# Patient Record
Sex: MALE | Race: WHITE | NOT HISPANIC OR LATINO | URBAN - METROPOLITAN AREA
[De-identification: names, ages, dates, MRNs, and addresses within clinical notes are randomized per-mention and may not be internally consistent; named-entity substitution may affect disease eponyms.]

---

## 2023-08-08 ENCOUNTER — HOSPITAL ENCOUNTER (OUTPATIENT)
Dept: DATA CONVERSION | Facility: HOSPITAL | Age: 68
Discharge: HOME | End: 2023-08-08

## 2023-08-08 DIAGNOSIS — M25.561 PAIN IN RIGHT KNEE: ICD-10-CM

## 2023-08-24 PROBLEM — D36.9 TUBULOVILLOUS ADENOMA: Status: ACTIVE | Noted: 2023-08-24

## 2023-08-24 PROBLEM — D50.9 IRON DEFICIENCY ANEMIA: Status: ACTIVE | Noted: 2023-08-24

## 2023-08-24 PROBLEM — J34.3 HYPERTROPHY OF NASAL TURBINATES: Status: ACTIVE | Noted: 2023-08-24

## 2023-08-24 PROBLEM — J34.2 DEVIATED SEPTUM: Status: ACTIVE | Noted: 2023-08-24

## 2023-08-24 PROBLEM — R53.82 CHRONIC FATIGUE: Status: ACTIVE | Noted: 2023-08-24

## 2023-08-24 PROBLEM — E78.5 HYPERLIPIDEMIA: Status: ACTIVE | Noted: 2023-08-24

## 2023-08-24 PROBLEM — M10.9 GOUT: Status: ACTIVE | Noted: 2023-08-24

## 2023-08-24 PROBLEM — I10 BENIGN HYPERTENSION: Status: ACTIVE | Noted: 2023-08-24

## 2023-08-24 PROBLEM — J30.9 ALLERGIC RHINITIS: Status: ACTIVE | Noted: 2023-08-24

## 2023-08-24 PROBLEM — J31.0 CHRONIC RHINITIS: Status: ACTIVE | Noted: 2023-08-24

## 2023-08-24 PROBLEM — K57.90 DIVERTICULAR DISEASE: Status: ACTIVE | Noted: 2023-08-24

## 2023-08-24 PROBLEM — Z86.010 HX OF COLONIC POLYP: Status: ACTIVE | Noted: 2023-08-24

## 2023-08-24 PROBLEM — Z86.0100 HX OF COLONIC POLYP: Status: ACTIVE | Noted: 2023-08-24

## 2023-08-24 PROBLEM — J34.89 NASAL LESION: Status: ACTIVE | Noted: 2023-08-24

## 2023-08-24 PROBLEM — G47.10 HYPERSOMNOLENCE DISORDER: Status: ACTIVE | Noted: 2023-08-24

## 2023-08-24 PROBLEM — R73.9 HYPERGLYCEMIA: Status: ACTIVE | Noted: 2023-08-24

## 2023-08-24 PROBLEM — G47.33 OBSTRUCTIVE SLEEP APNEA OF ADULT: Status: ACTIVE | Noted: 2023-08-24

## 2023-08-24 PROBLEM — I48.0 PAROXYSMAL ATRIAL FIBRILLATION (MULTI): Status: ACTIVE | Noted: 2023-08-24

## 2023-08-24 RX ORDER — SIMVASTATIN 10 MG/1
10 TABLET, FILM COATED ORAL DAILY
COMMUNITY

## 2023-08-24 RX ORDER — FELODIPINE 10 MG/1
10 TABLET, EXTENDED RELEASE ORAL
COMMUNITY
End: 2024-04-10 | Stop reason: DRUGHIGH

## 2023-08-24 RX ORDER — TERAZOSIN 5 MG/1
10 CAPSULE ORAL DAILY
COMMUNITY

## 2023-08-24 RX ORDER — CLONIDINE HYDROCHLORIDE 0.1 MG/1
TABLET ORAL
COMMUNITY

## 2023-08-24 RX ORDER — BENAZEPRIL HYDROCHLORIDE AND HYDROCHLOROTHIAZIDE 20; 25 MG/1; MG/1
TABLET ORAL
COMMUNITY

## 2023-09-27 ENCOUNTER — HOSPITAL ENCOUNTER (OUTPATIENT)
Dept: DATA CONVERSION | Facility: HOSPITAL | Age: 68
Discharge: HOME | End: 2023-09-27
Payer: COMMERCIAL

## 2023-09-27 DIAGNOSIS — J34.2 DEVIATED NASAL SEPTUM: ICD-10-CM

## 2023-09-27 DIAGNOSIS — J34.3 HYPERTROPHY OF NASAL TURBINATES: ICD-10-CM

## 2023-10-03 ENCOUNTER — OFFICE VISIT (OUTPATIENT)
Dept: OTOLARYNGOLOGY | Facility: CLINIC | Age: 68
End: 2023-10-03
Payer: MEDICARE

## 2023-10-03 DIAGNOSIS — J34.3 HYPERTROPHY OF NASAL TURBINATES: ICD-10-CM

## 2023-10-03 DIAGNOSIS — G47.33 OBSTRUCTIVE SLEEP APNEA OF ADULT: ICD-10-CM

## 2023-10-03 DIAGNOSIS — J34.2 DEVIATED SEPTUM: Primary | ICD-10-CM

## 2023-10-03 DIAGNOSIS — J34.89 NASAL LESION: ICD-10-CM

## 2023-10-03 PROCEDURE — 1159F MED LIST DOCD IN RCRD: CPT | Performed by: OTOLARYNGOLOGY

## 2023-10-03 PROCEDURE — 99024 POSTOP FOLLOW-UP VISIT: CPT | Performed by: OTOLARYNGOLOGY

## 2023-10-03 NOTE — PROGRESS NOTES
HPI  David Chatman is a 68 y.o. male returns status post septoplasty and turbinate surgery September 27, 2023.  His postoperative course was uneventful    He returns in follow-up for nasal obstruction and obstructive sleep apnea. He is using CPAP every night all night with an improvement in his blood pressure, nocturnal awakening and how he feels. He remains bothered by difficulty breathing through the nose during the day. The CPAP overcomes the obstruction at night. The lesion in the right nasal vestibule has gotten larger.   His sleep study showed an apnea-hypopnea index of 77.   History: This is a 67-year-old white male who is here for evaluation of nasal obstruction and snoring and probable sleep apnea. He reports a sleep study 10 years ago that was normal. He has gained significant weight in the meantime. He has bothered by difficulty breathing through the nose both day and night. He uses Flonase on a regular basis. He has loud snoring and witnessed apnea. He does have some postnasal drip          No past medical history on file.         Medications:     Current Outpatient Medications:     benazepriL-hydrochlorothiazide (Lotensin HCT) 20-25 mg tablet, 1 tablet Orally Once a day in am for 90 days, Disp: , Rfl:     cetirizine (ZYRTEC) 10 mg capsule, Take 1 capsule (10 mg) by mouth if needed., Disp: , Rfl:     cloNIDine (Catapres) 0.1 mg tablet, TAKE 1 TABLET TWICE A DAY for 90, Disp: , Rfl:     felodipine ER (Plendil) 10 mg 24 hr tablet, Take 1 tablet (10 mg) by mouth. For 90 days, Disp: , Rfl:     simvastatin (Zocor) 10 mg tablet, Take 1 tablet (10 mg) by mouth once daily. For 90 days, Disp: , Rfl:     terazosin (Hytrin) 5 mg capsule, Take 2 capsules (10 mg) by mouth once daily. For 90 days, Disp: , Rfl:      Allergies:  No Known Allergies     Physical Exam:  Last Recorded Vitals  There were no vitals taken for this visit.  Septal splints were removed.  Turbinates debrided.  Septum straight    Assessment/Plan    Encounter Diagnoses   Name Primary?    Deviated septum Yes    Nasal lesion     Hypertrophy of nasal turbinates     Obstructive sleep apnea of adult          Continue saline nasal spray.  Recheck in 1 week.  Awaiting pathology report on nasal lesion    Bernardo Ramírez MD

## 2023-10-10 ENCOUNTER — OFFICE VISIT (OUTPATIENT)
Dept: OTOLARYNGOLOGY | Facility: CLINIC | Age: 68
End: 2023-10-10
Payer: MEDICARE

## 2023-10-10 VITALS — WEIGHT: 263 LBS | BODY MASS INDEX: 35.62 KG/M2 | HEIGHT: 72 IN | TEMPERATURE: 97.5 F

## 2023-10-10 DIAGNOSIS — G47.33 OBSTRUCTIVE SLEEP APNEA OF ADULT: ICD-10-CM

## 2023-10-10 DIAGNOSIS — J34.3 HYPERTROPHY OF NASAL TURBINATES: ICD-10-CM

## 2023-10-10 DIAGNOSIS — J34.89 NASAL LESION: ICD-10-CM

## 2023-10-10 DIAGNOSIS — J34.2 DEVIATED SEPTUM: Primary | ICD-10-CM

## 2023-10-10 PROCEDURE — 1159F MED LIST DOCD IN RCRD: CPT | Performed by: OTOLARYNGOLOGY

## 2023-10-10 PROCEDURE — 99024 POSTOP FOLLOW-UP VISIT: CPT | Performed by: OTOLARYNGOLOGY

## 2023-10-10 PROCEDURE — 1036F TOBACCO NON-USER: CPT | Performed by: OTOLARYNGOLOGY

## 2023-10-10 NOTE — PROGRESS NOTES
HPI  He returns in follow-up status post septoplasty and turbinate surgery and excision of right nasal vestibule lesion.  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    David Chatman is a 68 y.o. male returns status post septoplasty and turbinate surgery September 27, 2023.  His postoperative course was uneventful    He returns in follow-up for nasal obstruction and obstructive sleep apnea. He is using CPAP every night all night with an improvement in his blood pressure, nocturnal awakening and how he feels. He remains bothered by difficulty breathing through the nose during the day. The CPAP overcomes the obstruction at night. The lesion in the right nasal vestibule has gotten larger.   His sleep study showed an apnea-hypopnea index of 77.   History: This is a 67-year-old white male who is here for evaluation of nasal obstruction and snoring and probable sleep apnea. He reports a sleep study 10 years ago that was normal. He has gained significant weight in the meantime. He has bothered by difficulty breathing through the nose both day and night. He uses Flonase on a regular basis. He has loud snoring and witnessed apnea. He does have some postnasal drip          No past medical history on file.         Medications:     Current Outpatient Medications:     cloNIDine (Catapres) 0.1 mg tablet, TAKE 1 TABLET TWICE A DAY for 90, Disp: , Rfl:     felodipine ER (Plendil) 10 mg 24 hr tablet, Take 1 tablet (10 mg) by mouth. For 90 days, Disp: , Rfl:     simvastatin (Zocor) 10 mg tablet, Take 1 tablet (10 mg) by mouth once daily. For 90 days, Disp: , Rfl:     terazosin (Hytrin) 5 mg capsule, Take 2 capsules (10 mg) by mouth once daily. For 90 days, Disp: , Rfl:     benazepriL-hydrochlorothiazide (Lotensin HCT) 20-25 mg tablet, 1 tablet Orally Once a day in am for 90 days, Disp: , Rfl:     cetirizine (ZYRTEC) 10 mg capsule, Take 1 capsule (10 mg) by mouth if needed., Disp: , Rfl:       Allergies:  No Known Allergies     Physical Exam:  Last Recorded Vitals  Temperature 36.4 °C (97.5 °F), height 1.829 m (6'), weight 119 kg (263 lb).  Nose debrided.  Septum straight.  Turbinates healing well.  Right nasal vestibule healing well without lesion   Assessment/Plan   Encounter Diagnoses   Name Primary?    Deviated septum Yes    Nasal lesion     Hypertrophy of nasal turbinates     Obstructive sleep apnea of adult            Continue saline nasal spray.  Restart CPAP.  Recheck in 2 weeks.  Discussed nasal vestibule pathology.  Will observe    Bernardo Ramírez MD

## 2023-11-03 ENCOUNTER — APPOINTMENT (OUTPATIENT)
Dept: PRIMARY CARE | Facility: CLINIC | Age: 68
End: 2023-11-03
Payer: COMMERCIAL

## 2023-11-03 NOTE — PROGRESS NOTES
Outpatient Visit Note    No chief complaint on file.      With patient's permission, this is a Telemedicine visit with video and audio. The provider and patient participated in this telemedicine encounter.    HPI:  David Chatman is a 68 y.o. male who presents to the office via telemedicine encounter secondary to complaints of acute URI.  He was last seen in the office on 8/7/2023 as a new patient to establish with new primary care provider.           He had most recently been established with [facility] with last visit on 04/27/2023 for 6 month follow-up. Review of chart notes a past medical history significant for hypertension, hyperlipidemia, gout, chronic sinus issues followed by ENT, SHOAIB on CPAP, osteoarthritis /off good Mejias of left knee with orthopedic referral given and prediabetes.    He reports ____    Chronic sinus:  Has established relationship with Dr. Ramírez of ENT to which he follows per protocol. Has attempted to manage symptoms with Flonase nasal spray and Zyrtec. Was additionally found to have SHOAIB and started on CPAP to which he reports. Continued follow-up with ENT as he is actively adjusting to his equipment.                 Current Medications  Current Outpatient Medications   Medication Instructions    benazepriL-hydrochlorothiazide (Lotensin HCT) 20-25 mg tablet 1 tablet Orally Once a day in am for 90 days    cetirizine (ZYRTEC) 10 mg, oral, As needed    cloNIDine (Catapres) 0.1 mg tablet TAKE 1 TABLET TWICE A DAY for 90    felodipine ER (PLENDIL) 10 mg, oral, For 90 days    simvastatin (ZOCOR) 10 mg, oral, Daily, For 90 days    terazosin (HYTRIN) 10 mg, oral, Daily, For 90 days        Allergies  No Known Allergies     No past medical history on file.   No past surgical history on file.  No family history on file.  Social History     Tobacco Use    Smoking status: Never    Smokeless tobacco: Never     Tobacco Use: Low Risk  (10/10/2023)    Patient History      Smoking Tobacco Use: Never     Smokeless Tobacco Use: Never     Passive Exposure: Not on file        ROS  All pertinent positive symptoms are included in the history of present illness.  All other systems have been reviewed and are negative and noncontributory to this patient's current ailments.    VITAL SIGNS  There were no vitals filed for this visit.   There is no height or weight on file to calculate BMI.   Patient is unable to provide    PHYSICAL EXAM  GENERAL APPEARANCE:  Alert and oriented x 3, Pleasant and cooperative, No acute distress.   LUNGS:  No conversational dyspnea or cough during encounter.   PSYCH:  appropriate mood and affect, no difficulty with speech.   Telemedicine visit, no other exam component done.      Assessment/Plan   {Assess/PlanSmartLinks:07904}

## 2023-11-07 ENCOUNTER — APPOINTMENT (OUTPATIENT)
Dept: OTOLARYNGOLOGY | Facility: CLINIC | Age: 68
End: 2023-11-07
Payer: COMMERCIAL

## 2023-11-21 ENCOUNTER — OFFICE VISIT (OUTPATIENT)
Dept: OTOLARYNGOLOGY | Facility: CLINIC | Age: 68
End: 2023-11-21
Payer: MEDICARE

## 2023-11-21 VITALS — WEIGHT: 263 LBS | BODY MASS INDEX: 35.62 KG/M2 | TEMPERATURE: 97.5 F | HEIGHT: 72 IN

## 2023-11-21 DIAGNOSIS — J34.2 DEVIATED SEPTUM: Primary | ICD-10-CM

## 2023-11-21 DIAGNOSIS — J34.89 NASAL LESION: ICD-10-CM

## 2023-11-21 DIAGNOSIS — H93.13 TINNITUS OF BOTH EARS: ICD-10-CM

## 2023-11-21 DIAGNOSIS — H90.3 SENSORINEURAL HEARING LOSS (SNHL) OF BOTH EARS: ICD-10-CM

## 2023-11-21 DIAGNOSIS — G47.33 OBSTRUCTIVE SLEEP APNEA OF ADULT: ICD-10-CM

## 2023-11-21 PROCEDURE — 99024 POSTOP FOLLOW-UP VISIT: CPT | Performed by: OTOLARYNGOLOGY

## 2023-11-21 PROCEDURE — 1159F MED LIST DOCD IN RCRD: CPT | Performed by: OTOLARYNGOLOGY

## 2023-11-21 PROCEDURE — 1036F TOBACCO NON-USER: CPT | Performed by: OTOLARYNGOLOGY

## 2023-11-21 NOTE — PROGRESS NOTES
HPI  He returns in follow-up status post septoplasty and turbinate surgery and excision of right nasal vestibule lesion.  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  He is breathing well and happy with his nasal airway.  He has additional questions regarding tinnitus and  related noise exposure with hearing loss.  He is looking for me to fill out some disability information regarding the hearing loss.  [facility]  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 male returns status post septoplasty and turbinate surgery September 27, 2023.  His postoperative course was uneventful    He returns in follow-up for nasal obstruction and obstructive sleep apnea. He is using CPAP every night all night with an improvement in his blood pressure, nocturnal awakening and how he feels. He remains bothered by difficulty breathing through the nose during the day. The CPAP overcomes the obstruction at night. The lesion in the right nasal vestibule has gotten larger.   His sleep study showed an apnea-hypopnea index of 77.   History: This is a 67-year-old white male who is here for evaluation of nasal obstruction and snoring and probable sleep apnea. He reports a sleep study 10 years ago that was normal. He has gained significant weight in the meantime. He has bothered by difficulty breathing through the nose both day and night. He uses Flonase on a regular basis. He has loud snoring and witnessed apnea. He does have some postnasal drip          No past medical history on file.         Medications:     Current Outpatient Medications:     benazepriL-hydrochlorothiazide (Lotensin HCT) 20-25 mg tablet, 1 tablet Orally Once a day in am for 90 days, Disp: , Rfl:     cetirizine (ZYRTEC) 10 mg capsule, Take 1 capsule (10 mg) by mouth if needed., Disp: , Rfl:     cloNIDine (Catapres) 0.1 mg tablet, TAKE 1 TABLET  TWICE A DAY for 90, Disp: , Rfl:     felodipine ER (Plendil) 10 mg 24 hr tablet, Take 1 tablet (10 mg) by mouth. For 90 days, Disp: , Rfl:     simvastatin (Zocor) 10 mg tablet, Take 1 tablet (10 mg) by mouth once daily. For 90 days, Disp: , Rfl:     terazosin (Hytrin) 5 mg capsule, Take 2 capsules (10 mg) by mouth once daily. For 90 days, Disp: , Rfl:      Allergies:  No Known Allergies     Physical Exam:  Last Recorded Vitals  Temperature 36.4 °C (97.5 °F), height 1.829 m (6'), weight 119 kg (263 lb).  Septum straight.  Turbinates healed.  Right nasal vestibule healed without lesion.  Ear exam normal  Assessment/Plan   Encounter Diagnoses   Name Primary?    Deviated septum Yes    Nasal lesion     Obstructive sleep apnea of adult     Sensorineural hearing loss (SNHL) of both ears     Tinnitus of both ears        He is doing well postoperatively status post septoplasty and turbinate surgery.  No recurrence of nasal vestibule lesion on the right.  He is tolerating CPAP and using it every night.  I have asked him to [facility] and schedule a audiogram and follow-up here for evaluation of his tinnitus and hearing loss.     Bernardo Ramírez MD

## 2023-12-05 ENCOUNTER — APPOINTMENT (OUTPATIENT)
Dept: OTOLARYNGOLOGY | Facility: CLINIC | Age: 68
End: 2023-12-05
Payer: COMMERCIAL

## 2023-12-12 ENCOUNTER — OFFICE VISIT (OUTPATIENT)
Dept: OTOLARYNGOLOGY | Facility: CLINIC | Age: 68
End: 2023-12-12
Payer: MEDICARE

## 2023-12-12 ENCOUNTER — CLINICAL SUPPORT (OUTPATIENT)
Dept: AUDIOLOGY | Facility: CLINIC | Age: 68
End: 2023-12-12
Payer: MEDICARE

## 2023-12-12 VITALS — WEIGHT: 263 LBS | TEMPERATURE: 97.5 F | BODY MASS INDEX: 35.62 KG/M2 | HEIGHT: 72 IN

## 2023-12-12 DIAGNOSIS — J30.9 ALLERGIC RHINITIS, UNSPECIFIED SEASONALITY, UNSPECIFIED TRIGGER: Primary | ICD-10-CM

## 2023-12-12 DIAGNOSIS — H93.13 TINNITUS OF BOTH EARS: ICD-10-CM

## 2023-12-12 DIAGNOSIS — G47.33 OBSTRUCTIVE SLEEP APNEA OF ADULT: ICD-10-CM

## 2023-12-12 DIAGNOSIS — H90.3 SENSORINEURAL HEARING LOSS (SNHL) OF BOTH EARS: ICD-10-CM

## 2023-12-12 DIAGNOSIS — J34.89 NASAL LESION: ICD-10-CM

## 2023-12-12 DIAGNOSIS — H93.13 TINNITUS OF BOTH EARS: Primary | ICD-10-CM

## 2023-12-12 PROCEDURE — 92557 COMPREHENSIVE HEARING TEST: CPT | Performed by: AUDIOLOGIST

## 2023-12-12 PROCEDURE — 92550 TYMPANOMETRY & REFLEX THRESH: CPT | Performed by: AUDIOLOGIST

## 2023-12-12 PROCEDURE — 1159F MED LIST DOCD IN RCRD: CPT | Performed by: OTOLARYNGOLOGY

## 2023-12-12 PROCEDURE — 1036F TOBACCO NON-USER: CPT | Performed by: OTOLARYNGOLOGY

## 2023-12-12 NOTE — PROGRESS NOTES
AUDIOLOGY ADULT AUDIOMETRIC EVALUATION    Name:  David Chatman  :  1955  Age:  68 y.o.  Date of Evaluation:  2023    Reason for visit: Patient is seen in the clinic today at the request of Bernardo Ramírez MD in otolaryngology for an audiologic evaluation.     HISTORY  The patient has a history of bilateral sensorineural hearing loss.  He reported that he has been experiencing constant, bilateral tinnitus for at least twenty years.  Otalgia, aural pressure and dizziness were denied.  He was exposed to excessive noise while serving in the .    EVALUATION  See scanned audiogram: “Media” > “Audiology Report”.    RESULTS  Otoscopic Evaluation:  Right Ear: clear ear canal  Left Ear: clear ear canal    Immittance Measures:  Tympanometry:  Right Ear: Type A, normal tympanic membrane mobility with normal middle ear pressure   Left Ear: Type A, normal tympanic membrane mobility with normal middle ear pressure     Acoustic Reflexes:  Ipsilateral Right Ear: Present at normal levels at 500-2000 Hz, absent at 4000 Hz   Ipsilateral Left Ear: Present at normal levels at 500-2000 Hz, absent at 4000 Hz   Contralateral Right Ear: did not evaluate  Contralateral Left Ear: did not evaluate    Distortion Product Otoacoustic Emissions (DPOAEs):  Right Ear: passed 7807-0085 Hz, absent 8596-7283 Hz  Left Ear: passed 1209-9612 Hz, absent 9282-6426 Hz    Audiometry:  Test Technique and Reliability:   Standard audiometry via supra-aural headphones. Reliability is good.    Pure tone air and bone conduction audiometry:  Right Ear: mild sloping to moderately-severe sensorineural hearing loss at 6674-8069 Hz  Left Ear: mild to moderately-severe sensorineural hearing loss at 8728-4138 Hz    Speech Audiometry (Word Recognition Scores):   Right Ear: Excellent, 100%   Left Ear: Excellent, 100%     IMPRESSIONS  Results of today's audiometric evaluation revealed a bilateral sensorineural hearing loss.    The presence of  acoustic reflexes within normal intensity limits is consistent with normal middle ear and brainstem function, and suggests that auditory sensitivity is not significantly impaired. An elevated or absent acoustic reflex threshold is consistent with a middle ear disorder, hearing loss in the stimulated ear, and/or interruption of neural innervation of the stapedius muscle. Present DPOAEs suggest normal/near normal cochlear outer hair cell function and are consistent with no greater than a mild hearing loss at those frequencies. Absent DPOAEs are consistent with abnormal cochlear outer hair cell function and some degree of hearing loss at those frequencies.    RECOMMENDATIONS  - Follow up with otolaryngology today as scheduled.  - Annual audiologic evaluation, sooner if an acute change is noted.  - Hearing aid evaluation.  - Wear hearing protection when exposed to excessive noise.    PATIENT EDUCATION  Discussed results, impressions and recommendations with the patient. Questions were addressed and the patient was encouraged to contact our office should concerns arise.    Time for this encounter: 10:15-10:45

## 2023-12-12 NOTE — PROGRESS NOTES
HPI  He returns in follow-up status post septoplasty and turbinate surgery and excision of right nasal vestibule lesion.  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  He is breathing well and happy with his nasal airway.    He has additional questions regarding tinnitus and  related noise exposure with hearing loss. 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  An audiogram today shows bilateral mid to high-frequency moderate to severe sensorineural hearing loss with excellent word recognition and normal tympanometry.  He is very bothered by tinnitus.    David Chatman is a 68 y.o. male returns status post septoplasty and turbinate surgery September 27, 2023.  His postoperative course was uneventful    He returns in follow-up for nasal obstruction and obstructive sleep apnea. He is using CPAP every night all night with an improvement in his blood pressure, nocturnal awakening and how he feels. He remains bothered by difficulty breathing through the nose during the day. The CPAP overcomes the obstruction at night. The lesion in the right nasal vestibule has gotten larger.   His sleep study showed an apnea-hypopnea index of 77.   History: This is a 67-year-old white male who is here for evaluation of nasal obstruction and snoring and probable sleep apnea. He reports a sleep study 10 years ago that was normal. He has gained significant weight in the meantime. He has bothered by difficulty breathing through the nose both day and night. He uses Flonase on a regular basis. He has loud snoring and witnessed apnea. He does have some postnasal drip          No past medical history on file.         Medications:     Current Outpatient Medications:     benazepriL-hydrochlorothiazide (Lotensin HCT) 20-25 mg tablet, 1 tablet Orally Once a day in am for 90 days, Disp: , Rfl:     cetirizine (ZYRTEC) 10 mg capsule, Take 1 capsule (10 mg) by mouth if needed., Disp: , Rfl:      cloNIDine (Catapres) 0.1 mg tablet, TAKE 1 TABLET TWICE A DAY for 90, Disp: , Rfl:     felodipine ER (Plendil) 10 mg 24 hr tablet, Take 1 tablet (10 mg) by mouth. For 90 days, Disp: , Rfl:     simvastatin (Zocor) 10 mg tablet, Take 1 tablet (10 mg) by mouth once daily. For 90 days, Disp: , Rfl:     terazosin (Hytrin) 5 mg capsule, Take 2 capsules (10 mg) by mouth once daily. For 90 days, Disp: , Rfl:      Allergies:  No Known Allergies     Physical Exam:  Last Recorded Vitals  Temperature 36.4 °C (97.5 °F), height 1.829 m (6'), weight 119 kg (263 lb).  []General appearance: Well-developed, well-nourished in no acute distress, conversant with normal voice quality    Head/face: No erythema or edema or facial tenderness, and normal facial nerve function bilaterally    External ear: Clear external auditory canals with normal pinnae  Tube status: N/A  Middle ear: Tympanic membranes intact and mobile, middle ears normal.  Tympanic membrane perforation: N/A  Mastoid bowl: N/A  Hearing: Normal conversational awareness at normal speech thresholds    Nose visualized using: Anterior rhinoscopy  Nasal dorsum: Nontraumatic midline appearance  Septum: Midline, nonobstructing  Inferior turbinates: Normal, pink  Secretions: Dry    Oral cavity and oropharynx: Normal  Teeth: Good condition  Floor of mouth: without lesions  Palate: Normal hard palate, soft palate and uvula  Oropharynx: Clear, no lesions present  Buccal mucosa: Normal without masses or lesions  Lips: Normal    Nasopharynx: Inadequate mirror exam secondary to gag/anatomy    Neck:  Salivary glands: Normal bilateral parotid and submandibular glands by inspection and palpation.  Non-thyroid masses: No palpable masses or significant lymphadenopathy  Trachea: Midline  Thyroid: No thyromegaly or palpable nodules  Temporomandibular joint: Nontender  Cervical range of motion: Normal    Neurologic exam: Alert and oriented x3, appropriate affect.  Cranial nerves II-XII normal  bilaterally  Extraocular movement: Extraocular movement intact, normal gaze alignment  Assessment/Plan   No diagnosis found.      He is doing well postoperatively status post septoplasty and turbinate surgery.  No recurrence of nasal vestibule lesion on the right.  He is tolerating CPAP and using it every night.  Follow-up with me in 3 months  I have reviewed his audiogram from the [facility] in 2019 and our audiogram today.  He has classic noise-induced sensorineural hearing loss.  His history of noise exposure in the  is the likely cause of this hearing loss.  It is more likely than not the proximate cause of his hearing loss which is then causing his tinnitus.  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  He will work through the [facility] for this.   Bernardo Ramírez MD

## 2024-02-07 ENCOUNTER — TELEPHONE (OUTPATIENT)
Dept: PRIMARY CARE | Facility: CLINIC | Age: 69
End: 2024-02-07

## 2024-02-07 ENCOUNTER — OFFICE VISIT (OUTPATIENT)
Dept: PRIMARY CARE | Facility: CLINIC | Age: 69
End: 2024-02-07
Payer: MEDICARE

## 2024-02-07 VITALS
WEIGHT: 269 LBS | HEART RATE: 78 BPM | OXYGEN SATURATION: 94 % | TEMPERATURE: 97.5 F | DIASTOLIC BLOOD PRESSURE: 66 MMHG | HEIGHT: 72 IN | SYSTOLIC BLOOD PRESSURE: 146 MMHG | BODY MASS INDEX: 36.44 KG/M2

## 2024-02-07 DIAGNOSIS — N50.811 RIGHT TESTICULAR PAIN: Primary | ICD-10-CM

## 2024-02-07 PROCEDURE — 3077F SYST BP >= 140 MM HG: CPT | Performed by: FAMILY MEDICINE

## 2024-02-07 PROCEDURE — 1036F TOBACCO NON-USER: CPT | Performed by: FAMILY MEDICINE

## 2024-02-07 PROCEDURE — 1159F MED LIST DOCD IN RCRD: CPT | Performed by: FAMILY MEDICINE

## 2024-02-07 PROCEDURE — 99214 OFFICE O/P EST MOD 30 MIN: CPT | Performed by: FAMILY MEDICINE

## 2024-02-07 PROCEDURE — 1160F RVW MEDS BY RX/DR IN RCRD: CPT | Performed by: FAMILY MEDICINE

## 2024-02-07 PROCEDURE — 3078F DIAST BP <80 MM HG: CPT | Performed by: FAMILY MEDICINE

## 2024-02-07 PROCEDURE — 1124F ACP DISCUSS-NO DSCNMKR DOCD: CPT | Performed by: FAMILY MEDICINE

## 2024-02-07 PROCEDURE — 1125F AMNT PAIN NOTED PAIN PRSNT: CPT | Performed by: FAMILY MEDICINE

## 2024-02-07 PROCEDURE — 3008F BODY MASS INDEX DOCD: CPT | Performed by: FAMILY MEDICINE

## 2024-02-07 RX ORDER — SEMAGLUTIDE 0.25 MG/.5ML
0.25 INJECTION, SOLUTION SUBCUTANEOUS
Qty: 2 ML | Refills: 0 | Status: SHIPPED | OUTPATIENT
Start: 2024-02-07 | End: 2024-02-29

## 2024-02-07 RX ORDER — DOXYCYCLINE 100 MG/1
100 CAPSULE ORAL 2 TIMES DAILY
Qty: 20 CAPSULE | Refills: 0 | Status: SHIPPED | OUTPATIENT
Start: 2024-02-07 | End: 2024-02-17

## 2024-02-07 RX ORDER — METHYLPREDNISOLONE 4 MG/1
TABLET ORAL
Qty: 21 TABLET | Refills: 0 | Status: SHIPPED | OUTPATIENT
Start: 2024-02-07 | End: 2024-02-14

## 2024-02-07 ASSESSMENT — PATIENT HEALTH QUESTIONNAIRE - PHQ9
1. LITTLE INTEREST OR PLEASURE IN DOING THINGS: NOT AT ALL
SUM OF ALL RESPONSES TO PHQ9 QUESTIONS 1 AND 2: 0
2. FEELING DOWN, DEPRESSED OR HOPELESS: NOT AT ALL

## 2024-02-07 ASSESSMENT — PAIN SCALES - GENERAL: PAINLEVEL: 8

## 2024-02-07 NOTE — ASSESSMENT & PLAN NOTE
- Unclear the exact cause of your symptoms though we will cover with an anti-inflammatory along with antibiotic  -Ultrasound ordered along with urology referral given plans for consultation if symptoms persist

## 2024-02-07 NOTE — ASSESSMENT & PLAN NOTE
- With ongoing struggles with weight despite lifestyle modification will attempt to initiate medical weight loss medication in the form of Wegovy  -If we are able to get this approved by insurance would plan for follow-up in 3 to 4 weeks  -If this is denied, would recommend talking with insurance/pharmacy to see if there is an approved alternative

## 2024-02-07 NOTE — TELEPHONE ENCOUNTER
The pt has question about [facility] and [facility].  He was supposed to give something for weight loss and he did not get that.  Asking if he can take these two prescribed medications together ?

## 2024-02-07 NOTE — PROGRESS NOTES
Outpatient Visit Note    Chief Complaint   Patient presents with    Groin Swelling     Right testicle swelling and soreness in groin on right side x2 days. No known injury       HPI:  David Chatman is a 68 y.o. male a past medical history significant for hypertension, hyperlipidemia, gout, chronic sinus issues followed by ENT, SHOAIB on CPAP, osteoarthritis /off good Mejias of left knee with orthopedic referral given and prediabetes who presents to the office secondary to testicular complaint.  He was last seen in the office on 8/7/2023 as a new patient to establish with new primary care provider.           He was previously established with [facility] with last visit on 04/27/2023 for 6 month follow-up. Review of chart notes.    He reports right-sided testicular pain and swelling for approximately 2 days ago.  States that he was in the garage working in his car when he started develop soreness/aching discomfort and sensation of swelling in his right testicle.  Denies any injury.  States that pain has occasionally radiated up into his right groin.  Urinary discomfort, discharge, hematuria or other systemic complaints such as fever or chills.  Denies ever having any similar symptomatic complaints in the past states to have taken 1 dose of ibuprofen with no significant symptom improvement.  No reported left-sided testicular complaints.    Separately, patient does express concerns regarding going persistent/progressive weight gain.  Has attempted to modify diet by reducing portions his physical activity as tolerated with no success in losing weight.  With this, he does express interest in potential medical weight loss options.    Current Medications  Current Outpatient Medications   Medication Instructions    benazepriL-hydrochlorothiazide (Lotensin HCT) 20-25 mg tablet 1 tablet Orally Once a day in am for 90 days    cloNIDine (Catapres) 0.1 mg tablet TAKE 1 TABLET TWICE A DAY for 90     doxycycline (VIBRAMYCIN) 100 mg, oral, 2 times daily, Take with at least 8 ounces (large glass) of water, do not lie down for 30 minutes after    felodipine ER (PLENDIL) 10 mg, oral, For 90 days    methylPREDNISolone (Medrol Dospak) 4 mg tablets Take as directed on package.    simvastatin (ZOCOR) 10 mg, oral, Daily, For 90 days    terazosin (HYTRIN) 10 mg, oral, Daily, For 90 days    Wegovy 0.25 mg, subcutaneous, Weekly        Allergies  No Known Allergies     Past Medical History:   Diagnosis Date    Gout     Hyperlipidemia     Hypertension       Past Surgical History:   Procedure Laterality Date    SINUS SURGERY       No family history on file.  Social History     Tobacco Use    Smoking status: Never    Smokeless tobacco: Never   Vaping Use    Vaping Use: Never used   Substance Use Topics    Alcohol use: Yes     Alcohol/week: 2.0 standard drinks of alcohol     Types: 2 Standard drinks or equivalent per week    Drug use: Never       ROS  All pertinent positive symptoms are included in the history of present illness.  All other systems have been reviewed and are negative and noncontributory to this patient's current ailments.    PHQ9/GAD7:        VITAL SIGNS  Vitals:    02/07/24 1259   BP: 146/66   Pulse: 78   Temp: 36.4 °C (97.5 °F)   SpO2: 94%       PHYSICAL EXAM  GENERAL APPEARANCE: alert and oriented, Pleasant and cooperative, No Acute Distress  HEENT: EOMI, PERRLA, MMM  HEART: RRR, normal S1S2, no murmurs, click or rubs  LUNGS: clear to auscultation bilaterally, no wheezes/rhonchi/rales  EXTREMITIES: no edema, normal ROM  : Right testicular enlargement with scrotal erythema and prominent TTP, left testicle grossly unremarkable, no appreciated penile lesions/discharge  SKIN: normal, no rash, unremarkable  NEUROLOGIC EXAM: non-focal exam  MUSCULOSKELETAL: no gross abnormalities  PSYCH: affect is normal, eye contact is good      Assessment/Plan   Problem List Items Addressed This Visit             ICD-10-CM     Right testicular pain - Primary N50.811     - Unclear the exact cause of your symptoms though we will cover with an anti-inflammatory along with antibiotic  -Ultrasound ordered along with urology referral given plans for consultation if symptoms persist         Relevant Medications    doxycycline (Vibramycin) 100 mg capsule    methylPREDNISolone (Medrol Dospak) 4 mg tablets    Other Relevant Orders    US scrotum    Referral to Urology    BMI 36.0-36.9,adult Z68.36     - With ongoing struggles with weight despite lifestyle modification will attempt to initiate medical weight loss medication in the form of Wegovy  -If we are able to get this approved by insurance would plan for follow-up in 3 to 4 weeks  -If this is denied, would recommend talking with insurance/pharmacy to see if there is an approved alternative         Relevant Medications    semaglutide, weight loss, (Wegovy) 0.25 mg/0.5 mL pen injector       Counseling:       Medication education:         Education:  The patient is counseled regarding potential side-effects of all new medications        Understanding:  Patient expressed understanding        Adherence:  No barriers to adherence identified

## 2024-02-07 NOTE — PATIENT INSTRUCTIONS
Problem List Items Addressed This Visit             ICD-10-CM    Right testicular pain - Primary N50.811     - Unclear the exact cause of your symptoms though we will cover with an anti-inflammatory along with antibiotic  -Ultrasound ordered along with urology referral given plans for consultation if symptoms persist         Relevant Medications    doxycycline (Vibramycin) 100 mg capsule    methylPREDNISolone (Medrol Dospak) 4 mg tablets    Other Relevant Orders    US scrotum    Referral to Urology    BMI 36.0-36.9,adult Z68.36     - With ongoing struggles with weight despite lifestyle modification will attempt to initiate medical weight loss medication in the form of Wegovy  -If we are able to get this approved by insurance would plan for follow-up in 3 to 4 weeks  -If this is denied, would recommend talking with insurance/pharmacy to see if there is an approved alternative         Relevant Medications    semaglutide, weight loss, (Wegovy) 0.25 mg/0.5 mL pen injector       Counseling:       Medication education:         Education:  The patient is counseled regarding potential side-effects of all new medications        Understanding:  Patient expressed understanding        Adherence:  No barriers to adherence identified

## 2024-02-08 NOTE — TELEPHONE ENCOUNTER
It is intended for patient to take both Medrol pack and doxycycline at same time together.  Both should be taken as directed with food.  As for the weight loss medication, I did send prescription of Wegovy to [facility] pharmacy.  If they did not fill it it may be related to his insurance not covering the medication or a prior authorization needing to be completed though I have not received any notification of prior authorization.  Would need to reach out to pharmacy to confirm status of prescription

## 2024-03-05 ENCOUNTER — TELEPHONE (OUTPATIENT)
Dept: PRIMARY CARE | Facility: CLINIC | Age: 69
End: 2024-03-05

## 2024-03-12 ENCOUNTER — OFFICE VISIT (OUTPATIENT)
Dept: OTOLARYNGOLOGY | Facility: CLINIC | Age: 69
End: 2024-03-12
Payer: MEDICARE

## 2024-03-12 VITALS — BODY MASS INDEX: 36.44 KG/M2 | WEIGHT: 269 LBS | HEIGHT: 72 IN

## 2024-03-12 DIAGNOSIS — J34.89 NASAL LESION: ICD-10-CM

## 2024-03-12 DIAGNOSIS — H90.3 SENSORINEURAL HEARING LOSS (SNHL) OF BOTH EARS: ICD-10-CM

## 2024-03-12 DIAGNOSIS — H60.543 ECZEMA OF BOTH EXTERNAL EARS: Primary | ICD-10-CM

## 2024-03-12 DIAGNOSIS — G47.33 OBSTRUCTIVE SLEEP APNEA OF ADULT: ICD-10-CM

## 2024-03-12 DIAGNOSIS — J30.9 ALLERGIC RHINITIS, UNSPECIFIED SEASONALITY, UNSPECIFIED TRIGGER: ICD-10-CM

## 2024-03-12 PROCEDURE — 1036F TOBACCO NON-USER: CPT | Performed by: OTOLARYNGOLOGY

## 2024-03-12 PROCEDURE — 1160F RVW MEDS BY RX/DR IN RCRD: CPT | Performed by: OTOLARYNGOLOGY

## 2024-03-12 PROCEDURE — 1159F MED LIST DOCD IN RCRD: CPT | Performed by: OTOLARYNGOLOGY

## 2024-03-12 PROCEDURE — 99214 OFFICE O/P EST MOD 30 MIN: CPT | Performed by: OTOLARYNGOLOGY

## 2024-03-12 PROCEDURE — 3008F BODY MASS INDEX DOCD: CPT | Performed by: OTOLARYNGOLOGY

## 2024-03-12 PROCEDURE — 1125F AMNT PAIN NOTED PAIN PRSNT: CPT | Performed by: OTOLARYNGOLOGY

## 2024-03-12 RX ORDER — FLUOCINOLONE ACETONIDE 0.1 MG/G
CREAM TOPICAL
Qty: 1 G | Refills: 3 | Status: SHIPPED | OUTPATIENT
Start: 2024-03-12

## 2024-03-12 NOTE — PROGRESS NOTES
HPI  He returns in follow-up status post septoplasty and turbinate surgery and excision of right nasal vestibule lesion.  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  He is breathing well and happy with his nasal airway.  In the morning when he wakes up he has some congestion.  He complains of dry itchy ears  He has additional questions regarding tinnitus and  related noise exposure with hearing loss. 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  An audiogram today shows bilateral mid to high-frequency moderate to severe sensorineural hearing loss with excellent word recognition and normal tympanometry.  He is very bothered by tinnitus.    David Chatman is a 68 y.o. male returns status post septoplasty and turbinate surgery September 27, 2023.  His postoperative course was uneventful    He returns in follow-up for nasal obstruction and obstructive sleep apnea. He is using CPAP every night all night with an improvement in his blood pressure, nocturnal awakening and how he feels. He remains bothered by difficulty breathing through the nose during the day. The CPAP overcomes the obstruction at night. The lesion in the right nasal vestibule has gotten larger.   His sleep study showed an apnea-hypopnea index of 77.   History: This is a 67-year-old white male who is here for evaluation of nasal obstruction and snoring and probable sleep apnea. He reports a sleep study 10 years ago that was normal. He has gained significant weight in the meantime. He has bothered by difficulty breathing through the nose both day and night. He uses Flonase on a regular basis. He has loud snoring and witnessed apnea. He does have some postnasal drip          Past Medical History:   Diagnosis Date    Gout     Hyperlipidemia     Hypertension             Medications:     Current Outpatient Medications:     benazepriL-hydrochlorothiazide (Lotensin HCT) 20-25 mg tablet, 1 tablet Orally  Once a day in am for 90 days, Disp: , Rfl:     cloNIDine (Catapres) 0.1 mg tablet, TAKE 1 TABLET TWICE A DAY for 90, Disp: , Rfl:     felodipine ER (Plendil) 10 mg 24 hr tablet, Take 1 tablet (10 mg) by mouth. For 90 days, Disp: , Rfl:     simvastatin (Zocor) 10 mg tablet, Take 1 tablet (10 mg) by mouth once daily. For 90 days, Disp: , Rfl:     terazosin (Hytrin) 5 mg capsule, Take 2 capsules (10 mg) by mouth once daily. For 90 days, Disp: , Rfl:     fluocinolone 0.01 % cream, Applied to external ear canal once daily as needed for itching, Disp: 1 g, Rfl: 3    semaglutide, weight loss, (Wegovy) 0.25 mg/0.5 mL pen injector, Inject 0.25 mg under the skin 1 (one) time per week for 4 doses., Disp: 2 mL, Rfl: 0     Allergies:  No Known Allergies     Physical Exam:  Last Recorded Vitals  Height 1.829 m (6'), weight 122 kg (269 lb).  []General appearance: Well-developed, well-nourished in no acute distress, conversant with normal voice quality    Head/face: No erythema or edema or facial tenderness, and normal facial nerve function bilaterally    External ear: Clear external auditory canals with normal pinnae bilateral eczema  Tube status: N/A  Middle ear: Tympanic membranes intact and mobile, middle ears normal.  Tympanic membrane perforation: N/A  Mastoid bowl: N/A  Hearing: Normal conversational awareness at normal speech thresholds    Nose visualized using: Anterior rhinoscopy  Nasal dorsum: Nontraumatic midline appearance  Septum: Midline, nonobstructing  Inferior turbinates: Normal, pink  Secretions: Dry    Oral cavity and oropharynx: Normal  Teeth: Good condition  Floor of mouth: without lesions  Palate: Normal hard palate, soft palate and uvula  Oropharynx: Clear, no lesions present  Buccal mucosa: Normal without masses or lesions  Lips: Normal    Nasopharynx: Inadequate mirror exam secondary to gag/anatomy    Neck:  Salivary glands: Normal bilateral parotid and submandibular glands by inspection and  palpation.  Non-thyroid masses: No palpable masses or significant lymphadenopathy  Trachea: Midline  Thyroid: No thyromegaly or palpable nodules  Temporomandibular joint: Nontender  Cervical range of motion: Normal    Neurologic exam: Alert and oriented x3, appropriate affect.  Cranial nerves II-XII normal bilaterally  Extraocular movement: Extraocular movement intact, normal gaze alignment  Assessment/Plan   Encounter Diagnoses   Name Primary?    Eczema of both external ears Yes    Allergic rhinitis, unspecified seasonality, unspecified trigger     Obstructive sleep apnea of adult     Nasal lesion     Sensorineural hearing loss (SNHL) of both ears          He is doing well postoperatively status post septoplasty and turbinate surgery.  No recurrence of nasal vestibule lesion on the right.  He is tolerating CPAP and using it every night.  Follow-up with me in 3 months  Fluocinolone cream for the chronic itching in the ears.  Flonase nightly and humidification.   Bernardo Ramírez MD

## 2024-04-10 ENCOUNTER — OFFICE VISIT (OUTPATIENT)
Dept: PRIMARY CARE | Facility: CLINIC | Age: 69
End: 2024-04-10
Payer: MEDICARE

## 2024-04-10 ENCOUNTER — LAB (OUTPATIENT)
Dept: LAB | Facility: LAB | Age: 69
End: 2024-04-10
Payer: MEDICARE

## 2024-04-10 VITALS
WEIGHT: 256.8 LBS | TEMPERATURE: 97.3 F | HEIGHT: 72 IN | OXYGEN SATURATION: 93 % | DIASTOLIC BLOOD PRESSURE: 66 MMHG | HEART RATE: 71 BPM | SYSTOLIC BLOOD PRESSURE: 132 MMHG | BODY MASS INDEX: 34.78 KG/M2

## 2024-04-10 DIAGNOSIS — Z11.59 NEED FOR HEPATITIS C SCREENING TEST: ICD-10-CM

## 2024-04-10 DIAGNOSIS — E78.5 HYPERLIPIDEMIA, UNSPECIFIED HYPERLIPIDEMIA TYPE: ICD-10-CM

## 2024-04-10 DIAGNOSIS — Z00.00 ROUTINE ADULT HEALTH MAINTENANCE: ICD-10-CM

## 2024-04-10 DIAGNOSIS — Z12.5 PROSTATE CANCER SCREENING: ICD-10-CM

## 2024-04-10 DIAGNOSIS — D50.9 IRON DEFICIENCY ANEMIA, UNSPECIFIED IRON DEFICIENCY ANEMIA TYPE: ICD-10-CM

## 2024-04-10 DIAGNOSIS — Z00.00 ROUTINE ADULT HEALTH MAINTENANCE: Primary | ICD-10-CM

## 2024-04-10 DIAGNOSIS — I10 BENIGN HYPERTENSION: ICD-10-CM

## 2024-04-10 DIAGNOSIS — I48.0 PAROXYSMAL ATRIAL FIBRILLATION (MULTI): ICD-10-CM

## 2024-04-10 DIAGNOSIS — M1A.9XX0 CHRONIC GOUT WITHOUT TOPHUS, UNSPECIFIED CAUSE, UNSPECIFIED SITE: ICD-10-CM

## 2024-04-10 DIAGNOSIS — R73.03 PREDIABETES: ICD-10-CM

## 2024-04-10 DIAGNOSIS — R22.9 SUBCUTANEOUS NODULE: ICD-10-CM

## 2024-04-10 DIAGNOSIS — M17.0 PRIMARY OSTEOARTHRITIS OF BOTH KNEES: ICD-10-CM

## 2024-04-10 DIAGNOSIS — Z23 ENCOUNTER FOR IMMUNIZATION: ICD-10-CM

## 2024-04-10 PROBLEM — M17.9 OSTEOARTHRITIS OF KNEE: Status: ACTIVE | Noted: 2024-04-10

## 2024-04-10 PROBLEM — H93.13 TINNITUS OF BOTH EARS: Status: ACTIVE | Noted: 2024-04-10

## 2024-04-10 PROBLEM — H90.3 SENSORINEURAL HEARING LOSS (SNHL) OF BOTH EARS: Status: ACTIVE | Noted: 2024-04-10

## 2024-04-10 LAB
ALBUMIN SERPL-MCNC: 4.5 G/DL (ref 3.5–5)
ALP BLD-CCNC: 88 U/L (ref 35–125)
ALT SERPL-CCNC: 25 U/L (ref 5–40)
ANION GAP SERPL CALC-SCNC: 14 MMOL/L
AST SERPL-CCNC: 23 U/L (ref 5–40)
BILIRUB SERPL-MCNC: 0.6 MG/DL (ref 0.1–1.2)
BUN SERPL-MCNC: 17 MG/DL (ref 8–25)
CALCIUM SERPL-MCNC: 9 MG/DL (ref 8.5–10.4)
CHLORIDE SERPL-SCNC: 104 MMOL/L (ref 97–107)
CHOLEST SERPL-MCNC: 157 MG/DL (ref 133–200)
CHOLEST/HDLC SERPL: 3 {RATIO}
CO2 SERPL-SCNC: 23 MMOL/L (ref 24–31)
CREAT SERPL-MCNC: 1 MG/DL (ref 0.4–1.6)
EGFRCR SERPLBLD CKD-EPI 2021: 83 ML/MIN/1.73M*2
ERYTHROCYTE [DISTWIDTH] IN BLOOD BY AUTOMATED COUNT: 14 % (ref 11.5–14.5)
EST. AVERAGE GLUCOSE BLD GHB EST-MCNC: 114 MG/DL
FERRITIN SERPL-MCNC: 389 NG/ML (ref 30–400)
GLUCOSE SERPL-MCNC: 101 MG/DL (ref 65–99)
HBA1C MFR BLD: 5.6 %
HCT VFR BLD AUTO: 39.7 % (ref 41–52)
HCV AB SER QL: NONREACTIVE
HDLC SERPL-MCNC: 53 MG/DL
HGB BLD-MCNC: 13.4 G/DL (ref 13.5–17.5)
IRON SERPL-MCNC: 71 UG/DL (ref 45–160)
LDLC SERPL CALC-MCNC: 93 MG/DL (ref 65–130)
MCH RBC QN AUTO: 28.8 PG (ref 26–34)
MCHC RBC AUTO-ENTMCNC: 33.8 G/DL (ref 32–36)
MCV RBC AUTO: 85 FL (ref 80–100)
NRBC BLD-RTO: 0 /100 WBCS (ref 0–0)
PLATELET # BLD AUTO: 178 X10*3/UL (ref 150–450)
POTASSIUM SERPL-SCNC: 4 MMOL/L (ref 3.4–5.1)
PROT SERPL-MCNC: 6.7 G/DL (ref 5.9–7.9)
PSA SERPL-MCNC: 1 NG/ML
RBC # BLD AUTO: 4.65 X10*6/UL (ref 4.5–5.9)
SODIUM SERPL-SCNC: 141 MMOL/L (ref 133–145)
TRIGL SERPL-MCNC: 55 MG/DL (ref 40–150)
TSH SERPL DL<=0.05 MIU/L-ACNC: 1.11 MIU/L (ref 0.27–4.2)
URATE SERPL-MCNC: 8.5 MG/DL (ref 3.6–7.7)
WBC # BLD AUTO: 6.1 X10*3/UL (ref 4.4–11.3)

## 2024-04-10 PROCEDURE — 84550 ASSAY OF BLOOD/URIC ACID: CPT

## 2024-04-10 PROCEDURE — 85027 COMPLETE CBC AUTOMATED: CPT

## 2024-04-10 PROCEDURE — 1160F RVW MEDS BY RX/DR IN RCRD: CPT | Performed by: FAMILY MEDICINE

## 2024-04-10 PROCEDURE — 84443 ASSAY THYROID STIM HORMONE: CPT

## 2024-04-10 PROCEDURE — G0103 PSA SCREENING: HCPCS

## 2024-04-10 PROCEDURE — 3078F DIAST BP <80 MM HG: CPT | Performed by: FAMILY MEDICINE

## 2024-04-10 PROCEDURE — 1125F AMNT PAIN NOTED PAIN PRSNT: CPT | Performed by: FAMILY MEDICINE

## 2024-04-10 PROCEDURE — 1159F MED LIST DOCD IN RCRD: CPT | Performed by: FAMILY MEDICINE

## 2024-04-10 PROCEDURE — 83036 HEMOGLOBIN GLYCOSYLATED A1C: CPT

## 2024-04-10 PROCEDURE — 36415 COLL VENOUS BLD VENIPUNCTURE: CPT

## 2024-04-10 PROCEDURE — 80061 LIPID PANEL: CPT

## 2024-04-10 PROCEDURE — G0439 PPPS, SUBSEQ VISIT: HCPCS | Performed by: FAMILY MEDICINE

## 2024-04-10 PROCEDURE — 90677 PCV20 VACCINE IM: CPT | Performed by: FAMILY MEDICINE

## 2024-04-10 PROCEDURE — 86803 HEPATITIS C AB TEST: CPT

## 2024-04-10 PROCEDURE — 3075F SYST BP GE 130 - 139MM HG: CPT | Performed by: FAMILY MEDICINE

## 2024-04-10 PROCEDURE — 1036F TOBACCO NON-USER: CPT | Performed by: FAMILY MEDICINE

## 2024-04-10 PROCEDURE — 83540 ASSAY OF IRON: CPT

## 2024-04-10 PROCEDURE — 99215 OFFICE O/P EST HI 40 MIN: CPT | Performed by: FAMILY MEDICINE

## 2024-04-10 PROCEDURE — 80053 COMPREHEN METABOLIC PANEL: CPT

## 2024-04-10 PROCEDURE — 3008F BODY MASS INDEX DOCD: CPT | Performed by: FAMILY MEDICINE

## 2024-04-10 PROCEDURE — 82728 ASSAY OF FERRITIN: CPT

## 2024-04-10 RX ORDER — FELODIPINE 5 MG/1
5 TABLET, EXTENDED RELEASE ORAL DAILY
Qty: 30 TABLET | Refills: 11 | Status: SHIPPED | OUTPATIENT
Start: 2024-04-10 | End: 2025-04-10

## 2024-04-10 RX ORDER — BISMUTH SUBSALICYLATE 262 MG
1 TABLET,CHEWABLE ORAL DAILY
COMMUNITY

## 2024-04-10 ASSESSMENT — PATIENT HEALTH QUESTIONNAIRE - PHQ9
SUM OF ALL RESPONSES TO PHQ9 QUESTIONS 1 AND 2: 0
1. LITTLE INTEREST OR PLEASURE IN DOING THINGS: NOT AT ALL
2. FEELING DOWN, DEPRESSED OR HOPELESS: NOT AT ALL

## 2024-04-10 ASSESSMENT — PAIN SCALES - GENERAL: PAINLEVEL: 4

## 2024-04-10 NOTE — PATIENT INSTRUCTIONS
Problem List Items Addressed This Visit             ICD-10-CM    Benign hypertension I10     - Blood pressure stable on current regimen  -With recent weight loss and dedicated lifestyle modification efforts, will plan to trial reduced dose of felodipine, reducing from 10 mg to 5 mg  -Please continue to watch blood pressures at home and let us know if pressures increase above 140/90 on routine checks  -Will continue with current medications along with healthy, balanced diet including moderation of salt/caffeine/alcohol         Relevant Medications    felodipine ER (Plendil) 5 mg 24 hr tablet    Other Relevant Orders    TSH with reflex to Free T4 if abnormal    Lipid Panel    Comprehensive Metabolic Panel    CBC    Gout M10.9     - Will assess uric acid levels with blood work ordered today  -Continue to moderate trigger foods including red meat, seafood and alcohol         Relevant Orders    Uric acid    Hyperlipidemia E78.5     - Will check cholesterol levels with blood work ordered  -Continue with current statin regimen along with healthy, low-fat diet         Relevant Orders    TSH with reflex to Free T4 if abnormal    Lipid Panel    Comprehensive Metabolic Panel    Iron deficiency anemia D50.9     - Will monitor blood counts with blood work ordered today         Relevant Orders    CBC    Iron level    Ferritin    Paroxysmal atrial fibrillation (CMS/HCC) I48.0     - Stable         Relevant Medications    felodipine ER (Plendil) 5 mg 24 hr tablet    Osteoarthritis of knee M17.9     - Continue with supportive care efforts         Prediabetes R73.03     - Will monitor A1c sugar average with blood work ordered today  -Continue to focus on healthy, low-fat diet with moderation of carbohydrates and regular physical activity as tolerated         Relevant Orders    Hemoglobin A1c    Prostate Spec.Ag,Screen    Subcutaneous nodule R22.9     - Nodule near left shoulder most likely subcutaneous cyst which no acute intervention  is needed at this time  -Will continue to monitor for any changes or concerns          Other Visit Diagnoses         Codes    Routine adult health maintenance    -  Primary Z00.00    Relevant Orders    TSH with reflex to Free T4 if abnormal    Lipid Panel    Comprehensive Metabolic Panel    CBC    Pneumococcal conjugate vaccine, 20-valent (PREVNAR 20)    Prostate cancer screening     Z12.5    Relevant Orders    Prostate Spec.Ag,Screen    Need for hepatitis C screening test     Z11.59    Relevant Orders    Hepatitis C antibody    Encounter for immunization     Z23    Relevant Orders    Pneumococcal conjugate vaccine, 20-valent (PREVNAR 20)            Additional Visit Plans:  Notes:  PREVENTATIVE HEALTH SCREENINGS INCLUDED:  - Blood pressure screen.  - Blood work may include a cholesterol and diabetes screen if risk factors exist (overweight, high blood pressure etc); screening for sexually transmitted infections; a one time HIV screen for all individuals, and a one time Hepatitis C Virus screen for those born between 8524-6039.  - I encourage you to eat a low-fat, moderate-carbohydrate, low-calorie diet to maintain a normal BMI (under 25) to reduce heart disease, and risk for diabetes  - Moderate intensity exercise for 30 minutes 5 days per week is recommended  - Along with recommendations for nutrition and exercise discussed today helpful resources recommended by the American Academy of Family Practice can be found at www.familydoctor.org or www.choosemyplate.gov    - Colon cancer screening for all ages 45-75 or 85 years old periodically depending on results.  - Cervical cancer screening (pap test) in women between 21-65 years old, periodically depending on results.  - Mammogram screening for breast cancer in women starting at 40-50 years and every 1-2 years.  - For men and women who have a 30 pack year smoking history and currently smoke or have quit in the past 15 years, screening for lung cancer with a yearly low  dose CT scan is recommended starting at age 55 until age 80 years  - For men only who have smoked 100+ cigarettes anytime in their lifetime, a one time ultrasound screening for for abdominal aortic aneurysms starting at age 65 until 75 years old  - Bone density screening (DEXA) for osteoporosis in women aged 65 years and older, once every two years if needed.    IMMUNIZATIONS:  - Flu shot annually.  - Tetanus booster every 10 years.  - Two pneumococcal vaccinations after 65 years old.  - Shingles vaccine for those 50 years or older.     This was a shared decision making visit.    Next Wellness Exam Due  In 1 year from today    Counseling:       Medication education:         Education:  The patient is counseled regarding potential side-effects of all new medications        Understanding:  Patient expressed understanding        Adherence:  No barriers to adherence identified  04/10/24   10:27 AM

## 2024-04-10 NOTE — ASSESSMENT & PLAN NOTE
- Nodule near left shoulder most likely subcutaneous cyst which no acute intervention is needed at this time  -Will continue to monitor for any changes or concerns

## 2024-04-10 NOTE — ASSESSMENT & PLAN NOTE
- Blood pressure stable on current regimen  -With recent weight loss and dedicated lifestyle modification efforts, will plan to trial reduced dose of felodipine, reducing from 10 mg to 5 mg  -Please continue to watch blood pressures at home and let us know if pressures increase above 140/90 on routine checks  -Will continue with current medications along with healthy, balanced diet including moderation of salt/caffeine/alcohol

## 2024-04-10 NOTE — ASSESSMENT & PLAN NOTE
- Will check cholesterol levels with blood work ordered  -Continue with current statin regimen along with healthy, low-fat diet

## 2024-04-10 NOTE — ASSESSMENT & PLAN NOTE
- Will monitor A1c sugar average with blood work ordered today  -Continue to focus on healthy, low-fat diet with moderation of carbohydrates and regular physical activity as tolerated

## 2024-04-10 NOTE — ASSESSMENT & PLAN NOTE
- Will assess uric acid levels with blood work ordered today  -Continue to moderate trigger foods including red meat, seafood and alcohol

## 2024-04-10 NOTE — PROGRESS NOTES
Outpatient Visit Note    Chief Complaint   Patient presents with    Annual Exam       HPI:  David Chatman is a 68 y.o. male a past medical history significant for hypertension, hyperlipidemia, gout, chronic sinus issues followed by ENT, SHOAIB on CPAP, osteoarthritis /Osgood Mejias of left knee with orthopedic referral given and prediabetes who presents the office for an annual Medicare Wellness Visit.  He was last seen in the office on 2/7/2024 secondary to complaints of testicular pain.  Prior to this he had been seen in the office on 8/7/2023 as a new patient to establish with new primary care provider.           He was previously established with [facility] with last visit on 04/27/2023 for 6 month follow-up. Review of chart notes.    At last encounter he reported right-sided testicular pain and swelling for approximately 2 days ago.  Stated that he was in the garage working in his car when he started develop soreness/aching discomfort and sensation of swelling in his right testicle.  Denies any injury.  States that pain has occasionally radiated up into his right groin.  Urinary discomfort, discharge, hematuria or other systemic complaints such as fever or chills.  Denies ever having any similar symptomatic complaints in the past states to have taken 1 dose of ibuprofen with no significant symptom improvement.  No reported left-sided testicular complaints.    Separately, patient does express concerns regarding going persistent/progressive weight gain.  Has attempted to modify diet by reducing portions his physical activity as tolerated with no success in losing weight.  With this, he does express interest in potential medical weight loss options.  Ultimately prescription for Wegovy was given.    Last panel blood work completed in October 2022 including CBC, CMP, ferritin, A1c, iron panel, lipid panel, PSA and uric acid level which was remarkable for hyperglycemia with prediabetic A1c  of 5.9% and elevated uric acid levels.    Well Exam:  Overall, they describes their health as good with no reports of recent illness or hospitalization. They state that their diet is stable. In regards to physical activity, he does stay active in his day-to-day. They deny any significant sleep complaints. No reported issues of chest pain, shortness of breath, headaches, vision/hearing changes, abdominal pain, vomiting, diarrhea, melena, hematochezia, constipation or urinary symptoms.    Preventative Health Maintenance:  In regards to preventative health maintenance, last Tdap received in 2020. Flu shot received for past season. Pneumonia vaccination series previously initiated with Pneumovax received in 2020. In regards to CRC screening, colonoscopy successfully completed in 2022. Shingles vaccination initiated with patient currently needing second and final Shingrix vaccine. COVID-19 vaccination series completed with patient currently up-to-date with recommended boosters.           Hypertension:  Patient was noted to have elevated blood pressure at last encounter with former PCP at which time he was switched to benazepril -hydrochlorothiazide 20-25 mg and clonidine 0.1 mg twice daily. He denies any adverse side effects from medication, admitting to compliance. No reported chest pain, shortness a breath, lightheadedness or dizziness.    Hyperlipidemia:  Has been compliant with simvastatin 10 mg daily with last lipid panel October 2022 showing good control. Denies any myalgias           Prediabetes:  Denies any polyuria, polydipsia, polyphagia or acute vision / sensation changes.           Gout:  Denies any recent attacks with hyperuricemia identified on blood work in October 2022. Diligent lifestyle modification was advocated.           Chronic sinus:  Has established relationship with Dr. Ramírez of ENT to which he follows per protocol. Has attempted to manage symptoms with Flonase nasal spray and Zyrtec. Was  additionally found to have SHOAIB and started on CPAP to which he reports. Continued follow-up with ENT as he is actively adjusting to his equipment.           OA/Osgood-Schlatter:  History of left knee pain with prior x-ray identifying osteoarthritis and tenderness thickening consistent with probable Osgood-Schlatter's. Orthopedic referral previously given though he denies coordinating consultation notes that left knee pain restricts activities. Does have some right-sided knee pain as well though left has historically been more painful in prominent does swell compared to right side.  Physical therapy previously encouraged.    Advanced directives: None on file    Hearing screen: reports no difficulty with hearing and passes finger rub test bilaterally    Does the patient use opioid medications: No  Name of medication: N/A  If yes, do they take this medicine appropriately: N/A    How does the patient rate their health status today: good    Cognitive Screen:  AAAx3  to person, place and time: Yes  3 word recall: Apple, Car, Shoe - Immediate recall: Yes         - 5 minutes recall: Yes   Impression: No cognitive deficiency observed during screening or encounter today      Reviewed:   Past Medical History/Allergies:  Yes  Family History:  Yes  Social History:  Yes  Current Medications:  Yes  Vital Signs:  Yes  Advanced Directives:  discussed  Immunizations:  reviewed today  Home Safety:                    Up & Go test > 30 seconds?  No                   Home have rugs; lack grab bars in bathroom; lack handrail on stairs; have poor lighting?  No                   Hearing difficulties?  No  Geriatric Assessment                   ADL areas requiring assistance:  Does not need help with Dressing, Eating, Ambulating, Toileting, Grooming, Hygiene.                    IADL areas requiring assistance:  Does not need help with Shopping, Housework, Accounting, Transportation, Driving.   Medications: reviewed  Current supplements:   Reviewed and recorded.   Other providers: Reviewed and recorded - Current providers and suppliers: Dr. Vaca, Dr. [facility]      PHQ9/GAD7:         Past Medical History:   Diagnosis Date    Gout     Hyperlipidemia     Hypertension         Current Medications  Current Outpatient Medications   Medication Instructions    benazepriL-hydrochlorothiazide (Lotensin HCT) 20-25 mg tablet 1 tablet Orally Once a day in am for 90 days    cloNIDine (Catapres) 0.1 mg tablet TAKE 1 TABLET TWICE A DAY for 90    felodipine ER (PLENDIL) 5 mg, oral, Daily, Do not crush, chew, or split.    fluocinolone 0.01 % cream Applied to external ear canal once daily as needed for itching    multivitamin tablet 1 tablet, oral, Daily    simvastatin (ZOCOR) 10 mg, oral, Daily, For 90 days    terazosin (HYTRIN) 10 mg, oral, Daily, For 90 days    Wegovy 0.25 mg, subcutaneous, Once Weekly        Allergies  No Known Allergies     Immunizations  Immunization History   Administered Date(s) Administered    Flu vaccine (IIV4), preservative free *Check age/dose* 09/15/2020, 10/12/2021, 10/27/2022    Flu vaccine, quadrivalent, high-dose, preservative free, age 65y+ (FLUZONE) 12/16/2023    Hepatitis B vaccine, adult (RECOMBIVAX, ENGERIX) 09/10/2008, 10/14/2008    Hepatitis B vaccine, pediatric/adolescent (RECOMBIVAX, ENGERIX) 11/30/2006    Influenza, injectable, MDCK, quadrivalent 10/17/2018, 12/01/2019    Influenza, injectable, quadrivalent 10/22/2017    Influenza, seasonal, injectable 09/30/2009, 11/13/2014, 11/10/2015    Novel influenza-H1N1-09, preservative-free 11/20/2009    Pfizer COVID-19 vaccine, Fall 2023, 12 years and older, (30mcg/0.3mL) 12/16/2023    Pfizer COVID-19 vaccine, bivalent, age 12 years and older (30 mcg/0.3 mL) 10/28/2022    Pfizer Purple Cap SARS-CoV-2 02/14/2021, 03/07/2021, 10/23/2021    Pneumococcal conjugate vaccine, 13-valent (PREVNAR 13) 11/24/2018    Pneumococcal polysaccharide vaccine, 23-valent, age 2 years and older  (PNEUMOVAX 23) 09/15/2020    RESPIRATORY SYNCYTIAL VIRUS (RSV), ELIGIBLE PREGNANT PTS, 0.5 ML (ABRYSVO) 01/18/2024    Tdap vaccine, age 7 year and older (BOOSTRIX, ADACEL) 11/30/2006, 09/23/2016, 06/12/2020    Zoster vaccine, recombinant, adult (SHINGRIX) 06/03/2020        Past Surgical History:   Procedure Laterality Date    SINUS SURGERY       No family history on file.  Social History     Tobacco Use    Smoking status: Never    Smokeless tobacco: Never   Vaping Use    Vaping status: Never Used   Substance Use Topics    Alcohol use: Not Currently     Alcohol/week: 2.0 standard drinks of alcohol     Types: 2 Cans of beer per week     Comment: once a week    Drug use: Never     Tobacco Use: Low Risk  (4/10/2024)    Patient History     Smoking Tobacco Use: Never     Smokeless Tobacco Use: Never     Passive Exposure: Not on file        ROS  All pertinent positive symptoms are included in the history of present illness.  All other systems have been reviewed and are negative and noncontributory to this patient's current ailments.    VITAL SIGNS  Vitals:    04/10/24 1000   BP: 132/66   Pulse: 71   Temp: 36.3 °C (97.3 °F)   SpO2: 93%     Vitals:    04/10/24 1000   Weight: 116 kg (256 lb 12.8 oz)      Body mass index is 34.83 kg/m².     PHYSICAL EXAM  GENERAL APPEARANCE: well nourished, well developed, looks like stated age, in no acute distress, not ill or tired appearing, conversing well.   HEENT: no trauma, normocephalic. PERRLA and EOMI with normal external exam. TM's intact with no injection or effusion, no signs of infection. Nares patent, turbinates pink without discharge. Pharynx pink with no exudates or lesions, no enlarged tonsils.   NECK: no nodes, supple without rigidity, no neck mass was observed, no thyromegaly or thyroid nodules.   HEART: regular rate and rhythm, S1 and S2 heard with no murmurs or skipped beats  LUNGS: clear to auscultation bilaterally with no wheezes, crackles or rales.   ABDOMEN: no  organomegaly, soft, nontender, nondistended, no guarding/rebound/rigidity.   EXTREMITIES: moving all extremities equally with no edema or deformities.   SKIN: normal skin color and pigmentation, normal skin turgor without rash, lesions, or nodules visualized.   NEUROLOGIC EXAM: CN II-XII grossly intact, normal gait, normal balance, 5/5 muscle strength, sensation grossly intact.   PSYCH: mood and affect appropriate; alert and oriented to time, place, person; no difficulty with speech or language.     Preventative Services reviewed with patient and copy printed for patient.    Assessment/Plan   Problem List Items Addressed This Visit             ICD-10-CM    Benign hypertension I10     - Blood pressure stable on current regimen  -With recent weight loss and dedicated lifestyle modification efforts, will plan to trial reduced dose of felodipine, reducing from 10 mg to 5 mg  -Please continue to watch blood pressures at home and let us know if pressures increase above 140/90 on routine checks  -Will continue with current medications along with healthy, balanced diet including moderation of salt/caffeine/alcohol         Relevant Medications    felodipine ER (Plendil) 5 mg 24 hr tablet    Other Relevant Orders    TSH with reflex to Free T4 if abnormal    Lipid Panel    Comprehensive Metabolic Panel    CBC    Gout M10.9     - Will assess uric acid levels with blood work ordered today  -Continue to moderate trigger foods including red meat, seafood and alcohol         Relevant Orders    Uric acid    Hyperlipidemia E78.5     - Will check cholesterol levels with blood work ordered  -Continue with current statin regimen along with healthy, low-fat diet         Relevant Orders    TSH with reflex to Free T4 if abnormal    Lipid Panel    Comprehensive Metabolic Panel    Iron deficiency anemia D50.9     - Will monitor blood counts with blood work ordered today         Relevant Orders    CBC    Iron level    Ferritin    Paroxysmal  atrial fibrillation (CMS/HCC) I48.0     - Stable         Relevant Medications    felodipine ER (Plendil) 5 mg 24 hr tablet    Osteoarthritis of knee M17.9     - Continue with supportive care efforts         Prediabetes R73.03     - Will monitor A1c sugar average with blood work ordered today  -Continue to focus on healthy, low-fat diet with moderation of carbohydrates and regular physical activity as tolerated         Relevant Orders    Hemoglobin A1c    Prostate Spec.Ag,Screen    Subcutaneous nodule R22.9     - Nodule near left shoulder most likely subcutaneous cyst which no acute intervention is needed at this time  -Will continue to monitor for any changes or concerns          Other Visit Diagnoses         Codes    Routine adult health maintenance    -  Primary Z00.00    Relevant Orders    TSH with reflex to Free T4 if abnormal    Lipid Panel    Comprehensive Metabolic Panel    CBC    Pneumococcal conjugate vaccine, 20-valent (PREVNAR 20)    Prostate cancer screening     Z12.5    Relevant Orders    Prostate Spec.Ag,Screen    Need for hepatitis C screening test     Z11.59    Relevant Orders    Hepatitis C antibody    Encounter for immunization     Z23    Relevant Orders    Pneumococcal conjugate vaccine, 20-valent (PREVNAR 20)            Additional Visit Plans:  Notes:  PREVENTATIVE HEALTH SCREENINGS INCLUDED:  - Blood pressure screen.  - Blood work may include a cholesterol and diabetes screen if risk factors exist (overweight, high blood pressure etc); screening for sexually transmitted infections; a one time HIV screen for all individuals, and a one time Hepatitis C Virus screen for those born between 3734-0551.  - I encourage you to eat a low-fat, moderate-carbohydrate, low-calorie diet to maintain a normal BMI (under 25) to reduce heart disease, and risk for diabetes  - Moderate intensity exercise for 30 minutes 5 days per week is recommended  - Along with recommendations for nutrition and exercise discussed  today helpful resources recommended by the American Academy of Family Practice can be found at www.familydoctor.org or www.choosemyplate.gov    - Colon cancer screening for all ages 45-75 or 85 years old periodically depending on results.  - Cervical cancer screening (pap test) in women between 21-65 years old, periodically depending on results.  - Mammogram screening for breast cancer in women starting at 40-50 years and every 1-2 years.  - For men and women who have a 30 pack year smoking history and currently smoke or have quit in the past 15 years, screening for lung cancer with a yearly low dose CT scan is recommended starting at age 55 until age 80 years  - For men only who have smoked 100+ cigarettes anytime in their lifetime, a one time ultrasound screening for for abdominal aortic aneurysms starting at age 65 until 75 years old  - Bone density screening (DEXA) for osteoporosis in women aged 65 years and older, once every two years if needed.    IMMUNIZATIONS:  - Flu shot annually.  - Tetanus booster every 10 years.  - Two pneumococcal vaccinations after 65 years old.  - Shingles vaccine for those 50 years or older.     This was a shared decision making visit.    Next Wellness Exam Due  In 1 year from today    Counseling:       Medication education:         Education:  The patient is counseled regarding potential side-effects of all new medications        Understanding:  Patient expressed understanding        Adherence:  No barriers to adherence identified  04/10/24   10:27 AM

## 2024-07-26 DIAGNOSIS — I10 ESSENTIAL (PRIMARY) HYPERTENSION: ICD-10-CM

## 2024-07-29 RX ORDER — CLONIDINE HYDROCHLORIDE 0.1 MG/1
0.1 TABLET ORAL EVERY 12 HOURS
Qty: 180 TABLET | Refills: 1 | Status: SHIPPED | OUTPATIENT
Start: 2024-07-29

## 2024-07-29 RX ORDER — BENAZEPRIL HYDROCHLORIDE AND HYDROCHLOROTHIAZIDE 20; 25 MG/1; MG/1
1 TABLET ORAL DAILY
Qty: 90 TABLET | Refills: 1 | Status: SHIPPED | OUTPATIENT
Start: 2024-07-29 | End: 2025-01-25

## 2024-07-30 ENCOUNTER — APPOINTMENT (OUTPATIENT)
Dept: CARDIOLOGY | Facility: HOSPITAL | Age: 69
End: 2024-07-30
Payer: MEDICARE

## 2024-07-30 ENCOUNTER — HOSPITAL ENCOUNTER (EMERGENCY)
Facility: HOSPITAL | Age: 69
Discharge: HOME | End: 2024-07-30
Attending: STUDENT IN AN ORGANIZED HEALTH CARE EDUCATION/TRAINING PROGRAM
Payer: MEDICARE

## 2024-07-30 ENCOUNTER — APPOINTMENT (OUTPATIENT)
Dept: OTOLARYNGOLOGY | Facility: CLINIC | Age: 69
End: 2024-07-30
Payer: COMMERCIAL

## 2024-07-30 ENCOUNTER — APPOINTMENT (OUTPATIENT)
Dept: RADIOLOGY | Facility: HOSPITAL | Age: 69
End: 2024-07-30
Payer: MEDICARE

## 2024-07-30 VITALS
HEART RATE: 69 BPM | TEMPERATURE: 97.7 F | RESPIRATION RATE: 16 BRPM | OXYGEN SATURATION: 94 % | BODY MASS INDEX: 33.51 KG/M2 | SYSTOLIC BLOOD PRESSURE: 133 MMHG | WEIGHT: 252.87 LBS | HEIGHT: 73 IN | DIASTOLIC BLOOD PRESSURE: 60 MMHG

## 2024-07-30 DIAGNOSIS — I50.9 ACUTE HEART FAILURE, UNSPECIFIED HEART FAILURE TYPE (MULTI): ICD-10-CM

## 2024-07-30 DIAGNOSIS — R06.02 SHORTNESS OF BREATH: Primary | ICD-10-CM

## 2024-07-30 LAB
ALBUMIN SERPL-MCNC: 4 G/DL (ref 3.5–5)
ALP BLD-CCNC: 113 U/L (ref 35–125)
ALT SERPL-CCNC: 40 U/L (ref 5–40)
ANION GAP SERPL CALC-SCNC: 12 MMOL/L
AST SERPL-CCNC: 30 U/L (ref 5–40)
BASOPHILS # BLD AUTO: 0.03 X10*3/UL (ref 0–0.1)
BASOPHILS NFR BLD AUTO: 0.6 %
BILIRUB SERPL-MCNC: 1 MG/DL (ref 0.1–1.2)
BUN SERPL-MCNC: 12 MG/DL (ref 8–25)
CALCIUM SERPL-MCNC: 8.8 MG/DL (ref 8.5–10.4)
CHLORIDE SERPL-SCNC: 108 MMOL/L (ref 97–107)
CO2 SERPL-SCNC: 22 MMOL/L (ref 24–31)
CREAT SERPL-MCNC: 0.9 MG/DL (ref 0.4–1.6)
EGFRCR SERPLBLD CKD-EPI 2021: >90 ML/MIN/1.73M*2
EOSINOPHIL # BLD AUTO: 0.06 X10*3/UL (ref 0–0.7)
EOSINOPHIL NFR BLD AUTO: 1.1 %
ERYTHROCYTE [DISTWIDTH] IN BLOOD BY AUTOMATED COUNT: 13.9 % (ref 11.5–14.5)
FLUAV RNA RESP QL NAA+PROBE: NOT DETECTED
FLUBV RNA RESP QL NAA+PROBE: NOT DETECTED
GLUCOSE SERPL-MCNC: 102 MG/DL (ref 65–99)
HCT VFR BLD AUTO: 39.1 % (ref 41–52)
HGB BLD-MCNC: 12.9 G/DL (ref 13.5–17.5)
IMM GRANULOCYTES # BLD AUTO: 0.01 X10*3/UL (ref 0–0.7)
IMM GRANULOCYTES NFR BLD AUTO: 0.2 % (ref 0–0.9)
LYMPHOCYTES # BLD AUTO: 1.46 X10*3/UL (ref 1.2–4.8)
LYMPHOCYTES NFR BLD AUTO: 26.9 %
MCH RBC QN AUTO: 28.1 PG (ref 26–34)
MCHC RBC AUTO-ENTMCNC: 33 G/DL (ref 32–36)
MCV RBC AUTO: 85 FL (ref 80–100)
MONOCYTES # BLD AUTO: 0.51 X10*3/UL (ref 0.1–1)
MONOCYTES NFR BLD AUTO: 9.4 %
NEUTROPHILS # BLD AUTO: 3.36 X10*3/UL (ref 1.2–7.7)
NEUTROPHILS NFR BLD AUTO: 61.8 %
NRBC BLD-RTO: 0 /100 WBCS (ref 0–0)
NT-PROBNP SERPL-MCNC: 939 PG/ML (ref 0–229)
PLATELET # BLD AUTO: 151 X10*3/UL (ref 150–450)
POTASSIUM SERPL-SCNC: 3.7 MMOL/L (ref 3.4–5.1)
PROT SERPL-MCNC: 6.7 G/DL (ref 5.9–7.9)
RBC # BLD AUTO: 4.59 X10*6/UL (ref 4.5–5.9)
SARS-COV-2 RNA RESP QL NAA+PROBE: NOT DETECTED
SODIUM SERPL-SCNC: 142 MMOL/L (ref 133–145)
TROPONIN T SERPL-MCNC: 19 NG/L
TROPONIN T SERPL-MCNC: 20 NG/L
WBC # BLD AUTO: 5.4 X10*3/UL (ref 4.4–11.3)

## 2024-07-30 PROCEDURE — 83880 ASSAY OF NATRIURETIC PEPTIDE: CPT | Performed by: STUDENT IN AN ORGANIZED HEALTH CARE EDUCATION/TRAINING PROGRAM

## 2024-07-30 PROCEDURE — 87636 SARSCOV2 & INF A&B AMP PRB: CPT | Performed by: STUDENT IN AN ORGANIZED HEALTH CARE EDUCATION/TRAINING PROGRAM

## 2024-07-30 PROCEDURE — 84075 ASSAY ALKALINE PHOSPHATASE: CPT | Performed by: STUDENT IN AN ORGANIZED HEALTH CARE EDUCATION/TRAINING PROGRAM

## 2024-07-30 PROCEDURE — 71045 X-RAY EXAM CHEST 1 VIEW: CPT | Performed by: RADIOLOGY

## 2024-07-30 PROCEDURE — 36415 COLL VENOUS BLD VENIPUNCTURE: CPT | Performed by: STUDENT IN AN ORGANIZED HEALTH CARE EDUCATION/TRAINING PROGRAM

## 2024-07-30 PROCEDURE — 84484 ASSAY OF TROPONIN QUANT: CPT | Mod: 91 | Performed by: STUDENT IN AN ORGANIZED HEALTH CARE EDUCATION/TRAINING PROGRAM

## 2024-07-30 PROCEDURE — 93005 ELECTROCARDIOGRAM TRACING: CPT

## 2024-07-30 PROCEDURE — 93005 ELECTROCARDIOGRAM TRACING: CPT | Mod: 59

## 2024-07-30 PROCEDURE — 71045 X-RAY EXAM CHEST 1 VIEW: CPT

## 2024-07-30 PROCEDURE — 84484 ASSAY OF TROPONIN QUANT: CPT | Performed by: STUDENT IN AN ORGANIZED HEALTH CARE EDUCATION/TRAINING PROGRAM

## 2024-07-30 PROCEDURE — 2500000004 HC RX 250 GENERAL PHARMACY W/ HCPCS (ALT 636 FOR OP/ED): Performed by: STUDENT IN AN ORGANIZED HEALTH CARE EDUCATION/TRAINING PROGRAM

## 2024-07-30 PROCEDURE — 99284 EMERGENCY DEPT VISIT MOD MDM: CPT | Mod: 25

## 2024-07-30 PROCEDURE — 96374 THER/PROPH/DIAG INJ IV PUSH: CPT

## 2024-07-30 PROCEDURE — 85025 COMPLETE CBC W/AUTO DIFF WBC: CPT | Performed by: STUDENT IN AN ORGANIZED HEALTH CARE EDUCATION/TRAINING PROGRAM

## 2024-07-30 RX ORDER — FUROSEMIDE 10 MG/ML
20 INJECTION INTRAMUSCULAR; INTRAVENOUS ONCE
Status: COMPLETED | OUTPATIENT
Start: 2024-07-30 | End: 2024-07-30

## 2024-07-30 RX ORDER — FUROSEMIDE 20 MG/1
20 TABLET ORAL DAILY
Qty: 5 TABLET | Refills: 0 | Status: SHIPPED | OUTPATIENT
Start: 2024-07-30 | End: 2024-08-04

## 2024-07-30 ASSESSMENT — COLUMBIA-SUICIDE SEVERITY RATING SCALE - C-SSRS
1. IN THE PAST MONTH, HAVE YOU WISHED YOU WERE DEAD OR WISHED YOU COULD GO TO SLEEP AND NOT WAKE UP?: NO
6. HAVE YOU EVER DONE ANYTHING, STARTED TO DO ANYTHING, OR PREPARED TO DO ANYTHING TO END YOUR LIFE?: NO
2. HAVE YOU ACTUALLY HAD ANY THOUGHTS OF KILLING YOURSELF?: NO

## 2024-07-30 ASSESSMENT — HEART SCORE
TROPONIN: LESS THAN OR EQUAL TO NORMAL LIMIT
HEART SCORE: 3
AGE: 65+
ECG: NORMAL
HISTORY: SLIGHTLY SUSPICIOUS
RISK FACTORS: 1-2 RISK FACTORS

## 2024-07-30 ASSESSMENT — PAIN - FUNCTIONAL ASSESSMENT: PAIN_FUNCTIONAL_ASSESSMENT: 0-10

## 2024-07-30 ASSESSMENT — PAIN SCALES - GENERAL
PAINLEVEL_OUTOF10: 0 - NO PAIN
PAINLEVEL_OUTOF10: 0 - NO PAIN

## 2024-07-30 NOTE — DISCHARGE INSTRUCTIONS
Your chest x-ray and laboratory studies revealed some extra fluid around the lungs.  You have been given a prescription for medication to help with this.  You should follow-up with both your primary care physician as well as cardiology.  Return to the emergency department with any worsening symptoms.    It is important to remember that your care does not end here and you must continue to monitor your condition closely. Please return to the emergency department for any worsening or concerning signs or symptoms as directed by our conversations and the discharge instructions. If you do not have a doctor please contact the referral number on your discharge instructions. Please contact any physician specialists provided in your discharge notes as it is very important to follow up with them regarding your condition. If you are unable to reach the physicians provided, please come back to the Emergency Department at any time.    Return to emergency room without delay for ANY new or worsening pains or for any other symptoms or concerns.  Return with worsening pains, nausea, vomiting, trouble breathing, palpitations, shortness of breath, inability to pass stool or urine, loss of control of stool or urine, any numbness or tingling (that is not normal for you), uncontrolled fevers, the passing of blood or other material in stool or urine, rashes, pains or for any other symptoms or concerns you may have.  You are always welcome to return to the ER at any time for any reason or for any other concerns you may have.

## 2024-07-30 NOTE — ED PROVIDER NOTES
HPI   Chief Complaint   Patient presents with    Shortness of Breath    Flu Symptoms     X 3 weeks of cough, congestion, shortness of breath, no vomit or diarrhea, hx of pneumonia and bronchitis , no CP,        Patient presents with shortness of breath.  He reports that his symptoms have been ongoing for the last 1 month.  He reports that he has episodes where he becomes very short of breath.  He reports that twice over the last month, he says shortness of breath at night which has limited his ability to sleep.  He reports that he has been more short of breath when he is up and walking around.  He states that he has tried to lose weight to help with symptoms.  He feels a tightness in the chest when he is short of breath.              Patient History   Past Medical History:   Diagnosis Date    Gout     Hyperlipidemia     Hypertension      Past Surgical History:   Procedure Laterality Date    SINUS SURGERY       No family history on file.  Social History     Tobacco Use    Smoking status: Never    Smokeless tobacco: Never   Vaping Use    Vaping status: Never Used   Substance Use Topics    Alcohol use: Not Currently     Alcohol/week: 2.0 standard drinks of alcohol     Types: 2 Cans of beer per week     Comment: once a week    Drug use: Never       Physical Exam   ED Triage Vitals [07/30/24 0856]   Temperature Heart Rate Respirations BP   36.5 °C (97.7 °F) 75 18 127/71      Pulse Ox Temp Source Heart Rate Source Patient Position   95 % Oral Monitor Sitting      BP Location FiO2 (%)     Right arm --       Physical Exam  HENT:      Head: Normocephalic.   Cardiovascular:      Rate and Rhythm: Normal rate.      Comments: JVD noted  Pulmonary:      Effort: Pulmonary effort is normal.      Breath sounds: Normal breath sounds.   Musculoskeletal:      Comments: Trace pretibial edema bilateral lower extremities   Neurological:      General: No focal deficit present.      Mental Status: He is alert.           ED Course & MDM   ED  Course as of 07/30/24 1426 Tue Jul 30, 2024   0922 EKG interpreted by me: Normal sinus rhythm, rate 72.  Normal axis.  Left bundle branch block.  No previous EKG available for comparison.  Sgarbossa criteria not met. [ML]      ED Course User Index  [ML] Phillip Saenz MD         Diagnoses as of 07/30/24 1426   Shortness of breath   Acute heart failure, unspecified heart failure type (Multi)                       Penn Laird Coma Scale Score: 15   HEART Score: 3                      Medical Decision Making  Laboratory results reveal stable but indeterminate troponin.  BNP significantly elevated.  Chest x-ray reveals pulmonary vascular congestion as well as small effusion.  I feel that heart failure is likely etiology of symptoms.  Patient was given Lasix and it was recommended that he be admitted.  Patient declines admission at this time, preferring to follow-up with cardiology as outpatient.  Given the mild nature of his symptoms, I feel that this is reasonable.  My suspicion for acute coronary syndrome or PE as etiology of symptoms is very low.  Patient given prescription for Lasix and was advised to follow-up with primary care physician and given referral for cardiology.  Return precautions given for any worsening symptoms.  Parts of this chart were completed with dictation software, please excuse any errors in transcription.        Procedure  Procedures     Phillip Saenz MD  07/30/24 1427

## 2024-08-02 LAB
ATRIAL RATE: 72 BPM
ATRIAL RATE: 72 BPM
P AXIS: -10 DEGREES
P AXIS: -8 DEGREES
P OFFSET: 149 MS
P OFFSET: 169 MS
P ONSET: 111 MS
P ONSET: 92 MS
PR INTERVAL: 182 MS
PR INTERVAL: 190 MS
Q ONSET: 183 MS
Q ONSET: 206 MS
QRS COUNT: 12 BEATS
QRS COUNT: 12 BEATS
QRS DURATION: 158 MS
QRS DURATION: 158 MS
QT INTERVAL: 440 MS
QT INTERVAL: 454 MS
QTC CALCULATION(BAZETT): 481 MS
QTC CALCULATION(BAZETT): 497 MS
QTC FREDERICIA: 467 MS
QTC FREDERICIA: 482 MS
R AXIS: -34 DEGREES
R AXIS: -35 DEGREES
T AXIS: 122 DEGREES
T AXIS: 127 DEGREES
T OFFSET: 403 MS
T OFFSET: 433 MS
VENTRICULAR RATE: 72 BPM
VENTRICULAR RATE: 72 BPM

## 2024-08-08 RX ORDER — CETIRIZINE HYDROCHLORIDE 10 MG/1
TABLET ORAL
COMMUNITY

## 2024-08-12 ENCOUNTER — APPOINTMENT (OUTPATIENT)
Dept: CARDIOLOGY | Facility: CLINIC | Age: 69
End: 2024-08-12
Payer: MEDICARE

## 2024-08-12 VITALS
HEART RATE: 68 BPM | TEMPERATURE: 98.6 F | RESPIRATION RATE: 18 BRPM | OXYGEN SATURATION: 93 % | WEIGHT: 250 LBS | DIASTOLIC BLOOD PRESSURE: 72 MMHG | HEIGHT: 73 IN | SYSTOLIC BLOOD PRESSURE: 125 MMHG | BODY MASS INDEX: 33.13 KG/M2

## 2024-08-12 DIAGNOSIS — I10 BENIGN HYPERTENSION: Primary | ICD-10-CM

## 2024-08-12 DIAGNOSIS — R53.82 CHRONIC FATIGUE: ICD-10-CM

## 2024-08-12 DIAGNOSIS — G47.33 OBSTRUCTIVE SLEEP APNEA OF ADULT: ICD-10-CM

## 2024-08-12 DIAGNOSIS — R06.02 SHORTNESS OF BREATH: ICD-10-CM

## 2024-08-12 DIAGNOSIS — E78.2 MIXED HYPERLIPIDEMIA: ICD-10-CM

## 2024-08-12 PROCEDURE — 1160F RVW MEDS BY RX/DR IN RCRD: CPT | Performed by: INTERNAL MEDICINE

## 2024-08-12 PROCEDURE — 3078F DIAST BP <80 MM HG: CPT | Performed by: INTERNAL MEDICINE

## 2024-08-12 PROCEDURE — 1036F TOBACCO NON-USER: CPT | Performed by: INTERNAL MEDICINE

## 2024-08-12 PROCEDURE — 1159F MED LIST DOCD IN RCRD: CPT | Performed by: INTERNAL MEDICINE

## 2024-08-12 PROCEDURE — 3008F BODY MASS INDEX DOCD: CPT | Performed by: INTERNAL MEDICINE

## 2024-08-12 PROCEDURE — 3074F SYST BP LT 130 MM HG: CPT | Performed by: INTERNAL MEDICINE

## 2024-08-12 PROCEDURE — 99204 OFFICE O/P NEW MOD 45 MIN: CPT | Performed by: INTERNAL MEDICINE

## 2024-08-12 PROCEDURE — 1126F AMNT PAIN NOTED NONE PRSNT: CPT | Performed by: INTERNAL MEDICINE

## 2024-08-12 ASSESSMENT — LIFESTYLE VARIABLES
HOW OFTEN DO YOU HAVE A DRINK CONTAINING ALCOHOL: 2-4 TIMES A MONTH
AUDIT TOTAL SCORE: 2
HOW MANY STANDARD DRINKS CONTAINING ALCOHOL DO YOU HAVE ON A TYPICAL DAY: 1 OR 2
HOW OFTEN DURING THE LAST YEAR HAVE YOU FOUND THAT YOU WERE NOT ABLE TO STOP DRINKING ONCE YOU HAD STARTED: NEVER
HOW OFTEN DURING THE LAST YEAR HAVE YOU HAD A FEELING OF GUILT OR REMORSE AFTER DRINKING: NEVER
HOW OFTEN DURING THE LAST YEAR HAVE YOU BEEN UNABLE TO REMEMBER WHAT HAPPENED THE NIGHT BEFORE BECAUSE YOU HAD BEEN DRINKING: NEVER
SKIP TO QUESTIONS 9-10: 1
HOW OFTEN DO YOU HAVE SIX OR MORE DRINKS ON ONE OCCASION: NEVER
HAS A RELATIVE, FRIEND, DOCTOR, OR ANOTHER HEALTH PROFESSIONAL EXPRESSED CONCERN ABOUT YOUR DRINKING OR SUGGESTED YOU CUT DOWN: NO
HOW OFTEN DURING THE LAST YEAR HAVE YOU FAILED TO DO WHAT WAS NORMALLY EXPECTED FROM YOU BECAUSE OF DRINKING: NEVER
HAVE YOU OR SOMEONE ELSE BEEN INJURED AS A RESULT OF YOUR DRINKING: NO
HOW OFTEN DURING THE LAST YEAR HAVE YOU NEEDED AN ALCOHOLIC DRINK FIRST THING IN THE MORNING TO GET YOURSELF GOING AFTER A NIGHT OF HEAVY DRINKING: NEVER
AUDIT-C TOTAL SCORE: 2

## 2024-08-12 ASSESSMENT — COLUMBIA-SUICIDE SEVERITY RATING SCALE - C-SSRS
1. IN THE PAST MONTH, HAVE YOU WISHED YOU WERE DEAD OR WISHED YOU COULD GO TO SLEEP AND NOT WAKE UP?: NO
2. HAVE YOU ACTUALLY HAD ANY THOUGHTS OF KILLING YOURSELF?: NO
6. HAVE YOU EVER DONE ANYTHING, STARTED TO DO ANYTHING, OR PREPARED TO DO ANYTHING TO END YOUR LIFE?: NO

## 2024-08-12 ASSESSMENT — PATIENT HEALTH QUESTIONNAIRE - PHQ9
1. LITTLE INTEREST OR PLEASURE IN DOING THINGS: NOT AT ALL
2. FEELING DOWN, DEPRESSED OR HOPELESS: NOT AT ALL
SUM OF ALL RESPONSES TO PHQ9 QUESTIONS 1 AND 2: 0

## 2024-08-12 ASSESSMENT — ENCOUNTER SYMPTOMS
LOSS OF SENSATION IN FEET: 0
OCCASIONAL FEELINGS OF UNSTEADINESS: 0
DEPRESSION: 0

## 2024-08-12 ASSESSMENT — PAIN SCALES - GENERAL: PAINLEVEL: 0-NO PAIN

## 2024-08-12 NOTE — PROGRESS NOTES
Subjective   Chief Complaint   Patient presents with    ER Follow-up     David Chatman is a/an new patient who presents to the office for an ER follow up for SOB. Patient states the SOB has improved, but he is still unable to take a deep breath. He was advised he needs an echocardiogram but I did not see an order.       68-year-old patient with a history of sinus surgery past currently and hypertension multiple medication that including Lotensin, Catapres, felt to be A channel blocker.  Went to [facility] emergency room with shortness of breath symptoms patient had a almost 3 weeks of flu symptoms cough congestion history of pneumonia and bronchitis in the past.  Worsening symptoms finally patient was Seen emergency room also complaining of tightness in the chest that time.  The history is negative for gout.  EKG in emergency room 530 sinus rhythm 72 with a left bundle right EKG with a nonspecific ST patient seen.  Chest x-ray history of pulmonary vascular congestion.  With a small effusion.  Workup in emergency room patient Found to have a proBNP 939, troponins were low normal 20s.  Comprehensive metabolic panel was done 2 weeks ago essentially was unremarkable.  CBC was done also 2 weeks ago sore anemia hemoglobin 12.9 hematocrit 39.1.  Plate count 151.  PSA checked 4 months ago with a negative or unremarkable hemoglobin A1c 4 months ago was 5.6%.  Lipid profile was done 4 months ago showed total cholesterol 157, triglyceride was 55, LDL is 93.  TSH is also unremarkable.  Patient does complain of worsening Sharber this may exertion for last month.  Also found to having anemia.  Hemoglobin 12.9 recently.  Past Medical History:   Diagnosis Date    Gout     Hyperlipidemia     Hypertension     Paroxysmal atrial fibrillation (Multi) 08/24/2023     Past Surgical History:   Procedure Laterality Date    SINUS SURGERY       No relevant family history has been documented for this patient.  Current Outpatient  "Medications   Medication Sig Dispense Refill    benazepriL-hydrochlorothiazide (Lotensin HCT) 20-25 mg tablet Take 1 tablet by mouth once daily. 90 tablet 1    cloNIDine (Catapres) 0.1 mg tablet TAKE 1 TABLET BY MOUTH EVERY 12 HOURS 180 tablet 1    felodipine ER (Plendil) 5 mg 24 hr tablet Take 1 tablet (5 mg) by mouth once daily. Do not crush, chew, or split. 30 tablet 11    fluocinolone 0.01 % cream Applied to external ear canal once daily as needed for itching 1 g 3    multivitamin tablet Take 1 tablet by mouth once daily.      simvastatin (Zocor) 10 mg tablet Take 1 tablet (10 mg) by mouth once daily. For 90 days      terazosin (Hytrin) 5 mg capsule Take 2 capsules (10 mg) by mouth once daily. For 90 days       No current facility-administered medications for this visit.      reports that he has never smoked. He has never used smokeless tobacco. He reports that he does not currently use alcohol after a past usage of about 2.0 standard drinks of alcohol per week. He reports that he does not use drugs.  Patient has no known allergies.  Shortness of breath    Vitals:    08/12/24 0946   BP: 125/72   Pulse: 68   Resp: 18   Temp: 37 °C (98.6 °F)   SpO2: 93%   Weight: 113 kg (250 lb)   Height: 1.854 m (6' 1\")   PainSc: 0-No pain      BMI:Body mass index is 32.98 kg/m².   General Cardiology:  General Appearance: Alert, oriented and in no acute distress.  HEENT: extra ocular movements intact (EOMI), pupils equal,  round, reactive to light and accommodation (PERRLA).  Carotid Upstroke: no bruit, normal.  Jugular Venous Distention (JVD): flat.  Chest: normal.  Lungs: Clear to auscultation,   Heart Sounds: no S3 or S4, normal S1, S2, regular rate.  Murmur, Click, Gallop: soft mid diastolic murmur.  Abdomen: no hepatomegaly, no masses felt, soft.  Extremities: no leg edema.  Peripheral pulses: 2 plus bilateral.  NEUROLOGY Cranial nerves II-XII grossly intact.     Patient Active Problem List   Diagnosis    Allergic rhinitis "    Chronic rhinitis    Benign hypertension    Chronic fatigue    Diverticular disease    Gout    Hx of colonic polyp    Hyperglycemia    Hyperlipidemia    Hypersomnolence disorder    Iron deficiency anemia    Paroxysmal atrial fibrillation (Multi)    Tubulovillous adenoma    Deviated septum    Hypertrophy of nasal turbinates    Nasal lesion    Obstructive sleep apnea of adult    Right testicular pain    BMI 36.0-36.9,adult    Osteoarthritis of knee    Sensorineural hearing loss (SNHL) of both ears    Tinnitus of both ears    Prediabetes    Subcutaneous nodule    Shortness of breath       Problem List Items Addressed This Visit       Benign hypertension - Primary    Relevant Orders    Transthoracic Echo Complete    Nuclear Stress Test    Chronic fatigue    Relevant Orders    Transthoracic Echo Complete    Nuclear Stress Test    Hyperlipidemia    Relevant Orders    Transthoracic Echo Complete    Nuclear Stress Test    Obstructive sleep apnea of adult    Relevant Orders    Transthoracic Echo Complete    Nuclear Stress Test    Shortness of breath    Relevant Orders    Transthoracic Echo Complete    Nuclear Stress Test      68-year patient with no history of shortness of breath with exertion weight loss almost 20 pounds in last month.  Shortness of breath fatigue at the moment.  1.  Hypertension: Patient currently on multiple medication.  EKG found to having underlying sinus rhythm with a left bundle EKG with a diffuse ST-T changes seen.  Continue current Lotensin, clonidine, shoulder pain.  Blood pressure normal range.  Advised patient check blood pressure at home.  2.  Worsening shortness of breath: Patient angina equivalent symptoms.  Will schedule patient for pharmacological stress test due to EKG showed underlying sinus rhythm with a left bundle by EKG with a nonspecific ST patient seen.  3.  Hyperlipidemia: Currently on simvastatin 10 mg tablet once a day.  4.  CAD: Will schedule patient for pharmacological stress  test as well as echocardiogram.  For workup for underlying CAD and shortness of breath with the valvular dysfunction.  5.  Anemia: Patient with a low H&H.  For anemia workup for GI perspective.  As well as hematological perspective.    Advised patient to avoid lunch meats, canned soups, pizzas, bread rolls, and sandwiches. Advised patient to limit salt intake 1,500 mg daily. Advised patient to exercise 30 mins/3 times a week including treadmill or aerobic type, Goal to achieve 65% target HR.    Diet and exercise reviewed with patient..advice to walk about 10,000 steps or about 2 hours during day time. Cut back on salt, sugar and flour.    Pt. care time is spent includes review of diagnostic tests, labs, radiographs, EKGs, old echoes, cardiac work-up and coordination of care. Assessment, impression and plans are reflected in the note above as well as the orders.    Jonathon Carter MD

## 2024-08-20 ENCOUNTER — APPOINTMENT (OUTPATIENT)
Dept: OTOLARYNGOLOGY | Facility: CLINIC | Age: 69
End: 2024-08-20
Payer: COMMERCIAL

## 2024-08-20 VITALS — HEIGHT: 73 IN | TEMPERATURE: 96.9 F | WEIGHT: 258 LBS | BODY MASS INDEX: 34.19 KG/M2

## 2024-08-20 DIAGNOSIS — K21.9 GASTROESOPHAGEAL REFLUX DISEASE WITHOUT ESOPHAGITIS: ICD-10-CM

## 2024-08-20 DIAGNOSIS — G47.33 OBSTRUCTIVE SLEEP APNEA OF ADULT: ICD-10-CM

## 2024-08-20 DIAGNOSIS — H90.3 SENSORINEURAL HEARING LOSS (SNHL) OF BOTH EARS: ICD-10-CM

## 2024-08-20 DIAGNOSIS — J30.9 ALLERGIC RHINITIS, UNSPECIFIED SEASONALITY, UNSPECIFIED TRIGGER: Primary | ICD-10-CM

## 2024-08-20 DIAGNOSIS — J34.89 NASAL LESION: ICD-10-CM

## 2024-08-20 PROCEDURE — 1036F TOBACCO NON-USER: CPT | Performed by: OTOLARYNGOLOGY

## 2024-08-20 PROCEDURE — 3008F BODY MASS INDEX DOCD: CPT | Performed by: OTOLARYNGOLOGY

## 2024-08-20 PROCEDURE — 1159F MED LIST DOCD IN RCRD: CPT | Performed by: OTOLARYNGOLOGY

## 2024-08-20 PROCEDURE — 99213 OFFICE O/P EST LOW 20 MIN: CPT | Performed by: OTOLARYNGOLOGY

## 2024-08-20 RX ORDER — OMEPRAZOLE 40 MG/1
CAPSULE, DELAYED RELEASE ORAL
Qty: 30 CAPSULE | Refills: 11 | Status: SHIPPED | OUTPATIENT
Start: 2024-08-20

## 2024-08-20 NOTE — PROGRESS NOTES
HPI  [facility].  He is complaining of postnasal drip and globus sensation.  He had a recent problem with shortness of breath and is scheduled for cardiac workup.  His nose feels good.  He is on Flonase.  He is using CPAP every night    He returns in follow-up status post septoplasty and turbinate surgery and excision of right nasal vestibule lesion.  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  He is breathing well and happy with his nasal airway.  In the morning when he wakes up he has some congestion.  He complains of dry itchy ears  He has additional questions regarding tinnitus and  related noise exposure with hearing loss. 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  An audiogram today shows bilateral mid to high-frequency moderate to severe sensorineural hearing loss with excellent word recognition and normal tympanometry.  He is very bothered by tinnitus.    David Chatman is a 68 y.o. male returns status post septoplasty and turbinate surgery September 27, 2023.  His postoperative course was uneventful    He returns in follow-up for nasal obstruction and obstructive sleep apnea. He is using CPAP every night all night with an improvement in his blood pressure, nocturnal awakening and how he feels. He remains bothered by difficulty breathing through the nose during the day. The CPAP overcomes the obstruction at night. The lesion in the right nasal vestibule has gotten larger.   His sleep study showed an apnea-hypopnea index of 77.   History: This is a 67-year-old white male who is here for evaluation of nasal obstruction and snoring and probable sleep apnea. He reports a sleep study 10 years ago that was normal. He has gained significant weight in the meantime. He has bothered by difficulty breathing through the nose both day and night. He uses Flonase on a regular basis. He has loud snoring and witnessed apnea. He does  "have some postnasal drip          Past Medical History:   Diagnosis Date    Gout     Hyperlipidemia     Hypertension     Paroxysmal atrial fibrillation (Multi) 08/24/2023            Medications:     Current Outpatient Medications:     benazepriL-hydrochlorothiazide (Lotensin HCT) 20-25 mg tablet, Take 1 tablet by mouth once daily., Disp: 90 tablet, Rfl: 1    cloNIDine (Catapres) 0.1 mg tablet, TAKE 1 TABLET BY MOUTH EVERY 12 HOURS, Disp: 180 tablet, Rfl: 1    felodipine ER (Plendil) 5 mg 24 hr tablet, Take 1 tablet (5 mg) by mouth once daily. Do not crush, chew, or split., Disp: 30 tablet, Rfl: 11    fluocinolone 0.01 % cream, Applied to external ear canal once daily as needed for itching, Disp: 1 g, Rfl: 3    multivitamin tablet, Take 1 tablet by mouth once daily., Disp: , Rfl:     simvastatin (Zocor) 10 mg tablet, Take 1 tablet (10 mg) by mouth once daily. For 90 days, Disp: , Rfl:     terazosin (Hytrin) 5 mg capsule, Take 2 capsules (10 mg) by mouth once daily. For 90 days, Disp: , Rfl:     omeprazole (PriLOSEC) 40 mg DR capsule, Take 1 capsule once daily 30-45 minutes before a meal., Disp: 30 capsule, Rfl: 11     Allergies:  No Known Allergies     Physical Exam:  Last Recorded Vitals  Temperature 36.1 °C (96.9 °F), height 1.854 m (6' 1\"), weight 117 kg (258 lb).  []General appearance: Well-developed, well-nourished in no acute distress, conversant with normal voice quality    Head/face: No erythema or edema or facial tenderness, and normal facial nerve function bilaterally    External ear: Clear external auditory canals with normal pinnae bilateral eczema  Tube status: N/A  Middle ear: Tympanic membranes intact and mobile, middle ears normal.  Tympanic membrane perforation: N/A  Mastoid bowl: N/A  Hearing: Normal conversational awareness at normal speech thresholds    Nose visualized using: Anterior rhinoscopy  Nasal dorsum: Nontraumatic midline appearance  Septum: Midline, nonobstructing  Inferior turbinates: " Normal, pink  Secretions: Dry    Oral cavity and oropharynx: Normal  Teeth: Good condition  Floor of mouth: without lesions  Palate: Normal hard palate, soft palate and uvula  Oropharynx: Clear, no lesions present  Buccal mucosa: Normal without masses or lesions  Lips: Normal    Nasopharynx: Inadequate mirror exam secondary to gag/anatomy  Larynx and hypopharynx: mirror examination.   The patient had a normal epiglottis, AE folds, arytenoids, false vocal cords, true vocal cords, and normal vocal cord mobility bilaterally.  No significant mucosal masses or lesions noted  Neck:  Salivary glands: Normal bilateral parotid and submandibular glands by inspection and palpation.  Non-thyroid masses: No palpable masses or significant lymphadenopathy  Trachea: Midline  Thyroid: No thyromegaly or palpable nodules  Temporomandibular joint: Nontender  Cervical range of motion: Normal    Neurologic exam: Alert and oriented x3, appropriate affect.  Cranial nerves II-XII normal bilaterally  Extraocular movement: Extraocular movement intact, normal gaze alignment  Assessment/Plan   Encounter Diagnoses   Name Primary?    Allergic rhinitis, unspecified seasonality, unspecified trigger Yes    Obstructive sleep apnea of adult     Nasal lesion     Sensorineural hearing loss (SNHL) of both ears     GERD without esophagitis     Gastroesophageal reflux disease without esophagitis            He is doing well postoperatively status post septoplasty and turbinate surgery.  No recurrence of nasal vestibule lesion on the right.  He is tolerating CPAP and using it every night. Fluocinolone cream for the chronic itching in the ears.  Flonase nightly and humidification.  Omeprazole for probable reflux laryngitis.  Recheck in 4 months  Bernardo Ramírez MD

## 2024-09-10 ENCOUNTER — HOSPITAL ENCOUNTER (OUTPATIENT)
Dept: RADIOLOGY | Facility: HOSPITAL | Age: 69
Discharge: HOME | End: 2024-09-10
Payer: MEDICARE

## 2024-09-10 ENCOUNTER — APPOINTMENT (OUTPATIENT)
Dept: CARDIOLOGY | Facility: HOSPITAL | Age: 69
DRG: 286 | End: 2024-09-10
Payer: MEDICARE

## 2024-09-10 ENCOUNTER — HOSPITAL ENCOUNTER (INPATIENT)
Facility: HOSPITAL | Age: 69
LOS: 1 days | Discharge: ANOTHER HOSPITAL | DRG: 286 | End: 2024-09-11
Attending: EMERGENCY MEDICINE | Admitting: INTERNAL MEDICINE
Payer: MEDICARE

## 2024-09-10 ENCOUNTER — HOSPITAL ENCOUNTER (OUTPATIENT)
Dept: CARDIOLOGY | Facility: HOSPITAL | Age: 69
Discharge: HOME | End: 2024-09-10
Payer: MEDICARE

## 2024-09-10 ENCOUNTER — APPOINTMENT (OUTPATIENT)
Dept: RADIOLOGY | Facility: HOSPITAL | Age: 69
DRG: 286 | End: 2024-09-10
Payer: MEDICARE

## 2024-09-10 DIAGNOSIS — E78.2 MIXED HYPERLIPIDEMIA: ICD-10-CM

## 2024-09-10 DIAGNOSIS — I10 BENIGN HYPERTENSION: ICD-10-CM

## 2024-09-10 DIAGNOSIS — I44.7 LBBB (LEFT BUNDLE BRANCH BLOCK): ICD-10-CM

## 2024-09-10 DIAGNOSIS — R07.9 CHEST PAIN, UNSPECIFIED TYPE: ICD-10-CM

## 2024-09-10 DIAGNOSIS — I10 BENIGN HYPERTENSION: Primary | ICD-10-CM

## 2024-09-10 DIAGNOSIS — R53.83 OTHER FATIGUE: ICD-10-CM

## 2024-09-10 DIAGNOSIS — R06.02 SHORTNESS OF BREATH: ICD-10-CM

## 2024-09-10 DIAGNOSIS — E78.5 HYPERLIPIDEMIA, UNSPECIFIED: ICD-10-CM

## 2024-09-10 DIAGNOSIS — R53.82 CHRONIC FATIGUE: ICD-10-CM

## 2024-09-10 DIAGNOSIS — G47.33 OBSTRUCTIVE SLEEP APNEA OF ADULT: ICD-10-CM

## 2024-09-10 DIAGNOSIS — I77.810 THORACIC AORTIC ECTASIA (CMS-HCC): ICD-10-CM

## 2024-09-10 DIAGNOSIS — I42.0 DILATED CARDIOMYOPATHY (MULTI): ICD-10-CM

## 2024-09-10 DIAGNOSIS — I50.20 HFREF (HEART FAILURE WITH REDUCED EJECTION FRACTION): Primary | ICD-10-CM

## 2024-09-10 DIAGNOSIS — R06.02 SOB (SHORTNESS OF BREATH) ON EXERTION: ICD-10-CM

## 2024-09-10 DIAGNOSIS — I71.21 ANEURYSM OF ASCENDING AORTA WITHOUT RUPTURE (CMS-HCC): ICD-10-CM

## 2024-09-10 PROBLEM — I50.21 ACUTE SYSTOLIC HEART FAILURE (MULTI): Status: ACTIVE | Noted: 2024-09-10

## 2024-09-10 PROBLEM — I42.9 CARDIOMYOPATHY: Status: ACTIVE | Noted: 2024-09-10

## 2024-09-10 LAB
ALBUMIN SERPL-MCNC: 4.2 G/DL (ref 3.5–5)
ALP BLD-CCNC: 140 U/L (ref 35–125)
ALT SERPL-CCNC: 27 U/L (ref 5–40)
ANION GAP SERPL CALC-SCNC: 14 MMOL/L
AORTIC VALVE MEAN GRADIENT: 8 MMHG
AORTIC VALVE PEAK VELOCITY: 2.03 M/S
AST SERPL-CCNC: 19 U/L (ref 5–40)
AV PEAK GRADIENT: 16.5 MMHG
AVA (PEAK VEL): 4.07 CM2
AVA (VTI): 4.27 CM2
BASOPHILS # BLD AUTO: 0.03 X10*3/UL (ref 0–0.1)
BASOPHILS NFR BLD AUTO: 0.5 %
BILIRUB SERPL-MCNC: 0.6 MG/DL (ref 0.1–1.2)
BUN SERPL-MCNC: 27 MG/DL (ref 8–25)
CALCIUM SERPL-MCNC: 9.4 MG/DL (ref 8.5–10.4)
CHLORIDE SERPL-SCNC: 106 MMOL/L (ref 97–107)
CO2 SERPL-SCNC: 24 MMOL/L (ref 24–31)
CREAT SERPL-MCNC: 1.2 MG/DL (ref 0.4–1.6)
EGFRCR SERPLBLD CKD-EPI 2021: 67 ML/MIN/1.73M*2
EJECTION FRACTION APICAL 4 CHAMBER: 43.1
EJECTION FRACTION: 38 %
EOSINOPHIL # BLD AUTO: 0.11 X10*3/UL (ref 0–0.7)
EOSINOPHIL NFR BLD AUTO: 1.9 %
ERYTHROCYTE [DISTWIDTH] IN BLOOD BY AUTOMATED COUNT: 14 % (ref 11.5–14.5)
GLOBAL LONGITUDINAL STRAIN: -10.5 %
GLUCOSE SERPL-MCNC: 125 MG/DL (ref 65–99)
HCT VFR BLD AUTO: 37.6 % (ref 41–52)
HGB BLD-MCNC: 12.4 G/DL (ref 13.5–17.5)
IMM GRANULOCYTES # BLD AUTO: 0.01 X10*3/UL (ref 0–0.7)
IMM GRANULOCYTES NFR BLD AUTO: 0.2 % (ref 0–0.9)
LEFT ATRIUM VOLUME AREA LENGTH INDEX BSA: 68.3 ML/M2
LEFT VENTRICLE INTERNAL DIMENSION DIASTOLE: 7.16 CM (ref 3.5–6)
LEFT VENTRICULAR OUTFLOW TRACT DIAMETER: 2.3 CM
LIPASE SERPL-CCNC: 25 U/L (ref 16–63)
LV EJECTION FRACTION BIPLANE: 41 %
LYMPHOCYTES # BLD AUTO: 1.4 X10*3/UL (ref 1.2–4.8)
LYMPHOCYTES NFR BLD AUTO: 23.9 %
MCH RBC QN AUTO: 28.3 PG (ref 26–34)
MCHC RBC AUTO-ENTMCNC: 33 G/DL (ref 32–36)
MCV RBC AUTO: 86 FL (ref 80–100)
MITRAL VALVE E/A RATIO: 2.27
MONOCYTES # BLD AUTO: 0.55 X10*3/UL (ref 0.1–1)
MONOCYTES NFR BLD AUTO: 9.4 %
NEUTROPHILS # BLD AUTO: 3.76 X10*3/UL (ref 1.2–7.7)
NEUTROPHILS NFR BLD AUTO: 64.1 %
NRBC BLD-RTO: 0 /100 WBCS (ref 0–0)
NT-PROBNP SERPL-MCNC: 1015 PG/ML (ref 0–229)
PLATELET # BLD AUTO: 147 X10*3/UL (ref 150–450)
POTASSIUM SERPL-SCNC: 3.4 MMOL/L (ref 3.4–5.1)
PROT SERPL-MCNC: 6.8 G/DL (ref 5.9–7.9)
RBC # BLD AUTO: 4.38 X10*6/UL (ref 4.5–5.9)
RIGHT VENTRICLE FREE WALL PEAK S': 9.57 CM/S
SARS-COV-2 RNA RESP QL NAA+PROBE: NOT DETECTED
SODIUM SERPL-SCNC: 144 MMOL/L (ref 133–145)
TRICUSPID ANNULAR PLANE SYSTOLIC EXCURSION: 2.1 CM
TROPONIN T SERPL-MCNC: 16 NG/L
TROPONIN T SERPL-MCNC: 18 NG/L
WBC # BLD AUTO: 5.9 X10*3/UL (ref 4.4–11.3)

## 2024-09-10 PROCEDURE — 2500000004 HC RX 250 GENERAL PHARMACY W/ HCPCS (ALT 636 FOR OP/ED): Performed by: INTERNAL MEDICINE

## 2024-09-10 PROCEDURE — 78452 HT MUSCLE IMAGE SPECT MULT: CPT

## 2024-09-10 PROCEDURE — 99285 EMERGENCY DEPT VISIT HI MDM: CPT | Mod: 25

## 2024-09-10 PROCEDURE — A9502 TC99M TETROFOSMIN: HCPCS | Performed by: INTERNAL MEDICINE

## 2024-09-10 PROCEDURE — 93306 TTE W/DOPPLER COMPLETE: CPT

## 2024-09-10 PROCEDURE — 83880 ASSAY OF NATRIURETIC PEPTIDE: CPT | Performed by: EMERGENCY MEDICINE

## 2024-09-10 PROCEDURE — 80053 COMPREHEN METABOLIC PANEL: CPT | Performed by: EMERGENCY MEDICINE

## 2024-09-10 PROCEDURE — 93005 ELECTROCARDIOGRAM TRACING: CPT

## 2024-09-10 PROCEDURE — 87635 SARS-COV-2 COVID-19 AMP PRB: CPT | Performed by: EMERGENCY MEDICINE

## 2024-09-10 PROCEDURE — 2550000001 HC RX 255 CONTRASTS: Performed by: EMERGENCY MEDICINE

## 2024-09-10 PROCEDURE — 3430000001 HC RX 343 DIAGNOSTIC RADIOPHARMACEUTICALS: Performed by: INTERNAL MEDICINE

## 2024-09-10 PROCEDURE — 85025 COMPLETE CBC W/AUTO DIFF WBC: CPT | Performed by: EMERGENCY MEDICINE

## 2024-09-10 PROCEDURE — 36415 COLL VENOUS BLD VENIPUNCTURE: CPT | Performed by: EMERGENCY MEDICINE

## 2024-09-10 PROCEDURE — 74174 CTA ABD&PLVS W/CONTRAST: CPT | Performed by: RADIOLOGY

## 2024-09-10 PROCEDURE — 93306 TTE W/DOPPLER COMPLETE: CPT | Performed by: INTERNAL MEDICINE

## 2024-09-10 PROCEDURE — 2060000001 HC INTERMEDIATE ICU ROOM DAILY

## 2024-09-10 PROCEDURE — 78452 HT MUSCLE IMAGE SPECT MULT: CPT | Performed by: STUDENT IN AN ORGANIZED HEALTH CARE EDUCATION/TRAINING PROGRAM

## 2024-09-10 PROCEDURE — 93017 CV STRESS TEST TRACING ONLY: CPT

## 2024-09-10 PROCEDURE — 71275 CT ANGIOGRAPHY CHEST: CPT | Performed by: RADIOLOGY

## 2024-09-10 PROCEDURE — 83690 ASSAY OF LIPASE: CPT | Performed by: EMERGENCY MEDICINE

## 2024-09-10 PROCEDURE — 84484 ASSAY OF TROPONIN QUANT: CPT | Performed by: EMERGENCY MEDICINE

## 2024-09-10 PROCEDURE — 93356 MYOCRD STRAIN IMG SPCKL TRCK: CPT | Performed by: INTERNAL MEDICINE

## 2024-09-10 PROCEDURE — 76376 3D RENDER W/INTRP POSTPROCES: CPT | Performed by: INTERNAL MEDICINE

## 2024-09-10 PROCEDURE — 99222 1ST HOSP IP/OBS MODERATE 55: CPT | Performed by: INTERNAL MEDICINE

## 2024-09-10 PROCEDURE — 71275 CT ANGIOGRAPHY CHEST: CPT

## 2024-09-10 RX ORDER — REGADENOSON 0.08 MG/ML
0.4 INJECTION, SOLUTION INTRAVENOUS ONCE
Status: COMPLETED | OUTPATIENT
Start: 2024-09-10 | End: 2024-09-10

## 2024-09-10 RX ORDER — FUROSEMIDE 10 MG/ML
20 INJECTION INTRAMUSCULAR; INTRAVENOUS ONCE
Status: COMPLETED | OUTPATIENT
Start: 2024-09-10 | End: 2024-09-11

## 2024-09-10 RX ADMIN — IOHEXOL 75 ML: 350 INJECTION, SOLUTION INTRAVENOUS at 21:50

## 2024-09-10 ASSESSMENT — PAIN - FUNCTIONAL ASSESSMENT: PAIN_FUNCTIONAL_ASSESSMENT: 0-10

## 2024-09-10 ASSESSMENT — LIFESTYLE VARIABLES
HAVE YOU EVER FELT YOU SHOULD CUT DOWN ON YOUR DRINKING: NO
HAVE PEOPLE ANNOYED YOU BY CRITICIZING YOUR DRINKING: NO
TOTAL SCORE: 0
EVER FELT BAD OR GUILTY ABOUT YOUR DRINKING: NO
EVER HAD A DRINK FIRST THING IN THE MORNING TO STEADY YOUR NERVES TO GET RID OF A HANGOVER: NO

## 2024-09-10 ASSESSMENT — PAIN DESCRIPTION - LOCATION: LOCATION: CHEST

## 2024-09-10 ASSESSMENT — PAIN DESCRIPTION - PAIN TYPE: TYPE: ACUTE PAIN

## 2024-09-10 ASSESSMENT — PAIN SCALES - GENERAL: PAINLEVEL_OUTOF10: 7

## 2024-09-10 NOTE — PROGRESS NOTES
Patient with underlying history of hypertension hyperlipidemia per patient had a echocardiogram report today found to having ascending aortic aneurysm 6.2 cm.   1. The left ventricular systolic function is moderately decreased, with a visually estimated ejection fraction of 35-40%.   2. There is global hypokinesis of the left ventricle with minor regional variations.   3. Left ventricular cavity size is severely dilated.   4. There is normal right ventricular global systolic function.   5. The left atrium is moderately dilated.   6. The right atrium is moderately dilated.   7. Moderate mitral valve regurgitation.   8. Trace tricuspid regurgitation is visualized.   9. Aortic valve stenosis is not present.  10. Likely tricuspid aortic valve but images slightly suboptimal.  11. Moderate aortic valve regurgitation.  12. Aneurysm of the ascending aorta.    Patient had pharmacological stress test found to having ischemia with dilated cardiomyopathy ejection action by 33%.  Impression   1. No evidence of inducible myocardial ischemia. Decreased perfusion  in the inferior wall seen on both stress and rest imaging with  preserved wall motion and thickness, without CT correction, either  artifacts or prior infarction, attention on follow-up study.  2. Severe dilatation of left ventricle  3. Global hypokinesis with a markedly decreased post-stress LV EF  estimated at 33%.         Schedule patient for outpatient CT chest angiography to evaluate ascending arctic aneurysm as well as CT surgery consult.  Will have patient to see Dr.Baeza VALDIVIA

## 2024-09-11 ENCOUNTER — TELEPHONE (OUTPATIENT)
Age: 69
End: 2024-09-11

## 2024-09-11 ENCOUNTER — HOSPITAL ENCOUNTER (INPATIENT)
Facility: HOSPITAL | Age: 69
DRG: 219 | End: 2024-09-11
Attending: THORACIC SURGERY (CARDIOTHORACIC VASCULAR SURGERY) | Admitting: PHYSICIAN ASSISTANT
Payer: MEDICARE

## 2024-09-11 ENCOUNTER — APPOINTMENT (OUTPATIENT)
Dept: CARDIOLOGY | Facility: HOSPITAL | Age: 69
DRG: 219 | End: 2024-09-11
Payer: MEDICARE

## 2024-09-11 VITALS
HEIGHT: 73 IN | BODY MASS INDEX: 32.47 KG/M2 | WEIGHT: 245 LBS | HEART RATE: 78 BPM | OXYGEN SATURATION: 97 % | SYSTOLIC BLOOD PRESSURE: 135 MMHG | RESPIRATION RATE: 24 BRPM | TEMPERATURE: 98 F | DIASTOLIC BLOOD PRESSURE: 69 MMHG

## 2024-09-11 DIAGNOSIS — I35.1 NONRHEUMATIC AORTIC (VALVE) INSUFFICIENCY: ICD-10-CM

## 2024-09-11 DIAGNOSIS — I44.7 LBBB (LEFT BUNDLE BRANCH BLOCK): ICD-10-CM

## 2024-09-11 DIAGNOSIS — I71.21 ANEURYSM OF ASCENDING AORTA WITHOUT RUPTURE (CMS-HCC): ICD-10-CM

## 2024-09-11 DIAGNOSIS — Z95.828 S/P ASCENDING AORTIC REPLACEMENT: ICD-10-CM

## 2024-09-11 DIAGNOSIS — I50.22 CHRONIC SYSTOLIC CONGESTIVE HEART FAILURE: ICD-10-CM

## 2024-09-11 DIAGNOSIS — I71.21 ASCENDING AORTIC ANEURYSM, UNSPECIFIED WHETHER RUPTURED (CMS-HCC): ICD-10-CM

## 2024-09-11 DIAGNOSIS — R09.89 CARDIAC COMPLAINT: ICD-10-CM

## 2024-09-11 DIAGNOSIS — Z95.3 S/P AORTIC VALVE REPLACEMENT WITH PORCINE VALVE: ICD-10-CM

## 2024-09-11 DIAGNOSIS — I79.0 ANEURYSM OF AORTA IN DISEASES CLASSIFIED ELSEWHERE: Primary | ICD-10-CM

## 2024-09-11 DIAGNOSIS — Z95.2 S/P AORTIC VALVE REPLACEMENT AND AORTOPLASTY: ICD-10-CM

## 2024-09-11 DIAGNOSIS — I50.21 ACUTE SYSTOLIC HEART FAILURE: ICD-10-CM

## 2024-09-11 DIAGNOSIS — I42.2 HYPERTROPHIC NONOBSTRUCTIVE CARDIOMYOPATHY (MULTI): ICD-10-CM

## 2024-09-11 PROBLEM — E78.5 HYPERLIPIDEMIA: Status: RESOLVED | Noted: 2023-08-24 | Resolved: 2024-09-11

## 2024-09-11 PROBLEM — D50.9 IRON DEFICIENCY ANEMIA: Status: RESOLVED | Noted: 2023-08-24 | Resolved: 2024-09-11

## 2024-09-11 PROBLEM — J81.1 PULMONARY EDEMA: Status: ACTIVE | Noted: 2024-09-11

## 2024-09-11 PROBLEM — K57.90 DIVERTICULAR DISEASE: Status: RESOLVED | Noted: 2023-08-24 | Resolved: 2024-09-11

## 2024-09-11 PROBLEM — J81.1 PULMONARY EDEMA: Status: RESOLVED | Noted: 2024-09-11 | Resolved: 2024-09-11

## 2024-09-11 PROBLEM — M10.9 GOUT: Status: RESOLVED | Noted: 2023-08-24 | Resolved: 2024-09-11

## 2024-09-11 PROBLEM — R73.9 HYPERGLYCEMIA: Status: RESOLVED | Noted: 2023-08-24 | Resolved: 2024-09-11

## 2024-09-11 PROBLEM — I50.20 HFREF (HEART FAILURE WITH REDUCED EJECTION FRACTION): Status: RESOLVED | Noted: 2024-09-10 | Resolved: 2024-09-11

## 2024-09-11 LAB
ALBUMIN SERPL-MCNC: 4 G/DL (ref 3.5–5)
ALP BLD-CCNC: 126 U/L (ref 35–125)
ALT SERPL-CCNC: 25 U/L (ref 5–40)
ANION GAP SERPL CALC-SCNC: 10 MMOL/L
AST SERPL-CCNC: 18 U/L (ref 5–40)
BASOPHILS # BLD AUTO: 0.04 X10*3/UL (ref 0–0.1)
BASOPHILS NFR BLD AUTO: 0.6 %
BILIRUB SERPL-MCNC: 0.8 MG/DL (ref 0.1–1.2)
BUN SERPL-MCNC: 24 MG/DL (ref 8–25)
CALCIUM SERPL-MCNC: 8.8 MG/DL (ref 8.5–10.4)
CHLORIDE SERPL-SCNC: 107 MMOL/L (ref 97–107)
CO2 SERPL-SCNC: 25 MMOL/L (ref 24–31)
CREAT SERPL-MCNC: 1 MG/DL (ref 0.4–1.6)
EGFRCR SERPLBLD CKD-EPI 2021: 83 ML/MIN/1.73M*2
EOSINOPHIL # BLD AUTO: 0.11 X10*3/UL (ref 0–0.7)
EOSINOPHIL NFR BLD AUTO: 1.5 %
ERYTHROCYTE [DISTWIDTH] IN BLOOD BY AUTOMATED COUNT: 14.1 % (ref 11.5–14.5)
GLUCOSE SERPL-MCNC: 102 MG/DL (ref 65–99)
HCT VFR BLD AUTO: 37.6 % (ref 41–52)
HGB BLD-MCNC: 12.3 G/DL (ref 13.5–17.5)
IMM GRANULOCYTES # BLD AUTO: 0.03 X10*3/UL (ref 0–0.7)
IMM GRANULOCYTES NFR BLD AUTO: 0.4 % (ref 0–0.9)
LYMPHOCYTES # BLD AUTO: 1.9 X10*3/UL (ref 1.2–4.8)
LYMPHOCYTES NFR BLD AUTO: 26.7 %
MCH RBC QN AUTO: 27.9 PG (ref 26–34)
MCHC RBC AUTO-ENTMCNC: 32.7 G/DL (ref 32–36)
MCV RBC AUTO: 85 FL (ref 80–100)
MONOCYTES # BLD AUTO: 0.7 X10*3/UL (ref 0.1–1)
MONOCYTES NFR BLD AUTO: 9.8 %
NEUTROPHILS # BLD AUTO: 4.33 X10*3/UL (ref 1.2–7.7)
NEUTROPHILS NFR BLD AUTO: 61 %
NRBC BLD-RTO: 0 /100 WBCS (ref 0–0)
PLATELET # BLD AUTO: 145 X10*3/UL (ref 150–450)
POTASSIUM SERPL-SCNC: 3.8 MMOL/L (ref 3.4–5.1)
PROT SERPL-MCNC: 6.4 G/DL (ref 5.9–7.9)
RBC # BLD AUTO: 4.41 X10*6/UL (ref 4.5–5.9)
SODIUM SERPL-SCNC: 142 MMOL/L (ref 133–145)
TROPONIN T SERPL-MCNC: 21 NG/L
WBC # BLD AUTO: 7.1 X10*3/UL (ref 4.4–11.3)

## 2024-09-11 PROCEDURE — 4A023N7 MEASUREMENT OF CARDIAC SAMPLING AND PRESSURE, LEFT HEART, PERCUTANEOUS APPROACH: ICD-10-PCS | Performed by: INTERNAL MEDICINE

## 2024-09-11 PROCEDURE — G0269 OCCLUSIVE DEVICE IN VEIN ART: HCPCS | Performed by: INTERNAL MEDICINE

## 2024-09-11 PROCEDURE — 93458 L HRT ARTERY/VENTRICLE ANGIO: CPT | Performed by: INTERNAL MEDICINE

## 2024-09-11 PROCEDURE — 2500000004 HC RX 250 GENERAL PHARMACY W/ HCPCS (ALT 636 FOR OP/ED): Performed by: NURSE PRACTITIONER

## 2024-09-11 PROCEDURE — 93005 ELECTROCARDIOGRAM TRACING: CPT

## 2024-09-11 PROCEDURE — 93454 CORONARY ARTERY ANGIO S&I: CPT | Performed by: INTERNAL MEDICINE

## 2024-09-11 PROCEDURE — 2500000004 HC RX 250 GENERAL PHARMACY W/ HCPCS (ALT 636 FOR OP/ED): Performed by: PHYSICIAN ASSISTANT

## 2024-09-11 PROCEDURE — 99221 1ST HOSP IP/OBS SF/LOW 40: CPT | Performed by: PHYSICIAN ASSISTANT

## 2024-09-11 PROCEDURE — C1760 CLOSURE DEV, VASC: HCPCS | Performed by: INTERNAL MEDICINE

## 2024-09-11 PROCEDURE — 99152 MOD SED SAME PHYS/QHP 5/>YRS: CPT | Performed by: INTERNAL MEDICINE

## 2024-09-11 PROCEDURE — 2500000002 HC RX 250 W HCPCS SELF ADMINISTERED DRUGS (ALT 637 FOR MEDICARE OP, ALT 636 FOR OP/ED): Performed by: INTERNAL MEDICINE

## 2024-09-11 PROCEDURE — 87081 CULTURE SCREEN ONLY: CPT | Performed by: PHYSICIAN ASSISTANT

## 2024-09-11 PROCEDURE — 84484 ASSAY OF TROPONIN QUANT: CPT | Performed by: EMERGENCY MEDICINE

## 2024-09-11 PROCEDURE — 99153 MOD SED SAME PHYS/QHP EA: CPT | Performed by: INTERNAL MEDICINE

## 2024-09-11 PROCEDURE — 2500000001 HC RX 250 WO HCPCS SELF ADMINISTERED DRUGS (ALT 637 FOR MEDICARE OP): Performed by: INTERNAL MEDICINE

## 2024-09-11 PROCEDURE — 2550000001 HC RX 255 CONTRASTS: Performed by: INTERNAL MEDICINE

## 2024-09-11 PROCEDURE — 2780000003 HC OR 278 NO HCPCS: Performed by: INTERNAL MEDICINE

## 2024-09-11 PROCEDURE — 2500000001 HC RX 250 WO HCPCS SELF ADMINISTERED DRUGS (ALT 637 FOR MEDICARE OP): Performed by: PHYSICIAN ASSISTANT

## 2024-09-11 PROCEDURE — 36415 COLL VENOUS BLD VENIPUNCTURE: CPT | Performed by: INTERNAL MEDICINE

## 2024-09-11 PROCEDURE — 2500000005 HC RX 250 GENERAL PHARMACY W/O HCPCS: Performed by: INTERNAL MEDICINE

## 2024-09-11 PROCEDURE — 99223 1ST HOSP IP/OBS HIGH 75: CPT | Performed by: INTERNAL MEDICINE

## 2024-09-11 PROCEDURE — 2500000004 HC RX 250 GENERAL PHARMACY W/ HCPCS (ALT 636 FOR OP/ED): Performed by: INTERNAL MEDICINE

## 2024-09-11 PROCEDURE — 85025 COMPLETE CBC W/AUTO DIFF WBC: CPT | Performed by: INTERNAL MEDICINE

## 2024-09-11 PROCEDURE — 1200000002 HC GENERAL ROOM WITH TELEMETRY DAILY

## 2024-09-11 PROCEDURE — 2720000007 HC OR 272 NO HCPCS: Performed by: INTERNAL MEDICINE

## 2024-09-11 PROCEDURE — 99221 1ST HOSP IP/OBS SF/LOW 40: CPT | Performed by: NURSE PRACTITIONER

## 2024-09-11 PROCEDURE — 2500000005 HC RX 250 GENERAL PHARMACY W/O HCPCS: Performed by: NURSE PRACTITIONER

## 2024-09-11 PROCEDURE — 2500000002 HC RX 250 W HCPCS SELF ADMINISTERED DRUGS (ALT 637 FOR MEDICARE OP, ALT 636 FOR OP/ED): Performed by: PHYSICIAN ASSISTANT

## 2024-09-11 PROCEDURE — 99232 SBSQ HOSP IP/OBS MODERATE 35: CPT | Performed by: INTERNAL MEDICINE

## 2024-09-11 PROCEDURE — B2111ZZ FLUOROSCOPY OF MULTIPLE CORONARY ARTERIES USING LOW OSMOLAR CONTRAST: ICD-10-PCS | Performed by: INTERNAL MEDICINE

## 2024-09-11 PROCEDURE — 84450 TRANSFERASE (AST) (SGOT): CPT | Performed by: INTERNAL MEDICINE

## 2024-09-11 PROCEDURE — C1894 INTRO/SHEATH, NON-LASER: HCPCS | Performed by: INTERNAL MEDICINE

## 2024-09-11 RX ORDER — ACETAMINOPHEN 160 MG/5ML
650 SOLUTION ORAL EVERY 4 HOURS PRN
Status: DISCONTINUED | OUTPATIENT
Start: 2024-09-11 | End: 2024-09-11 | Stop reason: HOSPADM

## 2024-09-11 RX ORDER — PANTOPRAZOLE SODIUM 20 MG/1
20 TABLET, DELAYED RELEASE ORAL
Status: ON HOLD
Start: 2024-09-12

## 2024-09-11 RX ORDER — ONDANSETRON 4 MG/1
4 TABLET, FILM COATED ORAL EVERY 8 HOURS PRN
Status: DISCONTINUED | OUTPATIENT
Start: 2024-09-11 | End: 2024-09-11 | Stop reason: HOSPADM

## 2024-09-11 RX ORDER — CLONIDINE HYDROCHLORIDE 0.1 MG/1
0.1 TABLET ORAL EVERY 8 HOURS SCHEDULED
Status: DISCONTINUED | OUTPATIENT
Start: 2024-09-11 | End: 2024-09-20

## 2024-09-11 RX ORDER — PANTOPRAZOLE SODIUM 20 MG/1
20 TABLET, DELAYED RELEASE ORAL
Status: DISCONTINUED | OUTPATIENT
Start: 2024-09-11 | End: 2024-09-11 | Stop reason: HOSPADM

## 2024-09-11 RX ORDER — GUAIFENESIN/DEXTROMETHORPHAN 100-10MG/5
5 SYRUP ORAL EVERY 4 HOURS PRN
Status: DISCONTINUED | OUTPATIENT
Start: 2024-09-11 | End: 2024-09-11 | Stop reason: HOSPADM

## 2024-09-11 RX ORDER — TERAZOSIN 5 MG/1
10 CAPSULE ORAL DAILY
Status: DISCONTINUED | OUTPATIENT
Start: 2024-09-11 | End: 2024-09-11 | Stop reason: HOSPADM

## 2024-09-11 RX ORDER — ONDANSETRON HYDROCHLORIDE 2 MG/ML
4 INJECTION, SOLUTION INTRAVENOUS EVERY 8 HOURS PRN
Status: DISCONTINUED | OUTPATIENT
Start: 2024-09-11 | End: 2024-09-11 | Stop reason: HOSPADM

## 2024-09-11 RX ORDER — MUPIROCIN 20 MG/G
0.5 OINTMENT TOPICAL 2 TIMES DAILY
Status: COMPLETED | OUTPATIENT
Start: 2024-09-11 | End: 2024-09-16

## 2024-09-11 RX ORDER — POLYETHYLENE GLYCOL 3350 17 G/17G
17 POWDER, FOR SOLUTION ORAL DAILY PRN
Status: DISCONTINUED | OUTPATIENT
Start: 2024-09-11 | End: 2024-09-11 | Stop reason: HOSPADM

## 2024-09-11 RX ORDER — ACETAMINOPHEN 650 MG/1
650 SUPPOSITORY RECTAL EVERY 4 HOURS PRN
Status: DISCONTINUED | OUTPATIENT
Start: 2024-09-11 | End: 2024-09-11 | Stop reason: HOSPADM

## 2024-09-11 RX ORDER — METOPROLOL SUCCINATE 25 MG/1
25 TABLET, EXTENDED RELEASE ORAL DAILY
Status: DISCONTINUED | OUTPATIENT
Start: 2024-09-11 | End: 2024-09-11 | Stop reason: HOSPADM

## 2024-09-11 RX ORDER — FLUTICASONE PROPIONATE 50 MCG
2 SPRAY, SUSPENSION (ML) NASAL DAILY
Status: DISCONTINUED | OUTPATIENT
Start: 2024-09-11 | End: 2024-09-11 | Stop reason: HOSPADM

## 2024-09-11 RX ORDER — HEPARIN SODIUM 5000 [USP'U]/ML
5000 INJECTION, SOLUTION INTRAVENOUS; SUBCUTANEOUS EVERY 8 HOURS
Status: DISCONTINUED | OUTPATIENT
Start: 2024-09-11 | End: 2024-09-11 | Stop reason: HOSPADM

## 2024-09-11 RX ORDER — METOPROLOL SUCCINATE 25 MG/1
25 TABLET, EXTENDED RELEASE ORAL DAILY
Status: DISCONTINUED | OUTPATIENT
Start: 2024-09-11 | End: 2024-09-11

## 2024-09-11 RX ORDER — SIMVASTATIN 10 MG/1
10 TABLET, FILM COATED ORAL NIGHTLY
Status: DISCONTINUED | OUTPATIENT
Start: 2024-09-11 | End: 2024-09-11 | Stop reason: HOSPADM

## 2024-09-11 RX ORDER — FENTANYL CITRATE 50 UG/ML
INJECTION, SOLUTION INTRAMUSCULAR; INTRAVENOUS AS NEEDED
Status: DISCONTINUED | OUTPATIENT
Start: 2024-09-11 | End: 2024-09-11 | Stop reason: HOSPADM

## 2024-09-11 RX ORDER — AMLODIPINE BESYLATE 5 MG/1
5 TABLET ORAL DAILY
Status: DISCONTINUED | OUTPATIENT
Start: 2024-09-11 | End: 2024-09-11

## 2024-09-11 RX ORDER — FLUTICASONE PROPIONATE 50 MCG
2 SPRAY, SUSPENSION (ML) NASAL DAILY
Qty: 16 G | Refills: 12 | Status: ON HOLD | OUTPATIENT
Start: 2024-09-12

## 2024-09-11 RX ORDER — MIDAZOLAM HYDROCHLORIDE 1 MG/ML
INJECTION, SOLUTION INTRAMUSCULAR; INTRAVENOUS AS NEEDED
Status: DISCONTINUED | OUTPATIENT
Start: 2024-09-11 | End: 2024-09-11 | Stop reason: HOSPADM

## 2024-09-11 RX ORDER — GUAIFENESIN 600 MG/1
600 TABLET, EXTENDED RELEASE ORAL EVERY 12 HOURS PRN
Status: DISCONTINUED | OUTPATIENT
Start: 2024-09-11 | End: 2024-09-11 | Stop reason: HOSPADM

## 2024-09-11 RX ORDER — CLONIDINE HYDROCHLORIDE 0.1 MG/1
0.1 TABLET ORAL 2 TIMES DAILY
Status: DISCONTINUED | OUTPATIENT
Start: 2024-09-11 | End: 2024-09-11

## 2024-09-11 RX ORDER — SIMVASTATIN 10 MG/1
10 TABLET, FILM COATED ORAL NIGHTLY
Status: ON HOLD
Start: 2024-09-11 | End: 2024-09-13

## 2024-09-11 RX ORDER — CLONIDINE HYDROCHLORIDE 0.1 MG/1
0.1 TABLET ORAL EVERY 12 HOURS
Status: ON HOLD
Start: 2024-09-11

## 2024-09-11 RX ORDER — BENAZEPRIL HYDROCHLORIDE AND HYDROCHLOROTHIAZIDE 20; 25 MG/1; MG/1
1 TABLET ORAL DAILY
Status: DISCONTINUED | OUTPATIENT
Start: 2024-09-11 | End: 2024-09-11

## 2024-09-11 RX ORDER — SIMVASTATIN 20 MG/1
10 TABLET, FILM COATED ORAL NIGHTLY
Status: DISCONTINUED | OUTPATIENT
Start: 2024-09-11 | End: 2024-09-23

## 2024-09-11 RX ORDER — SODIUM CHLORIDE 9 MG/ML
75 INJECTION, SOLUTION INTRAVENOUS CONTINUOUS
Status: ACTIVE | OUTPATIENT
Start: 2024-09-11 | End: 2024-09-11

## 2024-09-11 RX ORDER — LOSARTAN POTASSIUM 100 MG/1
100 TABLET ORAL DAILY
Status: ON HOLD
Start: 2024-09-12

## 2024-09-11 RX ORDER — TERAZOSIN 10 MG/1
10 CAPSULE ORAL DAILY
Status: ON HOLD
Start: 2024-09-12

## 2024-09-11 RX ORDER — HEPARIN SODIUM 5000 [USP'U]/ML
5000 INJECTION, SOLUTION INTRAVENOUS; SUBCUTANEOUS EVERY 8 HOURS
Status: ON HOLD
Start: 2024-09-11

## 2024-09-11 RX ORDER — LOSARTAN POTASSIUM 100 MG/1
100 TABLET ORAL DAILY
Status: DISCONTINUED | OUTPATIENT
Start: 2024-09-11 | End: 2024-09-11 | Stop reason: HOSPADM

## 2024-09-11 RX ORDER — CLONIDINE HYDROCHLORIDE 0.1 MG/1
0.1 TABLET ORAL EVERY 12 HOURS
Status: DISCONTINUED | OUTPATIENT
Start: 2024-09-11 | End: 2024-09-11 | Stop reason: HOSPADM

## 2024-09-11 RX ORDER — ACETAMINOPHEN 325 MG/1
650 TABLET ORAL EVERY 4 HOURS PRN
Status: DISCONTINUED | OUTPATIENT
Start: 2024-09-11 | End: 2024-09-11 | Stop reason: HOSPADM

## 2024-09-11 RX ORDER — PANTOPRAZOLE SODIUM 40 MG/1
40 TABLET, DELAYED RELEASE ORAL
Status: DISCONTINUED | OUTPATIENT
Start: 2024-09-12 | End: 2024-09-23

## 2024-09-11 RX ORDER — LIDOCAINE HYDROCHLORIDE 10 MG/ML
INJECTION, SOLUTION EPIDURAL; INFILTRATION; INTRACAUDAL; PERINEURAL AS NEEDED
Status: DISCONTINUED | OUTPATIENT
Start: 2024-09-11 | End: 2024-09-11 | Stop reason: HOSPADM

## 2024-09-11 RX ORDER — IODIXANOL 320 MG/ML
INJECTION, SOLUTION INTRAVASCULAR AS NEEDED
Status: DISCONTINUED | OUTPATIENT
Start: 2024-09-11 | End: 2024-09-11 | Stop reason: HOSPADM

## 2024-09-11 RX ORDER — TERAZOSIN 5 MG/1
10 CAPSULE ORAL NIGHTLY
Status: DISCONTINUED | OUTPATIENT
Start: 2024-09-11 | End: 2024-09-23

## 2024-09-11 RX ORDER — HYDRALAZINE HYDROCHLORIDE 20 MG/ML
10 INJECTION INTRAMUSCULAR; INTRAVENOUS EVERY 4 HOURS PRN
Status: DISCONTINUED | OUTPATIENT
Start: 2024-09-11 | End: 2024-09-13

## 2024-09-11 RX ORDER — METOPROLOL TARTRATE 25 MG/1
25 TABLET, FILM COATED ORAL 2 TIMES DAILY
Status: DISCONTINUED | OUTPATIENT
Start: 2024-09-11 | End: 2024-09-17

## 2024-09-11 RX ORDER — METOPROLOL SUCCINATE 25 MG/1
25 TABLET, EXTENDED RELEASE ORAL DAILY
Status: ON HOLD
Start: 2024-09-12

## 2024-09-11 RX ADMIN — MUPIROCIN 0.5 APPLICATION: 20 OINTMENT TOPICAL at 20:22

## 2024-09-11 RX ADMIN — SIMVASTATIN 10 MG: 10 TABLET, FILM COATED ORAL at 21:51

## 2024-09-11 RX ADMIN — TERAZOSIN HYDROCHLORIDE 10 MG: 5 CAPSULE ORAL at 20:22

## 2024-09-11 RX ADMIN — FUROSEMIDE 20 MG: 10 INJECTION, SOLUTION INTRAMUSCULAR; INTRAVENOUS at 06:27

## 2024-09-11 RX ADMIN — Medication 2 L/MIN: at 14:51

## 2024-09-11 RX ADMIN — METOPROLOL SUCCINATE 25 MG: 25 TABLET, EXTENDED RELEASE ORAL at 08:18

## 2024-09-11 RX ADMIN — METOPROLOL TARTRATE 25 MG: 25 TABLET, FILM COATED ORAL at 20:22

## 2024-09-11 RX ADMIN — HYDRALAZINE HYDROCHLORIDE 10 MG: 20 INJECTION INTRAMUSCULAR; INTRAVENOUS at 21:51

## 2024-09-11 RX ADMIN — CLONIDINE HYDROCHLORIDE 0.1 MG: 0.1 TABLET ORAL at 08:18

## 2024-09-11 RX ADMIN — FLUTICASONE PROPIONATE 2 SPRAY: 50 SPRAY, METERED NASAL at 08:19

## 2024-09-11 RX ADMIN — TERAZOSIN 10 MG: 5 CAPSULE ORAL at 08:19

## 2024-09-11 RX ADMIN — HEPARIN SODIUM 5000 UNITS: 5000 INJECTION, SOLUTION INTRAVENOUS; SUBCUTANEOUS at 06:27

## 2024-09-11 RX ADMIN — HEPARIN SODIUM 5000 UNITS: 5000 INJECTION, SOLUTION INTRAVENOUS; SUBCUTANEOUS at 08:18

## 2024-09-11 RX ADMIN — SODIUM CHLORIDE 75 ML/HR: 900 INJECTION, SOLUTION INTRAVENOUS at 08:22

## 2024-09-11 RX ADMIN — PANTOPRAZOLE SODIUM 20 MG: 20 TABLET, DELAYED RELEASE ORAL at 06:27

## 2024-09-11 RX ADMIN — CLONIDINE HYDROCHLORIDE 0.1 MG: 0.1 TABLET ORAL at 21:51

## 2024-09-11 ASSESSMENT — PAIN - FUNCTIONAL ASSESSMENT
PAIN_FUNCTIONAL_ASSESSMENT: 0-10
PAIN_FUNCTIONAL_ASSESSMENT: 0-10

## 2024-09-11 ASSESSMENT — ENCOUNTER SYMPTOMS
PALPITATIONS: 0
NEUROLOGICAL NEGATIVE: 1
EYES NEGATIVE: 1
CHEST TIGHTNESS: 1
CONSTITUTIONAL NEGATIVE: 1
SHORTNESS OF BREATH: 0
MUSCULOSKELETAL NEGATIVE: 1

## 2024-09-11 ASSESSMENT — PAIN SCALES - GENERAL
PAINLEVEL_OUTOF10: 0 - NO PAIN

## 2024-09-11 NOTE — TELEPHONE ENCOUNTER
David Chatman   was called  for his\her results from echo and was given the result of abnormal       David Chatman was told to come as scheduled\ they need to be seen once they were done with our procedures

## 2024-09-11 NOTE — CONSULTS
Reason For Consult  Ascending aortic aneurysm    History Of Present Illness  David Chatman is a 68 y.o. male presenting with shortness of breath. He presented to his cardiologist as an outpt for these complaints. Echo showed an EF of 35-40% and ascending aneurysm 6.2. He was directed to the ED. BNP 1015, troponins, 21,16,15. CT chest revealed large ascending aneurysm up to 6.2 cm. No evidence of acute aortic  dissection. He was given IV lasix with relief of symptoms and admitted for further evaluation. He is currently planned for cardiac catheterization. Asymptomatic. The pt reports his blood pressures are under control currently. CT surgery consulted for aneurysm evaluation.   Outpt antihypertensives include Lotensin Catapres Plendil.     Past Medical History  Gout  Hyperlipidemia  Hypertension  Paroxysmal atrial fibrillation (Multi) (2023)  SHOAIB  Chronic fatigue    Surgical History  Sinus surgery    Rt upper chest and face injury from a saw accident.      Social History  He reports that he has never smoked. He has never used smokeless tobacco. He reports that he does not currently use alcohol after a past usage of about 2.0 standard drinks of alcohol per week. He reports that he does not use drugs.      Family History  Mother  from lung cancer  Grandfather sudden death, cause unknown.      Allergies  NDKA    Physical Exam  Constitutional:       Normal appearance, well-developed, well-nourished.   HENT:      Head: Normocephalic and atraumatic.   Cardiovascular:      Rate and Rhythm: RRR, no murmur, nl S1/S2.      Pulses: Normal, <3 sec cap refill.   Pulmonary:      Clear bilateral breath sounds.   Abdominal:      Bowel sounds present, soft.   Musculoskeletal:         No gross abnormalities.  Skin:     Warm and dry, no rashes.   Neurological:      Alert, no focal deficits.     Last Recorded Vitals  Blood pressure 137/74, pulse 72, temperature 36.7 °C (98 °F), temperature source Oral, resp. rate  "16, height 1.854 m (6' 1\"), weight 111 kg (245 lb), SpO2 97%.    Relevant Results  9/11/24 CT angio chest  IMPRESSION:  Large ascending aneurysm up to 6.2 cm. No evidence of acute aortic  dissection.  Cardiac enlargement. Features of mild pulmonary edema.  Hepatic heterogeneity.  Diverticulosis. No diverticulitis.. No free air. No free fluid.     Assessment/Plan   Will transfer to [facility] for evaluation of ascending aneurysm. Cath currently underway. Spoke with accepting surgeon.     Dudley, 1:36 pm: Noted unable to perform cardiac cath due to technical difficulty.   Spoke with pt and wife. Both agree for transfer to [facility] for evaluation. Transfer order placed earlier today and followed up with phone call to Center.     I spent 30 minutes in the professional and overall care of this patient.    Niurka Pastor, APRN-CNP    "

## 2024-09-11 NOTE — NURSING NOTE
@ [address] [facility] packaged up pt. . Report given to Staff nurse @ [phone].

## 2024-09-11 NOTE — POST-PROCEDURE NOTE
Physician Transition of Care Summary  Invasive Cardiovascular Lab    Procedure Date: 9/11/2024  Attending:    * Zacarias Jarquin - Primary  Resident/Fellow/Other Assistant: Surgeons and Role:  * No surgeons found with a matching role *    Indications:   Pre-op Diagnosis      * Chest pain, unspecified type [R07.9]     * Aneurysm of ascending aorta without rupture (CMS-HCC) [I71.21]    Post-procedure diagnosis:   Post-op Diagnosis     * Chest pain, unspecified type [R07.9]     * Aneurysm of ascending aorta without rupture (CMS-HCC) [I71.21]    Procedure(s):   Left Heart Cath  60756 - TN CATH PLMT L HRT & ARTS W/NJX & ANGIO IMG S&I        Procedure Findings:   In the setting of large 6.2 cm ascending aorta dilation his coronary arteries were difficult to cannulate and image.  For LAD imaging a JL 4.5, JL 5, JL 6, catheters were unsuccessful due to catheter prolapse.  A 6 Danish AL 3 catheter provided adequate nonselective imaging of the left coronary system.  The right coronary artery originated just above the right coronary cusp and anterior orientation based on the prior CT scan of the chest.  I was unsuccessful at selectively imaging the right coronary artery and had only 1 brief fluoroscopic image of flow in the right coronary artery using an AL 3 catheter.  AL 2 and Aaron right 3 catheters were also unsuccessful at engaging the right coronary ostium.    1.  The left main is a large vessel that bifurcates into the LAD and circumflex and had no obstructive disease.    2.  The LAD was a large vessel that extended to the apex and wraps around the apex to supply the distal inferoapical wall.  The LAD had no obstructive disease.    3.  The circumflex was a large codominant vessel with no obstructive disease in the circumflex or its marginal branches.    4.  The right coronary artery was unsuccessfully imaged nonselective imaging briefly showed evidence of vessel patency.      Impression:     #1 large 6.2 cm ascending  aorta dilation.    2.  Inability to selectively or nonselectively engage the right coronary artery, and patient would benefit from CCTA imaging to confirm coronary anatomy.    3.  No obstructive disease in the left main, LAD, or circumflex vessels.    Description of the Procedure:   After informed consent was obtained for the cardiac catheterization procedure the patient was brought to the cardiac catheterization lab and prepped and draped in a sterile fashion with the remainder procedure performed in sterile technique.  10 cc of 1% lidocaine use of the right groin for local anesthesia and 3 mg of Versed and 75 mcg of fentanyl were used for sedation during the procedure.        We used a micropuncture technique to access his right common femoral artery and a 6 Gabonese sheath was placed without difficulty.  To help with catheter support and cardiac catheterization imaging I exchanged this sheath for a long 27 mm [place] sheath.  At the end the procedure the catheters and sheath were removed and good hemostasis was achieved with an Angio-Seal closure device and he was returned to the heart and vascular Center in stable condition.    Complications:   None    Stents/Implants:   Implants       No implant documentation for this case.            Anticoagulation/Antiplatelet Plan:   None    Estimated Blood Loss:   10 mL    Anesthesia: Moderate Sedation Anesthesia Staff: No anesthesia staff entered.    Any Specimen(s) Removed:   No specimens collected during this procedure.    Disposition:   Patient has been referred to the cardiac surgery service to arrange urgent resection of his ascending aorta dilation.  Preoperative CCTA would be appropriate to confirm anatomy and patency of the right coronary artery and additional anatomical assessment of his left coronary system is described above.      Electronically signed by: Zacarias Jarquin DO, 9/11/2024 12:49 PM

## 2024-09-11 NOTE — TELEPHONE ENCOUNTER
----- Message from Jonathon Carter sent at 9/10/2024  5:48 PM EDT -----  Please call the patient regarding his abnormal result.

## 2024-09-11 NOTE — PROGRESS NOTES
David Chatman is a 68 y.o. male on day 1 of admission presenting with Acute systolic heart failure (Multi).      Subjective   History of hypertension.  Has had vague chest discomfort and shortness of breath for months or longer.  Was getting outpatient cardiac evaluation which revealed a EF of 35% and a significant thoracic aortic aneurysm.  Was sent to the ER by his cardiologist.  He does not really have any acute symptoms.       Objective alert oriented undistressed  Heart regular rate and rhythm without murmurs rubs or gallops  Lungs bibasilar crackles without wheezing or rhonchi  Abdomen soft nontender nondistended  Extremities 2+ pitting bilateral leg edema    Last Recorded Vitals  /70 (BP Location: Left arm, Patient Position: Lying)   Pulse 82   Temp 36.5 °C (97.7 °F) (Oral)   Resp 16   Wt 111 kg (245 lb)   SpO2 94%   Intake/Output last 3 Shifts:  No intake or output data in the 24 hours ending 09/11/24 0828    Admission Weight  Weight: 111 kg (245 lb) (09/10/24 2054)    Daily Weight  09/10/24 : 111 kg (245 lb)    Image Results  ECG 12 lead  Normal sinus rhythm  Left axis deviation  Left bundle branch block  Abnormal ECG  When compared with ECG of 30-JUL-2024 09:04,  No significant change was found      Physical Exam      Assessment/Plan                  Assessment & Plan  Acute systolic heart failure (Multi)  EF 35%.  He has some symptoms.  He needs to be diuresed but he is getting a heart cath today so we will hold off on aggressive diuretics for now.  Need to be careful with his kidneys because of the forthcoming dye load  Benign hypertension  Continue home medications  Diverticular disease    Gout    Hyperglycemia    Hyperlipidemia    Iron deficiency anemia    Obstructive sleep apnea of adult  He has his own CPAP with him  Thoracic ascending aortic aneurysm (CMS-HCC)  Needs to be evaluated by thoracic surgery  Chest pain  Getting cardiac cath today  Cardiomyopathy (Multi)    HFrEF (heart  failure with reduced ejection fraction) (Multi)    LBBB (left bundle branch block)    Pulmonary edema (HHS-HCC)                  Farhat Valladares MD

## 2024-09-11 NOTE — CONSULTS
Inpatient consult to Cardiology  Consult performed by: Flaco Neff, SERGIO-CNP  Consult ordered by: Bryce Carter MD  Reason for consult: Ascending aortic aneurysm        History Of Present Illness:    David Chatman is a 68 y.o. male presenting with exertional dyspnea and chest pain.  Current with Dr. MATT Carter.  Past medical history of paroxysmal atrial fibrillation, hyperlipidemia, hypertensive disorder, and recently diagnosed systolic heart failure as well as thoracic ascending aortic aneurysm.  Patient presented to the hospital at the advice of his cardiologist after finding of significant reduced ejection fraction.  CT in emergency department reveals a ascending aortic aneurysm at 6.2 cm.  I sensitive troponin values of 18, 1621.  proBNP 1015.  Creatinine 1.0.  Hemoglobin 12.3 with a blood pressure of 146/78 on room air pulse ox 95%.  Patient denies significant symptoms although he reports continual exertional shortness of breath and occasional chest discomfort.  Patient was admitted on telemetry received 1 dose of IV furosemide and was admitted on telemetry for further testing and treatment.      Last Recorded Vitals:  Vitals:    09/11/24 0345 09/11/24 0625 09/11/24 0630 09/11/24 0738   BP:  146/78 146/78 147/70   BP Location:  Left arm  Left arm   Patient Position:  Lying  Lying   Pulse: 75 83 78 82   Resp: 17 17 16 16   Temp:    36.5 °C (97.7 °F)   TempSrc:    Oral   SpO2: (!) 93% 96% 95% 94%   Weight:       Height:           Last Labs:  CBC - 9/11/2024:  4:03 AM  7.1 12.3 145    37.6      CMP - 9/11/2024:  4:03 AM  8.8 6.4 18 --- 0.8   _ 4.0 25 126      PTT - No results in last year.  _   _ _     Hemoglobin A1C   Date/Time Value Ref Range Status   04/10/2024 10:44 AM 5.6 See below % Final   10/20/2022 07:48 AM 5.9 4.0 - 6.0 % Final     Comment:     Hemoglobin A1C levels are related to mean blood glucose during the   preceding 2-3 months. The relationship table below may be used as a   general  guide. Each 1% increase in HGB A1C is a reflection of an   increase in mean glucose of approximately 30 mg/dl.   Reference: Diabetes Care, volume 29, supplement 1 Jan. 2006                        HGB A1C ................. Approx. Mean Glucose   _______________________________________________   6%   ...............................  120 mg/dl   7%   ...............................  150 mg/dl   8%   ...............................  180 mg/dl   9%   ...............................  210 mg/dl   10%  ...............................  240 mg/dl  Performed at [address] [address]     09/07/2021 01:21 PM 5.8 4.0 - 6.0 % Final     Comment:     Hemoglobin A1C levels are related to mean blood glucose during the   preceding 2-3 months. The relationship table below may be used as a   general guide. Each 1% increase in HGB A1C is a reflection of an   increase in mean glucose of approximately 30 mg/dl.   Reference: Diabetes Care, volume 29, supplement 1 Jan. 2006                        HGB A1C ................. Approx. Mean Glucose   _______________________________________________   6%   ...............................  120 mg/dl   7%   ...............................  150 mg/dl   8%   ...............................  180 mg/dl   9%   ...............................  210 mg/dl   10%  ...............................  240 mg/dl  Performed at [address] [address]       LDL Calculated   Date/Time Value Ref Range Status   04/10/2024 10:44 AM 93 65 - 130 mg/dL Final   10/20/2022 07:48 AM 99 65 - 130 MG/DL Final   09/07/2021 01:21  65 - 130 MG/DL Final   12/05/2019 07:47 AM 90 65 - 130 MG/DL Final      Results for orders placed or performed during the hospital encounter of 09/10/24 (from the past 24 hour(s))   CBC and Auto Differential   Result Value Ref Range    WBC 5.9 4.4 - 11.3 x10*3/uL    nRBC 0.0 0.0 - 0.0 /100 WBCs    RBC 4.38 (L) 4.50 - 5.90 x10*6/uL    Hemoglobin 12.4 (L)  13.5 - 17.5 g/dL    Hematocrit 37.6 (L) 41.0 - 52.0 %    MCV 86 80 - 100 fL    MCH 28.3 26.0 - 34.0 pg    MCHC 33.0 32.0 - 36.0 g/dL    RDW 14.0 11.5 - 14.5 %    Platelets 147 (L) 150 - 450 x10*3/uL    Neutrophils % 64.1 40.0 - 80.0 %    Immature Granulocytes %, Automated 0.2 0.0 - 0.9 %    Lymphocytes % 23.9 13.0 - 44.0 %    Monocytes % 9.4 2.0 - 10.0 %    Eosinophils % 1.9 0.0 - 6.0 %    Basophils % 0.5 0.0 - 2.0 %    Neutrophils Absolute 3.76 1.20 - 7.70 x10*3/uL    Immature Granulocytes Absolute, Automated 0.01 0.00 - 0.70 x10*3/uL    Lymphocytes Absolute 1.40 1.20 - 4.80 x10*3/uL    Monocytes Absolute 0.55 0.10 - 1.00 x10*3/uL    Eosinophils Absolute 0.11 0.00 - 0.70 x10*3/uL    Basophils Absolute 0.03 0.00 - 0.10 x10*3/uL   Comprehensive metabolic panel   Result Value Ref Range    Glucose 125 (H) 65 - 99 mg/dL    Sodium 144 133 - 145 mmol/L    Potassium 3.4 3.4 - 5.1 mmol/L    Chloride 106 97 - 107 mmol/L    Bicarbonate 24 24 - 31 mmol/L    Urea Nitrogen 27 (H) 8 - 25 mg/dL    Creatinine 1.20 0.40 - 1.60 mg/dL    eGFR 67 >60 mL/min/1.73m*2    Calcium 9.4 8.5 - 10.4 mg/dL    Albumin 4.2 3.5 - 5.0 g/dL    Alkaline Phosphatase 140 (H) 35 - 125 U/L    Total Protein 6.8 5.9 - 7.9 g/dL    AST 19 5 - 40 U/L    Bilirubin, Total 0.6 0.1 - 1.2 mg/dL    ALT 27 5 - 40 U/L    Anion Gap 14 <=19 mmol/L   Lipase   Result Value Ref Range    Lipase 25 16 - 63 U/L   NT Pro-BNP   Result Value Ref Range    PROBNP 1,015 (H) 0 - 229 pg/mL   Serial Troponin, Initial ([facility])   Result Value Ref Range    Troponin T, High Sensitivity 18 () <=14 ng/L   Sars-CoV-2 PCR   Result Value Ref Range    Coronavirus 2019, PCR Not Detected Not Detected   Serial Troponin, 2 Hour ([facility])   Result Value Ref Range    Troponin T, High Sensitivity 16 () <=14 ng/L   Serial Troponin, 6 Hour ([facility])   Result Value Ref Range    Troponin T, High Sensitivity 21 () <=14 ng/L   CBC and Auto Differential   Result Value Ref Range    WBC 7.1 4.4 - 11.3 x10*3/uL     nRBC 0.0 0.0 - 0.0 /100 WBCs    RBC 4.41 (L) 4.50 - 5.90 x10*6/uL    Hemoglobin 12.3 (L) 13.5 - 17.5 g/dL    Hematocrit 37.6 (L) 41.0 - 52.0 %    MCV 85 80 - 100 fL    MCH 27.9 26.0 - 34.0 pg    MCHC 32.7 32.0 - 36.0 g/dL    RDW 14.1 11.5 - 14.5 %    Platelets 145 (L) 150 - 450 x10*3/uL    Neutrophils % 61.0 40.0 - 80.0 %    Immature Granulocytes %, Automated 0.4 0.0 - 0.9 %    Lymphocytes % 26.7 13.0 - 44.0 %    Monocytes % 9.8 2.0 - 10.0 %    Eosinophils % 1.5 0.0 - 6.0 %    Basophils % 0.6 0.0 - 2.0 %    Neutrophils Absolute 4.33 1.20 - 7.70 x10*3/uL    Immature Granulocytes Absolute, Automated 0.03 0.00 - 0.70 x10*3/uL    Lymphocytes Absolute 1.90 1.20 - 4.80 x10*3/uL    Monocytes Absolute 0.70 0.10 - 1.00 x10*3/uL    Eosinophils Absolute 0.11 0.00 - 0.70 x10*3/uL    Basophils Absolute 0.04 0.00 - 0.10 x10*3/uL   Comprehensive metabolic panel   Result Value Ref Range    Glucose 102 (H) 65 - 99 mg/dL    Sodium 142 133 - 145 mmol/L    Potassium 3.8 3.4 - 5.1 mmol/L    Chloride 107 97 - 107 mmol/L    Bicarbonate 25 24 - 31 mmol/L    Urea Nitrogen 24 8 - 25 mg/dL    Creatinine 1.00 0.40 - 1.60 mg/dL    eGFR 83 >60 mL/min/1.73m*2    Calcium 8.8 8.5 - 10.4 mg/dL    Albumin 4.0 3.5 - 5.0 g/dL    Alkaline Phosphatase 126 (H) 35 - 125 U/L    Total Protein 6.4 5.9 - 7.9 g/dL    AST 18 5 - 40 U/L    Bilirubin, Total 0.8 0.1 - 1.2 mg/dL    ALT 25 5 - 40 U/L    Anion Gap 10 <=19 mmol/L       Last I/O:  No intake/output data recorded.    Past Cardiology Tests (Last 3 Years):    EKG: Sinus rhythm with left bundle branch block    Echo:  Transthoracic Echo Complete 09/10/2024  Transthoracic Echo Complete    Result Date: 9/10/2024            [facility] [address] , [place] [address]             Phone [phone] TRANSTHORACIC ECHOCARDIOGRAM REPORT Patient Name:      NAN MCBRIDE      Reading Physician:    [id number] Jackeline Davies MD Study  Date:        9/10/2024            Ordering Provider:    [id number] AAMIR STANLEY MRN/PID:           [id number]             Fellow: Accession#:        [id number]         Nurse: Date of Birth/Age: 1955 / 68 years Sonographer:          Ai PELLETIER Gender:            M                    Additional Staff: Height:            185.42 cm            Admit Date: Weight:            117.03 kg            Admission Status:     Outpatient BSA / BMI:         2.40 m2 / 34.04      Department Location:                    kg/m2 Blood Pressure: 125 /72 mmHg Study Type:    TRANSTHORACIC ECHO (TTE) COMPLETE Diagnosis/ICD: Shortness of breath-R06.02; Essential (primary) hypertension-I10 Indication:    Rule out CAD HLD HTN Shortness of Breath CPT Codes:     Echo Complete w Full Doppler-[id number] Patient History: Pertinent History: HTN HLD PAF. Study Detail: The following Echo studies were performed: 2D, M-Mode, Doppler,               color flow, Strain and 3D.  PHYSICIAN INTERPRETATION: Left Ventricle: The left ventricular systolic function is moderately decreased, with a visually estimated ejection fraction of 35-40%. There is global hypokinesis of the left ventricle with minor regional variations. The left ventricular cavity size is severely dilated. Left Ventricular Global Longitudinal Strain - -10.5 %. Spectral Doppler shows a normal pattern of left ventricular diastolic filling. Left Atrium: The left atrium is moderately dilated. Right Ventricle: The right ventricle is normal in size. There is normal right ventricular global systolic function. Right Atrium: The right atrium is moderately dilated. Aortic Valve: The aortic valve was not well visualized. There is no aortic valve thickening. There is no evidence of aortic valve stenosis. The aortic valve dimensionless index is 1.03. There is moderate aortic valve  regurgitation. The peak instantaneous gradient of the aortic valve is 16.5 mmHg. The mean gradient of the aortic valve is 8.0 mmHg. Likely tricuspid aortic valve but images slightly suboptimal. Mitral Valve: The mitral valve is abnormal. There is moderate mitral valve regurgitation which is centrally directed. Tricuspid Valve: The tricuspid valve is structurally normal. There is trace tricuspid regurgitation. The right ventricular systolic pressure is unable to be estimated. Pulmonic Valve: The pulmonic valve is structurally normal. There is physiologic pulmonic valve regurgitation. Pericardium: There is a trivial pericardial effusion. Aorta: The aortic root is normal. There is an aneurysm involving the ascending aorta. Severe ascending aortic aneurysm of 6.2 cm at the largest diameter. Systemic Veins: The inferior vena cava appears to be of normal size, IVC inspiratory collapse greater than 50%.  CONCLUSIONS:  1. The left ventricular systolic function is moderately decreased, with a visually estimated ejection fraction of 35-40%.  2. There is global hypokinesis of the left ventricle with minor regional variations.  3. Left ventricular cavity size is severely dilated.  4. There is normal right ventricular global systolic function.  5. The left atrium is moderately dilated.  6. The right atrium is moderately dilated.  7. Moderate mitral valve regurgitation.  8. Trace tricuspid regurgitation is visualized.  9. Aortic valve stenosis is not present. 10. Likely tricuspid aortic valve but images slightly suboptimal. 11. Moderate aortic valve regurgitation. 12. Aneurysm of the ascending aorta. QUANTITATIVE DATA SUMMARY:  2D MEASUREMENTS:            Normal Ranges: LAs:             5.00 cm    (2.7-4.0cm) IVSd:            1.03 cm    (0.6-1.1cm) LVPWd:           1.19 cm    (0.6-1.1cm) LVIDd:           7.16 cm    (3.9-5.9cm) LVIDs:           6.41 cm LV Mass Index:   159.3 g/m2 LV % FS          10.5 %  LA VOLUME:                     Normal Ranges: LA Vol A4C:        164.4 ml   (22+/-6mL/m2) LA Vol A2C:        159.8 ml LA Vol BP:         163.9 ml LA Vol Index A4C:  68.5ml/m2 LA Vol Index A2C:  66.7 ml/m2 LA Vol Index BP:   68.3 ml/m2 LA Area A4C:       36.5 cm2 LA Area A2C:       35.6 cm2 LA Major Axis A4C: 6.9 cm LA Major Axis A2C: 6.7 cm LA Volume Index:   64.6 ml/m2 LA Vol A4C:        151.0 ml LA Vol A2C:        156.0 ml LA Vol Index BSA:  64.0 ml/m2  RA VOLUME BY A/L METHOD:            Normal Ranges: RA Vol A4C:              86.5 ml    (8.3-19.5ml) RA Vol Index A4C:        36.1 ml/m2 RA Area A4C:             25.3 cm2 RA Major Axis A4C:       6.3 cm  M-MODE MEASUREMENTS:         Normal Ranges: Ao Root:             4.80 cm (2.0-3.7cm) AoV Exc:             1.50 cm (1.5-2.5cm) LAs:                 4.50 cm (2.7-4.0cm)  AORTA MEASUREMENTS:         Normal Ranges: AoV Exc:            1.50 cm (1.5-2.5cm) Asc Ao, d:          6.20 cm (2.1-3.4cm)  LV SYSTOLIC FUNCTION BY 2D PLANIMETRY (MOD):                                         Normal Ranges: EF-A4C View:                      43 %  (>=55%) EF-A2C View:                      42 % EF-Biplane:                       41 % EF-Visual:                        38 % EF-3DQ:                           43 % LV EF Reported:                   38 % Global Longitudinal Strain (GLS): -10 %  LV DIASTOLIC FUNCTION:            Normal Ranges: MV Peak E:             0.99 m/s   (0.7-1.2 m/s) MV Peak A:             0.44 m/s   (0.42-0.7 m/s) E/A Ratio:             2.27       (1.0-2.2) MV e'                  0.057 m/s  (>8.0) MV lateral e'          0.07 m/s MV medial e'           0.04 m/s E/e' Ratio:            17.32      (<8.0) PulmV Sys Alfredito:         69.60 cm/s PulmV Gray Alfredito:        56.00 cm/s PulmV S/D Alfredito:         1.20  MITRAL VALVE:           Normal Ranges: MV Vmax:      1.72 m/s  (<=1.3m/s) MV peak P.8 mmHg (<5mmHg) MV mean P.0 mmHg  (<48mmHg) MV DT:        145 msec  (150-240msec) MV Annulus:   0.80 cm    (1.8-3.1cm)  MITRAL INSUFFICIENCY:             Normal Ranges: PISA Radius:          1.1 cm MR VTI:               170.00 cm MR Vmax:              487.50 cm/s  AORTIC VALVE:                      Normal Ranges: AoV Vmax:                2.03 m/s  (<=1.7m/s) AoV Peak P.5 mmHg (<20mmHg) AoV Mean P.0 mmHg  (1.7-11.5mmHg) LVOT Max Alfredito:            1.99 m/s  (<=1.1m/s) AoV VTI:                 39.20 cm  (18-25cm) LVOT VTI:                40.30 cm LVOT Diameter:           2.30 cm   (1.8-2.4cm) AoV Area, VTI:           4.27 cm2  (2.5-5.5cm2) AoV Area,Vmax:           4.07 cm2  (2.5-4.5cm2) AoV Dimensionless Index: 1.03  AORTIC INSUFFICIENCY: AI Vmax:       4.54 m/s AI Half-time:  269 msec AI Decel Rate: 500.50 cm/s2  RIGHT VENTRICLE: RV Basal 3.48 cm RV Mid   2.62 cm RV Major 6.8 cm TAPSE:   20.9 mm RV s'    0.10 m/s  TRICUSPID VALVE/RVSP:         Normal Ranges: IVC Diam:             2.17 cm  PULMONIC VALVE:          Normal Ranges: PV Accel Time:  87 msec  (>120ms) PV Max Alfredito:     0.6 m/s  (0.6-0.9m/s) PV Max P.4 mmHg UT Vmax:        1.75 m/s  Pulmonary Veins: PulmV Gray Alfredito: 56.00 cm/s PulmV S/D Alfredito:  1.20 PulmV Sys Alfredito:  69.60 cm/s  [id number] Jackeline Davies MD Electronically signed on 9/10/2024 at 3:42:25 PM  ** Final **        Ejection Fractions:  EF   Date/Time Value Ref Range Status   09/10/2024 01:30 PM 38 %        Stress Test:  Nuclear Stress Test 09/10/2024        Past Medical History:  He has a past medical history of Gout, Hyperlipidemia, Hypertension, and Paroxysmal atrial fibrillation (Multi) (2023).    Past Surgical History:  He has a past surgical history that includes Sinus surgery.      Social History:  He reports that he has never smoked. He has never used smokeless tobacco. He reports that he does not currently use alcohol after a past usage of about 2.0 standard drinks of alcohol per week. He reports that he does not use drugs.    Family History:  No family history on  file.     Allergies:  Patient has no known allergies.    Inpatient Medications:  Scheduled medications   Medication Dose Route Frequency    cloNIDine  0.1 mg oral q12h    fluticasone  2 spray Each Nostril Daily    heparin (porcine)  5,000 Units subcutaneous q8h    losartan  100 mg oral Daily    metoprolol succinate XL  25 mg oral Daily    pantoprazole  20 mg oral Daily before breakfast    simvastatin  10 mg oral Nightly    terazosin  10 mg oral Daily     PRN medications   Medication    acetaminophen    Or    acetaminophen    Or    acetaminophen    benzocaine-menthol    dextromethorphan-guaifenesin    guaiFENesin    ondansetron    Or    ondansetron    polyethylene glycol     Continuous Medications   Medication Dose Last Rate     Outpatient Medications:  Current Outpatient Medications   Medication Instructions    benazepriL-hydrochlorothiazide (Lotensin HCT) 20-25 mg tablet 1 tablet, oral, Daily    cloNIDine (CATAPRES) 0.1 mg, oral, Every 12 hours    felodipine ER (PLENDIL) 5 mg, oral, Daily, Do not crush, chew, or split.    fluocinolone 0.01 % cream Applied to external ear canal once daily as needed for itching    multivitamin tablet 1 tablet, oral, Daily    omeprazole (PriLOSEC) 40 mg DR capsule Take 1 capsule once daily 30-45 minutes before a meal.    simvastatin (ZOCOR) 10 mg, oral, Daily, For 90 days    terazosin (HYTRIN) 10 mg, oral, Daily, For 90 days       Physical Exam:  General: alert, oriented x 3, no distress.  HEENT: normal cephalic, atraumatic, no scleral icterus  Neck: No JVD, bruit or thrill, masses or tenderness   Heart: S1/S2, Rate 70, Rhythm regular, no s3 or s4, no murmur, thrill, or heaves at PMI.   Lungs: Clear, diminished in bases, equal expansion and excursion, no wheezes, crackles, rhales or rhonci.  Room air.   Abdomen: Overweight, bowel sounds x 4, soft, non-tender   Genitourinary: deferred   Extremities: No significant upper or lower extremity edema appreciated.       Assessment/Plan      Ascending thoracic aortic aneurysm  Chest pain  Acute on chronic systolic heart failure  Exertional shortness of  Paroxysmal atrial fibrillation  Hypertensive disorder  Hyperlipidemia  Obesity  Cardiomyopathy      Overall impression:      9/11: As above, presents at the advice of his cardiologist Dr. Carter.  Recently found to have reduced ejection fraction of approximately 35 to 40%.  Sent to the hospital for dyspnea and chest pain.  CT reveals a ascending thoracic aortic aneurysm of 6.2 cm.  Surgical team has been consulted.  Patient will go for cardiac catheterization this morning for assessment of his coronary arteries.  NPO.  Currently chest pain-free.  Breathing comfortably on room air.  Received 1 dose of IV furosemide for a mild fluid volume overload.  Likely a component of acute on chronic systolic heart failure.  Consult cardiac rehab.  Blood pressure is stable at 146/78.   room air pulse ox 95%.  Further recommendations post cardiac catheterization.  Will follow with you.      Code Status:  Full Code    I spent 60 minutes in the professional and overall care of this patient.        Flaco Neff, APRN-CNP

## 2024-09-11 NOTE — TELEPHONE ENCOUNTER
David Chatman   was called  for his\her results from stress and was given the result abnormal       David Chatman  was told to come as scheduled\ they need to be seen once done with procedures

## 2024-09-11 NOTE — H&P
History Of Present Illness      David Chatman is a 68 y.o. male presenting with dyspnea on exertion.      Patient presented to the emergency room upon request by Dr. NOEMY Carter. The patient's cardiologist, Dr. Carter, did call ahead of the patient's arrival to report that the patient has been having ongoing shortness of breath and dyspnea on exertion that prompted an outpatient workup. He was subsequently found to have a 6 cm aortic aneurysm. Dr. Carter has consulted with vascular surgery. He would like to have the patient admitted to the hospitalist and cardiology and vascular surgery will consult on the patient while he is in the hospital. He is requesting that we obtain a CT angio chest abdomen pelvis for further evaluation of the aortic aneurysm and to rule out dissection prior to admission. The patient denies any headache or visual changes. He does report that he has had increasing shortness of breath and dyspnea on exertion for a while. He states it has been gradually worsening over the past several weeks and more acutely intense over the past several weeks.       Upon arrival to the emergency room patient's vital signs noted for Tmax 36.9, pulse rate 81, respiratory rate 18, /63.  Patient saturate 95% on room air.    The patient's ED diagnostic workup was noted for a CBC with differential essentially unremarkable except side for a mild anemia of 12.4/37.6.  Patient's platelet count was low normal at 147.  The blood chemistry was noted for glucose 125.  The BUN was slightly elevated 27.  Alkaline phosphatase elevated to 140.  Patient had no electrolyte aberrancies.  A rapid coronavirus test was negative.    A proBNP was elevated 1015.  Patient's first set troponin level was 18 followed by 16, respectively.  Patient had a nuclear stress test earlier today.    It was noted for the following:      IMPRESSION:  1. No evidence of inducible myocardial ischemia. Decreased perfusion  in the inferior wall seen on  both stress and rest imaging with  preserved wall motion and thickness, without CT correction, either  artifacts or prior infarction, attention on follow-up study.  2. Severe dilatation of left ventricle  3. Global hypokinesis with a markedly decreased post-stress LV EF  estimated at 33%.    Patient also underwent a 2D echocardiogram earlier today which was noted for the following:      CONCLUSIONS:   1. The left ventricular systolic function is moderately decreased, with a visually estimated ejection fraction of 35-40%.   2. There is global hypokinesis of the left ventricle with minor regional variations.   3. Left ventricular cavity size is severely dilated.   4. There is normal right ventricular global systolic function.   5. The left atrium is moderately dilated.   6. The right atrium is moderately dilated.   7. Moderate mitral valve regurgitation.   8. Trace tricuspid regurgitation is visualized.   9. Aortic valve stenosis is not present.  10. Likely tricuspid aortic valve but images slightly suboptimal.  11. Moderate aortic valve regurgitation.  12. Aneurysm of the ascending aorta.    Patient underwent a CT angio chest abdomen and pelvis today in the ED.  The following was noted:      IMPRESSION:  Large ascending aneurysm up to 6.2 cm. No evidence of acute aortic  dissection.      Cardiac enlargement. Features of mild pulmonary edema.      Hepatic heterogeneity.      Diverticulosis. No diverticulitis.. No free air. No free fluid.        EKG today noted for normal sinus rhythm at 75 bpm  Left axis deviation  Left bundle branch block  QTc 471 ms         Past Medical History      Past Medical History:   Diagnosis Date    Gout     Hyperlipidemia     Hypertension     Paroxysmal atrial fibrillation (Multi) 08/24/2023         Surgical History        Past Surgical History:   Procedure Laterality Date    SINUS SURGERY            Social History    He reports that he has never smoked. He has never used smokeless tobacco.  He reports that he does not currently use alcohol after a past usage of about 2.0 standard drinks of alcohol per week. He reports that he does not use drugs.      Family History      No family history on file.       Allergies      Patient has no known allergies.          Review of Systems    14-point ROS otherwise negative, as per HPI/Interval History.    General: No change in weight. No weakenss. No Fevers/Chills/Night Sweats   Skin: No skin/hair/nail changes. No rashes or sores.  Head:  No trauma. No Headache/nasuea/vomitting.   Eyes: No visual changes. No tearing. No itching.   Ears: No hearing loss. No tinnitus. No vertigo. No discharge.  Nose, Sinuses: No rhinorrhea, No nasal congestion. No epistaxis.  Mouth, Throat, Neck: No bleeding gums, hoarseness, sore throat or swollen neck  Cardiac: No palpitations. Yes RIOJAS. No PND. No Orthopnea.   Respiratory: Yes Shortness of Breath. No wheezing. No cough. No hemoptysis.   GI: No nausea/vomiting. No indigestion. No diarrhea. No constipation.   Extremities: No numbness or tingling. No paresthesias.   Urinary: No change in urinary frequency. No change in hesitancy. No hematuria. No incontinence.         Physical Exam        Constitutional:  Pleasant  Eyes: PERRL, EOMI,   ENMT: mucous membranes moist  Head/Neck: Neck supple, No JVD,   Respiratory/Thorax: Patent airways, CTAB, Fine Crackles Bilaterally   Cardiovascular: Regular, rate and rhythm, no murmurs  Gastrointestinal: Soft, non-distended, +BS. Umbilical hernia protuberance noted  Musculoskeletal: ROM intact, no joint swelling, normal strength  Extremities: peripheral pulses intact; no edema  Neurological: Alert and Oriented x 3; no focal deficits; gross motor and sensation intact; CN II-XII intact. No asterixis.  Psychological: Appropriate mood and behavior  Skin: No lesions, No rashes.         Last Recorded Vitals  Blood pressure 146/69, pulse 77, temperature 36.9 °C (98.4 °F), temperature source Oral, resp. rate  "19, height 1.854 m (6' 1\"), weight 111 kg (245 lb), SpO2 (!) 93%.    Relevant Results    Lab Results   Component Value Date    WBC 5.9 09/10/2024    HGB 12.4 (L) 09/10/2024    HCT 37.6 (L) 09/10/2024    MCV 86 09/10/2024     (L) 09/10/2024       Lab Results   Component Value Date    GLUCOSE 125 (H) 09/10/2024    CALCIUM 9.4 09/10/2024     09/10/2024    K 3.4 09/10/2024    CO2 24 09/10/2024     09/10/2024    BUN 27 (H) 09/10/2024    CREATININE 1.20 09/10/2024       Lab Results   Component Value Date    HGBA1C 5.6 04/10/2024         No CT head results found for the past 12 months      Scheduled medications  amLODIPine, 5 mg, oral, Daily  benazepriL-hydrochlorothiazide, 1 tablet, oral, Daily  cloNIDine, 0.1 mg, oral, q12h  fluticasone, 2 spray, Each Nostril, Daily  furosemide, 20 mg, intravenous, Once  heparin (porcine), 5,000 Units, subcutaneous, q8h  pantoprazole, 20 mg, oral, Daily before breakfast  simvastatin, 10 mg, oral, Nightly  terazosin, 10 mg, oral, Daily      Continuous medications     PRN medications  PRN medications: acetaminophen **OR** acetaminophen **OR** acetaminophen, benzocaine-menthol, dextromethorphan-guaifenesin, guaiFENesin, ondansetron **OR** ondansetron, polyethylene glycol        Assessment/Plan   Principal Problem:    Acute systolic heart failure (Multi)  Active Problems:    Benign hypertension    Diverticular disease    Gout    Hyperglycemia    Hyperlipidemia    Iron deficiency anemia    Obstructive sleep apnea of adult    Thoracic ascending aortic aneurysm (CMS-HCC)    Chest pain    Cardiomyopathy (Multi)    HFrEF (heart failure with reduced ejection fraction) (Multi)    LBBB (left bundle branch block)    Pulmonary edema (HHS-HCC)        David Chatman is a 68 y.o. Male presenting with dyspnea on exertion.  Patient admitted for further evaluation and management.        Dyspnea on exertion secondary to HFrEF    Admit patient to Telemetry Services.   Will administer " diuretics Lasix 20 mg IVP x 1  Recent TTE to evaluate for most recent LVEF and to asses for any valvular deformities, or regional wall motion defects reviewed   Low Sodium Diet  Fluid Restriction < 1.5 Liter/day  Daily Weights  Strict I's + O's  NPO for now      Mild Pulmonary Edema    Noted on CT imaging  Management as above      Cardiomyopathy    NST earlier today negative for inducible ischemia  Possible planned LHC by Cardiology  Cardiology to medically optimize the patient      Thoracic Ascending Aortic Aneurysm    Incidental finding on 2D echocardiogram  Confirmed on CT angiogram  CTS contacted by Dr. NOEMY Carter  Continue to monitor blood pressure      Uncontrolled Hypertension    Continue to monitor BP and adjust adherence of medications accordingly  Will continue patient's home regimen  Appears felodipine is nonformulary so will be substituted with Norvasc      Obstructive Sleep Apnea    Patient has his home CPAP device with him      Dyslipidemia    Home statin therapy      Seasonal allergies    Continue Flonase      GI + DVT prophylaxis            This Dictation was Transcribed using a Nuance Dragon Voice Recognition System Device (with Compatible Computer + Software) and as such may contain Grammatical Errors and Unintentional Typing Misprints.      I spent 38 minutes in the professional and overall care of this patient.      Bryce Carter MD

## 2024-09-11 NOTE — H&P
CARDIAC SURGERY H&P    David Chatman is a 68 y.o. male presenting with shortness of breath. He presented to his cardiologist as an outpt for these complaints. Echo showed an EF of 35-40% and ascending aneurysm 6.2. He was directed to the ED. BNP 1015, troponins, 21,16,15. CT chest revealed large ascending aneurysm up to 6.2 cm. No evidence of acute aortic  dissection. He was given IV lasix with relief of symptoms and admitted for further evaluation. He is currently planned for cardiac catheterization. Asymptomatic. The pt reports his blood pressures are under control currently. CT surgery consulted for aneurysm evaluation at OSH and he was transferred to [facility] for further care.         Subjective   Past Medical History:   Diagnosis Date    Gout     Hyperlipidemia     Hypertension     Paroxysmal atrial fibrillation (Multi) 08/24/2023     Past Surgical History:   Procedure Laterality Date    SINUS SURGERY       Social History     Tobacco Use    Smoking status: Never    Smokeless tobacco: Never   Vaping Use    Vaping status: Never Used   Substance Use Topics    Alcohol use: Not Currently     Alcohol/week: 2.0 standard drinks of alcohol     Types: 2 Cans of beer per week     Comment: once a week    Drug use: Never     No family history on file.    Allergies:  Patient has no known allergies.    Prior to Admission medications    Medication Sig Start Date End Date Taking? Authorizing Provider   cloNIDine (Catapres) 0.1 mg tablet Take 1 tablet (0.1 mg) by mouth every 12 hours. 9/11/24   Farhat Valladares MD   fluticasone (Flonase) 50 mcg/actuation nasal spray Administer 2 sprays into each nostril once daily. Shake gently. Before first use, prime pump. After use, clean tip and replace cap. 9/12/24   Farhat Valladares MD   heparin sodium,porcine (heparin, porcine,) 5,000 unit/mL injection Inject 1 mL (5,000 Units) under the skin every 8 hours. 9/11/24   Farhat Valladares MD   losartan (Cozaar) 100 mg tablet Take 1 tablet (100 mg) by  mouth once daily. 9/12/24   Farhat Valladares MD   metoprolol succinate XL (Toprol-XL) 25 mg 24 hr tablet Take 1 tablet (25 mg) by mouth once daily. Do not crush or chew. 9/12/24   Farhat Valladares MD   pantoprazole (ProtoNix) 20 mg EC tablet Take 1 tablet (20 mg) by mouth once daily in the morning. Take before meals. Do not crush, chew, or split. 9/12/24   Farhat Valladares MD   simvastatin (Zocor) 10 mg tablet Take 1 tablet (10 mg) by mouth once daily at bedtime. 9/11/24   Farhat Valladares MD   terazosin (Hytrin) 10 mg capsule Take 1 capsule (10 mg) by mouth once daily. 9/12/24   Farhat Valladares MD   benazepriL-hydrochlorothiazide (Lotensin HCT) 20-25 mg tablet Take 1 tablet by mouth once daily. 7/29/24 9/11/24  Amandeep Vaca DO   cloNIDine (Catapres) 0.1 mg tablet TAKE 1 TABLET BY MOUTH EVERY 12 HOURS 7/29/24 9/11/24  Amandeep Vaca DO   felodipine ER (Plendil) 5 mg 24 hr tablet Take 1 tablet (5 mg) by mouth once daily. Do not crush, chew, or split. 4/10/24 9/11/24  Amandeep Vaca DO   fluocinolone 0.01 % cream Applied to external ear canal once daily as needed for itching 3/12/24 9/11/24  Bernardo Ramírez MD   multivitamin tablet Take 1 tablet by mouth once daily.  9/11/24  Historical Provider, MD   omeprazole (PriLOSEC) 40 mg DR capsule Take 1 capsule once daily 30-45 minutes before a meal. 8/20/24 9/11/24  Bernardo Ramírez MD   simvastatin (Zocor) 10 mg tablet Take 1 tablet (10 mg) by mouth once daily. For 90 days  9/11/24  Historical Provider, MD   terazosin (Hytrin) 5 mg capsule Take 2 capsules (10 mg) by mouth once daily. For 90 days  9/11/24  Historical Provider, MD       Review of Systems  Review of Systems   Constitutional: Negative.    HENT: Negative.     Eyes: Negative.    Respiratory:  Positive for chest tightness (very brief and self limiting). Negative for shortness of breath.    Cardiovascular:  Negative for chest pain and palpitations.   Musculoskeletal: Negative.    Neurological: Negative.              Objective   /71 (BP Location: Left arm, Patient Position: Lying)   Pulse 76   Temp 37.1 °C (98.8 °F) (Temporal)   Resp 21   SpO2 92%   0-10 (Numeric) Pain Score: 0 - No pain   There were no vitals filed for this visit.     No intake or output data in the 24 hours ending 09/11/24 4911    Physical Exam  Physical Exam  Constitutional:       General: He is not in acute distress.     Appearance: He is not toxic-appearing.   Eyes:      Pupils: Pupils are equal, round, and reactive to light.   Cardiovascular:      Rate and Rhythm: Normal rate and regular rhythm.      Pulses: Normal pulses.   Pulmonary:      Effort: Pulmonary effort is normal.   Abdominal:      General: Abdomen is flat. There is no distension.      Palpations: Abdomen is soft.      Tenderness: There is no abdominal tenderness.   Skin:     General: Skin is warm and dry.   Neurological:      General: No focal deficit present.      Mental Status: He is alert and oriented to person, place, and time.   Psychiatric:         Mood and Affect: Mood normal.         Behavior: Behavior normal.      Comments: anxious         Medications  Scheduled medications  cloNIDine, 0.1 mg, oral, BID  metoprolol succinate XL, 25 mg, oral, Daily  mupirocin, 0.5 Application, Topical, BID  [START ON 9/12/2024] pantoprazole, 40 mg, oral, Daily before breakfast  simvastatin, 10 mg, oral, Nightly  terazosin, 10 mg, oral, Nightly    Continuous medications   PRN medications  PRN medications: hydrALAZINE    Labs  Results for orders placed or performed during the hospital encounter of 09/10/24 (from the past 24 hour(s))   CBC and Auto Differential   Result Value Ref Range    WBC 5.9 4.4 - 11.3 x10*3/uL    nRBC 0.0 0.0 - 0.0 /100 WBCs    RBC 4.38 (L) 4.50 - 5.90 x10*6/uL    Hemoglobin 12.4 (L) 13.5 - 17.5 g/dL    Hematocrit 37.6 (L) 41.0 - 52.0 %    MCV 86 80 - 100 fL    MCH 28.3 26.0 - 34.0 pg    MCHC 33.0 32.0 - 36.0 g/dL    RDW 14.0 11.5 - 14.5 %    Platelets 147 (L) 150 -  450 x10*3/uL    Neutrophils % 64.1 40.0 - 80.0 %    Immature Granulocytes %, Automated 0.2 0.0 - 0.9 %    Lymphocytes % 23.9 13.0 - 44.0 %    Monocytes % 9.4 2.0 - 10.0 %    Eosinophils % 1.9 0.0 - 6.0 %    Basophils % 0.5 0.0 - 2.0 %    Neutrophils Absolute 3.76 1.20 - 7.70 x10*3/uL    Immature Granulocytes Absolute, Automated 0.01 0.00 - 0.70 x10*3/uL    Lymphocytes Absolute 1.40 1.20 - 4.80 x10*3/uL    Monocytes Absolute 0.55 0.10 - 1.00 x10*3/uL    Eosinophils Absolute 0.11 0.00 - 0.70 x10*3/uL    Basophils Absolute 0.03 0.00 - 0.10 x10*3/uL   Comprehensive metabolic panel   Result Value Ref Range    Glucose 125 (H) 65 - 99 mg/dL    Sodium 144 133 - 145 mmol/L    Potassium 3.4 3.4 - 5.1 mmol/L    Chloride 106 97 - 107 mmol/L    Bicarbonate 24 24 - 31 mmol/L    Urea Nitrogen 27 (H) 8 - 25 mg/dL    Creatinine 1.20 0.40 - 1.60 mg/dL    eGFR 67 >60 mL/min/1.73m*2    Calcium 9.4 8.5 - 10.4 mg/dL    Albumin 4.2 3.5 - 5.0 g/dL    Alkaline Phosphatase 140 (H) 35 - 125 U/L    Total Protein 6.8 5.9 - 7.9 g/dL    AST 19 5 - 40 U/L    Bilirubin, Total 0.6 0.1 - 1.2 mg/dL    ALT 27 5 - 40 U/L    Anion Gap 14 <=19 mmol/L   Lipase   Result Value Ref Range    Lipase 25 16 - 63 U/L   NT Pro-BNP   Result Value Ref Range    PROBNP 1,015 (H) 0 - 229 pg/mL   Serial Troponin, Initial ([facility])   Result Value Ref Range    Troponin T, High Sensitivity 18 (HH) <=14 ng/L   ECG 12 lead   Result Value Ref Range    Ventricular Rate 75 BPM    Atrial Rate 75 BPM    NV Interval 204 ms    QRS Duration 166 ms    QT Interval 422 ms    QTC Calculation(Bazett) 471 ms    P Axis -1 degrees    R Axis -47 degrees    T Axis 119 degrees    QRS Count 13 beats    Q Onset 208 ms    P Onset 106 ms    P Offset 169 ms    T Offset 419 ms    QTC Fredericia 454 ms   Sars-CoV-2 PCR   Result Value Ref Range    Coronavirus 2019, PCR Not Detected Not Detected   Serial Troponin, 2 Hour ([facility])   Result Value Ref Range    Troponin T, High Sensitivity 16 (HH) <=14 ng/L    Serial Troponin, 6 Hour ([facility])   Result Value Ref Range    Troponin T, High Sensitivity 21 (HH) <=14 ng/L   CBC and Auto Differential   Result Value Ref Range    WBC 7.1 4.4 - 11.3 x10*3/uL    nRBC 0.0 0.0 - 0.0 /100 WBCs    RBC 4.41 (L) 4.50 - 5.90 x10*6/uL    Hemoglobin 12.3 (L) 13.5 - 17.5 g/dL    Hematocrit 37.6 (L) 41.0 - 52.0 %    MCV 85 80 - 100 fL    MCH 27.9 26.0 - 34.0 pg    MCHC 32.7 32.0 - 36.0 g/dL    RDW 14.1 11.5 - 14.5 %    Platelets 145 (L) 150 - 450 x10*3/uL    Neutrophils % 61.0 40.0 - 80.0 %    Immature Granulocytes %, Automated 0.4 0.0 - 0.9 %    Lymphocytes % 26.7 13.0 - 44.0 %    Monocytes % 9.8 2.0 - 10.0 %    Eosinophils % 1.5 0.0 - 6.0 %    Basophils % 0.6 0.0 - 2.0 %    Neutrophils Absolute 4.33 1.20 - 7.70 x10*3/uL    Immature Granulocytes Absolute, Automated 0.03 0.00 - 0.70 x10*3/uL    Lymphocytes Absolute 1.90 1.20 - 4.80 x10*3/uL    Monocytes Absolute 0.70 0.10 - 1.00 x10*3/uL    Eosinophils Absolute 0.11 0.00 - 0.70 x10*3/uL    Basophils Absolute 0.04 0.00 - 0.10 x10*3/uL   Comprehensive metabolic panel   Result Value Ref Range    Glucose 102 (H) 65 - 99 mg/dL    Sodium 142 133 - 145 mmol/L    Potassium 3.8 3.4 - 5.1 mmol/L    Chloride 107 97 - 107 mmol/L    Bicarbonate 25 24 - 31 mmol/L    Urea Nitrogen 24 8 - 25 mg/dL    Creatinine 1.00 0.40 - 1.60 mg/dL    eGFR 83 >60 mL/min/1.73m*2    Calcium 8.8 8.5 - 10.4 mg/dL    Albumin 4.0 3.5 - 5.0 g/dL    Alkaline Phosphatase 126 (H) 35 - 125 U/L    Total Protein 6.4 5.9 - 7.9 g/dL    AST 18 5 - 40 U/L    Bilirubin, Total 0.8 0.1 - 1.2 mg/dL    ALT 25 5 - 40 U/L    Anion Gap 10 <=19 mmol/L       Imaging/Cardiac Testing  Cardiac cath perfroemd 9/11 at OSH  1.  The left main is a large vessel that bifurcates into the LAD and circumflex and had no obstructive disease.     2.  The LAD was a large vessel that extended to the apex and wraps around the apex to supply the distal inferoapical wall.  The LAD had no obstructive disease.     3.  The  circumflex was a large codominant vessel with no obstructive disease in the circumflex or its marginal branches.     4.  The right coronary artery was unsuccessfully imaged nonselective imaging briefly showed evidence of vessel patency.     Impression:      #1 large 6.2 cm ascending aorta dilation.     2.  Inability to selectively or nonselectively engage the right coronary artery, and patient would benefit from CCTA imaging to confirm coronary anatomy.     3.  No obstructive disease in the left main, LAD, or circumflex vessels.       ECHO 8/12   1. The left ventricular systolic function is moderately decreased, with a visually estimated ejection fraction of 35-40%.   2. There is global hypokinesis of the left ventricle with minor regional variations.   3. Left ventricular cavity size is severely dilated.   4. There is normal right ventricular global systolic function.   5. The left atrium is moderately dilated.   6. The right atrium is moderately dilated.   7. Moderate mitral valve regurgitation.   8. Trace tricuspid regurgitation is visualized.   9. Aortic valve stenosis is not present.  10. Likely tricuspid aortic valve but images slightly suboptimal.  11. Moderate aortic valve regurgitation.  12. Aneurysm of the ascending aorta.    ASSESSMENT:  David Chatman is a 68 y.o. male presenting with shortness of breath. He presented to his cardiologist as an outpt for these complaints. Echo showed an EF of 35-40% and ascending aneurysm 6.2. He was directed to the ED. BNP 1015, troponins, 21,16,15. CT chest revealed large ascending aneurysm up to 6.2 cm. No evidence of acute aortic  dissection. He was given IV lasix with relief of symptoms and admitted for further evaluation. He is currently planned for cardiac catheterization. Asymptomatic. The pt reports his blood pressures are under control currently. CT surgery consulted for aneurysm evaluation.     - Continue home statin  - ECHO and LHC completed at OSH- only  thing may consider is if needs coronary CTA to eval RCA given inability to engage  - Carotid US ordered   - Change to metoprolol succinate to tartrate 25mg BID  - Goal for BP control with home oral agent as well PRN hydralazine goal SBP <140, will add oral if needed to help with BP control  - Continue Hytrin and clonidine--> will increase cloniding to  0.1mg TID instead of BID    - Will need to discuss further with Dr Perdomo timing of surgery. At this time unclear surgical date    Prophylaxis  VTE Prophylaxis: SCDs/TEDs, ambulation    Code Status: Full Code      Alexsander Iqbal PA-C  Cardiac Surgery [facility]  [facility]  Team Pager [phone]     9/11/2024  6:33 PM

## 2024-09-11 NOTE — NURSING NOTE
@ 1300 Pt presented to room SDU 4 via cart from cath lab. Placed on are telemetry. Report received at bedside  from  cath lab staff. Pt laying flat s/p procedure. Femoral and pedal pulses  on both ext's strong and palpable. No active bleeding noticed. Wife at the Call light within reach. Will cont to monitor. Vials stable.

## 2024-09-11 NOTE — ASSESSMENT & PLAN NOTE
EF 35%.  He has some symptoms.  He needs to be diuresed but he is getting a heart cath today so we will hold off on aggressive diuretics for now.  Need to be careful with his kidneys because of the forthcoming dye load

## 2024-09-11 NOTE — PROGRESS NOTES
Attestation/Supervisory note for ENOCH Burden      The patient is a 68-year-old male presenting to the emergency department for evaluation of shortness of breath and dyspnea exertion.  The patient's cardiologist, Dr. Carter, did call ahead of the patient's arrival to report that the patient has been having ongoing shortness of breath and dyspnea on exertion that prompted an outpatient workup.  He was subsequently found to have a 6 cm aortic aneurysm.  Dr. Carter has consulted with vascular surgery.  He would like to have the patient admitted to the hospitalist and cardiology and vascular surgery will consult on the patient while he is in the hospital.  He is requesting that we obtain a CT angio chest abdomen pelvis for further evaluation of the aortic aneurysm and to rule out dissection prior to admission.  The patient denies any headache or visual changes.  He does report that he has had increasing shortness of breath and dyspnea on exertion for a while.  He states it has been gradually worsening over the past several weeks and more acutely intense over the past several weeks.  He denies having any chest pain.  No palpitations.  No diaphoresis.  No fever or chills.  No cough or congestion.  No abdominal pain.  No nausea vomiting.  No diarrhea or constipation.  No urinary complaints.  He does not use any blood thinners.  He does have a history of hypertension and hyperlipidemia but no diabetes.  No history of CAD or ACS.  No history of PE or DVT.  He does not smoke.  No recent travel or immobility.  No recent surgery.  All pertinent positives and negatives are recorded above.  All other systems reviewed and otherwise negative.  Vital signs within normal limits.  Physical exam with a well-nourished well-developed male in no acute distress.  HEENT exam within normal limits.  He has no evidence of airway compromise or respiratory distress.  Abdominal exam is benign.  He has no gross motor, neurologic or vascular deficits on  exam.  Pulses are equal bilaterally.      EKG with normal sinus rhythm at 75 bpm, leftward axis, left bundle branch block pattern, and normal T waves.      Diagnostic labs with elevated BNP, borderline troponin T, borderline anemia and mild thrombocytopenia but otherwise unremarkable.      UA pending at the time of admission      Initial troponin T 18.  Repeat troponin T pending at the time of my departure      CT angio chest abdomen pelvis    (Results Pending)        The patient does not have any evidence of STEMI on EKG.  No events on telemetry.  He does not have any evidence of airway compromise or respiratory distress on exam.  Results of the CT angio chest abdomen pelvis and repeat opponent T were pending at the time of my departure.      ENOCH Burden will continue manage the patient primarily.  Anticipate disposition based on the results of the repeat troponin T and CT angio chest abdomen pelvis.  If the CT chest abdomen pelvis does not show any evidence of dissection and/or other reason to prompt a transfer to higher level care, anticipate that the patient will be admitted to this facility by the hospitalist at the request of the his cardiologist and for further evaluation by cardiology and vascular surgery tomorrow.      Impression/diagnosis:  Dyspnea, dyspnea on exertion  Aortic aneurysm      Critical care time billing is not warranted at this time      I personally saw the patient and made/approve the management plan and take responsibility for the patient management.      I independently interpreted the following study (S) EKG and diagnostic labs      I personally discussed the patient's management with the patient, his wife and the referring cardiologist      I reviewed the results of the diagnostic labs and diagnostic imaging.  Formal radiology read was completed by the radiologist.      Stella Douglas MD

## 2024-09-11 NOTE — ED PROVIDER NOTES
HPI   Chief Complaint   Patient presents with    Chest Pain     Patient had an appointment today and testing discovered he had a thoracic aneurysm- patient sent in to be admitted and see Dr. Davies in the morning.        68-year-old male into the emergency department at the recommendation of his cardiologist after echocardiogram today revealed a 6.2 cm aortic aneurysm.  Patient has been having chest discomfort.  Had a stress test.  His cardiologist Dr. Carter contacted cardiothoracic surgeon and recommended that he come to the hospital for a CT angio to ensure no dissection and then for cardiac catheterization and CT surgery evaluation.  Patient is well-appearing nontoxic afebrile.  Denies lightheadedness dizziness numbness weakness.  No other complaint.              Patient History   Past Medical History:   Diagnosis Date    Gout     Hyperlipidemia     Hypertension     Paroxysmal atrial fibrillation (Multi) 08/24/2023     Past Surgical History:   Procedure Laterality Date    SINUS SURGERY       No family history on file.  Social History     Tobacco Use    Smoking status: Never    Smokeless tobacco: Never   Vaping Use    Vaping status: Never Used   Substance Use Topics    Alcohol use: Not Currently     Alcohol/week: 2.0 standard drinks of alcohol     Types: 2 Cans of beer per week     Comment: once a week    Drug use: Never       Physical Exam   ED Triage Vitals [09/10/24 2054]   Temperature Heart Rate Respirations BP   36.9 °C (98.4 °F) 81 18 141/63      Pulse Ox Temp Source Heart Rate Source Patient Position   95 % Oral Monitor Sitting      BP Location FiO2 (%)     Right arm --       Physical Exam  Vitals and nursing note reviewed.   Constitutional:       Appearance: He is well-developed.   HENT:      Head: Normocephalic.   Cardiovascular:      Rate and Rhythm: Normal rate and regular rhythm.      Heart sounds: Normal heart sounds.   Pulmonary:      Effort: Pulmonary effort is normal.      Breath sounds: Normal  breath sounds.   Abdominal:      Palpations: Abdomen is soft.   Musculoskeletal:         General: Normal range of motion.      Cervical back: Normal range of motion.   Skin:     General: Skin is dry.   Neurological:      General: No focal deficit present.      Mental Status: He is alert and oriented to person, place, and time.   Psychiatric:         Mood and Affect: Mood normal.           ED Course & MDM   Diagnoses as of 09/10/24 2355   Chest pain, unspecified type   Aneurysm of ascending aorta without rupture (CMS-HCC)                 No data recorded                                 Medical Decision Making  I have seen and evaluated this patient.  The attending physician has also seen and evaluated this patient.  Vital signs, laboratory testing and diagnostic images if applicable have been reviewed.  All laboratory and imaging is interpreted by myself unless otherwise stated.  Radiology studies are also formally interpreted by radiologist.    CBC without significant leukocytosis or anemia, metabolic panel without significant renal impairment or electrolyte abnormality.  Troponin within an appropriate range without significant delta change, chest x-ray without actionable cardiopulmonary process, EKG without evidence of acute ischemia.  CT angiogram without evidence of aortic dissection does demonstrate aortic aneurysm.  Patient will be admitted for cardiac surgery evaluation and cardiac catheterization.    I have seen and evaluated this patient and independently provided 31 minutes of nonconcurrent critical care time. This does not include separately billable procedures. Patient with high potential for deterioration, required frequent monitoring and assessment.    Labs Reviewed  CBC WITH AUTO DIFFERENTIAL - Abnormal     WBC                           5.9                    nRBC                          0.0                    RBC                           4.38 (*)               Hemoglobin                    12.4 (*)                Hematocrit                    37.6 (*)               MCV                           86                     MCH                           28.3                   MCHC                          33.0                   RDW                           14.0                   Platelets                     147 (*)                Neutrophils %                 64.1                   Immature Granulocytes %, Automated   0.2                    Lymphocytes %                 23.9                   Monocytes %                   9.4                    Eosinophils %                 1.9                    Basophils %                   0.5                    Neutrophils Absolute          3.76                   Immature Granulocytes Absolute, Au*   0.01                   Lymphocytes Absolute          1.40                   Monocytes Absolute            0.55                   Eosinophils Absolute          0.11                   Basophils Absolute            0.03                COMPREHENSIVE METABOLIC PANEL - Abnormal     Glucose                       125 (*)                Sodium                        144                    Potassium                     3.4                    Chloride                      106                    Bicarbonate                   24                     Urea Nitrogen                 27 (*)                 Creatinine                    1.20                   eGFR                          67                     Calcium                       9.4                    Albumin                       4.2                    Alkaline Phosphatase          140 (*)                Total Protein                 6.8                    AST                           19                     Bilirubin, Total              0.6                    ALT                           27                     Anion Gap                     14                  N-TERMINAL PROBNP - Abnormal     PROBNP                        1,015 (*)                  Narrative: Reference ranges are based on clinical submission data. These ranges represent the 95th percentile of normal cut-off points. As NT Pro- BNP values approach 1000 pg/ml, clinical symptoms are more likely associated with CHF.  SERIAL TROPONIN, INITIAL ([facility]) - Abnormal     Troponin T, High Sensitivity   18 (*)              SERIAL TROPONIN,  2 HOUR ([facility]) - Abnormal     Troponin T, High Sensitivity   16 (*)              LIPASE - Normal     Lipase                        25                  SARS-COV-2 PCR - Normal     Coronavirus 2019, PCR                                  Narrative: This assay has received FDA Emergency Use Authorization (EUA) and is only authorized for the duration of time that circumstances exist to justify the authorization of the emergency use of in vitro diagnostic tests for the detection of SARS-CoV-2 virus and/or diagnosis of COVID-19 infection under section 564(b)(1) of the Act, 21 U.S.C. 360bbb-3(b)(1). This assay is an in vitro diagnostic nucleic acid amplification test for the qualitative detection of SARS-CoV-2 from nasopharyngeal specimens and has been validated for use at [facility]. Negative results do not preclude COVID-19 infections and should not be used as the sole basis for diagnosis, treatment, or other management decisions.                  URINALYSIS WITH REFLEX CULTURE AND MICROSCOPIC       Narrative: The following orders were created for panel order Urinalysis with Reflex Culture and Microscopic.                Procedure                               Abnormality         Status                                   ---------                               -----------         ------                                   Urinalysis with Reflex C...[[id number]]                                                               Extra Urine Gray Tube[[id number]]                                                                                     Please view  results for these tests on the individual orders.  TROPONIN T SERIES, HIGH SENSITIVITY (0, 2 HR, 6 HR)       Narrative: The following orders were created for panel order Troponin T Series, High Sensitivity (0, 2HR, 6HR).                Procedure                               Abnormality         Status                                   ---------                               -----------         ------                                   Serial Troponin, Initial...[[id number]]  Abnormal            Final result                             Serial Troponin, 2 Hour ...[[id number]]  Abnormal            Final result                             Serial Troponin, 6 Hour ...[[id number]]                                                                               Please view results for these tests on the individual orders.  URINALYSIS WITH REFLEX CULTURE AND MICROSCOPIC  EXTRA URINE GRAY TUBE  SERIAL TROPONIN, 6 HOUR ([facility])  CBC WITH AUTO DIFFERENTIAL  COMPREHENSIVE METABOLIC PANEL  CT angio chest abdomen pelvis   Final Result    Large ascending aneurysm up to 6.2 cm. No evidence of acute aortic    dissection.          Cardiac enlargement. Features of mild pulmonary edema.          Hepatic heterogeneity.          Diverticulosis. No diverticulitis.. No free air. No free fluid.          Signed by: Geo Carbajal 9/10/2024 10:59 PM    Dictation workstation:   [id number]     Medications  furosemide (Lasix) injection 20 mg (has no administration in time range)  benazepriL-hydrochlorothiazide (Lotensin HCT) 20-25 mg per tablet 1 tablet (has no administration in time range)  cloNIDine (Catapres) tablet 0.1 mg (has no administration in time range)  amLODIPine (Norvasc) tablet 5 mg (has no administration in time range)  pantoprazole (ProtoNix) EC tablet 20 mg (has no administration in time range)  simvastatin (Zocor) tablet 10 mg (has no administration in time range)  terazosin (Hytrin) capsule 10 mg (has no administration in time  range)  heparin (porcine) injection 5,000 Units (has no administration in time range)  acetaminophen (Tylenol) tablet 650 mg (has no administration in time range)    Or  acetaminophen (Tylenol) oral liquid 650 mg (has no administration in time range)    Or  acetaminophen (Tylenol) suppository 650 mg (has no administration in time range)  ondansetron (Zofran) tablet 4 mg (has no administration in time range)    Or  ondansetron (Zofran) injection 4 mg (has no administration in time range)  polyethylene glycol (Glycolax, Miralax) packet 17 g (has no administration in time range)  benzocaine-menthol (Cepastat Sore Throat) lozenge 1 lozenge (has no administration in time range)  dextromethorphan-guaifenesin (Robitussin DM)  mg/5 mL oral liquid 5 mL (has no administration in time range)  guaiFENesin (Mucinex) 12 hr tablet 600 mg (has no administration in time range)  iohexol (OMNIPaque) 350 mg iodine/mL solution 75 mL (75 mL intravenous Given 9/10/24 0063)  New Prescriptions  No medications on file            Procedure  Procedures     Cabrera Burden PA-C  09/11/24 0018

## 2024-09-12 ENCOUNTER — APPOINTMENT (OUTPATIENT)
Dept: RADIOLOGY | Facility: HOSPITAL | Age: 69
DRG: 219 | End: 2024-09-12
Payer: MEDICARE

## 2024-09-12 ENCOUNTER — APPOINTMENT (OUTPATIENT)
Dept: VASCULAR MEDICINE | Facility: HOSPITAL | Age: 69
DRG: 219 | End: 2024-09-12
Payer: MEDICARE

## 2024-09-12 DIAGNOSIS — I44.7 LBBB (LEFT BUNDLE BRANCH BLOCK): ICD-10-CM

## 2024-09-12 DIAGNOSIS — I10 ESSENTIAL (PRIMARY) HYPERTENSION: ICD-10-CM

## 2024-09-12 PROCEDURE — 75561 CARDIAC MRI FOR MORPH W/DYE: CPT | Performed by: STUDENT IN AN ORGANIZED HEALTH CARE EDUCATION/TRAINING PROGRAM

## 2024-09-12 PROCEDURE — 2550000001 HC RX 255 CONTRASTS

## 2024-09-12 PROCEDURE — 93880 EXTRACRANIAL BILAT STUDY: CPT | Performed by: INTERNAL MEDICINE

## 2024-09-12 PROCEDURE — A9575 INJ GADOTERATE MEGLUMI 0.1ML: HCPCS

## 2024-09-12 PROCEDURE — 2500000004 HC RX 250 GENERAL PHARMACY W/ HCPCS (ALT 636 FOR OP/ED)

## 2024-09-12 PROCEDURE — 2500000001 HC RX 250 WO HCPCS SELF ADMINISTERED DRUGS (ALT 637 FOR MEDICARE OP): Performed by: PHYSICIAN ASSISTANT

## 2024-09-12 PROCEDURE — 93880 EXTRACRANIAL BILAT STUDY: CPT

## 2024-09-12 PROCEDURE — 1200000002 HC GENERAL ROOM WITH TELEMETRY DAILY

## 2024-09-12 PROCEDURE — 75561 CARDIAC MRI FOR MORPH W/DYE: CPT

## 2024-09-12 PROCEDURE — 93016 CV STRESS TEST SUPVJ ONLY: CPT | Performed by: INTERNAL MEDICINE

## 2024-09-12 PROCEDURE — 2500000004 HC RX 250 GENERAL PHARMACY W/ HCPCS (ALT 636 FOR OP/ED): Performed by: PHYSICIAN ASSISTANT

## 2024-09-12 PROCEDURE — 99232 SBSQ HOSP IP/OBS MODERATE 35: CPT

## 2024-09-12 PROCEDURE — 93018 CV STRESS TEST I&R ONLY: CPT | Performed by: INTERNAL MEDICINE

## 2024-09-12 PROCEDURE — 2500000002 HC RX 250 W HCPCS SELF ADMINISTERED DRUGS (ALT 637 FOR MEDICARE OP, ALT 636 FOR OP/ED): Performed by: PHYSICIAN ASSISTANT

## 2024-09-12 RX ORDER — HEPARIN SODIUM 5000 [USP'U]/ML
5000 INJECTION, SOLUTION INTRAVENOUS; SUBCUTANEOUS EVERY 8 HOURS
Status: DISCONTINUED | OUTPATIENT
Start: 2024-09-12 | End: 2024-09-23

## 2024-09-12 RX ORDER — GADOTERATE MEGLUMINE 376.9 MG/ML
40 INJECTION INTRAVENOUS
Status: COMPLETED | OUTPATIENT
Start: 2024-09-12 | End: 2024-09-12

## 2024-09-12 RX ADMIN — METOPROLOL TARTRATE 25 MG: 25 TABLET, FILM COATED ORAL at 08:29

## 2024-09-12 RX ADMIN — SIMVASTATIN 10 MG: 10 TABLET, FILM COATED ORAL at 21:15

## 2024-09-12 RX ADMIN — CLONIDINE HYDROCHLORIDE 0.1 MG: 0.1 TABLET ORAL at 05:38

## 2024-09-12 RX ADMIN — MUPIROCIN 0.5 APPLICATION: 20 OINTMENT TOPICAL at 08:29

## 2024-09-12 RX ADMIN — HEPARIN SODIUM 5000 UNITS: 5000 INJECTION, SOLUTION INTRAVENOUS; SUBCUTANEOUS at 15:39

## 2024-09-12 RX ADMIN — CLONIDINE HYDROCHLORIDE 0.1 MG: 0.1 TABLET ORAL at 21:14

## 2024-09-12 RX ADMIN — HYDRALAZINE HYDROCHLORIDE 10 MG: 20 INJECTION INTRAMUSCULAR; INTRAVENOUS at 18:01

## 2024-09-12 RX ADMIN — PANTOPRAZOLE SODIUM 40 MG: 40 TABLET, DELAYED RELEASE ORAL at 05:37

## 2024-09-12 RX ADMIN — TERAZOSIN HYDROCHLORIDE 10 MG: 5 CAPSULE ORAL at 21:15

## 2024-09-12 RX ADMIN — GADOTERATE MEGLUMINE 40 ML: 376.9 INJECTION INTRAVENOUS at 10:58

## 2024-09-12 RX ADMIN — METOPROLOL TARTRATE 25 MG: 25 TABLET, FILM COATED ORAL at 21:15

## 2024-09-12 RX ADMIN — CLONIDINE HYDROCHLORIDE 0.1 MG: 0.1 TABLET ORAL at 15:39

## 2024-09-12 RX ADMIN — MUPIROCIN 0.5 APPLICATION: 20 OINTMENT TOPICAL at 21:15

## 2024-09-12 ASSESSMENT — PAIN SCALES - GENERAL
PAINLEVEL_OUTOF10: 2
PAINLEVEL_OUTOF10: 0 - NO PAIN

## 2024-09-12 ASSESSMENT — PAIN - FUNCTIONAL ASSESSMENT
PAIN_FUNCTIONAL_ASSESSMENT: 0-10
PAIN_FUNCTIONAL_ASSESSMENT: 0-10

## 2024-09-12 NOTE — PROGRESS NOTES
David Chatman is a 68 y.o. male presenting with shortness of breath. He presented to his cardiologist as an outpt for these complaints. Echo showed an EF of 35-40% and ascending aneurysm 6.2. He was directed to the ED. BNP 1015, troponins, 21,16,15. CT chest revealed large ascending aneurysm up to 6.2 cm. No evidence of acute aortic dissection. He was given IV lasix with relief of symptoms and admitted for further evaluation. He is currently planned for cardiac catheterization. Asymptomatic. The pt reports his blood pressures are under control currently. CT surgery consulted for aneurysm evaluation at OSH and [place] [facility] [place]              Subjective  Medical History        Past Medical History:   Diagnosis Date    Gout      Hyperlipidemia      Hypertension      Paroxysmal atrial fibrillation (Multi) 08/24/2023         Surgical History         Past Surgical History:   Procedure Laterality Date    SINUS SURGERY             Social History   Social History            Tobacco Use    Smoking status: Never    Smokeless tobacco: Never   Vaping Use    Vaping status: Never Used   Substance Use Topics    Alcohol use: Not Currently       Alcohol/week: 2.0 standard drinks of alcohol       Types: 2 Cans of beer per week       Comment: once a week    Drug use: Never         Family History   No family history on file.        Allergies:  Patient has no known allergies.             Prior to Admission medications    Medication Sig Start Date End Date Taking? Authorizing Provider   cloNIDine (Catapres) 0.1 mg tablet Take 1 tablet (0.1 mg) by mouth every 12 hours. 9/11/24     Farhat Valladares MD   fluticasone (Flonase) 50 mcg/actuation nasal spray Administer 2 sprays into each nostril once daily. Shake gently. Before first use, prime pump. After use, clean tip and replace cap. 9/12/24     Farhat Valladares MD   heparin sodium,porcine (heparin, porcine,) 5,000 unit/mL injection Inject 1 mL (5,000 Units) under the  skin every 8 hours. 9/11/24     Farhat Valladares MD   losartan (Cozaar) 100 mg tablet Take 1 tablet (100 mg) by mouth once daily. 9/12/24     Farhat Valladares MD   metoprolol succinate XL (Toprol-XL) 25 mg 24 hr tablet Take 1 tablet (25 mg) by mouth once daily. Do not crush or chew. 9/12/24     Farhat Valladares MD   pantoprazole (ProtoNix) 20 mg EC tablet Take 1 tablet (20 mg) by mouth once daily in the morning. Take before meals. Do not crush, chew, or split. 9/12/24     Farhat Valladares MD   simvastatin (Zocor) 10 mg tablet Take 1 tablet (10 mg) by mouth once daily at bedtime. 9/11/24     Farhat Valladares MD   terazosin (Hytrin) 10 mg capsule Take 1 capsule (10 mg) by mouth once daily. 9/12/24     Farhat Valladares MD   benazepriL-hydrochlorothiazide (Lotensin HCT) 20-25 mg tablet Take 1 tablet by mouth once daily. 7/29/24 9/11/24   Amandeep Vaca DO   cloNIDine (Catapres) 0.1 mg tablet TAKE 1 TABLET BY MOUTH EVERY 12 HOURS 7/29/24 9/11/24   Amandeep Vaca DO   felodipine ER (Plendil) 5 mg 24 hr tablet Take 1 tablet (5 mg) by mouth once daily. Do not crush, chew, or split. 4/10/24 9/11/24   Amandeep Vaca DO   fluocinolone 0.01 % cream Applied to external ear canal once daily as needed for itching 3/12/24 9/11/24   Bernardo Ramírez MD   multivitamin tablet Take 1 tablet by mouth once daily.   9/11/24   Historical Provider, MD   omeprazole (PriLOSEC) 40 mg DR capsule Take 1 capsule once daily 30-45 minutes before a meal. 8/20/24 9/11/24   Bernardo Ramírez MD   simvastatin (Zocor) 10 mg tablet Take 1 tablet (10 mg) by mouth once daily. For 90 days   9/11/24   Historical Provider, MD   terazosin (Hytrin) 5 mg capsule Take 2 capsules (10 mg) by mouth once daily. For 90 days   9/11/24   Historical Provider, MD         Review of Systems  Review of Systems   Constitutional: Negative.    HENT: Negative.     Eyes: Negative.    Respiratory:  negative for chest tightness. Negative for shortness of breath.    Cardiovascular:   Negative for chest pain and palpitations.   Musculoskeletal: Negative.    Neurological: Negative.                   Objective  /71 (BP Location: Left arm, Patient Position: Lying)   Pulse 76   Temp 37.1 °C (98.8 °F) (Temporal)   Resp 21   SpO2 92%   0-10 (Numeric) Pain Score: 0 - No pain   Vitals   There were no vitals filed for this visit.         No intake or output data in the 24 hours ending 09/11/24 1833     Physical Exam  Physical Exam  Constitutional:       General: He is not in acute distress.     Appearance: He is not toxic-appearing.   Eyes:      Pupils: Pupils are equal, round, and reactive to light.   Cardiovascular:      Rate and Rhythm: Normal rate and regular rhythm.      Pulses: Normal pulses.   Pulmonary:      Effort: Pulmonary effort is normal.   Abdominal:      General: Abdomen is flat. There is no distension.      Palpations: Abdomen is soft.      Tenderness: There is no abdominal tenderness.   Skin:     General: Skin is warm and dry.   Neurological:      General: No focal deficit present.      Mental Status: He is alert and oriented to person, place, and time.   Psychiatric:         Mood and Affect: Mood normal.         Behavior: Behavior normal.      Comments: anxious            Medications  Scheduled medications    Scheduled Medications   cloNIDine, 0.1 mg, oral, BID  metoprolol succinate XL, 25 mg, oral, Daily  mupirocin, 0.5 Application, Topical, BID  [START ON 9/12/2024] pantoprazole, 40 mg, oral, Daily before breakfast  simvastatin, 10 mg, oral, Nightly  terazosin, 10 mg, oral, Nightly      Continuous medications  Continuous Medications       PRN medications  PRN Medications   PRN medications: hydrALAZINE        Labs        Results for orders placed or performed during the hospital encounter of 09/10/24 (from the past 24 hour(s))   CBC and Auto Differential   Result Value Ref Range     WBC 5.9 4.4 - 11.3 x10*3/uL     nRBC 0.0 0.0 - 0.0 /100 WBCs     RBC 4.38 (L) 4.50 - 5.90  x10*6/uL     Hemoglobin 12.4 (L) 13.5 - 17.5 g/dL     Hematocrit 37.6 (L) 41.0 - 52.0 %     MCV 86 80 - 100 fL     MCH 28.3 26.0 - 34.0 pg     MCHC 33.0 32.0 - 36.0 g/dL     RDW 14.0 11.5 - 14.5 %     Platelets 147 (L) 150 - 450 x10*3/uL     Neutrophils % 64.1 40.0 - 80.0 %     Immature Granulocytes %, Automated 0.2 0.0 - 0.9 %     Lymphocytes % 23.9 13.0 - 44.0 %     Monocytes % 9.4 2.0 - 10.0 %     Eosinophils % 1.9 0.0 - 6.0 %     Basophils % 0.5 0.0 - 2.0 %     Neutrophils Absolute 3.76 1.20 - 7.70 x10*3/uL     Immature Granulocytes Absolute, Automated 0.01 0.00 - 0.70 x10*3/uL     Lymphocytes Absolute 1.40 1.20 - 4.80 x10*3/uL     Monocytes Absolute 0.55 0.10 - 1.00 x10*3/uL     Eosinophils Absolute 0.11 0.00 - 0.70 x10*3/uL     Basophils Absolute 0.03 0.00 - 0.10 x10*3/uL   Comprehensive metabolic panel   Result Value Ref Range     Glucose 125 (H) 65 - 99 mg/dL     Sodium 144 133 - 145 mmol/L     Potassium 3.4 3.4 - 5.1 mmol/L     Chloride 106 97 - 107 mmol/L     Bicarbonate 24 24 - 31 mmol/L     Urea Nitrogen 27 (H) 8 - 25 mg/dL     Creatinine 1.20 0.40 - 1.60 mg/dL     eGFR 67 >60 mL/min/1.73m*2     Calcium 9.4 8.5 - 10.4 mg/dL     Albumin 4.2 3.5 - 5.0 g/dL     Alkaline Phosphatase 140 (H) 35 - 125 U/L     Total Protein 6.8 5.9 - 7.9 g/dL     AST 19 5 - 40 U/L     Bilirubin, Total 0.6 0.1 - 1.2 mg/dL     ALT 27 5 - 40 U/L     Anion Gap 14 <=19 mmol/L   Lipase   Result Value Ref Range     Lipase 25 16 - 63 U/L   NT Pro-BNP   Result Value Ref Range     PROBNP 1,015 (H) 0 - 229 pg/mL   Serial Troponin, Initial ([facility])   Result Value Ref Range     Troponin T, High Sensitivity 18 (HH) <=14 ng/L   ECG 12 lead   Result Value Ref Range     Ventricular Rate 75 BPM     Atrial Rate 75 BPM     NH Interval 204 ms     QRS Duration 166 ms     QT Interval 422 ms     QTC Calculation(Bazett) 471 ms     P Axis -1 degrees     R Axis -47 degrees     T Axis 119 degrees     QRS Count 13 beats     Q Onset 208 ms     P Onset 106 ms      P Offset 169 ms     T Offset 419 ms     QTC Fredericia 454 ms   Sars-CoV-2 PCR   Result Value Ref Range     Coronavirus 2019, PCR Not Detected Not Detected   Serial Troponin, 2 Hour ([facility])   Result Value Ref Range     Troponin T, High Sensitivity 16 (HH) <=14 ng/L   Serial Troponin, 6 Hour ([facility])   Result Value Ref Range     Troponin T, High Sensitivity 21 (HH) <=14 ng/L   CBC and Auto Differential   Result Value Ref Range     WBC 7.1 4.4 - 11.3 x10*3/uL     nRBC 0.0 0.0 - 0.0 /100 WBCs     RBC 4.41 (L) 4.50 - 5.90 x10*6/uL     Hemoglobin 12.3 (L) 13.5 - 17.5 g/dL     Hematocrit 37.6 (L) 41.0 - 52.0 %     MCV 85 80 - 100 fL     MCH 27.9 26.0 - 34.0 pg     MCHC 32.7 32.0 - 36.0 g/dL     RDW 14.1 11.5 - 14.5 %     Platelets 145 (L) 150 - 450 x10*3/uL     Neutrophils % 61.0 40.0 - 80.0 %     Immature Granulocytes %, Automated 0.4 0.0 - 0.9 %     Lymphocytes % 26.7 13.0 - 44.0 %     Monocytes % 9.8 2.0 - 10.0 %     Eosinophils % 1.5 0.0 - 6.0 %     Basophils % 0.6 0.0 - 2.0 %     Neutrophils Absolute 4.33 1.20 - 7.70 x10*3/uL     Immature Granulocytes Absolute, Automated 0.03 0.00 - 0.70 x10*3/uL     Lymphocytes Absolute 1.90 1.20 - 4.80 x10*3/uL     Monocytes Absolute 0.70 0.10 - 1.00 x10*3/uL     Eosinophils Absolute 0.11 0.00 - 0.70 x10*3/uL     Basophils Absolute 0.04 0.00 - 0.10 x10*3/uL   Comprehensive metabolic panel   Result Value Ref Range     Glucose 102 (H) 65 - 99 mg/dL     Sodium 142 133 - 145 mmol/L     Potassium 3.8 3.4 - 5.1 mmol/L     Chloride 107 97 - 107 mmol/L     Bicarbonate 25 24 - 31 mmol/L     Urea Nitrogen 24 8 - 25 mg/dL     Creatinine 1.00 0.40 - 1.60 mg/dL     eGFR 83 >60 mL/min/1.73m*2     Calcium 8.8 8.5 - 10.4 mg/dL     Albumin 4.0 3.5 - 5.0 g/dL     Alkaline Phosphatase 126 (H) 35 - 125 U/L     Total Protein 6.4 5.9 - 7.9 g/dL     AST 18 5 - 40 U/L     Bilirubin, Total 0.8 0.1 - 1.2 mg/dL     ALT 25 5 - 40 U/L     Anion Gap 10 <=19 mmol/L         Imaging/Cardiac Testing  Cardiac cath  perfroemd 9/11 at OSH  1.  The left main is a large vessel that bifurcates into the LAD and circumflex and had no obstructive disease.     2.  The LAD was a large vessel that extended to the apex and wraps around the apex to supply the distal inferoapical wall.  The LAD had no obstructive disease.     3.  The circumflex was a large codominant vessel with no obstructive disease in the circumflex or its marginal branches.     4.  The right coronary artery was unsuccessfully imaged nonselective imaging briefly showed evidence of vessel patency.     Impression:      #1 large 6.2 cm ascending aorta dilation.     2.  Inability to selectively or nonselectively engage the right coronary artery, and patient would benefit from CCTA imaging to confirm coronary anatomy.     3.  No obstructive disease in the left main, LAD, or circumflex vessels.        ECHO 8/12   1. The left ventricular systolic function is moderately decreased, with a visually estimated ejection fraction of 35-40%.   2. There is global hypokinesis of the left ventricle with minor regional variations.   3. Left ventricular cavity size is severely dilated.   4. There is normal right ventricular global systolic function.   5. The left atrium is moderately dilated.   6. The right atrium is moderately dilated.   7. Moderate mitral valve regurgitation.   8. Trace tricuspid regurgitation is visualized.   9. Aortic valve stenosis is not present.  10. Likely tricuspid aortic valve but images slightly suboptimal.  11. Moderate aortic valve regurgitation.  12. Aneurysm of the ascending aorta.     ASSESSMENT:  David Chatman is a 68 y.o. male presenting with shortness of breath. He presented to his cardiologist as an outpt for these complaints. Echo showed an EF of 35-40% and ascending aneurysm 6.2. He was directed to the ED. BNP 1015, troponins, 21,16,15. CT chest revealed large ascending aneurysm up to 6.2 cm. No evidence of acute aortic  dissection. He was given IV  lasix with relief of symptoms and admitted for further evaluation. He is currently planned for cardiac catheterization. Asymptomatic. The pt reports his blood pressures are under control currently. CT surgery consulted for aneurysm evaluation.      - Continue home statin  - ECHO and LHC completed at OSH- only thing may consider is if needs coronary CTA to eval RCA given inability to engage  -Cardiac MRI ordered today  - Consult Heart failure today  - Carotid US ordered   - Changed to metoprolol succinate to tartrate 25mg BID 9/11/24  - Goal for BP control with home oral agent as well PRN hydralazine goal SBP <140, will add oral if needed to help with BP control  - Continue Hytrin and clonidine--> will increase cloniding to  0.1mg TID instead of BID     - Will need to discuss further with Dr Perdomo timing of surgery. At this time unclear surgical date, likely mid-week next week     Prophylaxis  VTE Prophylaxis: SCDs/TEDs, ambulation     Code Status: Full Code

## 2024-09-12 NOTE — PROGRESS NOTES
09/12/24 0935   Discharge Planning   Living Arrangements Spouse/significant other   Support Systems Spouse/significant other   Type of Residence Private residence   Number of Stairs to Enter Residence 3   Number of Stairs Within Residence 12   Do you have animals or pets at home? Yes   Type of Animals or Pets 1 Cat   Home or Post Acute Services In home services   Type of Home Care Services Home nursing visits   Expected Discharge Disposition  Services  ([facility])   Does the patient need discharge transport arranged? No     Met with patient to introduce myself, role and discuss discharge planning.  Patient lives with his spouse.  Patient is independent in all adl's.  Requires no assist devices for mobility.  Patient denies active home care , but is agreeable to home care post discharge.  Patient denies any recent falls.  Patient feels safe at home and denies any issues with making follow up appointments or getting his medications.  Patient expressed no questions and no social work concerns at this time.    PCP:  Amandeep Vaca  Pharmacy:  [facility]  Home Care:  N/A  DME:  Orders from [employer]

## 2024-09-12 NOTE — CONSULTS
Inpatient consult to Heart Failure  Consult performed by: Daisy Han MD  Consult ordered by: Lisa Marte, APRN-CNP  Reason for consult: HFrEF        Advanced Heart Failure Initial Consultation Note   Consulting Team: CT surgery  Primary Cardiologist: Dr. Carter  Reason for Consult: HFrEF    Subjective   David Chatman is a 68 y.o. male with a PMH of HLD,HTN presented to the hospital at the advice of his cardiologist.    He presented to his cardiologist with complaints of shortness of breath on exertion for the past several weeks. He was seen in the clinic on 08/12 initially with c/o dyspnea.    TTE showed an LVEF of 35-40%, moderate AR, moderate MR, normal RV systolic function and aneurysm of ascending aorta. Nuclear perfusion scan negative for inducible ischemia.Decreased perfusion in the inferior wall seen on both stress and rest imaging with preserved wall motion and thickness, without CT correction, either  artifacts or prior infarction, attention on follow-up study.    Given the finding of aneurysm on TTE, (cardiology) consulted with vascular surgery with the decision to admit for further evaluation.  Admitted to the [facility] service initially.   Upon arrival to the emergency room patient's vital signs : Tmax 36.9, pulse rate 81, respiratory rate 18, /63. 95% on room air.   CT chest showed ascending aneurysm of 6.2 cm with no evidence of acute aortic dissection.  Noted to have pro BNP 1015. Mild pulm edema on CT chest and small L pleural effusion.  Patient received IV lasix with relief of symptoms.   S/p LHC at OSH. Inability to engage RCA per Dr. Davies's note 09/11.  CT surgery was consulted for aneurysm evaluation at OSH and he was transferred to [facility] for further evaluation/management. Timing of surgery TBD. Pending possible coronary CTA to rule out RCA disease.   Patient received IV lasix 20 mg yesterday. I/O?.    Cardiac visits with Dr. Carter:  Noted that even before  this presentation to the clinic on 08/12, patient had 3 weeks of flu like sx for which he went to [facility] with CXR showing pulm congestion, EKG showing LBBB with non specific ST changes.Workup in emergency room patient Found to have a proBNP 939, troponins were low normal 20s. Patient declined admission and opted to follow up with cardiology as outpatient. Was sent home on lasix prescription.        The following portions of the chart were reviewed this encounter and updated as appropriate:         Review of Systems  Otherwise, limited cardiovascular review of systems is negative.    Past Medical History:   Diagnosis Date    Gout     Hyperlipidemia     Hypertension     Paroxysmal atrial fibrillation (Multi) 08/24/2023     Past Surgical History:   Procedure Laterality Date    CARDIAC CATHETERIZATION N/A 9/11/2024    Procedure: Left Heart Cath;  Surgeon: Zacarias Jarquin DO;  Location: [facility] Cardiac Cath Lab;  Service: Cardiovascular;  Laterality: N/A;    SINUS SURGERY       Social History     Socioeconomic History    Marital status:      Spouse name: Not on file    Number of children: Not on file    Years of education: Not on file    Highest education level: Not on file   Occupational History    Not on file   Tobacco Use    Smoking status: Never    Smokeless tobacco: Never   Vaping Use    Vaping status: Never Used   Substance and Sexual Activity    Alcohol use: Not Currently     Alcohol/week: 2.0 standard drinks of alcohol     Types: 2 Cans of beer per week     Comment: once a week    Drug use: Never    Sexual activity: Defer   Other Topics Concern    Not on file   Social History Narrative    Not on file     Social Determinants of Health     Financial Resource Strain: Not on file   Food Insecurity: Not on file   Transportation Needs: Not on file   Physical Activity: Not on file   Stress: Not on file   Social Connections: Not on file   Intimate Partner Violence: Not on file   Housing Stability: Not  on file     No family history on file.    Home meds:  Benazepril-hydrochlorothiazide 20-25 mg, oral, Daily   Clonidine 0.1 mg, oral, Q12H [frequency increased to Q8H]  Felodipine ER 5 mg, oral, Daily  Omeprazole 20 mg DR, oral, Daily  Simvastatin 10 mg, oral, Daily   Terazosin 5 mg, oral, Daily [dose increased to 10 mg]      Objective   Physical Exam  Vitals:    09/12/24 0540   BP: 143/71   Pulse: 76   Resp: 18   Temp: 36.7 °C (98.1 °F)   SpO2: 94%       GEN:  NAD.  CV: RRR, no m/r/g.   LUNGS: crackles at bases  ABD: Soft, NT/ND, NBS  SKIN: Warm, well perfused. No skin rashes or abnormal lesions.   EXT: No clubbing, cyanosis, or edema.  NEURO: AAOX3, not following commands    I have personally reviewed the following images and laboratory findings:    ECG:  sinus rhythm, LAD, LBBB    Results for orders placed during the hospital encounter of 09/10/24    Transthoracic Echo Complete    Narrative  [address], [place] [address]  Phone [phone]    TRANSTHORACIC ECHOCARDIOGRAM REPORT    Patient Name:      NAN Norman Physician:    [id number] Jackeline Davies MD  Study Date:        9/10/2024            Ordering Provider:    [id number] AAMIR STANLEY  MRN/PID:           [id number]             Fellow:  Accession#:        [id number]         Nurse:  Date of Birth/Age: 1955 / 68 years Sonographer:          Ai PELLETIER  Gender:            M                    Additional Staff:  Height:            185.42 cm            Admit Date:  Weight:            117.03 kg            Admission Status:     Outpatient  BSA / BMI:         2.40 m2 / 34.04      Department Location:  kg/m2  Blood Pressure: 125 /72 mmHg    Study Type:    TRANSTHORACIC ECHO (TTE) COMPLETE  Diagnosis/ICD: Shortness of breath-R06.02; Essential (primary) hypertension-I10  Indication:    Rule out CAD HLD HTN Shortness of Breath  CPT Codes:     Echo Complete w Full Doppler-[id number]    Patient History:  Pertinent  History: HTN HLD PAF.    Study Detail: The following Echo studies were performed: 2D, M-Mode, Doppler,  color flow, Strain and 3D.      PHYSICIAN INTERPRETATION:  Left Ventricle: The left ventricular systolic function is moderately decreased, with a visually estimated ejection fraction of 35-40%. There is global hypokinesis of the left ventricle with minor regional variations. The left ventricular cavity size is severely dilated. Left Ventricular Global Longitudinal Strain - -10.5 %. Spectral Doppler shows a normal pattern of left ventricular diastolic filling.  Left Atrium: The left atrium is moderately dilated.  Right Ventricle: The right ventricle is normal in size. There is normal right ventricular global systolic function.  Right Atrium: The right atrium is moderately dilated.  Aortic Valve: The aortic valve was not well visualized. There is no aortic valve thickening. There is no evidence of aortic valve stenosis.  The aortic valve dimensionless index is 1.03. There is moderate aortic valve regurgitation. The peak instantaneous gradient of the aortic valve is 16.5 mmHg. The mean gradient of the aortic valve is 8.0 mmHg. Likely tricuspid aortic valve but images slightly suboptimal.  Mitral Valve: The mitral valve is abnormal. There is moderate mitral valve regurgitation which is centrally directed.  Tricuspid Valve: The tricuspid valve is structurally normal. There is trace tricuspid regurgitation. The right ventricular systolic pressure is unable to be estimated.  Pulmonic Valve: The pulmonic valve is structurally normal. There is physiologic pulmonic valve regurgitation.  Pericardium: There is a trivial pericardial effusion.  Aorta: The aortic root is normal. There is an aneurysm involving the ascending aorta. Severe ascending aortic aneurysm of 6.2 cm at the largest diameter.  Systemic Veins: The inferior vena cava appears to be of normal size, IVC inspiratory collapse greater than 50%.      CONCLUSIONS:  1.  The left ventricular systolic function is moderately decreased, with a visually estimated ejection fraction of 35-40%.  2. There is global hypokinesis of the left ventricle with minor regional variations.  3. Left ventricular cavity size is severely dilated.  4. There is normal right ventricular global systolic function.  5. The left atrium is moderately dilated.  6. The right atrium is moderately dilated.  7. Moderate mitral valve regurgitation.  8. Trace tricuspid regurgitation is visualized.  9. Aortic valve stenosis is not present.  10. Likely tricuspid aortic valve but images slightly suboptimal.  11. Moderate aortic valve regurgitation.  12. Aneurysm of the ascending aorta.    QUANTITATIVE DATA SUMMARY:    2D MEASUREMENTS:            Normal Ranges:  LAs:             5.00 cm    (2.7-4.0cm)  IVSd:            1.03 cm    (0.6-1.1cm)  LVPWd:           1.19 cm    (0.6-1.1cm)  LVIDd:           7.16 cm    (3.9-5.9cm)  LVIDs:           6.41 cm  LV Mass Index:   159.3 g/m2  LV % FS          10.5 %      LA VOLUME:                    Normal Ranges:  LA Vol A4C:        164.4 ml   (22+/-6mL/m2)  LA Vol A2C:        159.8 ml  LA Vol BP:         163.9 ml  LA Vol Index A4C:  68.5ml/m2  LA Vol Index A2C:  66.7 ml/m2  LA Vol Index BP:   68.3 ml/m2  LA Area A4C:       36.5 cm2  LA Area A2C:       35.6 cm2  LA Major Axis A4C: 6.9 cm  LA Major Axis A2C: 6.7 cm  LA Volume Index:   64.6 ml/m2  LA Vol A4C:        151.0 ml  LA Vol A2C:        156.0 ml  LA Vol Index BSA:  64.0 ml/m2      RA VOLUME BY A/L METHOD:            Normal Ranges:  RA Vol A4C:              86.5 ml    (8.3-19.5ml)  RA Vol Index A4C:        36.1 ml/m2  RA Area A4C:             25.3 cm2  RA Major Axis A4C:       6.3 cm      M-MODE MEASUREMENTS:         Normal Ranges:  Ao Root:             4.80 cm (2.0-3.7cm)  AoV Exc:             1.50 cm (1.5-2.5cm)  LAs:                 4.50 cm (2.7-4.0cm)      AORTA MEASUREMENTS:         Normal Ranges:  AoV Exc:            1.50 cm  (1.5-2.5cm)  Asc Ao, d:          6.20 cm (2.1-3.4cm)      LV SYSTOLIC FUNCTION BY 2D PLANIMETRY (MOD):  Normal Ranges:  EF-A4C View:                      43 %  (>=55%)  EF-A2C View:                      42 %  EF-Biplane:                       41 %  EF-Visual:                        38 %  EF-3DQ:                           43 %  LV EF Reported:                   38 %  Global Longitudinal Strain (GLS): -10 %      LV DIASTOLIC FUNCTION:            Normal Ranges:  MV Peak E:             0.99 m/s   (0.7-1.2 m/s)  MV Peak A:             0.44 m/s   (0.42-0.7 m/s)  E/A Ratio:             2.27       (1.0-2.2)  MV e'                  0.057 m/s  (>8.0)  MV lateral e'          0.07 m/s  MV medial e'           0.04 m/s  E/e' Ratio:            17.32      (<8.0)  PulmV Sys Alfredito:         69.60 cm/s  PulmV Gray Alfredito:        56.00 cm/s  PulmV S/D Alfredito:         1.20      MITRAL VALVE:           Normal Ranges:  MV Vmax:      1.72 m/s  (<=1.3m/s)  MV peak P.8 mmHg (<5mmHg)  MV mean P.0 mmHg  (<48mmHg)  MV DT:        145 msec  (150-240msec)  MV Annulus:   0.80 cm   (1.8-3.1cm)      MITRAL INSUFFICIENCY:             Normal Ranges:  PISA Radius:          1.1 cm  MR VTI:               170.00 cm  MR Vmax:              487.50 cm/s      AORTIC VALVE:                      Normal Ranges:  AoV Vmax:                2.03 m/s  (<=1.7m/s)  AoV Peak P.5 mmHg (<20mmHg)  AoV Mean P.0 mmHg  (1.7-11.5mmHg)  LVOT Max Alfredito:            1.99 m/s  (<=1.1m/s)  AoV VTI:                 39.20 cm  (18-25cm)  LVOT VTI:                40.30 cm  LVOT Diameter:           2.30 cm   (1.8-2.4cm)  AoV Area, VTI:           4.27 cm2  (2.5-5.5cm2)  AoV Area,Vmax:           4.07 cm2  (2.5-4.5cm2)  AoV Dimensionless Index: 1.03      AORTIC INSUFFICIENCY:  AI Vmax:       4.54 m/s  AI Half-time:  269 msec  AI Decel Rate: 500.50 cm/s2      RIGHT VENTRICLE:  RV Basal 3.48 cm  RV Mid   2.62 cm  RV Major 6.8 cm  TAPSE:   20.9 mm  RV s'     "0.10 m/s      TRICUSPID VALVE/RVSP:         Normal Ranges:  IVC Diam:             2.17 cm      PULMONIC VALVE:          Normal Ranges:  PV Accel Time:  87 msec  (>120ms)  PV Max Alfredito:     0.6 m/s  (0.6-0.9m/s)  PV Max P.4 mmHg  IA Vmax:        1.75 m/s      Pulmonary Veins:  PulmV Gray Alfredito: 56.00 cm/s  PulmV S/D Alfredito:  1.20  PulmV Sys Alfredito:  69.60 cm/s      [id number] Jackeline Davies MD  Electronically signed on 9/10/2024 at 3:42:25 PM        ** Final **       Imaging  CT angio chest/abd pelvis: IMPRESSION:  Large ascending aneurysm up to 6.2 cm. No evidence of acute aortic  dissection.      Cardiac enlargement. Features of mild pulmonary edema.      Hepatic heterogeneity.    Lab Review   No results found for: \"CKTOTAL\", \"CKMB\", \"CKMBINDEX\", \"TROPONINI\"  No results found for: \"WBC\", \"HGB\", \"HCT\", \"MCV\", \"PLT\"  No results found for: \"CHOL\", \"TRIG\", \"HDL\", \"LDLDIRECT\"    Triglycerides   Date/Time Value Ref Range Status   04/10/2024 10:44 AM 55 40 - 150 mg/dL Final   10/20/2022 07:48 AM 63 40 - 150 MG/DL Final   2021 01:21 PM 82 40 - 150 MG/DL Final        Assessment and Plan   David Chatman is a 68 y.o. male with a PMH of HLD,HTN presented to the hospital at the advice of his cardiologist. Found to have an LVEF of 35-40% with moderate AR with CT angio chest showing aneursym of thoracic aorta 6.2cm. Transferred from [facility] to [facility] service L3 for further evaluation. Planned for surgical intervention(date/time TBD).  HF consulted to assist in the management of HFrEF.    #New onset HFrEF(NYHA Class II-III/AHA Stage C). Cardiomyopathy likely related to moderate AR vs uncontrolled long standing HTN(patient on 5 different anti hypertensive agents at home) vs electrical dyssynchrony EKG noted with LBBB  Admit proBNP 1015, trop 18-->16-->21, EKG LBBB.  TTE 09/10 showed an LVEF of 35-40%, moderate AR, moderate MR, normal RV systolic function and aneurysm of ascending aorta. Nuclear perfusion scan negative for inducible " ischemia.  [facility] 09/11 at OSH with no significant CAD, although the RCA difficult to engage. MRI official read pending, but based on reviewing with Dr. Lynch, no evidence of ischemia.  #Thoracic aorta aneurysm 6.2 cm. No dissection on CT angio chest. Moderate AR noted in the setting of Thoracic aortic aneurysm    - Diuresis: s/p IV lasix 20mg with appropriate response.   - GDMT; patient on home ACE for HTN control which has been held in anticipation of surgery. Unclear regarding the surgical intervention timings. Likely early next week per primary team. In light of this, would hold off on initiating GDMT(BB,ACE/ARB/ARNI, MRA, SGLT2i). Of note, epic reports metoprolol succinate but patient not on home beta blocker(med rec done at bedside).  -goal SBP<130.  -currently on clonidine 0.1mg q8h, lopressor 25mg BID, hydralazine 10mg q4h PRN  - Inotropes: not indicated  - Mechanical support:  N/A  - AICD/CRT-D/Lifevest: none currently. Potentially a candidate in the future for CRT given HFrEF EF<35%, sinus rhythm, QRS>150 and LBBB.  - Strict I/Os, Daily Na < 2g daily, daily weights    For any questions or concerns, feel free to reach out via [employer] chat or page at [phone].         SIGNATURE: Daisy Han MD PATIENT NAME: David Chatman   DATE/TIME: September 12, 2024 9:08 AM MRN: [id number]     This plan was discussed with Dr. Lynch, HF attending.        Daisy Han MD  HF fellow  PGY-4

## 2024-09-12 NOTE — CARE PLAN
The patient's goals for the shift include      The clinical goals for the shift include Patient will remain safe throughout the shift.    Patient will be hemodynamically stable

## 2024-09-12 NOTE — PROGRESS NOTES
"Pharmacy Medication History Review    David Chatman is a 68 y.o. male admitted for Aneurysm of aorta in diseases classified elsewhere (CMS-HCC). Pharmacy reviewed the patient's [id number] medications and allergies for accuracy.    Medications ADDED:  None   Medications CHANGED:  None   Medications REMOVED:   None      See medication list in additional comments          The list below reflects the updated PTA list.        The list below reflects the updated allergy list. Please review each documented allergy for additional clarification and justification.  Allergies  Reviewed by Bere Arthur RN on 9/11/2024   No Known Allergies         Patient accepts M2B at discharge.   Local pharmacy: [facility]    Sources:   Pt interview - moderate historian, cannot recall medications from memory. Prompted with drug name and pt can confirm if he is taking or not.   Dispense hx  OARRS     Additional Comments:  Prior to admission medication list - the PTA list was updated prior to transfer from [facility] [place], operated by [facility]. Below are the medications taken prior to admission at [facility] [place] operated by [facility]. Pt will require new prescriptions at discharge for all medications he will continue. [Comment in brackets indicate changes made while admitted].   Benazepril-hydrochlorothiazide 20-25 mg, oral, Daily   Clonidine 0.1 mg, oral, Q12H [frequency increased to Q8H]  Felodipine ER 5 mg, oral, Daily  Omeprazole 20 mg DR, oral, Daily  Simvastatin 10 mg, oral, Daily   Terazosin 5 mg, oral, Daily [dose increased to 10 mg]       Marco Sky, PharmD  Transitions of Care Pharmacist  09/12/24     Secure Chat preferred   If no response call [phone] or [employer] \"Med Rec\"   "

## 2024-09-12 NOTE — PROGRESS NOTES
Pharmacy Admission Order Reconciliation Review    David Chatman is a 68 y.o. male admitted for Aneurysm of aorta in diseases classified elsewhere (CMS-HCC). Pharmacy reviewed the patient's unreconciled admission medications.    Prior to admission medications that were reviewed and acted on by the pharmacist include:  Clonidine  Flonase  Protonix  Simvastatin  Terazosin  Toprol XL  Losartan  These medications have been reconciled.     Any other unreconcilied medications have been addressed and will be ordered or held by the patient's medical team. Medications addressed by the pharmacist may be added or changed by the patient's medical team at any time.    Ana Rosa Muhammad, PharmD  Transitions of Care Pharmacist  [facility]  Please reach out via Secure Chat for questions

## 2024-09-13 PROBLEM — I35.1 NONRHEUMATIC AORTIC VALVE INSUFFICIENCY: Status: ACTIVE | Noted: 2024-09-13

## 2024-09-13 LAB
ALBUMIN SERPL BCP-MCNC: 4.2 G/DL (ref 3.4–5)
ANION GAP SERPL CALC-SCNC: 15 MMOL/L (ref 10–20)
BUN SERPL-MCNC: 20 MG/DL (ref 6–23)
CALCIUM SERPL-MCNC: 9.4 MG/DL (ref 8.6–10.6)
CHLORIDE SERPL-SCNC: 109 MMOL/L (ref 98–107)
CO2 SERPL-SCNC: 23 MMOL/L (ref 21–32)
CREAT SERPL-MCNC: 0.98 MG/DL (ref 0.5–1.3)
EGFRCR SERPLBLD CKD-EPI 2021: 85 ML/MIN/1.73M*2
ERYTHROCYTE [DISTWIDTH] IN BLOOD BY AUTOMATED COUNT: 13.8 % (ref 11.5–14.5)
GLUCOSE SERPL-MCNC: 86 MG/DL (ref 74–99)
HCT VFR BLD AUTO: 38 % (ref 41–52)
HGB BLD-MCNC: 12.6 G/DL (ref 13.5–17.5)
MAGNESIUM SERPL-MCNC: 2.35 MG/DL (ref 1.6–2.4)
MCH RBC QN AUTO: 28.3 PG (ref 26–34)
MCHC RBC AUTO-ENTMCNC: 33.2 G/DL (ref 32–36)
MCV RBC AUTO: 85 FL (ref 80–100)
NRBC BLD-RTO: 0 /100 WBCS (ref 0–0)
PHOSPHATE SERPL-MCNC: 4 MG/DL (ref 2.5–4.9)
PLATELET # BLD AUTO: 146 X10*3/UL (ref 150–450)
POTASSIUM SERPL-SCNC: 3.9 MMOL/L (ref 3.5–5.3)
RBC # BLD AUTO: 4.45 X10*6/UL (ref 4.5–5.9)
SODIUM SERPL-SCNC: 143 MMOL/L (ref 136–145)
STAPHYLOCOCCUS SPEC CULT: NORMAL
WBC # BLD AUTO: 5.4 X10*3/UL (ref 4.4–11.3)

## 2024-09-13 PROCEDURE — 2500000002 HC RX 250 W HCPCS SELF ADMINISTERED DRUGS (ALT 637 FOR MEDICARE OP, ALT 636 FOR OP/ED): Performed by: PHYSICIAN ASSISTANT

## 2024-09-13 PROCEDURE — 99232 SBSQ HOSP IP/OBS MODERATE 35: CPT | Performed by: NURSE PRACTITIONER

## 2024-09-13 PROCEDURE — 2500000001 HC RX 250 WO HCPCS SELF ADMINISTERED DRUGS (ALT 637 FOR MEDICARE OP): Performed by: PHYSICIAN ASSISTANT

## 2024-09-13 PROCEDURE — 2500000004 HC RX 250 GENERAL PHARMACY W/ HCPCS (ALT 636 FOR OP/ED)

## 2024-09-13 PROCEDURE — 99231 SBSQ HOSP IP/OBS SF/LOW 25: CPT | Performed by: INTERNAL MEDICINE

## 2024-09-13 PROCEDURE — 83735 ASSAY OF MAGNESIUM: CPT | Performed by: NURSE PRACTITIONER

## 2024-09-13 PROCEDURE — 36415 COLL VENOUS BLD VENIPUNCTURE: CPT | Performed by: NURSE PRACTITIONER

## 2024-09-13 PROCEDURE — 80069 RENAL FUNCTION PANEL: CPT | Performed by: NURSE PRACTITIONER

## 2024-09-13 PROCEDURE — 2500000004 HC RX 250 GENERAL PHARMACY W/ HCPCS (ALT 636 FOR OP/ED): Performed by: NURSE PRACTITIONER

## 2024-09-13 PROCEDURE — 85027 COMPLETE CBC AUTOMATED: CPT | Performed by: NURSE PRACTITIONER

## 2024-09-13 PROCEDURE — 1200000002 HC GENERAL ROOM WITH TELEMETRY DAILY

## 2024-09-13 RX ORDER — HYDRALAZINE HYDROCHLORIDE 20 MG/ML
10 INJECTION INTRAMUSCULAR; INTRAVENOUS EVERY 4 HOURS PRN
Status: DISCONTINUED | OUTPATIENT
Start: 2024-09-13 | End: 2024-09-19

## 2024-09-13 RX ORDER — SIMVASTATIN 10 MG/1
10 TABLET, FILM COATED ORAL DAILY
Qty: 90 TABLET | Refills: 1 | Status: SHIPPED | OUTPATIENT
Start: 2024-09-13

## 2024-09-13 RX ORDER — TERAZOSIN 5 MG/1
5 CAPSULE ORAL DAILY
Qty: 90 CAPSULE | Refills: 1 | Status: SHIPPED | OUTPATIENT
Start: 2024-09-13 | End: 2025-03-12

## 2024-09-13 RX ADMIN — MUPIROCIN 0.5 APPLICATION: 20 OINTMENT TOPICAL at 23:08

## 2024-09-13 RX ADMIN — MUPIROCIN 0.5 APPLICATION: 20 OINTMENT TOPICAL at 08:05

## 2024-09-13 RX ADMIN — PANTOPRAZOLE SODIUM 40 MG: 40 TABLET, DELAYED RELEASE ORAL at 05:35

## 2024-09-13 RX ADMIN — HEPARIN SODIUM 5000 UNITS: 5000 INJECTION, SOLUTION INTRAVENOUS; SUBCUTANEOUS at 01:11

## 2024-09-13 RX ADMIN — TERAZOSIN HYDROCHLORIDE 10 MG: 5 CAPSULE ORAL at 21:13

## 2024-09-13 RX ADMIN — CLONIDINE HYDROCHLORIDE 0.1 MG: 0.1 TABLET ORAL at 13:45

## 2024-09-13 RX ADMIN — METOPROLOL TARTRATE 25 MG: 25 TABLET, FILM COATED ORAL at 21:13

## 2024-09-13 RX ADMIN — HYDRALAZINE HYDROCHLORIDE 10 MG: 20 INJECTION INTRAMUSCULAR; INTRAVENOUS at 23:07

## 2024-09-13 RX ADMIN — METOPROLOL TARTRATE 25 MG: 25 TABLET, FILM COATED ORAL at 08:05

## 2024-09-13 RX ADMIN — HEPARIN SODIUM 5000 UNITS: 5000 INJECTION, SOLUTION INTRAVENOUS; SUBCUTANEOUS at 17:25

## 2024-09-13 RX ADMIN — CLONIDINE HYDROCHLORIDE 0.1 MG: 0.1 TABLET ORAL at 21:13

## 2024-09-13 RX ADMIN — CLONIDINE HYDROCHLORIDE 0.1 MG: 0.1 TABLET ORAL at 05:35

## 2024-09-13 RX ADMIN — SIMVASTATIN 10 MG: 10 TABLET, FILM COATED ORAL at 21:13

## 2024-09-13 RX ADMIN — HEPARIN SODIUM 5000 UNITS: 5000 INJECTION, SOLUTION INTRAVENOUS; SUBCUTANEOUS at 08:05

## 2024-09-13 ASSESSMENT — COGNITIVE AND FUNCTIONAL STATUS - GENERAL
DAILY ACTIVITIY SCORE: 24
MOBILITY SCORE: 24

## 2024-09-13 ASSESSMENT — PAIN SCALES - GENERAL
PAINLEVEL_OUTOF10: 0 - NO PAIN
PAINLEVEL_OUTOF10: 0 - NO PAIN

## 2024-09-13 ASSESSMENT — PAIN - FUNCTIONAL ASSESSMENT
PAIN_FUNCTIONAL_ASSESSMENT: 0-10
PAIN_FUNCTIONAL_ASSESSMENT: 0-10

## 2024-09-13 NOTE — HOSPITAL COURSE
David Chatman is a 68 y.o. male presenting with shortness of breath. He presented to his cardiologist as an outpt for these complaints. Echo showed an EF of 35-40% and ascending aneurysm 6.2. He was directed to the ED. BNP 1015, troponins, 21,16,15. CT chest revealed large ascending aneurysm up to 6.2 cm. No evidence of acute aortic dissection. He was given IV lasix with relief of symptoms and admitted for further evaluation. He is currently planned for cardiac catheterization. Asymptomatic. The pt reports his blood pressures are under control currently. CT surgery consulted for aneurysm evaluation at OSH and he was transferred to [facility] for further care.    9/23/2024 Operation Procedures: by Trino Perdomo  #1 standard median sternotomy  #2 innominate trunk arterial cannulation  #3 ascending aortic replacement and hemiarch replacement with a 32 Gelweave graft  #4 aortic root replacement and aortic valve replacement: Bentall procedure with a 29 mm connect valve conduit  #5 left atrial appendage closure with a 35 mm AtriCure clip  #6 left femoral arterial line placement    CTICU course: weaned off drips; afib RVR req amio    Transferred to T3 SDU on 9/26  ========================  Floor Course:  - Preop weight: Weight: 111 kg (245 lb 1.6 oz)kg, discharge wt:   Vitals:    10/01/24 0551   Weight: 114 kg (251 lb 4.8 oz)   Kg  - HF consult followed; unable to start Entresto d/t renal function; no SGLT2i >$500 so not started; will f/up with HF outpt  - day of discharge Cr 1.74 (baseline 1.08-1.2)  - On ASA, statin, long acting BB, hydralazine, eplerenone, torsemide by discharge  - DC'd terazosin given contraindication in heart failure  - LifeVest obtained and delivered to patient before discharge  - Epicardial wires CUT on 10/3  - telemetry at discharge : SR 1st degree LBBB  - had intermittent afib, rate controlled; on BB and amio; no AC at this time per Dr Perdomo; pt to f/up with cardiology and Dr Perdomo  - continued home  CPAP  - 2v CXR done 9/29  - Postop echo done 9/25 - EF 22%, reduced RV function, no AI  - Postop CTA done 9/30 - reviewed by Dr Perdomo  - Cardiac rehab referral was placed  - PT recs home and low intensity therapy  - Anticipate discharge to home with homecare    On day of discharge, vital signs were stable and no acute distress was noted. All questions were answered. After VS and labs were reviewed it was determined the patient was stable for discharge.   Discharged with home care RN and PT on Thursday, October 3, 2024; POD#10    =====================================================  Hospital day of discharge management- spent >30 minutes coordinating the discharge and counseling/educating patient and family regarding discharge instructions.  ====================================================  HISTORY:  Past Medical History:  Diagnosis Date  · Gout    · Hyperlipidemia    · Hypertension    · Paroxysmal atrial fibrillation (Multi) 08/24/2023     Surgical History  Past Surgical History:  Procedure Laterality Date  · SINUS SURGERY         Social History  Tobacco Use  · Smoking status: Never  · Smokeless tobacco: Never  Vaping Use  · Vaping status: Never Used  Substance Use Topics  · Alcohol use: Not Currently      Alcohol/week: 2.0 standard drinks of alcohol      Types: 2 Cans of beer per week      Comment: once a week  · Drug use: Never     Allergies:  Patient has no known allergies.     Prior to Admission medications   Below are the medications taken prior to admission at [facility]. Pt will require new prescriptions at discharge for all medications he will continue. [Comment in brackets indicate changes made while admitted].   Benazepril-hydrochlorothiazide 20-25 mg, oral, Daily   Clonidine 0.1 mg, oral, Q12H [frequency increased to Q8H]  Felodipine ER 5 mg, oral, Daily  Omeprazole 20 mg DR, oral, Daily  Simvastatin 10 mg, oral, Daily   Terazosin 5 mg, oral, Daily [dose increased to 10 mg]    ======================================================================

## 2024-09-13 NOTE — PROGRESS NOTES
CARDIAC SURGERY DAILY PROGRESS NOTE    David Chatman is a 68 y.o. male presenting with shortness of breath. He presented to his cardiologist as an outpt for these complaints. Echo showed an EF of 35-40% and ascending aneurysm 6.2. He was directed to the ED. BNP 1015, troponins, 21,16,15. CT chest revealed large ascending aneurysm up to 6.2 cm. No evidence of acute aortic dissection. He was given IV lasix with relief of symptoms and admitted for further evaluation. He is currently planned for cardiac catheterization. Asymptomatic. The pt reports his blood pressures are under control currently. CT surgery consulted for aneurysm evaluation at OSH and he was transferred to [facility] [place] for further care.        Interval History:   Overnight uneventful    SUBJECTIVE:  Denies complaints      Objective   /64 (BP Location: Left arm, Patient Position: Lying)   Pulse 71   Temp 36.5 °C (97.7 °F) (Temporal)   Resp 18   SpO2 95%   0-10 (Numeric) Pain Score: 0 - No pain   3 Day Weight Change: Unable to Calculate    Intake and Output    Intake/Output Summary (Last 24 hours) at 9/13/2024 1132  Last data filed at 9/13/2024 0537  Gross per 24 hour   Intake 480 ml   Output 500 ml   Net -20 ml       Physical Exam  Physical Exam  Vitals and nursing note reviewed.   HENT:      Head: Normocephalic.      Mouth/Throat:      Mouth: Mucous membranes are moist.   Eyes:      Conjunctiva/sclera: Conjunctivae normal.   Cardiovascular:      Rate and Rhythm: Normal rate and regular rhythm.      Pulses: Normal pulses.      Heart sounds: Normal heart sounds.   Pulmonary:      Effort: Pulmonary effort is normal.      Breath sounds: Normal breath sounds.   Abdominal:      Palpations: Abdomen is soft.   Musculoskeletal:      Cervical back: Neck supple.      Right lower leg: No edema.      Left lower leg: No edema.   Skin:     General: Skin is warm and dry.   Neurological:      General: No focal deficit present.      Mental Status: He is alert and  oriented to person, place, and time.   Psychiatric:         Mood and Affect: Mood normal.         Behavior: Behavior normal.           Medications  Scheduled medications  cloNIDine, 0.1 mg, oral, q8h DESHAWN  heparin (porcine), 5,000 Units, subcutaneous, q8h  metoprolol tartrate, 25 mg, oral, BID  mupirocin, 0.5 Application, Topical, BID  pantoprazole, 40 mg, oral, Daily before breakfast  simvastatin, 10 mg, oral, Nightly  terazosin, 10 mg, oral, Nightly    Continuous medications   PRN medications  PRN medications: hydrALAZINE    Labs  Results for orders placed or performed during the hospital encounter of 09/11/24 (from the past 24 hour(s))   CBC   Result Value Ref Range    WBC 5.4 4.4 - 11.3 x10*3/uL    nRBC 0.0 0.0 - 0.0 /100 WBCs    RBC 4.45 (L) 4.50 - 5.90 x10*6/uL    Hemoglobin 12.6 (L) 13.5 - 17.5 g/dL    Hematocrit 38.0 (L) 41.0 - 52.0 %    MCV 85 80 - 100 fL    MCH 28.3 26.0 - 34.0 pg    MCHC 33.2 32.0 - 36.0 g/dL    RDW 13.8 11.5 - 14.5 %    Platelets 146 (L) 150 - 450 x10*3/uL   Magnesium   Result Value Ref Range    Magnesium 2.35 1.60 - 2.40 mg/dL   Renal Function Panel   Result Value Ref Range    Glucose 86 74 - 99 mg/dL    Sodium 143 136 - 145 mmol/L    Potassium 3.9 3.5 - 5.3 mmol/L    Chloride 109 (H) 98 - 107 mmol/L    Bicarbonate 23 21 - 32 mmol/L    Anion Gap 15 10 - 20 mmol/L    Urea Nitrogen 20 6 - 23 mg/dL    Creatinine 0.98 0.50 - 1.30 mg/dL    eGFR 85 >60 mL/min/1.73m*2    Calcium 9.4 8.6 - 10.6 mg/dL    Phosphorus 4.0 2.5 - 4.9 mg/dL    Albumin 4.2 3.4 - 5.0 g/dL               IMAGING/ DIAGNOSTIC TESTING:  I have personally reviewed the following test result(s):      Transthoracic Echo (TTE) Complete With 3D And Strain 09/10/2024  CONCLUSIONS:  1. The left ventricular systolic function is moderately decreased, with a visually estimated ejection fraction of 35-40%.  2. There is global hypokinesis of the left ventricle with minor regional variations.  3. Left ventricular cavity size is severely  dilated.  4. There is normal right ventricular global systolic function.  5. The left atrium is moderately dilated.  6. The right atrium is moderately dilated.  7. Moderate mitral valve regurgitation.  8. Trace tricuspid regurgitation is visualized.  9. Aortic valve stenosis is not present.  10. Likely tricuspid aortic valve but images slightly suboptimal.  11. Moderate aortic valve regurgitation.  12. Aneurysm of the ascending aorta.    Cardiac MRI 9/12/24  IMPRESSION:  1. Dilated LV (EDVi-182 ml/m2) with reduced systolic function.  Quantitative LVEF 34 %.  2. Normal RV size (EDVi-88 ml/m2)and systolic function. Quantitative  RVEF49%.  3. Aneurysmal dilatation of the ascending aorta measuring up 6.4 cm.  Dilated aortic root measuring 4.5 x 1.5 x 4.2 cm.  4. Moderate aortic regurgitation. Aortic regurgitant fraction is 36%.  5. Small focal transmural (%) LGE/scar involving the apical  lateral wall. Finding may represent prior embolic event versus prior  myocarditis.  6. Nonspecific replacement fibrosis noted at the level of the basal  mid septum.  7. Small bilateral pleural effusion.  8. Small circumferential pericardial effusion.    CT angio chest abdomen pelvis 9/10/24  IMPRESSION:  Large ascending aneurysm up to 6.2 cm. No evidence of acute aortic  dissection.  Cardiac enlargement. Features of mild pulmonary edema.  Hepatic heterogeneity.  Diverticulosis. No diverticulitis.. No free air. No free fluid.          IMPRESSION & PLAN:  Ascending aneurysm up to 6.2 cm. No evidence of acute aortic dissection  - Continue home statin  - ECHO and LHC completed at OSH- only thing may consider is if needs coronary CTA to eval RCA given inability to engage  - Cardiac MRI 9/12  - appreciate consult by heart failure  - Carotid US done 9/12   - Changed to metoprolol succinate to tartrate 25mg BID 9/11/24  - Goal for BP control with home oral agent as well PRN hydralazine goal SBP <140, will add oral if needed to help with  BP control  - Continue Hytrin and clonidine--> will increase cloniding to  0.1mg TID instead of BID  - Will need to discuss further with Dr Perdomo timing of surgery. At this time unclear surgical date, likely mid-week next week    Rhythm: hx paroxysmal afib  - Tele: SR  - Continue BB  - Adjust medications as tolerated    Chronic systolic heart failure with EF 35-40 % on echo done 9/10/24   - appreciate HF recs  Recommendations:  Proceed with cardiac surgery for aortic aneurysm and aortic valve regurgitation  After cardiac surgery will need to optimize GDMT for HFrEF  Depending on what happens with his LV function with time he may need to be considered for CRT, but would not assess this until several weeks/months following cardiac surgery and introduction of GDMT    Hypertension: home meds: Benazepril-hydrochlorothiazide 20-25 mg, oral, Daily, Clonidine 0.1 mg, oral, Q12H, Felodipine ER 5 mg, oral, Daily, Terazosin 5 mg, oral, Daily   Systolic (24hrs), Av , Min:121 , Max:149   - continue BB  - additional antihypertensives as needed     Hyperlipidemia: home Simvastatin 10 mg, oral, Daily   Lab Results   Component Value Date    CHOL 157 04/10/2024    LDLCALC 93 04/10/2024    HDL 53.0 04/10/2024    TRIG 55 04/10/2024   - continue statin  - follow up lipid panel with PCP/ cardiologist for ongoing lipid management    VTE Prophylaxis: SCDs/TEDs, ambulation, SQ heparin  Code Status: Full Code    Dispo  - PT/OT recs to be determined after surgery  - will go to CTICU postop  - Will continue to assess discharge needs postoperatively         SERGIO Avalos-CNP  Cardiac Surgery [facility]  [facility]  Team Phone [phone]    2024  11:32 AM

## 2024-09-13 NOTE — PROGRESS NOTES
Advanced Heart Failure Progress Note   Consulting Team: CT surgery  Primary Cardiologist: Dr. Carter  Reason for Consult: HFrEF       Subjective   No acute complaints  Denies shortness of breath  Objective   Physical Exam  Vitals:    09/13/24 1340   BP: 130/74   Pulse: 70   Resp: 18   Temp: 36.6 °C (97.9 °F)   SpO2: 94%       GEN:  NAD.  CV: RRR, no m/r/g.   LUNGS: CTA B/L  ABD: Soft, NT/ND, NBS  SKIN: Warm, well perfused. No skin rashes or abnormal lesions.   EXT: No clubbing, cyanosis, or edema.  NEURO: AAOX3, not following commands       I have personally reviewed the following images and laboratory findings:  ECG:  sinus rhythm, LAD, LBBB     Results for orders placed during the hospital encounter of 09/10/24    Transthoracic Echo Complete    Narrative  [facility]  [address] [place] [address], [place] [id number]  Phone [phone]    TRANSTHORACIC ECHOCARDIOGRAM REPORT    Patient Name:      NAN Norman Physician:    [id number] Jackeline Davies MD  Study Date:        9/10/2024            Ordering Provider:    [id number] AAMIR CARTER  MRN/PID:           [id number]             Fellow:  Accession#:        [id number]         Nurse:  Date of Birth/Age: 1955 / 68 years Sonographer:          Ai PELLETIER  Gender:            M                    Additional Staff:  Height:            185.42 cm            Admit Date:  Weight:            117.03 kg            Admission Status:     Outpatient  BSA / BMI:         2.40 m2 / 34.04      Department Location:  kg/m2  Blood Pressure: 125 /72 mmHg    Study Type:    TRANSTHORACIC ECHO (TTE) COMPLETE  Diagnosis/ICD: Shortness of breath-R06.02; Essential (primary) hypertension-I10  Indication:    Rule out CAD HLD HTN Shortness of Breath  CPT Codes:     Echo Complete w Full [id number]    Patient History:  Pertinent History: HTN HLD PAF.    Study Detail: The following Echo studies were performed: 2D, M-Mode, Doppler,  color flow, Strain and  3D.      PHYSICIAN INTERPRETATION:  Left Ventricle: The left ventricular systolic function is moderately decreased, with a visually estimated ejection fraction of 35-40%. There is global hypokinesis of the left ventricle with minor regional variations. The left ventricular cavity size is severely dilated. Left Ventricular Global Longitudinal Strain - -10.5 %. Spectral Doppler shows a normal pattern of left ventricular diastolic filling.  Left Atrium: The left atrium is moderately dilated.  Right Ventricle: The right ventricle is normal in size. There is normal right ventricular global systolic function.  Right Atrium: The right atrium is moderately dilated.  Aortic Valve: The aortic valve was not well visualized. There is no aortic valve thickening. There is no evidence of aortic valve stenosis.  The aortic valve dimensionless index is 1.03. There is moderate aortic valve regurgitation. The peak instantaneous gradient of the aortic valve is 16.5 mmHg. The mean gradient of the aortic valve is 8.0 mmHg. Likely tricuspid aortic valve but images slightly suboptimal.  Mitral Valve: The mitral valve is abnormal. There is moderate mitral valve regurgitation which is centrally directed.  Tricuspid Valve: The tricuspid valve is structurally normal. There is trace tricuspid regurgitation. The right ventricular systolic pressure is unable to be estimated.  Pulmonic Valve: The pulmonic valve is structurally normal. There is physiologic pulmonic valve regurgitation.  Pericardium: There is a trivial pericardial effusion.  Aorta: The aortic root is normal. There is an aneurysm involving the ascending aorta. Severe ascending aortic aneurysm of 6.2 cm at the largest diameter.  Systemic Veins: The inferior vena cava appears to be of normal size, IVC inspiratory collapse greater than 50%.      CONCLUSIONS:  1. The left ventricular systolic function is moderately decreased, with a visually estimated ejection fraction of 35-40%.  2.  There is global hypokinesis of the left ventricle with minor regional variations.  3. Left ventricular cavity size is severely dilated.  4. There is normal right ventricular global systolic function.  5. The left atrium is moderately dilated.  6. The right atrium is moderately dilated.  7. Moderate mitral valve regurgitation.  8. Trace tricuspid regurgitation is visualized.  9. Aortic valve stenosis is not present.  10. Likely tricuspid aortic valve but images slightly suboptimal.  11. Moderate aortic valve regurgitation.  12. Aneurysm of the ascending aorta.    QUANTITATIVE DATA SUMMARY:    2D MEASUREMENTS:            Normal Ranges:  LAs:             5.00 cm    (2.7-4.0cm)  IVSd:            1.03 cm    (0.6-1.1cm)  LVPWd:           1.19 cm    (0.6-1.1cm)  LVIDd:           7.16 cm    (3.9-5.9cm)  LVIDs:           6.41 cm  LV Mass Index:   159.3 g/m2  LV % FS          10.5 %      LA VOLUME:                    Normal Ranges:  LA Vol A4C:        164.4 ml   (22+/-6mL/m2)  LA Vol A2C:        159.8 ml  LA Vol BP:         163.9 ml  LA Vol Index A4C:  68.5ml/m2  LA Vol Index A2C:  66.7 ml/m2  LA Vol Index BP:   68.3 ml/m2  LA Area A4C:       36.5 cm2  LA Area A2C:       35.6 cm2  LA Major Axis A4C: 6.9 cm  LA Major Axis A2C: 6.7 cm  LA Volume Index:   64.6 ml/m2  LA Vol A4C:        151.0 ml  LA Vol A2C:        156.0 ml  LA Vol Index BSA:  64.0 ml/m2      RA VOLUME BY A/L METHOD:            Normal Ranges:  RA Vol A4C:              86.5 ml    (8.3-19.5ml)  RA Vol Index A4C:        36.1 ml/m2  RA Area A4C:             25.3 cm2  RA Major Axis A4C:       6.3 cm      M-MODE MEASUREMENTS:         Normal Ranges:  Ao Root:             4.80 cm (2.0-3.7cm)  AoV Exc:             1.50 cm (1.5-2.5cm)  LAs:                 4.50 cm (2.7-4.0cm)      AORTA MEASUREMENTS:         Normal Ranges:  AoV Exc:            1.50 cm (1.5-2.5cm)  Asc Ao, d:          6.20 cm (2.1-3.4cm)      LV SYSTOLIC FUNCTION BY 2D PLANIMETRY (MOD):  Normal  Ranges:  EF-A4C View:                      43 %  (>=55%)  EF-A2C View:                      42 %  EF-Biplane:                       41 %  EF-Visual:                        38 %  EF-3DQ:                           43 %  LV EF Reported:                   38 %  Global Longitudinal Strain (GLS): -10 %      LV DIASTOLIC FUNCTION:            Normal Ranges:  MV Peak E:             0.99 m/s   (0.7-1.2 m/s)  MV Peak A:             0.44 m/s   (0.42-0.7 m/s)  E/A Ratio:             2.27       (1.0-2.2)  MV e'                  0.057 m/s  (>8.0)  MV lateral e'          0.07 m/s  MV medial e'           0.04 m/s  E/e' Ratio:            17.32      (<8.0)  PulmV Sys Alfredito:         69.60 cm/s  PulmV Gray Alfredito:        56.00 cm/s  PulmV S/D Alfredito:         1.20      MITRAL VALVE:           Normal Ranges:  MV Vmax:      1.72 m/s  (<=1.3m/s)  MV peak P.8 mmHg (<5mmHg)  MV mean P.0 mmHg  (<48mmHg)  MV DT:        145 msec  (150-240msec)  MV Annulus:   0.80 cm   (1.8-3.1cm)      MITRAL INSUFFICIENCY:             Normal Ranges:  PISA Radius:          1.1 cm  MR VTI:               170.00 cm  MR Vmax:              487.50 cm/s      AORTIC VALVE:                      Normal Ranges:  AoV Vmax:                2.03 m/s  (<=1.7m/s)  AoV Peak P.5 mmHg (<20mmHg)  AoV Mean P.0 mmHg  (1.7-11.5mmHg)  LVOT Max Alfredito:            1.99 m/s  (<=1.1m/s)  AoV VTI:                 39.20 cm  (18-25cm)  LVOT VTI:                40.30 cm  LVOT Diameter:           2.30 cm   (1.8-2.4cm)  AoV Area, VTI:           4.27 cm2  (2.5-5.5cm2)  AoV Area,Vmax:           4.07 cm2  (2.5-4.5cm2)  AoV Dimensionless Index: 1.03      AORTIC INSUFFICIENCY:  AI Vmax:       4.54 m/s  AI Half-time:  269 msec  AI Decel Rate: 500.50 cm/s2      RIGHT VENTRICLE:  RV Basal 3.48 cm  RV Mid   2.62 cm  RV Major 6.8 cm  TAPSE:   20.9 mm  RV s'    0.10 m/s      TRICUSPID VALVE/RVSP:         Normal Ranges:  IVC Diam:             2.17 cm      PULMONIC VALVE:      "     Normal Ranges:  PV Accel Time:  87 msec  (>120ms)  PV Max Alfredito:     0.6 m/s  (0.6-0.9m/s)  PV Max P.4 mmHg  MI Vmax:        1.75 m/s      Pulmonary Veins:  PulmV Gray Alfredito: 56.00 cm/s  PulmV S/D Alfredito:  1.20  PulmV Sys Alfredito:  69.60 cm/s      [id number] Jackeline Davies MD  Electronically signed on 9/10/2024 at 3:42:25 PM        ** Final **       Imaging  Chest x-ray: None today    Lab Review   No results found for: \"CKTOTAL\", \"CKMB\", \"CKMBINDEX\", \"TROPONINI\"  Lab Results   Component Value Date    WBC 5.4 2024    HGB 12.6 (L) 2024    HCT 38.0 (L) 2024    MCV 85 2024     (L) 2024     No results found for: \"CHOL\", \"TRIG\", \"HDL\", \"LDLDIRECT\"    Triglycerides   Date/Time Value Ref Range Status   04/10/2024 10:44 AM 55 40 - 150 mg/dL Final   10/20/2022 07:48 AM 63 40 - 150 MG/DL Final   2021 01:21 PM 82 40 - 150 MG/DL Final        Assessment and Plan   David Chatman is a 68 y.o. male with a PMH of HLD,HTN presented to the hospital at the advice of his cardiologist. Found to have an LVEF of 35-40% with moderate AR with CT angio chest showing aneursym of thoracic aorta 6.2cm. Transferred from [facility] to CTS service L3 for further evaluation. Planned for surgical intervention(date/time TBD).  HF consulted to assist in the management of HFrEF.     #New onset HFrEF(NYHA Class II-III/AHA Stage C). Cardiomyopathy likely related to moderate AR vs uncontrolled long standing HTN(patient on 5 different anti hypertensive agents at home) vs electrical dyssynchrony EKG noted with LBBB vs prior MI due to coronary embolism per findings on cMRI of \"small focal transmural scar in the apical lateral wall.\"   Admit proBNP 1015, trop 18-->16-->21, EKG LBBB.  TTE 09/10 showed an LVEF of 35-40%, moderate AR, moderate MR, normal RV systolic function and aneurysm of ascending aorta. Nuclear perfusion scan negative for inducible ischemia.  [facility]  at OSH with no significant CAD, although the RCA " difficult to engage.   ysm 6.2 cm. No dissection on CT angio chest. Moderate AR noted in the setting of Thoracic aortic aneurysm  - Diuresis:euvolemic on assessment.  - GDMT; patient on home ACE for HTN control which has been held in anticipation of surgery. Unclear regarding the surgical intervention timings. Likely early next week per primary team. In light of this, would hold off on initiating GDMT(BB,ACE/ARB/ARNI, MRA, SGLT2i). Of note, epic reports metoprolol succinate but patient not on home beta blocker(med rec done at bedside).  -goal SBP<130.  -currently on clonidine 0.1mg q8h, lopressor 25mg BID, hydralazine 10mg q4h PRN  - Inotropes: not indicated  - Mechanical support:  N/A  - AICD/CRT-D/Lifevest: none currently. Potentially a candidate in the future for CRT given HFrEF EF<35%, sinus rhythm, QRS>150 and LBBB.  - Strict I/Os, Daily Na < 2g daily, daily weights    HF will sign off. Please reconsult us after the surgery to assist with GDMT optimization.    Thank you for the opportunity to involve us in the care of this fine individual. This plan was discussed with Dr. Lynch, HF attending. For any questions or concerns, feel free to reach out via [employer] chat or page at [phone].    Daisy Han MD   Heart Failure Fellow  PGY-4      Daisy Han MD

## 2024-09-14 ENCOUNTER — APPOINTMENT (OUTPATIENT)
Dept: CARDIOLOGY | Facility: HOSPITAL | Age: 69
DRG: 219 | End: 2024-09-14
Payer: MEDICARE

## 2024-09-14 LAB
ALBUMIN SERPL BCP-MCNC: 3.8 G/DL (ref 3.4–5)
ANION GAP SERPL CALC-SCNC: 11 MMOL/L (ref 10–20)
BUN SERPL-MCNC: 23 MG/DL (ref 6–23)
CALCIUM SERPL-MCNC: 8.6 MG/DL (ref 8.6–10.6)
CHLORIDE SERPL-SCNC: 111 MMOL/L (ref 98–107)
CO2 SERPL-SCNC: 24 MMOL/L (ref 21–32)
CREAT SERPL-MCNC: 0.99 MG/DL (ref 0.5–1.3)
EGFRCR SERPLBLD CKD-EPI 2021: 84 ML/MIN/1.73M*2
ERYTHROCYTE [DISTWIDTH] IN BLOOD BY AUTOMATED COUNT: 14.1 % (ref 11.5–14.5)
GLUCOSE SERPL-MCNC: 97 MG/DL (ref 74–99)
HCT VFR BLD AUTO: 35.1 % (ref 41–52)
HGB BLD-MCNC: 11.4 G/DL (ref 13.5–17.5)
MAGNESIUM SERPL-MCNC: 2.23 MG/DL (ref 1.6–2.4)
MCH RBC QN AUTO: 28.1 PG (ref 26–34)
MCHC RBC AUTO-ENTMCNC: 32.5 G/DL (ref 32–36)
MCV RBC AUTO: 87 FL (ref 80–100)
NRBC BLD-RTO: 0 /100 WBCS (ref 0–0)
PHOSPHATE SERPL-MCNC: 4.1 MG/DL (ref 2.5–4.9)
PLATELET # BLD AUTO: 116 X10*3/UL (ref 150–450)
POTASSIUM SERPL-SCNC: 3.7 MMOL/L (ref 3.5–5.3)
RBC # BLD AUTO: 4.06 X10*6/UL (ref 4.5–5.9)
SODIUM SERPL-SCNC: 142 MMOL/L (ref 136–145)
WBC # BLD AUTO: 4.1 X10*3/UL (ref 4.4–11.3)

## 2024-09-14 PROCEDURE — 99232 SBSQ HOSP IP/OBS MODERATE 35: CPT

## 2024-09-14 PROCEDURE — 93010 ELECTROCARDIOGRAM REPORT: CPT | Performed by: INTERNAL MEDICINE

## 2024-09-14 PROCEDURE — 2500000001 HC RX 250 WO HCPCS SELF ADMINISTERED DRUGS (ALT 637 FOR MEDICARE OP): Performed by: PHYSICIAN ASSISTANT

## 2024-09-14 PROCEDURE — 1200000002 HC GENERAL ROOM WITH TELEMETRY DAILY

## 2024-09-14 PROCEDURE — 93005 ELECTROCARDIOGRAM TRACING: CPT

## 2024-09-14 PROCEDURE — 36415 COLL VENOUS BLD VENIPUNCTURE: CPT | Performed by: NURSE PRACTITIONER

## 2024-09-14 PROCEDURE — 85027 COMPLETE CBC AUTOMATED: CPT | Performed by: NURSE PRACTITIONER

## 2024-09-14 PROCEDURE — 2500000002 HC RX 250 W HCPCS SELF ADMINISTERED DRUGS (ALT 637 FOR MEDICARE OP, ALT 636 FOR OP/ED): Performed by: PHYSICIAN ASSISTANT

## 2024-09-14 PROCEDURE — 80069 RENAL FUNCTION PANEL: CPT | Performed by: NURSE PRACTITIONER

## 2024-09-14 PROCEDURE — 2500000005 HC RX 250 GENERAL PHARMACY W/O HCPCS

## 2024-09-14 PROCEDURE — 83735 ASSAY OF MAGNESIUM: CPT | Performed by: NURSE PRACTITIONER

## 2024-09-14 PROCEDURE — 2500000004 HC RX 250 GENERAL PHARMACY W/ HCPCS (ALT 636 FOR OP/ED)

## 2024-09-14 PROCEDURE — 2500000004 HC RX 250 GENERAL PHARMACY W/ HCPCS (ALT 636 FOR OP/ED): Performed by: NURSE PRACTITIONER

## 2024-09-14 RX ORDER — METOPROLOL TARTRATE 1 MG/ML
5 INJECTION, SOLUTION INTRAVENOUS ONCE
Status: COMPLETED | OUTPATIENT
Start: 2024-09-14 | End: 2024-09-14

## 2024-09-14 RX ADMIN — METOROPROLOL TARTRATE 5 MG: 5 INJECTION, SOLUTION INTRAVENOUS at 00:58

## 2024-09-14 RX ADMIN — HYDRALAZINE HYDROCHLORIDE 10 MG: 20 INJECTION INTRAMUSCULAR; INTRAVENOUS at 03:45

## 2024-09-14 RX ADMIN — CLONIDINE HYDROCHLORIDE 0.1 MG: 0.1 TABLET ORAL at 06:16

## 2024-09-14 RX ADMIN — HEPARIN SODIUM 5000 UNITS: 5000 INJECTION, SOLUTION INTRAVENOUS; SUBCUTANEOUS at 08:45

## 2024-09-14 RX ADMIN — CLONIDINE HYDROCHLORIDE 0.1 MG: 0.1 TABLET ORAL at 14:17

## 2024-09-14 RX ADMIN — METOPROLOL TARTRATE 25 MG: 25 TABLET, FILM COATED ORAL at 20:14

## 2024-09-14 RX ADMIN — HEPARIN SODIUM 5000 UNITS: 5000 INJECTION, SOLUTION INTRAVENOUS; SUBCUTANEOUS at 15:52

## 2024-09-14 RX ADMIN — CLONIDINE HYDROCHLORIDE 0.1 MG: 0.1 TABLET ORAL at 20:15

## 2024-09-14 RX ADMIN — MUPIROCIN 0.5 APPLICATION: 20 OINTMENT TOPICAL at 08:45

## 2024-09-14 RX ADMIN — HEPARIN SODIUM 5000 UNITS: 5000 INJECTION, SOLUTION INTRAVENOUS; SUBCUTANEOUS at 00:32

## 2024-09-14 RX ADMIN — METOPROLOL TARTRATE 25 MG: 25 TABLET, FILM COATED ORAL at 08:45

## 2024-09-14 RX ADMIN — TERAZOSIN HYDROCHLORIDE 10 MG: 5 CAPSULE ORAL at 20:14

## 2024-09-14 RX ADMIN — HYDRALAZINE HYDROCHLORIDE 10 MG: 20 INJECTION INTRAMUSCULAR; INTRAVENOUS at 14:17

## 2024-09-14 RX ADMIN — MUPIROCIN 0.5 APPLICATION: 20 OINTMENT TOPICAL at 20:15

## 2024-09-14 RX ADMIN — SIMVASTATIN 10 MG: 10 TABLET, FILM COATED ORAL at 20:14

## 2024-09-14 RX ADMIN — PANTOPRAZOLE SODIUM 40 MG: 40 TABLET, DELAYED RELEASE ORAL at 06:16

## 2024-09-14 ASSESSMENT — COGNITIVE AND FUNCTIONAL STATUS - GENERAL
MOBILITY SCORE: 24
DAILY ACTIVITIY SCORE: 24

## 2024-09-14 ASSESSMENT — PAIN SCALES - GENERAL
PAINLEVEL_OUTOF10: 0 - NO PAIN
PAINLEVEL_OUTOF10: 0 - NO PAIN

## 2024-09-14 ASSESSMENT — PAIN - FUNCTIONAL ASSESSMENT
PAIN_FUNCTIONAL_ASSESSMENT: 0-10
PAIN_FUNCTIONAL_ASSESSMENT: 0-10

## 2024-09-14 NOTE — CARE PLAN
Problem: Pain - Adult  Goal: Verbalizes/displays adequate comfort level or baseline comfort level  Outcome: Progressing     Problem: Safety - Adult  Goal: Free from fall injury  Outcome: Progressing     Problem: Discharge Planning  Goal: Discharge to home or other facility with appropriate resources  Outcome: Progressing     Problem: Chronic Conditions and Co-morbidities  Goal: Patient's chronic conditions and co-morbidity symptoms are monitored and maintained or improved  Outcome: Progressing     Problem: Fall/Injury  Goal: Not fall by end of shift  Outcome: Progressing  Goal: Be free from injury by end of the shift  Outcome: Progressing  Goal: Verbalize understanding of personal risk factors for fall in the hospital  Outcome: Progressing  Goal: Verbalize understanding of risk factor reduction measures to prevent injury from fall in the home  Outcome: Progressing  Goal: Use assistive devices by end of the shift  Outcome: Progressing  Goal: Pace activities to prevent fatigue by end of the shift  Outcome: Progressing

## 2024-09-14 NOTE — PROGRESS NOTES
CARDIAC SURGERY DAILY PROGRESS NOTE    David Chatman is a 68 y.o. male presenting with shortness of breath. He presented to his cardiologist as an outpt for these complaints. Echo showed an EF of 35-40% and ascending aneurysm 6.2. He was directed to the ED. BNP 1015, troponins, 21,16,15. CT chest revealed large ascending aneurysm up to 6.2 cm. No evidence of acute aortic dissection. He was given IV lasix with relief of symptoms and admitted for further evaluation. He is currently planned for cardiac catheterization. Asymptomatic. The pt reports his blood pressures are under control currently. CT surgery consulted for aneurysm evaluation at OSH and he was transferred to [facility] for further care.        Interval History:   Overnight uneventful    SUBJECTIVE:  Denies complaints. Intermittent mild SOB; stable from yesterday. Denies CP, palpitations, n/v. Last BM yesterday.      Objective   /70   Pulse 64   Temp 36.2 °C (97.2 °F)   Resp 17   SpO2 95%   0-10 (Numeric) Pain Score: 0 - No pain   3 Day Weight Change: Unable to Calculate    Intake and Output    Intake/Output Summary (Last 24 hours) at 9/14/2024 1058  Last data filed at 9/14/2024 1020  Gross per 24 hour   Intake 360 ml   Output 925 ml   Net -565 ml       Physical Exam  Physical Exam  Vitals and nursing note reviewed.   HENT:      Head: Normocephalic.      Mouth/Throat:      Mouth: Mucous membranes are moist.   Eyes:      Conjunctiva/sclera: Conjunctivae normal.   Cardiovascular:      Rate and Rhythm: Normal rate and regular rhythm.      Pulses: Normal pulses.      Heart sounds: Normal heart sounds.      Comments: SR with wide QRS, LBBB  Normotensive (goal SBP < 130 pre-op)  Pulmonary:      Effort: Pulmonary effort is normal.      Breath sounds: Normal breath sounds.   Abdominal:      General: Abdomen is flat. There is distension.      Palpations: Abdomen is soft.   Musculoskeletal:      Cervical back: Neck supple.      Right lower leg: No edema.       Left lower leg: No edema.   Skin:     General: Skin is warm and dry.   Neurological:      General: No focal deficit present.      Mental Status: He is alert and oriented to person, place, and time. Mental status is at baseline.   Psychiatric:         Mood and Affect: Mood normal.         Behavior: Behavior normal.         Thought Content: Thought content normal.         Judgment: Judgment normal.           Medications  Scheduled medications  cloNIDine, 0.1 mg, oral, q8h DESHAWN  heparin (porcine), 5,000 Units, subcutaneous, q8h  metoprolol tartrate, 25 mg, oral, BID  mupirocin, 0.5 Application, Topical, BID  pantoprazole, 40 mg, oral, Daily before breakfast  simvastatin, 10 mg, oral, Nightly  terazosin, 10 mg, oral, Nightly    Continuous medications   PRN medications  PRN medications: hydrALAZINE    Labs  Results for orders placed or performed during the hospital encounter of 09/11/24 (from the past 24 hour(s))   Renal Function Panel   Result Value Ref Range    Glucose 97 74 - 99 mg/dL    Sodium 142 136 - 145 mmol/L    Potassium 3.7 3.5 - 5.3 mmol/L    Chloride 111 (H) 98 - 107 mmol/L    Bicarbonate 24 21 - 32 mmol/L    Anion Gap 11 10 - 20 mmol/L    Urea Nitrogen 23 6 - 23 mg/dL    Creatinine 0.99 0.50 - 1.30 mg/dL    eGFR 84 >60 mL/min/1.73m*2    Calcium 8.6 8.6 - 10.6 mg/dL    Phosphorus 4.1 2.5 - 4.9 mg/dL    Albumin 3.8 3.4 - 5.0 g/dL   CBC   Result Value Ref Range    WBC 4.1 (L) 4.4 - 11.3 x10*3/uL    nRBC 0.0 0.0 - 0.0 /100 WBCs    RBC 4.06 (L) 4.50 - 5.90 x10*6/uL    Hemoglobin 11.4 (L) 13.5 - 17.5 g/dL    Hematocrit 35.1 (L) 41.0 - 52.0 %    MCV 87 80 - 100 fL    MCH 28.1 26.0 - 34.0 pg    MCHC 32.5 32.0 - 36.0 g/dL    RDW 14.1 11.5 - 14.5 %    Platelets 116 (L) 150 - 450 x10*3/uL   Magnesium   Result Value Ref Range    Magnesium 2.23 1.60 - 2.40 mg/dL               IMAGING/ DIAGNOSTIC TESTING:  I have personally reviewed the following test result(s):      Transthoracic Echo (TTE) Complete With 3D And Strain  09/10/2024  CONCLUSIONS:  1. The left ventricular systolic function is moderately decreased, with a visually estimated ejection fraction of 35-40%.  2. There is global hypokinesis of the left ventricle with minor regional variations.  3. Left ventricular cavity size is severely dilated.  4. There is normal right ventricular global systolic function.  5. The left atrium is moderately dilated.  6. The right atrium is moderately dilated.  7. Moderate mitral valve regurgitation.  8. Trace tricuspid regurgitation is visualized.  9. Aortic valve stenosis is not present.  10. Likely tricuspid aortic valve but images slightly suboptimal.  11. Moderate aortic valve regurgitation.  12. Aneurysm of the ascending aorta.    Cardiac MRI 9/12/24  IMPRESSION:  1. Dilated LV (EDVi-182 ml/m2) with reduced systolic function.  Quantitative LVEF 34 %.  2. Normal RV size (EDVi-88 ml/m2)and systolic function. Quantitative  RVEF49%.  3. Aneurysmal dilatation of the ascending aorta measuring up 6.4 cm.  Dilated aortic root measuring 4.5 x 1.5 x 4.2 cm.  4. Moderate aortic regurgitation. Aortic regurgitant fraction is 36%.  5. Small focal transmural (%) LGE/scar involving the apical  lateral wall. Finding may represent prior embolic event versus prior  myocarditis.  6. Nonspecific replacement fibrosis noted at the level of the basal  mid septum.  7. Small bilateral pleural effusion.  8. Small circumferential pericardial effusion.    CT angio chest abdomen pelvis 9/10/24  IMPRESSION:  Large ascending aneurysm up to 6.2 cm. No evidence of acute aortic  dissection.  Cardiac enlargement. Features of mild pulmonary edema.  Hepatic heterogeneity.  Diverticulosis. No diverticulitis.. No free air. No free fluid.          IMPRESSION & PLAN:  Ascending aneurysm up to 6.2 cm. No evidence of acute aortic dissection  - Continue home statin  - ECHO and LHC completed at OSH- only thing may consider is if needs coronary CTA to eval RCA given inability  to engage  - Cardiac MRI   - appreciate consult by heart failure  - Carotid US done    - Changed to metoprolol succinate to tartrate 25mg BID 24  - Goal for BP control with home oral agent as well PRN hydralazine goal SBP <140, will add oral if needed to help with BP control  - Continue Hytrin and clonidine--> will increase cloniding to  0.1mg TID instead of BID  - Will need to discuss further with Dr Perdomo timing of surgery. At this time unclear surgical date, likely mid-week next week    Rhythm: hx paroxysmal afib  - Tele: SR  - Continue BB  - Adjust medications as tolerated    Chronic systolic heart failure with EF 35-40 % on echo done 9/10/24   - appreciate HF recs  Recommendations:  Proceed with cardiac surgery for aortic aneurysm and aortic valve regurgitation  After cardiac surgery will need to optimize GDMT for HFrEF  Depending on what happens with his LV function with time he may need to be considered for CRT, but would not assess this until several weeks/months following cardiac surgery and introduction of GDMT    Hypertension: home meds: Benazepril-hydrochlorothiazide 20-25 mg, oral, Daily, Clonidine 0.1 mg, oral, Q12H, Felodipine ER 5 mg, oral, Daily, Terazosin 5 mg, oral, Daily   Systolic (24hrs), Av , Min:124 , Max:138   - continue BB  - additional antihypertensives as needed     Hyperlipidemia: home Simvastatin 10 mg, oral, Daily   Lab Results   Component Value Date    CHOL 157 04/10/2024    LDLCALC 93 04/10/2024    HDL 53.0 04/10/2024    TRIG 55 04/10/2024   - continue statin  - follow up lipid panel with PCP/ cardiologist for ongoing lipid management    VTE Prophylaxis: SCDs/TEDs, ambulation, SQ heparin  Code Status: Full Code    Dispo  - PT/OT recs to be determined after surgery  - will go to CTICU postop  - Will continue to assess discharge needs postoperatively         SERGIO James-CNP  Cardiac Surgery [facility]  [facility]  Team Phone  [phone]    9/14/2024  10:58 AM

## 2024-09-14 NOTE — CARE PLAN
The patient's goals for the shift include    Pt will remain HDS during shift and BP will be controlled below 140 systolic with medications

## 2024-09-15 VITALS
SYSTOLIC BLOOD PRESSURE: 132 MMHG | DIASTOLIC BLOOD PRESSURE: 60 MMHG | HEART RATE: 76 BPM | OXYGEN SATURATION: 94 % | RESPIRATION RATE: 18 BRPM | TEMPERATURE: 98.1 F

## 2024-09-15 LAB
ALBUMIN SERPL BCP-MCNC: 3.9 G/DL (ref 3.4–5)
ANION GAP SERPL CALC-SCNC: 11 MMOL/L (ref 10–20)
ATRIAL RATE: 75 BPM
BUN SERPL-MCNC: 22 MG/DL (ref 6–23)
CALCIUM SERPL-MCNC: 8.8 MG/DL (ref 8.6–10.6)
CHLORIDE SERPL-SCNC: 112 MMOL/L (ref 98–107)
CO2 SERPL-SCNC: 24 MMOL/L (ref 21–32)
CREAT SERPL-MCNC: 1.01 MG/DL (ref 0.5–1.3)
EGFRCR SERPLBLD CKD-EPI 2021: 82 ML/MIN/1.73M*2
ERYTHROCYTE [DISTWIDTH] IN BLOOD BY AUTOMATED COUNT: 14.1 % (ref 11.5–14.5)
GLUCOSE SERPL-MCNC: 85 MG/DL (ref 74–99)
HCT VFR BLD AUTO: 36.2 % (ref 41–52)
HGB BLD-MCNC: 11.6 G/DL (ref 13.5–17.5)
MAGNESIUM SERPL-MCNC: 2.3 MG/DL (ref 1.6–2.4)
MCH RBC QN AUTO: 27.7 PG (ref 26–34)
MCHC RBC AUTO-ENTMCNC: 32 G/DL (ref 32–36)
MCV RBC AUTO: 86 FL (ref 80–100)
NRBC BLD-RTO: 0 /100 WBCS (ref 0–0)
P AXIS: -1 DEGREES
P OFFSET: 169 MS
P ONSET: 106 MS
PHOSPHATE SERPL-MCNC: 4.2 MG/DL (ref 2.5–4.9)
PLATELET # BLD AUTO: 122 X10*3/UL (ref 150–450)
POTASSIUM SERPL-SCNC: 4 MMOL/L (ref 3.5–5.3)
PR INTERVAL: 204 MS
Q ONSET: 208 MS
QRS COUNT: 13 BEATS
QRS DURATION: 166 MS
QT INTERVAL: 422 MS
QTC CALCULATION(BAZETT): 471 MS
QTC FREDERICIA: 454 MS
R AXIS: -47 DEGREES
RBC # BLD AUTO: 4.19 X10*6/UL (ref 4.5–5.9)
SODIUM SERPL-SCNC: 143 MMOL/L (ref 136–145)
T AXIS: 119 DEGREES
T OFFSET: 419 MS
VENTRICULAR RATE: 75 BPM
WBC # BLD AUTO: 4.7 X10*3/UL (ref 4.4–11.3)

## 2024-09-15 PROCEDURE — 80069 RENAL FUNCTION PANEL: CPT | Performed by: NURSE PRACTITIONER

## 2024-09-15 PROCEDURE — 85027 COMPLETE CBC AUTOMATED: CPT | Performed by: NURSE PRACTITIONER

## 2024-09-15 PROCEDURE — 2500000001 HC RX 250 WO HCPCS SELF ADMINISTERED DRUGS (ALT 637 FOR MEDICARE OP): Performed by: PHYSICIAN ASSISTANT

## 2024-09-15 PROCEDURE — 36415 COLL VENOUS BLD VENIPUNCTURE: CPT | Performed by: NURSE PRACTITIONER

## 2024-09-15 PROCEDURE — 2500000004 HC RX 250 GENERAL PHARMACY W/ HCPCS (ALT 636 FOR OP/ED)

## 2024-09-15 PROCEDURE — 2500000004 HC RX 250 GENERAL PHARMACY W/ HCPCS (ALT 636 FOR OP/ED): Performed by: NURSE PRACTITIONER

## 2024-09-15 PROCEDURE — 1200000002 HC GENERAL ROOM WITH TELEMETRY DAILY

## 2024-09-15 PROCEDURE — 2500000002 HC RX 250 W HCPCS SELF ADMINISTERED DRUGS (ALT 637 FOR MEDICARE OP, ALT 636 FOR OP/ED): Performed by: PHYSICIAN ASSISTANT

## 2024-09-15 PROCEDURE — 83735 ASSAY OF MAGNESIUM: CPT | Performed by: NURSE PRACTITIONER

## 2024-09-15 RX ADMIN — METOPROLOL TARTRATE 25 MG: 25 TABLET, FILM COATED ORAL at 20:35

## 2024-09-15 RX ADMIN — HYDRALAZINE HYDROCHLORIDE 10 MG: 20 INJECTION INTRAMUSCULAR; INTRAVENOUS at 18:02

## 2024-09-15 RX ADMIN — HEPARIN SODIUM 5000 UNITS: 5000 INJECTION, SOLUTION INTRAVENOUS; SUBCUTANEOUS at 16:22

## 2024-09-15 RX ADMIN — CLONIDINE HYDROCHLORIDE 0.1 MG: 0.1 TABLET ORAL at 21:55

## 2024-09-15 RX ADMIN — MUPIROCIN 0.5 APPLICATION: 20 OINTMENT TOPICAL at 20:35

## 2024-09-15 RX ADMIN — PANTOPRAZOLE SODIUM 40 MG: 40 TABLET, DELAYED RELEASE ORAL at 08:17

## 2024-09-15 RX ADMIN — METOPROLOL TARTRATE 25 MG: 25 TABLET, FILM COATED ORAL at 08:17

## 2024-09-15 RX ADMIN — HEPARIN SODIUM 5000 UNITS: 5000 INJECTION, SOLUTION INTRAVENOUS; SUBCUTANEOUS at 00:58

## 2024-09-15 RX ADMIN — CLONIDINE HYDROCHLORIDE 0.1 MG: 0.1 TABLET ORAL at 05:50

## 2024-09-15 RX ADMIN — HYDRALAZINE HYDROCHLORIDE 10 MG: 20 INJECTION INTRAMUSCULAR; INTRAVENOUS at 00:59

## 2024-09-15 RX ADMIN — SIMVASTATIN 10 MG: 10 TABLET, FILM COATED ORAL at 20:35

## 2024-09-15 RX ADMIN — HYDRALAZINE HYDROCHLORIDE 10 MG: 20 INJECTION INTRAMUSCULAR; INTRAVENOUS at 13:55

## 2024-09-15 RX ADMIN — MUPIROCIN 0.5 APPLICATION: 20 OINTMENT TOPICAL at 08:17

## 2024-09-15 RX ADMIN — HEPARIN SODIUM 5000 UNITS: 5000 INJECTION, SOLUTION INTRAVENOUS; SUBCUTANEOUS at 08:17

## 2024-09-15 RX ADMIN — TERAZOSIN HYDROCHLORIDE 10 MG: 5 CAPSULE ORAL at 20:35

## 2024-09-15 RX ADMIN — CLONIDINE HYDROCHLORIDE 0.1 MG: 0.1 TABLET ORAL at 13:55

## 2024-09-15 ASSESSMENT — COGNITIVE AND FUNCTIONAL STATUS - GENERAL
DAILY ACTIVITIY SCORE: 24
MOBILITY SCORE: 24
DAILY ACTIVITIY SCORE: 24
MOBILITY SCORE: 24

## 2024-09-15 ASSESSMENT — PAIN - FUNCTIONAL ASSESSMENT
PAIN_FUNCTIONAL_ASSESSMENT: 0-10
PAIN_FUNCTIONAL_ASSESSMENT: 0-10

## 2024-09-15 ASSESSMENT — PAIN SCALES - GENERAL
PAINLEVEL_OUTOF10: 1
PAINLEVEL_OUTOF10: 0 - NO PAIN

## 2024-09-15 NOTE — PROGRESS NOTES
CARDIAC SURGERY DAILY PROGRESS NOTE    David Chatman is a 68 y.o. male presenting with shortness of breath. He presented to his cardiologist as an outpt for these complaints. Echo showed an EF of 35-40% and ascending aneurysm 6.2. He was directed to the ED. BNP 1015, troponins, 21,16,15. CT chest revealed large ascending aneurysm up to 6.2 cm. No evidence of acute aortic dissection. He was given IV lasix with relief of symptoms and admitted for further evaluation. He is currently planned for cardiac catheterization. Asymptomatic. The pt reports his blood pressures are under control currently. CT surgery consulted for aneurysm evaluation at OSH and [place] was [place] [facility] [place].        Interval History:   Overnight uneventful    SUBJECTIVE:  Denies complaints. Intermittent mild SOB; stable from yesterday. Denies CP, palpitations, n/v. Last BM this morning.       Objective   /69   Pulse 70   Temp 36.1 °C (97 °F)   Resp 17   SpO2 95%   0-10 (Numeric) Pain Score: 0 - No pain   3 Day Weight Change: Unable to Calculate    Intake and Output    Intake/Output Summary (Last 24 hours) at 9/15/2024 1203  Last data filed at 9/15/2024 0954  Gross per 24 hour   Intake 720 ml   Output 750 ml   Net -30 ml       Physical Exam  Physical Exam  Vitals and nursing note reviewed.   HENT:      Head: Normocephalic.      Mouth/Throat:      Mouth: Mucous membranes are moist.   Eyes:      Conjunctiva/sclera: Conjunctivae normal.   Cardiovascular:      Rate and Rhythm: Normal rate and regular rhythm.      Pulses: Normal pulses.      Heart sounds: Normal heart sounds.      Comments: SR with wide QRS, LBBB  Normotensive (goal SBP < 130 pre-op)  Pulmonary:      Effort: Pulmonary effort is normal.      Breath sounds: Normal breath sounds.   Abdominal:      General: Abdomen is flat. There is distension.      Palpations: Abdomen is soft.   Musculoskeletal:      Cervical back: Neck supple.      Right lower leg: Edema present.       Left lower leg: Edema present.      Comments: Trace to +1 edema lower extremities   Skin:     General: Skin is warm and dry.   Neurological:      General: No focal deficit present.      Mental Status: He is alert and oriented to person, place, and time. Mental status is at baseline.   Psychiatric:         Mood and Affect: Mood normal.         Behavior: Behavior normal.           Medications  Scheduled medications  cloNIDine, 0.1 mg, oral, q8h DESHAWN  heparin (porcine), 5,000 Units, subcutaneous, q8h  metoprolol tartrate, 25 mg, oral, BID  mupirocin, 0.5 Application, Topical, BID  pantoprazole, 40 mg, oral, Daily before breakfast  simvastatin, 10 mg, oral, Nightly  terazosin, 10 mg, oral, Nightly    Continuous medications   PRN medications  PRN medications: hydrALAZINE    Labs  Results for orders placed or performed during the hospital encounter of 09/11/24 (from the past 24 hour(s))   Renal Function Panel   Result Value Ref Range    Glucose 85 74 - 99 mg/dL    Sodium 143 136 - 145 mmol/L    Potassium 4.0 3.5 - 5.3 mmol/L    Chloride 112 (H) 98 - 107 mmol/L    Bicarbonate 24 21 - 32 mmol/L    Anion Gap 11 10 - 20 mmol/L    Urea Nitrogen 22 6 - 23 mg/dL    Creatinine 1.01 0.50 - 1.30 mg/dL    eGFR 82 >60 mL/min/1.73m*2    Calcium 8.8 8.6 - 10.6 mg/dL    Phosphorus 4.2 2.5 - 4.9 mg/dL    Albumin 3.9 3.4 - 5.0 g/dL   CBC   Result Value Ref Range    WBC 4.7 4.4 - 11.3 x10*3/uL    nRBC 0.0 0.0 - 0.0 /100 WBCs    RBC 4.19 (L) 4.50 - 5.90 x10*6/uL    Hemoglobin 11.6 (L) 13.5 - 17.5 g/dL    Hematocrit 36.2 (L) 41.0 - 52.0 %    MCV 86 80 - 100 fL    MCH 27.7 26.0 - 34.0 pg    MCHC 32.0 32.0 - 36.0 g/dL    RDW 14.1 11.5 - 14.5 %    Platelets 122 (L) 150 - 450 x10*3/uL   Magnesium   Result Value Ref Range    Magnesium 2.30 1.60 - 2.40 mg/dL               IMAGING/ DIAGNOSTIC TESTING:  I have personally reviewed the following test result(s):      Transthoracic Echo (TTE) Complete With 3D And Strain 09/10/2024  CONCLUSIONS:  1.  The left ventricular systolic function is moderately decreased, with a visually estimated ejection fraction of 35-40%.  2. There is global hypokinesis of the left ventricle with minor regional variations.  3. Left ventricular cavity size is severely dilated.  4. There is normal right ventricular global systolic function.  5. The left atrium is moderately dilated.  6. The right atrium is moderately dilated.  7. Moderate mitral valve regurgitation.  8. Trace tricuspid regurgitation is visualized.  9. Aortic valve stenosis is not present.  10. Likely tricuspid aortic valve but images slightly suboptimal.  11. Moderate aortic valve regurgitation.  12. Aneurysm of the ascending aorta.    Cardiac MRI 9/12/24  IMPRESSION:  1. Dilated LV (EDVi-182 ml/m2) with reduced systolic function.  Quantitative LVEF 34 %.  2. Normal RV size (EDVi-88 ml/m2)and systolic function. Quantitative  RVEF49%.  3. Aneurysmal dilatation of the ascending aorta measuring up 6.4 cm.  Dilated aortic root measuring 4.5 x 1.5 x 4.2 cm.  4. Moderate aortic regurgitation. Aortic regurgitant fraction is 36%.  5. Small focal transmural (%) LGE/scar involving the apical  lateral wall. Finding may represent prior embolic event versus prior  myocarditis.  6. Nonspecific replacement fibrosis noted at the level of the basal  mid septum.  7. Small bilateral pleural effusion.  8. Small circumferential pericardial effusion.    CT angio chest abdomen pelvis 9/10/24  IMPRESSION:  Large ascending aneurysm up to 6.2 cm. No evidence of acute aortic  dissection.  Cardiac enlargement. Features of mild pulmonary edema.  Hepatic heterogeneity.  Diverticulosis. No diverticulitis.. No free air. No free fluid.          IMPRESSION & PLAN:  Ascending aneurysm up to 6.2 cm. No evidence of acute aortic dissection  - Continue home statin  - ECHO and LHC completed at OSH- only thing may consider is if needs coronary CTA to eval RCA given inability to engage  - Cardiac MRI    - appreciate consult by heart failure  - Carotid US done    - Changed to metoprolol succinate to tartrate 25mg BID 24  - Goal for BP control with home oral agent as well PRN hydralazine goal SBP <140, will add oral if needed to help with BP control  - Continue Hytrin and clonidine--> will increase cloniding to  0.1mg TID instead of BID  - Will need to discuss further with Dr Perdomo timing of surgery. At this time unclear surgical date, likely mid-week next week    Rhythm: hx paroxysmal afib  - Tele: SR  - Continue BB  - Adjust medications as tolerated    Chronic systolic heart failure with EF 35-40 % on echo done 9/10/24   - appreciate HF recs  Recommendations:  Proceed with cardiac surgery for aortic aneurysm and aortic valve regurgitation  After cardiac surgery will need to optimize GDMT for HFrEF  Depending on what happens with his LV function with time he may need to be considered for CRT, but would not assess this until several weeks/months following cardiac surgery and introduction of GDMT    Hypertension: home meds: Benazepril-hydrochlorothiazide 20-25 mg, oral, Daily, Clonidine 0.1 mg, oral, Q12H, Felodipine ER 5 mg, oral, Daily, Terazosin 5 mg, oral, Daily   Systolic (24hrs), Av , Min:122 , Max:137   - continue BB  - additional antihypertensives as needed     Hyperlipidemia: home Simvastatin 10 mg, oral, Daily   Lab Results   Component Value Date    CHOL 157 04/10/2024    LDLCALC 93 04/10/2024    HDL 53.0 04/10/2024    TRIG 55 04/10/2024   - continue statin  - follow up lipid panel with PCP/ cardiologist for ongoing lipid management    VTE Prophylaxis: SCDs/TEDs, ambulation, SQ heparin  Code Status: Full Code    Dispo  - PT/OT recs to be determined after surgery  - will go to CTICU postop  - Will continue to assess discharge needs postoperatively         SERGIO Fisher-CNP  Cardiac Surgery [facility]  [facility]  Team Phone [phone]    9/15/2024  12:03 PM

## 2024-09-16 LAB
ALBUMIN SERPL BCP-MCNC: 3.7 G/DL (ref 3.4–5)
ANION GAP SERPL CALC-SCNC: 13 MMOL/L (ref 10–20)
BUN SERPL-MCNC: 21 MG/DL (ref 6–23)
CALCIUM SERPL-MCNC: 8.5 MG/DL (ref 8.6–10.6)
CHLORIDE SERPL-SCNC: 112 MMOL/L (ref 98–107)
CO2 SERPL-SCNC: 23 MMOL/L (ref 21–32)
CREAT SERPL-MCNC: 1.01 MG/DL (ref 0.5–1.3)
EGFRCR SERPLBLD CKD-EPI 2021: 82 ML/MIN/1.73M*2
ERYTHROCYTE [DISTWIDTH] IN BLOOD BY AUTOMATED COUNT: 14.2 % (ref 11.5–14.5)
GLUCOSE SERPL-MCNC: 87 MG/DL (ref 74–99)
HCT VFR BLD AUTO: 33.5 % (ref 41–52)
HGB BLD-MCNC: 10.9 G/DL (ref 13.5–17.5)
MAGNESIUM SERPL-MCNC: 2.23 MG/DL (ref 1.6–2.4)
MCH RBC QN AUTO: 27.8 PG (ref 26–34)
MCHC RBC AUTO-ENTMCNC: 32.5 G/DL (ref 32–36)
MCV RBC AUTO: 86 FL (ref 80–100)
NRBC BLD-RTO: 0 /100 WBCS (ref 0–0)
PHOSPHATE SERPL-MCNC: 4.1 MG/DL (ref 2.5–4.9)
PLATELET # BLD AUTO: 112 X10*3/UL (ref 150–450)
POTASSIUM SERPL-SCNC: 3.9 MMOL/L (ref 3.5–5.3)
RBC # BLD AUTO: 3.92 X10*6/UL (ref 4.5–5.9)
SODIUM SERPL-SCNC: 144 MMOL/L (ref 136–145)
WBC # BLD AUTO: 4.7 X10*3/UL (ref 4.4–11.3)

## 2024-09-16 PROCEDURE — 80069 RENAL FUNCTION PANEL: CPT | Performed by: NURSE PRACTITIONER

## 2024-09-16 PROCEDURE — 2500000002 HC RX 250 W HCPCS SELF ADMINISTERED DRUGS (ALT 637 FOR MEDICARE OP, ALT 636 FOR OP/ED): Performed by: PHYSICIAN ASSISTANT

## 2024-09-16 PROCEDURE — 2500000001 HC RX 250 WO HCPCS SELF ADMINISTERED DRUGS (ALT 637 FOR MEDICARE OP): Performed by: PHYSICIAN ASSISTANT

## 2024-09-16 PROCEDURE — 99232 SBSQ HOSP IP/OBS MODERATE 35: CPT | Performed by: NURSE PRACTITIONER

## 2024-09-16 PROCEDURE — 86022 PLATELET ANTIBODIES: CPT | Performed by: THORACIC SURGERY (CARDIOTHORACIC VASCULAR SURGERY)

## 2024-09-16 PROCEDURE — 36415 COLL VENOUS BLD VENIPUNCTURE: CPT | Performed by: NURSE PRACTITIONER

## 2024-09-16 PROCEDURE — 2500000004 HC RX 250 GENERAL PHARMACY W/ HCPCS (ALT 636 FOR OP/ED)

## 2024-09-16 PROCEDURE — 1200000002 HC GENERAL ROOM WITH TELEMETRY DAILY

## 2024-09-16 PROCEDURE — 83735 ASSAY OF MAGNESIUM: CPT | Performed by: NURSE PRACTITIONER

## 2024-09-16 PROCEDURE — 85027 COMPLETE CBC AUTOMATED: CPT | Performed by: NURSE PRACTITIONER

## 2024-09-16 PROCEDURE — 2500000004 HC RX 250 GENERAL PHARMACY W/ HCPCS (ALT 636 FOR OP/ED): Performed by: NURSE PRACTITIONER

## 2024-09-16 RX ADMIN — PANTOPRAZOLE SODIUM 40 MG: 40 TABLET, DELAYED RELEASE ORAL at 05:34

## 2024-09-16 RX ADMIN — CLONIDINE HYDROCHLORIDE 0.1 MG: 0.1 TABLET ORAL at 22:09

## 2024-09-16 RX ADMIN — CLONIDINE HYDROCHLORIDE 0.1 MG: 0.1 TABLET ORAL at 05:35

## 2024-09-16 RX ADMIN — TERAZOSIN HYDROCHLORIDE 10 MG: 5 CAPSULE ORAL at 20:13

## 2024-09-16 RX ADMIN — HYDRALAZINE HYDROCHLORIDE 10 MG: 20 INJECTION INTRAMUSCULAR; INTRAVENOUS at 18:37

## 2024-09-16 RX ADMIN — METOPROLOL TARTRATE 25 MG: 25 TABLET, FILM COATED ORAL at 09:25

## 2024-09-16 RX ADMIN — HYDRALAZINE HYDROCHLORIDE 10 MG: 20 INJECTION INTRAMUSCULAR; INTRAVENOUS at 22:09

## 2024-09-16 RX ADMIN — CLONIDINE HYDROCHLORIDE 0.1 MG: 0.1 TABLET ORAL at 14:14

## 2024-09-16 RX ADMIN — SIMVASTATIN 10 MG: 10 TABLET, FILM COATED ORAL at 20:13

## 2024-09-16 RX ADMIN — MUPIROCIN 0.5 APPLICATION: 20 OINTMENT TOPICAL at 09:25

## 2024-09-16 RX ADMIN — METOPROLOL TARTRATE 25 MG: 25 TABLET, FILM COATED ORAL at 20:13

## 2024-09-16 RX ADMIN — HEPARIN SODIUM 5000 UNITS: 5000 INJECTION, SOLUTION INTRAVENOUS; SUBCUTANEOUS at 01:07

## 2024-09-16 ASSESSMENT — PAIN - FUNCTIONAL ASSESSMENT
PAIN_FUNCTIONAL_ASSESSMENT: 0-10
PAIN_FUNCTIONAL_ASSESSMENT: 0-10

## 2024-09-16 ASSESSMENT — PAIN SCALES - GENERAL
PAINLEVEL_OUTOF10: 0 - NO PAIN
PAINLEVEL_OUTOF10: 0 - NO PAIN

## 2024-09-16 NOTE — PROGRESS NOTES
CARDIAC SURGERY DAILY PROGRESS NOTE    David Chatman is a 68 y.o. male presenting with shortness of breath. He presented to his cardiologist as an outpt for these complaints. Echo showed an EF of 35-40% and ascending aneurysm 6.2. He was directed to the ED. BNP 1015, troponins, 21,16,15. CT chest revealed large ascending aneurysm up to 6.2 cm. No evidence of acute aortic dissection. He was given IV lasix with relief of symptoms and admitted for further evaluation. He is currently planned for cardiac catheterization. Asymptomatic. The pt reports his blood pressures are under control currently. CT surgery consulted for aneurysm evaluation at OSH and he was transferred to [facility] [place] for further care.        Interval History:   Overnight uneventful    SUBJECTIVE:  Denies complaints.   Would like to go outside today if possible        Objective   /64 (BP Location: Left arm, Patient Position: Sitting)   Pulse 68   Temp 36.3 °C (97.3 °F) (Temporal)   Resp 17   Wt 111 kg (245 lb 1.6 oz)   SpO2 94%   BMI 32.34 kg/m²   0-10 (Numeric) Pain Score: 1   3 Day Weight Change: Unable to Calculate    Intake and Output    Intake/Output Summary (Last 24 hours) at 9/16/2024 0955  Last data filed at 9/16/2024 0910  Gross per 24 hour   Intake 760 ml   Output 750 ml   Net 10 ml       Physical Exam  Physical Exam  Vitals and nursing note reviewed.   HENT:      Head: Normocephalic.      Mouth/Throat:      Mouth: Mucous membranes are moist.   Eyes:      Conjunctiva/sclera: Conjunctivae normal.   Cardiovascular:      Rate and Rhythm: Normal rate and regular rhythm.      Pulses: Normal pulses.      Heart sounds: Normal heart sounds.      Comments: SR 60s-80s  Pulmonary:      Effort: Pulmonary effort is normal.      Breath sounds: Normal breath sounds.   Abdominal:      General: Abdomen is flat.      Palpations: Abdomen is soft.   Musculoskeletal:      Cervical back: Neck supple.      Right lower leg: No edema.      Left lower leg: No  edema.   Skin:     General: Skin is warm and dry.   Neurological:      General: No focal deficit present.      Mental Status: He is alert and oriented to person, place, and time. Mental status is at baseline.   Psychiatric:         Mood and Affect: Mood normal.         Behavior: Behavior normal.           Medications  Scheduled medications  cloNIDine, 0.1 mg, oral, q8h DESHAWN  [Held by provider] heparin (porcine), 5,000 Units, subcutaneous, q8h  metoprolol tartrate, 25 mg, oral, BID  pantoprazole, 40 mg, oral, Daily before breakfast  simvastatin, 10 mg, oral, Nightly  terazosin, 10 mg, oral, Nightly    Continuous medications   PRN medications  PRN medications: hydrALAZINE    Labs  Results for orders placed or performed during the hospital encounter of 09/11/24 (from the past 24 hour(s))   Renal Function Panel   Result Value Ref Range    Glucose 87 74 - 99 mg/dL    Sodium 144 136 - 145 mmol/L    Potassium 3.9 3.5 - 5.3 mmol/L    Chloride 112 (H) 98 - 107 mmol/L    Bicarbonate 23 21 - 32 mmol/L    Anion Gap 13 10 - 20 mmol/L    Urea Nitrogen 21 6 - 23 mg/dL    Creatinine 1.01 0.50 - 1.30 mg/dL    eGFR 82 >60 mL/min/1.73m*2    Calcium 8.5 (L) 8.6 - 10.6 mg/dL    Phosphorus 4.1 2.5 - 4.9 mg/dL    Albumin 3.7 3.4 - 5.0 g/dL   CBC   Result Value Ref Range    WBC 4.7 4.4 - 11.3 x10*3/uL    nRBC 0.0 0.0 - 0.0 /100 WBCs    RBC 3.92 (L) 4.50 - 5.90 x10*6/uL    Hemoglobin 10.9 (L) 13.5 - 17.5 g/dL    Hematocrit 33.5 (L) 41.0 - 52.0 %    MCV 86 80 - 100 fL    MCH 27.8 26.0 - 34.0 pg    MCHC 32.5 32.0 - 36.0 g/dL    RDW 14.2 11.5 - 14.5 %    Platelets 112 (L) 150 - 450 x10*3/uL   Magnesium   Result Value Ref Range    Magnesium 2.23 1.60 - 2.40 mg/dL               IMAGING/ DIAGNOSTIC TESTING:  I have personally reviewed the following test result(s):      Transthoracic Echo (TTE) Complete With 3D And Strain 09/10/2024  CONCLUSIONS:  1. The left ventricular systolic function is moderately decreased, with a visually estimated ejection  fraction of 35-40%.  2. There is global hypokinesis of the left ventricle with minor regional variations.  3. Left ventricular cavity size is severely dilated.  4. There is normal right ventricular global systolic function.  5. The left atrium is moderately dilated.  6. The right atrium is moderately dilated.  7. Moderate mitral valve regurgitation.  8. Trace tricuspid regurgitation is visualized.  9. Aortic valve stenosis is not present.  10. Likely tricuspid aortic valve but images slightly suboptimal.  11. Moderate aortic valve regurgitation.  12. Aneurysm of the ascending aorta.    Cardiac MRI 9/12/24  IMPRESSION:  1. Dilated LV (EDVi-182 ml/m2) with reduced systolic function.  Quantitative LVEF 34 %.  2. Normal RV size (EDVi-88 ml/m2)and systolic function. Quantitative  RVEF49%.  3. Aneurysmal dilatation of the ascending aorta measuring up 6.4 cm.  Dilated aortic root measuring 4.5 x 1.5 x 4.2 cm.  4. Moderate aortic regurgitation. Aortic regurgitant fraction is 36%.  5. Small focal transmural (%) LGE/scar involving the apical  lateral wall. Finding may represent prior embolic event versus prior  myocarditis.  6. Nonspecific replacement fibrosis noted at the level of the basal  mid septum.  7. Small bilateral pleural effusion.  8. Small circumferential pericardial effusion.    CT angio chest abdomen pelvis 9/10/24  IMPRESSION:  Large ascending aneurysm up to 6.2 cm. No evidence of acute aortic  dissection.  Cardiac enlargement. Features of mild pulmonary edema.  Hepatic heterogeneity.  Diverticulosis. No diverticulitis.. No free air. No free fluid.          IMPRESSION & PLAN:  Ascending aneurysm up to 6.2 cm. No evidence of acute aortic dissection  - Continue home statin  - ECHO and LHC completed at OSH- only thing may consider is if needs coronary CTA to eval RCA given inability to engage  - Cardiac MRI 9/12  - appreciate consult by heart failure  - Carotid US done 9/12   - Changed to metoprolol  succinate to tartrate 25mg BID 24  - Goal for BP control with home oral agent as well PRN hydralazine goal SBP <140, will add oral if needed to help with BP control  - Continue Hytrin and clonidine--> will increase cloniding to  0.1mg TID instead of BID  - Will need to discuss further with Dr Perdomo timing of surgery. At this time unclear surgical date, likely mid-week next week    Rhythm: hx paroxysmal afib  - Tele: SR  - Continue BB  - Adjust medications as tolerated    Chronic systolic heart failure with EF 35-40 % on echo done 9/10/24   - appreciate HF recs  Recommendations:  Proceed with cardiac surgery for aortic aneurysm and aortic valve regurgitation  After cardiac surgery will need to optimize GDMT for HFrEF  Depending on what happens with his LV function with time he may need to be considered for CRT, but would not assess this until several weeks/months following cardiac surgery and introduction of GDMT    Thrombocytopenia  Platelets   Date Value Ref Range Status   2024 112 (L) 150 - 450 x10*3/uL Final   09/15/2024 122 (L) 150 - 450 x10*3/uL Final   2024 116 (L) 150 - 450 x10*3/uL Final   - baseline platelets at admit 147; has been on subQ heparin DVT prophylaxis  -  plts 112; check PF4 as day 5 of heparin with ~20% drop in platelets with 4T score = 4 (intermediate risk)  - Continue to trend with daily CBCs    Hypertension: home meds: Benazepril-hydrochlorothiazide 20-25 mg, oral, Daily, Clonidine 0.1 mg, oral, Q12H, Felodipine ER 5 mg, oral, Daily, Terazosin 5 mg, oral, Daily   Systolic (24hrs), Av , Min:124 , Max:141   - continue BB  - additional antihypertensives as needed   - IV hydralazine PRN for SBP > 130    Hyperlipidemia: home Simvastatin 10 mg, oral, Daily   Lab Results   Component Value Date    CHOL 157 04/10/2024    LDLCALC 93 04/10/2024    HDL 53.0 04/10/2024    TRIG 55 04/10/2024   - continue statin  - follow up lipid panel with PCP/ cardiologist for ongoing lipid  management    VTE Prophylaxis: SCDs/TEDs, ambulation, SQ heparin  Code Status: Full Code    Dispo  - PT/OT recs to be determined after surgery  - will go to CTICU postop  - Will continue to assess discharge needs postoperatively         SERGIO Avalos-CNP  Cardiac Surgery [facility]  [facility]  Team Phone [phone]    9/16/2024  9:55 AM

## 2024-09-16 NOTE — CARE PLAN
The patient's goals for the shift include      The clinical goals for the shift include Patient will remain safe and free from injury/falls throughout this shift.    Patient will remain safe throughout the shift.

## 2024-09-17 ENCOUNTER — APPOINTMENT (OUTPATIENT)
Dept: CARDIOLOGY | Facility: HOSPITAL | Age: 69
DRG: 219 | End: 2024-09-17
Payer: MEDICARE

## 2024-09-17 ENCOUNTER — APPOINTMENT (OUTPATIENT)
Dept: RADIOLOGY | Facility: HOSPITAL | Age: 69
DRG: 219 | End: 2024-09-17
Payer: MEDICARE

## 2024-09-17 LAB
ALBUMIN SERPL BCP-MCNC: 3.7 G/DL (ref 3.4–5)
ANION GAP SERPL CALC-SCNC: 14 MMOL/L (ref 10–20)
ATRIAL RATE: 68 BPM
ATRIAL RATE: 70 BPM
BUN SERPL-MCNC: 20 MG/DL (ref 6–23)
CALCIUM SERPL-MCNC: 8.7 MG/DL (ref 8.6–10.6)
CHLORIDE SERPL-SCNC: 111 MMOL/L (ref 98–107)
CO2 SERPL-SCNC: 22 MMOL/L (ref 21–32)
CREAT SERPL-MCNC: 1 MG/DL (ref 0.5–1.3)
EGFRCR SERPLBLD CKD-EPI 2021: 83 ML/MIN/1.73M*2
ERYTHROCYTE [DISTWIDTH] IN BLOOD BY AUTOMATED COUNT: 14.1 % (ref 11.5–14.5)
GLUCOSE SERPL-MCNC: 87 MG/DL (ref 74–99)
HCT VFR BLD AUTO: 34.4 % (ref 41–52)
HGB BLD-MCNC: 11.1 G/DL (ref 13.5–17.5)
MAGNESIUM SERPL-MCNC: 2.17 MG/DL (ref 1.6–2.4)
MCH RBC QN AUTO: 28 PG (ref 26–34)
MCHC RBC AUTO-ENTMCNC: 32.3 G/DL (ref 32–36)
MCV RBC AUTO: 87 FL (ref 80–100)
NRBC BLD-RTO: 0 /100 WBCS (ref 0–0)
P AXIS: -11 DEGREES
P AXIS: -13 DEGREES
P OFFSET: 167 MS
P OFFSET: 172 MS
P ONSET: 104 MS
P ONSET: 118 MS
PHOSPHATE SERPL-MCNC: 3.8 MG/DL (ref 2.5–4.9)
PLATELET # BLD AUTO: 119 X10*3/UL (ref 150–450)
POTASSIUM SERPL-SCNC: 3.8 MMOL/L (ref 3.5–5.3)
PR INTERVAL: 182 MS
PR INTERVAL: 212 MS
Q ONSET: 209 MS
Q ONSET: 210 MS
QRS COUNT: 11 BEATS
QRS COUNT: 12 BEATS
QRS DURATION: 154 MS
QRS DURATION: 154 MS
QT INTERVAL: 456 MS
QT INTERVAL: 456 MS
QTC CALCULATION(BAZETT): 484 MS
QTC CALCULATION(BAZETT): 492 MS
QTC FREDERICIA: 475 MS
QTC FREDERICIA: 480 MS
R AXIS: -35 DEGREES
R AXIS: -44 DEGREES
RBC # BLD AUTO: 3.97 X10*6/UL (ref 4.5–5.9)
SCAN RESULT: NORMAL
SODIUM SERPL-SCNC: 143 MMOL/L (ref 136–145)
T AXIS: 140 DEGREES
T AXIS: 143 DEGREES
T OFFSET: 437 MS
T OFFSET: 438 MS
VENTRICULAR RATE: 68 BPM
VENTRICULAR RATE: 70 BPM
WBC # BLD AUTO: 4.6 X10*3/UL (ref 4.4–11.3)

## 2024-09-17 PROCEDURE — 2500000001 HC RX 250 WO HCPCS SELF ADMINISTERED DRUGS (ALT 637 FOR MEDICARE OP)

## 2024-09-17 PROCEDURE — 71275 CT ANGIOGRAPHY CHEST: CPT | Performed by: STUDENT IN AN ORGANIZED HEALTH CARE EDUCATION/TRAINING PROGRAM

## 2024-09-17 PROCEDURE — 93010 ELECTROCARDIOGRAM REPORT: CPT | Performed by: INTERNAL MEDICINE

## 2024-09-17 PROCEDURE — 99232 SBSQ HOSP IP/OBS MODERATE 35: CPT

## 2024-09-17 PROCEDURE — 2500000004 HC RX 250 GENERAL PHARMACY W/ HCPCS (ALT 636 FOR OP/ED): Performed by: NURSE PRACTITIONER

## 2024-09-17 PROCEDURE — 85027 COMPLETE CBC AUTOMATED: CPT | Performed by: NURSE PRACTITIONER

## 2024-09-17 PROCEDURE — 2500000001 HC RX 250 WO HCPCS SELF ADMINISTERED DRUGS (ALT 637 FOR MEDICARE OP): Performed by: PHYSICIAN ASSISTANT

## 2024-09-17 PROCEDURE — 2500000004 HC RX 250 GENERAL PHARMACY W/ HCPCS (ALT 636 FOR OP/ED)

## 2024-09-17 PROCEDURE — 93005 ELECTROCARDIOGRAM TRACING: CPT

## 2024-09-17 PROCEDURE — 1200000002 HC GENERAL ROOM WITH TELEMETRY DAILY

## 2024-09-17 PROCEDURE — 36415 COLL VENOUS BLD VENIPUNCTURE: CPT | Performed by: NURSE PRACTITIONER

## 2024-09-17 PROCEDURE — 83735 ASSAY OF MAGNESIUM: CPT | Performed by: NURSE PRACTITIONER

## 2024-09-17 PROCEDURE — 2500000002 HC RX 250 W HCPCS SELF ADMINISTERED DRUGS (ALT 637 FOR MEDICARE OP, ALT 636 FOR OP/ED): Performed by: PHYSICIAN ASSISTANT

## 2024-09-17 PROCEDURE — 80069 RENAL FUNCTION PANEL: CPT | Performed by: NURSE PRACTITIONER

## 2024-09-17 PROCEDURE — 2550000001 HC RX 255 CONTRASTS: Performed by: THORACIC SURGERY (CARDIOTHORACIC VASCULAR SURGERY)

## 2024-09-17 PROCEDURE — 71275 CT ANGIOGRAPHY CHEST: CPT

## 2024-09-17 RX ORDER — HYDRALAZINE HYDROCHLORIDE 10 MG/1
10 TABLET, FILM COATED ORAL 3 TIMES DAILY
Status: DISCONTINUED | OUTPATIENT
Start: 2024-09-17 | End: 2024-09-20

## 2024-09-17 RX ORDER — ACETAMINOPHEN 325 MG/1
650 TABLET ORAL EVERY 4 HOURS PRN
Status: DISCONTINUED | OUTPATIENT
Start: 2024-09-17 | End: 2024-09-23

## 2024-09-17 RX ORDER — METOPROLOL TARTRATE 50 MG/1
50 TABLET ORAL 2 TIMES DAILY
Status: DISCONTINUED | OUTPATIENT
Start: 2024-09-17 | End: 2024-09-17

## 2024-09-17 RX ORDER — HYDRALAZINE HYDROCHLORIDE 20 MG/ML
10 INJECTION INTRAMUSCULAR; INTRAVENOUS ONCE
Status: COMPLETED | OUTPATIENT
Start: 2024-09-17 | End: 2024-09-17

## 2024-09-17 RX ORDER — METOPROLOL TARTRATE 25 MG/1
25 TABLET, FILM COATED ORAL 2 TIMES DAILY
Status: DISCONTINUED | OUTPATIENT
Start: 2024-09-17 | End: 2024-09-22

## 2024-09-17 RX ORDER — ACETAMINOPHEN 160 MG/5ML
650 SOLUTION ORAL EVERY 4 HOURS PRN
Status: DISCONTINUED | OUTPATIENT
Start: 2024-09-17 | End: 2024-09-20

## 2024-09-17 RX ORDER — ACETAMINOPHEN 650 MG/1
650 SUPPOSITORY RECTAL EVERY 4 HOURS PRN
Status: DISCONTINUED | OUTPATIENT
Start: 2024-09-17 | End: 2024-09-20

## 2024-09-17 RX ADMIN — HYDRALAZINE HYDROCHLORIDE 10 MG: 10 TABLET, FILM COATED ORAL at 20:34

## 2024-09-17 RX ADMIN — HYDRALAZINE HYDROCHLORIDE 10 MG: 20 INJECTION INTRAMUSCULAR; INTRAVENOUS at 23:13

## 2024-09-17 RX ADMIN — PANTOPRAZOLE SODIUM 40 MG: 40 TABLET, DELAYED RELEASE ORAL at 05:38

## 2024-09-17 RX ADMIN — IOHEXOL 90 ML: 350 INJECTION, SOLUTION INTRAVENOUS at 16:42

## 2024-09-17 RX ADMIN — HYDRALAZINE HYDROCHLORIDE 10 MG: 20 INJECTION INTRAMUSCULAR; INTRAVENOUS at 22:13

## 2024-09-17 RX ADMIN — HYDRALAZINE HYDROCHLORIDE 10 MG: 20 INJECTION INTRAMUSCULAR; INTRAVENOUS at 05:38

## 2024-09-17 RX ADMIN — HEPARIN SODIUM 5000 UNITS: 5000 INJECTION, SOLUTION INTRAVENOUS; SUBCUTANEOUS at 22:13

## 2024-09-17 RX ADMIN — HYDRALAZINE HYDROCHLORIDE 10 MG: 20 INJECTION INTRAMUSCULAR; INTRAVENOUS at 02:04

## 2024-09-17 RX ADMIN — HYDRALAZINE HYDROCHLORIDE 10 MG: 10 TABLET, FILM COATED ORAL at 18:04

## 2024-09-17 RX ADMIN — CLONIDINE HYDROCHLORIDE 0.1 MG: 0.1 TABLET ORAL at 05:38

## 2024-09-17 RX ADMIN — SIMVASTATIN 10 MG: 10 TABLET, FILM COATED ORAL at 20:32

## 2024-09-17 RX ADMIN — METOPROLOL TARTRATE 50 MG: 50 TABLET, FILM COATED ORAL at 08:12

## 2024-09-17 RX ADMIN — ACETAMINOPHEN 650 MG: 325 TABLET ORAL at 08:12

## 2024-09-17 RX ADMIN — CLONIDINE HYDROCHLORIDE 0.1 MG: 0.1 TABLET ORAL at 22:13

## 2024-09-17 RX ADMIN — TERAZOSIN HYDROCHLORIDE 10 MG: 5 CAPSULE ORAL at 20:34

## 2024-09-17 RX ADMIN — METOPROLOL TARTRATE 25 MG: 25 TABLET, FILM COATED ORAL at 20:32

## 2024-09-17 RX ADMIN — CLONIDINE HYDROCHLORIDE 0.1 MG: 0.1 TABLET ORAL at 15:49

## 2024-09-17 ASSESSMENT — COGNITIVE AND FUNCTIONAL STATUS - GENERAL
DAILY ACTIVITIY SCORE: 24
MOBILITY SCORE: 24

## 2024-09-17 ASSESSMENT — PAIN - FUNCTIONAL ASSESSMENT
PAIN_FUNCTIONAL_ASSESSMENT: 0-10

## 2024-09-17 ASSESSMENT — PAIN SCALES - GENERAL
PAINLEVEL_OUTOF10: 0 - NO PAIN
PAINLEVEL_OUTOF10: 0 - NO PAIN
PAINLEVEL_OUTOF10: 3
PAINLEVEL_OUTOF10: 0 - NO PAIN

## 2024-09-17 NOTE — CARE PLAN
Problem: Pain - Adult  Goal: Verbalizes/displays adequate comfort level or baseline comfort level  Outcome: Progressing     Problem: Safety - Adult  Goal: Free from fall injury  Outcome: Progressing     Problem: Chronic Conditions and Co-morbidities  Goal: Patient's chronic conditions and co-morbidity symptoms are monitored and maintained or improved  Outcome: Progressing     Problem: Fall/Injury  Goal: Not fall by end of shift  Outcome: Progressing  Goal: Be free from injury by end of the shift  Outcome: Progressing  Goal: Verbalize understanding of personal risk factors for fall in the hospital  Outcome: Progressing  Goal: Verbalize understanding of risk factor reduction measures to prevent injury from fall in the home  Outcome: Progressing  Goal: Use assistive devices by end of the shift  Outcome: Progressing  Goal: Pace activities to prevent fatigue by end of the shift  Outcome: Progressing     The clinical goals for the shift include Pt will remain safe, free from falls, and HDS throughout remiander of shift

## 2024-09-17 NOTE — PROGRESS NOTES
CARDIAC SURGERY DAILY PROGRESS NOTE    David Chatman is a 68 y.o. male presenting with shortness of breath. He presented to his cardiologist as an outpt for these complaints. Echo showed an EF of 35-40% and ascending aneurysm 6.2. He was directed to the ED. BNP 1015, troponins, 21,16,15. CT chest revealed large ascending aneurysm up to 6.2 cm. No evidence of acute aortic dissection. He was given IV lasix with relief of symptoms and admitted for further evaluation. He is currently planned for cardiac catheterization. Asymptomatic. The pt reports his blood pressures are under control currently. CT surgery consulted for aneurysm evaluation at OSH and he was transferred to [facility] for further care.    Interval History:   Overnight uneventful    SUBJECTIVE:  Denies complaints. Dizzy and bradycardic with increase metoprolol. Requesting less frequent lab draws. Notified of plan for OR Monday.         Objective   /68 (BP Location: Right arm, Patient Position: Lying)   Pulse 73   Temp 36.3 °C (97.3 °F) (Temporal)   Resp 18   Wt 112 kg (246 lb 11.2 oz)   SpO2 95%   BMI 32.55 kg/m²   0-10 (Numeric) Pain Score: 0 - No pain   3 Day Weight Change: Unable to Calculate    Intake and Output    Intake/Output Summary (Last 24 hours) at 9/17/2024 0712  Last data filed at 9/17/2024 0532  Gross per 24 hour   Intake 720 ml   Output 825 ml   Net -105 ml       Physical Exam  Physical Exam  Vitals and nursing note reviewed.   HENT:      Head: Normocephalic.      Mouth/Throat:      Mouth: Mucous membranes are moist.   Eyes:      Conjunctiva/sclera: Conjunctivae normal.   Cardiovascular:      Rate and Rhythm: Normal rate and regular rhythm.      Pulses: Normal pulses.      Heart sounds: Normal heart sounds.      Comments: SR 50-60s  Pulmonary:      Effort: Pulmonary effort is normal.      Breath sounds: Normal breath sounds.   Abdominal:      General: Abdomen is flat.      Palpations: Abdomen is soft.   Musculoskeletal:      Cervical  back: Neck supple.      Right lower leg: No edema.      Left lower leg: No edema.   Skin:     General: Skin is warm and dry.   Neurological:      General: No focal deficit present.      Mental Status: He is alert and oriented to person, place, and time. Mental status is at baseline.   Psychiatric:         Mood and Affect: Mood normal.         Behavior: Behavior normal.           Medications  Scheduled medications  cloNIDine, 0.1 mg, oral, q8h DESHAWN  [Held by provider] heparin (porcine), 5,000 Units, subcutaneous, q8h  metoprolol tartrate, 25 mg, oral, BID  pantoprazole, 40 mg, oral, Daily before breakfast  simvastatin, 10 mg, oral, Nightly  terazosin, 10 mg, oral, Nightly    Continuous medications   PRN medications  PRN medications: hydrALAZINE    Labs  No results found for this or any previous visit (from the past 24 hour(s)).              IMAGING/ DIAGNOSTIC TESTING:  I have personally reviewed the following test result(s):      Transthoracic Echo (TTE) Complete With 3D And Strain 09/10/2024  CONCLUSIONS:  1. The left ventricular systolic function is moderately decreased, with a visually estimated ejection fraction of 35-40%.  2. There is global hypokinesis of the left ventricle with minor regional variations.  3. Left ventricular cavity size is severely dilated.  4. There is normal right ventricular global systolic function.  5. The left atrium is moderately dilated.  6. The right atrium is moderately dilated.  7. Moderate mitral valve regurgitation.  8. Trace tricuspid regurgitation is visualized.  9. Aortic valve stenosis is not present.  10. Likely tricuspid aortic valve but images slightly suboptimal.  11. Moderate aortic valve regurgitation.  12. Aneurysm of the ascending aorta.    Cardiac MRI 9/12/24  IMPRESSION:  1. Dilated LV (EDVi-182 ml/m2) with reduced systolic function.  Quantitative LVEF 34 %.  2. Normal RV size (EDVi-88 ml/m2)and systolic function. Quantitative  RVEF49%.  3. Aneurysmal dilatation of  the ascending aorta measuring up 6.4 cm.  Dilated aortic root measuring 4.5 x 1.5 x 4.2 cm.  4. Moderate aortic regurgitation. Aortic regurgitant fraction is 36%.  5. Small focal transmural (%) LGE/scar involving the apical  lateral wall. Finding may represent prior embolic event versus prior  myocarditis.  6. Nonspecific replacement fibrosis noted at the level of the basal  mid septum.  7. Small bilateral pleural effusion.  8. Small circumferential pericardial effusion.    CT angio chest abdomen pelvis 9/10/24  IMPRESSION:  Large ascending aneurysm up to 6.2 cm. No evidence of acute aortic  dissection.  Cardiac enlargement. Features of mild pulmonary edema.  Hepatic heterogeneity.  Diverticulosis. No diverticulitis.. No free air. No free fluid.          IMPRESSION & PLAN:  Ascending aneurysm up to 6.2 cm. No evidence of acute aortic dissection  - Continue home statin  - ECHO and LHC completed at OSH- only thing may consider is if needs coronary CTA to eval RCA given inability to engage  - Cardiac MRI 9/12  - appreciate consult by heart failure  - Carotid US done 9/12   - Changed to metoprolol succinate to tartrate 25mg BID 9/11/24  - Goal for BP control with home oral agent as well PRN hydralazine goal SBP <140, will add oral if needed to help with BP control  - Continue Hytrin and clonidine--> will increase cloniding to  0.1mg TID instead of BID  - Will need to discuss further with Dr Perdomo timing of surgery. At this time unclear surgical date, likely mid-week next week    Rhythm: hx paroxysmal afib  - Tele: SR  - Continue metop 25 mg BID, did not tolerate increase to 50 mg BID  - Adjust medications as tolerated    Chronic systolic heart failure with EF 35-40 % on echo done 9/10/24   - appreciate HF recs  Recommendations:  Proceed with cardiac surgery for aortic aneurysm and aortic valve regurgitation  After cardiac surgery will need to optimize GDMT for HFrEF  Depending on what happens with his LV function  with time he may need to be considered for CRT, but would not assess this until several weeks/months following cardiac surgery and introduction of GDMT    Thrombocytopenia  Platelets   Date Value Ref Range Status   2024 112 (L) 150 - 450 x10*3/uL Final   09/15/2024 122 (L) 150 - 450 x10*3/uL Final   - baseline platelets at admit 147; has been on subQ heparin DVT prophylaxis  -  plts 112; check PF4 as day 5 of heparin with ~20% drop in platelets with 4T score = 4 (intermediate risk)  - Continue to trend with daily CBCs    Hypertension: home meds: Benazepril-hydrochlorothiazide 20-25 mg, oral, Daily, Clonidine 0.1 mg, oral, Q12H, Felodipine ER 5 mg, oral, Daily, Terazosin 5 mg, oral, Daily   Systolic (24hrs), Av , Min:123 , Max:149   - continue BB  - add PO hydralazine 10 mg BID  - additional antihypertensives as needed   - IV hydralazine PRN for SBP > 130    Hyperlipidemia: home Simvastatin 10 mg, oral, Daily   Lab Results   Component Value Date    CHOL 157 04/10/2024    LDLCALC 93 04/10/2024    HDL 53.0 04/10/2024    TRIG 55 04/10/2024   - continue statin  - follow up lipid panel with PCP/ cardiologist for ongoing lipid management    VTE Prophylaxis: SCDs/TEDs, ambulation, SQ heparin  Code Status: Full Code    Dispo  - PT/OT recs to be determined after surgery  - Surgery scheduled for  second round  - Will go to CTICU postop  - Will continue to assess discharge needs postoperatively       SERGIO Garza-CNP  Cardiac Surgery [facility]  [facility]  Team Phone [phone]    2024  7:12 AM

## 2024-09-17 NOTE — PROGRESS NOTES
09/17/24 0950   Discharge Planning   Living Arrangements Spouse/significant other   Support Systems Spouse/significant other   Type of Residence Private residence   Who is requesting discharge planning? Provider   Does the patient need discharge transport arranged? No     Patient pre- op cardiac surgery being medically managed.  Surgery date unclear at this time.

## 2024-09-18 LAB
APPEARANCE UR: CLEAR
BILIRUB UR STRIP.AUTO-MCNC: NEGATIVE MG/DL
COLOR UR: YELLOW
GLUCOSE UR STRIP.AUTO-MCNC: NORMAL MG/DL
HOLD SPECIMEN: NORMAL
KETONES UR STRIP.AUTO-MCNC: NEGATIVE MG/DL
LEUKOCYTE ESTERASE UR QL STRIP.AUTO: NEGATIVE
MUCOUS THREADS #/AREA URNS AUTO: NORMAL /LPF
NITRITE UR QL STRIP.AUTO: NEGATIVE
PH UR STRIP.AUTO: 5.5 [PH]
PROT UR STRIP.AUTO-MCNC: ABNORMAL MG/DL
RBC # UR STRIP.AUTO: NEGATIVE /UL
RBC #/AREA URNS AUTO: NORMAL /HPF
SP GR UR STRIP.AUTO: 1.04
UROBILINOGEN UR STRIP.AUTO-MCNC: ABNORMAL MG/DL
WBC #/AREA URNS AUTO: NORMAL /HPF

## 2024-09-18 PROCEDURE — 2500000004 HC RX 250 GENERAL PHARMACY W/ HCPCS (ALT 636 FOR OP/ED): Performed by: NURSE PRACTITIONER

## 2024-09-18 PROCEDURE — 1200000002 HC GENERAL ROOM WITH TELEMETRY DAILY

## 2024-09-18 PROCEDURE — 99232 SBSQ HOSP IP/OBS MODERATE 35: CPT | Performed by: NURSE PRACTITIONER

## 2024-09-18 PROCEDURE — 2500000004 HC RX 250 GENERAL PHARMACY W/ HCPCS (ALT 636 FOR OP/ED)

## 2024-09-18 PROCEDURE — 2500000002 HC RX 250 W HCPCS SELF ADMINISTERED DRUGS (ALT 637 FOR MEDICARE OP, ALT 636 FOR OP/ED): Performed by: PHYSICIAN ASSISTANT

## 2024-09-18 PROCEDURE — 81003 URINALYSIS AUTO W/O SCOPE: CPT | Performed by: NURSE PRACTITIONER

## 2024-09-18 PROCEDURE — 2500000001 HC RX 250 WO HCPCS SELF ADMINISTERED DRUGS (ALT 637 FOR MEDICARE OP)

## 2024-09-18 PROCEDURE — 2500000001 HC RX 250 WO HCPCS SELF ADMINISTERED DRUGS (ALT 637 FOR MEDICARE OP): Performed by: PHYSICIAN ASSISTANT

## 2024-09-18 PROCEDURE — 81001 URINALYSIS AUTO W/SCOPE: CPT | Performed by: NURSE PRACTITIONER

## 2024-09-18 RX ORDER — CHLORHEXIDINE GLUCONATE ORAL RINSE 1.2 MG/ML
15 SOLUTION DENTAL 2 TIMES DAILY
Status: DISCONTINUED | OUTPATIENT
Start: 2024-09-22 | End: 2024-09-23

## 2024-09-18 RX ADMIN — HEPARIN SODIUM 5000 UNITS: 5000 INJECTION, SOLUTION INTRAVENOUS; SUBCUTANEOUS at 15:38

## 2024-09-18 RX ADMIN — HYDRALAZINE HYDROCHLORIDE 10 MG: 10 TABLET, FILM COATED ORAL at 14:03

## 2024-09-18 RX ADMIN — HYDRALAZINE HYDROCHLORIDE 10 MG: 10 TABLET, FILM COATED ORAL at 20:39

## 2024-09-18 RX ADMIN — CLONIDINE HYDROCHLORIDE 0.1 MG: 0.1 TABLET ORAL at 14:03

## 2024-09-18 RX ADMIN — CLONIDINE HYDROCHLORIDE 0.1 MG: 0.1 TABLET ORAL at 21:26

## 2024-09-18 RX ADMIN — HEPARIN SODIUM 5000 UNITS: 5000 INJECTION, SOLUTION INTRAVENOUS; SUBCUTANEOUS at 23:54

## 2024-09-18 RX ADMIN — HEPARIN SODIUM 5000 UNITS: 5000 INJECTION, SOLUTION INTRAVENOUS; SUBCUTANEOUS at 09:38

## 2024-09-18 RX ADMIN — METOPROLOL TARTRATE 25 MG: 25 TABLET, FILM COATED ORAL at 09:38

## 2024-09-18 RX ADMIN — HYDRALAZINE HYDROCHLORIDE 10 MG: 20 INJECTION INTRAMUSCULAR; INTRAVENOUS at 10:17

## 2024-09-18 RX ADMIN — SIMVASTATIN 10 MG: 10 TABLET, FILM COATED ORAL at 20:39

## 2024-09-18 RX ADMIN — PANTOPRAZOLE SODIUM 40 MG: 40 TABLET, DELAYED RELEASE ORAL at 06:06

## 2024-09-18 RX ADMIN — HYDRALAZINE HYDROCHLORIDE 10 MG: 10 TABLET, FILM COATED ORAL at 09:38

## 2024-09-18 RX ADMIN — CLONIDINE HYDROCHLORIDE 0.1 MG: 0.1 TABLET ORAL at 06:06

## 2024-09-18 RX ADMIN — TERAZOSIN HYDROCHLORIDE 10 MG: 5 CAPSULE ORAL at 20:39

## 2024-09-18 RX ADMIN — METOPROLOL TARTRATE 25 MG: 25 TABLET, FILM COATED ORAL at 20:39

## 2024-09-18 RX ADMIN — HYDRALAZINE HYDROCHLORIDE 10 MG: 20 INJECTION INTRAMUSCULAR; INTRAVENOUS at 17:38

## 2024-09-18 ASSESSMENT — COGNITIVE AND FUNCTIONAL STATUS - GENERAL
MOBILITY SCORE: 24
MOBILITY SCORE: 24
DAILY ACTIVITIY SCORE: 24
DAILY ACTIVITIY SCORE: 24

## 2024-09-18 ASSESSMENT — PAIN - FUNCTIONAL ASSESSMENT
PAIN_FUNCTIONAL_ASSESSMENT: 0-10
PAIN_FUNCTIONAL_ASSESSMENT: 0-10

## 2024-09-18 ASSESSMENT — PAIN SCALES - GENERAL
PAINLEVEL_OUTOF10: 0 - NO PAIN
PAINLEVEL_OUTOF10: 0 - NO PAIN

## 2024-09-18 NOTE — PROGRESS NOTES
CARDIAC SURGERY DAILY PROGRESS NOTE    David Chatman is a 68 y.o. male presenting with shortness of breath. He presented to his cardiologist as an outpt for these complaints. Echo showed an EF of 35-40% and ascending aneurysm 6.2. He was directed to the ED. BNP 1015, troponins, 21,16,15. CT chest revealed large ascending aneurysm up to 6.2 cm. No evidence of acute aortic dissection. He was given IV lasix with relief of symptoms and admitted for further evaluation. He is currently planned for cardiac catheterization. Asymptomatic. The pt reports his blood pressures are under control currently. CT surgery consulted for aneurysm evaluation at OSH and he was transferred to [facility] [place] for further care.    Interval History:   Overnight uneventful  [birth year] birthday.      SUBJECTIVE:  Denies complaints.       Objective   /68 (BP Location: Right arm, Patient Position: Lying)   Pulse 62   Temp 36.9 °C (98.4 °F) (Temporal)   Resp 17   Wt 111 kg (245 lb 6 oz)   SpO2 95%   BMI 32.37 kg/m²   0-10 (Numeric) Pain Score: 0 - No pain   3 Day Weight Change: Unable to Calculate    Intake and Output    Intake/Output Summary (Last 24 hours) at 9/18/2024 1502  Last data filed at 9/18/2024 1347  Gross per 24 hour   Intake 800 ml   Output 1100 ml   Net -300 ml       Physical Exam  Physical Exam  Vitals and nursing note reviewed.   HENT:      Head: Normocephalic.      Mouth/Throat:      Mouth: Mucous membranes are moist.   Eyes:      Conjunctiva/sclera: Conjunctivae normal.   Cardiovascular:      Rate and Rhythm: Normal rate and regular rhythm.      Pulses: Normal pulses.      Heart sounds: Normal heart sounds.      Comments: SR 60s-70s  Pulmonary:      Effort: Pulmonary effort is normal.      Breath sounds: Normal breath sounds.   Abdominal:      General: Abdomen is flat.      Palpations: Abdomen is soft.   Musculoskeletal:      Cervical back: Neck supple.      Right lower leg: No edema.      Left lower leg: No edema.   Skin:      General: Skin is warm and dry.   Neurological:      General: No focal deficit present.      Mental Status: He is alert and oriented to person, place, and time. Mental status is at baseline.   Psychiatric:         Mood and Affect: Mood normal.         Behavior: Behavior normal.           Medications  Scheduled medications  [START ON 9/22/2024] chlorhexidine, 15 mL, Mouth/Throat, BID  cloNIDine, 0.1 mg, oral, q8h DESHAWN  heparin (porcine), 5,000 Units, subcutaneous, q8h  hydrALAZINE, 10 mg, oral, TID  metoprolol tartrate, 25 mg, oral, BID  pantoprazole, 40 mg, oral, Daily before breakfast  simvastatin, 10 mg, oral, Nightly  terazosin, 10 mg, oral, Nightly    Continuous medications   PRN medications  PRN medications: acetaminophen **OR** acetaminophen **OR** acetaminophen, hydrALAZINE    Labs  Results for orders placed or performed during the hospital encounter of 09/11/24 (from the past 24 hour(s))   Urinalysis with Reflex Culture and Microscopic   Result Value Ref Range    Color, Urine Yellow Light-Yellow, Yellow, Dark-Yellow    Appearance, Urine Clear Clear    Specific Gravity, Urine 1.044 (N) 1.005 - 1.035    pH, Urine 5.5 5.0, 5.5, 6.0, 6.5, 7.0, 7.5, 8.0    Protein, Urine 30 (1+) (A) NEGATIVE, 10 (TRACE), 20 (TRACE) mg/dL    Glucose, Urine Normal Normal mg/dL    Blood, Urine NEGATIVE NEGATIVE    Ketones, Urine NEGATIVE NEGATIVE mg/dL    Bilirubin, Urine NEGATIVE NEGATIVE    Urobilinogen, Urine 2 (1+) (A) Normal mg/dL    Nitrite, Urine NEGATIVE NEGATIVE    Leukocyte Esterase, Urine NEGATIVE NEGATIVE   Urinalysis Microscopic   Result Value Ref Range    WBC, Urine 1-5 1-5, NONE /HPF    RBC, Urine 3-5 NONE, 1-2, 3-5 /HPF    Mucus, Urine 1+ Reference range not established. /LPF                 IMAGING/ DIAGNOSTIC TESTING:  I have personally reviewed the following test result(s):      Transthoracic Echo (TTE) Complete With 3D And Strain 09/10/2024  CONCLUSIONS:  1. The left ventricular systolic function is moderately  decreased, with a visually estimated ejection fraction of 35-40%.  2. There is global hypokinesis of the left ventricle with minor regional variations.  3. Left ventricular cavity size is severely dilated.  4. There is normal right ventricular global systolic function.  5. The left atrium is moderately dilated.  6. The right atrium is moderately dilated.  7. Moderate mitral valve regurgitation.  8. Trace tricuspid regurgitation is visualized.  9. Aortic valve stenosis is not present.  10. Likely tricuspid aortic valve but images slightly suboptimal.  11. Moderate aortic valve regurgitation.  12. Aneurysm of the ascending aorta.    Cardiac MRI 9/12/24  IMPRESSION:  1. Dilated LV (EDVi-182 ml/m2) with reduced systolic function.  Quantitative LVEF 34 %.  2. Normal RV size (EDVi-88 ml/m2)and systolic function. Quantitative  RVEF49%.  3. Aneurysmal dilatation of the ascending aorta measuring up 6.4 cm.  Dilated aortic root measuring 4.5 x 1.5 x 4.2 cm.  4. Moderate aortic regurgitation. Aortic regurgitant fraction is 36%.  5. Small focal transmural (%) LGE/scar involving the apical  lateral wall. Finding may represent prior embolic event versus prior  myocarditis.  6. Nonspecific replacement fibrosis noted at the level of the basal  mid septum.  7. Small bilateral pleural effusion.  8. Small circumferential pericardial effusion.    CT angio chest abdomen pelvis 9/10/24  IMPRESSION:  Large ascending aneurysm up to 6.2 cm. No evidence of acute aortic  dissection.  Cardiac enlargement. Features of mild pulmonary edema.  Hepatic heterogeneity.  Diverticulosis. No diverticulitis.. No free air. No free fluid.          IMPRESSION & PLAN:  Ascending aneurysm up to 6.2 cm. No evidence of acute aortic dissection  - Continue home statin  - ECHO and LHC completed at OSH- only thing may consider is if needs coronary CTA to eval RCA given inability to engage  - Cardiac MRI 9/12  - appreciate consult by heart failure  - Carotid  US done    - Changed to metoprolol succinate to tartrate 25mg BID 24  - Goal for BP control with home oral agent as well PRN hydralazine goal SBP <140, will add oral if needed to help with BP control  - Continue Hytrin and clonidine--> will increase cloniding to  0.1mg TID instead of BID  - Surgery date has been set for  - 1st case, discussed with patient    Rhythm: hx paroxysmal afib  - Tele: SR 60s-70s  - Continue metop 25 mg BID, did not tolerate increase to 50 mg BID  - Adjust medications as tolerated    Chronic systolic heart failure with EF 35-40 % on echo done 9/10/24   - appreciate HF recs  Recommendations:  Proceed with cardiac surgery for aortic aneurysm and aortic valve regurgitation  After cardiac surgery will need to optimize GDMT for HFrEF  Depending on what happens with his LV function with time he may need to be considered for CRT, but would not assess this until several weeks/months following cardiac surgery and introduction of GDMT    Thrombocytopenia  Platelets   Date Value Ref Range Status   2024 119 (L) 150 - 450 x10*3/uL Final   2024 112 (L) 150 - 450 x10*3/uL Final   - baseline platelets at admit 147; has been on subQ heparin DVT prophylaxis  -  plts 112; check PF4 as day 5 of heparin with ~20% drop in platelets with 4T score = 4 (intermediate risk)  - Continue to trend with daily CBCs    Hypertension: home meds: Benazepril-hydrochlorothiazide 20-25 mg, oral, Daily, Clonidine 0.1 mg, oral, Q12H, Felodipine ER 5 mg, oral, Daily, Terazosin 5 mg, oral, Daily   Systolic (24hrs), Av , Min:119 , Max:142   - continue BB  - added PO hydralazine 10 mg BID  - additional antihypertensives as needed   - IV hydralazine PRN for SBP > 130    Hyperlipidemia: home Simvastatin 10 mg, oral, Daily   Lab Results   Component Value Date    CHOL 157 04/10/2024    LDLCALC 93 04/10/2024    HDL 53.0 04/10/2024    TRIG 55 04/10/2024   - continue statin  - follow up lipid panel  with PCP/ cardiologist for ongoing lipid management    VTE Prophylaxis: SCDs/TEDs, ambulation, SQ heparin  Code Status: Full Code    Dispo  - PT/OT recs to be determined after surgery  - Surgery scheduled for Monday 9/23 first round; patient aware of new time of 1st case  - Will go to CTICU postop  - Will continue to assess discharge needs postoperatively       SERGIO Avalos-CNP  Cardiac Surgery [facility]  [facility]  Team Phone [phone]    9/18/2024  3:02 PM

## 2024-09-18 NOTE — PROGRESS NOTES
09/18/24 0920   Discharge Planning   Living Arrangements Spouse/significant other   Support Systems Spouse/significant other   Type of Residence Private residence   Does the patient need discharge transport arranged? No     Patient pre op cardiac surgery.  Tentative surgery date is 9/23/2024.

## 2024-09-18 NOTE — DISCHARGE SUMMARY
Discharge Diagnosis  Acute systolic heart failure (Multi)    Issues Requiring Follow-Up  Thoracic aortic aneurysm, CHF    Discharge Meds     Medication List      START taking these medications     fluticasone 50 mcg/actuation nasal spray; Commonly known as: Flonase;   Administer 2 sprays into each nostril once daily. Shake gently. Before   first use, prime pump. After use, clean tip and replace cap.   heparin (porcine) 5,000 unit/mL injection; Inject 1 mL (5,000 Units)   under the skin every 8 hours.   losartan 100 mg tablet; Commonly known as: Cozaar; Take 1 tablet (100   mg) by mouth once daily.   metoprolol succinate XL 25 mg 24 hr tablet; Commonly known as:   Toprol-XL; Take 1 tablet (25 mg) by mouth once daily. Do not crush or   chew.   pantoprazole 20 mg EC tablet; Commonly known as: ProtoNix; Take 1 tablet   (20 mg) by mouth once daily in the morning. Take before meals. Do not   crush, chew, or split.; Replaces: omeprazole 40 mg DR capsule     CHANGE how you take these medications     terazosin 10 mg capsule; Commonly known as: Hytrin; Take 1 capsule (10   mg) by mouth once daily.; What changed: medication strength, when to take   this, additional instructions     CONTINUE taking these medications     cloNIDine 0.1 mg tablet; Commonly known as: Catapres; Take 1 tablet (0.1   mg) by mouth every 12 hours.     STOP taking these medications     benazepriL-hydrochlorothiazide 20-25 mg tablet; Commonly known as:   Lotensin HCT   felodipine ER 5 mg 24 hr tablet; Commonly known as: Plendil   fluocinolone 0.01 % cream   multivitamin tablet   omeprazole 40 mg DR capsule; Commonly known as: PriLOSEC; Replaced by:   pantoprazole 20 mg EC tablet   Zocor 10 mg tablet; Generic drug: simvastatin       Test Results Pending At Discharge  Pending Labs       No current pending labs.            Hospital Course   Admitted with CHF.  Imaging showed an acute decline in his EF as well as a large thoracic aortic aneurysm.  He had a  cardiac catheterization that did not show any significant coronary lesions but CT surgery evaluated him and felt that the thoracic aortic aneurysm was significant and required urgent surgery.  He was transferred to [facility] ([facility] for surgical treatment of his thoracic aortic aneurysm.    Pertinent Physical Exam At Time of Discharge  Physical Exam    Outpatient Follow-Up  Future Appointments   Date Time Provider Department Center   2/21/2025  9:45 AM Bernardo Ramírez MD [id number] / Jason Valladares MD   pt feeling slightly better, does not want anything further for pain.  troponin negative (pain ongoing throughout the day, doubt acute acs), ddimer negative, doubt PE. recommend PMD f/u  I have discussed the discharge plan with the patient. The patient agrees with the plan, as discussed.  The patient understands Emergency Department diagnosis is a preliminary diagnosis often based on limited information and that the patient must adhere to the follow-up plan as discussed.  The patient understands that if the symptoms worsen the patient may return to the Emergency Department at any time for further evaluation and treatment.

## 2024-09-19 LAB
ABO GROUP (TYPE) IN BLOOD: NORMAL
ANTIBODY SCREEN: NORMAL
RH FACTOR (ANTIGEN D): NORMAL

## 2024-09-19 PROCEDURE — 86900 BLOOD TYPING SEROLOGIC ABO: CPT | Performed by: NURSE PRACTITIONER

## 2024-09-19 PROCEDURE — 86901 BLOOD TYPING SEROLOGIC RH(D): CPT | Performed by: NURSE PRACTITIONER

## 2024-09-19 PROCEDURE — 2500000001 HC RX 250 WO HCPCS SELF ADMINISTERED DRUGS (ALT 637 FOR MEDICARE OP): Performed by: PHYSICIAN ASSISTANT

## 2024-09-19 PROCEDURE — 2500000004 HC RX 250 GENERAL PHARMACY W/ HCPCS (ALT 636 FOR OP/ED): Performed by: NURSE PRACTITIONER

## 2024-09-19 PROCEDURE — 2500000004 HC RX 250 GENERAL PHARMACY W/ HCPCS (ALT 636 FOR OP/ED)

## 2024-09-19 PROCEDURE — 86923 COMPATIBILITY TEST ELECTRIC: CPT

## 2024-09-19 PROCEDURE — 99232 SBSQ HOSP IP/OBS MODERATE 35: CPT

## 2024-09-19 PROCEDURE — 36415 COLL VENOUS BLD VENIPUNCTURE: CPT | Performed by: NURSE PRACTITIONER

## 2024-09-19 PROCEDURE — 1200000002 HC GENERAL ROOM WITH TELEMETRY DAILY

## 2024-09-19 PROCEDURE — 2500000001 HC RX 250 WO HCPCS SELF ADMINISTERED DRUGS (ALT 637 FOR MEDICARE OP)

## 2024-09-19 PROCEDURE — 2500000002 HC RX 250 W HCPCS SELF ADMINISTERED DRUGS (ALT 637 FOR MEDICARE OP, ALT 636 FOR OP/ED): Performed by: PHYSICIAN ASSISTANT

## 2024-09-19 RX ORDER — HYDRALAZINE HYDROCHLORIDE 20 MG/ML
15 INJECTION INTRAMUSCULAR; INTRAVENOUS
Status: DISCONTINUED | OUTPATIENT
Start: 2024-09-19 | End: 2024-09-20

## 2024-09-19 RX ORDER — HYDRALAZINE HYDROCHLORIDE 20 MG/ML
10 INJECTION INTRAMUSCULAR; INTRAVENOUS ONCE
Status: DISCONTINUED | OUTPATIENT
Start: 2024-09-19 | End: 2024-09-19

## 2024-09-19 RX ORDER — HYDRALAZINE HYDROCHLORIDE 20 MG/ML
10 INJECTION INTRAMUSCULAR; INTRAVENOUS ONCE AS NEEDED
Status: DISCONTINUED | OUTPATIENT
Start: 2024-09-19 | End: 2024-09-19

## 2024-09-19 RX ORDER — HYDRALAZINE HYDROCHLORIDE 20 MG/ML
15 INJECTION INTRAMUSCULAR; INTRAVENOUS EVERY 4 HOURS PRN
Status: DISCONTINUED | OUTPATIENT
Start: 2024-09-19 | End: 2024-09-19

## 2024-09-19 RX ADMIN — CLONIDINE HYDROCHLORIDE 0.1 MG: 0.1 TABLET ORAL at 16:03

## 2024-09-19 RX ADMIN — METOPROLOL TARTRATE 25 MG: 25 TABLET, FILM COATED ORAL at 10:18

## 2024-09-19 RX ADMIN — SIMVASTATIN 10 MG: 10 TABLET, FILM COATED ORAL at 20:07

## 2024-09-19 RX ADMIN — HEPARIN SODIUM 5000 UNITS: 5000 INJECTION, SOLUTION INTRAVENOUS; SUBCUTANEOUS at 18:44

## 2024-09-19 RX ADMIN — HYDRALAZINE HYDROCHLORIDE 10 MG: 20 INJECTION INTRAMUSCULAR; INTRAVENOUS at 22:19

## 2024-09-19 RX ADMIN — HYDRALAZINE HYDROCHLORIDE 10 MG: 10 TABLET, FILM COATED ORAL at 20:07

## 2024-09-19 RX ADMIN — METOPROLOL TARTRATE 25 MG: 25 TABLET, FILM COATED ORAL at 20:06

## 2024-09-19 RX ADMIN — HEPARIN SODIUM 5000 UNITS: 5000 INJECTION, SOLUTION INTRAVENOUS; SUBCUTANEOUS at 10:19

## 2024-09-19 RX ADMIN — HYDRALAZINE HYDROCHLORIDE 10 MG: 20 INJECTION INTRAMUSCULAR; INTRAVENOUS at 05:28

## 2024-09-19 RX ADMIN — CLONIDINE HYDROCHLORIDE 0.1 MG: 0.1 TABLET ORAL at 23:59

## 2024-09-19 RX ADMIN — TERAZOSIN HYDROCHLORIDE 10 MG: 5 CAPSULE ORAL at 20:06

## 2024-09-19 RX ADMIN — HYDRALAZINE HYDROCHLORIDE 10 MG: 10 TABLET, FILM COATED ORAL at 15:59

## 2024-09-19 RX ADMIN — PANTOPRAZOLE SODIUM 40 MG: 40 TABLET, DELAYED RELEASE ORAL at 10:19

## 2024-09-19 RX ADMIN — HYDRALAZINE HYDROCHLORIDE 10 MG: 10 TABLET, FILM COATED ORAL at 10:18

## 2024-09-19 RX ADMIN — CLONIDINE HYDROCHLORIDE 0.1 MG: 0.1 TABLET ORAL at 05:28

## 2024-09-19 ASSESSMENT — COGNITIVE AND FUNCTIONAL STATUS - GENERAL
MOBILITY SCORE: 24
DAILY ACTIVITIY SCORE: 24

## 2024-09-19 ASSESSMENT — PAIN - FUNCTIONAL ASSESSMENT
PAIN_FUNCTIONAL_ASSESSMENT: 0-10
PAIN_FUNCTIONAL_ASSESSMENT: 0-10

## 2024-09-19 ASSESSMENT — PAIN SCALES - GENERAL
PAINLEVEL_OUTOF10: 0 - NO PAIN
PAINLEVEL_OUTOF10: 0 - NO PAIN

## 2024-09-19 NOTE — PROGRESS NOTES
CARDIAC SURGERY DAILY PROGRESS NOTE    David Chatman is a 68 y.o. male presenting with shortness of breath. He presented to his cardiologist as an outpt for these complaints. Echo showed an EF of 35-40% and ascending aneurysm 6.2. He was directed to the ED. BNP 1015, troponins, 21,16,15. CT chest revealed large ascending aneurysm up to 6.2 cm. No evidence of acute aortic dissection. He was given IV lasix with relief of symptoms and admitted for further evaluation. He is currently planned for cardiac catheterization. Asymptomatic. The pt reports his blood pressures are under control currently. CT surgery consulted for aneurysm evaluation at OSH and [place] [facility] [place]    Interval History:   Overnight uneventful    SUBJECTIVE:  Denies complaints.       Objective   /61   Pulse 67   Temp 36.5 °C (97.7 °F) (Temporal)   Resp 20   Wt 112 kg (246 lb 8 oz)   SpO2 94%   BMI 32.52 kg/m²   0-10 (Numeric) Pain Score: 0 - No pain   3 Day Weight Change: 0.212 kg (7.5 oz) per day    Intake and Output    Intake/Output Summary (Last 24 hours) at 9/19/2024 0934  Last data filed at 9/18/2024 2040  Gross per 24 hour   Intake 360 ml   Output 200 ml   Net 160 ml       Physical Exam  Physical Exam  Vitals and nursing note reviewed.   HENT:      Head: Normocephalic.      Mouth/Throat:      Mouth: Mucous membranes are moist.   Eyes:      Conjunctiva/sclera: Conjunctivae normal.   Cardiovascular:      Rate and Rhythm: Normal rate and regular rhythm.      Pulses: Normal pulses.      Heart sounds: Normal heart sounds.      Comments: SR 60s-70s  Pulmonary:      Effort: Pulmonary effort is normal.      Breath sounds: Normal breath sounds.   Abdominal:      General: Abdomen is flat.      Palpations: Abdomen is soft.   Musculoskeletal:      Cervical back: Neck supple.      Right lower leg: No edema.      Left lower leg: No edema.   Skin:     General: Skin is warm and dry.   Neurological:      General: No focal  Caller: Unspecified (2 days ago,  1:33 PM)  This approval authorizes your coverage from 01/01/2024 - 12/31/2024   deficit present.      Mental Status: He is alert and oriented to person, place, and time. Mental status is at baseline.   Psychiatric:         Mood and Affect: Mood normal.         Behavior: Behavior normal.           Medications  Scheduled medications  [START ON 9/22/2024] chlorhexidine, 15 mL, Mouth/Throat, BID  cloNIDine, 0.1 mg, oral, q8h DESHAWN  heparin (porcine), 5,000 Units, subcutaneous, q8h  hydrALAZINE, 10 mg, oral, TID  metoprolol tartrate, 25 mg, oral, BID  pantoprazole, 40 mg, oral, Daily before breakfast  simvastatin, 10 mg, oral, Nightly  terazosin, 10 mg, oral, Nightly    Continuous medications   PRN medications  PRN medications: acetaminophen **OR** acetaminophen **OR** acetaminophen, hydrALAZINE    Labs  Results for orders placed or performed during the hospital encounter of 09/11/24 (from the past 24 hour(s))   Urinalysis with Reflex Culture and Microscopic   Result Value Ref Range    Color, Urine Yellow Light-Yellow, Yellow, Dark-Yellow    Appearance, Urine Clear Clear    Specific Gravity, Urine 1.044 (N) 1.005 - 1.035    pH, Urine 5.5 5.0, 5.5, 6.0, 6.5, 7.0, 7.5, 8.0    Protein, Urine 30 (1+) (A) NEGATIVE, 10 (TRACE), 20 (TRACE) mg/dL    Glucose, Urine Normal Normal mg/dL    Blood, Urine NEGATIVE NEGATIVE    Ketones, Urine NEGATIVE NEGATIVE mg/dL    Bilirubin, Urine NEGATIVE NEGATIVE    Urobilinogen, Urine 2 (1+) (A) Normal mg/dL    Nitrite, Urine NEGATIVE NEGATIVE    Leukocyte Esterase, Urine NEGATIVE NEGATIVE   Extra Urine Gray Tube   Result Value Ref Range    Extra Tube Hold for add-ons.    Urinalysis Microscopic   Result Value Ref Range    WBC, Urine 1-5 1-5, NONE /HPF    RBC, Urine 3-5 NONE, 1-2, 3-5 /HPF    Mucus, Urine 1+ Reference range not established. /LPF           IMAGING/ DIAGNOSTIC TESTING:  I have personally reviewed the following test result(s):      Transthoracic Echo (TTE) Complete With 3D And Strain 09/10/2024  CONCLUSIONS:  1. The left ventricular systolic function is  moderately decreased, with a visually estimated ejection fraction of 35-40%.  2. There is global hypokinesis of the left ventricle with minor regional variations.  3. Left ventricular cavity size is severely dilated.  4. There is normal right ventricular global systolic function.  5. The left atrium is moderately dilated.  6. The right atrium is moderately dilated.  7. Moderate mitral valve regurgitation.  8. Trace tricuspid regurgitation is visualized.  9. Aortic valve stenosis is not present.  10. Likely tricuspid aortic valve but images slightly suboptimal.  11. Moderate aortic valve regurgitation.  12. Aneurysm of the ascending aorta.    Cardiac MRI 9/12/24  IMPRESSION:  1. Dilated LV (EDVi-182 ml/m2) with reduced systolic function.  Quantitative LVEF 34 %.  2. Normal RV size (EDVi-88 ml/m2)and systolic function. Quantitative  RVEF49%.  3. Aneurysmal dilatation of the ascending aorta measuring up 6.4 cm.  Dilated aortic root measuring 4.5 x 1.5 x 4.2 cm.  4. Moderate aortic regurgitation. Aortic regurgitant fraction is 36%.  5. Small focal transmural (%) LGE/scar involving the apical  lateral wall. Finding may represent prior embolic event versus prior  myocarditis.  6. Nonspecific replacement fibrosis noted at the level of the basal  mid septum.  7. Small bilateral pleural effusion.  8. Small circumferential pericardial effusion.    CT angio chest abdomen pelvis 9/10/24  IMPRESSION:  Large ascending aneurysm up to 6.2 cm. No evidence of acute aortic  dissection.  Cardiac enlargement. Features of mild pulmonary edema.  Hepatic heterogeneity.  Diverticulosis. No diverticulitis.. No free air. No free fluid.    CT angio chest w and wo contrast 9/17/24  IMPRESSION:  1. No significant change in appearance of aneurysmal dilatation of  aortic root (4.6 cm) and ascending thoracic aorta (measuring up to  6.4 cm) with effacement of sino-tubular junction. No evidence of  acute aortic pathology.  2. Suggestion of  minimal interstitial edema with slightly worsened  small bilateral pleural effusions.  3. Similar cardiomegaly with asymmetric left heart enlargement.  4. Redemonstrated moderate coronary artery calcifications. Please  note that, the present study is not tailored for evaluation of  coronary arteries.  5. Few linear calcifications of aortic valve again seen and correlate  with recent echocardiogram.    IMPRESSION & PLAN:  Ascending aneurysm up to 6.2 cm. No evidence of acute aortic dissection  - Continue home statin  - ECHO and LHC completed at OSH -> reached out to Dr. Perdomo 9/19 to make sure he saw the results of recent CTA on 9/17. He does not need any more imaging prior to surgery.    - Cardiac MRI 9/12  - Heart failure consulted and will f/up after surgery   - Carotid US done 9/12   - Changed to metoprolol succinate to tartrate 25mg BID 9/11/24  - Goal for BP control with home oral agent as well PRN hydralazine goal SBP <140, will add oral if needed to help with BP control  - Continue Hytrin and clonidine--> will increase cloniding to  0.1mg TID instead of BID  - Surgery date has been set for Monday 9/23 - 1st case, discussed with patient    Rhythm: hx paroxysmal afib  - Tele: SR 50s-70s  - Continue metop 25 mg BID  - Adjust medications as tolerated    Chronic systolic heart failure with EF 35-40 % on echo done 9/10/24   - appreciate HF recs  Recommendations:  Proceed with cardiac surgery for aortic aneurysm and aortic valve regurgitation  After cardiac surgery will need to optimize GDMT for HFrEF  Depending on what happens with his LV function with time he may need to be considered for CRT, but would not assess this until several weeks/months following cardiac surgery and introduction of GDMT    Thrombocytopenia  Platelets   Date Value Ref Range Status   09/17/2024 119 (L) 150 - 450 x10*3/uL Final   - baseline platelets at admit 147; has been on subQ heparin DVT prophylaxis  - 9/16 plts 112; check PF4 as day 5  of heparin with ~20% drop in platelets with 4T score = 4 (intermediate risk)  - Next CBC check is tomorrow     Hypertension: home meds: Benazepril-hydrochlorothiazide 20-25 mg, oral, Daily, Clonidine 0.1 mg, oral, Q12H, Felodipine ER 5 mg, oral, Daily, Terazosin 5 mg, oral, Daily   Systolic (24hrs), Av , Min:115 , Max:143   - continue BB 25 mg BID  - PO hydralazine 10 mg TID (BID)  - clonidine 0.1 mg q8hr  - additional antihypertensives as needed   - IV hydralazine PRN for SBP > 130    Hyperlipidemia: home Simvastatin 10 mg, oral, Daily   Lab Results   Component Value Date    CHOL 157 04/10/2024    LDLCALC 93 04/10/2024    HDL 53.0 04/10/2024    TRIG 55 04/10/2024   - continue statin  - follow up lipid panel with PCP/ cardiologist for ongoing lipid management    VTE Prophylaxis: SCDs/TEDs, ambulation, SQ heparin  Code Status: Full Code    Dispo  - PT/OT recs to be determined after surgery  - Surgery scheduled for  first round; patient aware of new time of 1st case  - Will go to CTICU postop  - Will continue to assess discharge needs postoperatively       Davina Stratton PA-C  Cardiac Surgery [facility]  [facility]  Team Phone [phone]    2024  9:34 AM

## 2024-09-20 ENCOUNTER — ANESTHESIA EVENT (OUTPATIENT)
Dept: OPERATING ROOM | Facility: HOSPITAL | Age: 69
End: 2024-09-20
Payer: MEDICARE

## 2024-09-20 PROCEDURE — 2500000002 HC RX 250 W HCPCS SELF ADMINISTERED DRUGS (ALT 637 FOR MEDICARE OP, ALT 636 FOR OP/ED): Performed by: PHYSICIAN ASSISTANT

## 2024-09-20 PROCEDURE — 2500000004 HC RX 250 GENERAL PHARMACY W/ HCPCS (ALT 636 FOR OP/ED)

## 2024-09-20 PROCEDURE — 2500000004 HC RX 250 GENERAL PHARMACY W/ HCPCS (ALT 636 FOR OP/ED): Performed by: STUDENT IN AN ORGANIZED HEALTH CARE EDUCATION/TRAINING PROGRAM

## 2024-09-20 PROCEDURE — 2500000001 HC RX 250 WO HCPCS SELF ADMINISTERED DRUGS (ALT 637 FOR MEDICARE OP)

## 2024-09-20 PROCEDURE — 2500000001 HC RX 250 WO HCPCS SELF ADMINISTERED DRUGS (ALT 637 FOR MEDICARE OP): Performed by: PHYSICIAN ASSISTANT

## 2024-09-20 PROCEDURE — 2500000001 HC RX 250 WO HCPCS SELF ADMINISTERED DRUGS (ALT 637 FOR MEDICARE OP): Performed by: NURSE PRACTITIONER

## 2024-09-20 PROCEDURE — 1200000002 HC GENERAL ROOM WITH TELEMETRY DAILY

## 2024-09-20 PROCEDURE — 99232 SBSQ HOSP IP/OBS MODERATE 35: CPT | Performed by: NURSE PRACTITIONER

## 2024-09-20 PROCEDURE — 2500000004 HC RX 250 GENERAL PHARMACY W/ HCPCS (ALT 636 FOR OP/ED): Performed by: NURSE PRACTITIONER

## 2024-09-20 RX ORDER — CLONIDINE HYDROCHLORIDE 0.1 MG/1
0.2 TABLET ORAL EVERY 8 HOURS SCHEDULED
Status: DISCONTINUED | OUTPATIENT
Start: 2024-09-20 | End: 2024-09-22

## 2024-09-20 RX ORDER — HYDRALAZINE HYDROCHLORIDE 100 MG/1
100 TABLET, FILM COATED ORAL 3 TIMES DAILY
Status: DISCONTINUED | OUTPATIENT
Start: 2024-09-20 | End: 2024-09-23

## 2024-09-20 RX ORDER — CLONIDINE HYDROCHLORIDE 0.1 MG/1
0.1 TABLET ORAL ONCE
Status: COMPLETED | OUTPATIENT
Start: 2024-09-20 | End: 2024-09-20

## 2024-09-20 RX ORDER — HYDRALAZINE HYDROCHLORIDE 20 MG/ML
10 INJECTION INTRAMUSCULAR; INTRAVENOUS
Status: DISCONTINUED | OUTPATIENT
Start: 2024-09-20 | End: 2024-09-23

## 2024-09-20 RX ORDER — HYDRALAZINE HYDROCHLORIDE 10 MG/1
50 TABLET, FILM COATED ORAL 3 TIMES DAILY
Status: DISCONTINUED | OUTPATIENT
Start: 2024-09-20 | End: 2024-09-20

## 2024-09-20 RX ADMIN — HEPARIN SODIUM 5000 UNITS: 5000 INJECTION, SOLUTION INTRAVENOUS; SUBCUTANEOUS at 20:07

## 2024-09-20 RX ADMIN — CLONIDINE HYDROCHLORIDE 0.1 MG: 0.1 TABLET ORAL at 08:34

## 2024-09-20 RX ADMIN — HYDRALAZINE HYDROCHLORIDE 100 MG: 10 TABLET ORAL at 20:06

## 2024-09-20 RX ADMIN — TERAZOSIN HYDROCHLORIDE 10 MG: 5 CAPSULE ORAL at 20:07

## 2024-09-20 RX ADMIN — HYDRALAZINE HYDROCHLORIDE 50 MG: 10 TABLET ORAL at 08:34

## 2024-09-20 RX ADMIN — PANTOPRAZOLE SODIUM 40 MG: 40 TABLET, DELAYED RELEASE ORAL at 08:34

## 2024-09-20 RX ADMIN — CLONIDINE HYDROCHLORIDE 0.1 MG: 0.1 TABLET ORAL at 21:08

## 2024-09-20 RX ADMIN — HYDRALAZINE HYDROCHLORIDE 15 MG: 20 INJECTION INTRAMUSCULAR; INTRAVENOUS at 00:00

## 2024-09-20 RX ADMIN — ACETAMINOPHEN 650 MG: 325 TABLET ORAL at 04:49

## 2024-09-20 RX ADMIN — METOPROLOL TARTRATE 25 MG: 25 TABLET, FILM COATED ORAL at 08:34

## 2024-09-20 RX ADMIN — HEPARIN SODIUM 5000 UNITS: 5000 INJECTION, SOLUTION INTRAVENOUS; SUBCUTANEOUS at 12:18

## 2024-09-20 RX ADMIN — HYDRALAZINE HYDROCHLORIDE 10 MG: 20 INJECTION INTRAMUSCULAR; INTRAVENOUS at 22:26

## 2024-09-20 RX ADMIN — ACETAMINOPHEN 650 MG: 325 TABLET ORAL at 23:53

## 2024-09-20 RX ADMIN — CLONIDINE HYDROCHLORIDE 0.1 MG: 0.1 TABLET ORAL at 14:21

## 2024-09-20 RX ADMIN — CLONIDINE HYDROCHLORIDE 0.1 MG: 0.1 TABLET ORAL at 23:19

## 2024-09-20 RX ADMIN — METOPROLOL TARTRATE 25 MG: 25 TABLET, FILM COATED ORAL at 20:06

## 2024-09-20 RX ADMIN — HYDRALAZINE HYDROCHLORIDE 50 MG: 10 TABLET ORAL at 15:19

## 2024-09-20 RX ADMIN — HEPARIN SODIUM 5000 UNITS: 5000 INJECTION, SOLUTION INTRAVENOUS; SUBCUTANEOUS at 03:05

## 2024-09-20 RX ADMIN — HYDRALAZINE HYDROCHLORIDE 15 MG: 20 INJECTION INTRAMUSCULAR; INTRAVENOUS at 05:37

## 2024-09-20 RX ADMIN — SIMVASTATIN 10 MG: 10 TABLET, FILM COATED ORAL at 20:07

## 2024-09-20 ASSESSMENT — COGNITIVE AND FUNCTIONAL STATUS - GENERAL
EATING MEALS: A LITTLE
DAILY ACTIVITIY SCORE: 24
MOBILITY SCORE: 24
MOBILITY SCORE: 24
DAILY ACTIVITIY SCORE: 23

## 2024-09-20 ASSESSMENT — PAIN SCALES - GENERAL
PAINLEVEL_OUTOF10: 0 - NO PAIN
PAINLEVEL_OUTOF10: 2
PAINLEVEL_OUTOF10: 0 - NO PAIN
PAINLEVEL_OUTOF10: 2

## 2024-09-20 ASSESSMENT — PAIN - FUNCTIONAL ASSESSMENT
PAIN_FUNCTIONAL_ASSESSMENT: 0-10
PAIN_FUNCTIONAL_ASSESSMENT: 0-10

## 2024-09-20 ASSESSMENT — PAIN DESCRIPTION - ORIENTATION: ORIENTATION: MID

## 2024-09-20 ASSESSMENT — PAIN DESCRIPTION - LOCATION
LOCATION: NECK
LOCATION: NECK

## 2024-09-20 NOTE — CARE PLAN
Problem: Pain - Adult  Goal: Verbalizes/displays adequate comfort level or baseline comfort level  Outcome: Progressing     Problem: Safety - Adult  Goal: Free from fall injury  Outcome: Progressing     Problem: Fall/Injury  Goal: Not fall by end of shift  Outcome: Progressing  Goal: Be free from injury by end of the shift  Outcome: Progressing  Goal: Verbalize understanding of personal risk factors for fall in the hospital  Outcome: Progressing  Goal: Verbalize understanding of risk factor reduction measures to prevent injury from fall in the home  Outcome: Progressing  Goal: Use assistive devices by end of the shift  Outcome: Progressing  Goal: Pace activities to prevent fatigue by end of the shift  Outcome: Progressing       The clinical goals for the shift include Patient will remain hemodynamically stable throughout the shift

## 2024-09-20 NOTE — PROGRESS NOTES
CARDIAC SURGERY DAILY PROGRESS NOTE    David Chatman is a 68 y.o. male presenting with shortness of breath. He presented to his cardiologist as an outpt for these complaints. Echo showed an EF of 35-40% and ascending aneurysm 6.2. He was directed to the ED. BNP 1015, troponins, 21,16,15. CT chest revealed large ascending aneurysm up to 6.2 cm. No evidence of acute aortic dissection. He was given IV lasix with relief of symptoms and admitted for further evaluation. He is currently planned for cardiac catheterization. Asymptomatic. The pt reports his blood pressures are under control currently. CT surgery consulted for aneurysm evaluation at OSH and he was transferred to [facility] for further care.    Interval History:   Overnight uneventful   Extensive education about surgical procedure and expectations      SUBJECTIVE:  Anxious about surgery     Objective   /67 (BP Location: Left arm, Patient Position: Lying)   Pulse 63   Temp 36.6 °C (97.9 °F) (Temporal)   Resp 17   Wt 112 kg (246 lb 8 oz)   SpO2 93%   BMI 32.52 kg/m²   0-10 (Numeric) Pain Score: 2   3 Day Weight Change: Unable to Calculate    Intake and Output    Intake/Output Summary (Last 24 hours) at 9/20/2024 1044  Last data filed at 9/19/2024 1920  Gross per 24 hour   Intake 240 ml   Output 750 ml   Net -510 ml       Physical Exam  Physical Exam  Vitals and nursing note reviewed.   HENT:      Head: Normocephalic.      Mouth/Throat:      Mouth: Mucous membranes are moist.   Eyes:      Conjunctiva/sclera: Conjunctivae normal.   Cardiovascular:      Rate and Rhythm: Normal rate and regular rhythm.      Pulses: Normal pulses.      Heart sounds: Normal heart sounds.      Comments: SR 60s-70s  Pulmonary:      Effort: Pulmonary effort is normal.      Breath sounds: Normal breath sounds.   Abdominal:      General: Abdomen is flat.      Palpations: Abdomen is soft.   Musculoskeletal:      Cervical back: Neck supple.      Right lower leg: No edema.      Left  lower leg: No edema.   Skin:     General: Skin is warm and dry.   Neurological:      General: No focal deficit present.      Mental Status: He is alert and oriented to person, place, and time. Mental status is at baseline.   Psychiatric:         Mood and Affect: Mood normal.         Behavior: Behavior normal.       Medications  Scheduled medications  [START ON 9/22/2024] chlorhexidine, 15 mL, Mouth/Throat, BID  cloNIDine, 0.1 mg, oral, q8h DESHAWN  heparin (porcine), 5,000 Units, subcutaneous, q8h  hydrALAZINE, 50 mg, oral, TID  metoprolol tartrate, 25 mg, oral, BID  pantoprazole, 40 mg, oral, Daily before breakfast  simvastatin, 10 mg, oral, Nightly  terazosin, 10 mg, oral, Nightly    Continuous medications   PRN medications  PRN medications: acetaminophen **OR** [DISCONTINUED] acetaminophen **OR** [DISCONTINUED] acetaminophen, hydrALAZINE    Labs  No results found for this or any previous visit (from the past 24 hour(s)).          IMAGING/ DIAGNOSTIC TESTING:  I have personally reviewed the following test result(s):      Transthoracic Echo (TTE) Complete With 3D And Strain 09/10/2024  CONCLUSIONS:  1. The left ventricular systolic function is moderately decreased, with a visually estimated ejection fraction of 35-40%.  2. There is global hypokinesis of the left ventricle with minor regional variations.  3. Left ventricular cavity size is severely dilated.  4. There is normal right ventricular global systolic function.  5. The left atrium is moderately dilated.  6. The right atrium is moderately dilated.  7. Moderate mitral valve regurgitation.  8. Trace tricuspid regurgitation is visualized.  9. Aortic valve stenosis is not present.  10. Likely tricuspid aortic valve but images slightly suboptimal.  11. Moderate aortic valve regurgitation.  12. Aneurysm of the ascending aorta.    Cardiac MRI 9/12/24  IMPRESSION:  1. Dilated LV (EDVi-182 ml/m2) with reduced systolic function.  Quantitative LVEF 34 %.  2. Normal RV size  (EDVi-88 ml/m2)and systolic function. Quantitative  RVEF49%.  3. Aneurysmal dilatation of the ascending aorta measuring up 6.4 cm.  Dilated aortic root measuring 4.5 x 1.5 x 4.2 cm.  4. Moderate aortic regurgitation. Aortic regurgitant fraction is 36%.  5. Small focal transmural (%) LGE/scar involving the apical  lateral wall. Finding may represent prior embolic event versus prior  myocarditis.  6. Nonspecific replacement fibrosis noted at the level of the basal  mid septum.  7. Small bilateral pleural effusion.  8. Small circumferential pericardial effusion.    CT angio chest abdomen pelvis 9/10/24  IMPRESSION:  Large ascending aneurysm up to 6.2 cm. No evidence of acute aortic  dissection.  Cardiac enlargement. Features of mild pulmonary edema.  Hepatic heterogeneity.  Diverticulosis. No diverticulitis.. No free air. No free fluid.    CT angio chest w and wo contrast 9/17/24  IMPRESSION:  1. No significant change in appearance of aneurysmal dilatation of  aortic root (4.6 cm) and ascending thoracic aorta (measuring up to  6.4 cm) with effacement of sino-tubular junction. No evidence of  acute aortic pathology.  2. Suggestion of minimal interstitial edema with slightly worsened  small bilateral pleural effusions.  3. Similar cardiomegaly with asymmetric left heart enlargement.  4. Redemonstrated moderate coronary artery calcifications. Please  note that, the present study is not tailored for evaluation of  coronary arteries.  5. Few linear calcifications of aortic valve again seen and correlate  with recent echocardiogram.    IMPRESSION & PLAN:   Preop   Ascending aneurysm up to 6.2 cm. No evidence of acute aortic dissection  - Continue home statin  - ECHO and LHC completed at OSH -> reached out to Dr. Perdomo 9/19 to make sure he saw the results of recent CTA on 9/17. He does not need any more imaging prior to surgery.    - Cardiac MRI 9/12  - Heart failure consulted and will f/up after surgery   - Carotid US  "done    - Changed to metoprolol succinate to tartrate 25mg BID 24  - Goal for BP control with home oral agent as well PRN hydralazine goal SBP <140, will add oral if needed to help with BP control  - Continue Hytrin and clonidine--> will increase cloniding to  0.1mg TID instead of BID  - Surgery date has been set for  - 1st case, discussed with patient    Rhythm: hx paroxysmal afib  - Tele: SR 50s-70s  - Continue metop 25 mg BID  - Adjust medications as tolerated    Chronic systolic heart failure with EF 35-40 % on echo done 9/10/24   - appreciate HF recs  Recommendations:  Proceed with cardiac surgery for aortic aneurysm and aortic valve regurgitation  After cardiac surgery will need to optimize GDMT for HFrEF  Depending on what happens with his LV function with time he may need to be considered for CRT, but would not assess this until several weeks/months following cardiac surgery and introduction of GDMT    Thrombocytopenia  No results found for: \"PLT\"  - baseline platelets at admit 147; has been on subQ heparin DVT prophylaxis  -  plts 112; check PF4 as day 5 of heparin with ~20% drop in platelets with 4T score = 4 (intermediate risk)  - Next CBC check is tomorrow     Hypertension: home meds: Benazepril-hydrochlorothiazide 20-25 mg, oral, Daily, Clonidine 0.1 mg, oral, Q12H, Felodipine ER 5 mg, oral, Daily, Terazosin 5 mg, oral, Daily   Systolic (24hrs), Av , Min:128 , Max:152   - continue BB 25 mg BID  - PO hydralazine 10 mg TID (BID)  - clonidine 0.1 mg q8hr  - additional antihypertensives as needed   - IV hydralazine PRN for SBP > 130    Hyperlipidemia: home Simvastatin 10 mg, oral, Daily   Lab Results   Component Value Date    CHOL 157 04/10/2024    LDLCALC 93 04/10/2024    HDL 53.0 04/10/2024    TRIG 55 04/10/2024   - continue statin  - follow up lipid panel with PCP/ cardiologist for ongoing lipid management    VTE Prophylaxis: SCDs/TEDs, ambulation, SQ heparin  Code " Status: Full Code    Dispo  - PT/OT recs to be determined after surgery  - Surgery scheduled for Monday 9/23 first round; patient aware of new time of 1st case  - Will go to CTICU postop  - Will continue to assess discharge needs postoperatively       SERGIO Mendoza-CNP  Cardiac Surgery [facility]  [facility]  Team Phone [phone]    9/20/2024  10:44 AM

## 2024-09-21 PROBLEM — R09.89 SINUS SYMPTOM: Status: ACTIVE | Noted: 2024-09-21

## 2024-09-21 PROBLEM — R93.1 DECREASED CARDIAC EJECTION FRACTION: Status: ACTIVE | Noted: 2024-09-21

## 2024-09-21 PROBLEM — M17.11 PRIMARY OSTEOARTHRITIS OF RIGHT KNEE: Status: ACTIVE | Noted: 2024-09-21

## 2024-09-21 PROBLEM — R94.31 ABNORMAL EKG: Status: ACTIVE | Noted: 2024-09-21

## 2024-09-21 PROBLEM — J30.2 SEASONAL ALLERGIES: Status: ACTIVE | Noted: 2024-09-21

## 2024-09-21 PROBLEM — N18.9 CHRONIC RENAL INSUFFICIENCY: Status: ACTIVE | Noted: 2024-09-21

## 2024-09-21 PROBLEM — R06.09 DYSPNEA ON EXERTION: Status: ACTIVE | Noted: 2024-08-12

## 2024-09-21 PROBLEM — E78.5 HYPERLIPIDEMIA: Status: ACTIVE | Noted: 2024-09-21

## 2024-09-21 PROBLEM — E66.9 CLASS 1 OBESITY WITH BODY MASS INDEX (BMI) OF 32.0 TO 32.9 IN ADULT: Status: ACTIVE | Noted: 2024-09-21

## 2024-09-21 PROBLEM — D69.6 THROMBOCYTOPENIA (CMS-HCC): Status: ACTIVE | Noted: 2024-09-21

## 2024-09-21 PROBLEM — E66.811 CLASS 1 OBESITY WITH BODY MASS INDEX (BMI) OF 32.0 TO 32.9 IN ADULT: Status: ACTIVE | Noted: 2024-09-21

## 2024-09-21 PROBLEM — I50.22 CHRONIC SYSTOLIC CONGESTIVE HEART FAILURE: Status: ACTIVE | Noted: 2024-09-21

## 2024-09-21 PROBLEM — D64.9 ANEMIA: Status: ACTIVE | Noted: 2024-09-21

## 2024-09-21 LAB
ABO GROUP (TYPE) IN BLOOD: NORMAL
ALBUMIN SERPL BCP-MCNC: 3.9 G/DL (ref 3.4–5)
ANION GAP SERPL CALC-SCNC: 13 MMOL/L (ref 10–20)
ANTIBODY SCREEN: NORMAL
BLOOD EXPIRATION DATE: NORMAL
BUN SERPL-MCNC: 22 MG/DL (ref 6–23)
CALCIUM SERPL-MCNC: 8.7 MG/DL (ref 8.6–10.6)
CHLORIDE SERPL-SCNC: 111 MMOL/L (ref 98–107)
CO2 SERPL-SCNC: 21 MMOL/L (ref 21–32)
CREAT SERPL-MCNC: 1.08 MG/DL (ref 0.5–1.3)
DISPENSE STATUS: NORMAL
EGFRCR SERPLBLD CKD-EPI 2021: 75 ML/MIN/1.73M*2
ERYTHROCYTE [DISTWIDTH] IN BLOOD BY AUTOMATED COUNT: 14.6 % (ref 11.5–14.5)
GLUCOSE SERPL-MCNC: 96 MG/DL (ref 74–99)
HCT VFR BLD AUTO: 35.7 % (ref 41–52)
HGB BLD-MCNC: 11.5 G/DL (ref 13.5–17.5)
MAGNESIUM SERPL-MCNC: 2.19 MG/DL (ref 1.6–2.4)
MCH RBC QN AUTO: 27.7 PG (ref 26–34)
MCHC RBC AUTO-ENTMCNC: 32.2 G/DL (ref 32–36)
MCV RBC AUTO: 86 FL (ref 80–100)
NRBC BLD-RTO: 0 /100 WBCS (ref 0–0)
PHOSPHATE SERPL-MCNC: 4.1 MG/DL (ref 2.5–4.9)
PLATELET # BLD AUTO: 124 X10*3/UL (ref 150–450)
POTASSIUM SERPL-SCNC: 4 MMOL/L (ref 3.5–5.3)
PRODUCT BLOOD TYPE: 5100
PRODUCT CODE: NORMAL
RBC # BLD AUTO: 4.15 X10*6/UL (ref 4.5–5.9)
RH FACTOR (ANTIGEN D): NORMAL
SODIUM SERPL-SCNC: 141 MMOL/L (ref 136–145)
UNIT ABO: NORMAL
UNIT NUMBER: NORMAL
UNIT RH: NORMAL
UNIT VOLUME: 281
UNIT VOLUME: 350
WBC # BLD AUTO: 4.5 X10*3/UL (ref 4.4–11.3)
XM INTEP: NORMAL

## 2024-09-21 PROCEDURE — 80069 RENAL FUNCTION PANEL: CPT | Performed by: NURSE PRACTITIONER

## 2024-09-21 PROCEDURE — 85027 COMPLETE CBC AUTOMATED: CPT | Performed by: NURSE PRACTITIONER

## 2024-09-21 PROCEDURE — 36415 COLL VENOUS BLD VENIPUNCTURE: CPT | Performed by: NURSE PRACTITIONER

## 2024-09-21 PROCEDURE — 2500000002 HC RX 250 W HCPCS SELF ADMINISTERED DRUGS (ALT 637 FOR MEDICARE OP, ALT 636 FOR OP/ED): Performed by: PHYSICIAN ASSISTANT

## 2024-09-21 PROCEDURE — 2500000001 HC RX 250 WO HCPCS SELF ADMINISTERED DRUGS (ALT 637 FOR MEDICARE OP): Performed by: NURSE PRACTITIONER

## 2024-09-21 PROCEDURE — 99232 SBSQ HOSP IP/OBS MODERATE 35: CPT | Performed by: NURSE PRACTITIONER

## 2024-09-21 PROCEDURE — 2500000004 HC RX 250 GENERAL PHARMACY W/ HCPCS (ALT 636 FOR OP/ED)

## 2024-09-21 PROCEDURE — 86923 COMPATIBILITY TEST ELECTRIC: CPT

## 2024-09-21 PROCEDURE — 2500000001 HC RX 250 WO HCPCS SELF ADMINISTERED DRUGS (ALT 637 FOR MEDICARE OP)

## 2024-09-21 PROCEDURE — 86901 BLOOD TYPING SEROLOGIC RH(D): CPT | Performed by: NURSE PRACTITIONER

## 2024-09-21 PROCEDURE — 2500000001 HC RX 250 WO HCPCS SELF ADMINISTERED DRUGS (ALT 637 FOR MEDICARE OP): Performed by: PHYSICIAN ASSISTANT

## 2024-09-21 PROCEDURE — 83735 ASSAY OF MAGNESIUM: CPT | Performed by: NURSE PRACTITIONER

## 2024-09-21 PROCEDURE — 1200000002 HC GENERAL ROOM WITH TELEMETRY DAILY

## 2024-09-21 RX ORDER — AMLODIPINE BESYLATE 5 MG/1
5 TABLET ORAL DAILY
Status: DISCONTINUED | OUTPATIENT
Start: 2024-09-21 | End: 2024-09-23

## 2024-09-21 RX ADMIN — CLONIDINE HYDROCHLORIDE 0.2 MG: 0.1 TABLET ORAL at 06:08

## 2024-09-21 RX ADMIN — SIMVASTATIN 10 MG: 10 TABLET, FILM COATED ORAL at 20:41

## 2024-09-21 RX ADMIN — CLONIDINE HYDROCHLORIDE 0.2 MG: 0.1 TABLET ORAL at 15:01

## 2024-09-21 RX ADMIN — ACETAMINOPHEN 650 MG: 325 TABLET ORAL at 22:10

## 2024-09-21 RX ADMIN — TERAZOSIN HYDROCHLORIDE 10 MG: 5 CAPSULE ORAL at 20:41

## 2024-09-21 RX ADMIN — AMLODIPINE BESYLATE 5 MG: 5 TABLET ORAL at 08:28

## 2024-09-21 RX ADMIN — HEPARIN SODIUM 5000 UNITS: 5000 INJECTION, SOLUTION INTRAVENOUS; SUBCUTANEOUS at 06:08

## 2024-09-21 RX ADMIN — CLONIDINE HYDROCHLORIDE 0.2 MG: 0.1 TABLET ORAL at 22:14

## 2024-09-21 RX ADMIN — HYDRALAZINE HYDROCHLORIDE 100 MG: 10 TABLET ORAL at 15:01

## 2024-09-21 RX ADMIN — HYDRALAZINE HYDROCHLORIDE 100 MG: 10 TABLET ORAL at 20:41

## 2024-09-21 RX ADMIN — HEPARIN SODIUM 5000 UNITS: 5000 INJECTION, SOLUTION INTRAVENOUS; SUBCUTANEOUS at 15:01

## 2024-09-21 RX ADMIN — HYDRALAZINE HYDROCHLORIDE 100 MG: 10 TABLET ORAL at 08:27

## 2024-09-21 RX ADMIN — PANTOPRAZOLE SODIUM 40 MG: 40 TABLET, DELAYED RELEASE ORAL at 06:08

## 2024-09-21 RX ADMIN — METOPROLOL TARTRATE 25 MG: 25 TABLET, FILM COATED ORAL at 20:41

## 2024-09-21 ASSESSMENT — COGNITIVE AND FUNCTIONAL STATUS - GENERAL
MOBILITY SCORE: 24
DAILY ACTIVITIY SCORE: 24
MOBILITY SCORE: 24
DAILY ACTIVITIY SCORE: 24

## 2024-09-21 ASSESSMENT — PAIN SCALES - GENERAL
PAINLEVEL_OUTOF10: 0 - NO PAIN
PAINLEVEL_OUTOF10: 0 - NO PAIN

## 2024-09-21 ASSESSMENT — PAIN - FUNCTIONAL ASSESSMENT
PAIN_FUNCTIONAL_ASSESSMENT: 0-10
PAIN_FUNCTIONAL_ASSESSMENT: 0-10

## 2024-09-21 NOTE — PROGRESS NOTES
CARDIAC SURGERY DAILY PROGRESS NOTE    David Chatman is a 68 y.o. male presenting with shortness of breath. He presented to his cardiologist as an outpt for these complaints. Echo showed an EF of 35-40% and ascending aneurysm 6.2. He was directed to the ED. BNP 1015, troponins, 21,16,15. CT chest revealed large ascending aneurysm up to 6.2 cm. No evidence of acute aortic dissection. He was given IV lasix with relief of symptoms and admitted for further evaluation. He is currently planned for cardiac catheterization. Asymptomatic. The pt reports his blood pressures are under control currently. CT surgery consulted for aneurysm evaluation at OSH and he was transferred to [facility] for further care.    Interval History:   Overnight uneventful   Extensive education about surgical procedure and expectations      SUBJECTIVE:  Anxious about surgery     Objective   /57 (BP Location: Left arm, Patient Position: Lying)   Pulse 61   Temp 36.3 °C (97.3 °F) (Temporal)   Resp 17   Wt 112 kg (246 lb 8 oz)   SpO2 94%   BMI 32.52 kg/m²   0-10 (Numeric) Pain Score: 2   3 Day Weight Change: Unable to Calculate    Intake and Output    Intake/Output Summary (Last 24 hours) at 9/21/2024 0746  Last data filed at 9/20/2024 1954  Gross per 24 hour   Intake 240 ml   Output --   Net 240 ml       Physical Exam  Physical Exam  Vitals and nursing note reviewed.   HENT:      Head: Normocephalic.      Mouth/Throat:      Mouth: Mucous membranes are moist.   Eyes:      Conjunctiva/sclera: Conjunctivae normal.   Cardiovascular:      Rate and Rhythm: Normal rate and regular rhythm.      Pulses: Normal pulses.      Heart sounds: Normal heart sounds.      Comments: SR 60s-70s  Pulmonary:      Effort: Pulmonary effort is normal.      Breath sounds: Normal breath sounds.   Abdominal:      General: Abdomen is flat.      Palpations: Abdomen is soft.   Musculoskeletal:      Cervical back: Neck supple.      Right lower leg: No edema.      Left lower  leg: No edema.   Skin:     General: Skin is warm and dry.   Neurological:      General: No focal deficit present.      Mental Status: He is alert and oriented to person, place, and time. Mental status is at baseline.   Psychiatric:         Mood and Affect: Mood normal.         Behavior: Behavior normal.      Comments: Anxious about the surgery       Medications  Scheduled medications  amLODIPine, 5 mg, oral, Daily  [START ON 9/22/2024] chlorhexidine, 15 mL, Mouth/Throat, BID  cloNIDine, 0.2 mg, oral, q8h DESHAWN  heparin (porcine), 5,000 Units, subcutaneous, q8h  hydrALAZINE, 100 mg, oral, TID  metoprolol tartrate, 25 mg, oral, BID  pantoprazole, 40 mg, oral, Daily before breakfast  simvastatin, 10 mg, oral, Nightly  terazosin, 10 mg, oral, Nightly    Continuous medications   PRN medications  PRN medications: acetaminophen **OR** [DISCONTINUED] acetaminophen **OR** [DISCONTINUED] acetaminophen, hydrALAZINE    Labs  No results found for this or any previous visit (from the past 24 hour(s)).          IMAGING/ DIAGNOSTIC TESTING:  I have personally reviewed the following test result(s):      Transthoracic Echo (TTE) Complete With 3D And Strain 09/10/2024  CONCLUSIONS:  1. The left ventricular systolic function is moderately decreased, with a visually estimated ejection fraction of 35-40%.  2. There is global hypokinesis of the left ventricle with minor regional variations.  3. Left ventricular cavity size is severely dilated.  4. There is normal right ventricular global systolic function.  5. The left atrium is moderately dilated.  6. The right atrium is moderately dilated.  7. Moderate mitral valve regurgitation.  8. Trace tricuspid regurgitation is visualized.  9. Aortic valve stenosis is not present.  10. Likely tricuspid aortic valve but images slightly suboptimal.  11. Moderate aortic valve regurgitation.  12. Aneurysm of the ascending aorta.    Cardiac MRI 9/12/24  IMPRESSION:  1. Dilated LV (EDVi-182 ml/m2) with  reduced systolic function.  Quantitative LVEF 34 %.  2. Normal RV size (EDVi-88 ml/m2)and systolic function. Quantitative  RVEF49%.  3. Aneurysmal dilatation of the ascending aorta measuring up 6.4 cm.  Dilated aortic root measuring 4.5 x 1.5 x 4.2 cm.  4. Moderate aortic regurgitation. Aortic regurgitant fraction is 36%.  5. Small focal transmural (%) LGE/scar involving the apical  lateral wall. Finding may represent prior embolic event versus prior  myocarditis.  6. Nonspecific replacement fibrosis noted at the level of the basal  mid septum.  7. Small bilateral pleural effusion.  8. Small circumferential pericardial effusion.    CT angio chest abdomen pelvis 9/10/24  IMPRESSION:  Large ascending aneurysm up to 6.2 cm. No evidence of acute aortic  dissection.  Cardiac enlargement. Features of mild pulmonary edema.  Hepatic heterogeneity.  Diverticulosis. No diverticulitis.. No free air. No free fluid.    CT angio chest w and wo contrast 9/17/24  IMPRESSION:  1. No significant change in appearance of aneurysmal dilatation of  aortic root (4.6 cm) and ascending thoracic aorta (measuring up to  6.4 cm) with effacement of sino-tubular junction. No evidence of  acute aortic pathology.  2. Suggestion of minimal interstitial edema with slightly worsened  small bilateral pleural effusions.  3. Similar cardiomegaly with asymmetric left heart enlargement.  4. Redemonstrated moderate coronary artery calcifications. Please  note that, the present study is not tailored for evaluation of  coronary arteries.  5. Few linear calcifications of aortic valve again seen and correlate  with recent echocardiogram.    IMPRESSION & PLAN:   Preop   Ascending aneurysm up to 6.2 cm. No evidence of acute aortic dissection  - Continue home statin  - ECHO and LHC completed at OSH -> reached out to Dr. Perdomo 9/19 to make sure he saw the results of recent CTA on 9/17. He does not need any more imaging prior to surgery.    - Cardiac MRI  "  - Heart failure consulted and will f/up after surgery   - Carotid US done    - Changed to metoprolol succinate to tartrate 25mg BID 24  - Goal for BP control with home oral agent as well PRN hydralazine goal SBP <140, will add oral if needed to help with BP control  - Continue Hytrin and clonidine--> will increase cloniding to  0.1mg TID instead of BID  - Surgery date has been set for  - 1st case, discussed with patient    Rhythm: hx paroxysmal afib  - Tele: SR 50s-70s  - Continue metop 25 mg BID  - Adjust medications as tolerated    Chronic systolic heart failure with EF 35-40 % on echo done 9/10/24   - appreciate HF recs  Recommendations:  Proceed with cardiac surgery for aortic aneurysm and aortic valve regurgitation  After cardiac surgery will need to optimize GDMT for HFrEF  Depending on what happens with his LV function with time he may need to be considered for CRT, but would not assess this until several weeks/months following cardiac surgery and introduction of GDMT    Thrombocytopenia  No results found for: \"PLT\"  - baseline platelets at admit 147; has been on subQ heparin DVT prophylaxis  -  plts 112; check PF4 as day 5 of heparin with ~20% drop in platelets with 4T score = 4 (intermediate risk)  - CBC     Hypertension: home meds: Benazepril-hydrochlorothiazide 20-25 mg, oral, Daily, Clonidine 0.1 mg, oral, Q12H, Felodipine ER 5 mg, oral, Daily, Terazosin 5 mg, oral, Daily   Systolic (24hrs), Av , Min:116 , Max:138   - continue BB 25 mg BID  - PO hydralazine 100 mg TID (BID)  - clonidine 0.2 mg q8hr  - Amlodipine 5 mg daily   - additional antihypertensives as needed   - IV hydralazine PRN for SBP > 130    Hyperlipidemia: home Simvastatin 10 mg, oral, Daily   Lab Results   Component Value Date    CHOL 157 04/10/2024    LDLCALC 93 04/10/2024    HDL 53.0 04/10/2024    TRIG 55 04/10/2024   - continue statin  - follow up lipid panel with PCP/ cardiologist for ongoing " lipid management    VTE Prophylaxis: SCDs/TEDs, ambulation, SQ heparin  Code Status: Full Code    Dispo  - PT/OT recs to be determined after surgery  - Surgery scheduled for Monday 9/23 first round; patient aware of new time of 1st case  - Will go to CTICU postop    SERGIO Mendoza-CNP  Cardiac Surgery [facility]  [facility]  Team Phone [phone]    9/21/2024  7:46 AM

## 2024-09-21 NOTE — ANESTHESIA PREPROCEDURE EVALUATION
Patient: David Chatman    Procedure Information       Date/Time: 09/23/24 0645    Procedure: Aortic root and ascending aorta replacement - first case    Location: [facility] OR 19 / [facility] OR    Surgeons: Trino Perdomo MD            Relevant Problems   Anesthesia (within normal limits)      Cardiac   (+) Abnormal EKG (Hx LBBB)   (+) Aneurysm of aorta in diseases classified elsewhere   (+) Benign hypertension   (+) Chest pain   (+) Chronic systolic congestive heart failure   (+) Decreased cardiac ejection fraction (Hx of cardiomyopathy)   (+) Hyperlipidemia   (+) LBBB (left bundle branch block)   (+) Nonrheumatic aortic valve insufficiency   (+) Paroxysmal atrial fibrillation (Multi)   (+) Thoracic ascending aortic aneurysm (CMS-HCC)      Pulmonary   (+) Dyspnea on exertion   (+) Obstructive sleep apnea of adult (Wears CPAP at home every night)      Neuro  Hypersomnolence disorder      GI (within normal limits)      /Renal  Hx R-sided testicular pain   (+) Chronic renal insufficiency (Hx gout  )      Liver (within normal limits)      Endocrine   (+) Class 1 obesity with body mass index (BMI) of 32.0 to 32.9 in adult   (-) Diabetes mellitus, type 2 (Multi) (Hx pre-diabetes)      Hematology   (+) Anemia (Chronic mild anemia)   (+) Thrombocytopenia (CMS-HCC) (Chronic mild thrombocytopenia)      Musculoskeletal  Chronic fatigue syndrome   (+) Primary osteoarthritis of right knee      HEENT   (+) Seasonal allergies   (+) Sensorineural hearing loss (SNHL) of both ears (Bilateral tinnitus)   (+) Sinus symptom (Chronic sinusitis/rhinitis, nasal turbinate hypertrophy, deviated nasal septum)      ID (within normal limits)      Skin (within normal limits)       Clinical information reviewed:    Allergies  Meds               NPO Detail:  No data recorded     Physical Exam    Airway  Mallampati: III  TM distance: >3 FB  Neck ROM: limited  Comments: [place]   Cardiovascular   Rhythm: regular  Rate: normal  (+)  peripheral edema (2+ pitting edema of BLE)     Dental   (+) upper dentures       Pulmonary - normal exam  Breath sounds clear to auscultation     Abdominal - normal exam  (+) obese  Abdomen: soft  Bowel sounds: normal     Other findings: Pt with full upper dentures with metal posts in place, solid lower dentition/none loose per patient          Anesthesia Plan    History of general anesthesia?: yes  History of complications of general anesthesia?: no    ASA 4     general and regional   (Plan GETA/A-line/PIVx2/CVP/JUMANA/post-op vent, possible post-op nerve blocks)  The patient is not a current smoker.  Patient was previously instructed to abstain from smoking on day of procedure.  Patient did not smoke on day of procedure.  Education provided regarding risk of obstructive sleep apnea.  intravenous induction   Postoperative administration of opioids is intended.  Anesthetic plan and risks discussed with patient and spouse.  Use of blood products discussed with patient and spouse who consented to blood products.    Plan discussed with resident and attending.

## 2024-09-22 VITALS
TEMPERATURE: 99 F | BODY MASS INDEX: 33.26 KG/M2 | SYSTOLIC BLOOD PRESSURE: 126 MMHG | DIASTOLIC BLOOD PRESSURE: 62 MMHG | HEART RATE: 69 BPM | OXYGEN SATURATION: 93 % | WEIGHT: 252.1 LBS | RESPIRATION RATE: 18 BRPM

## 2024-09-22 PROCEDURE — 2500000001 HC RX 250 WO HCPCS SELF ADMINISTERED DRUGS (ALT 637 FOR MEDICARE OP): Performed by: NURSE PRACTITIONER

## 2024-09-22 PROCEDURE — 2500000004 HC RX 250 GENERAL PHARMACY W/ HCPCS (ALT 636 FOR OP/ED)

## 2024-09-22 PROCEDURE — 2500000002 HC RX 250 W HCPCS SELF ADMINISTERED DRUGS (ALT 637 FOR MEDICARE OP, ALT 636 FOR OP/ED): Performed by: PHYSICIAN ASSISTANT

## 2024-09-22 PROCEDURE — 99232 SBSQ HOSP IP/OBS MODERATE 35: CPT | Performed by: NURSE PRACTITIONER

## 2024-09-22 PROCEDURE — 2500000001 HC RX 250 WO HCPCS SELF ADMINISTERED DRUGS (ALT 637 FOR MEDICARE OP): Performed by: PHYSICIAN ASSISTANT

## 2024-09-22 PROCEDURE — 1200000002 HC GENERAL ROOM WITH TELEMETRY DAILY

## 2024-09-22 RX ORDER — CLONIDINE HYDROCHLORIDE 0.1 MG/1
0.1 TABLET ORAL EVERY 12 HOURS SCHEDULED
Status: DISCONTINUED | OUTPATIENT
Start: 2024-09-22 | End: 2024-09-23

## 2024-09-22 RX ORDER — METOPROLOL TARTRATE 25 MG/1
12.5 TABLET, FILM COATED ORAL 2 TIMES DAILY
Status: DISCONTINUED | OUTPATIENT
Start: 2024-09-22 | End: 2024-09-23

## 2024-09-22 RX ADMIN — HYDRALAZINE HYDROCHLORIDE 100 MG: 10 TABLET ORAL at 16:03

## 2024-09-22 RX ADMIN — HYDRALAZINE HYDROCHLORIDE 100 MG: 10 TABLET ORAL at 09:19

## 2024-09-22 RX ADMIN — AMLODIPINE BESYLATE 5 MG: 5 TABLET ORAL at 09:19

## 2024-09-22 RX ADMIN — CHLORHEXIDINE GLUCONATE 0.12% ORAL RINSE 15 ML: 1.2 LIQUID ORAL at 21:44

## 2024-09-22 RX ADMIN — PANTOPRAZOLE SODIUM 40 MG: 40 TABLET, DELAYED RELEASE ORAL at 06:29

## 2024-09-22 RX ADMIN — METOPROLOL TARTRATE 12.5 MG: 25 TABLET, FILM COATED ORAL at 21:43

## 2024-09-22 RX ADMIN — CLONIDINE HYDROCHLORIDE 0.2 MG: 0.1 TABLET ORAL at 06:29

## 2024-09-22 RX ADMIN — HEPARIN SODIUM 5000 UNITS: 5000 INJECTION, SOLUTION INTRAVENOUS; SUBCUTANEOUS at 16:03

## 2024-09-22 RX ADMIN — HEPARIN SODIUM 5000 UNITS: 5000 INJECTION, SOLUTION INTRAVENOUS; SUBCUTANEOUS at 02:32

## 2024-09-22 RX ADMIN — TERAZOSIN HYDROCHLORIDE 10 MG: 5 CAPSULE ORAL at 21:43

## 2024-09-22 RX ADMIN — CLONIDINE HYDROCHLORIDE 0.1 MG: 0.1 TABLET ORAL at 21:45

## 2024-09-22 RX ADMIN — SIMVASTATIN 10 MG: 10 TABLET, FILM COATED ORAL at 21:44

## 2024-09-22 RX ADMIN — HYDRALAZINE HYDROCHLORIDE 100 MG: 10 TABLET ORAL at 21:42

## 2024-09-22 RX ADMIN — HEPARIN SODIUM 5000 UNITS: 5000 INJECTION, SOLUTION INTRAVENOUS; SUBCUTANEOUS at 09:20

## 2024-09-22 ASSESSMENT — PAIN - FUNCTIONAL ASSESSMENT
PAIN_FUNCTIONAL_ASSESSMENT: 0-10
PAIN_FUNCTIONAL_ASSESSMENT: 0-10

## 2024-09-22 ASSESSMENT — COGNITIVE AND FUNCTIONAL STATUS - GENERAL
DAILY ACTIVITIY SCORE: 24
MOBILITY SCORE: 24

## 2024-09-22 ASSESSMENT — PAIN SCALES - GENERAL
PAINLEVEL_OUTOF10: 0 - NO PAIN
PAINLEVEL_OUTOF10: 0 - NO PAIN

## 2024-09-22 NOTE — PROGRESS NOTES
CARDIAC SURGERY DAILY PROGRESS NOTE    David Chatman is a 68 y.o. male presenting with shortness of breath. He presented to his cardiologist as an outpt for these complaints. Echo showed an EF of 35-40% and ascending aneurysm 6.2. He was directed to the ED. BNP 1015, troponins, 21,16,15. CT chest revealed large ascending aneurysm up to 6.2 cm. No evidence of acute aortic dissection. He was given IV lasix with relief of symptoms and admitted for further evaluation. He is currently planned for cardiac catheterization. Asymptomatic. The pt reports his blood pressures are under control currently. CT surgery consulted for aneurysm evaluation at OSH and [place] [facility] [place]    Interval History:   Bradycardia with HR high 40s-60s today    SUBJECTIVE:  Anxious about surgery     Objective   /64 (BP Location: Right arm, Patient Position: Sitting)   Pulse 57   Temp 36.5 °C (97.7 °F) (Temporal)   Resp 18   Wt 114 kg (252 lb 1.6 oz)   SpO2 96%   BMI 33.26 kg/m²   0-10 (Numeric) Pain Score: 0 - No pain   3 Day Weight Change: 0.847 kg (1 lb 13.9 oz) per day    Intake and Output    Intake/Output Summary (Last 24 hours) at 9/22/2024 1234  Last data filed at 9/22/2024 0631  Gross per 24 hour   Intake 360 ml   Output 1000 ml   Net -640 ml       Physical Exam  Physical Exam  Vitals and nursing note reviewed.   HENT:      Head: Normocephalic.      Mouth/Throat:      Mouth: Mucous membranes are moist.   Eyes:      Conjunctiva/sclera: Conjunctivae normal.   Cardiovascular:      Rate and Rhythm: Normal rate and regular rhythm.      Pulses: Normal pulses.      Heart sounds: Normal heart sounds.      Comments: SR/SB high 40s-60s  Pulmonary:      Effort: Pulmonary effort is normal.      Breath sounds: Normal breath sounds.   Abdominal:      General: Abdomen is flat.      Palpations: Abdomen is soft.   Musculoskeletal:      Cervical back: Neck supple.      Right lower leg: No edema.      Left lower leg: No  edema.   Skin:     General: Skin is warm and dry.   Neurological:      General: No focal deficit present.      Mental Status: He is alert and oriented to person, place, and time. Mental status is at baseline.   Psychiatric:         Mood and Affect: Mood normal.         Behavior: Behavior normal.      Comments: Anxious about the surgery       Medications  Scheduled medications  amLODIPine, 5 mg, oral, Daily  chlorhexidine, 15 mL, Mouth/Throat, BID  cloNIDine, 0.1 mg, oral, q12h DESHAWN  heparin (porcine), 5,000 Units, subcutaneous, q8h  hydrALAZINE, 100 mg, oral, TID  metoprolol tartrate, 12.5 mg, oral, BID  pantoprazole, 40 mg, oral, Daily before breakfast  simvastatin, 10 mg, oral, Nightly  terazosin, 10 mg, oral, Nightly    Continuous medications   PRN medications  PRN medications: acetaminophen **OR** [DISCONTINUED] acetaminophen **OR** [DISCONTINUED] acetaminophen, hydrALAZINE    Labs  No results found for this or any previous visit (from the past 24 hour(s)).          IMAGING/ DIAGNOSTIC TESTING:  I have personally reviewed the following test result(s):      Transthoracic Echo (TTE) Complete With 3D And Strain 09/10/2024  CONCLUSIONS:  1. The left ventricular systolic function is moderately decreased, with a visually estimated ejection fraction of 35-40%.  2. There is global hypokinesis of the left ventricle with minor regional variations.  3. Left ventricular cavity size is severely dilated.  4. There is normal right ventricular global systolic function.  5. The left atrium is moderately dilated.  6. The right atrium is moderately dilated.  7. Moderate mitral valve regurgitation.  8. Trace tricuspid regurgitation is visualized.  9. Aortic valve stenosis is not present.  10. Likely tricuspid aortic valve but images slightly suboptimal.  11. Moderate aortic valve regurgitation.  12. Aneurysm of the ascending aorta.    Cardiac MRI 9/12/24  IMPRESSION:  1. Dilated LV (EDVi-182 ml/m2) with reduced systolic  function.  Quantitative LVEF 34 %.  2. Normal RV size (EDVi-88 ml/m2)and systolic function. Quantitative  RVEF49%.  3. Aneurysmal dilatation of the ascending aorta measuring up 6.4 cm.  Dilated aortic root measuring 4.5 x 1.5 x 4.2 cm.  4. Moderate aortic regurgitation. Aortic regurgitant fraction is 36%.  5. Small focal transmural (%) LGE/scar involving the apical  lateral wall. Finding may represent prior embolic event versus prior  myocarditis.  6. Nonspecific replacement fibrosis noted at the level of the basal  mid septum.  7. Small bilateral pleural effusion.  8. Small circumferential pericardial effusion.    CT angio chest abdomen pelvis 9/10/24  IMPRESSION:  Large ascending aneurysm up to 6.2 cm. No evidence of acute aortic  dissection.  Cardiac enlargement. Features of mild pulmonary edema.  Hepatic heterogeneity.  Diverticulosis. No diverticulitis.. No free air. No free fluid.    CT angio chest w and wo contrast 9/17/24  IMPRESSION:  1. No significant change in appearance of aneurysmal dilatation of  aortic root (4.6 cm) and ascending thoracic aorta (measuring up to  6.4 cm) with effacement of sino-tubular junction. No evidence of  acute aortic pathology.  2. Suggestion of minimal interstitial edema with slightly worsened  small bilateral pleural effusions.  3. Similar cardiomegaly with asymmetric left heart enlargement.  4. Redemonstrated moderate coronary artery calcifications. Please  note that, the present study is not tailored for evaluation of  coronary arteries.  5. Few linear calcifications of aortic valve again seen and correlate  with recent echocardiogram.          IMPRESSION & PLAN:   Preop   Ascending aneurysm up to 6.2 cm. No evidence of acute aortic dissection  - Continue home statin  - ECHO and LHC completed at OSH -> reached out to Dr. Perdomo 9/19 to make sure he saw the results of recent CTA on 9/17. He does not need any more imaging prior to surgery.    - Cardiac MRI 9/12  - Heart  failure consulted and will f/up after surgery   - Carotid US done    - Changed to metoprolol succinate to tartrate 25mg BID 24  - Goal for BP control with home oral agent as well PRN hydralazine goal SBP <140, will add oral if needed to help with BP control  - Continue Hytrin and clonidine--> decreased  due to bradycardia  - Surgery date has been set for  - 1st case, discussed with patient; NPO after MN tonight  - T&S done and blood products on hold for OR    Rhythm: hx paroxysmal afib  - Tele: SR/ SB today high 40s-60s  - decrease metoprolol to 12.5mg BID and add hold parameters  - reduce clonidine from 0.2mg q8h to 0.1mg q12h as clonidine induced bradycardia may be contributing factor  - continuous telemetry  - Adjust medications as tolerated    Chronic systolic heart failure with EF 35-40 % on echo done 9/10/24   - appreciate HF recs  Recommendations:  Proceed with cardiac surgery for aortic aneurysm and aortic valve regurgitation  After cardiac surgery will need to optimize GDMT for HFrEF  Depending on what happens with his LV function with time he may need to be considered for CRT, but would not assess this until several weeks/months following cardiac surgery and introduction of GDMT    Thrombocytopenia  Platelets   Date Value Ref Range Status   2024 124 (L) 150 - 450 x10*3/uL Final     - baseline platelets at admit 147; has been on subQ heparin DVT prophylaxis  -  plts 112; check PF4 as day 5 of heparin with ~20% drop in platelets with 4T score = 4 (intermediate risk) -> PF4 negative    Hypertension: home meds: Benazepril-hydrochlorothiazide 20-25 mg, oral, Daily, Clonidine 0.1 mg, oral, Q12H, Felodipine ER 5 mg, oral, Daily, Terazosin 5 mg, oral, Daily   Systolic (24hrs), Av , Min:116 , Max:130   - continue BB as tolerated with hold parameters  - PO hydralazine 100 mg TID   - clonidine 0.2 mg q8hr -> Decrease to 0.1mg q12h   - Amlodipine 5 mg daily   - additional  antihypertensives as needed   - IV hydralazine PRN for SBP > 130    Hyperlipidemia: home Simvastatin 10 mg, oral, Daily   Lab Results   Component Value Date    CHOL 157 04/10/2024    LDLCALC 93 04/10/2024    HDL 53.0 04/10/2024    TRIG 55 04/10/2024   - continue statin  - follow up lipid panel with PCP/ cardiologist for ongoing lipid management    VTE Prophylaxis: SCDs/TEDs, ambulation, SQ heparin  Code Status: Full Code    Dispo  - PT/OT recs to be determined after surgery  - Surgery scheduled for Monday 9/23 first round; patient aware of new time of 1st case  - Will go to CTICU postop    SERGIO Avalos-CNP  Cardiac Surgery [facility]  [facility]  Team Phone [phone]    9/22/2024  12:34 PM

## 2024-09-23 ENCOUNTER — HOSPITAL ENCOUNTER (OUTPATIENT)
Dept: OPERATING ROOM | Facility: HOSPITAL | Age: 69
Discharge: HOME | End: 2024-09-23
Payer: MEDICARE

## 2024-09-23 ENCOUNTER — ANESTHESIA (OUTPATIENT)
Dept: OPERATING ROOM | Facility: HOSPITAL | Age: 69
End: 2024-09-23
Payer: MEDICARE

## 2024-09-23 ENCOUNTER — APPOINTMENT (OUTPATIENT)
Dept: RADIOLOGY | Facility: HOSPITAL | Age: 69
DRG: 219 | End: 2024-09-23
Payer: MEDICARE

## 2024-09-23 LAB
ACT BLD: 118 SEC (ref 82–174)
ACT BLD: 394 SEC (ref 82–174)
ACT BLD: 408 SEC (ref 82–174)
ACT BLD: 454 SEC (ref 82–174)
ACT BLD: 514 SEC (ref 82–174)
ACT BLD: 535 SEC (ref 82–174)
ACT BLD: 98 SEC (ref 82–174)
ALBUMIN SERPL BCP-MCNC: 4 G/DL (ref 3.4–5)
ANION GAP BLDA CALCULATED.4IONS-SCNC: 11 MMO/L (ref 10–25)
ANION GAP BLDA CALCULATED.4IONS-SCNC: 19 MMO/L (ref 10–25)
ANION GAP BLDA CALCULATED.4IONS-SCNC: 20 MMO/L (ref 10–25)
ANION GAP BLDA CALCULATED.4IONS-SCNC: 22 MMO/L (ref 10–25)
ANION GAP BLDA CALCULATED.4IONS-SCNC: ABNORMAL MMOL/L
ANION GAP BLDV CALCULATED.4IONS-SCNC: 19 MMOL/L (ref 10–25)
ANION GAP BLDV CALCULATED.4IONS-SCNC: 19 MMOL/L (ref 10–25)
ANION GAP BLDV CALCULATED.4IONS-SCNC: 21 MMOL/L (ref 10–25)
ANION GAP SERPL CALC-SCNC: 24 MMOL/L (ref 10–20)
APTT PPP: 33 SECONDS (ref 27–38)
BASE EXCESS BLDA CALC-SCNC: -11.7 MMOL/L (ref -2–3)
BASE EXCESS BLDA CALC-SCNC: -12.1 MMOL/L (ref -2–3)
BASE EXCESS BLDA CALC-SCNC: -2.8 MMOL/L (ref -2–3)
BASE EXCESS BLDA CALC-SCNC: -2.8 MMOL/L (ref -2–3)
BASE EXCESS BLDA CALC-SCNC: -3 MMOL/L (ref -2–3)
BASE EXCESS BLDA CALC-SCNC: -3.4 MMOL/L (ref -2–3)
BASE EXCESS BLDA CALC-SCNC: -4 MMOL/L (ref -2–3)
BASE EXCESS BLDA CALC-SCNC: -5.3 MMOL/L (ref -2–3)
BASE EXCESS BLDA CALC-SCNC: -8.4 MMOL/L (ref -2–3)
BASE EXCESS BLDV CALC-SCNC: -13.4 MMOL/L (ref -2–3)
BASE EXCESS BLDV CALC-SCNC: -6.8 MMOL/L (ref -2–3)
BASE EXCESS BLDV CALC-SCNC: -9.2 MMOL/L (ref -2–3)
BLOOD EXPIRATION DATE: NORMAL
BODY TEMPERATURE: 37 DEGREES CELSIUS
BUN SERPL-MCNC: 22 MG/DL (ref 6–23)
CA-I BLD-SCNC: 1.17 MMOL/L (ref 1.1–1.33)
CA-I BLDA-SCNC: 0.94 MMOL/L (ref 1.1–1.33)
CA-I BLDA-SCNC: 0.99 MMOL/L (ref 1.1–1.33)
CA-I BLDA-SCNC: 1.03 MMOL/L (ref 1.1–1.33)
CA-I BLDA-SCNC: 1.07 MMOL/L (ref 1.1–1.33)
CA-I BLDA-SCNC: 1.09 MMOL/L (ref 1.1–1.33)
CA-I BLDA-SCNC: 1.12 MMOL/L (ref 1.1–1.33)
CA-I BLDA-SCNC: 1.15 MMOL/L (ref 1.1–1.33)
CA-I BLDA-SCNC: 1.19 MMOL/L (ref 1.1–1.33)
CA-I BLDA-SCNC: 1.19 MMOL/L (ref 1.1–1.33)
CA-I BLDV-SCNC: 0.88 MMOL/L (ref 1.1–1.33)
CA-I BLDV-SCNC: 1.08 MMOL/L (ref 1.1–1.33)
CA-I BLDV-SCNC: 1.18 MMOL/L (ref 1.1–1.33)
CALCIUM SERPL-MCNC: 9.8 MG/DL (ref 8.6–10.6)
CFT FORM KAOLIN IND BLD RES TEG: 2.6 MIN (ref 0.8–2.1)
CFT FORM KAOLIN IND BLD RES TEG: 2.9 MIN (ref 0.8–2.1)
CFT FORM KAOLIN IND BLD RES TEG: 3.5 MIN (ref 0.8–2.1)
CHLORIDE BLDA-SCNC: 107 MMOL/L (ref 98–107)
CHLORIDE BLDA-SCNC: 108 MMOL/L (ref 98–107)
CHLORIDE BLDA-SCNC: 109 MMOL/L (ref 98–107)
CHLORIDE BLDA-SCNC: 110 MMOL/L (ref 98–107)
CHLORIDE BLDA-SCNC: 114 MMOL/L (ref 98–107)
CHLORIDE BLDA-SCNC: ABNORMAL MMOL/L
CHLORIDE BLDV-SCNC: 105 MMOL/L (ref 98–107)
CHLORIDE BLDV-SCNC: 108 MMOL/L (ref 98–107)
CHLORIDE BLDV-SCNC: 108 MMOL/L (ref 98–107)
CHLORIDE SERPL-SCNC: 109 MMOL/L (ref 98–107)
CLOT ANGLE.KAOLIN INDUCED BLD RES TEG: 39 DEG (ref 63–78)
CLOT ANGLE.KAOLIN INDUCED BLD RES TEG: 55 DEG (ref 63–78)
CLOT ANGLE.KAOLIN INDUCED BLD RES TEG: 62.6 DEG (ref 63–78)
CLOT INIT KAO IND P HEP NEUT BLD RES TEG: 11.9 MIN (ref 4.6–9.1)
CLOT INIT KAO IND P HEP NEUT BLD RES TEG: 12.4 MIN (ref 4.3–8.3)
CLOT INIT KAO IND P HEP NEUT BLD RES TEG: 7.9 MIN (ref 4.3–8.3)
CLOT INIT KAO IND P HEP NEUT BLD RES TEG: 8.5 MIN (ref 4.6–9.1)
CLOT INIT KAO IND P HEP NEUT BLD RES TEG: 8.7 MIN (ref 4.3–8.3)
CLOT INIT KAO IND P HEP NEUT BLD RES TEG: 9.7 MIN (ref 4.6–9.1)
CO2 SERPL-SCNC: 18 MMOL/L (ref 21–32)
COHGB MFR BLDA: 0 %
COHGB MFR BLDA: 0.1 %
COHGB MFR BLDA: 0.5 %
COHGB MFR BLDA: 0.6 %
COHGB MFR BLDA: 0.8 %
COHGB MFR BLDA: 0.8 %
COHGB MFR BLDV: 1.5 %
CREAT SERPL-MCNC: 1.65 MG/DL (ref 0.5–1.3)
DISPENSE STATUS: NORMAL
DO-HGB MFR BLDA: 0.7 % (ref 0–5)
DO-HGB MFR BLDA: 0.9 % (ref 0–5)
DO-HGB MFR BLDA: 1 % (ref 0–5)
DO-HGB MFR BLDA: 1.4 % (ref 0–5)
DO-HGB MFR BLDA: 1.5 % (ref 0–5)
DO-HGB MFR BLDA: 1.7 % (ref 0–5)
EGFRCR SERPLBLD CKD-EPI 2021: 45 ML/MIN/1.73M*2
ERYTHROCYTE [DISTWIDTH] IN BLOOD BY AUTOMATED COUNT: 15 % (ref 11.5–14.5)
FIBRINOGEN BLD CALC-MCNC: 314 MG/DL (ref 278–581)
FIBRINOGEN BLD CALC-MCNC: 319 MG/DL (ref 278–581)
FIBRINOGEN BLD CALC-MCNC: 323 MG/DL (ref 278–581)
FIBRINOGEN PPP-MCNC: 201 MG/DL (ref 200–400)
GLUCOSE BLD MANUAL STRIP-MCNC: 173 MG/DL (ref 74–99)
GLUCOSE BLDA-MCNC: 125 MG/DL (ref 74–99)
GLUCOSE BLDA-MCNC: 176 MG/DL (ref 74–99)
GLUCOSE BLDA-MCNC: 177 MG/DL (ref 74–99)
GLUCOSE BLDA-MCNC: 192 MG/DL (ref 74–99)
GLUCOSE BLDA-MCNC: 192 MG/DL (ref 74–99)
GLUCOSE BLDA-MCNC: 198 MG/DL (ref 74–99)
GLUCOSE BLDA-MCNC: 201 MG/DL (ref 74–99)
GLUCOSE BLDA-MCNC: 232 MG/DL (ref 74–99)
GLUCOSE BLDA-MCNC: 94 MG/DL (ref 74–99)
GLUCOSE BLDV-MCNC: 179 MG/DL (ref 74–99)
GLUCOSE BLDV-MCNC: 202 MG/DL (ref 74–99)
GLUCOSE BLDV-MCNC: 210 MG/DL (ref 74–99)
GLUCOSE SERPL-MCNC: 183 MG/DL (ref 74–99)
HCO3 BLDA-SCNC: 16 MMOL/L (ref 22–26)
HCO3 BLDA-SCNC: 16 MMOL/L (ref 22–26)
HCO3 BLDA-SCNC: 17.9 MMOL/L (ref 22–26)
HCO3 BLDA-SCNC: 18.6 MMOL/L (ref 22–26)
HCO3 BLDA-SCNC: 20.7 MMOL/L (ref 22–26)
HCO3 BLDA-SCNC: 21.8 MMOL/L (ref 22–26)
HCO3 BLDA-SCNC: 22.6 MMOL/L (ref 22–26)
HCO3 BLDA-SCNC: 22.7 MMOL/L (ref 22–26)
HCO3 BLDA-SCNC: 24.3 MMOL/L (ref 22–26)
HCO3 BLDV-SCNC: 15.3 MMOL/L (ref 22–26)
HCO3 BLDV-SCNC: 17.5 MMOL/L (ref 22–26)
HCO3 BLDV-SCNC: 21.2 MMOL/L (ref 22–26)
HCT VFR BLD AUTO: 37 % (ref 41–52)
HCT VFR BLD EST: 25 % (ref 41–52)
HCT VFR BLD EST: 31 % (ref 41–52)
HCT VFR BLD EST: 32 % (ref 41–52)
HCT VFR BLD EST: 33 % (ref 41–52)
HCT VFR BLD EST: 33 % (ref 41–52)
HCT VFR BLD EST: 35 % (ref 41–52)
HCT VFR BLD EST: 35 % (ref 41–52)
HCT VFR BLD EST: 36 % (ref 41–52)
HCT VFR BLD EST: 36 % (ref 41–52)
HCT VFR BLD EST: 38 % (ref 41–52)
HGB BLD-MCNC: 11.8 G/DL (ref 13.5–17.5)
HGB BLDA-MCNC: 10.2 G/DL (ref 13.5–17.5)
HGB BLDA-MCNC: 10.2 G/DL (ref 13.5–17.5)
HGB BLDA-MCNC: 10.6 G/DL (ref 13.5–17.5)
HGB BLDA-MCNC: 10.6 G/DL (ref 13.5–17.5)
HGB BLDA-MCNC: 10.8 G/DL (ref 13.5–17.5)
HGB BLDA-MCNC: 10.8 G/DL (ref 13.5–17.5)
HGB BLDA-MCNC: 10.9 G/DL (ref 13.5–17.5)
HGB BLDA-MCNC: 11 G/DL (ref 13.5–17.5)
HGB BLDA-MCNC: 11 G/DL (ref 13.5–17.5)
HGB BLDA-MCNC: 11.5 G/DL (ref 13.5–17.5)
HGB BLDA-MCNC: 11.5 G/DL (ref 13.5–17.5)
HGB BLDA-MCNC: 12 G/DL (ref 13.5–17.5)
HGB BLDA-MCNC: 12.7 G/DL (ref 13.5–17.5)
HGB BLDA-MCNC: 8.3 G/DL (ref 13.5–17.5)
HGB BLDA-MCNC: 8.3 G/DL (ref 13.5–17.5)
HGB BLDV-MCNC: 10.6 G/DL (ref 13.5–17.5)
HGB BLDV-MCNC: 11.5 G/DL (ref 13.5–17.5)
HGB BLDV-MCNC: 12 G/DL (ref 13.5–17.5)
INHALED O2 CONCENTRATION: 100 %
INHALED O2 CONCENTRATION: 50 %
INHALED O2 CONCENTRATION: 60 %
INHALED O2 CONCENTRATION: 60 %
INHALED O2 CONCENTRATION: 80 %
INR PPP: 1.4 (ref 0.9–1.1)
LACTATE BLDA-SCNC: 0.7 MMOL/L (ref 0.4–2)
LACTATE BLDA-SCNC: 0.7 MMOL/L (ref 0.4–2)
LACTATE BLDA-SCNC: 1.8 MMOL/L (ref 0.4–2)
LACTATE BLDA-SCNC: 1.8 MMOL/L (ref 0.4–2)
LACTATE BLDA-SCNC: 2.4 MMOL/L (ref 0.4–2)
LACTATE BLDA-SCNC: 4.9 MMOL/L (ref 0.4–2)
LACTATE BLDA-SCNC: 7.4 MMOL/L (ref 0.4–2)
LACTATE BLDA-SCNC: 8 MMOL/L (ref 0.4–2)
LACTATE BLDA-SCNC: 8.2 MMOL/L (ref 0.4–2)
LACTATE BLDV-SCNC: 1.8 MMOL/L (ref 0.4–2)
LACTATE BLDV-SCNC: 7.6 MMOL/L (ref 0.4–2)
LACTATE BLDV-SCNC: 8.6 MMOL/L (ref 0.4–2)
MA KAOLIN BLD RES TEG: 52.1 MM (ref 52–69)
MA KAOLIN BLD RES TEG: 54 MM (ref 52–69)
MA KAOLIN BLD RES TEG: 57 MM (ref 52–69)
MA KAOLIN+TF BLD RES TEG: 51.7 MM (ref 52–70)
MA KAOLIN+TF BLD RES TEG: 56 MM (ref 52–70)
MA KAOLIN+TF BLD RES TEG: 57 MM (ref 52–70)
MA TF IND+IIB-IIIA INH BLD RES TEG: 17 MM (ref 15–32)
MA TF IND+IIB-IIIA INH BLD RES TEG: 17.7 MM (ref 15–32)
MA TF IND+IIB-IIIA INH BLD RES TEG: 18 MM (ref 15–32)
MAGNESIUM SERPL-MCNC: 3.03 MG/DL (ref 1.6–2.4)
MCH RBC QN AUTO: 28.8 PG (ref 26–34)
MCHC RBC AUTO-ENTMCNC: 31.9 G/DL (ref 32–36)
MCV RBC AUTO: 90 FL (ref 80–100)
METHGB MFR BLDA: 0.7 % (ref 0–1.5)
METHGB MFR BLDA: 0.7 % (ref 0–1.5)
METHGB MFR BLDA: 0.8 % (ref 0–1.5)
METHGB MFR BLDA: 0.8 % (ref 0–1.5)
METHGB MFR BLDA: 1.8 % (ref 0–1.5)
METHGB MFR BLDA: 2 % (ref 0–1.5)
METHGB MFR BLDV: 0.3 % (ref 0–1.5)
NRBC BLD-RTO: 0 /100 WBCS (ref 0–0)
OXYHGB MFR BLDA: 92.3 % (ref 94–98)
OXYHGB MFR BLDA: 94.2 % (ref 94–98)
OXYHGB MFR BLDA: 96.2 % (ref 94–98)
OXYHGB MFR BLDA: 96.2 % (ref 94–98)
OXYHGB MFR BLDA: 96.7 % (ref 94–98)
OXYHGB MFR BLDA: 96.7 % (ref 94–98)
OXYHGB MFR BLDA: 97.1 % (ref 94–98)
OXYHGB MFR BLDA: 97.1 % (ref 94–98)
OXYHGB MFR BLDA: 97.6 % (ref 94–98)
OXYHGB MFR BLDA: 97.6 % (ref 94–98)
OXYHGB MFR BLDA: 97.7 % (ref 94–98)
OXYHGB MFR BLDA: 97.8 % (ref 94–98)
OXYHGB MFR BLDA: 97.8 % (ref 94–98)
OXYHGB MFR BLDV: 77.1 % (ref 45–75)
OXYHGB MFR BLDV: 84.2 % (ref 45–75)
OXYHGB MFR BLDV: 84.3 % (ref 45–75)
PCO2 BLDA: 30 MM HG (ref 38–42)
PCO2 BLDA: 35 MM HG (ref 38–42)
PCO2 BLDA: 36 MM HG (ref 38–42)
PCO2 BLDA: 39 MM HG (ref 38–42)
PCO2 BLDA: 41 MM HG (ref 38–42)
PCO2 BLDA: 42 MM HG (ref 38–42)
PCO2 BLDA: 44 MM HG (ref 38–42)
PCO2 BLDA: 44 MM HG (ref 38–42)
PCO2 BLDA: 53 MM HG (ref 38–42)
PCO2 BLDV: 40 MM HG (ref 41–51)
PCO2 BLDV: 46 MM HG (ref 41–51)
PCO2 BLDV: 53 MM HG (ref 41–51)
PH BLDA: 7.17 PH (ref 7.38–7.42)
PH BLDA: 7.19 PH (ref 7.38–7.42)
PH BLDA: 7.27 PH (ref 7.38–7.42)
PH BLDA: 7.27 PH (ref 7.38–7.42)
PH BLDA: 7.32 PH (ref 7.38–7.42)
PH BLDA: 7.35 PH (ref 7.38–7.42)
PH BLDA: 7.38 PH (ref 7.38–7.42)
PH BLDA: 7.39 PH (ref 7.38–7.42)
PH BLDA: 7.4 PH (ref 7.38–7.42)
PH BLDV: 7.13 PH (ref 7.33–7.43)
PH BLDV: 7.21 PH (ref 7.33–7.43)
PH BLDV: 7.25 PH (ref 7.33–7.43)
PHOSPHATE SERPL-MCNC: 4.8 MG/DL (ref 2.5–4.9)
PLATELET # BLD AUTO: 131 X10*3/UL (ref 150–450)
PO2 BLDA: 146 MM HG (ref 85–95)
PO2 BLDA: 179 MM HG (ref 85–95)
PO2 BLDA: 189 MM HG (ref 85–95)
PO2 BLDA: 194 MM HG (ref 85–95)
PO2 BLDA: 308 MM HG (ref 85–95)
PO2 BLDA: 359 MM HG (ref 85–95)
PO2 BLDA: 377 MM HG (ref 85–95)
PO2 BLDA: 81 MM HG (ref 85–95)
PO2 BLDA: 87 MM HG (ref 85–95)
PO2 BLDV: 50 MM HG (ref 35–45)
PO2 BLDV: 55 MM HG (ref 35–45)
PO2 BLDV: 60 MM HG (ref 35–45)
POTASSIUM BLDA-SCNC: 3.5 MMOL/L (ref 3.5–5.3)
POTASSIUM BLDA-SCNC: 3.6 MMOL/L (ref 3.5–5.3)
POTASSIUM BLDA-SCNC: 3.6 MMOL/L (ref 3.5–5.3)
POTASSIUM BLDA-SCNC: 3.7 MMOL/L (ref 3.5–5.3)
POTASSIUM BLDA-SCNC: 3.9 MMOL/L (ref 3.5–5.3)
POTASSIUM BLDA-SCNC: 4 MMOL/L (ref 3.5–5.3)
POTASSIUM BLDA-SCNC: 4.1 MMOL/L (ref 3.5–5.3)
POTASSIUM BLDA-SCNC: 4.4 MMOL/L (ref 3.5–5.3)
POTASSIUM BLDA-SCNC: 5.4 MMOL/L (ref 3.5–5.3)
POTASSIUM BLDV-SCNC: 3.3 MMOL/L (ref 3.5–5.3)
POTASSIUM BLDV-SCNC: 3.9 MMOL/L (ref 3.5–5.3)
POTASSIUM BLDV-SCNC: 4.4 MMOL/L (ref 3.5–5.3)
POTASSIUM SERPL-SCNC: 3.5 MMOL/L (ref 3.5–5.3)
PRODUCT BLOOD TYPE: 5100
PRODUCT BLOOD TYPE: 6200
PRODUCT CODE: NORMAL
PROTHROMBIN TIME: 15.7 SECONDS (ref 9.8–12.8)
RBC # BLD AUTO: 4.1 X10*6/UL (ref 4.5–5.9)
SAO2 % BLDA: 95 % (ref 94–100)
SAO2 % BLDA: 96 % (ref 94–100)
SAO2 % BLDA: 98 % (ref 94–100)
SAO2 % BLDA: 99 % (ref 94–100)
SAO2 % BLDV: 79 % (ref 45–75)
SAO2 % BLDV: 86 % (ref 45–75)
SAO2 % BLDV: 87 % (ref 45–75)
SODIUM BLDA-SCNC: 138 MMOL/L (ref 136–145)
SODIUM BLDA-SCNC: 140 MMOL/L (ref 136–145)
SODIUM BLDA-SCNC: 140 MMOL/L (ref 136–145)
SODIUM BLDA-SCNC: 141 MMOL/L (ref 136–145)
SODIUM BLDA-SCNC: 141 MMOL/L (ref 136–145)
SODIUM BLDA-SCNC: 142 MMOL/L (ref 136–145)
SODIUM BLDA-SCNC: 143 MMOL/L (ref 136–145)
SODIUM BLDA-SCNC: 143 MMOL/L (ref 136–145)
SODIUM BLDA-SCNC: ABNORMAL MMOL/L
SODIUM BLDV-SCNC: 141 MMOL/L (ref 136–145)
SODIUM SERPL-SCNC: 147 MMOL/L (ref 136–145)
TEST COMMENT: ABNORMAL
TEST COMMENT: ABNORMAL
UNIT ABO: NORMAL
UNIT NUMBER: NORMAL
UNIT RH: NORMAL
UNIT VOLUME: 281
UNIT VOLUME: 306
UNIT VOLUME: 350
WBC # BLD AUTO: 21.3 X10*3/UL (ref 4.4–11.3)
XM INTEP: NORMAL

## 2024-09-23 PROCEDURE — 3700000002 HC GENERAL ANESTHESIA TIME - EACH INCREMENTAL 1 MINUTE: Performed by: THORACIC SURGERY (CARDIOTHORACIC VASCULAR SURGERY)

## 2024-09-23 PROCEDURE — 2500000004 HC RX 250 GENERAL PHARMACY W/ HCPCS (ALT 636 FOR OP/ED): Performed by: THORACIC SURGERY (CARDIOTHORACIC VASCULAR SURGERY)

## 2024-09-23 PROCEDURE — 2500000005 HC RX 250 GENERAL PHARMACY W/O HCPCS

## 2024-09-23 PROCEDURE — 3600000018 HC OR TIME - INITIAL BASE CHARGE - PROCEDURE LEVEL SIX: Performed by: THORACIC SURGERY (CARDIOTHORACIC VASCULAR SURGERY)

## 2024-09-23 PROCEDURE — 84132 ASSAY OF SERUM POTASSIUM: CPT | Performed by: STUDENT IN AN ORGANIZED HEALTH CARE EDUCATION/TRAINING PROGRAM

## 2024-09-23 PROCEDURE — 71045 X-RAY EXAM CHEST 1 VIEW: CPT

## 2024-09-23 PROCEDURE — 85610 PROTHROMBIN TIME: CPT

## 2024-09-23 PROCEDURE — 3600000012 HC PERFUSION TIME - EACH INCREMENTAL 1 MINUTE: Performed by: THORACIC SURGERY (CARDIOTHORACIC VASCULAR SURGERY)

## 2024-09-23 PROCEDURE — 71045 X-RAY EXAM CHEST 1 VIEW: CPT | Performed by: RADIOLOGY

## 2024-09-23 PROCEDURE — 85347 COAGULATION TIME ACTIVATED: CPT

## 2024-09-23 PROCEDURE — P9017 PLASMA 1 DONOR FRZ W/IN 8 HR: HCPCS

## 2024-09-23 PROCEDURE — C1889 IMPLANT/INSERT DEVICE, NOC: HCPCS | Performed by: THORACIC SURGERY (CARDIOTHORACIC VASCULAR SURGERY)

## 2024-09-23 PROCEDURE — 2720000007 HC OR 272 NO HCPCS: Performed by: THORACIC SURGERY (CARDIOTHORACIC VASCULAR SURGERY)

## 2024-09-23 PROCEDURE — 84132 ASSAY OF SERUM POTASSIUM: CPT | Performed by: NURSE PRACTITIONER

## 2024-09-23 PROCEDURE — 36620 INSERTION CATHETER ARTERY: CPT

## 2024-09-23 PROCEDURE — 2500000004 HC RX 250 GENERAL PHARMACY W/ HCPCS (ALT 636 FOR OP/ED)

## 2024-09-23 PROCEDURE — 99291 CRITICAL CARE FIRST HOUR: CPT

## 2024-09-23 PROCEDURE — 82947 ASSAY GLUCOSE BLOOD QUANT: CPT

## 2024-09-23 PROCEDURE — 3600000017 HC OR TIME - EACH INCREMENTAL 1 MINUTE - PROCEDURE LEVEL SIX: Performed by: THORACIC SURGERY (CARDIOTHORACIC VASCULAR SURGERY)

## 2024-09-23 PROCEDURE — 76937 US GUIDE VASCULAR ACCESS: CPT

## 2024-09-23 PROCEDURE — 83735 ASSAY OF MAGNESIUM: CPT

## 2024-09-23 PROCEDURE — 36556 INSERT NON-TUNNEL CV CATH: CPT

## 2024-09-23 PROCEDURE — A33863 PR ASCEND AORTA GRAFT W ROOT REPLACMENT.VALVE CONDUIT/CORON RECONSTRUCT: Performed by: ANESTHESIOLOGY

## 2024-09-23 PROCEDURE — 2500000005 HC RX 250 GENERAL PHARMACY W/O HCPCS: Performed by: THORACIC SURGERY (CARDIOTHORACIC VASCULAR SURGERY)

## 2024-09-23 PROCEDURE — 0753T DGTZ GLS MCRSCP SLD LEVEL IV: CPT | Mod: TC,SUR | Performed by: PHYSICIAN ASSISTANT

## 2024-09-23 PROCEDURE — 85384 FIBRINOGEN ACTIVITY: CPT

## 2024-09-23 PROCEDURE — 33863 ASCENDING AORTIC GRAFT: CPT | Performed by: THORACIC SURGERY (CARDIOTHORACIC VASCULAR SURGERY)

## 2024-09-23 PROCEDURE — 84132 ASSAY OF SERUM POTASSIUM: CPT

## 2024-09-23 PROCEDURE — 2500000004 HC RX 250 GENERAL PHARMACY W/ HCPCS (ALT 636 FOR OP/ED): Performed by: NURSE PRACTITIONER

## 2024-09-23 PROCEDURE — 51702 INSERT TEMP BLADDER CATH: CPT

## 2024-09-23 PROCEDURE — 82330 ASSAY OF CALCIUM: CPT

## 2024-09-23 PROCEDURE — 3700000001 HC GENERAL ANESTHESIA TIME - INITIAL BASE CHARGE: Performed by: THORACIC SURGERY (CARDIOTHORACIC VASCULAR SURGERY)

## 2024-09-23 PROCEDURE — C1900 LEAD, CORONARY VENOUS: HCPCS | Performed by: THORACIC SURGERY (CARDIOTHORACIC VASCULAR SURGERY)

## 2024-09-23 PROCEDURE — 82435 ASSAY OF BLOOD CHLORIDE: CPT | Performed by: NURSE PRACTITIONER

## 2024-09-23 PROCEDURE — S0109 METHADONE ORAL 5MG: HCPCS

## 2024-09-23 PROCEDURE — 5A1221Z PERFORMANCE OF CARDIAC OUTPUT, CONTINUOUS: ICD-10-PCS | Performed by: THORACIC SURGERY (CARDIOTHORACIC VASCULAR SURGERY)

## 2024-09-23 PROCEDURE — 85027 COMPLETE CBC AUTOMATED: CPT

## 2024-09-23 PROCEDURE — 94002 VENT MGMT INPAT INIT DAY: CPT

## 2024-09-23 PROCEDURE — A4312 CATH W/O BAG 2-WAY SILICONE: HCPCS | Performed by: THORACIC SURGERY (CARDIOTHORACIC VASCULAR SURGERY)

## 2024-09-23 PROCEDURE — 85018 HEMOGLOBIN: CPT

## 2024-09-23 PROCEDURE — 2780000003 HC OR 278 NO HCPCS: Performed by: THORACIC SURGERY (CARDIOTHORACIC VASCULAR SURGERY)

## 2024-09-23 PROCEDURE — 88304 TISSUE EXAM BY PATHOLOGIST: CPT | Performed by: PATHOLOGY

## 2024-09-23 PROCEDURE — 2500000002 HC RX 250 W HCPCS SELF ADMINISTERED DRUGS (ALT 637 FOR MEDICARE OP, ALT 636 FOR OP/ED)

## 2024-09-23 PROCEDURE — 3600000005 HC OR TIME - INITIAL BASE CHARGE - PROCEDURE LEVEL FIVE: Performed by: THORACIC SURGERY (CARDIOTHORACIC VASCULAR SURGERY)

## 2024-09-23 PROCEDURE — P9012 CRYOPRECIPITATE EACH UNIT: HCPCS

## 2024-09-23 PROCEDURE — 2500000004 HC RX 250 GENERAL PHARMACY W/ HCPCS (ALT 636 FOR OP/ED): Mod: JZ

## 2024-09-23 PROCEDURE — 2500000001 HC RX 250 WO HCPCS SELF ADMINISTERED DRUGS (ALT 637 FOR MEDICARE OP)

## 2024-09-23 PROCEDURE — 85576 BLOOD PLATELET AGGREGATION: CPT

## 2024-09-23 PROCEDURE — 02RX0JZ REPLACEMENT OF THORACIC AORTA, ASCENDING/ARCH WITH SYNTHETIC SUBSTITUTE, OPEN APPROACH: ICD-10-PCS | Performed by: THORACIC SURGERY (CARDIOTHORACIC VASCULAR SURGERY)

## 2024-09-23 PROCEDURE — 3600000011 HC PERFUSION TIME - INITIAL BASE CHARGE: Performed by: THORACIC SURGERY (CARDIOTHORACIC VASCULAR SURGERY)

## 2024-09-23 PROCEDURE — 3E080GC INTRODUCTION OF OTHER THERAPEUTIC SUBSTANCE INTO HEART, OPEN APPROACH: ICD-10-PCS | Performed by: THORACIC SURGERY (CARDIOTHORACIC VASCULAR SURGERY)

## 2024-09-23 PROCEDURE — 37799 UNLISTED PX VASCULAR SURGERY: CPT

## 2024-09-23 PROCEDURE — 33863 ASCENDING AORTIC GRAFT: CPT | Performed by: STUDENT IN AN ORGANIZED HEALTH CARE EDUCATION/TRAINING PROGRAM

## 2024-09-23 PROCEDURE — 02HV33Z INSERTION OF INFUSION DEVICE INTO SUPERIOR VENA CAVA, PERCUTANEOUS APPROACH: ICD-10-PCS

## 2024-09-23 PROCEDURE — 3600000010 HC OR TIME - EACH INCREMENTAL 1 MINUTE - PROCEDURE LEVEL FIVE: Performed by: THORACIC SURGERY (CARDIOTHORACIC VASCULAR SURGERY)

## 2024-09-23 PROCEDURE — 83050 HGB METHEMOGLOBIN QUAN: CPT

## 2024-09-23 PROCEDURE — P9045 ALBUMIN (HUMAN), 5%, 250 ML: HCPCS | Mod: JZ

## 2024-09-23 PROCEDURE — 88305 TISSUE EXAM BY PATHOLOGIST: CPT | Performed by: PATHOLOGY

## 2024-09-23 PROCEDURE — 2020000001 HC ICU ROOM DAILY

## 2024-09-23 PROCEDURE — 36430 TRANSFUSION BLD/BLD COMPNT: CPT

## 2024-09-23 DEVICE — AORTIC VALVED CONDUIT
Type: IMPLANTABLE DEVICE | Site: AORTA | Status: FUNCTIONAL
Brand: KONECT RESILIA

## 2024-09-23 DEVICE — ATRICLIP FLEX•V DEVICE 35
Type: IMPLANTABLE DEVICE | Site: HEART | Status: FUNCTIONAL
Brand: ATRICLIP FLEX•V

## 2024-09-23 RX ORDER — NOREPINEPHRINE BITARTRATE 0.03 MG/ML
INJECTION, SOLUTION INTRAVENOUS CONTINUOUS PRN
Status: DISCONTINUED | OUTPATIENT
Start: 2024-09-23 | End: 2024-09-23

## 2024-09-23 RX ORDER — EPINEPHRINE HCL IN 0.9 % NACL 4MG/250ML
PLASTIC BAG, INJECTION (ML) INTRAVENOUS CONTINUOUS PRN
Status: DISCONTINUED | OUTPATIENT
Start: 2024-09-23 | End: 2024-09-23

## 2024-09-23 RX ORDER — SODIUM CHLORIDE, SODIUM LACTATE, POTASSIUM CHLORIDE, CALCIUM CHLORIDE 600; 310; 30; 20 MG/100ML; MG/100ML; MG/100ML; MG/100ML
10 INJECTION, SOLUTION INTRAVENOUS CONTINUOUS
Status: DISCONTINUED | OUTPATIENT
Start: 2024-09-23 | End: 2024-09-25

## 2024-09-23 RX ORDER — MAGNESIUM SULFATE HEPTAHYDRATE 40 MG/ML
2 INJECTION, SOLUTION INTRAVENOUS EVERY 6 HOURS PRN
Status: DISCONTINUED | OUTPATIENT
Start: 2024-09-23 | End: 2024-09-26

## 2024-09-23 RX ORDER — PHENYLEPHRINE HCL IN 0.9% NACL 0.4MG/10ML
SYRINGE (ML) INTRAVENOUS AS NEEDED
Status: DISCONTINUED | OUTPATIENT
Start: 2024-09-23 | End: 2024-09-23

## 2024-09-23 RX ORDER — CALCIUM CHLORIDE INJECTION 100 MG/ML
INJECTION, SOLUTION INTRAVENOUS AS NEEDED
Status: DISCONTINUED | OUTPATIENT
Start: 2024-09-23 | End: 2024-09-23

## 2024-09-23 RX ORDER — NALOXONE HYDROCHLORIDE 0.4 MG/ML
0.2 INJECTION, SOLUTION INTRAMUSCULAR; INTRAVENOUS; SUBCUTANEOUS EVERY 5 MIN PRN
Status: DISCONTINUED | OUTPATIENT
Start: 2024-09-23 | End: 2024-10-03 | Stop reason: HOSPADM

## 2024-09-23 RX ORDER — CALCIUM GLUCONATE 20 MG/ML
2 INJECTION, SOLUTION INTRAVENOUS EVERY 6 HOURS PRN
Status: DISCONTINUED | OUTPATIENT
Start: 2024-09-23 | End: 2024-09-26

## 2024-09-23 RX ORDER — VANCOMYCIN HYDROCHLORIDE 1 G/20ML
INJECTION, POWDER, LYOPHILIZED, FOR SOLUTION INTRAVENOUS AS NEEDED
Status: DISCONTINUED | OUTPATIENT
Start: 2024-09-23 | End: 2024-09-23

## 2024-09-23 RX ORDER — POTASSIUM CHLORIDE 29.8 MG/ML
40 INJECTION INTRAVENOUS EVERY 6 HOURS PRN
Status: DISCONTINUED | OUTPATIENT
Start: 2024-09-23 | End: 2024-09-26

## 2024-09-23 RX ORDER — PROPOFOL 10 MG/ML
0-50 INJECTION, EMULSION INTRAVENOUS CONTINUOUS
Status: DISCONTINUED | OUTPATIENT
Start: 2024-09-23 | End: 2024-09-24

## 2024-09-23 RX ORDER — METHYLENE BLUE 5 MG/ML
INJECTION INTRAVENOUS AS NEEDED
Status: DISCONTINUED | OUTPATIENT
Start: 2024-09-23 | End: 2024-09-23

## 2024-09-23 RX ORDER — ALBUMIN HUMAN 50 G/1000ML
SOLUTION INTRAVENOUS
Status: COMPLETED
Start: 2024-09-23 | End: 2024-09-23

## 2024-09-23 RX ORDER — PANTOPRAZOLE SODIUM 40 MG/1
40 TABLET, DELAYED RELEASE ORAL
Status: DISCONTINUED | OUTPATIENT
Start: 2024-09-24 | End: 2024-09-24

## 2024-09-23 RX ORDER — CEFAZOLIN 1 G/1
INJECTION, POWDER, FOR SOLUTION INTRAVENOUS AS NEEDED
Status: DISCONTINUED | OUTPATIENT
Start: 2024-09-23 | End: 2024-09-23

## 2024-09-23 RX ORDER — ROCURONIUM BROMIDE 10 MG/ML
INJECTION, SOLUTION INTRAVENOUS AS NEEDED
Status: DISCONTINUED | OUTPATIENT
Start: 2024-09-23 | End: 2024-09-23

## 2024-09-23 RX ORDER — EPINEPHRINE HCL IN DEXTROSE 5% 4MG/250ML
0-1 PLASTIC BAG, INJECTION (ML) INTRAVENOUS CONTINUOUS
Status: DISCONTINUED | OUTPATIENT
Start: 2024-09-23 | End: 2024-09-24

## 2024-09-23 RX ORDER — ALBUMIN HUMAN 50 G/1000ML
25 SOLUTION INTRAVENOUS ONCE
Status: COMPLETED | OUTPATIENT
Start: 2024-09-23 | End: 2024-09-23

## 2024-09-23 RX ORDER — CEFAZOLIN SODIUM 2 G/100ML
2 INJECTION, SOLUTION INTRAVENOUS EVERY 8 HOURS
Status: COMPLETED | OUTPATIENT
Start: 2024-09-23 | End: 2024-09-25

## 2024-09-23 RX ORDER — POTASSIUM CHLORIDE 29.8 MG/ML
INJECTION INTRAVENOUS AS NEEDED
Status: DISCONTINUED | OUTPATIENT
Start: 2024-09-23 | End: 2024-09-23

## 2024-09-23 RX ORDER — SODIUM CHLORIDE, SODIUM LACTATE, POTASSIUM CHLORIDE, CALCIUM CHLORIDE 600; 310; 30; 20 MG/100ML; MG/100ML; MG/100ML; MG/100ML
5 INJECTION, SOLUTION INTRAVENOUS CONTINUOUS
Status: DISCONTINUED | OUTPATIENT
Start: 2024-09-23 | End: 2024-09-26

## 2024-09-23 RX ORDER — PHENYLEPHRINE HYDROCHLORIDE 10 MG/ML
INJECTION INTRAVENOUS AS NEEDED
Status: DISCONTINUED | OUTPATIENT
Start: 2024-09-23 | End: 2024-09-23

## 2024-09-23 RX ORDER — LIDOCAINE HYDROCHLORIDE 20 MG/ML
INJECTION, SOLUTION INFILTRATION; PERINEURAL AS NEEDED
Status: DISCONTINUED | OUTPATIENT
Start: 2024-09-23 | End: 2024-09-23

## 2024-09-23 RX ORDER — MIDAZOLAM HYDROCHLORIDE 1 MG/ML
INJECTION INTRAMUSCULAR; INTRAVENOUS AS NEEDED
Status: DISCONTINUED | OUTPATIENT
Start: 2024-09-23 | End: 2024-09-23

## 2024-09-23 RX ORDER — INSULIN LISPRO 100 [IU]/ML
0-15 INJECTION, SOLUTION INTRAVENOUS; SUBCUTANEOUS EVERY 4 HOURS
Status: DISCONTINUED | OUTPATIENT
Start: 2024-09-23 | End: 2024-09-24

## 2024-09-23 RX ORDER — MAGNESIUM SULFATE HEPTAHYDRATE 500 MG/ML
INJECTION, SOLUTION INTRAMUSCULAR; INTRAVENOUS AS NEEDED
Status: DISCONTINUED | OUTPATIENT
Start: 2024-09-23 | End: 2024-09-23

## 2024-09-23 RX ORDER — PANTOPRAZOLE SODIUM 40 MG/10ML
40 INJECTION, POWDER, LYOPHILIZED, FOR SOLUTION INTRAVENOUS
Status: DISCONTINUED | OUTPATIENT
Start: 2024-09-24 | End: 2024-09-24

## 2024-09-23 RX ORDER — HYDROMORPHONE HYDROCHLORIDE 1 MG/ML
0.2 INJECTION, SOLUTION INTRAMUSCULAR; INTRAVENOUS; SUBCUTANEOUS
Status: DISCONTINUED | OUTPATIENT
Start: 2024-09-23 | End: 2024-09-24

## 2024-09-23 RX ORDER — FENTANYL CITRATE 50 UG/ML
INJECTION, SOLUTION INTRAMUSCULAR; INTRAVENOUS AS NEEDED
Status: DISCONTINUED | OUTPATIENT
Start: 2024-09-23 | End: 2024-09-23

## 2024-09-23 RX ORDER — PROTAMINE SULFATE 10 MG/ML
INJECTION, SOLUTION INTRAVENOUS AS NEEDED
Status: DISCONTINUED | OUTPATIENT
Start: 2024-09-23 | End: 2024-09-23

## 2024-09-23 RX ORDER — AMOXICILLIN 250 MG
2 CAPSULE ORAL 2 TIMES DAILY
Status: DISCONTINUED | OUTPATIENT
Start: 2024-09-23 | End: 2024-10-02

## 2024-09-23 RX ORDER — INDOMETHACIN 25 MG/1
CAPSULE ORAL AS NEEDED
Status: DISCONTINUED | OUTPATIENT
Start: 2024-09-23 | End: 2024-09-23

## 2024-09-23 RX ORDER — NITROGLYCERIN 40 MG/100ML
INJECTION INTRAVENOUS AS NEEDED
Status: DISCONTINUED | OUTPATIENT
Start: 2024-09-23 | End: 2024-09-23

## 2024-09-23 RX ORDER — PHENYLEPHRINE 10 MG/250 ML(40 MCG/ML)IN 0.9 % SOD.CHLORIDE INTRAVENOUS
CONTINUOUS PRN
Status: DISCONTINUED | OUTPATIENT
Start: 2024-09-23 | End: 2024-09-23

## 2024-09-23 RX ORDER — POTASSIUM CHLORIDE 14.9 MG/ML
20 INJECTION INTRAVENOUS EVERY 6 HOURS PRN
Status: DISCONTINUED | OUTPATIENT
Start: 2024-09-23 | End: 2024-09-26

## 2024-09-23 RX ORDER — MAGNESIUM SULFATE HEPTAHYDRATE 40 MG/ML
4 INJECTION, SOLUTION INTRAVENOUS EVERY 6 HOURS PRN
Status: DISCONTINUED | OUTPATIENT
Start: 2024-09-23 | End: 2024-09-26

## 2024-09-23 RX ORDER — NOREPINEPHRINE BITARTRATE/D5W 8 MG/250ML
0-1 PLASTIC BAG, INJECTION (ML) INTRAVENOUS CONTINUOUS
Status: DISCONTINUED | OUTPATIENT
Start: 2024-09-23 | End: 2024-09-24

## 2024-09-23 RX ORDER — ACETAMINOPHEN 325 MG/1
650 TABLET ORAL EVERY 6 HOURS
Status: DISCONTINUED | OUTPATIENT
Start: 2024-09-23 | End: 2024-09-26

## 2024-09-23 RX ORDER — OXYCODONE HYDROCHLORIDE 5 MG/1
5 TABLET ORAL EVERY 4 HOURS PRN
Status: DISCONTINUED | OUTPATIENT
Start: 2024-09-23 | End: 2024-10-02

## 2024-09-23 RX ORDER — ESMOLOL HYDROCHLORIDE 10 MG/ML
INJECTION INTRAVENOUS AS NEEDED
Status: DISCONTINUED | OUTPATIENT
Start: 2024-09-23 | End: 2024-09-23

## 2024-09-23 RX ORDER — OXYCODONE HYDROCHLORIDE 5 MG/1
10 TABLET ORAL EVERY 4 HOURS PRN
Status: DISCONTINUED | OUTPATIENT
Start: 2024-09-23 | End: 2024-09-26

## 2024-09-23 RX ORDER — NAPROXEN SODIUM 220 MG/1
81 TABLET, FILM COATED ORAL DAILY
Status: DISCONTINUED | OUTPATIENT
Start: 2024-09-23 | End: 2024-09-26

## 2024-09-23 RX ORDER — CALCIUM GLUCONATE 20 MG/ML
1 INJECTION, SOLUTION INTRAVENOUS EVERY 6 HOURS PRN
Status: DISCONTINUED | OUTPATIENT
Start: 2024-09-23 | End: 2024-09-26

## 2024-09-23 RX ORDER — EPINEPHRINE HCL IN DEXTROSE 5% 4MG/250ML
0.05 PLASTIC BAG, INJECTION (ML) INTRAVENOUS CONTINUOUS
Status: DISCONTINUED | OUTPATIENT
Start: 2024-09-23 | End: 2024-09-23

## 2024-09-23 RX ORDER — AMIODARONE HYDROCHLORIDE 150 MG/3ML
INJECTION, SOLUTION INTRAVENOUS AS NEEDED
Status: DISCONTINUED | OUTPATIENT
Start: 2024-09-23 | End: 2024-09-23

## 2024-09-23 RX ORDER — METHADONE HYDROCHLORIDE 10 MG/1
TABLET ORAL AS NEEDED
Status: DISCONTINUED | OUTPATIENT
Start: 2024-09-23 | End: 2024-09-23

## 2024-09-23 RX ORDER — PROPOFOL 10 MG/ML
INJECTION, EMULSION INTRAVENOUS AS NEEDED
Status: DISCONTINUED | OUTPATIENT
Start: 2024-09-23 | End: 2024-09-23

## 2024-09-23 RX ORDER — CALCIUM CHLORIDE INJECTION 100 MG/ML
INJECTION, SOLUTION INTRAVENOUS
Status: COMPLETED
Start: 2024-09-23 | End: 2024-09-23

## 2024-09-23 RX ORDER — SODIUM CHLORIDE, SODIUM GLUCONATE, SODIUM ACETATE, POTASSIUM CHLORIDE AND MAGNESIUM CHLORIDE 30; 37; 368; 526; 502 MG/100ML; MG/100ML; MG/100ML; MG/100ML; MG/100ML
INJECTION, SOLUTION INTRAVENOUS CONTINUOUS PRN
Status: DISCONTINUED | OUTPATIENT
Start: 2024-09-23 | End: 2024-09-23

## 2024-09-23 RX ADMIN — SENNOSIDES AND DOCUSATE SODIUM 2 TABLET: 8.6; 5 TABLET ORAL at 20:49

## 2024-09-23 RX ADMIN — ALBUMIN HUMAN 12.5 G: 0.05 INJECTION, SOLUTION INTRAVENOUS at 14:47

## 2024-09-23 RX ADMIN — ALBUMIN HUMAN 25 G: 0.05 INJECTION, SOLUTION INTRAVENOUS at 20:49

## 2024-09-23 RX ADMIN — PROPOFOL 40 MCG/KG/MIN: 10 INJECTION, EMULSION INTRAVENOUS at 20:52

## 2024-09-23 RX ADMIN — HEPARIN SODIUM 5000 UNITS: 5000 INJECTION, SOLUTION INTRAVENOUS; SUBCUTANEOUS at 02:16

## 2024-09-23 RX ADMIN — PROPOFOL 40 MCG/KG/MIN: 10 INJECTION, EMULSION INTRAVENOUS at 23:34

## 2024-09-23 RX ADMIN — VASOPRESSIN 0.03 UNITS: 0.2 INJECTION INTRAVENOUS at 15:04

## 2024-09-23 RX ADMIN — ALBUMIN HUMAN 12.5 G: 50 SOLUTION INTRAVENOUS at 14:47

## 2024-09-23 RX ADMIN — ASPIRIN 81 MG 81 MG: 81 TABLET ORAL at 20:50

## 2024-09-23 RX ADMIN — Medication 50 PERCENT: at 14:15

## 2024-09-23 RX ADMIN — ACETAMINOPHEN 650 MG: 325 TABLET ORAL at 20:50

## 2024-09-23 RX ADMIN — CEFAZOLIN SODIUM 2 G: 2 INJECTION, SOLUTION INTRAVENOUS at 22:31

## 2024-09-23 RX ADMIN — PROPOFOL 40 MCG/KG/MIN: 10 INJECTION, EMULSION INTRAVENOUS at 16:38

## 2024-09-23 RX ADMIN — HYDRALAZINE HYDROCHLORIDE 10 MG: 20 INJECTION INTRAMUSCULAR; INTRAVENOUS at 05:04

## 2024-09-23 RX ADMIN — INSULIN LISPRO 15 UNITS: 100 INJECTION, SOLUTION INTRAVENOUS; SUBCUTANEOUS at 22:37

## 2024-09-23 RX ADMIN — CALCIUM CHLORIDE 1 G: 100 INJECTION INTRAVENOUS; INTRAVENTRICULAR at 14:48

## 2024-09-23 RX ADMIN — Medication 50 PERCENT: at 20:00

## 2024-09-23 SDOH — HEALTH STABILITY: MENTAL HEALTH: CURRENT SMOKER: 0

## 2024-09-23 ASSESSMENT — PAIN SCALES - GENERAL
PAINLEVEL_OUTOF10: 0 - NO PAIN
PAIN_LEVEL: 0

## 2024-09-23 ASSESSMENT — PAIN - FUNCTIONAL ASSESSMENT: PAIN_FUNCTIONAL_ASSESSMENT: 0-10

## 2024-09-23 NOTE — BRIEF OP NOTE
Date: 2024 - 2024  OR Location: [facility] OR    Name: David Chatman, : 1955, Age: 69 y.o., MRN: [id number], Sex: male    Diagnosis  Pre-op Diagnosis      * Aneurysm of ascending aorta without rupture (CMS-HCC) [I71.21] Post-op Diagnosis     * Aneurysm of ascending aorta without rupture (CMS-HCC) [I71.21]     Procedures  Aortic root and ascending aorta replacement w/ 29 mm Konect; LAAL  10548 - TN AS-AORT GRF W/CARD BYP & AORTIC ROOT RPLCMT    Median Sternotomy  Standard Central Cannulation  Evacuation of Pleural Effusions   Evacuation of 200mL Pleural Effusion  Bio Bental w/ 29 Konnect  STEVEN W 35mm Atriclip  Closure w/10 Wires    Chest Tubes/Drains:      Mediastinal X2 L Pleural & R Pleural  Temporary Pacing Wires:     -Settings:   -Underlying Rhythm: V & A Wires    Permanent pacer/ICD: No   -Preoperative settings:    -Intra-op/ Postoperative settings:    Sternotomy performed by: Conor    Conduit Harvested by: n/a    Sternal Wires placed by: Eapon  Chest-Gladikiy/ Eapon/    Arm/Leg/Groin Closure/Cutdown performed by: n/a    Cardio Pulmonary Bypass Time: 119  Cross-clamp Time: 75  Circulatory Arrest: No Time:     Is patient candidate for Emergency Re-sternotomy? Yes   -If yes, POD #10 is -     Surgeons      * Trino Perdomo - Primary    Resident/Fellow/Other Assistant:  Surgeons and Role:  * No surgeons found with a matching role *    Procedure Summary  Anesthesia: Regional, General  ASA: IV  Anesthesia Staff: Anesthesiologist: Daljit West MD  Anesthesia Resident: Carlitos Mccoy MD  Perfusionist: Cabrera Ramey  Estimated Blood Loss: 250mL  Intra-op Medications:   Administrations occurring from 0645 to 1330 on 24:   Medication Name Total Dose   amLODIPine (Norvasc) tablet 5 mg Cannot be calculated   angiotensin II (Giapreza) 2.5 mg in sodium chloride 0.9% 250 mL (0.01 mg/mL) infusion 353,400 ng   chlorhexidine (Peridex) 0.12 % solution 15 mL Cannot be calculated   cloNIDine  (Catapres) tablet 0.1 mg Cannot be calculated   heparin (porcine) injection 5,000 Units 40,000 Units   hydrALAZINE (Apresoline) tablet 100 mg Cannot be calculated   metoprolol tartrate (Lopressor) tablet 12.5 mg Cannot be calculated   pantoprazole (ProtoNix) EC tablet 40 mg Cannot be calculated              Anesthesia Record               Intraprocedure I/O Totals          Intake    Norepinephrine Drip 0.00 mL    The total shown is the total volume documented since Anesthesia Start was filed.    Tranexamic Acid 0.00 mL    The total shown is the total volume documented since Anesthesia Start was filed.    Angiotensin II 0.00 mL    The total shown is the total volume documented since Anesthesia Start was filed.    Insulin Drip 0.00 mL    The total shown is the total volume documented since Anesthesia Start was filed.    Epinephrine Drip 0.00 mL    The total shown is the total volume documented since Anesthesia Start was filed.    Phenylephrine Drip 0.00 mL    The total shown is the total volume documented since Anesthesia Start was filed.    Vasopressin Drip 0.00 mL    The total shown is the total volume documented since Anesthesia Start was filed.    Total Intake 0 mL          Specimen:   ID Type Source Tests Collected by Time   1 : AORTA Tissue AORTA SURGICAL PATHOLOGY EXAM Trino Perdomo MD 9/23/2024 1150   2 : AORTIC VALVE LEAFLET Tissue HEART VALVE-AORTIC SURGICAL PATHOLOGY EXAM Trino Perdomo MD 9/23/2024 6649        Staff:   Circulator: Kenia  Scrub Person: Melissa Contrerasub Person: Candy Ramirez Circulator: Nisha          Findings:Ascending Aneurysm     Complications:  None; patient tolerated the procedure well.     Disposition: ICU - intubated and hemodynamically stable.  Condition: stable  Specimens Collected:   ID Type Source Tests Collected by Time   1 : AORTA Tissue AORTA SURGICAL PATHOLOGY EXAM Trino Perdomo MD 9/23/2024 1155   2 : AORTIC VALVE LEAFLET Tissue HEART VALVE-AORTIC SURGICAL  PATHOLOGY EXAM Trino Perdomo MD 9/23/2024 9825     Attending Attestation:       Trino Perdomo  Phone Number: [phone]

## 2024-09-23 NOTE — ANESTHESIA PROCEDURE NOTES
Central Venous Line:    Date/Time: 9/23/2024 8:15 AM    A central venous line was placed in the OR for the following indication(s): central venous access and CVP monitoring.  Staffing  Performed: resident   Authorized by: Daljit West MD    Performed by: Carlitos Mccoy MD    Sterility preparation included the following: provider hand hygiene performed prior to central venous catheter insertion, all 5 sterile barriers used (gloves, gown, cap, mask, large sterile drape) during central venous catheter insertion, antiseptic used during central venous catheter insertion and skin prep agent completely dried prior to procedure.  Medical reason for not performing maximal sterile barrier technique: no  The patient was placed in Trendelenburg position.    Right internal jugular vein was prepped.    The site was prepped with Chlorhexidine.  Size: 9 Fr (8 Fr)   Length: 11.5  Catheter type: introducer   Number of Lumens: double lumen    This catheter was not an oximetric catheter.    During the procedure, the following specific steps were taken: target vein identified, needle advanced into vein and blood aspirated and guidewire advanced into vein.  Seldinger technique used.  Procedure performed using ultrasound guidance.  Sterile gel and probe cover used in ultrasound-guided central venous catheter insertion.    Intravenous verification was obtained by ultrasound, venous blood return and manometry.      Post insertion care included: all ports aspirated, all ports flushed easily, guidewire removed intact, Biopatch applied, line sutured in place and dressing applied.    During the procedure the patient experienced: patient tolerated procedure well with no complications.           images stored in chart    Additional notes:  Central line Procedure summary     The patient was placed in trendelenburg position.   The patient's Left neck region was prepped and draped in sterile fashion using chlorhexidine scrub. The patient  prior to central line placement was on mechanical ventilation. The left Internal Jugular vein was accessed with ultrasound guidance using a finder needle.  The guidewire was advanced into the vein and the needle was withdrawn. A small incision was made with a 10 blade scalpel and the vessel was dilated over the guidewire until appropriate dilation was obtained. The dilator was removed and an 9  Romanian central venous MAC double lumen catheter was advanced over the guidewire and secured into place with sutures at 11.5 cm. Post procedure chest x-ray pending. The patient tolerated the procedure well without any complications.

## 2024-09-23 NOTE — ANESTHESIA PROCEDURE NOTES
Airway  Date/Time: 9/23/2024 8:00 AM  Urgency: elective    Airway not difficult    Staffing  Performed: resident   Authorized by: Daljit West MD    Performed by: Carlitos Mccoy MD  Patient location during procedure: OR    Indications and Patient Condition  Indications for airway management: anesthesia and airway protection  Spontaneous Ventilation: absent  Sedation level: deep  Preoxygenated: yes  Patient position: sniffing  MILS maintained throughout  Mask difficulty assessment: 3 - difficult mask (inadequate, unstable or two providers) +/- NMBA  No planned trial extubation    Final Airway Details  Final airway type: endotracheal airway      Successful airway: ETT  Cuffed: yes   Successful intubation technique: direct laryngoscopy  Facilitating devices/methods: intubating stylet  Endotracheal tube insertion site: oral  Blade: Janessa  Blade size: #4  ETT size (mm): 8.0  Cormack-Lehane Classification: grade IIa - partial view of glottis  Placement verified by: chest auscultation and capnometry   Measured from: teeth  ETT to teeth (cm): 24  Number of attempts at approach: 1  Ventilation between attempts: none  Number of other approaches attempted: 0

## 2024-09-23 NOTE — ANESTHESIA PROCEDURE NOTES
Peripheral IV  Date/Time: 9/23/2024 7:45 AM      Placement  Needle size: 16 G  Laterality: left  Location: hand  Local anesthetic: none  Site prep: alcohol  Technique: anatomical landmarks  Attempts: 1

## 2024-09-23 NOTE — H&P
HPI: 69 y M with PMH s/f LBBB, T-AAA, HTN, ACS, HFrEF (35-40%), HLD, Aortic valve insufficiency, pAF, RIOJAS, SHOAIB (compliant with CPAP), Hypersomnolence disorder, Gout, Renal insufficiency, R sided testicular pain, Class 1 Obesity, T2DM, Anemia, Thrombocytopenia, OA of R knee, SNHL (bilateral), and sinusitis who presents to the CTICU s/p ascending aorta repair with Dr Perdomo on 9/23. He initially presented with shortness of breath. He presented to his cardiologist as an outpt for SOB. Echo at the time showed an EF of 35-40% and ascending aneurysm with max dilatation at 6.2 cm. He was thence directed to the ED. BNP 1015, troponins, 21,16,15. CT chest revealed large ascending aneurysm up to 6.2 cm. No evidence of acute aortic dissection. He was given IV lasix with relief of symptoms and admitted for further evaluation. He then underwent cardiac catheterization. Asymptomatic at that time. CT surgery consulted for aneurysm evaluation at OSH and [place] [facility] [place] Now POD 0 from ascending aorta replacement.    Cardiac Testing:     TTE: 9/10/24   1. The left ventricular systolic function is moderately decreased, with a visually estimated ejection fraction of 35-40%.   2. There is global hypokinesis of the left ventricle with minor regional variations.   3. Left ventricular cavity size is severely dilated.   4. There is normal right ventricular global systolic function.   5. The left atrium is moderately dilated.   6. The right atrium is moderately dilated.   7. Moderate mitral valve regurgitation.   8. Trace tricuspid regurgitation is visualized.   9. Aortic valve stenosis is not present.  10. Likely tricuspid aortic valve but images slightly suboptimal.  11. Moderate aortic valve regurgitation.  12. Aneurysm of the ascending aorta.    CT angio chest abdomen pelvis 9/10/24  IMPRESSION:  Large ascending aneurysm up to 6.2 cm. No evidence of acute aortic  dissection.  Cardiac enlargement. Features of  mild pulmonary edema.  Hepatic heterogeneity.  Diverticulosis. No diverticulitis.. No free air. No free fluid.    [facility]: 9/11/24  1. large 6.2 cm ascending aorta dilation.  2. Inability to selectively or nonselectively engage the right coronary artery, and patient would benefit from CCTA imaging to confirm coronary anatomy.   3. No obstructive disease in the left main, LAD, or circumflex vessels.    Cardiac MRI 9/12/24  IMPRESSION:  1. Dilated LV (EDVi-182 ml/m2) with reduced systolic function.  Quantitative LVEF 34 %.  2. Normal RV size (EDVi-88 ml/m2)and systolic function. Quantitative  RVEF49%.  3. Aneurysmal dilatation of the ascending aorta measuring up 6.4 cm.  Dilated aortic root measuring 4.5 x 1.5 x 4.2 cm.  4. Moderate aortic regurgitation. Aortic regurgitant fraction is 36%.  5. Small focal transmural (%) LGE/scar involving the apical  lateral wall. Finding may represent prior embolic event versus prior  myocarditis.  6. Nonspecific replacement fibrosis noted at the level of the basal  mid septum.  7. Small bilateral pleural effusion.  8. Small circumferential pericardial effusion.    PMH: as above    PSH: Sinus surgery    Social history: Per chart review as of 9/10/24; reports that he has never smoked. He has never used smokeless tobacco. He reports that he does not currently use alcohol after a past usage of about 2.0 standard drinks of alcohol per week. He reports that he does not use drugs.     Family history: none on file    Allergies: NKDA     Home meds: Simvastatin 10mg daily, Terazosin 10mg daily, Pantoprazole 20mg daily, Fluticasone 50mcg (2puffs) daily, Losartan 100mg daily, Metoprolol succinate 25mg daily, Clonidine 0.1mg BID    Intra-Op:  Procedure/Surgeon: Ascending aortic repair/ Dr Perdomo  Frontliner/Anesthesia: Joseph Mccoy/ Brett  Out of OR Time (document on ventilator card): 3535     OR Course/Issues: Became profoundly vasoplegic as CPB was initiated and throughout the entirety of  being on pump. Received methylene blue, cyanokit, started on epinephrine gtt, levophed gtt, vasopressin gtt, angiotensin II gtt     CPB time: 119 mins  Cross clamp time: 75 mins  Circ arrest time: None  Echo Pre/Post: Normal RV, LVEF 30% w septal dyskinesis  Chest Tubes/Drains: 2 mediastinal chest tubes, 2 pleural chest tubes   Temporary wires location/setting: A and V wires      Fluids  Crystalloid: 3.5L  Colloid: none  Cellsaver: 1170 cc  Products: none  EBL: 250 cc  UOP: 260 cc     Anesthesia  Airway: 2 hand mask, DL, 8.0 ETT, 1 attempt, grade 2a view  Intravenous Access: RIJ MAC w mini MAC 16g LH, 20g LFA   Regional anesthesia: none  Benzodiazepine dose/last administration: 4 mg midazolam total  Opioid dose/last administration: 100 mcg fentanyl total, 20mg Methadone  NMB dose/last administration: 150 mg rocuronium   TOF/ reversal given: 2 twitches present at end of case/ not reversed  Antibiotics: 1g Vancomycin and 4g of Ancef  Temperature on admission to ICU: 36.4    Review of Systems:  ROS unable to be completed as patient currently intubated and sedated    Scheduled Medications:   [Transfer Hold] amLODIPine, 5 mg, oral, Daily  [Transfer Hold] chlorhexidine, 15 mL, Mouth/Throat, BID  [Transfer Hold] cloNIDine, 0.1 mg, oral, q12h DESHAWN  [Transfer Hold] heparin (porcine), 5,000 Units, subcutaneous, q8h  [Transfer Hold] hydrALAZINE, 100 mg, oral, TID  [Transfer Hold] metoprolol tartrate, 12.5 mg, oral, BID  [Transfer Hold] pantoprazole, 40 mg, oral, Daily before breakfast  [Transfer Hold] simvastatin, 10 mg, oral, Nightly  [Transfer Hold] terazosin, 10 mg, oral, Nightly         Continuous Medications:   angiotensin II (Giapreza) 2.5 mg in sodium chloride 0.9% 250 mL (0.01 mg/mL) infusion, 1.25-80 ng/kg/min  angiotensin II (Giapreza) 2.5 mg in sodium chloride 0.9% 250 mL (0.01 mg/mL) infusion, 1.25-40 ng/kg/min, Last Rate: 20 ng/kg/min (09/23/24 1148)         PRN Medications:   PRN medications: [Transfer Hold]  acetaminophen **OR** [DISCONTINUED] acetaminophen **OR** [DISCONTINUED] acetaminophen, [Transfer Hold] hydrALAZINE    Vitals:  Most Recent:  Vitals:    09/23/24 0537   BP: 168/73   Pulse: 74   Resp: 16   Temp: 36.6 °C (97.9 °F)   SpO2: 95%       I/O:  I/O last 2 completed shifts:  In: 600 (5.3 mL/kg) [P.O.:600]  Out: 550 (4.8 mL/kg) [Urine:550 (0.2 mL/kg/hr)]  Weight: 114 kg     Physical Exam:   Constitutional: Intubated and sedated on mechanical ventilation in NAD   Neuro: Neuro intact without obvious focal deficits, on sedation.  PERRL  CV: RRR. S1S2. SR on monitor.  AV wires with back up rate set at VVI 50  Pulm: CTAB on mechanical ventilation.  CT's with appropriate serosang output and no airleak.  : clear dusky urine via todd  GI: S/ND/NT. Hypoactive BS. OGT in place with bilious output.  Extremities: Vasc exam intact x4.  Skin: WDI.  Postop dressings intact.  Chest tube dressings intact.    Psych: CIRO, sedated    Lab/Radiology/Diagnostic Review:  All other pertinent labs/imaging/diagnostics reviewed.     Assessment:  69 y M with PMH s/f LBBB, T-AAA, HTN, ACS, HFrEF (35-40%), HLD, Aortic valve insufficiency, pAF, RIOJAS, SHOAIB (compliant with CPAP), Hypersomnolence disorder, Gout, Renal insufficiency, R sided testicular pain, Class 1 Obesity, T2DM, Anemia, Thrombocytopenia, OA of R knee, SNHL (bilateral), and sinusitis who presents to the CTICU s/p ascending aorta repair with Dr Perdomo on 9/23    Plan:  NEURO:  Hx of hypersomnolence disorder. Acute post op pain.  Patient is currently intubated and sedated on propofol infusion. No obvious focal deficits.  -->   - Serial neuro and pain assessments   - Continue propofol until NMB reversal, then daily sedation vacation at minimum   - NMB reversal when normothermic and hemodynamically stable.    - Scheduled Tylenol   - PRN oxycodone and Dilaudid for pain   - Lidoderm patches x3 days  - PT Consult, OOB to chair as tolerated, chair position if not tolerated   - CAM ICU  score qshift  - Sleep/wake cycle hygiene   - REST protocol    CV:  Patient has a history of HFrEF (35-40%), TAA (6.2cm), LBBB, HTN, HLD, pAF.  Now status post ascending aorta repair. Pre & post cardiac function: 30%. AV wires set to VVI @ 50.  SR on monitor.  Arrived to ICU on Epi 0.05, Levo 0.05, and Angiotensin II 20 ng/kg/min. --> severely vasoplegic on pump intra-op requiring pressors and cyanokit and methylene blue. Upon arrival to unit MAPs drifting down to high 40s. Transfused 1 unit FFP, administered 500cc of 5% Albumin, and 500cc of crystalloid with titration of pressors upto Vaso 0.06, Ang II 40, levo 0.07, and epi 0.05. ABGs significant for lactic acidosis with mild hypercarbia. Was able to wean off pressors once patient was volume loaded. Currently on 0.03 of levo and epi and 5 ng/kg/min of angiotensin 2. Awaiting 2 units FFPs and 2u Cryoprecipitate from Blood bank to correct TEG values from earlier today. Will obtain repeat labs and TEG after completion of product transfusion.  - Continuous EKG and ABP monitoring  - Titrate vasopressors to maintain goal MAP 65-90, -120 per surgical team  - Wean intropes to goal CI >2.2 and/or SVO2 > 60  - Maintain AV pacer wires set at backup rate VVI 50   - Start ASA 81 mg today. No indication for Plavix at this time per surgical team  - Start statin tomorrow  Hold home meds: Simvastatin 10mg daily, Terazosin 10mg daily, Losartan 100mg daily, Metoprolol succinate 25mg daily, Clonidine 0.1mg BID    PULM:  Hx of RIOJAS, SHOAIB (compliant with home CPAP at night) Currently intubated on ventilator with appropriate oxygenation and ventilation.  4 Chest tubes with no airleak and appropriate serosang output, to suction.  -->  - f/u post op CXR and daily CXR while in ICU  - Once NMB reversed begin CPAP trials and extubate when criteria met.    - Wean FiO2 maintaining SpO2 >92%.   - ABGs as needed  - Aggressive BPH, IS q1h and OOB to chair when extubated  - Maintain chest  tubes to wall suction, notify team if output > 100 cc in one hour  Hold Home meds: Fluticasone 50mcg (2puffs) daily    GI: NPO with OGT in place, minimal bilious output.. -->  - Continue PPI until extubated, then will resume home dose  - NPO, swallow eval 4 hours post extubation.    - Colace and miralax BID, PRN dulc suppository for bowel regimen.   Home meds: Pantoprazole 20mg daily    :  Chronic renal insufficiency, R sided testicular pain. Baseline serum creatinine 1.08. Todd in place with appropriate UOP draining dusky urine iso cyanokit administration-->  - Continue todd catheter for strict I/Os.    - Goal UOP 0.5ml/kg/hr  - RFP as clinically indicated.  Replete electrolytes per CTICU protocol.    ENDO:  Hx of T2DM.  A1c 5.6 (4/24) .  Not on home meds. Arrived to the unit on insulin gtt. Postoperative hyperglycemia well controlled with insulin gtt at 1 U/hr . --> weaned off insulin gtt, started on cardiac SSI  - Maintain BG <180  - cardiac SSI per CTICU protocol    HEME:  Acute blood loss anemia. Thrombocytopenia.   -->    - Monitor drain output volume and characteristics  - CBC, coags, and fibrinogen post op and as clinically indicated  - SCDs for DVT prophylaxis.  Hold SQH POD0 d/t increased risk of bleed.   - last type and screen: 9/21/24    ID:  MRSA negative. COVID negative. Afebrile, no current indications of infection --> Received Vancomycin and Ancef intra-op.   - Trend temp q4h  - Periop Ancef x48hrs    Skin:    - arrived to ICU from OR with preventative Mepilex dressings in place on sacrum and heels  - change preventative Mepilex weekly or more frequently as indicated (when moist/soiled)   - every shift skin assessment per nursing and weekly ICU skin rounds  - moisture barrier to be applied with ruddy care  - active skin problems addressed with nursing on daily rounds.     Proph:  SCDs  PPI    G:  Line  Right IJ MAC w mini MAC placed 9/23  Right brachial phil placed 9/23  Left femoral phil placed  9/23  Coy placed 9/23  16g, 20g PIVs    F: Family: will update family at bedside as able    A,B,C,D,E,F,G: reviewed    Restraints: Bilateral soft wrist restraints ordered    Dispo: CTICU    Seen and discussed with ICU attending Dr Walden

## 2024-09-23 NOTE — ANESTHESIA POSTPROCEDURE EVALUATION
Patient: David Chatman    Procedure Summary       Date: 09/23/24 Room / Location: [facility] OR 19 / [facility] OR    Anesthesia Start: 0724 Anesthesia Stop: 1433    Procedure: Aortic root and ascending aorta replacement w/ 29 mm Konect; 35 LAAL Diagnosis:       Aneurysm of ascending aorta without rupture (CMS-HCC)      (Aneurysm of ascending aorta without rupture (CMS-HCC) [I71.21])    Surgeons: Trino Perdomo MD Responsible Provider: Daljit West MD    Anesthesia Type: general, regional ASA Status: 4            Anesthesia Type: general, regional    Vitals Value Taken Time   /47 09/23/24 1415   Temp 36 °C (96.8 °F) 09/23/24 1415   Pulse 96 09/23/24 1432   Resp 13 09/23/24 1432   SpO2 95 % 09/23/24 1432   Vitals shown include unfiled device data.    Anesthesia Post Evaluation    Patient location during evaluation: ICU  Patient participation: complete - patient cannot participate  Level of consciousness: sedated  Pain score: 0  Pain management: adequate  Multimodal analgesia pain management approach  Airway patency: patent  Two or more strategies used to mitigate risk of obstructive sleep apnea  Cardiovascular status: acceptable and blood pressure returned to baseline  Respiratory status: acceptable, ETT and intubated  Hydration status: acceptable  Postoperative Nausea and Vomiting: none        No notable events documented.

## 2024-09-23 NOTE — ANESTHESIA PROCEDURE NOTES
Arterial Line:    Date/Time: 9/23/2024 7:48 AM    Staffing  Performed: resident   Authorized by: Daljit West MD    Performed by: Carlitos Mccoy MD    An arterial line was placed. Procedure performed using ultrasound guidance.in the OR for the following indication(s): continuous blood pressure monitoring and blood sampling needed.    A 20 gauge (size), 10 cm (length), Arrow (type) catheter was placed into the Right brachial artery, secured by Tegaderm,   Seldinger technique used.  Events:  patient tolerated procedure well with no complications.      Additional notes:  Arterial line insertion performed easily/atraumatically, no complications noted.

## 2024-09-24 ENCOUNTER — APPOINTMENT (OUTPATIENT)
Dept: RADIOLOGY | Facility: HOSPITAL | Age: 69
DRG: 219 | End: 2024-09-24
Payer: MEDICARE

## 2024-09-24 ENCOUNTER — APPOINTMENT (OUTPATIENT)
Dept: CARDIOLOGY | Facility: HOSPITAL | Age: 69
DRG: 219 | End: 2024-09-24
Payer: MEDICARE

## 2024-09-24 LAB
ALBUMIN SERPL BCP-MCNC: 4 G/DL (ref 3.4–5)
ANION GAP BLDA CALCULATED.4IONS-SCNC: 11 MMO/L (ref 10–25)
ANION GAP BLDA CALCULATED.4IONS-SCNC: 14 MMO/L (ref 10–25)
ANION GAP BLDV CALCULATED.4IONS-SCNC: 14 MMOL/L (ref 10–25)
ANION GAP SERPL CALC-SCNC: 15 MMOL/L (ref 10–20)
BASE EXCESS BLDA CALC-SCNC: -0.8 MMOL/L (ref -2–3)
BASE EXCESS BLDA CALC-SCNC: -3.8 MMOL/L (ref -2–3)
BASE EXCESS BLDV CALC-SCNC: -5.7 MMOL/L (ref -2–3)
BLOOD EXPIRATION DATE: NORMAL
BODY TEMPERATURE: 37 DEGREES CELSIUS
BUN SERPL-MCNC: 26 MG/DL (ref 6–23)
CA-I BLD-SCNC: 1.13 MMOL/L (ref 1.1–1.33)
CA-I BLDA-SCNC: 1.18 MMOL/L (ref 1.1–1.33)
CA-I BLDA-SCNC: 1.19 MMOL/L (ref 1.1–1.33)
CA-I BLDV-SCNC: 1.13 MMOL/L (ref 1.1–1.33)
CALCIUM SERPL-MCNC: 8.7 MG/DL (ref 8.6–10.6)
CHLORIDE BLDA-SCNC: 108 MMOL/L (ref 98–107)
CHLORIDE BLDA-SCNC: 108 MMOL/L (ref 98–107)
CHLORIDE BLDV-SCNC: 109 MMOL/L (ref 98–107)
CHLORIDE SERPL-SCNC: 107 MMOL/L (ref 98–107)
CO2 SERPL-SCNC: 23 MMOL/L (ref 21–32)
CREAT SERPL-MCNC: 1.42 MG/DL (ref 0.5–1.3)
DISPENSE STATUS: NORMAL
EGFRCR SERPLBLD CKD-EPI 2021: 54 ML/MIN/1.73M*2
ERYTHROCYTE [DISTWIDTH] IN BLOOD BY AUTOMATED COUNT: 15.4 % (ref 11.5–14.5)
GLUCOSE BLD MANUAL STRIP-MCNC: 125 MG/DL (ref 74–99)
GLUCOSE BLD MANUAL STRIP-MCNC: 131 MG/DL (ref 74–99)
GLUCOSE BLDA-MCNC: 163 MG/DL (ref 74–99)
GLUCOSE BLDA-MCNC: 198 MG/DL (ref 74–99)
GLUCOSE BLDV-MCNC: 247 MG/DL (ref 74–99)
GLUCOSE SERPL-MCNC: 153 MG/DL (ref 74–99)
HCO3 BLDA-SCNC: 20.6 MMOL/L (ref 22–26)
HCO3 BLDA-SCNC: 23.2 MMOL/L (ref 22–26)
HCO3 BLDV-SCNC: 19.3 MMOL/L (ref 22–26)
HCT VFR BLD AUTO: 31.5 % (ref 41–52)
HCT VFR BLD EST: 31 % (ref 41–52)
HCT VFR BLD EST: 32 % (ref 41–52)
HCT VFR BLD EST: 32 % (ref 41–52)
HGB BLD-MCNC: 10.1 G/DL (ref 13.5–17.5)
HGB BLDA-MCNC: 10.7 G/DL (ref 13.5–17.5)
HGB BLDA-MCNC: 10.8 G/DL (ref 13.5–17.5)
HGB BLDV-MCNC: 10.3 G/DL (ref 13.5–17.5)
INHALED O2 CONCENTRATION: 25 %
INHALED O2 CONCENTRATION: 40 %
INHALED O2 CONCENTRATION: 40 %
LACTATE BLDA-SCNC: 1.8 MMOL/L (ref 0.4–2)
LACTATE BLDA-SCNC: 4.8 MMOL/L (ref 0.4–2)
LACTATE BLDV-SCNC: 5.3 MMOL/L (ref 0.4–2)
MAGNESIUM SERPL-MCNC: 2.7 MG/DL (ref 1.6–2.4)
MCH RBC QN AUTO: 28.3 PG (ref 26–34)
MCHC RBC AUTO-ENTMCNC: 32.1 G/DL (ref 32–36)
MCV RBC AUTO: 88 FL (ref 80–100)
NRBC BLD-RTO: 0 /100 WBCS (ref 0–0)
OXYHGB MFR BLDA: 92.1 % (ref 94–98)
OXYHGB MFR BLDA: 96.5 % (ref 94–98)
OXYHGB MFR BLDV: 83.5 % (ref 45–75)
PCO2 BLDA: 34 MM HG (ref 38–42)
PCO2 BLDA: 35 MM HG (ref 38–42)
PCO2 BLDV: 35 MM HG (ref 41–51)
PH BLDA: 7.39 PH (ref 7.38–7.42)
PH BLDA: 7.43 PH (ref 7.38–7.42)
PH BLDV: 7.35 PH (ref 7.33–7.43)
PHOSPHATE SERPL-MCNC: 4.3 MG/DL (ref 2.5–4.9)
PLATELET # BLD AUTO: 88 X10*3/UL (ref 150–450)
PO2 BLDA: 103 MM HG (ref 85–95)
PO2 BLDA: 64 MM HG (ref 85–95)
PO2 BLDV: 52 MM HG (ref 35–45)
POTASSIUM BLDA-SCNC: 3.8 MMOL/L (ref 3.5–5.3)
POTASSIUM BLDA-SCNC: 4.1 MMOL/L (ref 3.5–5.3)
POTASSIUM BLDV-SCNC: 3.8 MMOL/L (ref 3.5–5.3)
POTASSIUM SERPL-SCNC: 3.8 MMOL/L (ref 3.5–5.3)
PRODUCT BLOOD TYPE: 5100
PRODUCT CODE: NORMAL
RBC # BLD AUTO: 3.57 X10*6/UL (ref 4.5–5.9)
SAO2 % BLDA: 94 % (ref 94–100)
SAO2 % BLDA: 99 % (ref 94–100)
SAO2 % BLDV: 86 % (ref 45–75)
SODIUM BLDA-SCNC: 138 MMOL/L (ref 136–145)
SODIUM BLDA-SCNC: 139 MMOL/L (ref 136–145)
SODIUM BLDV-SCNC: 138 MMOL/L (ref 136–145)
SODIUM SERPL-SCNC: 141 MMOL/L (ref 136–145)
UNIT ABO: NORMAL
UNIT NUMBER: NORMAL
UNIT RH: NORMAL
UNIT VOLUME: 294
UNIT VOLUME: 324
UNIT VOLUME: 361
UNIT VOLUME: 75
UNIT VOLUME: 81
WBC # BLD AUTO: 13.9 X10*3/UL (ref 4.4–11.3)

## 2024-09-24 PROCEDURE — 2500000001 HC RX 250 WO HCPCS SELF ADMINISTERED DRUGS (ALT 637 FOR MEDICARE OP)

## 2024-09-24 PROCEDURE — 2500000004 HC RX 250 GENERAL PHARMACY W/ HCPCS (ALT 636 FOR OP/ED)

## 2024-09-24 PROCEDURE — 37799 UNLISTED PX VASCULAR SURGERY: CPT

## 2024-09-24 PROCEDURE — 99291 CRITICAL CARE FIRST HOUR: CPT

## 2024-09-24 PROCEDURE — 83735 ASSAY OF MAGNESIUM: CPT

## 2024-09-24 PROCEDURE — 82330 ASSAY OF CALCIUM: CPT

## 2024-09-24 PROCEDURE — 82947 ASSAY GLUCOSE BLOOD QUANT: CPT

## 2024-09-24 PROCEDURE — 97161 PT EVAL LOW COMPLEX 20 MIN: CPT | Mod: GP

## 2024-09-24 PROCEDURE — 84132 ASSAY OF SERUM POTASSIUM: CPT | Performed by: STUDENT IN AN ORGANIZED HEALTH CARE EDUCATION/TRAINING PROGRAM

## 2024-09-24 PROCEDURE — 97530 THERAPEUTIC ACTIVITIES: CPT | Mod: GP

## 2024-09-24 PROCEDURE — 84520 ASSAY OF UREA NITROGEN: CPT

## 2024-09-24 PROCEDURE — 97166 OT EVAL MOD COMPLEX 45 MIN: CPT | Mod: GO

## 2024-09-24 PROCEDURE — 2500000004 HC RX 250 GENERAL PHARMACY W/ HCPCS (ALT 636 FOR OP/ED): Mod: JZ

## 2024-09-24 PROCEDURE — 94660 CPAP INITIATION&MGMT: CPT

## 2024-09-24 PROCEDURE — 84132 ASSAY OF SERUM POTASSIUM: CPT

## 2024-09-24 PROCEDURE — 2500000002 HC RX 250 W HCPCS SELF ADMINISTERED DRUGS (ALT 637 FOR MEDICARE OP, ALT 636 FOR OP/ED)

## 2024-09-24 PROCEDURE — 93005 ELECTROCARDIOGRAM TRACING: CPT

## 2024-09-24 PROCEDURE — 94003 VENT MGMT INPAT SUBQ DAY: CPT

## 2024-09-24 PROCEDURE — 2020000001 HC ICU ROOM DAILY

## 2024-09-24 PROCEDURE — 71045 X-RAY EXAM CHEST 1 VIEW: CPT | Performed by: RADIOLOGY

## 2024-09-24 PROCEDURE — 93010 ELECTROCARDIOGRAM REPORT: CPT | Performed by: INTERNAL MEDICINE

## 2024-09-24 PROCEDURE — 85027 COMPLETE CBC AUTOMATED: CPT

## 2024-09-24 PROCEDURE — 71045 X-RAY EXAM CHEST 1 VIEW: CPT

## 2024-09-24 PROCEDURE — 2500000005 HC RX 250 GENERAL PHARMACY W/O HCPCS

## 2024-09-24 RX ORDER — ATORVASTATIN CALCIUM 20 MG/1
20 TABLET, FILM COATED ORAL NIGHTLY
Status: DISCONTINUED | OUTPATIENT
Start: 2024-09-24 | End: 2024-10-03 | Stop reason: HOSPADM

## 2024-09-24 RX ORDER — HYDROMORPHONE HYDROCHLORIDE 1 MG/ML
0.2 INJECTION, SOLUTION INTRAMUSCULAR; INTRAVENOUS; SUBCUTANEOUS EVERY 2 HOUR PRN
Status: DISCONTINUED | OUTPATIENT
Start: 2024-09-24 | End: 2024-09-26

## 2024-09-24 RX ORDER — DEXTROSE 50 % IN WATER (D50W) INTRAVENOUS SYRINGE
25
Status: DISCONTINUED | OUTPATIENT
Start: 2024-09-24 | End: 2024-09-26

## 2024-09-24 RX ORDER — METHOCARBAMOL 100 MG/ML
1000 INJECTION, SOLUTION INTRAMUSCULAR; INTRAVENOUS ONCE
Status: COMPLETED | OUTPATIENT
Start: 2024-09-24 | End: 2024-09-24

## 2024-09-24 RX ORDER — HYDRALAZINE HYDROCHLORIDE 20 MG/ML
10 INJECTION INTRAMUSCULAR; INTRAVENOUS ONCE
Status: COMPLETED | OUTPATIENT
Start: 2024-09-24 | End: 2024-09-24

## 2024-09-24 RX ORDER — NEOSTIGMINE METHYLSULFATE 1 MG/ML
INJECTION INTRAVENOUS
Status: COMPLETED
Start: 2024-09-24 | End: 2024-09-24

## 2024-09-24 RX ORDER — HEPARIN SODIUM 5000 [USP'U]/ML
5000 INJECTION, SOLUTION INTRAVENOUS; SUBCUTANEOUS EVERY 8 HOURS
Status: DISCONTINUED | OUTPATIENT
Start: 2024-09-24 | End: 2024-10-03 | Stop reason: HOSPADM

## 2024-09-24 RX ORDER — ATORVASTATIN CALCIUM 40 MG/1
40 TABLET, FILM COATED ORAL NIGHTLY
Status: DISCONTINUED | OUTPATIENT
Start: 2024-09-24 | End: 2024-09-24

## 2024-09-24 RX ORDER — POTASSIUM CHLORIDE 20 MEQ/1
20 TABLET, EXTENDED RELEASE ORAL ONCE
Status: COMPLETED | OUTPATIENT
Start: 2024-09-24 | End: 2024-09-24

## 2024-09-24 RX ORDER — NITROGLYCERIN 20 MG/100ML
0-200 INJECTION INTRAVENOUS CONTINUOUS
Status: DISCONTINUED | OUTPATIENT
Start: 2024-09-24 | End: 2024-09-24

## 2024-09-24 RX ORDER — HYDROMORPHONE HYDROCHLORIDE 1 MG/ML
0.2 INJECTION, SOLUTION INTRAMUSCULAR; INTRAVENOUS; SUBCUTANEOUS EVERY 2 HOUR PRN
Status: DISCONTINUED | OUTPATIENT
Start: 2024-09-24 | End: 2024-09-24

## 2024-09-24 RX ORDER — NEOSTIGMINE METHYLSULFATE 1 MG/ML
5 INJECTION INTRAVENOUS ONCE
Status: COMPLETED | OUTPATIENT
Start: 2024-09-24 | End: 2024-09-24

## 2024-09-24 RX ORDER — HYDRALAZINE HYDROCHLORIDE 20 MG/ML
5 INJECTION INTRAMUSCULAR; INTRAVENOUS EVERY 4 HOURS PRN
Status: DISCONTINUED | OUTPATIENT
Start: 2024-09-24 | End: 2024-09-28

## 2024-09-24 RX ORDER — ACETAMINOPHEN 500 MG
5 TABLET ORAL DAILY
Status: DISCONTINUED | OUTPATIENT
Start: 2024-09-25 | End: 2024-09-25

## 2024-09-24 RX ORDER — NITROGLYCERIN 20 MG/100ML
INJECTION INTRAVENOUS
Status: COMPLETED
Start: 2024-09-24 | End: 2024-09-24

## 2024-09-24 RX ORDER — GLYCOPYRROLATE 0.2 MG/ML
INJECTION INTRAMUSCULAR; INTRAVENOUS
Status: COMPLETED
Start: 2024-09-24 | End: 2024-09-24

## 2024-09-24 RX ORDER — DEXTROSE 50 % IN WATER (D50W) INTRAVENOUS SYRINGE
12.5
Status: DISCONTINUED | OUTPATIENT
Start: 2024-09-24 | End: 2024-09-26

## 2024-09-24 RX ORDER — EPINEPHRINE HCL IN DEXTROSE 5% 4MG/250ML
0.01 PLASTIC BAG, INJECTION (ML) INTRAVENOUS CONTINUOUS
Status: DISCONTINUED | OUTPATIENT
Start: 2024-09-24 | End: 2024-09-25

## 2024-09-24 RX ORDER — GLYCOPYRROLATE 0.2 MG/ML
1 INJECTION INTRAMUSCULAR; INTRAVENOUS ONCE
Status: COMPLETED | OUTPATIENT
Start: 2024-09-24 | End: 2024-09-24

## 2024-09-24 RX ORDER — CLONIDINE HYDROCHLORIDE 0.1 MG/1
0.1 TABLET ORAL EVERY 8 HOURS
Status: DISCONTINUED | OUTPATIENT
Start: 2024-09-24 | End: 2024-09-26

## 2024-09-24 RX ORDER — TERAZOSIN 5 MG/1
5 CAPSULE ORAL NIGHTLY
Status: DISCONTINUED | OUTPATIENT
Start: 2024-09-24 | End: 2024-09-30

## 2024-09-24 RX ORDER — INSULIN LISPRO 100 [IU]/ML
0-5 INJECTION, SOLUTION INTRAVENOUS; SUBCUTANEOUS
Status: DISCONTINUED | OUTPATIENT
Start: 2024-09-24 | End: 2024-09-28

## 2024-09-24 RX ORDER — POLYETHYLENE GLYCOL 3350 17 G/17G
17 POWDER, FOR SOLUTION ORAL DAILY
Status: DISCONTINUED | OUTPATIENT
Start: 2024-09-24 | End: 2024-10-03 | Stop reason: HOSPADM

## 2024-09-24 RX ORDER — PANTOPRAZOLE SODIUM 20 MG/1
20 TABLET, DELAYED RELEASE ORAL
Status: DISCONTINUED | OUTPATIENT
Start: 2024-09-24 | End: 2024-09-29

## 2024-09-24 RX ORDER — FUROSEMIDE 10 MG/ML
20 INJECTION INTRAMUSCULAR; INTRAVENOUS ONCE
Status: COMPLETED | OUTPATIENT
Start: 2024-09-24 | End: 2024-09-24

## 2024-09-24 RX ADMIN — INSULIN LISPRO 5 UNITS: 100 INJECTION, SOLUTION INTRAVENOUS; SUBCUTANEOUS at 05:48

## 2024-09-24 RX ADMIN — OXYCODONE HYDROCHLORIDE 10 MG: 5 TABLET ORAL at 08:18

## 2024-09-24 RX ADMIN — CLONIDINE HYDROCHLORIDE 0.1 MG: 0.1 TABLET ORAL at 20:13

## 2024-09-24 RX ADMIN — CLONIDINE HYDROCHLORIDE 0.1 MG: 0.1 TABLET ORAL at 10:54

## 2024-09-24 RX ADMIN — HYDRALAZINE HYDROCHLORIDE 10 MG: 20 INJECTION INTRAMUSCULAR; INTRAVENOUS at 05:19

## 2024-09-24 RX ADMIN — FUROSEMIDE 20 MG: 10 INJECTION, SOLUTION INTRAVENOUS at 16:39

## 2024-09-24 RX ADMIN — ASPIRIN 81 MG 81 MG: 81 TABLET ORAL at 08:18

## 2024-09-24 RX ADMIN — ACETAMINOPHEN 650 MG: 325 TABLET ORAL at 14:05

## 2024-09-24 RX ADMIN — GLYCOPYRROLATE 1 MG: 0.2 INJECTION, SOLUTION INTRAMUSCULAR; INTRAVENOUS at 00:15

## 2024-09-24 RX ADMIN — TERAZOSIN HYDROCHLORIDE 5 MG: 5 CAPSULE ORAL at 20:13

## 2024-09-24 RX ADMIN — Medication 4 L/MIN: at 01:33

## 2024-09-24 RX ADMIN — METHOCARBAMOL 1000 MG: 100 INJECTION INTRAMUSCULAR; INTRAVENOUS at 23:50

## 2024-09-24 RX ADMIN — OXYCODONE HYDROCHLORIDE 10 MG: 5 TABLET ORAL at 20:13

## 2024-09-24 RX ADMIN — SENNOSIDES AND DOCUSATE SODIUM 2 TABLET: 8.6; 5 TABLET ORAL at 20:13

## 2024-09-24 RX ADMIN — NEOSTIGMINE METHYLSULFATE 5 MG: 1 INJECTION, SOLUTION INTRAVENOUS at 00:14

## 2024-09-24 RX ADMIN — PANTOPRAZOLE SODIUM 20 MG: 20 TABLET, DELAYED RELEASE ORAL at 08:19

## 2024-09-24 RX ADMIN — ACETAMINOPHEN 650 MG: 325 TABLET ORAL at 20:13

## 2024-09-24 RX ADMIN — POTASSIUM CHLORIDE 20 MEQ: 1500 TABLET, EXTENDED RELEASE ORAL at 14:05

## 2024-09-24 RX ADMIN — Medication 4 L/MIN: at 21:26

## 2024-09-24 RX ADMIN — CEFAZOLIN SODIUM 2 G: 2 INJECTION, SOLUTION INTRAVENOUS at 05:19

## 2024-09-24 RX ADMIN — Medication 40 PERCENT: at 00:33

## 2024-09-24 RX ADMIN — HYDRALAZINE HYDROCHLORIDE 5 MG: 20 INJECTION INTRAMUSCULAR; INTRAVENOUS at 14:14

## 2024-09-24 RX ADMIN — ATORVASTATIN CALCIUM 20 MG: 20 TABLET, FILM COATED ORAL at 20:13

## 2024-09-24 RX ADMIN — NITROGLYCERIN 50 MCG/MIN: 20 INJECTION INTRAVENOUS at 00:15

## 2024-09-24 RX ADMIN — GLYCOPYRROLATE 1 MG: 0.2 INJECTION INTRAMUSCULAR; INTRAVENOUS at 00:15

## 2024-09-24 RX ADMIN — NEOSTIGMINE METHYLSULFATE 5 MG: 1 INJECTION INTRAVENOUS at 00:14

## 2024-09-24 RX ADMIN — HEPARIN SODIUM 5000 UNITS: 5000 INJECTION, SOLUTION INTRAVENOUS; SUBCUTANEOUS at 10:54

## 2024-09-24 RX ADMIN — CEFAZOLIN SODIUM 2 G: 2 INJECTION, SOLUTION INTRAVENOUS at 13:04

## 2024-09-24 RX ADMIN — Medication 4 L/MIN: at 02:05

## 2024-09-24 RX ADMIN — HEPARIN SODIUM 5000 UNITS: 5000 INJECTION, SOLUTION INTRAVENOUS; SUBCUTANEOUS at 20:14

## 2024-09-24 RX ADMIN — Medication 4 L/MIN: at 08:19

## 2024-09-24 RX ADMIN — NITROGLYCERIN 75 MCG/MIN: 20 INJECTION INTRAVENOUS at 09:06

## 2024-09-24 RX ADMIN — CEFAZOLIN SODIUM 2 G: 2 INJECTION, SOLUTION INTRAVENOUS at 20:13

## 2024-09-24 RX ADMIN — ACETAMINOPHEN 650 MG: 325 TABLET ORAL at 08:18

## 2024-09-24 SDOH — SOCIAL STABILITY: SOCIAL NETWORK: HOW OFTEN DO YOU GET TOGETHER WITH FRIENDS OR RELATIVES?: MORE THAN THREE TIMES A WEEK

## 2024-09-24 SDOH — ECONOMIC STABILITY: HOUSING INSECURITY: AT ANY TIME IN THE PAST 12 MONTHS, WERE YOU HOMELESS OR LIVING IN A SHELTER (INCLUDING NOW)?: NO

## 2024-09-24 SDOH — HEALTH STABILITY: MENTAL HEALTH
STRESS IS WHEN SOMEONE FEELS TENSE, NERVOUS, ANXIOUS, OR CAN'T SLEEP AT NIGHT BECAUSE THEIR MIND IS TROUBLED. HOW STRESSED ARE YOU?: ONLY A LITTLE

## 2024-09-24 SDOH — SOCIAL STABILITY: SOCIAL INSECURITY
WITHIN THE LAST YEAR, HAVE YOU BEEN KICKED, HIT, SLAPPED, OR OTHERWISE PHYSICALLY HURT BY YOUR PARTNER OR EX-PARTNER?: NO

## 2024-09-24 SDOH — ECONOMIC STABILITY: TRANSPORTATION INSECURITY
IN THE PAST 12 MONTHS, HAS THE LACK OF TRANSPORTATION KEPT YOU FROM MEDICAL APPOINTMENTS OR FROM GETTING MEDICATIONS?: NO

## 2024-09-24 SDOH — ECONOMIC STABILITY: INCOME INSECURITY: IN THE LAST 12 MONTHS, WAS THERE A TIME WHEN YOU WERE NOT ABLE TO PAY THE MORTGAGE OR RENT ON TIME?: NO

## 2024-09-24 SDOH — HEALTH STABILITY: PHYSICAL HEALTH: ON AVERAGE, HOW MANY DAYS PER WEEK DO YOU ENGAGE IN MODERATE TO STRENUOUS EXERCISE (LIKE A BRISK WALK)?: 3 DAYS

## 2024-09-24 SDOH — HEALTH STABILITY: MENTAL HEALTH: HOW OFTEN DO YOU HAVE A DRINK CONTAINING ALCOHOL?: 2-4 TIMES A MONTH

## 2024-09-24 SDOH — SOCIAL STABILITY: SOCIAL INSECURITY: WITHIN THE LAST YEAR, HAVE YOU BEEN HUMILIATED OR EMOTIONALLY ABUSED IN OTHER WAYS BY YOUR PARTNER OR EX-PARTNER?: NO

## 2024-09-24 SDOH — HEALTH STABILITY: MENTAL HEALTH: HOW MANY STANDARD DRINKS CONTAINING ALCOHOL DO YOU HAVE ON A TYPICAL DAY?: 1 OR 2

## 2024-09-24 SDOH — ECONOMIC STABILITY: INCOME INSECURITY: IN THE PAST 12 MONTHS, HAS THE ELECTRIC, GAS, OIL, OR WATER COMPANY THREATENED TO SHUT OFF SERVICE IN YOUR HOME?: NO

## 2024-09-24 SDOH — SOCIAL STABILITY: SOCIAL NETWORK: HOW OFTEN DO YOU ATTEND CHURCH OR RELIGIOUS SERVICES?: NEVER

## 2024-09-24 SDOH — ECONOMIC STABILITY: FOOD INSECURITY: WITHIN THE PAST 12 MONTHS, THE FOOD YOU BOUGHT JUST DIDN'T LAST AND YOU DIDN'T HAVE MONEY TO GET MORE.: NEVER TRUE

## 2024-09-24 SDOH — SOCIAL STABILITY: SOCIAL INSECURITY: WITHIN THE LAST YEAR, HAVE YOU BEEN AFRAID OF YOUR PARTNER OR EX-PARTNER?: NO

## 2024-09-24 SDOH — SOCIAL STABILITY: SOCIAL NETWORK
DO YOU BELONG TO ANY CLUBS OR ORGANIZATIONS SUCH AS CHURCH GROUPS UNIONS, FRATERNAL OR ATHLETIC GROUPS, OR SCHOOL GROUPS?: NO

## 2024-09-24 SDOH — SOCIAL STABILITY: SOCIAL NETWORK: ARE YOU MARRIED, WIDOWED, DIVORCED, SEPARATED, NEVER MARRIED, OR LIVING WITH A PARTNER?: MARRIED

## 2024-09-24 SDOH — ECONOMIC STABILITY: HOUSING INSECURITY: IN THE PAST 12 MONTHS, HOW MANY TIMES HAVE YOU MOVED WHERE YOU WERE LIVING?: 0

## 2024-09-24 SDOH — HEALTH STABILITY: MENTAL HEALTH
HOW OFTEN DO YOU NEED TO HAVE SOMEONE HELP YOU WHEN YOU READ INSTRUCTIONS, PAMPHLETS, OR OTHER WRITTEN MATERIAL FROM YOUR DOCTOR OR PHARMACY?: NEVER

## 2024-09-24 SDOH — HEALTH STABILITY: MENTAL HEALTH: HOW OFTEN DO YOU HAVE 6 OR MORE DRINKS ON ONE OCCASION?: NEVER

## 2024-09-24 SDOH — HEALTH STABILITY: PHYSICAL HEALTH: ON AVERAGE, HOW MANY MINUTES DO YOU ENGAGE IN EXERCISE AT THIS LEVEL?: 20 MIN

## 2024-09-24 SDOH — SOCIAL STABILITY: SOCIAL NETWORK
IN A TYPICAL WEEK, HOW MANY TIMES DO YOU TALK ON THE PHONE WITH FAMILY, FRIENDS, OR NEIGHBORS?: MORE THAN THREE TIMES A WEEK

## 2024-09-24 SDOH — ECONOMIC STABILITY: FOOD INSECURITY: WITHIN THE PAST 12 MONTHS, YOU WORRIED THAT YOUR FOOD WOULD RUN OUT BEFORE YOU GOT MONEY TO BUY MORE.: NEVER TRUE

## 2024-09-24 SDOH — SOCIAL STABILITY: SOCIAL INSECURITY
WITHIN THE LAST YEAR, HAVE TO BEEN RAPED OR FORCED TO HAVE ANY KIND OF SEXUAL ACTIVITY BY YOUR PARTNER OR EX-PARTNER?: NO

## 2024-09-24 SDOH — SOCIAL STABILITY: SOCIAL NETWORK: HOW OFTEN DO YOU ATTENT MEETINGS OF THE CLUB OR ORGANIZATION YOU BELONG TO?: NEVER

## 2024-09-24 SDOH — ECONOMIC STABILITY: TRANSPORTATION INSECURITY
IN THE PAST 12 MONTHS, HAS LACK OF TRANSPORTATION KEPT YOU FROM MEETINGS, WORK, OR FROM GETTING THINGS NEEDED FOR DAILY LIVING?: NO

## 2024-09-24 SDOH — ECONOMIC STABILITY: INCOME INSECURITY: HOW HARD IS IT FOR YOU TO PAY FOR THE VERY BASICS LIKE FOOD, HOUSING, MEDICAL CARE, AND HEATING?: NOT HARD AT ALL

## 2024-09-24 ASSESSMENT — LIFESTYLE VARIABLES
AUDIT-C TOTAL SCORE: 2
SKIP TO QUESTIONS 9-10: 1

## 2024-09-24 ASSESSMENT — COGNITIVE AND FUNCTIONAL STATUS - GENERAL
PERSONAL GROOMING: A LITTLE
DRESSING REGULAR LOWER BODY CLOTHING: A LOT
MOVING TO AND FROM BED TO CHAIR: A LITTLE
STANDING UP FROM CHAIR USING ARMS: A LITTLE
TOILETING: A LOT
HELP NEEDED FOR BATHING: A LOT
MOVING FROM LYING ON BACK TO SITTING ON SIDE OF FLAT BED WITH BEDRAILS: A LITTLE
WALKING IN HOSPITAL ROOM: A LITTLE
TURNING FROM BACK TO SIDE WHILE IN FLAT BAD: A LITTLE
MOBILITY SCORE: 18
DAILY ACTIVITIY SCORE: 16
CLIMB 3 TO 5 STEPS WITH RAILING: A LITTLE
DRESSING REGULAR UPPER BODY CLOTHING: A LITTLE

## 2024-09-24 ASSESSMENT — PAIN SCALES - GENERAL
PAINLEVEL_OUTOF10: 0 - NO PAIN
PAINLEVEL_OUTOF10: 7
PAINLEVEL_OUTOF10: 4
PAINLEVEL_OUTOF10: 0 - NO PAIN
PAINLEVEL_OUTOF10: 0 - NO PAIN
PAINLEVEL_OUTOF10: 8
PAINLEVEL_OUTOF10: 0 - NO PAIN
PAINLEVEL_OUTOF10: 0 - NO PAIN
PAINLEVEL_OUTOF10: 5 - MODERATE PAIN
PAINLEVEL_OUTOF10: 0 - NO PAIN
PAINLEVEL_OUTOF10: 5 - MODERATE PAIN

## 2024-09-24 ASSESSMENT — PAIN - FUNCTIONAL ASSESSMENT
PAIN_FUNCTIONAL_ASSESSMENT: 0-10

## 2024-09-24 ASSESSMENT — ACTIVITIES OF DAILY LIVING (ADL)
ADL_ASSISTANCE: INDEPENDENT
ADL_ASSISTANCE: INDEPENDENT
ADLS_ADDRESSED: NO
BATHING_ASSISTANCE: MODERATE

## 2024-09-24 NOTE — PROGRESS NOTES
Subjective   Overnight events: Lactate normalized. NTG started for afterload reduction. Extubated to BiPAP ON, on 4L NC satting> 92%.      Scheduled Medications:   acetaminophen, 650 mg, oral, q6h  aspirin, 81 mg, oral, Daily  ceFAZolin, 2 g, intravenous, q8h  insulin lispro, 0-15 Units, subcutaneous, q4h  oxygen, , inhalation, Continuous - Inhalation  pantoprazole, 20 mg, oral, Daily before breakfast  sennosides-docusate sodium, 2 tablet, oral, BID         Continuous Medications:   angiotensin II (Giapreza) 2.5 mg in sodium chloride 0.9% 250 mL (0.01 mg/mL) infusion, 1.25-40 ng/kg/min, Last Rate: Stopped (09/23/24 1428)  EPINEPHrine, 0-1 mcg/kg/min, Last Rate: 0.02 mcg/kg/min (09/24/24 0600)  lactated Ringer's, 30 mL/hr, Last Rate: 30 mL/hr (09/24/24 0700)  lactated Ringer's, 5 mL/hr, Last Rate: 5 mL/hr (09/24/24 0700)  nitroglycerin, 0-200 mcg/min, Last Rate: 75 mcg/min (09/24/24 0700)  norepinephrine, 0-1 mcg/kg/min, Last Rate: Stopped (09/23/24 2200)  propofol, 0-50 mcg/kg/min, Last Rate: Stopped (09/24/24 0102)         PRN Medications:   PRN medications: alteplase, calcium gluconate, calcium gluconate, HYDROmorphone, magnesium sulfate, magnesium sulfate, naloxone, oxyCODONE, oxyCODONE, potassium chloride, potassium chloride    Objective   Vitals:  Most Recent:  Vitals:    09/24/24 0745   BP:    Pulse: 82   Resp: 21   Temp:    SpO2: 94%       24hr Min/Max:  Temp  Min: 36 °C (96.8 °F)  Max: 37 °C (98.6 °F)  Pulse  Min: 77  Max: 98  BP  Min: 108/47  Max: 138/53  Resp  Min: 16  Max: 24  SpO2  Min: 89 %  Max: 100 %    I/O:  I/O last 2 completed shifts:  In: 41820.7 (92.1 mL/kg) [I.V.:1265 (11.1 mL/kg); Blood:1950; IV Piggyback:5166.8]  Out: 9375 (82.2 mL/kg) [Urine:8545 (3.1 mL/kg/hr); Blood:250; Chest Tube:580]  Weight: 114 kg     Hemodynamic parameters for last 24 hours:  CVP:  [4 mmHg-29 mmHg] 29 mmHg      Vent settings:  Vent Mode: Pressure support  FiO2 (%):  [50 %-60 %] 60 %  S RR:  [16-22] 22  S VT:  [640 mL]  640 mL  PEEP/CPAP (cm H2O):  [5 cm H20-8 cm H20] 5 cm H20  [place] SUP:  [5 cm H20] 5 cm H20  MAP (cm H2O):  [12-13] 13    Physical Exam:   Constitutional: NAD sitting in bed, pleasant and conversant AOx3  Neuro: Neuro exam intact without obvious focal deficit.  RASS 0 and CAM negative.  CV: RRR, SR 80's on monitor.  AV wires placed to VVI 50   Pulm: CTAB on NC, chest tubes with no airleak and minimal serosang output to -20 suction  : todd in place with dusky urine  GI: S/ND/NT  Extremities: No edema, NV exam intact x4  Skin: Post op and chest tube dressings inact.  Psych: WNL    Lab/Radiology/Diagnostic Review:  All AM labs reviewed     AM CXR: Reviewed      Assessment/Plan   69 y M with PMH s/f LBBB, T-AAA, HTN, ACS, HFrEF (35-40%), HLD, Aortic valve insufficiency, pAF, RIOJAS, SHOAIB (compliant with CPAP), Hypersomnolence disorder, Gout, Renal insufficiency, R sided testicular pain, Class 1 Obesity, T2DM, Anemia, Thrombocytopenia, OA of R knee, SNHL (bilateral), and sinusitis who presents to the CTICU s/p ascending aorta repair with Dr Perdomo on 9/23.      Plan:  NEURO:  Hx of hypersomnolence disorder. Acute post op pain.  Patient is Aox3. No obvious focal deficits.  -->   - Serial neuro and pain assessments   - Scheduled Tylenol   - PRN oxycodone and Dilaudid for pain   - Lidoderm patches x3 days  - PT Consult, OOB to chair as tolerated, chair position if not tolerated   - CAM ICU score qshift  - Sleep/wake cycle hygiene   - REST protocol     CV:  Patient has a history of HFrEF (35-40%), TAA (6.2cm), LBBB, HTN, HLD, pAF.  Now status post ascending aorta repair. Pre & post cardiac function: 30%. AV wires set to VVI @ 50.  SR on monitor.  Arrived to ICU on Epi 0.05, Levo 0.05, and Angiotensin II 20 ng/kg/min. --> severely vasoplegic on pump intra-op requiring pressors and cyanokit and methylene blue. Upon arrival to unit MAPs drifting down to high 40s. Transfused 1 unit FFP, administered 500cc of 5% Albumin, and 500cc of  crystalloid with titration of pressors upto Vaso 0.06, Ang II 40, levo 0.07, and epi 0.05. ABGs significant for lactic acidosis with mild hypercarbia. Was able to wean off pressors once patient was volume loaded. Currently on 0.03 of levo and epi and 5 ng/kg/min of angiotensin 2. Received a total of 3u FFP and 2u Cryo and 1L of 5% Albumin postop with normalization of lactate and appropriate svO2. On 0.02 of Epi this AM for inotropy and 75 mcg/ min of NTG for afterload reduction  - Continuous EKG and ABP monitoring  - Titrate vasopressors to maintain goal SBP<140 liberalized per surgical team  - Wean intropes to goal CI >2.2 and/or SVO2 > 60  - Maintain AV pacer wires set at backup rate VVI 50   - cw ASA 81 mg today. No indication for Plavix at this time per surgical team  - Start statin 20mg and clonidine 0.1 TID  - Reassess hydral PRN to help wean off NTG gtt  Home meds: Simvastatin 10mg daily, Terazosin 10mg daily, Losartan 100mg daily, Metoprolol succinate 25mg daily, Clonidine 0.1mg TID     PULM:  Hx of RIOJAS, SHOAIB (compliant with home CPAP at night) Extubated on 4L NC, 4 Chest tubes with no airleak and appropriate serosang output, to suction. CXR this AM looked fluffy cw mild pulmonary edema -->  - f/u post op CXR and daily CXR while in ICU  - Wean FiO2 maintaining SpO2 >92%.   - ABGs as needed  - Aggressive BPH, IS q1h and OOB to chair when extubated  - Maintain mediastinal chest tubes to wall suction, notify team if output > 100 cc in one hour   - remove pleural chest tubes today  Hold Home meds: Fluticasone 50mcg (2puffs) daily     GI: On home PPI-->  - Continue PPI   - Passed bedside swallow, ok for cardiac diet  - Colace and miralax BID, PRN dulc suppository for bowel regimen.   - Last BM: 9/22/24  Home meds: Pantoprazole 20mg daily     :  Chronic renal insufficiency, BPH. Baseline serum creatinine 1.08. Coy in place with appropriate UOP draining dusky urine iso cyanokit administration--> Postop LIZ  resolving, UOP 45cc/hr. Net positive 1L  - Reassess diuresis this Afternoon  - Continue todd catheter for strict I/Os.    - Goal UOP 0.5ml/kg/hr  - RFP as clinically indicated.  Replete electrolytes per CTICU protocol.  - Start home Terazosin 5mg daily    ENDO:  Hx of T2DM.  A1c 5.6 (4/24) .  Not on home meds. Arrived to the unit on insulin gtt. Postoperative hyperglycemia well controlled with insulin gtt at 1 U/hr . --> weaned off insulin gtt, started on cardiac SSI. Received 15u ON.  - Maintain BG <180  - cardiac SSI per CTICU protocol     HEME:  Acute blood loss anemia. Thrombocytopenia.  Leukocytosis improving-->    - Monitor drain output volume and characteristics  - CBC, coags, and fibrinogen post op and as clinically indicated  - SCDs and SQH for DVT prophylaxis.   - last type and screen: 9/24/24     ID:  MRSA negative. COVID negative. Afebrile, no current indications of infection --> Received Vancomycin and Ancef intra-op.   - Trend temp q4h  - Periop Ancef x48hrs     Skin:    - arrived to ICU from OR with preventative Mepilex dressings in place on sacrum and heels  - change preventative Mepilex weekly or more frequently as indicated (when moist/soiled)   - every shift skin assessment per nursing and weekly ICU skin rounds  - moisture barrier to be applied with ruddy care  - active skin problems addressed with nursing on daily rounds.      Proph:  SCDs  SQH  PPI     G:  Line  Right IJ MAC w mini MAC placed 9/23  Right brachial phil placed 9/23  Todd placed 9/23  16g, 20g PIVs     F: Family: will update family at bedside as able     A,B,C,D,E,F,G: reviewed     Restraints: not needed     Dispo: CTICU    Seen and discussed with ICU attending Dr Finney

## 2024-09-24 NOTE — OP NOTE
Operation Note    Date of the Operation: 09/23/2024  Name: David Chatman is a 69 y.o. year old male patient with  aortic root dilatation and aortic insufficiency referred for surgical correction .   MRN: [id number]    Preoperative Diagnosis:   #1 severe aortic root dilatation [6.2 cm]  #2 moderate secondary aortic valve insufficiency  #3 decreased LV function [35% ejection fraction]  #4 decompensated heart failure    Postoperative Diagnosis  The same  Operation Procedures:  #1 standard median sternotomy  #2 innominate trunk arterial cannulation  #3 ascending aortic replacement and hemiarch replacement with a 32 Gelweave graft  #4 aortic root replacement and aortic valve replacement: Bentall procedure with a 29 mm connect valve conduit  #5 left atrial appendage closure with a 35 mm AtriCure clip  #6 left femoral arterial line placement  Surgeon: Trino Perdomo MD    Assistants: Nanci Brown MD [Dr. Brown was considered an assistant surgeon since we did not have a qualified resident to help in this operation.  She helped throughout the entire operation and then she finished closed the chest and transported the patient back to the CTICU] Amandeep Ferrell    Anesthesia Type: General Endotracheal esthesia    Complications: The patient was severely hypotensive and vasoplegic.  Despite all the efforts towards high doses of for vasoactive drugs and also methylene blue the patient was kept on severe hypotension during at least 45 minutes to an hour with mean pressures of 30 mmHg.     Estimated Blood loss: 400 cc    Indication for Surgery: This is 69 years old male patient who was symptomatic for the last 2 to 3 months with shortness of breath on minimal exertion.  He was worked up and he was found with a 6.2 cm aortic root aneurysm, with a trileaflet aortic valve with secondary central moderate aortic insufficiency.  He was admitted to an emergency room in an outside hospital with decompensated heart failure, he was taken  to the Cath Lab and coronary angiogram was performed ruling out any significant coronary disease.  The echocardiogram showed trileaflet aortic valve with moderate central insufficiency, the LV function was decreased to 30%.  The patient has been severely hypertensive in general with very hard to manage high blood pressure.  Since the patient was admitted because of this episode we decided to transfer him over to [facility] [place] for final heart failure assessment and also surgical correction.  He was assisted by the heart failure team and essentially he was diagnosed with a chronic hypertensive dilated cardiomyopathy with moderate aortic insufficiency and aortic root aneurysm.  We treated him for a week with medical management until we optimize him, we indicated surgery, the patient agreed to proceed and the consent was signed.    Operative Findings:    JUMANA: Severe aortic root dilatation, moderate aortic insufficiency, trileaflet aortic valve.  30% left ventricle ejection fraction.  Mild MR mild TR.  Moderate to severe pericardial effusion.    Direct: The mediastinum was normal, the pericardium was normal, the pericardial fluid was increased in volume but with normal characteristics.  We aspirated about 250 cc.  We also found a right pleural effusion of about 300 cc.  The ascending aorta is mildly and moderately dilated.  The aortic root is severely dilated.  The aortic valve is trileaflet.  The noncoronary cusp is severely calcified.  The heart is moderately to severely enlarged.    Operation Description: The patient was taken to the operative room in a stable condition and then he was put on the table in a supine position.  After monitoring, general endotracheal anesthesia was induced and the patient was then prepped and draped as usual.  A standard median sternotomy was carried out.  We continue with the dissection of the mediastinum, we dissected and isolated the innominate trunk.  We continue with the opening of the  pericardium, the heparin was giving, we proceeded with cannulation of the innominate trunk for the tear cannulation, we used a triple stage cannula through the SVC, we went on bypass.  After we went on bypass the patient got severely hypotensive with mean blood pressure remains between 20 and 30 mmHg.  We troubleshoot it and we decided to place a new A-line on the left femoral artery for better understanding if this is real or is a malfunction of the A-line.  In the meantime the perfusion is running with no issues with more than 6 L/min and also the brain saturations were stable.  The echo was performed showing that there is no dissection.  After we placed the A-line on the left femoral we were able to better monitor the blood pressure.  The patient is still severely hypotensive so 4 different vasoactive drugs were started and also a 1 dose of methylene blue was given.  With this the patient finally regained some better blood pressure.  Antegrade and retrograde cardioplegia cannula were placed and the aorta was crossclamped.  1 dose of antegrade cardioplegia was given with [place] cardioplegia.  The heart was completely arrested.  We did not need a second dose of cardioplegia.  We proceeded to transect the ascending aorta, the previous findings were confirmed, we resected the ascending aorta from the hemiarch down into the aortic annulus.  Both coronary ostium were harvested away.  We continue with the measuring of the aortic annulus, we asked for the 29 mm connect valve conduit to the table.  We used 2 0 81 stitches with pledgets from the inside out.  The stitches were passed through the sewing ring of the conduit, the conduit was lowered down and tied with core knots.  Then we proceeded to use the electrocautery to perform a round perforation at the level of the left ostium, then we reconstructed the ostium with a 5-0 running Prolene suture.  In the same fashion now we constructed the right coronary ostium.  We  resected the rest of the Gelweave graft, the hemiarch was then anastomosed to the Gelweave graft with a 4-0 running prolene suture.  After we finished the anastomosis we exposed the left atrial appendage, 35 mm AtriCure clip was applied.  We removed the air from the left side of the heart, the cross-clamp was open, the heart promptly reassume slow sinus rhythm.  We placed 4 temporary pacemaker wires and we paced at 110/min to get rid of a lot of PVCs.  We gave 150 mg of amiodarone which made the rhythm more stable, we were able to paced at 100/min and then finally the patient regained sinus rhythm at 80/min.  After 10 minutes of rewarming and reperfusion we were able to come off bypass at the first attempt with no inconvenient.  An echo was performed showing a well working valve, the LV function is somewhat better, there is no other significant findings.  The protamine was given and the patient was decannulated.  After 45 minutes or so we were able to gain reasonable hemostasis, 2 chest tube were placed into the midsternal cavity and 1 chest tube in each pleural cavity.  The patient continued to be hemodynamically stable so we were able to close the sternum and the wound in standard fashion.  Before we closed the sternum all the counts were correct by the circulating nurse.  The patient is then transferred to the CT ICU to continue with his postoperative management.  Dictated by the Trino Perdomo

## 2024-09-24 NOTE — CARE PLAN
The patient's goals for the shift include      The clinical goals for the shift include Patient will remain safe and free from falls throughout shift.    Problem: Pain - Adult  Goal: Verbalizes/displays adequate comfort level or baseline comfort level  Outcome: Progressing     Problem: Safety - Adult  Goal: Free from fall injury  Outcome: Progressing     Problem: Discharge Planning  Goal: Discharge to home or other facility with appropriate resources  Outcome: Progressing     Problem: Chronic Conditions and Co-morbidities  Goal: Patient's chronic conditions and co-morbidity symptoms are monitored and maintained or improved  Outcome: Progressing     Problem: Fall/Injury  Goal: Not fall by end of shift  Outcome: Progressing  Goal: Be free from injury by end of the shift  Outcome: Progressing  Goal: Verbalize understanding of personal risk factors for fall in the hospital  Outcome: Progressing  Goal: Verbalize understanding of risk factor reduction measures to prevent injury from fall in the home  Outcome: Progressing  Goal: Use assistive devices by end of the shift  Outcome: Progressing  Goal: Pace activities to prevent fatigue by end of the shift  Outcome: Progressing     Problem: Safety - Medical Restraint  Goal: Remains free of injury from restraints (Restraint for Interference with Medical Device)  Outcome: Progressing  Goal: Free from restraint(s) (Restraint for Interference with Medical Device)  Outcome: Progressing

## 2024-09-24 NOTE — PROGRESS NOTES
Physical Therapy    Physical Therapy Evaluation & Treatment    Patient Name: David Chatman  MRN: [id number]  [place] [facility]  Room: 07/07-A  Today's Date: 9/24/2024   Time Calculation  Start Time: 0923  Stop Time: 0950  Time Calculation (min): 27 min    Assessment/Plan   PT Assessment  PT Assessment Results: Decreased strength, Decreased endurance, Impaired balance, Decreased mobility  Rehab Prognosis: Good  Barriers to Discharge: None  Evaluation/Treatment Tolerance: Patient tolerated treatment well  Medical Staff Made Aware: Yes  End of Session Communication: Bedside nurse  Assessment Comment: Pt demonstrated ability to complete sit<>stand transfer and ambulation with FWW.  CGA provided with all mobility.  No instability or LOB noted.  End of Session Patient Position: Up in chair, Alarm off, not on at start of session   IP OR SWING BED PT PLAN  Inpatient or Swing Bed: Inpatient  PT Plan  Treatment/Interventions: Bed mobility, Transfer training, Gait training, Stair training, Balance training, Strengthening, Endurance training, Therapeutic exercise, Therapeutic activity  PT Plan: Ongoing PT  PT Frequency: 3 times per week  PT Discharge Recommendations: Low intensity level of continued care  PT Recommended Transfer Status: Assist x1, Assistive device  PT - OK to Discharge: Yes    Subjective     General Visit Information:  General  Reason for Referral: Ascending aorta repair  Referred By: Dr Perdomo  Past Medical History Relevant to Rehab: LBBB, T-AAA, HTN, ACS, HFrEF (35-40%), HLD, Aortic valve insufficiency, pAF, RIOJAS, SHOAIB (compliant with CPAP), Hypersomnolence disorder, Gout, Renal insufficiency, R sided testicular pain, Class 1 Obesity, T2DM, Anemia, Thrombocytopenia, OA of R knee, SNHL (bilateral), and sinusitis  Missed Visit: No  Family/Caregiver Present: No  Prior to Session Communication: Bedside nurse  Patient Position Received: Up in chair, Alarm off, not on at start of session  Preferred Learning Style:  auditory, visual, verbal, written  General Comment: Pt awake, alert and willing to particiapte in PT session  Home Living:  Home Living  Type of Home: House  Lives With: Spouse  Home Layout:  (4 RAYMUNDO, bed/bath - 2nd floor with railing, walk in shower with shower chair)  Prior Level of Function:  Prior Function Per Pt/Caregiver Report  Level of [place]: Independent with ADLs and functional transfers, Independent with homemaking with ambulation  ADL Assistance: Independent  Homemaking Assistance: Independent  Ambulatory Assistance: Independent  Vocational: Retired  Prior Function Comments: (+) drives, (-) falls in past 6 months  Precautions:  Precautions  Hearing/Visual Limitations: WFL  Medical Precautions: Cardiac precautions, Fall precautions, Oxygen therapy device and L/min, Chest tube  Post-Surgical Precautions: Move in the Tube  Precautions Comment: MAP 65-90, -120, SpO2 >92%     09/24/24 0923   Vital Signs   Vitals Session Pre PT   Heart Rate 81   Resp 21   SpO2 94 %   /71   MAP (mmHg) 90   BP Method Arterial line   Patient Position Sitting      09/24/24 0950   Vital Signs   Vitals Session Post PT   Heart Rate 87   Resp 24   SpO2 98 %   /68   MAP (mmHg) 88   BP Method Arterial line   Patient Position Sitting   Vital Signs Comment No change in vitals with mobility     Objective   Pain:  Pain Assessment  Pain Assessment: 0-10  0-10 (Numeric) Pain Score: 4  Pain Type: Surgical pain  Pain Location: Chest  Cognition:  Cognition  Overall Cognitive Status: Within Functional Limits  Orientation Level: Oriented X4    General Assessments:  General Observation  General Observation: 4L, tele, phil, CT x2, todd, wound vac, epi .02, nitro 75     Activity Tolerance  Endurance: Tolerates 10 - 20 min exercise with multiple rests  Early Mobility/Exercise Safety Screen: Proceed with mobilization - No exclusion criteria met  Activity Tolerance Comments: None    Sensation  Light Touch:  (No post-op  deficits)    Strength  Strength Comments: Not formally assessed however at least 3/5 shown through functional mobility  Coordination  Movements are Fluid and Coordinated: Yes    Postural Control  Postural Control: Within Functional Limits    Static Standing Balance  Static Standing-Balance Support: Bilateral upper extremity supported  Static Standing-Level of Assistance: Contact guard  Functional Assessments:  ADL  ADL's Addressed: No    Bed Mobility  Bed Mobility: No    Transfers  Transfer: Yes  Transfer 1  Transfer From 1: Sit to, Stand to  Transfer to 1: Sit, Stand  Technique 1: Sit to stand, Stand to sit  Transfer Device 1: Walker  Transfer Level of Assistance 1: Contact guard  Trials/Comments 1: Cues for hand placement and proper use of AD    Ambulation/Gait Training  Ambulation/Gait Training Performed: Yes  Ambulation/Gait Training 1  Surface 1: Level tile  Device 1: Rolling walker  Assistance 1: Contact guard  Comments/Distance (ft) 1: ~300ft    Stairs  Stairs: No  Extremity/Trunk Assessments:  RUE   RUE : Within Functional Limits  LUE   LUE: Within Functional Limits  RLE   RLE : Within Functional Limits  LLE   LLE : Within Functional Limits  Treatments:  Therapeutic Exercise  Therapeutic Exercise Performed: Yes  Therapeutic Exercise Activity 1: 1x10 reps of incentive spirometer: <1500    Therapeutic Activity  Therapeutic Activity Performed: Yes  Therapeutic Activity 1: Increased time for skilled ICU line/tube management for safe functional mobility  Therapeutic Activity 2: Education: MITT (handout provided and reviewed), use of IS, use of AD  Outcome Measures:  [facility] Basic Mobility  Turning from your back to your side while in a flat bed without using bedrails: A little  Moving from lying on your back to sitting on the side of a flat bed without using bedrails: A little  Moving to and from bed to chair (including a wheelchair): A little  Standing up from a chair using your arms (e.g. wheelchair or bedside  chair): A little  To walk in hospital room: A little  Climbing 3-5 steps with railing: A little  Basic Mobility - Total Score: 18    FSS-ICU  Ambulation: Walks >/ or equal to 150 feet with supervision  Rolling: Supervision or set-up only  Sitting: Supervision or set-up only  Transfer Sit-to-Stand: Supervision or set-up only  Transfer Supine-to-Sit: Supervision or set-up only  Total Score: 25    Early Mobility/Exercise Safety Screen: Proceed with mobilization - No exclusion criteria met  ICU Mobility Scale: Walking with assistance of 1 person [8]    Encounter Problems       Encounter Problems (Active)       Balance       Pt will demonstrated ability to score at least 24/28 on the Tinetti balance assessment tool to ensure safety upon D/C.  (Progressing)       Start:  09/24/24    Expected End:  10/08/24               Mobility       Pt will demonstrated ability to ambulate >/=250ft with proper form and no balance deficits for safe home going.   (Progressing)       Start:  09/24/24    Expected End:  10/08/24            Pt will demonstrate ability to ascend/descend 1 flight of stairs with unilateral rail and no balance deficits for safe home going.  (Progressing)       Start:  09/24/24    Expected End:  10/08/24               PT Transfers       Pt will demonstrate ability to complete 5X STS in < 12 sec with good from and consistency between transfers for safe D/C.  (Progressing)       Start:  09/24/24    Expected End:  10/08/24                   Education Documentation  Handouts, taught by Gela Cortez PT at 9/24/2024 11:26 AM.  Learner: Patient  Readiness: Acceptance  Method: Explanation, Demonstration, Handout  Response: Demonstrated Understanding, Verbalizes Understanding    Precautions, taught by Gela Cortez PT at 9/24/2024 11:26 AM.  Learner: Patient  Readiness: Acceptance  Method: Explanation, Demonstration, Handout  Response: Demonstrated Understanding, Verbalizes Understanding    Body Mechanics, taught by  Gela Cortez, PT at 9/24/2024 11:26 AM.  Learner: Patient  Readiness: Acceptance  Method: Explanation, Demonstration, Handout  Response: Demonstrated Understanding, Verbalizes Understanding    Mobility Training, taught by Gela Cortez PT at 9/24/2024 11:26 AM.  Learner: Patient  Readiness: Acceptance  Method: Explanation, Demonstration, Handout  Response: Demonstrated Understanding, Verbalizes Understanding    Education Comments  No comments found.

## 2024-09-24 NOTE — SIGNIFICANT EVENT
09/24/24 0033   Daily Screen   Total RSBI 21   Weaning Parameters   Weaning Vital Capacity 1056 mL   Negative Inspiratory Force (NIF) -26   Spontaneous Minute Volume (MV) 15.7   Weaning Tidal Volume 885 mL   Respiratory Depth/Rhythm Regular   Respiratory Effort Unlabored   Weaning Tolerance Good

## 2024-09-24 NOTE — PROGRESS NOTES
Occupational Therapy    Evaluation    Patient Name: David Chatman  MRN: [id number]  Department: [facility]  Room: 07/07-A  Today's Date: 9/24/2024  Time Calculation  Start Time: 0957  Stop Time: 1015  Time Calculation (min): 18 min        Assessment:  OT Assessment: Pt presents with deficits in strength, high level balance and endurance, new MITT precautions impeding occupational performance.  Prognosis: Good  Barriers to Discharge: Inaccessible home environment  Evaluation/Treatment Tolerance: Patient limited by fatigue  Medical Staff Made Aware: Yes  End of Session Communication: Bedside nurse  End of Session Patient Position: Up in chair, Alarm off, not on at start of session  OT Assessment Results: Decreased ADL status, Decreased upper extremity strength, Decreased functional mobility, Decreased gross motor control, Decreased IADLs  Prognosis: Good  Barriers to Discharge: Inaccessible home environment  Evaluation/Treatment Tolerance: Patient limited by fatigue  Medical Staff Made Aware: Yes  Strengths: Premorbid level of function  Barriers to Participation: Comorbidities  Plan:  Treatment Interventions: ADL retraining, Functional transfer training, UE strengthening/ROM, Endurance training, Patient/family training, Equipment evaluation/education, Compensatory technique education  OT Frequency: 3 times per week  OT Discharge Recommendations: Low intensity level of continued care  OT Recommended Transfer Status: Assist of 1  OT - OK to Discharge: Yes  Treatment Interventions: ADL retraining, Functional transfer training, UE strengthening/ROM, Endurance training, Patient/family training, Equipment evaluation/education, Compensatory technique education    Subjective   Current Problem:  1. Aneurysm of aorta in diseases classified elsewhere (CMS-HCC)  Anesthesia Intraoperative Transesophageal Echocardiogram    Anesthesia Intraoperative Transesophageal Echocardiogram      2. Aneurysm of ascending aorta without rupture  (CMS-[facility])  Vascular US carotid artery duplex bilateral    Vascular US carotid artery duplex bilateral    Case Request Operating Room: Aortic root and ascending aorta replacement    Case Request Operating Room: Aortic root and ascending aorta replacement    Surgical Pathology Exam    Surgical Pathology Exam      3. Cardiac complaint  Vascular US carotid artery duplex bilateral    Vascular US carotid artery duplex bilateral        General:  General  Reason for Referral: Ascending aorta repair  Past Medical History Relevant to Rehab: LBBB, T-AAA, HTN, ACS, HFrEF (35-40%), HLD, Aortic valve insufficiency, pAF, RIOJAS, SHOAIB (compliant with CPAP), Hypersomnolence disorder, Gout, Renal insufficiency, R sided testicular pain, Class 1 Obesity, T2DM, Anemia, Thrombocytopenia, OA of R knee, SNHL (bilateral), and sinusitis  Missed Visit: No  Family/Caregiver Present: No  Patient Position Received: Up in chair, Alarm off, not on at start of session  General Comment: Pt pleasant and cooperative throughout. (A-line, CVC, chest tube x2, todd)  Precautions:  Hearing/Visual Limitations: WFL  Medical Precautions: Cardiac precautions, Fall precautions, Oxygen therapy device and L/min, Chest tube  Post-Surgical Precautions: Move in the Tube  Precautions Comment: MAP 65-90, -120, SpO2 >92%        09/24/24 0957   Vital Signs   Vitals Session Pre OT   Heart Rate 86   Resp 21   SpO2 92 %   /66   MAP (mmHg) 87   BP Method Arterial line   Patient Position Sitting        09/24/24 1014   Vital Signs   Vitals Session Post OT   Heart Rate 86   Resp 20   SpO2 94 %   /68   MAP (mmHg) 88   BP Method Arterial line   Patient Position Sitting   Vital Signs Comment No change in vitals with mobility         Pain:  Pain Assessment  Pain Assessment: 0-10  0-10 (Numeric) Pain Score: 5 - Moderate pain  Pain Type: Surgical pain  Pain Location: Chest    Objective   Cognition:  Overall Cognitive Status: Within Functional Limits  Orientation  Level: Oriented X4  Attention: Within Functional Limits  Insight: Within function limits  Impulsive: Within functional limits     Confusion Assessment Method (CAM)  Acute Onset and Fluctuating Course (1A): No     Home Living:  Type of Home: House  Lives With: Spouse  Home Layout: Two level, Bed/bath upstairs  Home Access: Stairs to enter with rails  Entrance Stairs-Number of Steps: 4  Bathroom Shower/Tub: Walk-in shower  Bathroom Toilet: Standard  Bathroom Equipment: Shower chair with back  Prior Function:  Level of [place]: Independent with ADLs and functional transfers, Independent with homemaking with ambulation  ADL Assistance: Independent  Homemaking Assistance: Independent  Ambulatory Assistance: Independent  Vocational: Retired  Prior Function Comments: (+) driving, (-) falls    ADL:  Eating Assistance: Independent  Grooming Assistance: Stand by  Bathing Assistance: Moderate  UE Dressing Assistance: Minimal  LE Dressing Assistance: Moderate  Toileting Assistance with Device: Moderate  Activity Tolerance:  Endurance: Tolerates 10 - 20 min exercise with multiple rests  Early Mobility/Exercise Safety Screen: Proceed with mobilization - No exclusion criteria met  Bed Mobility/Transfers: Bed Mobility  Bed Mobility: No    Transfers  Transfer: Yes  Transfer 1  Transfer From 1: Sit to, Chair with arms to  Transfer to 1: Chair with arms  Technique 1: Sit to stand  Transfer Level of Assistance 1: Contact guard  Trials/Comments 1: partial stand for repositioning in chair to improve posture and trunk position    Sitting Balance:  Static Sitting Balance  Static Sitting-Balance Support: Feet supported  Static Sitting-Level of Assistance: Independent    Sensation:  Light Touch: No apparent deficits  Sensation Comment: endorses tingling in R digits 2-3    Perception:  Inattention/Neglect: Appears intact  Initiation: Appears intact  Motor Planning: Appears intact  Perseveration: Not present    Hand Function:  Gross  Grasp: Functional  Coordination: Functional  Extremities: RUE   RUE : Within Functional Limits (within MITT precautions) and KYLAHE   LUE: Within Functional Limits (within MITT precautions)    Outcome Measures:[facility] Daily Activity  Putting on and taking off regular lower body clothing: A lot  Bathing (including washing, rinsing, drying): A lot  Putting on and taking off regular upper body clothing: A little  Toileting, which includes using toilet, bedpan or urinal: A lot  Taking care of personal grooming such as brushing teeth: A little  Eating Meals: None  Daily Activity - Total Score: 16        Education Documentation  Body Mechanics, taught by Patrizia Yung, OT at 9/24/2024  1:30 PM.  Learner: Patient  Readiness: Acceptance  Method: Explanation  Response: Verbalizes Understanding    Precautions, taught by Patrizia Yung OT at 9/24/2024  1:30 PM.  Learner: Patient  Readiness: Acceptance  Method: Explanation  Response: Verbalizes Understanding    ADL Training, taught by Patrizia Yung OT at 9/24/2024  1:30 PM.  Learner: Patient  Readiness: Acceptance  Method: Explanation  Response: Verbalizes Understanding    Education Comments  No comments found.          Goals:  Encounter Problems       Encounter Problems (Active)       ADLs       Patient with complete upper body dressing with independent level of assistance        Start:  09/24/24    Expected End:  10/08/24            Patient with complete lower body dressing with independent level of assistance        Start:  09/24/24    Expected End:  10/08/24            Patient will complete toileting including hygiene clothing management/hygiene with independent level of assistance.       Start:  09/24/24    Expected End:  10/08/24               MOBILITY       Patient will perform Functional mobility Household distances/Community Distances with modified independent level of assistance and least restrictive device in order to improve safety and functional  mobility.       Start:  09/24/24    Expected End:  10/08/24               TRANSFERS       Patient will perform bed mobility independent level of assistance in order to improve safety and independence with mobility       Start:  09/24/24    Expected End:  10/08/24            Patient will complete functional transfers with least restrictive device with modified independent level of assistance.       Start:  09/24/24    Expected End:  10/08/24                    RESHMA TIWARI OT

## 2024-09-24 NOTE — PROGRESS NOTES
09/24/24 1447   Current Planned Discharge Disposition   Current Planned Discharge Disposition Home H  (C: [facility])     SDOH Assessment completed with patient. Verified EPIC info of PCP, [facility] / Pharmacy is [facility] in [place] / address / and contacts.  Reviewed [facility] loc as a service, right to choice and list to choose. Choice for [facility] and [place] for [facility].     Sallie Cuenca (LSW, MSW)

## 2024-09-25 ENCOUNTER — APPOINTMENT (OUTPATIENT)
Dept: CARDIOLOGY | Facility: HOSPITAL | Age: 69
DRG: 219 | End: 2024-09-25
Payer: MEDICARE

## 2024-09-25 ENCOUNTER — APPOINTMENT (OUTPATIENT)
Dept: RADIOLOGY | Facility: HOSPITAL | Age: 69
DRG: 219 | End: 2024-09-25
Payer: MEDICARE

## 2024-09-25 LAB
ABO GROUP (TYPE) IN BLOOD: NORMAL
ALBUMIN SERPL BCP-MCNC: 3.8 G/DL (ref 3.4–5)
ALBUMIN SERPL BCP-MCNC: 4.1 G/DL (ref 3.4–5)
ANION GAP BLDV CALCULATED.4IONS-SCNC: 9 MMOL/L (ref 10–25)
ANION GAP SERPL CALC-SCNC: 15 MMOL/L (ref 10–20)
ANION GAP SERPL CALC-SCNC: 16 MMOL/L (ref 10–20)
ANTIBODY SCREEN: NORMAL
ATRIAL RATE: 81 BPM
BASE EXCESS BLDV CALC-SCNC: 0.3 MMOL/L (ref -2–3)
BODY TEMPERATURE: 37 DEGREES CELSIUS
BUN SERPL-MCNC: 31 MG/DL (ref 6–23)
BUN SERPL-MCNC: 36 MG/DL (ref 6–23)
CA-I BLD-SCNC: 1.13 MMOL/L (ref 1.1–1.33)
CA-I BLDV-SCNC: 1.16 MMOL/L (ref 1.1–1.33)
CALCIUM SERPL-MCNC: 8.3 MG/DL (ref 8.6–10.6)
CALCIUM SERPL-MCNC: 8.7 MG/DL (ref 8.6–10.6)
CHLORIDE BLDV-SCNC: 101 MMOL/L (ref 98–107)
CHLORIDE SERPL-SCNC: 101 MMOL/L (ref 98–107)
CHLORIDE SERPL-SCNC: 101 MMOL/L (ref 98–107)
CO2 SERPL-SCNC: 22 MMOL/L (ref 21–32)
CO2 SERPL-SCNC: 22 MMOL/L (ref 21–32)
CREAT SERPL-MCNC: 1.41 MG/DL (ref 0.5–1.3)
CREAT SERPL-MCNC: 1.48 MG/DL (ref 0.5–1.3)
EGFRCR SERPLBLD CKD-EPI 2021: 52 ML/MIN/1.73M*2
EGFRCR SERPLBLD CKD-EPI 2021: 55 ML/MIN/1.73M*2
EJECTION FRACTION APICAL 4 CHAMBER: 24.2
EJECTION FRACTION: 22 %
ERYTHROCYTE [DISTWIDTH] IN BLOOD BY AUTOMATED COUNT: 15.2 % (ref 11.5–14.5)
GLUCOSE BLD MANUAL STRIP-MCNC: 111 MG/DL (ref 74–99)
GLUCOSE BLDV-MCNC: 114 MG/DL (ref 74–99)
GLUCOSE SERPL-MCNC: 104 MG/DL (ref 74–99)
GLUCOSE SERPL-MCNC: 122 MG/DL (ref 74–99)
HCO3 BLDV-SCNC: 25.4 MMOL/L (ref 22–26)
HCT VFR BLD AUTO: 33.2 % (ref 41–52)
HCT VFR BLD EST: 33 % (ref 41–52)
HGB BLD-MCNC: 11.1 G/DL (ref 13.5–17.5)
HGB BLDV-MCNC: 11 G/DL (ref 13.5–17.5)
LACTATE BLDV-SCNC: 0.9 MMOL/L (ref 0.4–2)
MAGNESIUM SERPL-MCNC: 2.56 MG/DL (ref 1.6–2.4)
MCH RBC QN AUTO: 29.1 PG (ref 26–34)
MCHC RBC AUTO-ENTMCNC: 33.4 G/DL (ref 32–36)
MCV RBC AUTO: 87 FL (ref 80–100)
NRBC BLD-RTO: 0 /100 WBCS (ref 0–0)
OXYHGB MFR BLDV: 68.9 % (ref 45–75)
P AXIS: 29 DEGREES
P OFFSET: 168 MS
P ONSET: 108 MS
PCO2 BLDV: 42 MM HG (ref 41–51)
PH BLDV: 7.39 PH (ref 7.33–7.43)
PHOSPHATE SERPL-MCNC: 4.3 MG/DL (ref 2.5–4.9)
PHOSPHATE SERPL-MCNC: 4.7 MG/DL (ref 2.5–4.9)
PLATELET # BLD AUTO: 89 X10*3/UL (ref 150–450)
PO2 BLDV: 38 MM HG (ref 35–45)
POTASSIUM BLDV-SCNC: 4.5 MMOL/L (ref 3.5–5.3)
POTASSIUM SERPL-SCNC: 4.3 MMOL/L (ref 3.5–5.3)
POTASSIUM SERPL-SCNC: 4.3 MMOL/L (ref 3.5–5.3)
PR INTERVAL: 202 MS
Q ONSET: 209 MS
QRS COUNT: 13 BEATS
QRS DURATION: 144 MS
QT INTERVAL: 396 MS
QTC CALCULATION(BAZETT): 460 MS
QTC FREDERICIA: 437 MS
R AXIS: -21 DEGREES
RBC # BLD AUTO: 3.82 X10*6/UL (ref 4.5–5.9)
RH FACTOR (ANTIGEN D): NORMAL
RIGHT VENTRICLE FREE WALL PEAK S': 3.83 CM/S
RIGHT VENTRICLE PEAK SYSTOLIC PRESSURE: 25.8 MMHG
SAO2 % BLDV: 70 % (ref 45–75)
SODIUM BLDV-SCNC: 131 MMOL/L (ref 136–145)
SODIUM SERPL-SCNC: 134 MMOL/L (ref 136–145)
SODIUM SERPL-SCNC: 135 MMOL/L (ref 136–145)
T AXIS: 124 DEGREES
T OFFSET: 407 MS
TRICUSPID ANNULAR PLANE SYSTOLIC EXCURSION: 1 CM
VENTRICULAR RATE: 81 BPM
WBC # BLD AUTO: 14.5 X10*3/UL (ref 4.4–11.3)

## 2024-09-25 PROCEDURE — P9045 ALBUMIN (HUMAN), 5%, 250 ML: HCPCS | Mod: JZ

## 2024-09-25 PROCEDURE — 86901 BLOOD TYPING SEROLOGIC RH(D): CPT

## 2024-09-25 PROCEDURE — 99291 CRITICAL CARE FIRST HOUR: CPT

## 2024-09-25 PROCEDURE — 93321 DOPPLER ECHO F-UP/LMTD STD: CPT | Performed by: INTERNAL MEDICINE

## 2024-09-25 PROCEDURE — 2500000001 HC RX 250 WO HCPCS SELF ADMINISTERED DRUGS (ALT 637 FOR MEDICARE OP)

## 2024-09-25 PROCEDURE — 93325 DOPPLER ECHO COLOR FLOW MAPG: CPT

## 2024-09-25 PROCEDURE — 2500000005 HC RX 250 GENERAL PHARMACY W/O HCPCS

## 2024-09-25 PROCEDURE — 93010 ELECTROCARDIOGRAM REPORT: CPT | Performed by: INTERNAL MEDICINE

## 2024-09-25 PROCEDURE — 85027 COMPLETE CBC AUTOMATED: CPT

## 2024-09-25 PROCEDURE — 83735 ASSAY OF MAGNESIUM: CPT

## 2024-09-25 PROCEDURE — 84132 ASSAY OF SERUM POTASSIUM: CPT

## 2024-09-25 PROCEDURE — 71045 X-RAY EXAM CHEST 1 VIEW: CPT

## 2024-09-25 PROCEDURE — 37799 UNLISTED PX VASCULAR SURGERY: CPT

## 2024-09-25 PROCEDURE — 2500000004 HC RX 250 GENERAL PHARMACY W/ HCPCS (ALT 636 FOR OP/ED): Mod: JZ

## 2024-09-25 PROCEDURE — 71045 X-RAY EXAM CHEST 1 VIEW: CPT | Performed by: RADIOLOGY

## 2024-09-25 PROCEDURE — 2500000002 HC RX 250 W HCPCS SELF ADMINISTERED DRUGS (ALT 637 FOR MEDICARE OP, ALT 636 FOR OP/ED)

## 2024-09-25 PROCEDURE — 93005 ELECTROCARDIOGRAM TRACING: CPT

## 2024-09-25 PROCEDURE — 2500000004 HC RX 250 GENERAL PHARMACY W/ HCPCS (ALT 636 FOR OP/ED): Performed by: STUDENT IN AN ORGANIZED HEALTH CARE EDUCATION/TRAINING PROGRAM

## 2024-09-25 PROCEDURE — 80069 RENAL FUNCTION PANEL: CPT

## 2024-09-25 PROCEDURE — 82947 ASSAY GLUCOSE BLOOD QUANT: CPT

## 2024-09-25 PROCEDURE — 97116 GAIT TRAINING THERAPY: CPT | Mod: GP

## 2024-09-25 PROCEDURE — 82330 ASSAY OF CALCIUM: CPT

## 2024-09-25 PROCEDURE — 2020000001 HC ICU ROOM DAILY

## 2024-09-25 PROCEDURE — 93325 DOPPLER ECHO COLOR FLOW MAPG: CPT | Performed by: INTERNAL MEDICINE

## 2024-09-25 PROCEDURE — 2500000004 HC RX 250 GENERAL PHARMACY W/ HCPCS (ALT 636 FOR OP/ED)

## 2024-09-25 PROCEDURE — 97530 THERAPEUTIC ACTIVITIES: CPT | Mod: GP

## 2024-09-25 PROCEDURE — 93308 TTE F-UP OR LMTD: CPT | Performed by: INTERNAL MEDICINE

## 2024-09-25 RX ORDER — ALBUMIN HUMAN 50 G/1000ML
12.5 SOLUTION INTRAVENOUS ONCE
Status: COMPLETED | OUTPATIENT
Start: 2024-09-25 | End: 2024-09-25

## 2024-09-25 RX ORDER — FUROSEMIDE 10 MG/ML
20 INJECTION INTRAMUSCULAR; INTRAVENOUS ONCE
Status: COMPLETED | OUTPATIENT
Start: 2024-09-25 | End: 2024-09-25

## 2024-09-25 RX ADMIN — ATORVASTATIN CALCIUM 20 MG: 20 TABLET, FILM COATED ORAL at 20:12

## 2024-09-25 RX ADMIN — PERFLUTREN 4 ML OF DILUTION: 6.52 INJECTION, SUSPENSION INTRAVENOUS at 16:09

## 2024-09-25 RX ADMIN — AMIODARONE HYDROCHLORIDE 1 MG/MIN: 1.8 INJECTION, SOLUTION INTRAVENOUS at 21:34

## 2024-09-25 RX ADMIN — HEPARIN SODIUM 5000 UNITS: 5000 INJECTION, SOLUTION INTRAVENOUS; SUBCUTANEOUS at 11:10

## 2024-09-25 RX ADMIN — ACETAMINOPHEN 650 MG: 325 TABLET ORAL at 14:20

## 2024-09-25 RX ADMIN — ALBUMIN HUMAN 12.5 G: 0.05 INJECTION, SOLUTION INTRAVENOUS at 21:42

## 2024-09-25 RX ADMIN — HEPARIN SODIUM 5000 UNITS: 5000 INJECTION, SOLUTION INTRAVENOUS; SUBCUTANEOUS at 18:02

## 2024-09-25 RX ADMIN — CEFAZOLIN SODIUM 2 G: 2 INJECTION, SOLUTION INTRAVENOUS at 05:58

## 2024-09-25 RX ADMIN — HEPARIN SODIUM 5000 UNITS: 5000 INJECTION, SOLUTION INTRAVENOUS; SUBCUTANEOUS at 03:22

## 2024-09-25 RX ADMIN — CLONIDINE HYDROCHLORIDE 0.1 MG: 0.1 TABLET ORAL at 11:10

## 2024-09-25 RX ADMIN — ACETAMINOPHEN 650 MG: 325 TABLET ORAL at 20:12

## 2024-09-25 RX ADMIN — Medication 2 L/MIN: at 08:29

## 2024-09-25 RX ADMIN — ACETAMINOPHEN 650 MG: 325 TABLET ORAL at 08:28

## 2024-09-25 RX ADMIN — ASPIRIN 81 MG 81 MG: 81 TABLET ORAL at 08:28

## 2024-09-25 RX ADMIN — TERAZOSIN HYDROCHLORIDE 5 MG: 5 CAPSULE ORAL at 20:12

## 2024-09-25 RX ADMIN — ACETAMINOPHEN 650 MG: 325 TABLET ORAL at 03:22

## 2024-09-25 RX ADMIN — CLONIDINE HYDROCHLORIDE 0.1 MG: 0.1 TABLET ORAL at 03:22

## 2024-09-25 RX ADMIN — CEFAZOLIN SODIUM 2 G: 2 INJECTION, SOLUTION INTRAVENOUS at 12:53

## 2024-09-25 RX ADMIN — Medication 2 L/MIN: at 20:13

## 2024-09-25 RX ADMIN — PANTOPRAZOLE SODIUM 20 MG: 20 TABLET, DELAYED RELEASE ORAL at 08:28

## 2024-09-25 RX ADMIN — FUROSEMIDE 20 MG: 10 INJECTION, SOLUTION INTRAVENOUS at 08:28

## 2024-09-25 RX ADMIN — CLONIDINE HYDROCHLORIDE 0.1 MG: 0.1 TABLET ORAL at 18:02

## 2024-09-25 RX ADMIN — AMIODARONE HYDROCHLORIDE 150 MG: 1.5 INJECTION, SOLUTION INTRAVENOUS at 21:35

## 2024-09-25 ASSESSMENT — PAIN SCALES - GENERAL
PAINLEVEL_OUTOF10: 0 - NO PAIN
PAINLEVEL_OUTOF10: 3
PAINLEVEL_OUTOF10: 0 - NO PAIN
PAINLEVEL_OUTOF10: 0 - NO PAIN
PAINLEVEL_OUTOF10: 4
PAINLEVEL_OUTOF10: 0 - NO PAIN

## 2024-09-25 ASSESSMENT — COGNITIVE AND FUNCTIONAL STATUS - GENERAL
WALKING IN HOSPITAL ROOM: A LITTLE
CLIMB 3 TO 5 STEPS WITH RAILING: A LITTLE
STANDING UP FROM CHAIR USING ARMS: A LITTLE
MOVING FROM LYING ON BACK TO SITTING ON SIDE OF FLAT BED WITH BEDRAILS: A LITTLE
MOVING TO AND FROM BED TO CHAIR: A LITTLE
MOBILITY SCORE: 18
TURNING FROM BACK TO SIDE WHILE IN FLAT BAD: A LITTLE

## 2024-09-25 ASSESSMENT — PAIN - FUNCTIONAL ASSESSMENT
PAIN_FUNCTIONAL_ASSESSMENT: 0-10

## 2024-09-25 NOTE — PROGRESS NOTES
Subjective   Overnight events: NAEON. Had trouble sleeping overnight.    Scheduled Medications:   acetaminophen, 650 mg, oral, q6h  aspirin, 81 mg, oral, Daily  atorvastatin, 20 mg, oral, Nightly  ceFAZolin, 2 g, intravenous, q8h  cloNIDine, 0.1 mg, oral, q8h  heparin (porcine), 5,000 Units, subcutaneous, q8h  insulin lispro, 0-5 Units, subcutaneous, TID AC  melatonin, 5 mg, oral, Daily  oxygen, , inhalation, Continuous - Inhalation  pantoprazole, 20 mg, oral, Daily before breakfast  polyethylene glycol, 17 g, oral, Daily  sennosides-docusate sodium, 2 tablet, oral, BID  terazosin, 5 mg, oral, Nightly         Continuous Medications:   EPINEPHrine, 0.01 mcg/kg/min (Dosing Weight), Last Rate: 0.01 mcg/kg/min (09/25/24 0700)  lactated Ringer's, 10 mL/hr, Last Rate: 10 mL/hr (09/25/24 0700)  lactated Ringer's, 5 mL/hr, Last Rate: 5 mL/hr (09/25/24 0700)         PRN Medications:   PRN medications: alteplase, calcium gluconate, calcium gluconate, dextrose, dextrose, glucagon, glucagon, hydrALAZINE, HYDROmorphone, magnesium sulfate, magnesium sulfate, naloxone, oxyCODONE, oxyCODONE, potassium chloride, potassium chloride    Objective   Vitals:  Most Recent:  Vitals:    09/25/24 0700   BP:    Pulse: 80   Resp: 19   Temp:    SpO2: 95%       24hr Min/Max:  Temp  Min: 36.5 °C (97.7 °F)  Max: 37 °C (98.6 °F)  Pulse  Min: 77  Max: 91  BP  Min: 124/68  Max: 131/68  Resp  Min: 14  Max: 28  SpO2  Min: 91 %  Max: 98 %    I/O:  I/O last 2 completed shifts:  In: 1055.1 (9.3 mL/kg) [P.O.:250; I.V.:805.1 (7.1 mL/kg)]  Out: 2115 (18.6 mL/kg) [Urine:1440 (0.5 mL/kg/hr); Chest Tube:675]  Weight: 114 kg     Hemodynamic parameters for last 24 hours:  CVP:  [12 mmHg-29 mmHg] 20 mmHg        Physical Exam:   Constitutional: NAD sitting in bed, pleasant and conversant AOx3  Neuro: Neuro exam intact without obvious focal deficit.  RASS 0 and CAM negative.  CV: RRR, SR 70's on monitor.  AV wires placed to VVI 50   Pulm: CTAB on RA, chest tubes  with no airleak and minimal serosang output to -20 suction  : todd in place with rob yellow urine  GI: S/ND/NT  Extremities: No edema, NV exam intact x4  Skin: Post op and chest tube dressings inact.  Psych: WNL    Lab/Radiology/Diagnostic Review:  All AM labs reviewed     AM CXR: reviewed    Assessment/Plan   69 y M with PMH s/f LBBB, T-AAA, HTN, ACS, HFrEF (35-40%), HLD, Aortic valve insufficiency, pAF, RIOJAS, SHOAIB (compliant with CPAP), Hypersomnolence disorder, Gout, Renal insufficiency, R sided testicular pain, Class 1 Obesity, T2DM, Anemia, Thrombocytopenia, OA of R knee, SNHL (bilateral), and sinusitis who presents to the CTICU s/p ascending aorta repair with Dr Perdomo on 9/23.      Plan:  NEURO:  Hx of hypersomnolence disorder. Acute post op pain.  Patient is Aox3. No obvious focal deficits. Was unable to sleep well ON -->   - Serial neuro and pain assessments   - Scheduled Tylenol   - PRN oxycodone and Dilaudid for pain   - Lidoderm patches x3 days  - PT Consult, OOB to chair as tolerated  - CAM ICU score qshift  - Sleep/wake cycle hygiene (not willing to take melatonin/ sleep pills just yet)  - REST protocol     CV:  Patient has a history of HFrEF (35-40%), TAA (6.2cm), LBBB, HTN, HLD, pAF.  Now status post ascending aorta repair. Pre & post cardiac function: 30%. AV wires set to VVI @ 50.  SR on monitor.  Arrived to ICU on Epi 0.05, Levo 0.05, and Angiotensin II 20 ng/kg/min. --> severely vasoplegic on pump intra-op requiring pressors and cyanokit and methylene blue. Upon arrival to unit MAPs drifting down to high 40s. Transfused 1 unit FFP, administered 500cc of 5% Albumin, and 500cc of crystalloid with titration of pressors upto Vaso 0.06, Ang II 40, levo 0.07, and epi 0.05. ABGs significant for lactic acidosis with mild hypercarbia. Was able to wean off pressors once patient was volume loaded. Currently on 0.03 of levo and epi and 5 ng/kg/min of angiotensin 2. Received a total of 3u FFP and 2u Cryo  and 1L of 5% Albumin postop with normalization of lactate and appropriate svO2. On 0.01 of Epi this AM for inotropy.   - Continuous EKG and ABP monitoring  - maintain goal SBP<140 liberalized per surgical team  - off Epi gtt today, f/uTTE at noon  - Maintain AV pacer wires set at backup rate VVI 50   - cw ASA 81 mg today. No indication for Plavix at this time per surgical team  - Continue statin 20mg and clonidine 0.1 TID  Home meds: Simvastatin 10mg daily, Terazosin 10mg daily, Losartan 100mg daily, Metoprolol succinate 25mg daily, Clonidine 0.1mg TID     PULM:  Hx of RIOJAS, SHOAIB (compliant with home CPAP at night) Extubated on 4L NC, 4 Chest tubes with no airleak and appropriate serosang output, to suction. CXR this AM looked fluffy cw mild pulmonary edema -->  - f/u post op CXR and daily CXR while in ICU  - Wean FiO2 maintaining SpO2 >92%.   - ABGs as needed  - Aggressive BPH, IS q1h and OOB to chair when extubated  - Maintain mediastinal chest tubes to wall suction, notify team if output > 100 cc in one hour   - maintain mediastinal pleural chest tubes for today  - CPAP at night  Hold Home meds: Fluticasone 50mcg (2puffs) daily     GI: On home PPI-->  - Continue PPI   - Passed bedside swallow, cardiac diet - Has been consuming chicken broth and water, not ready for fiber rich foods yet  - Colace and miralax BID, PRN dulc suppository for bowel regimen (Has been refusing - says he is not ready for a bowel regimen yet)  - Last BM: 9/23/24   Home meds: Pantoprazole 20mg daily     :  Chronic renal insufficiency, BPH. Baseline serum creatinine 1.08. Todd in place with appropriate UOP draining dusky urine iso cyanokit administration--> Postop LIZ resolving, UOP 60cc/hr. Net negative 1L  - Diuresis this AM, goal net negative 1L  - Continue todd catheter for strict I/Os.    - Goal UOP 0.5ml/kg/hr  - RFP as clinically indicated.  Replete electrolytes per CTICU protocol.  - Continue home Terazosin 5mg daily     ENDO:  A1c  5.6 (4/24) .  Not on home meds. Arrived to the unit on insulin gtt. Postoperative hyperglycemia well controlled with insulin gtt at 1 U/hr . --> weaned off insulin gtt, started on cardiac SSI.   - Maintain BG <180  - cardiac SSI per CTICU protocol     HEME:  Acute blood loss anemia. Thrombocytopenia.  Leukocytosis improving-->    - Monitor drain output volume and characteristics  - CBC, coags, and fibrinogen post op and as clinically indicated  - SCDs and SQH for DVT prophylaxis.   - last type and screen: 9/25/24     ID:  MRSA negative. COVID negative. Afebrile, no current indications of infection --> Received Vancomycin and Ancef intra-op.   - Trend temp q4h  - Periop Ancef x48hrs     Skin:    - arrived to ICU from OR with preventative Mepilex dressings in place on sacrum and heels  - change preventative Mepilex weekly or more frequently as indicated (when moist/soiled)   - every shift skin assessment per nursing and weekly ICU skin rounds  - moisture barrier to be applied with ruddy care  - active skin problems addressed with nursing on daily rounds.      Proph:  SCDs  SQH  PPI     G:  Line  Right IJ MAC w mini MAC placed 9/23  Right brachial phil placed 9/23  Coy placed 9/23  16g, 20g PIVs     F: Family: will update family at bedside as able     A,B,C,D,E,F,G: reviewed     Restraints: not needed     Dispo: CTICU, may be ready for stepdown tomorrow  Seen and discussed with ICU attending Dr Neal

## 2024-09-25 NOTE — PROGRESS NOTES
Physical Therapy    Physical Therapy Treatment    Patient Name: David Chatman  MRN: [id number]  Department: [facility] CTICU  Room: 07/07-A  Today's Date: 9/25/2024  Time Calculation  Start Time: 1218  Stop Time: 1241  Time Calculation (min): 23 min    Assessment/Plan   PT Assessment  PT Assessment Results: Decreased strength, Decreased endurance, Impaired balance, Decreased mobility  Rehab Prognosis: Good  Barriers to Discharge: None  Evaluation/Treatment Tolerance: Patient tolerated treatment well  Medical Staff Made Aware: Yes  End of Session Communication: Bedside nurse  End of Session Patient Position: Up in chair, Alarm off, not on at start of session  PT Plan  Inpatient/Swing Bed or Outpatient: Inpatient  PT Plan  Treatment/Interventions: Bed mobility, Transfer training, Gait training, Stair training, Balance training, Strengthening, Endurance training, Therapeutic exercise, Therapeutic activity  PT Plan: Ongoing PT  PT Frequency: 3 times per week  PT Discharge Recommendations: Low intensity level of continued care  PT Recommended Transfer Status: Assist x1, Assistive device  PT - OK to Discharge: Yes    General Visit Information:   PT  Visit  PT Received On: 09/25/24  Response to Previous Treatment: Patient with no complaints from previous session.  General  Missed Visit: No  Family/Caregiver Present: No  Prior to Session Communication: Bedside nurse  Patient Position Received: Up in chair, Alarm off, not on at start of session  Preferred Learning Style: auditory, verbal  General Comment: Pt awake, alert and willing to participate in PT session.  Pt completed sit<>stand transfers and ambulation with thoracic walker.  CGA-SBA provided.  Vital stable. (4L, tele, phil, CT x1, todd, CVC)    Subjective   Precautions:  Precautions  Medical Precautions: Cardiac precautions, Fall precautions, Oxygen therapy device and L/min, Chest tube  Post-Surgical Precautions: Move in the Tube  Precautions Comment: MAP 65-90, SBP  <140, SpO2 >92%, VVI @ 50    Vital Signs (Past 2hrs)        Date/Time Vitals Session Patient Position Pulse Resp SpO2 BP MAP (mmHg)    09/25/24 1218 Pre PT  Sitting  81  23  97 %  106/64  78            Vital Signs Comment: No change in vitals with mobility     Objective   Pain:  Pain Assessment  Pain Assessment: 0-10  0-10 (Numeric) Pain Score: 4  Pain Type: Surgical pain  Pain Location: Chest  Cognition:  Cognition  Overall Cognitive Status: Within Functional Limits  Orientation Level: Oriented X4  Activity Tolerance:  Activity Tolerance  Endurance: Tolerates 10 - 20 min exercise with multiple rests  Early Mobility/Exercise Safety Screen: Proceed with mobilization - No exclusion criteria met  Activity Tolerance Comments: SpO2 ~90-92% with ambulation - SOB reported  Rate of Perceived Dyspnea (RPD): 3/10  Treatments:  Therapeutic Exercise  Therapeutic Exercise Performed: Yes  Therapeutic Exercise Activity 1: 1x10 reps of incentive spirometer: <1500    Therapeutic Activity  Therapeutic Activity Performed: Yes  Therapeutic Activity 1: Increased time for skilled ICU line/tube management for safe functional mobility  Therapeutic Activity 2: x5 sit<>stand transfers from chair - increased time to complete, UE use to complete, CGA provided    Bed Mobility  Bed Mobility: No    Ambulation/Gait Training  Ambulation/Gait Training Performed: Yes  Ambulation/Gait Training 1  Surface 1: Level tile  Device 1: Thoracic walker  Assistance 1: Contact guard  Comments/Distance (ft) 1: ~300ft  Transfers  Transfer: Yes  Transfer 1  Transfer From 1: Sit to, Stand to  Transfer to 1: Sit, Stand  Technique 1: Sit to stand, Stand to sit  Transfer Device 1: Thoracic walker  Transfer Level of Assistance 1: Contact guard  Trials/Comments 1: Cues for hand placement; x6 transfers completed    Stairs  Stairs: No    Outcome Measures:  [facility] Basic Mobility  Turning from your back to your side while in a flat bed without using bedrails: A  little  Moving from lying on your back to sitting on the side of a flat bed without using bedrails: A little  Moving to and from bed to chair (including a wheelchair): A little  Standing up from a chair using your arms (e.g. wheelchair or bedside chair): A little  To walk in hospital room: A little  Climbing 3-5 steps with railing: A little  Basic Mobility - Total Score: 18    FSS-ICU  Ambulation: Walks >/ or equal to 150 feet with supervision  Rolling: Supervision or set-up only  Sitting: Supervision or set-up only  Transfer Sit-to-Stand: Supervision or set-up only  Transfer Supine-to-Sit: Supervision or set-up only  Total Score: 25    Early Mobility/Exercise Safety Screen: Proceed with mobilization - No exclusion criteria met  ICU Mobility Scale: Walking with assistance of 1 person [8]    Education Documentation  Precautions, taught by Gela Cortez PT at 9/25/2024 12:50 PM.  Learner: Patient  Readiness: Acceptance  Method: Demonstration, Explanation  Response: Demonstrated Understanding    Body Mechanics, taught by Gela Cortez PT at 9/25/2024 12:50 PM.  Learner: Patient  Readiness: Acceptance  Method: Demonstration, Explanation  Response: Demonstrated Understanding    Mobility Training, taught by Gela Cortez PT at 9/25/2024 12:50 PM.  Learner: Patient  Readiness: Acceptance  Method: Demonstration, Explanation  Response: Demonstrated Understanding    Education Comments  No comments found.    EDUCATION:       Encounter Problems       Encounter Problems (Active)       Balance       Pt will demonstrated ability to score at least 24/28 on the Tinetti balance assessment tool to ensure safety upon D/C.  (Progressing)       Start:  09/24/24    Expected End:  10/08/24               Mobility       Pt will demonstrated ability to ambulate >/=250ft with proper form and no balance deficits for safe home going.   (Progressing)       Start:  09/24/24    Expected End:  10/08/24            Pt will demonstrate ability to  ascend/descend 1 flight of stairs with unilateral rail and no balance deficits for safe home going.  (Progressing)       Start:  09/24/24    Expected End:  10/08/24               PT Transfers       Pt will demonstrate ability to complete 5X STS in < 12 sec with good from and consistency between transfers for safe D/C.  (Progressing)       Start:  09/24/24    Expected End:  10/08/24

## 2024-09-26 ENCOUNTER — APPOINTMENT (OUTPATIENT)
Dept: RADIOLOGY | Facility: HOSPITAL | Age: 69
DRG: 219 | End: 2024-09-26
Payer: MEDICARE

## 2024-09-26 LAB
ALBUMIN SERPL BCP-MCNC: 3.6 G/DL (ref 3.4–5)
ANION GAP BLDA CALCULATED.4IONS-SCNC: 10 MMO/L (ref 10–25)
ANION GAP BLDV CALCULATED.4IONS-SCNC: 10 MMOL/L (ref 10–25)
ANION GAP BLDV CALCULATED.4IONS-SCNC: 9 MMOL/L (ref 10–25)
ANION GAP SERPL CALC-SCNC: 14 MMOL/L (ref 10–20)
ATRIAL RATE: 163 BPM
BASE EXCESS BLDA CALC-SCNC: -0.7 MMOL/L (ref -2–3)
BASE EXCESS BLDV CALC-SCNC: -0.7 MMOL/L (ref -2–3)
BASE EXCESS BLDV CALC-SCNC: 0.2 MMOL/L (ref -2–3)
BODY TEMPERATURE: 37 DEGREES CELSIUS
BUN SERPL-MCNC: 40 MG/DL (ref 6–23)
CA-I BLD-SCNC: 1.13 MMOL/L (ref 1.1–1.33)
CA-I BLDA-SCNC: 1.16 MMOL/L (ref 1.1–1.33)
CA-I BLDV-SCNC: 1.13 MMOL/L (ref 1.1–1.33)
CA-I BLDV-SCNC: 1.19 MMOL/L (ref 1.1–1.33)
CALCIUM SERPL-MCNC: 8.2 MG/DL (ref 8.6–10.6)
CHLORIDE BLDA-SCNC: 101 MMOL/L (ref 98–107)
CHLORIDE BLDV-SCNC: 100 MMOL/L (ref 98–107)
CHLORIDE BLDV-SCNC: 102 MMOL/L (ref 98–107)
CHLORIDE SERPL-SCNC: 100 MMOL/L (ref 98–107)
CO2 SERPL-SCNC: 23 MMOL/L (ref 21–32)
CREAT SERPL-MCNC: 1.34 MG/DL (ref 0.5–1.3)
EGFRCR SERPLBLD CKD-EPI 2021: 58 ML/MIN/1.73M*2
ERYTHROCYTE [DISTWIDTH] IN BLOOD BY AUTOMATED COUNT: 14.8 % (ref 11.5–14.5)
GLUCOSE BLD MANUAL STRIP-MCNC: 107 MG/DL (ref 74–99)
GLUCOSE BLD MANUAL STRIP-MCNC: 112 MG/DL (ref 74–99)
GLUCOSE BLD MANUAL STRIP-MCNC: 128 MG/DL (ref 74–99)
GLUCOSE BLD MANUAL STRIP-MCNC: 97 MG/DL (ref 74–99)
GLUCOSE BLDA-MCNC: 129 MG/DL (ref 74–99)
GLUCOSE BLDV-MCNC: 110 MG/DL (ref 74–99)
GLUCOSE BLDV-MCNC: 129 MG/DL (ref 74–99)
GLUCOSE SERPL-MCNC: 115 MG/DL (ref 74–99)
HCO3 BLDA-SCNC: 22.9 MMOL/L (ref 22–26)
HCO3 BLDV-SCNC: 22.1 MMOL/L (ref 22–26)
HCO3 BLDV-SCNC: 24.6 MMOL/L (ref 22–26)
HCT VFR BLD AUTO: 31.7 % (ref 41–52)
HCT VFR BLD EST: 27 % (ref 41–52)
HCT VFR BLD EST: 29 % (ref 41–52)
HCT VFR BLD EST: 32 % (ref 41–52)
HGB BLD-MCNC: 10.3 G/DL (ref 13.5–17.5)
HGB BLDA-MCNC: 10.5 G/DL (ref 13.5–17.5)
HGB BLDV-MCNC: 8.9 G/DL (ref 13.5–17.5)
HGB BLDV-MCNC: 9.7 G/DL (ref 13.5–17.5)
INHALED O2 CONCENTRATION: 40 %
LACTATE BLDA-SCNC: 0.7 MMOL/L (ref 0.4–2)
LACTATE BLDV-SCNC: 0.8 MMOL/L (ref 0.4–2)
LACTATE BLDV-SCNC: 0.9 MMOL/L (ref 0.4–2)
MAGNESIUM SERPL-MCNC: 2.7 MG/DL (ref 1.6–2.4)
MCH RBC QN AUTO: 28.2 PG (ref 26–34)
MCHC RBC AUTO-ENTMCNC: 32.5 G/DL (ref 32–36)
MCV RBC AUTO: 87 FL (ref 80–100)
NRBC BLD-RTO: 0 /100 WBCS (ref 0–0)
OXYHGB MFR BLDA: 96.1 % (ref 94–98)
OXYHGB MFR BLDV: 79.9 % (ref 45–75)
OXYHGB MFR BLDV: 96.3 % (ref 45–75)
PCO2 BLDA: 33 MM HG (ref 38–42)
PCO2 BLDV: 29 MM HG (ref 41–51)
PCO2 BLDV: 38 MM HG (ref 41–51)
PH BLDA: 7.45 PH (ref 7.38–7.42)
PH BLDV: 7.42 PH (ref 7.33–7.43)
PH BLDV: 7.49 PH (ref 7.33–7.43)
PHOSPHATE SERPL-MCNC: 4.2 MG/DL (ref 2.5–4.9)
PLATELET # BLD AUTO: 80 X10*3/UL (ref 150–450)
PO2 BLDA: 76 MM HG (ref 85–95)
PO2 BLDV: 47 MM HG (ref 35–45)
PO2 BLDV: 95 MM HG (ref 35–45)
POTASSIUM BLDA-SCNC: 4.4 MMOL/L (ref 3.5–5.3)
POTASSIUM BLDV-SCNC: 4.6 MMOL/L (ref 3.5–5.3)
POTASSIUM BLDV-SCNC: 4.8 MMOL/L (ref 3.5–5.3)
POTASSIUM SERPL-SCNC: 4.2 MMOL/L (ref 3.5–5.3)
Q ONSET: 207 MS
QRS COUNT: 16 BEATS
QRS DURATION: 146 MS
QT INTERVAL: 354 MS
QTC CALCULATION(BAZETT): 449 MS
QTC FREDERICIA: 415 MS
R AXIS: 0 DEGREES
RBC # BLD AUTO: 3.65 X10*6/UL (ref 4.5–5.9)
SAO2 % BLDA: 98 % (ref 94–100)
SAO2 % BLDV: 82 % (ref 45–75)
SAO2 % BLDV: 99 % (ref 45–75)
SODIUM BLDA-SCNC: 129 MMOL/L (ref 136–145)
SODIUM BLDV-SCNC: 129 MMOL/L (ref 136–145)
SODIUM BLDV-SCNC: 129 MMOL/L (ref 136–145)
SODIUM SERPL-SCNC: 133 MMOL/L (ref 136–145)
T AXIS: 177 DEGREES
T OFFSET: 384 MS
VENTRICULAR RATE: 97 BPM
WBC # BLD AUTO: 9.5 X10*3/UL (ref 4.4–11.3)

## 2024-09-26 PROCEDURE — 2500000001 HC RX 250 WO HCPCS SELF ADMINISTERED DRUGS (ALT 637 FOR MEDICARE OP): Performed by: NURSE PRACTITIONER

## 2024-09-26 PROCEDURE — 82330 ASSAY OF CALCIUM: CPT | Performed by: NURSE PRACTITIONER

## 2024-09-26 PROCEDURE — 71045 X-RAY EXAM CHEST 1 VIEW: CPT

## 2024-09-26 PROCEDURE — 2500000002 HC RX 250 W HCPCS SELF ADMINISTERED DRUGS (ALT 637 FOR MEDICARE OP, ALT 636 FOR OP/ED)

## 2024-09-26 PROCEDURE — 2500000001 HC RX 250 WO HCPCS SELF ADMINISTERED DRUGS (ALT 637 FOR MEDICARE OP)

## 2024-09-26 PROCEDURE — 82947 ASSAY GLUCOSE BLOOD QUANT: CPT

## 2024-09-26 PROCEDURE — 37799 UNLISTED PX VASCULAR SURGERY: CPT | Performed by: NURSE PRACTITIONER

## 2024-09-26 PROCEDURE — 83735 ASSAY OF MAGNESIUM: CPT | Performed by: NURSE PRACTITIONER

## 2024-09-26 PROCEDURE — 2500000004 HC RX 250 GENERAL PHARMACY W/ HCPCS (ALT 636 FOR OP/ED)

## 2024-09-26 PROCEDURE — 37799 UNLISTED PX VASCULAR SURGERY: CPT

## 2024-09-26 PROCEDURE — 2500000004 HC RX 250 GENERAL PHARMACY W/ HCPCS (ALT 636 FOR OP/ED): Mod: JZ

## 2024-09-26 PROCEDURE — 84132 ASSAY OF SERUM POTASSIUM: CPT

## 2024-09-26 PROCEDURE — 2500000002 HC RX 250 W HCPCS SELF ADMINISTERED DRUGS (ALT 637 FOR MEDICARE OP, ALT 636 FOR OP/ED): Performed by: NURSE PRACTITIONER

## 2024-09-26 PROCEDURE — 99291 CRITICAL CARE FIRST HOUR: CPT

## 2024-09-26 PROCEDURE — 2060000001 HC INTERMEDIATE ICU ROOM DAILY

## 2024-09-26 PROCEDURE — 2500000001 HC RX 250 WO HCPCS SELF ADMINISTERED DRUGS (ALT 637 FOR MEDICARE OP): Performed by: STUDENT IN AN ORGANIZED HEALTH CARE EDUCATION/TRAINING PROGRAM

## 2024-09-26 PROCEDURE — 85027 COMPLETE CBC AUTOMATED: CPT | Performed by: NURSE PRACTITIONER

## 2024-09-26 PROCEDURE — 84132 ASSAY OF SERUM POTASSIUM: CPT | Performed by: NURSE PRACTITIONER

## 2024-09-26 RX ORDER — AMIODARONE HYDROCHLORIDE 200 MG/1
400 TABLET ORAL 2 TIMES DAILY
Status: DISCONTINUED | OUTPATIENT
Start: 2024-09-26 | End: 2024-09-30

## 2024-09-26 RX ORDER — AMIODARONE HYDROCHLORIDE 200 MG/1
400 TABLET ORAL 3 TIMES DAILY
Status: DISCONTINUED | OUTPATIENT
Start: 2024-09-26 | End: 2024-09-26

## 2024-09-26 RX ORDER — ACETAMINOPHEN 325 MG/1
650 TABLET ORAL EVERY 8 HOURS SCHEDULED
Status: DISCONTINUED | OUTPATIENT
Start: 2024-09-26 | End: 2024-10-02

## 2024-09-26 RX ORDER — CLONIDINE HYDROCHLORIDE 0.1 MG/1
0.1 TABLET ORAL EVERY 12 HOURS SCHEDULED
Status: DISCONTINUED | OUTPATIENT
Start: 2024-09-26 | End: 2024-09-29

## 2024-09-26 RX ORDER — METOPROLOL TARTRATE 25 MG/1
12.5 TABLET, FILM COATED ORAL 2 TIMES DAILY
Status: DISCONTINUED | OUTPATIENT
Start: 2024-09-26 | End: 2024-09-29

## 2024-09-26 RX ORDER — ASPIRIN 81 MG/1
81 TABLET ORAL DAILY
Status: DISCONTINUED | OUTPATIENT
Start: 2024-09-27 | End: 2024-10-03 | Stop reason: HOSPADM

## 2024-09-26 RX ORDER — MULTIVIT-MIN/IRON FUM/FOLIC AC 7.5 MG-4
1 TABLET ORAL DAILY
Status: DISCONTINUED | OUTPATIENT
Start: 2024-09-27 | End: 2024-09-30

## 2024-09-26 RX ORDER — EPINEPHRINE HCL IN DEXTROSE 5% 4MG/250ML
0.01 PLASTIC BAG, INJECTION (ML) INTRAVENOUS CONTINUOUS
Status: DISCONTINUED | OUTPATIENT
Start: 2024-09-26 | End: 2024-09-26

## 2024-09-26 RX ORDER — IRON POLYSACCHARIDE COMPLEX 150 MG
150 CAPSULE ORAL DAILY
Status: DISCONTINUED | OUTPATIENT
Start: 2024-09-27 | End: 2024-09-30

## 2024-09-26 RX ORDER — AMIODARONE HYDROCHLORIDE 200 MG/1
400 TABLET ORAL EVERY 8 HOURS SCHEDULED
Status: DISCONTINUED | OUTPATIENT
Start: 2024-09-26 | End: 2024-09-26

## 2024-09-26 RX ORDER — BISACODYL 10 MG/1
10 SUPPOSITORY RECTAL DAILY PRN
Status: DISCONTINUED | OUTPATIENT
Start: 2024-09-26 | End: 2024-10-03 | Stop reason: HOSPADM

## 2024-09-26 RX ADMIN — CLONIDINE HYDROCHLORIDE 0.1 MG: 0.1 TABLET ORAL at 02:59

## 2024-09-26 RX ADMIN — ACETAMINOPHEN 650 MG: 325 TABLET, FILM COATED ORAL at 14:17

## 2024-09-26 RX ADMIN — OXYCODONE HYDROCHLORIDE 5 MG: 5 TABLET ORAL at 21:35

## 2024-09-26 RX ADMIN — AMIODARONE HYDROCHLORIDE 150 MG: 1.5 INJECTION, SOLUTION INTRAVENOUS at 00:12

## 2024-09-26 RX ADMIN — SENNOSIDES AND DOCUSATE SODIUM 2 TABLET: 8.6; 5 TABLET ORAL at 21:35

## 2024-09-26 RX ADMIN — ALTEPLASE 2 MG: 2.2 INJECTION, POWDER, LYOPHILIZED, FOR SOLUTION INTRAVENOUS at 04:22

## 2024-09-26 RX ADMIN — EPINEPHRINE 0.01 MCG/KG/MIN: 1 INJECTION INTRAMUSCULAR; INTRAVENOUS; SUBCUTANEOUS at 02:59

## 2024-09-26 RX ADMIN — PANTOPRAZOLE SODIUM 20 MG: 20 TABLET, DELAYED RELEASE ORAL at 06:54

## 2024-09-26 RX ADMIN — ASPIRIN 81 MG 81 MG: 81 TABLET ORAL at 08:01

## 2024-09-26 RX ADMIN — ATORVASTATIN CALCIUM 20 MG: 20 TABLET, FILM COATED ORAL at 21:35

## 2024-09-26 RX ADMIN — AMIODARONE HYDROCHLORIDE 1 MG/MIN: 1.8 INJECTION, SOLUTION INTRAVENOUS at 02:58

## 2024-09-26 RX ADMIN — METOPROLOL TARTRATE 12.5 MG: 25 TABLET, FILM COATED ORAL at 09:06

## 2024-09-26 RX ADMIN — CLONIDINE HYDROCHLORIDE 0.1 MG: 0.1 TABLET ORAL at 10:25

## 2024-09-26 RX ADMIN — HEPARIN SODIUM 5000 UNITS: 5000 INJECTION, SOLUTION INTRAVENOUS; SUBCUTANEOUS at 10:25

## 2024-09-26 RX ADMIN — ACETAMINOPHEN 650 MG: 325 TABLET ORAL at 02:59

## 2024-09-26 RX ADMIN — AMIODARONE HYDROCHLORIDE 1 MG/MIN: 1.8 INJECTION, SOLUTION INTRAVENOUS at 00:12

## 2024-09-26 RX ADMIN — TERAZOSIN HYDROCHLORIDE 5 MG: 5 CAPSULE ORAL at 21:35

## 2024-09-26 RX ADMIN — OXYCODONE HYDROCHLORIDE 5 MG: 5 TABLET ORAL at 14:17

## 2024-09-26 RX ADMIN — AMIODARONE HYDROCHLORIDE 400 MG: 200 TABLET ORAL at 09:41

## 2024-09-26 RX ADMIN — HEPARIN SODIUM 5000 UNITS: 5000 INJECTION, SOLUTION INTRAVENOUS; SUBCUTANEOUS at 02:59

## 2024-09-26 RX ADMIN — AMIODARONE HYDROCHLORIDE 400 MG: 200 TABLET ORAL at 21:34

## 2024-09-26 ASSESSMENT — PAIN - FUNCTIONAL ASSESSMENT
PAIN_FUNCTIONAL_ASSESSMENT: 0-10

## 2024-09-26 ASSESSMENT — PAIN SCALES - GENERAL
PAINLEVEL_OUTOF10: 0 - NO PAIN
PAINLEVEL_OUTOF10: 5 - MODERATE PAIN
PAINLEVEL_OUTOF10: 0 - NO PAIN

## 2024-09-26 ASSESSMENT — COGNITIVE AND FUNCTIONAL STATUS - GENERAL
WALKING IN HOSPITAL ROOM: A LITTLE
STANDING UP FROM CHAIR USING ARMS: A LITTLE
DAILY ACTIVITIY SCORE: 20
CLIMB 3 TO 5 STEPS WITH RAILING: A LITTLE
DRESSING REGULAR UPPER BODY CLOTHING: A LITTLE
DRESSING REGULAR LOWER BODY CLOTHING: A LITTLE
TURNING FROM BACK TO SIDE WHILE IN FLAT BAD: A LITTLE
MOVING TO AND FROM BED TO CHAIR: A LITTLE
TOILETING: A LITTLE
HELP NEEDED FOR BATHING: A LITTLE
MOBILITY SCORE: 19

## 2024-09-26 NOTE — CARE PLAN
The patient's goals for the shift include orient to LT3.     The clinical goals for the shift include Patient will remain safe and free from falls throughout shift.    Over the shift, the patient remained safe and free of injury. Oriented to room and call light. Pain level rate controlled with tylenol and oxycodone. Keep pt and family up to date on plan of care.

## 2024-09-26 NOTE — PROGRESS NOTES
09/26/24 1149   Discharge Planning   Living Arrangements Spouse/significant other   Support Systems Spouse/significant other   Type of Residence Private residence   Number of Stairs to Enter Residence 3   Number of Stairs Within Residence 12   Type of Animals or Pets 1 Cat   Who is requesting discharge planning? Provider   Home or Post Acute Services In home services   Type of Home Care Services Home nursing visits   Expected Discharge Disposition Home H  ([facility])   Does the patient need discharge transport arranged? No     Met with patient to introduce myself,r ole and discuss discharge planning.  Patient lives with spouse.  Patient is independent in all adl's.  Requires no assist devices for mobility.  Patient denies any recent falls.  Patient feel safe at home and denies any issues with making follow up appointments or getting his medications.  Patient denies active homecare, but is agreeable to home care post discharge.  Patient would like [facility].  Patient expressed no questions and no social work concerns.   PCP:  Dr. Huerta  Pharmacy:  [facility]  Home Care:  N/A  DME:  N/A

## 2024-09-26 NOTE — SIGNIFICANT EVENT
Rapid Response Nurse Note: [] Rapid Response [x] RADAR alert: Score 6  Pager time: 1428  Arrival time: 129  Event end time: 1440  Location: T3  [] Phone triage     Rapid response initiated by:  [] Rapid Response RN [] Family [] Nursing Supervisor [] Physician   [x] RADAR auto-page [] Sepsis auto-page [] RN [] RT   [] NP/PA [] Other:     Primary reason for call:   [] BAT [] New CPAP/BiPAP [] Bleeding [] Change in mental status   [] Chest pain [] Code blue [] FiO2 >/= 50% [] HR </= 40 bpm   [] HR >/= 130 bpm [] Hyperglycemia [] Hypoglycemia [] RADAR    [] RR </= 8 bpm [] RR >/= 30 bpm [] SBP </= 90 mmHg [] SpO2 < 90%   [] Seizure [] Sepsis [] Staff concern:     Initial VS and/or RADAR VS: T 36.1 °C; HR 76; RR 26; BP 85/67; SPO2 92%.  Providers present at bedside:   Objective/Subjective data relevant to event:   Interventions:  [x] None [] ABG [] Assist w/ICU transfer [] BAT paged    [] Bag mask [] Blood [] Cardioversion [] Code Blue   [] Code blue for intubation [] Code status changed [] Chest x-ray [] EKG   [] IV fluid/bolus [] KUB x-ray [] Labs/cultures [] Medication   [] Nebulizer treatment [] NIPPV (CPAP/BiPAP) [] Oxygen [] Oral airway   [] Peripheral IV [] Palliative care consult [] CT/MRI [] Sepsis protocol    [] Suctioned [] Other:     Name of ICU Provider contacted (if applicable):   Outcome:  [] Coded and  [] Code blue for intubation [] Coded and transferred to ICU []  on division   [x] Remained on division (no change) [] Remained on division + additional monitoring [] Remained in ED [] Transferred to ED   [] Transferred to ICU [] Transferred to inpatient status [] Transferred for interventions (procedure) [] Transferred to ICU stepdown    [] Transferred to surgery [] Transferred to telemetry [] Sepsis protocol [] STEMI protocol   [] Stroke protocol [x] Bedside nurse instructed to page rapid response for any concerns or acute change in condition/VS     Additional Comments: Upon arrival pt  comfortable in bed- recheck of BP 91/62 (71). Pt recent transfer from CTICU and BP's have been softer. Pt with some oozing from R internal jugular line site- pressure being applied and dressing applied following. Pt able to stand at the side of the bed and no complaints of dizziness or lightheadness-using the urinal. Pt ok to remain on the [id number] at this time.

## 2024-09-26 NOTE — PROGRESS NOTES
Subjective   Overnight events: TTE w LVEF of 22%. Afib RVR - 250cc Albumin given, Amio boluses x2, and Amio gtt started. UOP dwindled early morning, Epi gtt started briefly with attainment of venous blood gas and then discontinued. Slept better than the night prior. Overall feels well this AM reclining in bedside chair.     Scheduled Medications:   acetaminophen, 650 mg, oral, q6h  aspirin, 81 mg, oral, Daily  atorvastatin, 20 mg, oral, Nightly  cloNIDine, 0.1 mg, oral, q8h  heparin (porcine), 5,000 Units, subcutaneous, q8h  insulin lispro, 0-5 Units, subcutaneous, TID AC  oxygen, , inhalation, Continuous - Inhalation  pantoprazole, 20 mg, oral, Daily before breakfast  polyethylene glycol, 17 g, oral, Daily  sennosides-docusate sodium, 2 tablet, oral, BID  terazosin, 5 mg, oral, Nightly      Continuous Medications:   amiodarone, 0.5-1 mg/min, Last Rate: 1 mg/min (09/26/24 0700)  lactated Ringer's, 5 mL/hr, Last Rate: 5 mL/hr (09/26/24 0700)         PRN Medications:   PRN medications: alteplase, calcium gluconate, calcium gluconate, dextrose, dextrose, glucagon, glucagon, hydrALAZINE, HYDROmorphone, magnesium sulfate, magnesium sulfate, naloxone, oxyCODONE, oxyCODONE, potassium chloride, potassium chloride    Objective   Vitals:  Most Recent:  Vitals:    09/26/24 0700   BP:    Pulse: 87   Resp: 18   Temp:    SpO2: 97%       24hr Min/Max:  Temp  Min: 36.3 °C (97.3 °F)  Max: 36.5 °C (97.7 °F)  Pulse  Min: 76  Max: 107  BP  Min: 106/64  Max: 106/64  Resp  Min: 13  Max: 23  SpO2  Min: 94 %  Max: 98 %    I/O:  I/O last 2 completed shifts:  In: 671 (5.9 mL/kg) [I.V.:671 (5.9 mL/kg)]  Out: 1385 (12.1 mL/kg) [Urine:1235 (0.5 mL/kg/hr); Chest Tube:150]  Weight: 114 kg     Physical Exam:   Constitutional: NAD sitting in chair, pleasant and conversant, AOx3  Neuro: Neuro exam intact without obvious focal deficit.  RASS 0 and CAM negative.  CV: Irregular rhythm, Rate - 90's on monitor.  AV wires placed to VVI 50   Pulm: CTAB on  RA, chest tubes removed this AM, site c/d/I   : todd in place with dusky urine  GI: S/D/NT, BS +ve  Extremities: No edema, NV exam intact x4  Skin: Post op and chest tube dressings inact.  Psych: WNL    Lab/Radiology/Diagnostic Review:  All AM labs reviewed     AM CXR: improved from yesterday      Assessment/Plan   69 y M with PMH s/f LBBB, T-AAA, HTN, ACS, HFrEF (35-40%), HLD, Aortic valve insufficiency, pAF, IROJAS, SHOAIB (compliant with CPAP), Hypersomnolence disorder, Gout, Renal insufficiency, R sided testicular pain, Class 1 Obesity, T2DM, Anemia, Thrombocytopenia, OA of R knee, SNHL (bilateral), and sinusitis who presents to the CTICU s/p ascending aorta repair with Dr Perdomo on 9/23.     POD3 today. Overnight events: TTE w LVEF of 22%. Afib RVR - 250cc Albumin given, Amio boluses x2, and Amio gtt started. UOP dwindled early morning, Epi gtt started briefly with attainment of venous blood gas and then discontinued. Slept better than the night prior. Overall feels well this AM reclining in bedside chair.      Plan:  NEURO:  Hx of hypersomnolence disorder. Acute post op pain - well controlled.  Patient is Aox3. No obvious focal deficits. Slept better than the night prior -->   - Serial neuro and pain assessments   - Scheduled Tylenol   - PRN oxycodone and Dilaudid for pain   - PT Consult, OOB to chair as tolerated  - CAM ICU score qshift  - Sleep/wake cycle hygiene (not willing to take melatonin/ sleep pills just yet)  - REST protocol     CV:  Patient has a history of HFrEF (35-40%), TAA (6.2cm), LBBB, HTN, HLD, pAF.  Now status post ascending aorta repair. Pre & post cardiac function: 30%. AV wires set to VVI @ 50.  SR on monitor.  Arrived to ICU on Epi 0.05, Levo 0.05, and Angiotensin II 20 ng/kg/min. --> severely vasoplegic on pump intra-op requiring pressors and cyanokit and methylene blue. Upon arrival to unit MAPs drifting down to high 40s. Transfused 1 unit FFP, administered 500cc of 5% Albumin, and 500cc  of crystalloid with titration of pressors upto Vaso 0.06, Ang II 40, levo 0.07, and epi 0.05. ABGs significant for lactic acidosis with mild hypercarbia. Was able to wean off pressors once patient was volume loaded. Currently on 0.03 of levo and epi and 5 ng/kg/min of angiotensin 2. Received a total of 3u FFP and 2u Cryo and 1L of 5% Albumin postop with normalization of lactate and appropriate svO2. On 0.01 of Epi this AM for decreased UOP which was then discontinued after attainment of venous gases.    - Continuous EKG and ABP monitoring  - maintain goal SBP<140 liberalized per surgical team  - started metop tartrate 12.5 mg BID, hold for HR <70  - Afib RVR: Started 400mg Amiodarone PO TID to achieve amio load and transitioned off amio gtt, hold for HR <70  - TTE (9/25/24): LVEF 22% and reduced RV function. Compared with the prior exam, preop echo done on 9/20/2024 [facility], the LV was already severely dilated with moderately reduced function with a severely dilated aorta with moderate AI and the patient is now s/p acending aortic and arch replacement and Konect AVR. The LV remains severely dilated with a reducetion in LV systolic function , now severely reduced. The AVR gradients were not assessed today but there is no AI.   - HF Recommendations (9/13/24):  Proceed with cardiac surgery for aortic aneurysm and aortic valve regurgitation  After cardiac surgery will need to optimize GDMT (BB,ACE/ARB/ARNI,  MRA, SGLT2i) for HFrEF  Depending on what happens with his LV function with time he may need to be considered for CRT, but would not assess this until several weeks/months following cardiac surgery and introduction of GDMT  - Maintain AV pacer wires set at backup rate VVI 50   - cw ASA 81 mg today. No indication for Plavix at this time per surgical team  - Continue statin 20mg and clonidine 0.1 TID  Home meds: Simvastatin 10mg daily, Terazosin 10mg daily, Losartan 100mg daily, Metoprolol succinate  25mg daily, Clonidine 0.1mg TID     PULM:  Hx of RIOJAS, SHOAIB (compliant with home CPAP at night) Extubated on 4L NC, 4 Chest tubes with no airleak and appropriate serosang output, to suction. CXR this AM looked fluffy cw mild pulmonary edema but improved, weaned to RA this AM, satting 98%-->  - f/u daily CXR while in ICU  - Wean FiO2 maintaining SpO2 >92%.   - CPAP at night  - ABGs as needed  - Aggressive BPH, IS q1h and OOB to chair when extubated  - Maintain mediastinal chest tubes removed today  Hold Home meds: Fluticasone 50mcg (2puffs) daily     GI: On home PPI-->  - Continue PPI   - Passed bedside swallow, cardiac diet   - Colace and miralax BID, PRN dulc suppository for bowel regimen (Has been refusing - says he is not ready for a bowel regimen yet)  - Last BM: 9/23/24   Home meds: Pantoprazole 20mg daily     :  Chronic renal insufficiency, BPH. Baseline serum creatinine 1.08. Todd in place with appropriate UOP draining dusky urine iso cyanokit and methylene blue administration--> Postop LIZ resolving, UOP 30-40cc/hr. Net negative 1L  - goal net negative 1L  - Continue todd catheter for strict I/Os.    - Goal UOP 0.5ml/kg/hr  - RFP as clinically indicated.  Replete electrolytes per CTICU protocol.  - Continue home Terazosin 5mg daily     ENDO:  A1c 5.6 (4/24) .  Not on home meds. Arrived to the unit on insulin gtt. Postoperative hyperglycemia well controlled with insulin gtt at 1 U/hr . --> weaned off insulin gtt, started on cardiac SSI.   - Maintain BG <180  - cardiac SSI per CTICU protocol     HEME:  Acute blood loss anemia. Thrombocytopenia.  Leukocytosis improving-->    - Monitor drain output volume and characteristics  - CBC, coags, and fibrinogen post op and as clinically indicated  - SCDs and SQH for DVT prophylaxis.  - last type and screen: 9/25/24     ID:  MRSA negative. COVID negative. Afebrile, no current indications of infection --> Received Vancomycin and Ancef intra-op.   - Trend temp q4h  -  Periop Ancef x48hrs completed     Skin:    - arrived to ICU from OR with preventative Mepilex dressings in place on sacrum and heels  - change preventative Mepilex weekly or more frequently as indicated (when moist/soiled)   - every shift skin assessment per nursing and weekly ICU skin rounds  - moisture barrier to be applied with ruddy care  - active skin problems addressed with nursing on daily rounds.      Proph:  SCDs  SQH  PPI     G:  Line  Right IJ MAC w mini MAC placed 9/23 - remove today  Right brachial phil placed 9/23 - remove today  Coy placed 9/23 - remove today  16g, 20g PIVs     F: Family: will update family at bedside as able     A,B,C,D,E,F,G: reviewed     Restraints: not needed     Dispo: Transfer to stepdown later today  Seen and discussed with ICU attending Dr Neal

## 2024-09-27 ENCOUNTER — APPOINTMENT (OUTPATIENT)
Dept: RADIOLOGY | Facility: HOSPITAL | Age: 69
DRG: 219 | End: 2024-09-27
Payer: MEDICARE

## 2024-09-27 LAB
ALBUMIN SERPL BCP-MCNC: 3.6 G/DL (ref 3.4–5)
ANION GAP SERPL CALC-SCNC: 14 MMOL/L (ref 10–20)
BLOOD EXPIRATION DATE: NORMAL
BUN SERPL-MCNC: 56 MG/DL (ref 6–23)
CALCIUM SERPL-MCNC: 8.7 MG/DL (ref 8.6–10.6)
CHLORIDE SERPL-SCNC: 98 MMOL/L (ref 98–107)
CO2 SERPL-SCNC: 25 MMOL/L (ref 21–32)
CREAT SERPL-MCNC: 1.54 MG/DL (ref 0.5–1.3)
DISPENSE STATUS: NORMAL
EGFRCR SERPLBLD CKD-EPI 2021: 49 ML/MIN/1.73M*2
EJECTION FRACTION: 33 %
ERYTHROCYTE [DISTWIDTH] IN BLOOD BY AUTOMATED COUNT: 14.4 % (ref 11.5–14.5)
GLUCOSE BLD MANUAL STRIP-MCNC: 101 MG/DL (ref 74–99)
GLUCOSE BLD MANUAL STRIP-MCNC: 111 MG/DL (ref 74–99)
GLUCOSE BLD MANUAL STRIP-MCNC: 113 MG/DL (ref 74–99)
GLUCOSE BLD MANUAL STRIP-MCNC: 95 MG/DL (ref 74–99)
GLUCOSE SERPL-MCNC: 85 MG/DL (ref 74–99)
HCT VFR BLD AUTO: 33.2 % (ref 41–52)
HGB BLD-MCNC: 11 G/DL (ref 13.5–17.5)
MAGNESIUM SERPL-MCNC: 2.68 MG/DL (ref 1.6–2.4)
MCH RBC QN AUTO: 28.6 PG (ref 26–34)
MCHC RBC AUTO-ENTMCNC: 33.1 G/DL (ref 32–36)
MCV RBC AUTO: 86 FL (ref 80–100)
NRBC BLD-RTO: 0 /100 WBCS (ref 0–0)
PHOSPHATE SERPL-MCNC: 3.6 MG/DL (ref 2.5–4.9)
PLATELET # BLD AUTO: 118 X10*3/UL (ref 150–450)
POTASSIUM SERPL-SCNC: 4.1 MMOL/L (ref 3.5–5.3)
PRODUCT BLOOD TYPE: 5100
PRODUCT CODE: NORMAL
RBC # BLD AUTO: 3.85 X10*6/UL (ref 4.5–5.9)
SODIUM SERPL-SCNC: 133 MMOL/L (ref 136–145)
UNIT ABO: NORMAL
UNIT NUMBER: NORMAL
UNIT RH: NORMAL
UNIT VOLUME: 281
UNIT VOLUME: 350
WBC # BLD AUTO: 10.1 X10*3/UL (ref 4.4–11.3)
XM INTEP: NORMAL

## 2024-09-27 PROCEDURE — 97530 THERAPEUTIC ACTIVITIES: CPT | Mod: GP

## 2024-09-27 PROCEDURE — 2500000001 HC RX 250 WO HCPCS SELF ADMINISTERED DRUGS (ALT 637 FOR MEDICARE OP): Performed by: NURSE PRACTITIONER

## 2024-09-27 PROCEDURE — 2060000001 HC INTERMEDIATE ICU ROOM DAILY

## 2024-09-27 PROCEDURE — 71045 X-RAY EXAM CHEST 1 VIEW: CPT

## 2024-09-27 PROCEDURE — 82947 ASSAY GLUCOSE BLOOD QUANT: CPT

## 2024-09-27 PROCEDURE — 2500000002 HC RX 250 W HCPCS SELF ADMINISTERED DRUGS (ALT 637 FOR MEDICARE OP, ALT 636 FOR OP/ED): Performed by: NURSE PRACTITIONER

## 2024-09-27 PROCEDURE — 85027 COMPLETE CBC AUTOMATED: CPT | Performed by: NURSE PRACTITIONER

## 2024-09-27 PROCEDURE — 83735 ASSAY OF MAGNESIUM: CPT | Performed by: NURSE PRACTITIONER

## 2024-09-27 PROCEDURE — 2500000004 HC RX 250 GENERAL PHARMACY W/ HCPCS (ALT 636 FOR OP/ED): Performed by: NURSE PRACTITIONER

## 2024-09-27 PROCEDURE — 36415 COLL VENOUS BLD VENIPUNCTURE: CPT | Performed by: NURSE PRACTITIONER

## 2024-09-27 PROCEDURE — 97112 NEUROMUSCULAR REEDUCATION: CPT | Mod: GP

## 2024-09-27 PROCEDURE — 80069 RENAL FUNCTION PANEL: CPT | Performed by: NURSE PRACTITIONER

## 2024-09-27 PROCEDURE — 2500000005 HC RX 250 GENERAL PHARMACY W/O HCPCS: Performed by: NURSE PRACTITIONER

## 2024-09-27 PROCEDURE — 71045 X-RAY EXAM CHEST 1 VIEW: CPT | Performed by: RADIOLOGY

## 2024-09-27 PROCEDURE — 99232 SBSQ HOSP IP/OBS MODERATE 35: CPT | Performed by: NURSE PRACTITIONER

## 2024-09-27 RX ORDER — FUROSEMIDE 10 MG/ML
40 INJECTION INTRAMUSCULAR; INTRAVENOUS ONCE
Status: COMPLETED | OUTPATIENT
Start: 2024-09-27 | End: 2024-09-27

## 2024-09-27 RX ADMIN — HEPARIN SODIUM 5000 UNITS: 5000 INJECTION, SOLUTION INTRAVENOUS; SUBCUTANEOUS at 15:30

## 2024-09-27 RX ADMIN — SENNOSIDES AND DOCUSATE SODIUM 2 TABLET: 8.6; 5 TABLET ORAL at 20:33

## 2024-09-27 RX ADMIN — ACETAMINOPHEN 650 MG: 325 TABLET, FILM COATED ORAL at 13:44

## 2024-09-27 RX ADMIN — ATORVASTATIN CALCIUM 20 MG: 20 TABLET, FILM COATED ORAL at 20:33

## 2024-09-27 RX ADMIN — OXYCODONE HYDROCHLORIDE 5 MG: 5 TABLET ORAL at 20:45

## 2024-09-27 RX ADMIN — POLYETHYLENE GLYCOL 3350 17 G: 17 POWDER, FOR SOLUTION ORAL at 08:18

## 2024-09-27 RX ADMIN — METOPROLOL TARTRATE 12.5 MG: 25 TABLET, FILM COATED ORAL at 20:33

## 2024-09-27 RX ADMIN — AMIODARONE HYDROCHLORIDE 400 MG: 200 TABLET ORAL at 20:33

## 2024-09-27 RX ADMIN — PANTOPRAZOLE SODIUM 20 MG: 20 TABLET, DELAYED RELEASE ORAL at 06:50

## 2024-09-27 RX ADMIN — ASPIRIN 81 MG: 81 TABLET, COATED ORAL at 08:18

## 2024-09-27 RX ADMIN — ACETAMINOPHEN 650 MG: 325 TABLET, FILM COATED ORAL at 06:50

## 2024-09-27 RX ADMIN — AMIODARONE HYDROCHLORIDE 400 MG: 200 TABLET ORAL at 08:18

## 2024-09-27 RX ADMIN — SENNOSIDES AND DOCUSATE SODIUM 2 TABLET: 8.6; 5 TABLET ORAL at 08:18

## 2024-09-27 RX ADMIN — POLYSACCHARIDE-IRON COMPLEX 150 MG: 150 CAPSULE ORAL at 08:18

## 2024-09-27 RX ADMIN — TERAZOSIN HYDROCHLORIDE 5 MG: 5 CAPSULE ORAL at 20:33

## 2024-09-27 RX ADMIN — FUROSEMIDE 40 MG: 10 INJECTION, SOLUTION INTRAVENOUS at 13:44

## 2024-09-27 RX ADMIN — Medication 1 TABLET: at 08:19

## 2024-09-27 RX ADMIN — OXYCODONE HYDROCHLORIDE 5 MG: 5 TABLET ORAL at 03:49

## 2024-09-27 RX ADMIN — HEPARIN SODIUM 5000 UNITS: 5000 INJECTION, SOLUTION INTRAVENOUS; SUBCUTANEOUS at 22:49

## 2024-09-27 RX ADMIN — Medication 2 L/MIN: at 08:21

## 2024-09-27 RX ADMIN — HEPARIN SODIUM 5000 UNITS: 5000 INJECTION, SOLUTION INTRAVENOUS; SUBCUTANEOUS at 06:50

## 2024-09-27 RX ADMIN — METOPROLOL TARTRATE 12.5 MG: 25 TABLET, FILM COATED ORAL at 08:19

## 2024-09-27 RX ADMIN — ACETAMINOPHEN 650 MG: 325 TABLET, FILM COATED ORAL at 20:33

## 2024-09-27 ASSESSMENT — COGNITIVE AND FUNCTIONAL STATUS - GENERAL
TOILETING: A LITTLE
DRESSING REGULAR LOWER BODY CLOTHING: A LITTLE
MOVING FROM LYING ON BACK TO SITTING ON SIDE OF FLAT BED WITH BEDRAILS: A LITTLE
STANDING UP FROM CHAIR USING ARMS: A LITTLE
MOVING TO AND FROM BED TO CHAIR: A LITTLE
WALKING IN HOSPITAL ROOM: A LITTLE
MOBILITY SCORE: 20
CLIMB 3 TO 5 STEPS WITH RAILING: A LITTLE
TOILETING: A LITTLE
WALKING IN HOSPITAL ROOM: A LITTLE
MOVING TO AND FROM BED TO CHAIR: A LITTLE
MOBILITY SCORE: 18
HELP NEEDED FOR BATHING: A LITTLE
DRESSING REGULAR UPPER BODY CLOTHING: A LITTLE
DAILY ACTIVITIY SCORE: 20
CLIMB 3 TO 5 STEPS WITH RAILING: A LITTLE
HELP NEEDED FOR BATHING: A LITTLE
MOBILITY SCORE: 20
STANDING UP FROM CHAIR USING ARMS: A LITTLE
WALKING IN HOSPITAL ROOM: A LITTLE
TURNING FROM BACK TO SIDE WHILE IN FLAT BAD: A LITTLE
STANDING UP FROM CHAIR USING ARMS: A LITTLE
DAILY ACTIVITIY SCORE: 20
MOVING TO AND FROM BED TO CHAIR: A LITTLE
DRESSING REGULAR LOWER BODY CLOTHING: A LITTLE
DRESSING REGULAR UPPER BODY CLOTHING: A LITTLE
CLIMB 3 TO 5 STEPS WITH RAILING: A LITTLE

## 2024-09-27 ASSESSMENT — PAIN - FUNCTIONAL ASSESSMENT
PAIN_FUNCTIONAL_ASSESSMENT: 0-10

## 2024-09-27 ASSESSMENT — PAIN DESCRIPTION - LOCATION
LOCATION: CHEST
LOCATION: BACK

## 2024-09-27 ASSESSMENT — PAIN SCALES - GENERAL
PAINLEVEL_OUTOF10: 5 - MODERATE PAIN
PAINLEVEL_OUTOF10: 0 - NO PAIN
PAINLEVEL_OUTOF10: 5 - MODERATE PAIN
PAINLEVEL_OUTOF10: 5 - MODERATE PAIN
PAINLEVEL_OUTOF10: 0 - NO PAIN
PAINLEVEL_OUTOF10: 0 - NO PAIN

## 2024-09-27 NOTE — CARE PLAN
The patient's goals for the shift include      The clinical goals for the shift include Patient will remain safe throughout the shift.    Patient will be hemodynamically stable throughout the shift.

## 2024-09-27 NOTE — PROGRESS NOTES
Physical Therapy    Physical Therapy Treatment    Patient Name: David Chatman  MRN: [id number]  Department: [place] [address]  Room: [address] [place] [address]  Today's Date: 9/27/2024  Time Calculation  Start Time: 1130  Stop Time: 1154  Time Calculation (min): 24 min      Assessment/Plan     PT Assessment  Evaluation/Treatment Tolerance: Patient tolerated treatment well  Medical Staff Made Aware: Yes  End of Session Communication: Bedside nurse    Assessment Comment: Pt able to progress from use of thoracic walker last session to FWW this session. Able to walk ~300 ft with FWW and CGA. Completed standing balance activities with pt displaying no LOB. Vitals remained stable throughout session. Pt would continue to benefit from inpatient PT to improve functional mobility status.    End of Session Patient Position: Up in chair, Alarm off, not on at start of session  PT Plan  Inpatient/Swing Bed or Outpatient: Inpatient  PT Plan  Treatment/Interventions: Bed mobility, Transfer training, Gait training, Stair training, Balance training, Strengthening, Endurance training, Therapeutic exercise, Therapeutic activity  PT Plan: Ongoing PT  PT Frequency: 3 times per week  PT Discharge Recommendations: Low intensity level of continued care  PT Recommended Transfer Status: Assist x1, Assistive device  PT - OK to Discharge: Yes    General Visit Information:   PT  Visit  PT Received On: 09/27/24  Response to Previous Treatment: Patient with no complaints from previous session.  General  Missed Visit: No  Family/Caregiver Present: No  Prior to Session Communication: Bedside nurse  Patient Position Received: Up in chair, Alarm off, not on at start of session  General Comment: Pt awake, alert, and willing to participate in PT treatment. Vitals remained stable throughout session. (Wound vac to chest, tele, external pacer (not on))      Subjective     Precautions:  Precautions  Medical Precautions: Cardiac precautions, Fall precautions  Post-Surgical  Precautions: Move in the Tube  Precautions Comment: MAP 65-90, SBP <140, SpO2 >92%     09/27/24 1130   Vital Signs   Vitals Session Pre PT   Heart Rate 76   Heart Rate Source Monitor   Resp 14   SpO2 96 %   /78   MAP (mmHg) 86   BP Location Right arm   BP Method Automatic   Patient Position Sitting      09/27/24 1154   Vital Signs   Vitals Session Post PT   Heart Rate 79   Heart Rate Source Monitor   Resp 20   SpO2 95 %   /62   MAP (mmHg) 74   BP Location Right arm   BP Method Automatic   Patient Position Sitting   Vital Signs Comment No significant change in vitals this session.       Objective     Pain:  Pain Assessment  Pain Assessment: 0-10  0-10 (Numeric) Pain Score: 5 - Moderate pain  Pain Type: Acute pain  Pain Location: Back    Cognition:  Cognition  Overall Cognitive Status: Within Functional Limits  Orientation Level: Oriented X4    Treatments:  Therapeutic Exercise  Therapeutic Exercise Performed: Yes  Therapeutic Exercise Activity 1: Seated LAQs x10 B    Therapeutic Activity  Therapeutic Activity Performed: Yes  Therapeutic Activity 1: Seated rest breaks to manage fatigue between ambulation trials and standing balance activities.    Balance/Neuromuscular Re-Education  Balance/Neuromuscular Re-Education Activity Performed: Yes  Balance/Neuromuscular Re-Education Activity 1: Standing feet shoulder width apart, eyes open x30 seconds, no significant sway or LOB  Balance/Neuromuscular Re-Education Activity 2: Standing feet together, eyes open x30 seconds, no significant sway or LOB  Balance/Neuromuscular Re-Education Activity 3: Standing feet shoulder width apart, eyes closed x30 seconds, no significant sway or LOB  Balance/Neuromuscular Re-Education Activity 4: Standing feet together, eyes closed x30 seconds, no significant sway or LOB  Balance/Neuromuscular Re-Education Activity 5: Standing marches x20 B, with hands hovering above FWW    Ambulation/Gait Training  Ambulation/Gait Training  Performed: Yes  Ambulation/Gait Training 1  Surface 1: Level tile  Device 1: Rolling walker  Assistance 1: Contact guard  Quality of Gait 1: Decreased step length  Comments/Distance (ft) 1: ~300 ft, assist for line management, verbal cues to keep FWW close    Transfers  Transfer: Yes  Transfer 1  Transfer From 1: Sit to  Transfer to 1: Stand  Technique 1: Sit to stand  Transfer Device 1: Walker  Transfer Level of Assistance 1: Minimum assistance  Trials/Comments 1: Carito for lift to stand  Transfers 2  Transfer From 2: Stand to  Transfer to 2: Sit  Technique 2: Stand to sit  Transfer Device 2: Walker  Transfer Level of Assistance 2: Contact guard  Trials/Comments 2: CGA for safety  Transfers 3  Transfer From 3: Sit to, Stand to  Transfer to 3: Stand, Sit  Technique 3: Sit to stand, Stand to sit  Transfer Device 3: Walker  Transfer Level of Assistance 3: Contact guard    Outcome Measures:  [facility] Basic Mobility  Turning from your back to your side while in a flat bed without using bedrails: A little  Moving from lying on your back to sitting on the side of a flat bed without using bedrails: A little  Moving to and from bed to chair (including a wheelchair): A little  Standing up from a chair using your arms (e.g. wheelchair or bedside chair): A little  To walk in hospital room: A little  Climbing 3-5 steps with railing: A little  Basic Mobility - Total Score: 18    Education Documentation  Handouts, taught by ADRI Maldonado at 9/27/2024  2:11 PM.  Learner: Patient  Readiness: Acceptance  Method: Explanation  Response: Verbalizes Understanding    Precautions, taught by ADRI Maldonado at 9/27/2024  2:11 PM.  Learner: Patient  Readiness: Acceptance  Method: Explanation  Response: Verbalizes Understanding    Body Mechanics, taught by ADRI Maldonado at 9/27/2024  2:11 PM.  Learner: Patient  Readiness: Acceptance  Method: Explanation  Response: Verbalizes Understanding    Mobility Training, taught by  Rosalio Whitfield, S-PT at 9/27/2024  2:11 PM.  Learner: Patient  Readiness: Acceptance  Method: Explanation  Response: Verbalizes Understanding    Education Comments  No comments found.      Encounter Problems       Encounter Problems (Active)       Balance       Pt will demonstrated ability to score at least 24/28 on the Tinetti balance assessment tool to ensure safety upon D/C.  (Progressing)       Start:  09/24/24    Expected End:  10/08/24               Mobility       Pt will demonstrated ability to ambulate >/=250ft with proper form and no balance deficits for safe home going.   (Progressing)       Start:  09/24/24    Expected End:  10/08/24            Pt will demonstrate ability to ascend/descend 1 flight of stairs with unilateral rail and no balance deficits for safe home going.  (Progressing)       Start:  09/24/24    Expected End:  10/08/24               PT Transfers       Pt will demonstrate ability to complete 5X STS in < 12 sec with good from and consistency between transfers for safe D/C.  (Progressing)       Start:  09/24/24    Expected End:  10/08/24

## 2024-09-27 NOTE — PROGRESS NOTES
"CARDIAC SURGERY DAILY PROGRESS NOTE    David Chatman is a 68 y.o. male presenting with shortness of breath. He presented to his cardiologist as an outpt for these complaints. Echo showed an EF of 35-40% and ascending aneurysm 6.2. He was directed to the ED. BNP 1015, troponins, 21,16,15. CT chest revealed large ascending aneurysm up to 6.2 cm. No evidence of acute aortic dissection. He was given IV lasix with relief of symptoms and admitted for further evaluation. He is currently planned for cardiac catheterization. Asymptomatic. The pt reports his blood pressures are under control currently. CT surgery consulted for aneurysm evaluation at OSH and [place] [facility] [place].    9/23/2024 Operation Procedures: by Trino Perdomo  #1 standard median sternotomy  #2 innominate trunk arterial cannulation  #3 ascending aortic replacement and hemiarch replacement with a 32 Gelweave graft  #4 aortic root replacement and aortic valve replacement: Bentall procedure with a 29 mm connect valve conduit  #5 left atrial appendage closure with a 35 mm AtriCure clip  #6 left femoral arterial line placement    Echo Pre/Post: Normal RV, LVEF 30% w septal dyskinesis  Chest Tubes/Drains: 2 mediastinal chest tubes, 2 pleural chest tubes   Temporary wires location/setting: A and V wires     CTICU course: weaned off drips; afib RVR req amio    Transferred to T3 SDU on 9/26    Interval History:   No overnight events    SUBJECTIVE:  On 2L NC this am    Objective   /83 (BP Location: Right arm, Patient Position: Sitting)   Pulse 83   Temp 36.3 °C (97.3 °F) (Temporal)   Resp 21   Ht 1.854 m (6' 1\")   Wt 114 kg (251 lb 5.2 oz)   SpO2 94%   BMI 33.16 kg/m²   0-10 (Numeric) Pain Score: 5 - Moderate pain   3 Day Weight Change: Unable to Calculate    Intake and Output    Intake/Output Summary (Last 24 hours) at 9/27/2024 1831  Last data filed at 9/27/2024 1529  Gross per 24 hour   Intake 320 ml   Output 1025 ml   Net " -705 ml       Physical Exam  Physical Exam  Constitutional:       Appearance: Normal appearance.      Comments: Up at the side of the bed during exam   HENT:      Head: Normocephalic and atraumatic.      Mouth/Throat:      Mouth: Mucous membranes are moist.   Eyes:      Conjunctiva/sclera: Conjunctivae normal.   Cardiovascular:      Rate and Rhythm: Normal rate and regular rhythm.      Pulses: Normal pulses.      Comments: Afib HR 100s  Pulmonary:      Effort: Pulmonary effort is normal.      Breath sounds: Normal breath sounds.      Comments: On 2L NC  Abdominal:      General: Bowel sounds are normal.      Palpations: Abdomen is soft.   Musculoskeletal:         General: Normal range of motion.      Cervical back: Neck supple.   Skin:     General: Skin is warm and dry.      Capillary Refill: Capillary refill takes less than 2 seconds.      Comments: midsternal chest incision C/D/I, well approximated, no s/s infection    Neurological:      General: No focal deficit present.      Mental Status: He is alert and oriented to person, place, and time.   Psychiatric:         Mood and Affect: Mood normal.         Behavior: Behavior normal.       Medications  Scheduled medications  acetaminophen, 650 mg, oral, q8h DESHAWN  amiodarone, 400 mg, oral, BID  aspirin, 81 mg, oral, Daily  atorvastatin, 20 mg, oral, Nightly  [Held by provider] cloNIDine, 0.1 mg, oral, q12h DESHAWN  heparin (porcine), 5,000 Units, subcutaneous, q8h  insulin lispro, 0-5 Units, subcutaneous, TID AC  iron polysaccharides, 150 mg, oral, Daily  metoprolol tartrate, 12.5 mg, oral, BID  multivitamin with minerals, 1 tablet, oral, Daily  oxygen, , inhalation, Continuous - 02/gases  pantoprazole, 20 mg, oral, Daily before breakfast  polyethylene glycol, 17 g, oral, Daily  sennosides-docusate sodium, 2 tablet, oral, BID  terazosin, 5 mg, oral, Nightly    Continuous medications   PRN medications  PRN medications: bisacodyl, glucagon, glucagon, hydrALAZINE, naloxone,  oxyCODONE    Labs  Results for orders placed or performed during the hospital encounter of 09/11/24 (from the past 24 hour(s))   POCT GLUCOSE   Result Value Ref Range    POCT Glucose 112 (H) 74 - 99 mg/dL   CBC   Result Value Ref Range    WBC 10.1 4.4 - 11.3 x10*3/uL    nRBC 0.0 0.0 - 0.0 /100 WBCs    RBC 3.85 (L) 4.50 - 5.90 x10*6/uL    Hemoglobin 11.0 (L) 13.5 - 17.5 g/dL    Hematocrit 33.2 (L) 41.0 - 52.0 %    MCV 86 80 - 100 fL    MCH 28.6 26.0 - 34.0 pg    MCHC 33.1 32.0 - 36.0 g/dL    RDW 14.4 11.5 - 14.5 %    Platelets 118 (L) 150 - 450 x10*3/uL   Magnesium   Result Value Ref Range    Magnesium 2.68 (H) 1.60 - 2.40 mg/dL   Renal Function Panel   Result Value Ref Range    Glucose 85 74 - 99 mg/dL    Sodium 133 (L) 136 - 145 mmol/L    Potassium 4.1 3.5 - 5.3 mmol/L    Chloride 98 98 - 107 mmol/L    Bicarbonate 25 21 - 32 mmol/L    Anion Gap 14 10 - 20 mmol/L    Urea Nitrogen 56 (H) 6 - 23 mg/dL    Creatinine 1.54 (H) 0.50 - 1.30 mg/dL    eGFR 49 (L) >60 mL/min/1.73m*2    Calcium 8.7 8.6 - 10.6 mg/dL    Phosphorus 3.6 2.5 - 4.9 mg/dL    Albumin 3.6 3.4 - 5.0 g/dL   POCT GLUCOSE   Result Value Ref Range    POCT Glucose 95 74 - 99 mg/dL   POCT GLUCOSE   Result Value Ref Range    POCT Glucose 101 (H) 74 - 99 mg/dL   POCT GLUCOSE   Result Value Ref Range    POCT Glucose 113 (H) 74 - 99 mg/dL         Imaging:    XR chest 1 view 9/27/2024  1.  Postoperative atelectasis without pneumothorax.       Transthoracic Echo (TTE) Limited 9/25/2024  1. Poorly visualized anatomical structures due to suboptimal image quality.    2. Left ventricular ejection fraction is severely decreased, calculated by Vasquez's biplane at 22%.  3. There is global hypokinesis of the left ventricle with minor regional variations.    4. Spectral Doppler shows a restrictive pattern of left ventricular diastolic filling.    5. Left ventricular cavity size is severely dilated.    6. Abnormal septal motion consistent with post-operative status.    7.  No left ventricular thrombus visualized.    8. There is reduced right ventricular systolic function.    9. The left atrium is enlarged.   10. The right atrium is mild to moderately dilated.   11. Right ventricular systolic pressure is within normal limits.   12. S/p 29mm Konect Resilia bioprosthetic valved conduit. AVR gradients were not assessed by spectral Doppler though no obvious AI by color Doppler   13. S/p 32mm Gelweave ascending aortic conduit and hemiarch.   14. Compared with the prior exam, preop echo done on 9/20/2024 [facility], the LV was already severely dilated with moderately reduced function with a severely dilated aorta with moderate AI and the patient is now s/p acending aortic and arch replacement and Konect AVR. The LV remains severely dilated with a reducetion in LV systolic function , now severely reduced. The AVR gradients were not assessed today but there is no AI.     IMPRESSION & PLAN:  POD # 4 s/p  ascending aortic replacement and hemiarch replacement with a 32 Gelweave graft, aortic root replacement and aortic valve replacement: Bentall procedure with a 29 mm connect valve conduit,  left atrial appendage closure with a 35 mm AtriCure clip  - Increase activity/ ambulation; PT/OT  - Encourage IS, C/DB; respiratory therapy; wean O2 as aroldo   - Cardiac rehab referral   - Continue cardiac meds: ASA, BB, statin , amiodarone  - Pain and anticonstipation meds  - chest tubes removed 9/26  - 2v CXR prior to discharge   - Postop echo pending  - Remove epicardial wires prior to discharge   - Tele until discharge  - Optimize nutrition and electrolytes    Rhythm  - Tele: afib HR 90s  AV pacer backup rate VVI 50  - Continue BB  - was started on Amio gtt, now on Amio 400 mg BID  - Adjust medications as tolerated      Acute on chronic systolic heart failure with EF 35-40% % on echo done 9/25  -Heart Failure consulted, preop  -will need optimization/ GDMT prior to discharge   -Follow up with  HF team     Acute Blood Loss Anemia   Recent Labs     09/27/24  0705 09/26/24  0008 09/25/24  0324 09/24/24  0525 09/23/24  1444 09/21/24  0651 09/17/24  0603   HGB 11.0* 10.3* 11.1* 10.1* 11.8* 11.5* 11.1*   HCT 33.2* 31.7* 33.2* 31.5* 37.0* 35.7* 34.4*   - MV, PO Iron x1mo  - Daily labs, transfuse as indicated    Thrombocytopenia  Recent Labs     09/27/24  0705 09/26/24  0008 09/25/24  0324 09/24/24  0525 09/23/24  1444 09/21/24  0651 09/17/24  0603   * 80* 89* 88* 131* 124* 119*   - Etiology likely postop/CPB related  - Continue to trend with daily CBCs    Volume/Electrolyte Status: Preop wt Weight: 111 kg (245 lb 1.6 oz)   Vitals:    09/23/24 0537   Weight: 114 kg (251 lb 5.2 oz)     - Weight: 114 kg  - Adjust diuresis as needed for postop cardiac surgery hypervolemia  -gave 40 IV Lasix 9/27  - Replete electrolytes for hypokalemia/hypomagnesemia/hypophosphatemia as needed   - Daily weights and strict I&Os  - Daily RFP while admitted    Hx BPH   -todd   Home Terazosin     SHOAIB  -aggressive bronchial hygiene  -wean O2 as tolerated, on 2L NC  -ABGs PRN  -continue home CPAP       VTE Prophylaxis: SCDs/TEDs, ambulation, SQ heparin  Code Status: Full Code    Dispo  - PT/OT recs low intensity  - Would benefit from homecare for cardiac surgery carepath and RN visits  - Anticipate discharge likely early next week, pending volume status, rhythm control  - Will continue to assess discharge needs      SERGIO Seo-CNP  Cardiac Surgery [facility]  [facility]  Team Phone [phone]    9/27/2024  6:31 PM

## 2024-09-27 NOTE — SIGNIFICANT EVENT
Rapid Response RN Note    Rapid response RN at bedside for RADAR score 7 due to the following VS: T 36.9 °C; HR 86; RR 22; /84; SPO2 93%.    Reviewed above VS with bedside RN.  VS within patient's current trends.  Patient denied pain, shortness of breath, dizziness or lightheadedness.  No interventions by rapid response team indicated at this time.      Staff to page rapid response for any concerns or acute change in condition/VS. Rosalio Naik RN.

## 2024-09-28 LAB
ALBUMIN SERPL BCP-MCNC: 3.4 G/DL (ref 3.4–5)
ANION GAP SERPL CALC-SCNC: 15 MMOL/L (ref 10–20)
BUN SERPL-MCNC: 49 MG/DL (ref 6–23)
CALCIUM SERPL-MCNC: 8.4 MG/DL (ref 8.6–10.6)
CHLORIDE SERPL-SCNC: 98 MMOL/L (ref 98–107)
CO2 SERPL-SCNC: 25 MMOL/L (ref 21–32)
CREAT SERPL-MCNC: 1.39 MG/DL (ref 0.5–1.3)
EGFRCR SERPLBLD CKD-EPI 2021: 56 ML/MIN/1.73M*2
ERYTHROCYTE [DISTWIDTH] IN BLOOD BY AUTOMATED COUNT: 14.1 % (ref 11.5–14.5)
GLUCOSE BLD MANUAL STRIP-MCNC: 111 MG/DL (ref 74–99)
GLUCOSE BLD MANUAL STRIP-MCNC: 125 MG/DL (ref 74–99)
GLUCOSE SERPL-MCNC: 90 MG/DL (ref 74–99)
HCT VFR BLD AUTO: 32.1 % (ref 41–52)
HGB BLD-MCNC: 10.7 G/DL (ref 13.5–17.5)
MAGNESIUM SERPL-MCNC: 2.48 MG/DL (ref 1.6–2.4)
MCH RBC QN AUTO: 28.5 PG (ref 26–34)
MCHC RBC AUTO-ENTMCNC: 33.3 G/DL (ref 32–36)
MCV RBC AUTO: 85 FL (ref 80–100)
NRBC BLD-RTO: 0 /100 WBCS (ref 0–0)
PHOSPHATE SERPL-MCNC: 3.3 MG/DL (ref 2.5–4.9)
PLATELET # BLD AUTO: 116 X10*3/UL (ref 150–450)
POTASSIUM SERPL-SCNC: 3.6 MMOL/L (ref 3.5–5.3)
RBC # BLD AUTO: 3.76 X10*6/UL (ref 4.5–5.9)
SODIUM SERPL-SCNC: 134 MMOL/L (ref 136–145)
WBC # BLD AUTO: 7.5 X10*3/UL (ref 4.4–11.3)

## 2024-09-28 PROCEDURE — 2500000004 HC RX 250 GENERAL PHARMACY W/ HCPCS (ALT 636 FOR OP/ED): Performed by: NURSE PRACTITIONER

## 2024-09-28 PROCEDURE — 2500000005 HC RX 250 GENERAL PHARMACY W/O HCPCS: Performed by: NURSE PRACTITIONER

## 2024-09-28 PROCEDURE — 2500000001 HC RX 250 WO HCPCS SELF ADMINISTERED DRUGS (ALT 637 FOR MEDICARE OP): Performed by: NURSE PRACTITIONER

## 2024-09-28 PROCEDURE — 1200000002 HC GENERAL ROOM WITH TELEMETRY DAILY

## 2024-09-28 PROCEDURE — 85027 COMPLETE CBC AUTOMATED: CPT | Performed by: NURSE PRACTITIONER

## 2024-09-28 PROCEDURE — 2500000002 HC RX 250 W HCPCS SELF ADMINISTERED DRUGS (ALT 637 FOR MEDICARE OP, ALT 636 FOR OP/ED): Performed by: NURSE PRACTITIONER

## 2024-09-28 PROCEDURE — 36415 COLL VENOUS BLD VENIPUNCTURE: CPT | Performed by: NURSE PRACTITIONER

## 2024-09-28 PROCEDURE — 99223 1ST HOSP IP/OBS HIGH 75: CPT | Performed by: NURSE PRACTITIONER

## 2024-09-28 PROCEDURE — 82947 ASSAY GLUCOSE BLOOD QUANT: CPT

## 2024-09-28 PROCEDURE — 80069 RENAL FUNCTION PANEL: CPT | Performed by: NURSE PRACTITIONER

## 2024-09-28 PROCEDURE — 83735 ASSAY OF MAGNESIUM: CPT | Performed by: NURSE PRACTITIONER

## 2024-09-28 RX ORDER — POTASSIUM CHLORIDE 20 MEQ/1
60 TABLET, EXTENDED RELEASE ORAL ONCE
Status: COMPLETED | OUTPATIENT
Start: 2024-09-28 | End: 2024-09-28

## 2024-09-28 RX ORDER — FUROSEMIDE 10 MG/ML
40 INJECTION INTRAMUSCULAR; INTRAVENOUS ONCE
Status: COMPLETED | OUTPATIENT
Start: 2024-09-28 | End: 2024-09-28

## 2024-09-28 RX ORDER — OXYCODONE HYDROCHLORIDE 10 MG/1
10 TABLET ORAL ONCE
Status: COMPLETED | OUTPATIENT
Start: 2024-09-29 | End: 2024-09-29

## 2024-09-28 RX ADMIN — AMIODARONE HYDROCHLORIDE 400 MG: 200 TABLET ORAL at 08:17

## 2024-09-28 RX ADMIN — HEPARIN SODIUM 5000 UNITS: 5000 INJECTION, SOLUTION INTRAVENOUS; SUBCUTANEOUS at 06:36

## 2024-09-28 RX ADMIN — OXYCODONE HYDROCHLORIDE 5 MG: 5 TABLET ORAL at 02:24

## 2024-09-28 RX ADMIN — METOPROLOL TARTRATE 12.5 MG: 25 TABLET, FILM COATED ORAL at 20:18

## 2024-09-28 RX ADMIN — ACETAMINOPHEN 650 MG: 325 TABLET, FILM COATED ORAL at 14:11

## 2024-09-28 RX ADMIN — POLYSACCHARIDE-IRON COMPLEX 150 MG: 150 CAPSULE ORAL at 08:17

## 2024-09-28 RX ADMIN — PANTOPRAZOLE SODIUM 20 MG: 20 TABLET, DELAYED RELEASE ORAL at 06:36

## 2024-09-28 RX ADMIN — Medication 2 L/MIN: at 20:24

## 2024-09-28 RX ADMIN — HEPARIN SODIUM 5000 UNITS: 5000 INJECTION, SOLUTION INTRAVENOUS; SUBCUTANEOUS at 14:11

## 2024-09-28 RX ADMIN — ACETAMINOPHEN 650 MG: 325 TABLET, FILM COATED ORAL at 06:36

## 2024-09-28 RX ADMIN — TERAZOSIN HYDROCHLORIDE 5 MG: 5 CAPSULE ORAL at 20:18

## 2024-09-28 RX ADMIN — METOPROLOL TARTRATE 12.5 MG: 25 TABLET, FILM COATED ORAL at 08:17

## 2024-09-28 RX ADMIN — ASPIRIN 81 MG: 81 TABLET, COATED ORAL at 08:17

## 2024-09-28 RX ADMIN — ACETAMINOPHEN 650 MG: 325 TABLET, FILM COATED ORAL at 23:12

## 2024-09-28 RX ADMIN — FUROSEMIDE 40 MG: 10 INJECTION, SOLUTION INTRAVENOUS at 16:30

## 2024-09-28 RX ADMIN — SENNOSIDES AND DOCUSATE SODIUM 2 TABLET: 8.6; 5 TABLET ORAL at 08:17

## 2024-09-28 RX ADMIN — POTASSIUM CHLORIDE 60 MEQ: 1500 TABLET, EXTENDED RELEASE ORAL at 16:31

## 2024-09-28 RX ADMIN — OXYCODONE HYDROCHLORIDE 5 MG: 5 TABLET ORAL at 20:19

## 2024-09-28 RX ADMIN — HEPARIN SODIUM 5000 UNITS: 5000 INJECTION, SOLUTION INTRAVENOUS; SUBCUTANEOUS at 23:11

## 2024-09-28 RX ADMIN — Medication 1 TABLET: at 08:17

## 2024-09-28 RX ADMIN — AMIODARONE HYDROCHLORIDE 400 MG: 200 TABLET ORAL at 20:18

## 2024-09-28 RX ADMIN — ATORVASTATIN CALCIUM 20 MG: 20 TABLET, FILM COATED ORAL at 20:18

## 2024-09-28 ASSESSMENT — COGNITIVE AND FUNCTIONAL STATUS - GENERAL
HELP NEEDED FOR BATHING: A LITTLE
DRESSING REGULAR LOWER BODY CLOTHING: A LITTLE
MOVING TO AND FROM BED TO CHAIR: A LITTLE
MOBILITY SCORE: 20
CLIMB 3 TO 5 STEPS WITH RAILING: A LITTLE
DAILY ACTIVITIY SCORE: 21
STANDING UP FROM CHAIR USING ARMS: A LITTLE
DRESSING REGULAR UPPER BODY CLOTHING: A LITTLE
WALKING IN HOSPITAL ROOM: A LITTLE

## 2024-09-28 ASSESSMENT — PAIN DESCRIPTION - LOCATION
LOCATION: CHEST
LOCATION: CHEST

## 2024-09-28 ASSESSMENT — PAIN SCALES - GENERAL
PAINLEVEL_OUTOF10: 0 - NO PAIN
PAINLEVEL_OUTOF10: 5 - MODERATE PAIN
PAINLEVEL_OUTOF10: 4

## 2024-09-28 ASSESSMENT — PAIN - FUNCTIONAL ASSESSMENT: PAIN_FUNCTIONAL_ASSESSMENT: 0-10

## 2024-09-28 ASSESSMENT — PAIN SCALES - PAIN ASSESSMENT IN ADVANCED DEMENTIA (PAINAD): TOTALSCORE: MEDICATION (SEE MAR)

## 2024-09-28 NOTE — CARE PLAN
Problem: Safety - Adult  Goal: Free from fall injury  Outcome: Progressing     Problem: Discharge Planning  Goal: Discharge to home or other facility with appropriate resources  Outcome: Progressing     Problem: Chronic Conditions and Co-morbidities  Goal: Patient's chronic conditions and co-morbidity symptoms are monitored and maintained or improved  Outcome: Progressing     Problem: Fall/Injury  Goal: Not fall by end of shift  Outcome: Progressing  Goal: Be free from injury by end of the shift  Outcome: Progressing  Goal: Verbalize understanding of personal risk factors for fall in the hospital  Outcome: Progressing  Goal: Verbalize understanding of risk factor reduction measures to prevent injury from fall in the home  Outcome: Progressing  Goal: Use assistive devices by end of the shift  Outcome: Progressing  Goal: Pace activities to prevent fatigue by end of the shift  Outcome: Progressing     Problem: Safety - Medical Restraint  Goal: Free from restraint(s) (Restraint for Interference with Medical Device)  Outcome: Progressing     Problem: Skin  Goal: Decreased wound size/increased tissue granulation at next dressing change  Outcome: Progressing  Goal: Participates in plan/prevention/treatment measures  Outcome: Progressing  Goal: Prevent/manage excess moisture  Outcome: Progressing  Goal: Prevent/minimize sheer/friction injuries  Outcome: Progressing  Goal: Promote/optimize nutrition  Outcome: Progressing  Goal: Promote skin healing  Outcome: Progressing       The clinical goals for the shift include Patient will remain safe and free from falls throughout shift.    Jennie Matamoros RN

## 2024-09-28 NOTE — CARE PLAN
The patient's goals for the shift include      The clinical goals for the shift include Patient will remain hemodynamically stable,    Patient will remain safe throughout the shift.

## 2024-09-28 NOTE — PROGRESS NOTES
"CARDIAC SURGERY DAILY PROGRESS NOTE    David Chatman is a 68 y.o. male presenting with shortness of breath. He presented to his cardiologist as an outpt for these complaints. Echo showed an EF of 35-40% and ascending aneurysm 6.2. He was directed to the ED. BNP 1015, troponins, 21,16,15. CT chest revealed large ascending aneurysm up to 6.2 cm. No evidence of acute aortic dissection. He was given IV lasix with relief of symptoms and admitted for further evaluation. He is currently planned for cardiac catheterization. Asymptomatic. The pt reports his blood pressures are under control currently. CT surgery consulted for aneurysm evaluation at OSH and he was transferred to [facility] [place].    9/23/2024 Operation Procedures: by Trino Perdomo  #1 standard median sternotomy  #2 innominate trunk arterial cannulation  #3 ascending aortic replacement and hemiarch replacement with a 32 Gelweave graft  #4 aortic root replacement and aortic valve replacement: Bentall procedure with a 29 mm connect valve conduit  #5 left atrial appendage closure with a 35 mm AtriCure clip  #6 left femoral arterial line placement    CTICU course: weaned off drips; afib RVR req amio    Transferred to T3 SDU on 9/26  ========================    Interval History:   No  issues  overnight     SUBJECTIVE:  No sleeping overnight     Objective   /66 (BP Location: Right arm, Patient Position: Sitting)   Pulse 68   Temp 36.4 °C (97.5 °F) (Temporal)   Resp 19   Ht 1.854 m (6' 1\")   Wt 114 kg (251 lb 5.2 oz)   SpO2 95%   BMI 33.16 kg/m²   0-10 (Numeric) Pain Score: 0 - No pain   3 Day Weight Change: Unable to Calculate    Intake and Output    Intake/Output Summary (Last 24 hours) at 9/28/2024 1605  Last data filed at 9/28/2024 1257  Gross per 24 hour   Intake 720 ml   Output 1450 ml   Net -730 ml       Physical Exam  Physical Exam  Vitals and nursing note reviewed.   Constitutional:       Appearance: Normal appearance.   Eyes:      " Conjunctiva/sclera: Conjunctivae normal.   Cardiovascular:      Rate and Rhythm: Normal rate and regular rhythm.      Pulses: Normal pulses.      Heart sounds: Normal heart sounds.      Comments: NSR    Epicardial wires capped   Pulmonary:      Effort: Pulmonary effort is normal.      Breath sounds: Normal breath sounds.      Comments:  Diminished LS at the base    Sternum stable   Abdominal:      Palpations: Abdomen is soft.      Comments: + BM    Genitourinary:     Comments: Voids independently   Musculoskeletal:         General: Normal range of motion.   Skin:     General: Skin is warm and dry.      Capillary Refill: Capillary refill takes less than 2 seconds.      Comments: M[facility] [place]    Neurological:      General: No focal deficit present.      Mental Status: He is alert and oriented to person, place, and time.   Psychiatric:         Mood and Affect: Mood normal.         Behavior: Behavior normal.       Medications  Scheduled medications  acetaminophen, 650 mg, oral, q8h DESHAWN  amiodarone, 400 mg, oral, BID  aspirin, 81 mg, oral, Daily  atorvastatin, 20 mg, oral, Nightly  [Held by provider] cloNIDine, 0.1 mg, oral, q12h DESHAWN  furosemide, 40 mg, intravenous, Once  heparin (porcine), 5,000 Units, subcutaneous, q8h  insulin lispro, 0-5 Units, subcutaneous, TID AC  iron polysaccharides, 150 mg, oral, Daily  metoprolol tartrate, 12.5 mg, oral, BID  multivitamin with minerals, 1 tablet, oral, Daily  oxygen, , inhalation, Continuous - 02/gases  pantoprazole, 20 mg, oral, Daily before breakfast  polyethylene glycol, 17 g, oral, Daily  potassium chloride CR, 60 mEq, oral, Once  sennosides-docusate sodium, 2 tablet, oral, BID  terazosin, 5 mg, oral, Nightly    Continuous medications   PRN medications  PRN medications: bisacodyl, glucagon, glucagon, hydrALAZINE, naloxone, oxyCODONE    Labs  Results for orders placed or performed during the hospital encounter of 09/11/24 (from the past 24 hour(s))   POCT GLUCOSE   Result  Value Ref Range    POCT Glucose 113 (H) 74 - 99 mg/dL   POCT GLUCOSE   Result Value Ref Range    POCT Glucose 111 (H) 74 - 99 mg/dL   Magnesium   Result Value Ref Range    Magnesium 2.48 (H) 1.60 - 2.40 mg/dL   CBC   Result Value Ref Range    WBC 7.5 4.4 - 11.3 x10*3/uL    nRBC 0.0 0.0 - 0.0 /100 WBCs    RBC 3.76 (L) 4.50 - 5.90 x10*6/uL    Hemoglobin 10.7 (L) 13.5 - 17.5 g/dL    Hematocrit 32.1 (L) 41.0 - 52.0 %    MCV 85 80 - 100 fL    MCH 28.5 26.0 - 34.0 pg    MCHC 33.3 32.0 - 36.0 g/dL    RDW 14.1 11.5 - 14.5 %    Platelets 116 (L) 150 - 450 x10*3/uL   Renal Function Panel   Result Value Ref Range    Glucose 90 74 - 99 mg/dL    Sodium 134 (L) 136 - 145 mmol/L    Potassium 3.6 3.5 - 5.3 mmol/L    Chloride 98 98 - 107 mmol/L    Bicarbonate 25 21 - 32 mmol/L    Anion Gap 15 10 - 20 mmol/L    Urea Nitrogen 49 (H) 6 - 23 mg/dL    Creatinine 1.39 (H) 0.50 - 1.30 mg/dL    eGFR 56 (L) >60 mL/min/1.73m*2    Calcium 8.4 (L) 8.6 - 10.6 mg/dL    Phosphorus 3.3 2.5 - 4.9 mg/dL    Albumin 3.4 3.4 - 5.0 g/dL   POCT GLUCOSE   Result Value Ref Range    POCT Glucose 125 (H) 74 - 99 mg/dL   POCT GLUCOSE   Result Value Ref Range    POCT Glucose 111 (H) 74 - 99 mg/dL           IMAGING/ DIAGNOSTIC TESTING:  I have personally reviewed the following test result(s):    XR CHEST 1 VIEW; 9/27/2024 4:49 am    FINDINGS:  Interval removal of right IJ line.      CARDIOMEDIASTINAL SILHOUETTE:  Patient is status post median sternotomy and atrial appendage  occluder device placement.      LUNGS:  There is right basilar atelectasis. Left midlung atelectasis. No  pneumothorax.      ABDOMEN:  No remarkable upper abdominal findings.      BONES:  No acute osseous changes.      IMPRESSION:  1.  Postoperative atelectasis without pneumothorax.    TRANSTHORACIC ECHOCARDIOGRAM REPORT        Patient Name:      NAN MCBRIDE      [place] Physician:    [id number] Clari Geronimo MD  Study Date:         9/25/2024      PHYSICIAN INTERPRETATION:  Left Ventricle: Left ventricular ejection fraction is severely decreased, calculated by Vasquez's biplane at 22%. There is global hypokinesis of the left ventricle with minor regional variations. The left ventricular cavity size is severely dilated. Abnormal (paradoxical) septal motion consistent with post-operative status. Spectral Doppler shows a restrictive pattern of left ventricular diastolic filling. There is no definite left ventricular thrombus visualized.  Left Atrium: The left atrium is enlarged.  Right Ventricle: The right ventricle is upper limits of normal in size. There is reduced right ventricular systolic function.  Right Atrium: The right atrium is mild to moderately dilated.  Aortic Valve: There is a prosthetic aortic valve present. There is an Chiu bioprosthetic aortic valve, with a 29 mm reported size. There is no evidence of aortic valve regurgitation. S/p 29mm Konect Resilia bioprosthetic valved conduit. AVR gradients were not assessed by spectral Doppler though no obvious AI by color Doppler.  Mitral Valve: The mitral valve is normal in structure. There is mild mitral valve regurgitation.  Tricuspid Valve: The tricuspid valve is structurally normal. There is trace to mild tricuspid regurgitation. The Doppler estimated RVSP is within normal limits at 25.8 mmHg.  Pulmonic Valve: The pulmonic valve is not well visualized. The pulmonic valve regurgitation was not well visualized.  Pericardium: Trivial to small pericardial effusion.  Aorta: The aortic root is abnormal. S/p 32mm Gelweave ascending aortic conduit and hemiarch.  In comparison to the previous echocardiogram(s): Compared with the prior exam, preop echo done on 9/20/2024 [facility] [place][note 2], the LV was already severely dilated with moderately reduced function with a severely dilated aorta with moderate AI and the patient is now s/p acending aortic and arch replacement and Konect AVR. The  LV remains severely dilated with a reducetion in LV systolic function , now severely reduced. The AVR gradients were not assessed today but there is no AI.     CONCLUSIONS:   1. Poorly visualized anatomical structures due to suboptimal image quality.   2. Left ventricular ejection fraction is severely decreased, calculated by Vasquez's biplane at 22%.   3. There is global hypokinesis of the left ventricle with minor regional variations.   4. Spectral Doppler shows a restrictive pattern of left ventricular diastolic filling.   5. Left ventricular cavity size is severely dilated.   6. Abnormal septal motion consistent with post-operative status.   7. No left ventricular thrombus visualized.   8. There is reduced right ventricular systolic function.   9. The left atrium is enlarged.  10. The right atrium is mild to moderately dilated.  11. Right ventricular systolic pressure is within normal limits.  12. S/p 29mm Konect Resilia bioprosthetic valved conduit. AVR gradients were not assessed by spectral Doppler though no obvious AI by color Doppler  13. S/p 32mm Gelweave ascending aortic conduit and hemiarch.  14. Compared with the prior exam, preop echo done on 9/20/2024 [facility], the LV was already severely dilated with moderately reduced function with a severely dilated aorta with moderate AI and the patient is now s/p acending aortic and arch replacement and Konect AVR. The LV remains severely dilated with a reducetion in LV systolic function , now severely reduced. The AVR gradients were not assessed today but there is no AI.      IMPRESSION & PLAN:    POD # 6 s/p Aortic root replacement and aortic valve replacement: Bentall procedure with a 29 mm connect valve conduit  - Increase activity/ ambulation; PT/OT  - Encourage IS, C/DB; respiratory therapy; wean O2 as aroldo   - Cardiac rehab referral   - Continue cardiac meds: ASA, BB, statin   - cyanokit administration-   - Pain and anticonstipation meds  -  2v CXR   - Postop echo  limited   - CUT epicardial wires prior to discharge   - Tele until discharge  - Optimize nutrition and electrolytes    Rhythm  Hx LBBB; Paroxysma AFIB   - Tele: NSR   - Continue metoprolol 25 mg BID   - Adjust medications as tolerated    Acute Blood Loss Anemia   Recent Labs     24  0709 24  0705 24  0008 24  0324 24  0525 24  1444 24  0651   HGB 10.7* 11.0* 10.3* 11.1* 10.1* 11.8* 11.5*   HCT 32.1* 33.2* 31.7* 33.2* 31.5* 37.0* 35.7*   - MV, PO Iron x1mo  - Daily labs, transfuse as indicated    Thrombocytopenia  Recent Labs     24  0709 24  0705 24  0008 24  0324 24  0525 24  1444 24  0651   * 118* 80* 89* 88* 131* 124*   - Etiology likely postop/CPB related  - Continue to trend with daily CBCs    Volume/Electrolyte Status: Preop wt Weight: 111 kg (245 lb 1.6 oz) Chronic systolic heart failure with EF 35 % on echo  Vitals:    24 0537   Weight: 114 kg (251 lb 5.2 oz)   - Weight: xx kg  - Lasix 40 mg IV   - Adjust diuresis as needed for postop cardiac surgery hypervolemia  - Replete electrolytes for hypokalemia/hypomagnesemia/hypophosphatemia as needed  K replaced   - Daily weights and strict I&Os  - Daily RFP while admitted    Leukocytosis  Recent Labs     24  0709 24  0705 24  0008 24  0324 24  0525 24  1444 24  0651   WBC 7.5 10.1 9.5 14.5* 13.9* 21.3* 4.5     Temp (36hrs), Av.5 °C (97.7 °F), Min:36.3 °C (97.3 °F), Max:36.9 °C (98.4 °F)  - aggressive pulmonary hygiene  - monitor for s/s infection  - Periop Ancef x48hrs   - likely atelectasis/ postoperative in etiology  - daily CBC to follow    CKD stage III: baseline SCr 1.08-1.2. cyanokit administration-  In OR      Creatinine   Date Value Ref Range Status   2024 1.33 (H) 0.50 - 1.30 mg/dL Final   2024 1.39 (H) 0.50 - 1.30 mg/dL Final   2024 1.54 (H) 0.50 - 1.30 mg/dL  Final   -daily RFP  -diuretics as indicated  -renally dose all medications  -avoid nephrotoxic drugs  -accurate I/Os    SHOAIB (compliant with home CPAP at night)   -aggressive bronchial hygiene  -wean O2 as tolerated  -ABGs PRN  -continue home CPAP    GERD   - Cont PPI   - Avoid taking pills on an empty stomach  - Eat small frequent meals  - eat sitting up in the chair   - avoid acidic foods      Hypertension  Systolic (24hrs), Av , Min:101 , Max:128    - continue BB  - Clonidine 0.1mg BID   - Losartan 100mg daily, on hold for now   -Consider ACEis/ [facility] once bp stable    Hyperlipidemia:   Lab Results   Component Value Date    CHOL 157 04/10/2024    HDL 53.0 04/10/2024    TRIG 55 04/10/2024   -continue statin  -follow up lipid panel with PCP/ cardio as appropriate    Hypersomnolence disorder.   - Sleep/wake cycle hygiene   - REST protocol    OA / Gout   - PT/OT   - Hot and cold packs  - Acetaminophen   - No NSAIDs for 3 months after cardiac surgery; if NSAIDs needed after 3 months, clear use with cardiologist before starting      VTE Prophylaxis: SCDs/TEDs, ambulation, SQ heparin  Code Status: Full Code    Dispo  - PT/OT recs Home   - Would benefit from homecare RN for cardiac surgery carepath  - Anticipate discharge Monday / Tuesday  days, pending CTA chest, Rhythm stable   - Will continue to assess discharge needs    I spent 60 minutes in the care of this patient which included chart review, interviewing patient/family, coordination of care, and documentation.   Medical Decision Making was high level due to high complexity of problems, extensive data review, and high risk of management/treatment.       SERGIO Mendoza-CNP  Cardiac Surgery [facility]  [facility]  Team Phone [phone]    2024  4:05 PM

## 2024-09-29 ENCOUNTER — APPOINTMENT (OUTPATIENT)
Dept: RADIOLOGY | Facility: HOSPITAL | Age: 69
DRG: 219 | End: 2024-09-29
Payer: MEDICARE

## 2024-09-29 VITALS
RESPIRATION RATE: 20 BRPM | HEART RATE: 101 BPM | SYSTOLIC BLOOD PRESSURE: 125 MMHG | DIASTOLIC BLOOD PRESSURE: 67 MMHG | OXYGEN SATURATION: 95 % | BODY MASS INDEX: 33.46 KG/M2 | WEIGHT: 252.43 LBS | HEIGHT: 73 IN | TEMPERATURE: 97.9 F

## 2024-09-29 LAB
ALBUMIN SERPL BCP-MCNC: 3.1 G/DL (ref 3.4–5)
ANION GAP SERPL CALC-SCNC: 14 MMOL/L (ref 10–20)
BUN SERPL-MCNC: 41 MG/DL (ref 6–23)
CALCIUM SERPL-MCNC: 8.4 MG/DL (ref 8.6–10.6)
CHLORIDE SERPL-SCNC: 101 MMOL/L (ref 98–107)
CO2 SERPL-SCNC: 25 MMOL/L (ref 21–32)
CREAT SERPL-MCNC: 1.33 MG/DL (ref 0.5–1.3)
EGFRCR SERPLBLD CKD-EPI 2021: 59 ML/MIN/1.73M*2
ERYTHROCYTE [DISTWIDTH] IN BLOOD BY AUTOMATED COUNT: 13.9 % (ref 11.5–14.5)
GLUCOSE SERPL-MCNC: 80 MG/DL (ref 74–99)
HCT VFR BLD AUTO: 31.2 % (ref 41–52)
HGB BLD-MCNC: 10.5 G/DL (ref 13.5–17.5)
MAGNESIUM SERPL-MCNC: 2.36 MG/DL (ref 1.6–2.4)
MCH RBC QN AUTO: 28.3 PG (ref 26–34)
MCHC RBC AUTO-ENTMCNC: 33.7 G/DL (ref 32–36)
MCV RBC AUTO: 84 FL (ref 80–100)
NRBC BLD-RTO: 0 /100 WBCS (ref 0–0)
PHOSPHATE SERPL-MCNC: 3.9 MG/DL (ref 2.5–4.9)
PLATELET # BLD AUTO: 154 X10*3/UL (ref 150–450)
POTASSIUM SERPL-SCNC: 3.9 MMOL/L (ref 3.5–5.3)
RBC # BLD AUTO: 3.71 X10*6/UL (ref 4.5–5.9)
SODIUM SERPL-SCNC: 136 MMOL/L (ref 136–145)
WBC # BLD AUTO: 7.4 X10*3/UL (ref 4.4–11.3)

## 2024-09-29 PROCEDURE — 83735 ASSAY OF MAGNESIUM: CPT | Performed by: NURSE PRACTITIONER

## 2024-09-29 PROCEDURE — 71046 X-RAY EXAM CHEST 2 VIEWS: CPT | Performed by: RADIOLOGY

## 2024-09-29 PROCEDURE — 2500000001 HC RX 250 WO HCPCS SELF ADMINISTERED DRUGS (ALT 637 FOR MEDICARE OP): Performed by: NURSE PRACTITIONER

## 2024-09-29 PROCEDURE — 2500000005 HC RX 250 GENERAL PHARMACY W/O HCPCS: Performed by: NURSE PRACTITIONER

## 2024-09-29 PROCEDURE — 2500000002 HC RX 250 W HCPCS SELF ADMINISTERED DRUGS (ALT 637 FOR MEDICARE OP, ALT 636 FOR OP/ED): Performed by: NURSE PRACTITIONER

## 2024-09-29 PROCEDURE — 85027 COMPLETE CBC AUTOMATED: CPT | Performed by: NURSE PRACTITIONER

## 2024-09-29 PROCEDURE — 99223 1ST HOSP IP/OBS HIGH 75: CPT | Performed by: NURSE PRACTITIONER

## 2024-09-29 PROCEDURE — 2500000004 HC RX 250 GENERAL PHARMACY W/ HCPCS (ALT 636 FOR OP/ED): Performed by: NURSE PRACTITIONER

## 2024-09-29 PROCEDURE — 1200000002 HC GENERAL ROOM WITH TELEMETRY DAILY

## 2024-09-29 PROCEDURE — 71046 X-RAY EXAM CHEST 2 VIEWS: CPT

## 2024-09-29 PROCEDURE — 36416 COLLJ CAPILLARY BLOOD SPEC: CPT | Performed by: NURSE PRACTITIONER

## 2024-09-29 PROCEDURE — 80069 RENAL FUNCTION PANEL: CPT | Performed by: NURSE PRACTITIONER

## 2024-09-29 RX ORDER — METOPROLOL TARTRATE 25 MG/1
25 TABLET, FILM COATED ORAL 2 TIMES DAILY
Status: DISCONTINUED | OUTPATIENT
Start: 2024-09-29 | End: 2024-09-30

## 2024-09-29 RX ORDER — POTASSIUM CHLORIDE 20 MEQ/1
40 TABLET, EXTENDED RELEASE ORAL ONCE
Status: COMPLETED | OUTPATIENT
Start: 2024-09-29 | End: 2024-09-29

## 2024-09-29 RX ORDER — FUROSEMIDE 10 MG/ML
40 INJECTION INTRAMUSCULAR; INTRAVENOUS ONCE
Status: COMPLETED | OUTPATIENT
Start: 2024-09-29 | End: 2024-09-29

## 2024-09-29 RX ORDER — PANTOPRAZOLE SODIUM 40 MG/1
40 TABLET, DELAYED RELEASE ORAL
Status: DISCONTINUED | OUTPATIENT
Start: 2024-09-30 | End: 2024-10-03 | Stop reason: HOSPADM

## 2024-09-29 RX ADMIN — Medication 0.5 L/MIN: at 08:17

## 2024-09-29 RX ADMIN — FUROSEMIDE 40 MG: 10 INJECTION, SOLUTION INTRAVENOUS at 11:00

## 2024-09-29 RX ADMIN — POLYSACCHARIDE-IRON COMPLEX 150 MG: 150 CAPSULE ORAL at 08:16

## 2024-09-29 RX ADMIN — POLYETHYLENE GLYCOL 3350 17 G: 17 POWDER, FOR SOLUTION ORAL at 08:16

## 2024-09-29 RX ADMIN — ATORVASTATIN CALCIUM 20 MG: 20 TABLET, FILM COATED ORAL at 20:25

## 2024-09-29 RX ADMIN — POTASSIUM CHLORIDE 40 MEQ: 1500 TABLET, EXTENDED RELEASE ORAL at 11:00

## 2024-09-29 RX ADMIN — HEPARIN SODIUM 5000 UNITS: 5000 INJECTION, SOLUTION INTRAVENOUS; SUBCUTANEOUS at 08:17

## 2024-09-29 RX ADMIN — AMIODARONE HYDROCHLORIDE 400 MG: 200 TABLET ORAL at 08:16

## 2024-09-29 RX ADMIN — METOPROLOL TARTRATE 12.5 MG: 25 TABLET, FILM COATED ORAL at 08:17

## 2024-09-29 RX ADMIN — Medication 1 TABLET: at 08:17

## 2024-09-29 RX ADMIN — OXYCODONE HYDROCHLORIDE 5 MG: 5 TABLET ORAL at 01:38

## 2024-09-29 RX ADMIN — OXYCODONE HYDROCHLORIDE 10 MG: 10 TABLET ORAL at 08:17

## 2024-09-29 RX ADMIN — PANTOPRAZOLE SODIUM 20 MG: 20 TABLET, DELAYED RELEASE ORAL at 08:17

## 2024-09-29 RX ADMIN — ACETAMINOPHEN 650 MG: 325 TABLET, FILM COATED ORAL at 05:31

## 2024-09-29 RX ADMIN — OXYCODONE HYDROCHLORIDE 5 MG: 5 TABLET ORAL at 20:25

## 2024-09-29 RX ADMIN — METOPROLOL TARTRATE 25 MG: 25 TABLET, FILM COATED ORAL at 20:24

## 2024-09-29 RX ADMIN — ASPIRIN 81 MG: 81 TABLET, COATED ORAL at 08:17

## 2024-09-29 RX ADMIN — TERAZOSIN HYDROCHLORIDE 5 MG: 5 CAPSULE ORAL at 20:24

## 2024-09-29 RX ADMIN — AMIODARONE HYDROCHLORIDE 400 MG: 200 TABLET ORAL at 20:25

## 2024-09-29 RX ADMIN — HEPARIN SODIUM 5000 UNITS: 5000 INJECTION, SOLUTION INTRAVENOUS; SUBCUTANEOUS at 15:16

## 2024-09-29 RX ADMIN — ACETAMINOPHEN 650 MG: 325 TABLET, FILM COATED ORAL at 15:16

## 2024-09-29 RX ADMIN — SENNOSIDES AND DOCUSATE SODIUM 2 TABLET: 8.6; 5 TABLET ORAL at 08:16

## 2024-09-29 RX ADMIN — SODIUM CHLORIDE 1000 ML: 9 INJECTION, SOLUTION INTRAVENOUS at 21:43

## 2024-09-29 RX ADMIN — ACETAMINOPHEN 650 MG: 325 TABLET, FILM COATED ORAL at 21:42

## 2024-09-29 ASSESSMENT — COGNITIVE AND FUNCTIONAL STATUS - GENERAL
DAILY ACTIVITIY SCORE: 22
CLIMB 3 TO 5 STEPS WITH RAILING: A LITTLE
MOVING TO AND FROM BED TO CHAIR: A LITTLE
DRESSING REGULAR LOWER BODY CLOTHING: A LITTLE
WALKING IN HOSPITAL ROOM: A LITTLE
DRESSING REGULAR UPPER BODY CLOTHING: A LITTLE
MOBILITY SCORE: 21

## 2024-09-29 ASSESSMENT — PAIN SCALES - GENERAL
PAINLEVEL_OUTOF10: 0 - NO PAIN
PAINLEVEL_OUTOF10: 0 - NO PAIN
PAINLEVEL_OUTOF10: 5 - MODERATE PAIN
PAINLEVEL_OUTOF10: 5 - MODERATE PAIN
PAINLEVEL_OUTOF10: 4
PAINLEVEL_OUTOF10: 3
PAINLEVEL_OUTOF10: 3

## 2024-09-29 ASSESSMENT — PAIN DESCRIPTION - LOCATION
LOCATION: CHEST
LOCATION: CHEST

## 2024-09-29 ASSESSMENT — PAIN - FUNCTIONAL ASSESSMENT
PAIN_FUNCTIONAL_ASSESSMENT: 0-10

## 2024-09-29 ASSESSMENT — PAIN DESCRIPTION - ORIENTATION: ORIENTATION: MID

## 2024-09-29 ASSESSMENT — PAIN DESCRIPTION - DESCRIPTORS: DESCRIPTORS: SORE

## 2024-09-29 NOTE — PROGRESS NOTES
"CARDIAC SURGERY DAILY PROGRESS NOTE    David Chatman is a 68 y.o. male presenting with shortness of breath. He presented to his cardiologist as an outpt for these complaints. Echo showed an EF of 35-40% and ascending aneurysm 6.2. He was directed to the ED. BNP 1015, troponins, 21,16,15. CT chest revealed large ascending aneurysm up to 6.2 cm. No evidence of acute aortic dissection. He was given IV lasix with relief of symptoms and admitted for further evaluation. He is currently planned for cardiac catheterization. Asymptomatic. The pt reports his blood pressures are under control currently. CT surgery consulted for aneurysm evaluation at OSH and he was transferred to [facility] [place].    9/23/2024 Operation Procedures: by Trino Perdomo  #1 standard median sternotomy  #2 innominate trunk arterial cannulation  #3 ascending aortic replacement and hemiarch replacement with a 32 Gelweave graft  #4 aortic root replacement and aortic valve replacement: Bentall procedure with a 29 mm connect valve conduit  #5 left atrial appendage closure with a 35 mm AtriCure clip  #6 left femoral arterial line placement    CTICU course: weaned off drips; afib RVR req amio    Transferred to T3 SDU on 9/26  ========================    Interval History:   No  issues  overnight     SUBJECTIVE:  No sleeping overnight     Objective   /56 (BP Location: Left arm, Patient Position: Sitting)   Pulse 77   Temp 36.4 °C (97.5 °F) (Temporal)   Resp 18   Ht 1.854 m (6' 1\")   Wt 115 kg (252 lb 6.8 oz)   SpO2 92%   BMI 33.30 kg/m²   0-10 (Numeric) Pain Score: 5 - Moderate pain   3 Day Weight Change: Unable to Calculate    Intake and Output    Intake/Output Summary (Last 24 hours) at 9/29/2024 1020  Last data filed at 9/29/2024 0941  Gross per 24 hour   Intake 1080 ml   Output 1075 ml   Net 5 ml       Physical Exam  Physical Exam  Vitals and nursing note reviewed.   Constitutional:       Appearance: Normal appearance.   Eyes:      " Conjunctiva/sclera: Conjunctivae normal.   Cardiovascular:      Rate and Rhythm: Normal rate and regular rhythm.      Pulses: Normal pulses.      Heart sounds: Normal heart sounds.      Comments: NSR    Epicardial wires capped   Pulmonary:      Effort: Pulmonary effort is normal.      Breath sounds: Normal breath sounds.      Comments:  Diminished LS at the base    Sternum stable   Abdominal:      Palpations: Abdomen is soft.      Comments: + BM    Genitourinary:     Comments: Voids independently   Musculoskeletal:         General: Normal range of motion.   Skin:     General: Skin is warm and dry.      Capillary Refill: Capillary refill takes less than 2 seconds.      Comments: MSI JACK well approx no s/sx of infections, no erythema     Neurological:      General: No focal deficit present.      Mental Status: He is alert and oriented to person, place, and time.   Psychiatric:         Mood and Affect: Mood normal.         Behavior: Behavior normal.       Medications  Scheduled medications  acetaminophen, 650 mg, oral, q8h DESHAWN  amiodarone, 400 mg, oral, BID  aspirin, 81 mg, oral, Daily  atorvastatin, 20 mg, oral, Nightly  heparin (porcine), 5,000 Units, subcutaneous, q8h  iron polysaccharides, 150 mg, oral, Daily  metoprolol tartrate, 25 mg, oral, BID  multivitamin with minerals, 1 tablet, oral, Daily  oxygen, , inhalation, Continuous - 02/gases  [START ON 9/30/2024] pantoprazole, 40 mg, oral, Daily before breakfast  polyethylene glycol, 17 g, oral, Daily  sennosides-docusate sodium, 2 tablet, oral, BID  terazosin, 5 mg, oral, Nightly    Continuous medications   PRN medications  PRN medications: bisacodyl, naloxone, oxyCODONE    Labs  Results for orders placed or performed during the hospital encounter of 09/11/24 (from the past 24 hour(s))   POCT GLUCOSE   Result Value Ref Range    POCT Glucose 111 (H) 74 - 99 mg/dL   Magnesium   Result Value Ref Range    Magnesium 2.36 1.60 - 2.40 mg/dL   CBC   Result Value Ref Range     WBC 7.4 4.4 - 11.3 x10*3/uL    nRBC 0.0 0.0 - 0.0 /100 WBCs    RBC 3.71 (L) 4.50 - 5.90 x10*6/uL    Hemoglobin 10.5 (L) 13.5 - 17.5 g/dL    Hematocrit 31.2 (L) 41.0 - 52.0 %    MCV 84 80 - 100 fL    MCH 28.3 26.0 - 34.0 pg    MCHC 33.7 32.0 - 36.0 g/dL    RDW 13.9 11.5 - 14.5 %    Platelets 154 150 - 450 x10*3/uL   Renal Function Panel   Result Value Ref Range    Glucose 80 74 - 99 mg/dL    Sodium 136 136 - 145 mmol/L    Potassium 3.9 3.5 - 5.3 mmol/L    Chloride 101 98 - 107 mmol/L    Bicarbonate 25 21 - 32 mmol/L    Anion Gap 14 10 - 20 mmol/L    Urea Nitrogen 41 (H) 6 - 23 mg/dL    Creatinine 1.33 (H) 0.50 - 1.30 mg/dL    eGFR 59 (L) >60 mL/min/1.73m*2    Calcium 8.4 (L) 8.6 - 10.6 mg/dL    Phosphorus 3.9 2.5 - 4.9 mg/dL    Albumin 3.1 (L) 3.4 - 5.0 g/dL           IMAGING/ DIAGNOSTIC TESTING:  I have personally reviewed the following test result(s):    XR CHEST 1 VIEW; 9/27/2024 4:49 am    FINDINGS:  Interval removal of right IJ line.      CARDIOMEDIASTINAL SILHOUETTE:  Patient is status post median sternotomy and atrial appendage  occluder device placement.      LUNGS:  There is right basilar atelectasis. Left midlung atelectasis. No  pneumothorax.      ABDOMEN:  No remarkable upper abdominal findings.      BONES:  No acute osseous changes.      IMPRESSION:  1.  Postoperative atelectasis without pneumothorax.    TRANSTHORACIC ECHOCARDIOGRAM REPORT        Patient Name:      NAN MCBRIDE      Reading Physician:    [id number] Clari Geronimo MD  Study Date:        9/25/2024      PHYSICIAN INTERPRETATION:  Left Ventricle: Left ventricular ejection fraction is severely decreased, calculated by Vasquez's biplane at 22%. There is global hypokinesis of the left ventricle with minor regional variations. The left ventricular cavity size is severely dilated. Abnormal (paradoxical) septal motion consistent with post-operative status. Spectral Doppler shows a  restrictive pattern of left ventricular diastolic filling. There is no definite left ventricular thrombus visualized.  Left Atrium: The left atrium is enlarged.  Right Ventricle: The right ventricle is upper limits of normal in size. There is reduced right ventricular systolic function.  Right Atrium: The right atrium is mild to moderately dilated.  Aortic Valve: There is a prosthetic aortic valve present. There is an Chiu bioprosthetic aortic valve, with a 29 mm reported size. There is no evidence of aortic valve regurgitation. S/p 29mm Konect Resilia bioprosthetic valved conduit. AVR gradients were not assessed by spectral Doppler though no obvious AI by color Doppler.  Mitral Valve: The mitral valve is normal in structure. There is mild mitral valve regurgitation.  Tricuspid Valve: The tricuspid valve is structurally normal. There is trace to mild tricuspid regurgitation. The Doppler estimated RVSP is within normal limits at 25.8 mmHg.  Pulmonic Valve: The pulmonic valve is not well visualized. The pulmonic valve regurgitation was not well visualized.  Pericardium: Trivial to small pericardial effusion.  Aorta: The aortic root is abnormal. S/p 32mm Gelweave ascending aortic conduit and hemiarch.  In comparison to the previous echocardiogram(s): Compared with the prior exam, preop echo done on 9/20/2024 [facility], the LV was already severely dilated with moderately reduced function with a severely dilated aorta with moderate AI and the patient is now s/p acending aortic and arch replacement and Konect AVR. The LV remains severely dilated with a reducetion in LV systolic function , now severely reduced. The AVR gradients were not assessed today but there is no AI.     CONCLUSIONS:   1. Poorly visualized anatomical structures due to suboptimal image quality.   2. Left ventricular ejection fraction is severely decreased, calculated by Vasquez's biplane at 22%.   3. There is global hypokinesis of the  left ventricle with minor regional variations.   4. Spectral Doppler shows a restrictive pattern of left ventricular diastolic filling.   5. Left ventricular cavity size is severely dilated.   6. Abnormal septal motion consistent with post-operative status.   7. No left ventricular thrombus visualized.   8. There is reduced right ventricular systolic function.   9. The left atrium is enlarged.  10. The right atrium is mild to moderately dilated.  11. Right ventricular systolic pressure is within normal limits.  12. S/p 29mm Konect Resilia bioprosthetic valved conduit. AVR gradients were not assessed by spectral Doppler though no obvious AI by color Doppler  13. S/p 32mm Gelweave ascending aortic conduit and hemiarch.  14. Compared with the prior exam, preop echo done on 9/20/2024 [facility], the LV was already severely dilated with moderately reduced function with a severely dilated aorta with moderate AI and the patient is now s/p acending aortic and arch replacement and Konect AVR. The LV remains severely dilated with a reducetion in LV systolic function , now severely reduced. The AVR gradients were not assessed today but there is no AI.      IMPRESSION & PLAN:    POD # 6 s/p Aortic root replacement and aortic valve replacement: Bentall procedure with a 29 mm connect valve conduit  - Increase activity/ ambulation; PT/OT  - Encourage IS, C/DB; respiratory therapy; wean O2 as aroldo   - Cardiac rehab referral   - Continue cardiac meds: ASA, BB, statin   - cyanokit administration-   - Pain and anticonstipation meds  - 2v CXR 9/29  - Postop echo 9/26 limited   - Pravenna removed 9/29  - CUT epicardial wires prior to discharge   - Tele until discharge  - Optimize nutrition and electrolytes    Rhythm  Hx LBBB; Paroxysma AFIB   - Tele: NSR   - 9/28 AFIB with RVR 120s  amio bolus 150s started amio po 400 mg BID   - Continue metoprolol 25 mg BID, consider changing to XL closer to discharge   - amiodarone   mg BID , consider changing to 200 mg daily closer to discharge    - Adjust medications as tolerated    Acute Blood Loss Anemia   Recent Labs     24  0641 24  0709 24  0705 24  0008 24  0324 24  0525 24  1444   HGB 10.5* 10.7* 11.0* 10.3* 11.1* 10.1* 11.8*   HCT 31.2* 32.1* 33.2* 31.7* 33.2* 31.5* 37.0*   - MV, PO Iron x1mo  - Daily labs, transfuse as indicated    Thrombocytopenia  Recent Labs     24  0641 24  0709 24  0705 24  0008 24  0324 24  0525 24  1444    116* 118* 80* 89* 88* 131*   - Etiology likely postop/CPB related  - Continue to trend with daily CBCs    Volume/Electrolyte Status: Preop wt Weight: 111 kg (245 lb 1.6 oz) Chronic systolic heart failure with EF 35 % on echo  Vitals:    24 0507   Weight: 115 kg (252 lb 6.8 oz)   - Weight: xx kg  - Lasix 40 mgx1 IV .   - Adjust diuresis as needed for postop cardiac surgery hypervolemia  - Replete electrolytes for hypokalemia/hypomagnesemia/hypophosphatemia as needed  K replaced ,    - Daily weights and strict I&Os  - Daily RFP while admitted    Leukocytosis  Recent Labs     24  0641 24  0709 24  0705 24  0008 24  0324 24  0525 24  1444   WBC 7.4 7.5 10.1 9.5 14.5* 13.9* 21.3*     Temp (36hrs), Av.5 °C (97.7 °F), Min:36.4 °C (97.5 °F), Max:37 °C (98.6 °F)  - aggressive pulmonary hygiene  - monitor for s/s infection  - Periop Ancef x48hrs   - likely atelectasis/ postoperative in etiology  - daily CBC to follow    CKD stage III: baseline SCr 1.08-1.2. cyanokit administration-  In OR      Creatinine   Date Value Ref Range Status   2024 1.33 (H) 0.50 - 1.30 mg/dL Final   2024 1.39 (H) 0.50 - 1.30 mg/dL Final   2024 1.54 (H) 0.50 - 1.30 mg/dL Final   -daily RFP  - Gentle IV hydration overnight  for CTA  chest  In the AM   -diuretics as indicated  -renally dose all medications  -avoid  nephrotoxic drugs  -accurate I/Os    SHOAIB (compliant with home CPAP at night)   -aggressive bronchial hygiene  -wean O2 as tolerated  -ABGs PRN  -continue home CPAP    GERD   - Cont PPI   - Avoid taking pills on an empty stomach  - Eat small frequent meals  - eat sitting up in the chair   - avoid acidic foods      Hypertension  Systolic (24hrs), Av , Min:101 , Max:128    - continue BB  - Clonidine 0.1mg BID   - Losartan 100mg daily, on hold for now   -Consider ACEis/ [facility] once bp stable    Hyperlipidemia:   Lab Results   Component Value Date    CHOL 157 04/10/2024    HDL 53.0 04/10/2024    TRIG 55 04/10/2024   -continue statin  -follow up lipid panel with PCP/ cardio as appropriate    Hypersomnolence disorder.   - Sleep/wake cycle hygiene   - REST protocol    OA / Gout   - PT/OT   - Hot and cold packs  - Acetaminophen   - No NSAIDs for 3 months after cardiac surgery; if NSAIDs needed after 3 months, clear use with cardiologist before starting      VTE Prophylaxis: SCDs/TEDs, ambulation, SQ heparin  Code Status: Full Code    Dispo  - PT/OT recs Home   - Would benefit from homecare RN for cardiac surgery carepath  - Anticipate discharge Monday / Tuesday  days, pending CTA chest, Rhythm stable   - Will continue to assess discharge needs    I spent 60 minutes in the care of this patient which included chart review, interviewing patient/family, coordination of care, and documentation.   Medical Decision Making was high level due to high complexity of problems, extensive data review, and high risk of management/treatment.       SERGIO Mendoza-CNP  Cardiac Surgery [facility]  [facility]  Team Phone [phone]    2024  10:20 AM

## 2024-09-30 ENCOUNTER — TELEPHONE (OUTPATIENT)
Dept: CARDIOLOGY | Facility: CLINIC | Age: 69
End: 2024-09-30
Payer: MEDICARE

## 2024-09-30 ENCOUNTER — APPOINTMENT (OUTPATIENT)
Dept: RADIOLOGY | Facility: HOSPITAL | Age: 69
DRG: 219 | End: 2024-09-30
Payer: MEDICARE

## 2024-09-30 LAB
ALBUMIN SERPL BCP-MCNC: 3.1 G/DL (ref 3.4–5)
ANION GAP SERPL CALC-SCNC: 15 MMOL/L (ref 10–20)
BUN SERPL-MCNC: 34 MG/DL (ref 6–23)
CALCIUM SERPL-MCNC: 8.3 MG/DL (ref 8.6–10.6)
CHLORIDE SERPL-SCNC: 103 MMOL/L (ref 98–107)
CO2 SERPL-SCNC: 23 MMOL/L (ref 21–32)
CREAT SERPL-MCNC: 1.3 MG/DL (ref 0.5–1.3)
EGFRCR SERPLBLD CKD-EPI 2021: 60 ML/MIN/1.73M*2
ERYTHROCYTE [DISTWIDTH] IN BLOOD BY AUTOMATED COUNT: 14.1 % (ref 11.5–14.5)
GLUCOSE SERPL-MCNC: 87 MG/DL (ref 74–99)
HCT VFR BLD AUTO: 32.9 % (ref 41–52)
HGB BLD-MCNC: 10.7 G/DL (ref 13.5–17.5)
MAGNESIUM SERPL-MCNC: 2.2 MG/DL (ref 1.6–2.4)
MCH RBC QN AUTO: 27.7 PG (ref 26–34)
MCHC RBC AUTO-ENTMCNC: 32.5 G/DL (ref 32–36)
MCV RBC AUTO: 85 FL (ref 80–100)
NRBC BLD-RTO: 0 /100 WBCS (ref 0–0)
PHOSPHATE SERPL-MCNC: 4 MG/DL (ref 2.5–4.9)
PLATELET # BLD AUTO: 175 X10*3/UL (ref 150–450)
POTASSIUM SERPL-SCNC: 4.2 MMOL/L (ref 3.5–5.3)
RBC # BLD AUTO: 3.86 X10*6/UL (ref 4.5–5.9)
SODIUM SERPL-SCNC: 137 MMOL/L (ref 136–145)
WBC # BLD AUTO: 8.9 X10*3/UL (ref 4.4–11.3)

## 2024-09-30 PROCEDURE — 2500000001 HC RX 250 WO HCPCS SELF ADMINISTERED DRUGS (ALT 637 FOR MEDICARE OP): Performed by: NURSE PRACTITIONER

## 2024-09-30 PROCEDURE — 2500000004 HC RX 250 GENERAL PHARMACY W/ HCPCS (ALT 636 FOR OP/ED): Performed by: NURSE PRACTITIONER

## 2024-09-30 PROCEDURE — 2500000005 HC RX 250 GENERAL PHARMACY W/O HCPCS: Performed by: NURSE PRACTITIONER

## 2024-09-30 PROCEDURE — 2500000004 HC RX 250 GENERAL PHARMACY W/ HCPCS (ALT 636 FOR OP/ED): Performed by: PHYSICIAN ASSISTANT

## 2024-09-30 PROCEDURE — 71275 CT ANGIOGRAPHY CHEST: CPT

## 2024-09-30 PROCEDURE — 85027 COMPLETE CBC AUTOMATED: CPT | Performed by: NURSE PRACTITIONER

## 2024-09-30 PROCEDURE — 80069 RENAL FUNCTION PANEL: CPT | Performed by: NURSE PRACTITIONER

## 2024-09-30 PROCEDURE — 1200000002 HC GENERAL ROOM WITH TELEMETRY DAILY

## 2024-09-30 PROCEDURE — 83735 ASSAY OF MAGNESIUM: CPT | Performed by: NURSE PRACTITIONER

## 2024-09-30 PROCEDURE — 2500000002 HC RX 250 W HCPCS SELF ADMINISTERED DRUGS (ALT 637 FOR MEDICARE OP, ALT 636 FOR OP/ED): Performed by: PHYSICIAN ASSISTANT

## 2024-09-30 PROCEDURE — 2500000001 HC RX 250 WO HCPCS SELF ADMINISTERED DRUGS (ALT 637 FOR MEDICARE OP): Performed by: PHYSICIAN ASSISTANT

## 2024-09-30 PROCEDURE — 2550000001 HC RX 255 CONTRASTS: Performed by: THORACIC SURGERY (CARDIOTHORACIC VASCULAR SURGERY)

## 2024-09-30 PROCEDURE — 99233 SBSQ HOSP IP/OBS HIGH 50: CPT | Performed by: INTERNAL MEDICINE

## 2024-09-30 PROCEDURE — 2500000002 HC RX 250 W HCPCS SELF ADMINISTERED DRUGS (ALT 637 FOR MEDICARE OP, ALT 636 FOR OP/ED): Performed by: NURSE PRACTITIONER

## 2024-09-30 PROCEDURE — 71275 CT ANGIOGRAPHY CHEST: CPT | Performed by: INTERNAL MEDICINE

## 2024-09-30 RX ORDER — METOPROLOL TARTRATE 50 MG/1
50 TABLET ORAL 2 TIMES DAILY
Status: DISCONTINUED | OUTPATIENT
Start: 2024-09-30 | End: 2024-10-01

## 2024-09-30 RX ORDER — FUROSEMIDE 10 MG/ML
40 INJECTION INTRAMUSCULAR; INTRAVENOUS 2 TIMES DAILY
Status: DISCONTINUED | OUTPATIENT
Start: 2024-09-30 | End: 2024-10-01

## 2024-09-30 RX ORDER — AMIODARONE HYDROCHLORIDE 200 MG/1
400 TABLET ORAL 2 TIMES DAILY
Status: DISCONTINUED | OUTPATIENT
Start: 2024-09-30 | End: 2024-10-03 | Stop reason: HOSPADM

## 2024-09-30 RX ORDER — AMIODARONE HYDROCHLORIDE 200 MG/1
200 TABLET ORAL DAILY
Status: DISCONTINUED | OUTPATIENT
Start: 2024-10-01 | End: 2024-09-30

## 2024-09-30 RX ORDER — FUROSEMIDE 10 MG/ML
40 INJECTION INTRAMUSCULAR; INTRAVENOUS ONCE
Status: COMPLETED | OUTPATIENT
Start: 2024-09-30 | End: 2024-09-30

## 2024-09-30 RX ORDER — DAPAGLIFLOZIN 10 MG/1
5 TABLET, FILM COATED ORAL DAILY
Status: DISCONTINUED | OUTPATIENT
Start: 2024-09-30 | End: 2024-09-30

## 2024-09-30 RX ORDER — EPLERENONE 25 MG/1
25 TABLET, FILM COATED ORAL DAILY
Status: DISCONTINUED | OUTPATIENT
Start: 2024-09-30 | End: 2024-10-03 | Stop reason: HOSPADM

## 2024-09-30 RX ORDER — POTASSIUM CHLORIDE 20 MEQ/1
20 TABLET, EXTENDED RELEASE ORAL ONCE
Status: COMPLETED | OUTPATIENT
Start: 2024-09-30 | End: 2024-09-30

## 2024-09-30 RX ADMIN — SENNOSIDES AND DOCUSATE SODIUM 2 TABLET: 8.6; 5 TABLET ORAL at 08:14

## 2024-09-30 RX ADMIN — AMIODARONE HYDROCHLORIDE 400 MG: 200 TABLET ORAL at 08:14

## 2024-09-30 RX ADMIN — HEPARIN SODIUM 5000 UNITS: 5000 INJECTION, SOLUTION INTRAVENOUS; SUBCUTANEOUS at 15:19

## 2024-09-30 RX ADMIN — OXYCODONE HYDROCHLORIDE 5 MG: 5 TABLET ORAL at 01:09

## 2024-09-30 RX ADMIN — Medication 1 TABLET: at 08:14

## 2024-09-30 RX ADMIN — FUROSEMIDE 40 MG: 10 INJECTION, SOLUTION INTRAVENOUS at 08:15

## 2024-09-30 RX ADMIN — ATORVASTATIN CALCIUM 20 MG: 20 TABLET, FILM COATED ORAL at 20:11

## 2024-09-30 RX ADMIN — EPLERENONE 25 MG: 25 TABLET, FILM COATED ORAL at 15:19

## 2024-09-30 RX ADMIN — HEPARIN SODIUM 5000 UNITS: 5000 INJECTION, SOLUTION INTRAVENOUS; SUBCUTANEOUS at 01:09

## 2024-09-30 RX ADMIN — POTASSIUM CHLORIDE 20 MEQ: 1500 TABLET, EXTENDED RELEASE ORAL at 08:14

## 2024-09-30 RX ADMIN — OXYCODONE HYDROCHLORIDE 5 MG: 5 TABLET ORAL at 20:11

## 2024-09-30 RX ADMIN — ACETAMINOPHEN 650 MG: 325 TABLET, FILM COATED ORAL at 13:15

## 2024-09-30 RX ADMIN — ACETAMINOPHEN 650 MG: 325 TABLET, FILM COATED ORAL at 20:16

## 2024-09-30 RX ADMIN — ASPIRIN 81 MG: 81 TABLET, COATED ORAL at 08:14

## 2024-09-30 RX ADMIN — POLYETHYLENE GLYCOL 3350 17 G: 17 POWDER, FOR SOLUTION ORAL at 08:14

## 2024-09-30 RX ADMIN — METOPROLOL TARTRATE 50 MG: 50 TABLET, FILM COATED ORAL at 08:15

## 2024-09-30 RX ADMIN — HEPARIN SODIUM 5000 UNITS: 5000 INJECTION, SOLUTION INTRAVENOUS; SUBCUTANEOUS at 08:14

## 2024-09-30 RX ADMIN — AMIODARONE HYDROCHLORIDE 400 MG: 200 TABLET ORAL at 20:11

## 2024-09-30 RX ADMIN — Medication 0.5 L/MIN: at 08:15

## 2024-09-30 RX ADMIN — ACETAMINOPHEN 650 MG: 325 TABLET, FILM COATED ORAL at 05:48

## 2024-09-30 RX ADMIN — IOHEXOL 98 ML: 350 INJECTION, SOLUTION INTRAVENOUS at 16:07

## 2024-09-30 RX ADMIN — PANTOPRAZOLE SODIUM 40 MG: 40 TABLET, DELAYED RELEASE ORAL at 06:03

## 2024-09-30 RX ADMIN — FUROSEMIDE 40 MG: 10 INJECTION, SOLUTION INTRAVENOUS at 16:07

## 2024-09-30 RX ADMIN — POLYSACCHARIDE-IRON COMPLEX 150 MG: 150 CAPSULE ORAL at 08:14

## 2024-09-30 RX ADMIN — METOPROLOL TARTRATE 50 MG: 50 TABLET, FILM COATED ORAL at 18:14

## 2024-09-30 ASSESSMENT — COGNITIVE AND FUNCTIONAL STATUS - GENERAL
DAILY ACTIVITIY SCORE: 22
DRESSING REGULAR UPPER BODY CLOTHING: A LITTLE
WALKING IN HOSPITAL ROOM: A LITTLE
MOBILITY SCORE: 21
MOVING TO AND FROM BED TO CHAIR: A LITTLE
CLIMB 3 TO 5 STEPS WITH RAILING: A LITTLE
DRESSING REGULAR LOWER BODY CLOTHING: A LITTLE

## 2024-09-30 ASSESSMENT — PAIN SCALES - GENERAL
PAINLEVEL_OUTOF10: 4
PAINLEVEL_OUTOF10: 0 - NO PAIN
PAINLEVEL_OUTOF10: 0 - NO PAIN
PAINLEVEL_OUTOF10: 2
PAINLEVEL_OUTOF10: 0 - NO PAIN
PAINLEVEL_OUTOF10: 5 - MODERATE PAIN
PAINLEVEL_OUTOF10: 3
PAINLEVEL_OUTOF10: 4
PAINLEVEL_OUTOF10: 0 - NO PAIN

## 2024-09-30 ASSESSMENT — PAIN DESCRIPTION - LOCATION
LOCATION: CHEST
LOCATION: CHEST

## 2024-09-30 ASSESSMENT — PAIN - FUNCTIONAL ASSESSMENT
PAIN_FUNCTIONAL_ASSESSMENT: 0-10

## 2024-09-30 ASSESSMENT — PAIN DESCRIPTION - DESCRIPTORS: DESCRIPTORS: SORE

## 2024-09-30 ASSESSMENT — PAIN DESCRIPTION - ORIENTATION: ORIENTATION: MID

## 2024-09-30 NOTE — PROGRESS NOTES
"CARDIAC SURGERY DAILY PROGRESS NOTE    David Chatman is a 68 y.o. male presenting with shortness of breath. He presented to his cardiologist as an outpt for these complaints. Echo showed an EF of 35-40% and ascending aneurysm 6.2. He was directed to the ED. BNP 1015, troponins, 21,16,15. CT chest revealed large ascending aneurysm up to 6.2 cm. No evidence of acute aortic dissection. He was given IV lasix with relief of symptoms and admitted for further evaluation. He is currently planned for cardiac catheterization. Asymptomatic. The pt reports his blood pressures are under control currently. CT surgery consulted for aneurysm evaluation at OSH and [place] was [place] [facility] [place].    9/23/2024 Operation Procedures: by Trino Perdomo  #1 standard median sternotomy  #2 innominate trunk arterial cannulation  #3 ascending aortic replacement and hemiarch replacement with a 32 Gelweave graft  #4 aortic root replacement and aortic valve replacement: Bentall procedure with a 29 mm connect valve conduit  #5 left atrial appendage closure with a 35 mm AtriCure clip  #6 left femoral arterial line placement    CTICU course: weaned off drips; afib RVR req amio    Transferred to T3 SDU on 9/26  ========================    Interval History:   No  issues  overnight     SUBJECTIVE:  No sleeping overnight     Objective   /71 (BP Location: Left arm, Patient Position: Sitting)   Pulse 79   Temp 36.6 °C (97.9 °F) (Temporal)   Resp 17   Ht 1.854 m (6' 1\")   Wt 115 kg (253 lb 9.6 oz)   SpO2 95%   BMI 33.46 kg/m²   0-10 (Numeric) Pain Score: 0 - No pain   3 Day Weight Change: Unable to Calculate    Intake and Output    Intake/Output Summary (Last 24 hours) at 9/30/2024 1236  Last data filed at 9/30/2024 0938  Gross per 24 hour   Intake 600 ml   Output 375 ml   Net 225 ml       Physical Exam  Physical Exam  Vitals and nursing note reviewed.   Constitutional:       Appearance: Normal appearance.   Eyes:      " Conjunctiva/sclera: Conjunctivae normal.   Cardiovascular:      Rate and Rhythm: Normal rate and regular rhythm.      Pulses: Normal pulses.      Heart sounds: Normal heart sounds.      Comments: NSR    Epicardial wires capped   Pulmonary:      Effort: Pulmonary effort is normal.      Breath sounds: Normal breath sounds.      Comments:  Diminished LS at the base    Sternum stable   Abdominal:      Palpations: Abdomen is soft.      Comments: + BM    Genitourinary:     Comments: Voids independently   Musculoskeletal:         General: Normal range of motion.   Skin:     General: Skin is warm and dry.      Capillary Refill: Capillary refill takes less than 2 seconds.      Comments: MSI JACK well approx no s/sx of infections, no erythema     Neurological:      General: No focal deficit present.      Mental Status: He is alert and oriented to person, place, and time.   Psychiatric:         Mood and Affect: Mood normal.         Behavior: Behavior normal.       Medications  Scheduled medications  acetaminophen, 650 mg, oral, q8h DESHAWN  amiodarone, 400 mg, oral, BID  aspirin, 81 mg, oral, Daily  atorvastatin, 20 mg, oral, Nightly  heparin (porcine), 5,000 Units, subcutaneous, q8h  iron polysaccharides, 150 mg, oral, Daily  metoprolol tartrate, 50 mg, oral, BID  multivitamin with minerals, 1 tablet, oral, Daily  oxygen, , inhalation, Continuous - 02/gases  pantoprazole, 40 mg, oral, Daily before breakfast  polyethylene glycol, 17 g, oral, Daily  sennosides-docusate sodium, 2 tablet, oral, BID  terazosin, 5 mg, oral, Nightly    Continuous medications   PRN medications  PRN medications: bisacodyl, naloxone, oxyCODONE    Labs  Results for orders placed or performed during the hospital encounter of 09/11/24 (from the past 24 hour(s))   Magnesium   Result Value Ref Range    Magnesium 2.20 1.60 - 2.40 mg/dL   CBC   Result Value Ref Range    WBC 8.9 4.4 - 11.3 x10*3/uL    nRBC 0.0 0.0 - 0.0 /100 WBCs    RBC 3.86 (L) 4.50 - 5.90  x10*6/uL    Hemoglobin 10.7 (L) 13.5 - 17.5 g/dL    Hematocrit 32.9 (L) 41.0 - 52.0 %    MCV 85 80 - 100 fL    MCH 27.7 26.0 - 34.0 pg    MCHC 32.5 32.0 - 36.0 g/dL    RDW 14.1 11.5 - 14.5 %    Platelets 175 150 - 450 x10*3/uL   Renal Function Panel   Result Value Ref Range    Glucose 87 74 - 99 mg/dL    Sodium 137 136 - 145 mmol/L    Potassium 4.2 3.5 - 5.3 mmol/L    Chloride 103 98 - 107 mmol/L    Bicarbonate 23 21 - 32 mmol/L    Anion Gap 15 10 - 20 mmol/L    Urea Nitrogen 34 (H) 6 - 23 mg/dL    Creatinine 1.30 0.50 - 1.30 mg/dL    eGFR 60 (L) >60 mL/min/1.73m*2    Calcium 8.3 (L) 8.6 - 10.6 mg/dL    Phosphorus 4.0 2.5 - 4.9 mg/dL    Albumin 3.1 (L) 3.4 - 5.0 g/dL           IMAGING/ DIAGNOSTIC TESTING:  I have personally reviewed the following test result(s):    XR CHEST 1 VIEW; 9/27/2024 4:49 am    FINDINGS:  Interval removal of right IJ line.      CARDIOMEDIASTINAL SILHOUETTE:  Patient is status post median sternotomy and atrial appendage  occluder device placement.      LUNGS:  There is right basilar atelectasis. Left midlung atelectasis. No  pneumothorax.      ABDOMEN:  No remarkable upper abdominal findings.      BONES:  No acute osseous changes.      IMPRESSION:  1.  Postoperative atelectasis without pneumothorax.    TRANSTHORACIC ECHOCARDIOGRAM REPORT        Patient Name:      NAN MCBRIDE      Reading Physician:    [id number] Clari Geronimo MD  Study Date:        9/25/2024      PHYSICIAN INTERPRETATION:  Left Ventricle: Left ventricular ejection fraction is severely decreased, calculated by Vasquez's biplane at 22%. There is global hypokinesis of the left ventricle with minor regional variations. The left ventricular cavity size is severely dilated. Abnormal (paradoxical) septal motion consistent with post-operative status. Spectral Doppler shows a restrictive pattern of left ventricular diastolic filling. There is no definite left ventricular  thrombus visualized.  Left Atrium: The left atrium is enlarged.  Right Ventricle: The right ventricle is upper limits of normal in size. There is reduced right ventricular systolic function.  Right Atrium: The right atrium is mild to moderately dilated.  Aortic Valve: There is a prosthetic aortic valve present. There is an Chiu bioprosthetic aortic valve, with a 29 mm reported size. There is no evidence of aortic valve regurgitation. S/p 29mm Konect Resilia bioprosthetic valved conduit. AVR gradients were not assessed by spectral Doppler though no obvious AI by color Doppler.  Mitral Valve: The mitral valve is normal in structure. There is mild mitral valve regurgitation.  Tricuspid Valve: The tricuspid valve is structurally normal. There is trace to mild tricuspid regurgitation. The Doppler estimated RVSP is within normal limits at 25.8 mmHg.  Pulmonic Valve: The pulmonic valve is not well visualized. The pulmonic valve regurgitation was not well visualized.  Pericardium: Trivial to small pericardial effusion.  Aorta: The aortic root is abnormal. S/p 32mm Gelweave ascending aortic conduit and hemiarch.  In comparison to the previous echocardiogram(s): Compared with the prior exam, preop echo done on 9/20/2024 [facility], the LV was already severely dilated with moderately reduced function with a severely dilated aorta with moderate AI and the patient is now s/p acending aortic and arch replacement and Konect AVR. The LV remains severely dilated with a reducetion in LV systolic function , now severely reduced. The AVR gradients were not assessed today but there is no AI.     CONCLUSIONS:   1. Poorly visualized anatomical structures due to suboptimal image quality.   2. Left ventricular ejection fraction is severely decreased, calculated by Vasquez's biplane at 22%.   3. There is global hypokinesis of the left ventricle with minor regional variations.   4. Spectral Doppler shows a restrictive pattern  of left ventricular diastolic filling.   5. Left ventricular cavity size is severely dilated.   6. Abnormal septal motion consistent with post-operative status.   7. No left ventricular thrombus visualized.   8. There is reduced right ventricular systolic function.   9. The left atrium is enlarged.  10. The right atrium is mild to moderately dilated.  11. Right ventricular systolic pressure is within normal limits.  12. S/p 29mm Konect Resilia bioprosthetic valved conduit. AVR gradients were not assessed by spectral Doppler though no obvious AI by color Doppler  13. S/p 32mm Gelweave ascending aortic conduit and hemiarch.  14. Compared with the prior exam, preop echo done on 9/20/2024 [facility], the LV was already severely dilated with moderately reduced function with a severely dilated aorta with moderate AI and the patient is now s/p acending aortic and arch replacement and Konect AVR. The LV remains severely dilated with a reducetion in LV systolic function , now severely reduced. The AVR gradients were not assessed today but there is no AI.      IMPRESSION & PLAN:    POD # 7 s/p Aortic root replacement and aortic valve replacement: Bentall procedure with a 29 mm connect valve conduit  - Increase activity/ ambulation; PT/OT  - Encourage IS, C/DB; respiratory therapy; wean O2 as aroldo   - Cardiac rehab referral   - Continue cardiac meds: ASA, BB, statin   - Pain and anticonstipation meds  - 2v CXR 9/29  - Postop echo 9/25 limited completed EF 22%, reduced RV function  - Pravenna removed 9/29  - CUT epicardial wires prior to discharge   - Tele until discharge  - Optimize nutrition and electrolytes    Rhythm  Hx LBBB; Paroxysma AFIB - Currently in Afib rate controlled  - Tele: NSR   - 9/28 AFIB with RVR 120s  amio bolus 150s started amio po 400 mg BID   - Continue metoprolol 25 mg BID, consider changing to XL closer to discharge   - Adjust medications as tolerated  - Continue Amio 400mg BID  - Will need  to discuss anticoagulation with Dr Perdomo today    Acute heart failure- most recent EF 22%  - Will initiate GDMT therapy today   - Add epleranone 25mg today   - Metoprolol increased to 50mg BID today---> will transition to XL at discharge   - Estimates for SGLT2i >$500 will wait not start   - Will add low dose entresto as tolerated--> hold for now given resolution of LIZ and need for CTA today    Acute Blood Loss Anemia   Recent Labs     24  0623 24  0641 24  0709 24  0705 24  0008 24  0324 24  0525   HGB 10.7* 10.5* 10.7* 11.0* 10.3* 11.1* 10.1*   HCT 32.9* 31.2* 32.1* 33.2* 31.7* 33.2* 31.5*   - MV, PO Iron x1mo- dc today  - Daily labs, transfuse as indicated    Thrombocytopenia- resolved  Recent Labs     24  0623 24  0641 24  0709 24  0705 24  0008 24  0324 24  0525    154 116* 118* 80* 89* 88*   - Etiology likely postop/CPB related  - Continue to trend with daily CBCs    Volume/Electrolyte Status: Preop wt Weight: 111 kg (245 lb 1.6 oz) Chronic systolic heart failure with EF 35 % on echo  Total body fluid overload  Vitals:    24 0500   Weight: 115 kg (253 lb 9.6 oz)   - Weight: xx kg  - Lasix 40 mgx1 IV .   - Replete electrolytes for hypokalemia/hypomagnesemia/hypophosphatemia as needed  K replaced ,    - Daily weights and strict I&Os  - Daily RFP while admitted  - Add scheduled lasix 40mg BID today    Leukocytosis- resolved likely inflammatory  Recent Labs     24  0623 24  0641 24  0709 24  0705 24  0008 24  0324 24  0525   WBC 8.9 7.4 7.5 10.1 9.5 14.5* 13.9*     Temp (36hrs), Av.6 °C (97.8 °F), Min:36 °C (96.8 °F), Max:37 °C (98.6 °F)  - monitor for s/s infection  - Periop Ancef x48hrs   - daily CBC to follow    CKD stage III: baseline SCr 1.08-1.2. cyanokit administration-  In OR      Creatinine   Date Value Ref Range Status   2024 1.30 0.50 -  1.30 mg/dL Final   2024 1.33 (H) 0.50 - 1.30 mg/dL Final   2024 1.39 (H) 0.50 - 1.30 mg/dL Final   -daily RFP  -Received gentle IV hydration overnight   for CTA chest today   -renally dose all medications  -avoid nephrotoxic drugs  -accurate I/Os    SHOAIB (compliant with home CPAP at night)   Oxygenating well on RA  -aggressive bronchial hygiene  -continue home CPAP    GERD   - Cont PPI   - Avoid taking pills on an empty stomach  - Eat small frequent meals  - eat sitting up in the chair   - avoid acidic foods      Hypertension  Prior to surgery significant hypertension on multiple medications  Systolic (24hrs), Av , Min:106 , Max:125   - Clonidine 0.1mg BID dc'd now with appropriate BP control  - Will hold on re-initiation of losartan  - Will add Entresto as tolerated-  - DC terazosin given contraindication in heart failure    Hyperlipidemia:   Lab Results   Component Value Date    CHOL 157 04/10/2024    HDL 53.0 04/10/2024    TRIG 55 04/10/2024   -continue statin  -follow up lipid panel with PCP/ cardio as appropriate    Hypersomnolence disorder.   - Sleep/wake cycle hygiene   - REST protocol    OA / Gout   - PT/OT   - Hot and cold packs  - Acetaminophen     VTE Prophylaxis: SCDs/TEDs, ambulation, SQ heparin  Code Status: Full Code    Dispo  - PT/OT recs Home   - Would benefit from homecare RN for cardiac surgery carepath  - Anticipate discharge Tuesday/Wednesday pending initiation of GDMT and CTA results  - Will continue to assess discharge needs    Alexsander Iqbal PA-C  Cardiac Surgery [facility]  [facility]  Team Phone [phone]    2024  12:36 PM

## 2024-09-30 NOTE — PROGRESS NOTES
Advanced Heart Failure Progress Note   Consulting Team: CT surgery  Primary Cardiologist: Dr. Carter  Reason for Consult: HFrEF. Now s/p thoracic aorta aneursym repair.    Subjective   Reconsulted for HFrEF medication management recommendations as patient now s/p thoracic aneuyrsm repair.     Patient endorses SOB, similar to 1 month prior to admission and surgery. Continues to have orthopnea and LE edema though he does think there is some improvement from when he first presented. Denies any chest discomfort. Does have some lightheadedness/dizzines when he first gets up; denies any falls or syncope.     Objective   Physical Exam  Vitals:    09/30/24 0938   BP: 106/71   Pulse: 82   Resp: 17   Temp: 36.6 °C (97.9 °F)   SpO2: 95%       GEN:  NAD.  CV: irregularly irregular, no m/r/g. LE edema: +2 pitting edema.   LUNGS: CTA B/L  ABD: Soft, NT/ND, NBS  SKIN: Warm, well perfused. No skin rashes or abnormal lesions.   EXT: No clubbing, cyanosis, or edema.  NEURO: AAOX3, not following commands      I have personally reviewed the following images and laboratory findings:  ECG:  sinus rhythm, LAD, LBBB     Results for orders placed during the hospital encounter of 09/10/24    Transthoracic Echo Complete    Narrative  [address], [place] [id number]  Phone [phone]    TRANSTHORACIC ECHOCARDIOGRAM REPORT    Patient Name:      NAN LINDA Norman Physician:    [id number] Jackeline Davies MD  Study Date:        9/10/2024            Ordering Provider:    [id number] AAMIR CARTER  MRN/PID:           [id number]             Fellow:  Accession#:        [id number]         Nurse:  Date of Birth/Age: 1955 / 68 years Sonographer:          Ai PELLETIER  Gender:            M                    Additional Staff:  Height:            185.42 cm            Admit Date:  Weight:            117.03 kg            Admission Status:     Outpatient  BSA / BMI:         2.40 m2 / 34.04      Department  Location:  kg/m2  Blood Pressure: 125 /72 mmHg    Study Type:    TRANSTHORACIC ECHO (TTE) COMPLETE  Diagnosis/ICD: Shortness of breath-R06.02; Essential (primary) hypertension-I10  Indication:    Rule out CAD HLD HTN Shortness of Breath  CPT Codes:     Echo Complete w Full Doppler-40320    Patient History:  Pertinent History: HTN HLD PAF.    Study Detail: The following Echo studies were performed: 2D, M-Mode, Doppler,  color flow, Strain and 3D.      PHYSICIAN INTERPRETATION:  Left Ventricle: The left ventricular systolic function is moderately decreased, with a visually estimated ejection fraction of 35-40%. There is global hypokinesis of the left ventricle with minor regional variations. The left ventricular cavity size is severely dilated. Left Ventricular Global Longitudinal Strain - -10.5 %. Spectral Doppler shows a normal pattern of left ventricular diastolic filling.  Left Atrium: The left atrium is moderately dilated.  Right Ventricle: The right ventricle is normal in size. There is normal right ventricular global systolic function.  Right Atrium: The right atrium is moderately dilated.  Aortic Valve: The aortic valve was not well visualized. There is no aortic valve thickening. There is no evidence of aortic valve stenosis.  The aortic valve dimensionless index is 1.03. There is moderate aortic valve regurgitation. The peak instantaneous gradient of the aortic valve is 16.5 mmHg. The mean gradient of the aortic valve is 8.0 mmHg. Likely tricuspid aortic valve but images slightly suboptimal.  Mitral Valve: The mitral valve is abnormal. There is moderate mitral valve regurgitation which is centrally directed.  Tricuspid Valve: The tricuspid valve is structurally normal. There is trace tricuspid regurgitation. The right ventricular systolic pressure is unable to be estimated.  Pulmonic Valve: The pulmonic valve is structurally normal. There is physiologic pulmonic valve regurgitation.  Pericardium: There is a  trivial pericardial effusion.  Aorta: The aortic root is normal. There is an aneurysm involving the ascending aorta. Severe ascending aortic aneurysm of 6.2 cm at the largest diameter.  Systemic Veins: The inferior vena cava appears to be of normal size, IVC inspiratory collapse greater than 50%.      CONCLUSIONS:  1. The left ventricular systolic function is moderately decreased, with a visually estimated ejection fraction of 35-40%.  2. There is global hypokinesis of the left ventricle with minor regional variations.  3. Left ventricular cavity size is severely dilated.  4. There is normal right ventricular global systolic function.  5. The left atrium is moderately dilated.  6. The right atrium is moderately dilated.  7. Moderate mitral valve regurgitation.  8. Trace tricuspid regurgitation is visualized.  9. Aortic valve stenosis is not present.  10. Likely tricuspid aortic valve but images slightly suboptimal.  11. Moderate aortic valve regurgitation.  12. Aneurysm of the ascending aorta.    Limited TTE 9/25  CONCLUSIONS:   1. Poorly visualized anatomical structures due to suboptimal image quality.   2. Left ventricular ejection fraction is severely decreased, calculated by Vasquez's biplane at 22%.   3. There is global hypokinesis of the left ventricle with minor regional variations.   4. Spectral Doppler shows a restrictive pattern of left ventricular diastolic filling.   5. Left ventricular cavity size is severely dilated.   6. Abnormal septal motion consistent with post-operative status.   7. No left ventricular thrombus visualized.   8. There is reduced right ventricular systolic function.   9. The left atrium is enlarged.  10. The right atrium is mild to moderately dilated.  11. Right ventricular systolic pressure is within normal limits.  12. S/p 29mm Konect Resilia bioprosthetic valved conduit. AVR gradients were not assessed by spectral Doppler though no obvious AI by color Doppler  13. S/p 32mm  Gelweave ascending aortic conduit and hemiarch.  14. Compared with the prior exam, preop echo done on 9/20/2024 [facility], the LV was already severely dilated with moderately reduced function with a severely dilated aorta with moderate AI and the patient is now s/p acending aortic and arch replacement and Konect AVR. The LV remains severely dilated with a reducetion in LV systolic function , now severely reduced. The AVR gradients were not assessed today but there is no AI.    Imaging  Chest x-ray: None today    cMRI: 9/12/24  IMPRESSION:  1. Dilated LV (EDVi-182 ml/m2) with reduced systolic function.  Quantitative LVEF 34 %.  2. Normal RV size (EDVi-88 ml/m2)and systolic function. Quantitative  RVEF49%.  3. Aneurysmal dilatation of the ascending aorta measuring up 6.4 cm.  Dilated aortic root measuring 4.5 x 1.5 x 4.2 cm.  4. Moderate aortic regurgitation. Aortic regurgitant fraction is 36%.  5. Small focal transmural (%) LGE/scar involving the apical  lateral wall. Finding may represent prior embolic event versus prior  myocarditis.  6. Nonspecific replacement fibrosis noted at the level of the basal  mid septum.  7. Small bilateral pleural effusion.  8. Small circumferential pericardial effusion.    [facility]: 9/11/24  1. The left main is a large vessel that bifurcates into the LAD and circumflex and had no obstructive disease.  2. The LAD was a large vessel that extended to the apex and wraps around the apex to supply the distal inferoapical wall. The LAD had no obstructive disease.  3. The circumflex was a large codominant vessel with no obstructive disease in the circumflex or its marginal branches.  4. The right coronary artery was unsuccessfully imaged nonselective imaging briefly showed evidence of vessel patency.    Lab Review   Results for orders placed or performed during the hospital encounter of 09/11/24 (from the past 24 hour(s))   Magnesium   Result Value Ref Range    Magnesium 2.20  "1.60 - 2.40 mg/dL   CBC   Result Value Ref Range    WBC 8.9 4.4 - 11.3 x10*3/uL    nRBC 0.0 0.0 - 0.0 /100 WBCs    RBC 3.86 (L) 4.50 - 5.90 x10*6/uL    Hemoglobin 10.7 (L) 13.5 - 17.5 g/dL    Hematocrit 32.9 (L) 41.0 - 52.0 %    MCV 85 80 - 100 fL    MCH 27.7 26.0 - 34.0 pg    MCHC 32.5 32.0 - 36.0 g/dL    RDW 14.1 11.5 - 14.5 %    Platelets 175 150 - 450 x10*3/uL   Renal Function Panel   Result Value Ref Range    Glucose 87 74 - 99 mg/dL    Sodium 137 136 - 145 mmol/L    Potassium 4.2 3.5 - 5.3 mmol/L    Chloride 103 98 - 107 mmol/L    Bicarbonate 23 21 - 32 mmol/L    Anion Gap 15 10 - 20 mmol/L    Urea Nitrogen 34 (H) 6 - 23 mg/dL    Creatinine 1.30 0.50 - 1.30 mg/dL    eGFR 60 (L) >60 mL/min/1.73m*2    Calcium 8.3 (L) 8.6 - 10.6 mg/dL    Phosphorus 4.0 2.5 - 4.9 mg/dL    Albumin 3.1 (L) 3.4 - 5.0 g/dL       No results found for: \"CKTOTAL\", \"CKMB\", \"CKMBINDEX\", \"TROPONINI\"  Lab Results   Component Value Date    WBC 8.9 09/30/2024    HGB 10.7 (L) 09/30/2024    HCT 32.9 (L) 09/30/2024    MCV 85 09/30/2024     09/30/2024     No results found for: \"CHOL\", \"TRIG\", \"HDL\", \"LDLDIRECT\"    Triglycerides   Date/Time Value Ref Range Status   04/10/2024 10:44 AM 55 40 - 150 mg/dL Final   10/20/2022 07:48 AM 63 40 - 150 MG/DL Final   09/07/2021 01:21 PM 82 40 - 150 MG/DL Final        Assessment and Plan   David Chatman is a 68 y.o. male with a PMH of HLD,HTN, SHOAIB who was being worked up for SOB, presented to the hospital at the advice of his cardiologist after he was found to have an LVEF of 35-40% with moderate AR with CT angio chest showing aneursym of thoracic aorta 6.2cm. LHC 9/11 showed no obstructive disease in the left main, LAD, or circumflex vessel; inability to engage the RCA. Underwent cMRI 9/12 that showed dilated LV with reduced LVEF 34%, normal RV size and systolic function; dilation of ascending aorta measuring 6.4 cm with moderate aortic regurg; small focal transmural LGE/scar involving the apical " lateral wall and nonspecific replacement fibrosis noted at level of the basal mid septum. He ultimately went to the OR with Dr. Perdomo on 9/23 and is s/p ascending aortic replacement and hemiarch replacement with a 32 Gelweave graft, aortic root replacement and aortic valve replacement with a 29 mm connect valve conduit, STEVEN closure. Post op hospital course complicated with afib RVR requiring amiodarone and LIZ. HF re-consulted to assist in the management of HFrEF.     #New onset HFrEF(NYHA Class II-III/AHA Stage C).   #now s/p thoracic aortic aneurysm repair, AVR   Cardiomyopathy likely related to valvulopathy (moderate AR) vs idiopathic. Some note of small focal transmural scar in the apical lateral wall on cMRI though out of proportion to cardiomyopathy present.    Admit proBNP 1015, trop 18-->16-->21, EKG LBBB.  -TTE 09/10 showed an LVEF of 35-40%, moderate AR, moderate MR, normal RV systolic function and aneurysm of ascending aorta. Nuclear perfusion scan negative for inducible ischemia.  -TTE 09/25 showed an LV that is severely dilated with EF of 22%, reduced RVSF. No obvious AI by color doppler. Mild MVR. Trace to mild TVR.   -LHC 09/11 at OSH with left system without significant CAD, although the RCA difficult to engage.   -cMRI 09/12: Dilated LV with reduced systolic function, LVEF 34 %. Aneurysmal dilatation of the ascending aorta measuring up 6.4 cm. Dilated aortic root measuring 4.5 x 1.5 x 4.2 cm. Moderate aortic regurgitation. Small focal transmural (%) LGE/scar involving the apical lateral wall. Nonspecific replacement fibrosis noted at the level of the basal mid septum.  - Diuresis: Hypervolemic on clinical exam. Recommend continued diuresis, could consider increase dose of IV lasix.   - GDMT:  -BB: continue metoprolol 50mg bid, plan to transition to metoprolol XL prior to discharge.  -ACE/ARB/ARNI: start entretso 24-26mg bid.  -MRA: continue eplerenone 25mg daily.  -SGLT2i: start  farxiga/jardiance if able (insurance issues noted).   - Inotropes: not indicated  - Mechanical support:  not indicated  - AICD/CRT-D/Lifevest: Potentially a candidate in the future for CRT given HFrEF EF<35%, QRS>150 and LBBB.  - Strict I/Os, Daily Na < 2g daily, daily weights  - Will need follow up with outpatient HF team. Consider setting up follow up with Dr. Buck in [facility].     This plan was discussed with Dr. Henderson, HF attending. For any questions or concerns, feel free to reach out via [email or web address] chat or page at [phone].    Alba Maya, DO   Heart Failure Fellow  PGY-4

## 2024-09-30 NOTE — TELEPHONE ENCOUNTER
Can you please clarify what I am supposed to tell the patient about the abnormal result for the stress test and monitor

## 2024-09-30 NOTE — TELEPHONE ENCOUNTER
----- Message from Jonathon Cartre sent at 9/10/2024  5:48 PM EDT -----  Please call the patient regarding his abnormal result.

## 2024-10-01 LAB
ALBUMIN SERPL BCP-MCNC: 3.4 G/DL (ref 3.4–5)
ALBUMIN SERPL BCP-MCNC: 3.5 G/DL (ref 3.4–5)
ANION GAP SERPL CALC-SCNC: 15 MMOL/L (ref 10–20)
ANION GAP SERPL CALC-SCNC: 17 MMOL/L (ref 10–20)
ATRIAL RATE: 70 BPM
BUN SERPL-MCNC: 36 MG/DL (ref 6–23)
BUN SERPL-MCNC: 40 MG/DL (ref 6–23)
CALCIUM SERPL-MCNC: 8.3 MG/DL (ref 8.6–10.6)
CALCIUM SERPL-MCNC: 8.6 MG/DL (ref 8.6–10.6)
CHLORIDE SERPL-SCNC: 100 MMOL/L (ref 98–107)
CHLORIDE SERPL-SCNC: 103 MMOL/L (ref 98–107)
CO2 SERPL-SCNC: 22 MMOL/L (ref 21–32)
CO2 SERPL-SCNC: 23 MMOL/L (ref 21–32)
CREAT SERPL-MCNC: 1.48 MG/DL (ref 0.5–1.3)
CREAT SERPL-MCNC: 1.61 MG/DL (ref 0.5–1.3)
EGFRCR SERPLBLD CKD-EPI 2021: 47 ML/MIN/1.73M*2
EGFRCR SERPLBLD CKD-EPI 2021: 52 ML/MIN/1.73M*2
ERYTHROCYTE [DISTWIDTH] IN BLOOD BY AUTOMATED COUNT: 14.2 % (ref 11.5–14.5)
GLUCOSE SERPL-MCNC: 124 MG/DL (ref 74–99)
GLUCOSE SERPL-MCNC: 92 MG/DL (ref 74–99)
HCT VFR BLD AUTO: 31.4 % (ref 41–52)
HGB BLD-MCNC: 10.5 G/DL (ref 13.5–17.5)
MAGNESIUM SERPL-MCNC: 2.28 MG/DL (ref 1.6–2.4)
MCH RBC QN AUTO: 28.1 PG (ref 26–34)
MCHC RBC AUTO-ENTMCNC: 33.4 G/DL (ref 32–36)
MCV RBC AUTO: 84 FL (ref 80–100)
NRBC BLD-RTO: 0 /100 WBCS (ref 0–0)
P AXIS: -13 DEGREES
P OFFSET: 172 MS
P ONSET: 118 MS
PHOSPHATE SERPL-MCNC: 4.7 MG/DL (ref 2.5–4.9)
PHOSPHATE SERPL-MCNC: 5.1 MG/DL (ref 2.5–4.9)
PLATELET # BLD AUTO: 212 X10*3/UL (ref 150–450)
POTASSIUM SERPL-SCNC: 4.2 MMOL/L (ref 3.5–5.3)
POTASSIUM SERPL-SCNC: 4.3 MMOL/L (ref 3.5–5.3)
PR INTERVAL: 182 MS
Q ONSET: 209 MS
QRS COUNT: 12 BEATS
QRS DURATION: 154 MS
QT INTERVAL: 456 MS
QTC CALCULATION(BAZETT): 492 MS
QTC FREDERICIA: 480 MS
R AXIS: -35 DEGREES
RBC # BLD AUTO: 3.74 X10*6/UL (ref 4.5–5.9)
SODIUM SERPL-SCNC: 135 MMOL/L (ref 136–145)
SODIUM SERPL-SCNC: 137 MMOL/L (ref 136–145)
T AXIS: 140 DEGREES
T OFFSET: 437 MS
VENTRICULAR RATE: 70 BPM
WBC # BLD AUTO: 9.6 X10*3/UL (ref 4.4–11.3)

## 2024-10-01 PROCEDURE — 1200000002 HC GENERAL ROOM WITH TELEMETRY DAILY

## 2024-10-01 PROCEDURE — 99232 SBSQ HOSP IP/OBS MODERATE 35: CPT | Performed by: INTERNAL MEDICINE

## 2024-10-01 PROCEDURE — 80069 RENAL FUNCTION PANEL: CPT | Performed by: NURSE PRACTITIONER

## 2024-10-01 PROCEDURE — 2500000002 HC RX 250 W HCPCS SELF ADMINISTERED DRUGS (ALT 637 FOR MEDICARE OP, ALT 636 FOR OP/ED): Performed by: PHYSICIAN ASSISTANT

## 2024-10-01 PROCEDURE — 83735 ASSAY OF MAGNESIUM: CPT | Performed by: NURSE PRACTITIONER

## 2024-10-01 PROCEDURE — 2500000004 HC RX 250 GENERAL PHARMACY W/ HCPCS (ALT 636 FOR OP/ED): Performed by: PHYSICIAN ASSISTANT

## 2024-10-01 PROCEDURE — 2500000001 HC RX 250 WO HCPCS SELF ADMINISTERED DRUGS (ALT 637 FOR MEDICARE OP): Performed by: PHYSICIAN ASSISTANT

## 2024-10-01 PROCEDURE — 2500000001 HC RX 250 WO HCPCS SELF ADMINISTERED DRUGS (ALT 637 FOR MEDICARE OP): Performed by: NURSE PRACTITIONER

## 2024-10-01 PROCEDURE — 2500000004 HC RX 250 GENERAL PHARMACY W/ HCPCS (ALT 636 FOR OP/ED): Performed by: NURSE PRACTITIONER

## 2024-10-01 PROCEDURE — 84100 ASSAY OF PHOSPHORUS: CPT | Performed by: NURSE PRACTITIONER

## 2024-10-01 PROCEDURE — 2500000005 HC RX 250 GENERAL PHARMACY W/O HCPCS: Performed by: NURSE PRACTITIONER

## 2024-10-01 PROCEDURE — 99232 SBSQ HOSP IP/OBS MODERATE 35: CPT | Performed by: NURSE PRACTITIONER

## 2024-10-01 PROCEDURE — 36416 COLLJ CAPILLARY BLOOD SPEC: CPT | Performed by: NURSE PRACTITIONER

## 2024-10-01 PROCEDURE — 85027 COMPLETE CBC AUTOMATED: CPT | Performed by: NURSE PRACTITIONER

## 2024-10-01 PROCEDURE — 97530 THERAPEUTIC ACTIVITIES: CPT | Mod: GP,CQ

## 2024-10-01 RX ORDER — METOPROLOL SUCCINATE 100 MG/1
100 TABLET, EXTENDED RELEASE ORAL DAILY
Status: DISCONTINUED | OUTPATIENT
Start: 2024-10-02 | End: 2024-10-03 | Stop reason: HOSPADM

## 2024-10-01 RX ORDER — LIDOCAINE 560 MG/1
1 PATCH PERCUTANEOUS; TOPICAL; TRANSDERMAL DAILY
Status: DISCONTINUED | OUTPATIENT
Start: 2024-10-01 | End: 2024-10-03 | Stop reason: HOSPADM

## 2024-10-01 RX ORDER — METOPROLOL SUCCINATE 50 MG/1
50 TABLET, EXTENDED RELEASE ORAL ONCE
Status: COMPLETED | OUTPATIENT
Start: 2024-10-01 | End: 2024-10-01

## 2024-10-01 RX ORDER — FUROSEMIDE 10 MG/ML
80 INJECTION INTRAMUSCULAR; INTRAVENOUS 2 TIMES DAILY
Status: DISCONTINUED | OUTPATIENT
Start: 2024-10-01 | End: 2024-10-03

## 2024-10-01 RX ADMIN — AMIODARONE HYDROCHLORIDE 400 MG: 200 TABLET ORAL at 20:04

## 2024-10-01 RX ADMIN — METOPROLOL SUCCINATE 50 MG: 50 TABLET, EXTENDED RELEASE ORAL at 16:20

## 2024-10-01 RX ADMIN — FUROSEMIDE 40 MG: 10 INJECTION, SOLUTION INTRAVENOUS at 08:31

## 2024-10-01 RX ADMIN — HEPARIN SODIUM 5000 UNITS: 5000 INJECTION, SOLUTION INTRAVENOUS; SUBCUTANEOUS at 16:20

## 2024-10-01 RX ADMIN — EPLERENONE 25 MG: 25 TABLET, FILM COATED ORAL at 08:31

## 2024-10-01 RX ADMIN — ATORVASTATIN CALCIUM 20 MG: 20 TABLET, FILM COATED ORAL at 20:04

## 2024-10-01 RX ADMIN — PANTOPRAZOLE SODIUM 40 MG: 40 TABLET, DELAYED RELEASE ORAL at 05:47

## 2024-10-01 RX ADMIN — HEPARIN SODIUM 5000 UNITS: 5000 INJECTION, SOLUTION INTRAVENOUS; SUBCUTANEOUS at 08:31

## 2024-10-01 RX ADMIN — AMIODARONE HYDROCHLORIDE 400 MG: 200 TABLET ORAL at 08:31

## 2024-10-01 RX ADMIN — ACETAMINOPHEN 650 MG: 325 TABLET, FILM COATED ORAL at 21:48

## 2024-10-01 RX ADMIN — OXYCODONE HYDROCHLORIDE 5 MG: 5 TABLET ORAL at 21:48

## 2024-10-01 RX ADMIN — METOPROLOL TARTRATE 50 MG: 50 TABLET, FILM COATED ORAL at 08:31

## 2024-10-01 RX ADMIN — HEPARIN SODIUM 5000 UNITS: 5000 INJECTION, SOLUTION INTRAVENOUS; SUBCUTANEOUS at 00:47

## 2024-10-01 RX ADMIN — ACETAMINOPHEN 650 MG: 325 TABLET, FILM COATED ORAL at 14:24

## 2024-10-01 RX ADMIN — ASPIRIN 81 MG: 81 TABLET, COATED ORAL at 08:31

## 2024-10-01 RX ADMIN — AMIODARONE HYDROCHLORIDE 150 MG: 1.5 INJECTION, SOLUTION INTRAVENOUS at 16:21

## 2024-10-01 RX ADMIN — LIDOCAINE 1 PATCH: 4 PATCH TOPICAL at 16:21

## 2024-10-01 RX ADMIN — FUROSEMIDE 80 MG: 10 INJECTION, SOLUTION INTRAVENOUS at 17:17

## 2024-10-01 RX ADMIN — OXYCODONE HYDROCHLORIDE 5 MG: 5 TABLET ORAL at 00:52

## 2024-10-01 ASSESSMENT — COGNITIVE AND FUNCTIONAL STATUS - GENERAL
STANDING UP FROM CHAIR USING ARMS: A LITTLE
WALKING IN HOSPITAL ROOM: A LITTLE
MOVING TO AND FROM BED TO CHAIR: A LITTLE
MOBILITY SCORE: 20
CLIMB 3 TO 5 STEPS WITH RAILING: A LITTLE

## 2024-10-01 ASSESSMENT — PAIN SCALES - GENERAL
PAINLEVEL_OUTOF10: 3
PAINLEVEL_OUTOF10: 5 - MODERATE PAIN
PAINLEVEL_OUTOF10: 1
PAINLEVEL_OUTOF10: 5 - MODERATE PAIN
PAINLEVEL_OUTOF10: 0 - NO PAIN

## 2024-10-01 ASSESSMENT — PAIN - FUNCTIONAL ASSESSMENT
PAIN_FUNCTIONAL_ASSESSMENT: 0-10
PAIN_FUNCTIONAL_ASSESSMENT: 0-10

## 2024-10-01 ASSESSMENT — PAIN DESCRIPTION - ORIENTATION: ORIENTATION: MID

## 2024-10-01 ASSESSMENT — PAIN DESCRIPTION - LOCATION
LOCATION: CHEST
LOCATION: CHEST
LOCATION: BACK

## 2024-10-01 NOTE — PROGRESS NOTES
Physical Therapy    Physical Therapy Treatment    Patient Name: David Chatman  MRN: [id number]  Department: [place] [address]  Room: [address] [place] [address]  Today's Date: 10/1/2024  Time Calculation  Start Time: 1010  Stop Time: 1025  Time Calculation (min): 15 min         Assessment/Plan   PT Assessment  PT Assessment Results: Decreased strength, Decreased endurance, Impaired balance, Decreased mobility  Rehab Prognosis: Excellent  Evaluation/Treatment Tolerance: Patient tolerated treatment well  Medical Staff Made Aware: Yes  End of Session Communication: Bedside nurse  Assessment Comment: Pt making excellent progress, ambulating without AD and able to negotiate full flight of stairs. Continues to demo decreased strength and endurance compared to baseline. Remains appropriate for low intensity therpay  End of Session Patient Position: Bed, 2 rail up, Alarm off, not on at start of session  PT Plan  Inpatient/Swing Bed or Outpatient: Inpatient  PT Plan  Treatment/Interventions: Bed mobility, Transfer training, Gait training, Stair training, Balance training, Strengthening, Endurance training, Therapeutic exercise, Therapeutic activity  PT Plan: Ongoing PT  PT Frequency: 3 times per week  PT Discharge Recommendations: Low intensity level of continued care  PT Recommended Transfer Status: Assist x1, Assistive device  PT - OK to Discharge: Yes      General Visit Information:   PT  Visit  PT Received On: 10/01/24  Response to Previous Treatment: Patient with no complaints from previous session.  General  Prior to Session Communication: Bedside nurse  Patient Position Received: Up in chair, Alarm off, not on at start of session  General Comment: Pt sitting up in room, pleasant and agreeable to therapy. Reports concerns regarding feeling weak since surgery  Per handoff with RN, pt is appropriate for therapy, vitals are stable and pain is controlled. Other concerns prior to tx are: none    Subjective   Precautions:  Precautions  Medical  Precautions: Cardiac precautions, Fall precautions  Post-Surgical Precautions: Move in the Tube    Objective   Pain:  Pain Assessment  Pain Assessment: 0-10  0-10 (Numeric) Pain Score: 0 - No pain  Cognition:  Cognition  Overall Cognitive Status: Within Functional Limits  Orientation Level: Oriented X4    Treatments:  Therapeutic Exercise  Therapeutic Exercise Performed: Yes  Therapeutic Exercise Activity 1: Sit-stand 5x from EOB without UE assist with SUP, supine SLR 5x/LE, pt reports plan to complete throughout the day    Bed Mobility  Bed Mobility: Yes  Bed Mobility 1  Bed Mobility 1: Supine to sitting, Sitting to supine  Level of Assistance 1: Modified independent    Ambulation/Gait Training  Ambulation/Gait Training Performed: Yes  Ambulation/Gait Training 1  Surface 1: Level tile  Device 1: No device  Assistance 1: Close supervision  Comments/Distance (ft) 1: 250'x1, 80'x1, 160'x1  Transfers  Transfer: Yes  Transfer 1  Transfer From 1: Sit to, Stand to  Transfer to 1: Sit  Technique 1: Sit to stand, Stand to sit  Transfer Level of Assistance 1: Distant supervision    Stairs  Stairs: Yes  Stairs  Rails 1: Bilateral  Device 1: Railing  Assistance 1: Close supervision  Comment/Number of Steps 1: 12 steps, recip sequence    Outcome Measures:     [facility] Basic Mobility  Turning from your back to your side while in a flat bed without using bedrails: None  Moving from lying on your back to sitting on the side of a flat bed without using bedrails: None  Moving to and from bed to chair (including a wheelchair): A little  Standing up from a chair using your arms (e.g. wheelchair or bedside chair): A little  To walk in hospital room: A little  Climbing 3-5 steps with railing: A little  Basic Mobility - Total Score: 20    Education Documentation  Precautions, taught by Yolie Solis PTA at 10/1/2024 10:34 AM.  Learner: Patient  Readiness: Acceptance  Method: Explanation, Demonstration  Response: Verbalizes Understanding,  Demonstrated Understanding    Body Mechanics, taught by Yolie Solis PTA at 10/1/2024 10:34 AM.  Learner: Patient  Readiness: Acceptance  Method: Explanation, Demonstration  Response: Verbalizes Understanding, Demonstrated Understanding    Mobility Training, taught by Yolie Solis PTA at 10/1/2024 10:34 AM.  Learner: Patient  Readiness: Acceptance  Method: Explanation, Demonstration  Response: Verbalizes Understanding, Demonstrated Understanding    Education Comments  No comments found.        OP EDUCATION:       Encounter Problems       Encounter Problems (Active)       Balance       Pt will demonstrated ability to score at least 24/28 on the Tinetti balance assessment tool to ensure safety upon D/C.  (Progressing)       Start:  09/24/24    Expected End:  10/08/24               Mobility       Pt will demonstrated ability to ambulate >/=250ft with proper form and no balance deficits for safe home going.   (Progressing)       Start:  09/24/24    Expected End:  10/08/24            Pt will demonstrate ability to ascend/descend 1 flight of stairs with unilateral rail and no balance deficits for safe home going.  (Progressing)       Start:  09/24/24    Expected End:  10/08/24               PT Transfers       Pt will demonstrate ability to complete 5X STS in < 12 sec with good from and consistency between transfers for safe D/C.  (Progressing)       Start:  09/24/24    Expected End:  10/08/24                 MIKALA Fontaine

## 2024-10-01 NOTE — PROGRESS NOTES
Advanced Heart Failure Progress Note   Consulting Team: CT surgery  Primary Cardiologist: Dr. Carter  Reason for Consult: HFrEF. Now s/p thoracic aorta aneursym repair.    Subjective   Patient endorses SOB, similar to yesterday. LE edema is similar as well. Reports he hasn't had issues with urination - not more or less with the IV diuretics. Denies any chest discomfort.     Received IV lasix 40mg bid yesterday. Was not started on entresto because recovering from LIZ and pending CTA per primary note.     Objective   Physical Exam  Vitals:    10/01/24 0544   BP: 113/76   Pulse: 90   Resp: 18   Temp: 36.8 °C (98.2 °F)   SpO2: 95%     GEN:  NAD.  CV: irregularly irregular, no m/r/g. LE edema: +2 pitting edema.   LUNGS: CTA B/L  ABD: Soft, NT/ND, NBS  SKIN: Warm, well perfused. No skin rashes or abnormal lesions.   EXT: No clubbing, cyanosis, or edema.  NEURO: AAOX3, not following commands    I have personally reviewed the following images and laboratory findings:  ECG:  sinus rhythm, LAD, LBBB     Results for orders placed during the hospital encounter of 09/10/24    Transthoracic Echo Complete    Narrative  [address], [place] [address]  Phone [phone]    TRANSTHORACIC ECHOCARDIOGRAM REPORT    Patient Name:      NAN Norman Physician:    [id number] Jackeline Davies MD  Study Date:        9/10/2024            Ordering Provider:    [id number] AAMIR CARTER  MRN/PID:           [id number]             Fellow:  Accession#:        [id number]         Nurse:  Date of Birth/Age: 1955 / 68 years Sonographer:          Ai PELLETIER  Gender:            M                    Additional Staff:  Height:            185.42 cm            Admit Date:  Weight:            117.03 kg            Admission Status:     Outpatient  BSA / BMI:         2.40 m2 / 34.04      Department Location:  kg/m2  Blood Pressure: 125 /72 mmHg    Study Type:    TRANSTHORACIC ECHO (TTE)  COMPLETE  Diagnosis/ICD: Shortness of breath-R06.02; Essential (primary) hypertension-I10  Indication:    Rule out CAD HLD HTN Shortness of Breath  CPT Codes:     Echo Complete w Full Doppler-[id number]    Patient History:  Pertinent History: HTN HLD PAF.    Study Detail: The following Echo studies were performed: 2D, M-Mode, Doppler,  color flow, Strain and 3D.      PHYSICIAN INTERPRETATION:  Left Ventricle: The left ventricular systolic function is moderately decreased, with a visually estimated ejection fraction of 35-40%. There is global hypokinesis of the left ventricle with minor regional variations. The left ventricular cavity size is severely dilated. Left Ventricular Global Longitudinal Strain - -10.5 %. Spectral Doppler shows a normal pattern of left ventricular diastolic filling.  Left Atrium: The left atrium is moderately dilated.  Right Ventricle: The right ventricle is normal in size. There is normal right ventricular global systolic function.  Right Atrium: The right atrium is moderately dilated.  Aortic Valve: The aortic valve was not well visualized. There is no aortic valve thickening. There is no evidence of aortic valve stenosis.  The aortic valve dimensionless index is 1.03. There is moderate aortic valve regurgitation. The peak instantaneous gradient of the aortic valve is 16.5 mmHg. The mean gradient of the aortic valve is 8.0 mmHg. Likely tricuspid aortic valve but images slightly suboptimal.  Mitral Valve: The mitral valve is abnormal. There is moderate mitral valve regurgitation which is centrally directed.  Tricuspid Valve: The tricuspid valve is structurally normal. There is trace tricuspid regurgitation. The right ventricular systolic pressure is unable to be estimated.  Pulmonic Valve: The pulmonic valve is structurally normal. There is physiologic pulmonic valve regurgitation.  Pericardium: There is a trivial pericardial effusion.  Aorta: The aortic root is normal. There is an aneurysm  involving the ascending aorta. Severe ascending aortic aneurysm of 6.2 cm at the largest diameter.  Systemic Veins: The inferior vena cava appears to be of normal size, IVC inspiratory collapse greater than 50%.      CONCLUSIONS:  1. The left ventricular systolic function is moderately decreased, with a visually estimated ejection fraction of 35-40%.  2. There is global hypokinesis of the left ventricle with minor regional variations.  3. Left ventricular cavity size is severely dilated.  4. There is normal right ventricular global systolic function.  5. The left atrium is moderately dilated.  6. The right atrium is moderately dilated.  7. Moderate mitral valve regurgitation.  8. Trace tricuspid regurgitation is visualized.  9. Aortic valve stenosis is not present.  10. Likely tricuspid aortic valve but images slightly suboptimal.  11. Moderate aortic valve regurgitation.  12. Aneurysm of the ascending aorta.    Limited TTE 9/25  CONCLUSIONS:   1. Poorly visualized anatomical structures due to suboptimal image quality.   2. Left ventricular ejection fraction is severely decreased, calculated by Vasquez's biplane at 22%.   3. There is global hypokinesis of the left ventricle with minor regional variations.   4. Spectral Doppler shows a restrictive pattern of left ventricular diastolic filling.   5. Left ventricular cavity size is severely dilated.   6. Abnormal septal motion consistent with post-operative status.   7. No left ventricular thrombus visualized.   8. There is reduced right ventricular systolic function.   9. The left atrium is enlarged.  10. The right atrium is mild to moderately dilated.  11. Right ventricular systolic pressure is within normal limits.  12. S/p 29mm Konect Resilia bioprosthetic valved conduit. AVR gradients were not assessed by spectral Doppler though no obvious AI by color Doppler  13. S/p 32mm Gelweave ascending aortic conduit and hemiarch.  14. Compared with the prior exam, preop  "echo done on 9/20/2024 [facility], the LV was already severely dilated with moderately reduced function with a severely dilated aorta with moderate AI and the patient is now s/p acending aortic and arch replacement and Konect AVR. The LV remains severely dilated with a reducetion in LV systolic function , now severely reduced. The AVR gradients were not assessed today but there is no AI.    Imaging  Chest x-ray: None today    cMRI: 9/12/24  IMPRESSION:  1. Dilated LV (EDVi-182 ml/m2) with reduced systolic function.  Quantitative LVEF 34 %.  2. Normal RV size (EDVi-88 ml/m2)and systolic function. Quantitative  RVEF49%.  3. Aneurysmal dilatation of the ascending aorta measuring up 6.4 cm.  Dilated aortic root measuring 4.5 x 1.5 x 4.2 cm.  4. Moderate aortic regurgitation. Aortic regurgitant fraction is 36%.  5. Small focal transmural (%) LGE/scar involving the apical  lateral wall. Finding may represent prior embolic event versus prior  myocarditis.  6. Nonspecific replacement fibrosis noted at the level of the basal  mid septum.  7. Small bilateral pleural effusion.  8. Small circumferential pericardial effusion.    [facility]: 9/11/24  1. The left main is a large vessel that bifurcates into the LAD and circumflex and had no obstructive disease.  2. The LAD was a large vessel that extended to the apex and wraps around the apex to supply the distal inferoapical wall. The LAD had no obstructive disease.  3. The circumflex was a large codominant vessel with no obstructive disease in the circumflex or its marginal branches.  4. The right coronary artery was unsuccessfully imaged nonselective imaging briefly showed evidence of vessel patency.    Lab Review   No results found for this or any previous visit (from the past 24 hour(s)).      No results found for: \"CKTOTAL\", \"CKMB\", \"CKMBINDEX\", \"TROPONINI\"  Lab Results   Component Value Date    WBC 8.9 09/30/2024    HGB 10.7 (L) 09/30/2024    HCT 32.9 (L) " "09/30/2024    MCV 85 09/30/2024     09/30/2024     No results found for: \"CHOL\", \"TRIG\", \"HDL\", \"LDLDIRECT\"    Triglycerides   Date/Time Value Ref Range Status   04/10/2024 10:44 AM 55 40 - 150 mg/dL Final   10/20/2022 07:48 AM 63 40 - 150 MG/DL Final   09/07/2021 01:21 PM 82 40 - 150 MG/DL Final        Assessment and Plan   David Chatman is a 68 y.o. male with a PMH of HLD,HTN, SHOAIB who was being worked up for SOB, presented to the hospital at the advice of his cardiologist after he was found to have an LVEF of 35-40% with moderate AR with CT angio chest showing aneursym of thoracic aorta 6.2cm. LHC 9/11 showed no obstructive disease in the left main, LAD, or circumflex vessel; inability to engage the RCA. Underwent cMRI 9/12 that showed dilated LV with reduced LVEF 34%, normal RV size and systolic function; dilation of ascending aorta measuring 6.4 cm with moderate aortic regurg; small focal transmural LGE/scar involving the apical lateral wall and nonspecific replacement fibrosis noted at level of the basal mid septum. He ultimately went to the OR with Dr. Perdomo on 9/23 and is s/p ascending aortic replacement and hemiarch replacement with a 32 Gelweave graft, aortic root replacement and aortic valve replacement with a 29 mm connect valve conduit, STEVEN closure. Post op hospital course complicated with afib RVR requiring amiodarone and LIZ. HF re-consulted to assist in the management of HFrEF.     #New onset HFrEF(NYHA Class II-III/AHA Stage C).   #now s/p thoracic aortic aneurysm repair, AVR   Cardiomyopathy likely related to valvulopathy (moderate AR) vs idiopathic. Some note of small focal transmural scar in the apical lateral wall on cMRI though out of proportion to cardiomyopathy present.    Admit proBNP 1015, trop 18-->16-->21, EKG LBBB.  -TTE 09/10 showed an LVEF of 35-40%, moderate AR, moderate MR, normal RV systolic function and aneurysm of ascending aorta. Nuclear perfusion scan negative for " inducible ischemia.  -TTE 09/25 showed an LV that is severely dilated with EF of 22%, reduced RVSF. No obvious AI by color doppler. Mild MVR. Trace to mild TVR.   -LHC 09/11 at OSH with left system without significant CAD, although the RCA difficult to engage.   -cMRI 09/12: Dilated LV with reduced systolic function, LVEF 34 %. Aneurysmal dilatation of the ascending aorta measuring up 6.4 cm. Dilated aortic root measuring 4.5 x 1.5 x 4.2 cm. Moderate aortic regurgitation. Small focal transmural (%) LGE/scar involving the apical lateral wall. Nonspecific replacement fibrosis noted at the level of the basal mid septum.  - Diuresis: Hypervolemic on clinical exam. Recommend continued diuresis, recommend increase dose of IV lasix to 80mg BID.  - GDMT:  -BB: continue metoprolol 50mg bid, plan to transition to metoprolol XL 100mg daily prior to discharge.  -ACE/ARB/ARNI: hold off initiation of entretso 24-26mg bid with worsening kidney impairment.  -MRA: continue eplerenone 25mg daily.  -SGLT2i: hold off due to costs; will consider starting in outpatient setting if able.   - Inotropes: not indicated  - Mechanical support:  not indicated  - AICD/CRT-D/Lifevest: Potentially a candidate in the future for CRT given HFrEF EF<35%, QRS>150 and LBBB.  - Strict I/Os, Daily Na < 2g daily, daily weights  - Will need follow up with outpatient HF team. Consider setting up follow up with Dr. Buck in [facility].     This plan was discussed with Dr. Henderson, HF attending. For any questions or concerns, feel free to reach out via getbetter! chat or page at [phone].    Alba Maya,    Heart Failure Fellow  PGY-4

## 2024-10-01 NOTE — PROGRESS NOTES
"CARDIAC SURGERY DAILY PROGRESS NOTE    David Chatman is a 68 y.o. male presenting with shortness of breath. He presented to his cardiologist as an outpt for these complaints. Echo showed an EF of 35-40% and ascending aneurysm 6.2. He was directed to the ED. BNP 1015, troponins, 21,16,15. CT chest revealed large ascending aneurysm up to 6.2 cm. No evidence of acute aortic dissection. He was given IV lasix with relief of symptoms and admitted for further evaluation. He is currently planned for cardiac catheterization. Asymptomatic. The pt reports his blood pressures are under control currently. CT surgery consulted for aneurysm evaluation at OSH and he was transferred to [facility] for further care.    9/23/2024 Operation Procedures: by Trino Perdomo  #1 standard median sternotomy  #2 innominate trunk arterial cannulation  #3 ascending aortic replacement and hemiarch replacement with a 32 Gelweave graft  #4 aortic root replacement and aortic valve replacement: Bentall procedure with a 29 mm connect valve conduit  #5 left atrial appendage closure with a 35 mm AtriCure clip  #6 left femoral arterial line placement    CTICU course: weaned off drips; afib RVR req amio    Transferred to T3 SDU on 9/26  ========================    Interval History:   No  issues  overnight     SUBJECTIVE:  No sleeping overnight     Objective   /70 (BP Location: Right arm, Patient Position: Lying)   Pulse 97   Temp 36.9 °C (98.4 °F)   Resp 18   Ht 1.854 m (6' 1\")   Wt 114 kg (251 lb 4.8 oz)   SpO2 96%   BMI 33.16 kg/m²   0-10 (Numeric) Pain Score: 3   3 Day Weight Change: Unable to Calculate    Intake and Output    Intake/Output Summary (Last 24 hours) at 10/1/2024 1512  Last data filed at 10/1/2024 1320  Gross per 24 hour   Intake 890 ml   Output 800 ml   Net 90 ml       Physical Exam  Physical Exam  Vitals and nursing note reviewed.   Constitutional:       Appearance: Normal appearance.   Eyes:      Conjunctiva/sclera: Conjunctivae " normal.   Cardiovascular:      Rate and Rhythm: Normal rate and regular rhythm.      Pulses: Normal pulses.      Heart sounds: Normal heart sounds.      Comments: NSR    Epicardial wires capped   Pulmonary:      Effort: Pulmonary effort is normal.      Breath sounds: Normal breath sounds.      Comments:  Diminished LS at the base    Sternum stable   Abdominal:      Palpations: Abdomen is soft.      Comments: + BM    Genitourinary:     Comments: Voids independently   Musculoskeletal:         General: Normal range of motion.   Skin:     General: Skin is warm and dry.      Capillary Refill: Capillary refill takes less than 2 seconds.      Comments: MSI JACK well approx no s/sx of infections, no erythema     Neurological:      General: No focal deficit present.      Mental Status: He is alert and oriented to person, place, and time.   Psychiatric:         Mood and Affect: Mood normal.         Behavior: Behavior normal.       Medications  Scheduled medications  acetaminophen, 650 mg, oral, q8h DESHAWN  amiodarone, 400 mg, oral, BID  aspirin, 81 mg, oral, Daily  atorvastatin, 20 mg, oral, Nightly  eplerenone, 25 mg, oral, Daily  furosemide, 40 mg, intravenous, BID  heparin (porcine), 5,000 Units, subcutaneous, q8h  [START ON 10/2/2024] metoprolol succinate XL, 100 mg, oral, Daily  metoprolol succinate XL, 50 mg, oral, Once  oxygen, , inhalation, Continuous - 02/gases  pantoprazole, 40 mg, oral, Daily before breakfast  polyethylene glycol, 17 g, oral, Daily  [Held by provider] sacubitriL-valsartan, 0.5 tablet, oral, BID  sennosides-docusate sodium, 2 tablet, oral, BID    Continuous medications   PRN medications  PRN medications: bisacodyl, naloxone, oxyCODONE    Labs  Results for orders placed or performed during the hospital encounter of 09/11/24 (from the past 24 hour(s))   Magnesium   Result Value Ref Range    Magnesium 2.28 1.60 - 2.40 mg/dL   CBC   Result Value Ref Range    WBC 9.6 4.4 - 11.3 x10*3/uL    nRBC 0.0 0.0 - 0.0  /100 WBCs    RBC 3.74 (L) 4.50 - 5.90 x10*6/uL    Hemoglobin 10.5 (L) 13.5 - 17.5 g/dL    Hematocrit 31.4 (L) 41.0 - 52.0 %    MCV 84 80 - 100 fL    MCH 28.1 26.0 - 34.0 pg    MCHC 33.4 32.0 - 36.0 g/dL    RDW 14.2 11.5 - 14.5 %    Platelets 212 150 - 450 x10*3/uL   Renal Function Panel   Result Value Ref Range    Glucose 92 74 - 99 mg/dL    Sodium 137 136 - 145 mmol/L    Potassium 4.3 3.5 - 5.3 mmol/L    Chloride 103 98 - 107 mmol/L    Bicarbonate 23 21 - 32 mmol/L    Anion Gap 15 10 - 20 mmol/L    Urea Nitrogen 36 (H) 6 - 23 mg/dL    Creatinine 1.48 (H) 0.50 - 1.30 mg/dL    eGFR 52 (L) >60 mL/min/1.73m*2    Calcium 8.6 8.6 - 10.6 mg/dL    Phosphorus 4.7 2.5 - 4.9 mg/dL    Albumin 3.4 3.4 - 5.0 g/dL           IMAGING/ DIAGNOSTIC TESTING:  I have personally reviewed the following test result(s):      CT ANGIO CHEST W AND WO IV CONTRAST;  9/30/2024 4:05 pm          FINDINGS:  Potential study limitations:  Cardiac motion artifact.      THORACIC AORTA:  Patient is status post ascending aortic replacement (32 Gelweave  graft), as well as aortic root replacement and aortic valve  replacement (Bentall procedure with a 29mm Konect Resilia  bioprosthetic valved conduit). There is perigraft low-attenuation  material without evidence of anastomotic leak/hemorrhage. The  thoracic aorta is normal in course, caliber, and contour. There is no  evidence for acute aortic pathology, such as dissection or intramural  hematoma. The arch vessel branching pattern is  conventional.  All of  the arch branch vessels appear widely patent in their proximal  portions. Visualized proximal abdominal aorta is normal.  The celiac trunk, SMA and bilateral renal arteries are normal in  caliber.      REPRESENTATIVE MEASUREMENTS OF THE AORTA:  Annulus  4.1 x  4.1 cm  Root (Sinus of Valsalva)  4.1 cm  Sinotubular junction  3.6 cm  Mid ascending  3.2 cm  Distal ascending  3.2 cm  Mid transverse arch  3.2 cm  Isthmus  3.3 cm  Mid descending  3.2  cm  Distal descending (hiatus)  2.8 cm      CORONARY ARTERIES:  The examination is not optimized for assessment of the coronary  arteries. The coronary ostia appear patent.  Right dominant system.          PULMONARY ARTERIES:  The central pulmonary arteries appear  dilated (MPA-3.6 cm, RPA-2.8  cm, LPA-2.8 cm).      SYSTEMIC AND PULMONARY VEINS:  Normal systemic venous and pulmonary venous return.  The SVC and IVC are of normal caliber.  Normal pulmonary venous anatomy.      CARDIAC CHAMBERS:  Normal atrioventricular and ventriculoarterial concordance.  There is a small circumferential pericardial effusion. Correlate with  TTE.      LEFT ATRIUM:  Status post left atrial appendage clip. No evidence of contrast  opacification of the STEVEN distal to the clip, however, delayed phase  imaging not performed. Enlarged (Area-42 cm2)      RIGHT ATRIUM:  Severely enlarged (Area-34 cm2)      INTERATRIAL SEPTUM:  Intact.      LEFT VENTRICLE:  Severely dilated (RIC-7.3 cm)      RIGHT VENTRICLE:  Severely dilated (RIC-5.4 cm)      INTERVENTRICULAR SEPTUM:  Intact.      AORTIC VALVE:  The aortic valve is  trileaflet in morphology.  No calcifications.      MITRAL VALVE:  No thickening/calcification.      PERICARDIUM:  There is no pericardial effusion or thickening.      CHEST:  Trachea and central airways are patent. No endobronchial lesion.  There is no focal pulmonary mass or consolidation. No pleural  effusion or pneumothorax. There is no significant axillary or  mediastinal lymph nodes. No hilar adenopathy. Visualized thyroid is  intact. Esophagus is normal.Bilateral lower lobe atelectasis.  Small bilateral pleural effusions.      UPPER ABDOMEN:  Limited imaging through the upper abdomen reveals no abnormalities of  the visualized organs.      BONE AND SOFT TISSUE:  No suspicious osseous abnormality.      IMPRESSION:  1. Status post ascending aortic replacement (32 Gelweave graft),  aortic root replacement and aortic valve  replacement (Bentall  procedure with 29mm Konect Resilia bioprosthetic valved conduit).  2. There is perigraft low-attenuation material without evidence of  anastomotic leak/hemorrhage.  3. Widely patent coronary ostia.  4. Status post left atrial appendage AtriClip deployment. No evidence  of contrast opacification of the STEVEN.  5. Biventricular and biatrial dilatation. Correlate with TTE.  6. Small circumferential pericardial effusion. Correlate with TTE.  7. Small bilateral pleural effusions.          XR CHEST 1 VIEW; 9/27/2024 4:49 am    FINDINGS:  Interval removal of right IJ line.      CARDIOMEDIASTINAL SILHOUETTE:  Patient is status post median sternotomy and atrial appendage  occluder device placement.      LUNGS:  There is right basilar atelectasis. Left midlung atelectasis. No  pneumothorax.      ABDOMEN:  No remarkable upper abdominal findings.      BONES:  No acute osseous changes.      IMPRESSION:  1.  Postoperative atelectasis without pneumothorax.    TRANSTHORACIC ECHOCARDIOGRAM REPORT        Patient Name:      NAN MCBRIDE      Reading Physician:    [id number] Clari Geronimo MD  Study Date:        9/25/2024                                                                                 PHYSICIAN INTERPRETATION:  Left Ventricle: Left ventricular ejection fraction is severely decreased, calculated by Vasquez's biplane at 22%. There is global hypokinesis of the left ventricle with minor regional variations. The left ventricular cavity size is severely dilated. Abnormal (paradoxical) septal motion consistent with post-operative status. Spectral Doppler shows a restrictive pattern of left ventricular diastolic filling. There is no definite left ventricular thrombus visualized.  Left Atrium: The left atrium is enlarged.  Right Ventricle: The right ventricle is upper limits of normal in size. There is reduced right ventricular systolic  function.  Right Atrium: The right atrium is mild to moderately dilated.  Aortic Valve: There is a prosthetic aortic valve present. There is an Chiu bioprosthetic aortic valve, with a 29 mm reported size. There is no evidence of aortic valve regurgitation. S/p 29mm Konect Resilia bioprosthetic valved conduit. AVR gradients were not assessed by spectral Doppler though no obvious AI by color Doppler.  Mitral Valve: The mitral valve is normal in structure. There is mild mitral valve regurgitation.  Tricuspid Valve: The tricuspid valve is structurally normal. There is trace to mild tricuspid regurgitation. The Doppler estimated RVSP is within normal limits at 25.8 mmHg.  Pulmonic Valve: The pulmonic valve is not well visualized. The pulmonic valve regurgitation was not well visualized.  Pericardium: Trivial to small pericardial effusion.  Aorta: The aortic root is abnormal. S/p 32mm Gelweave ascending aortic conduit and hemiarch.  In comparison to the previous echocardiogram(s): Compared with the prior exam, preop echo done on 9/20/2024 [facility], the LV was already severely dilated with moderately reduced function with a severely dilated aorta with moderate AI and the patient is now s/p acending aortic and arch replacement and Konect AVR. The LV remains severely dilated with a reducetion in LV systolic function , now severely reduced. The AVR gradients were not assessed today but there is no AI.     CONCLUSIONS:   1. Poorly visualized anatomical structures due to suboptimal image quality.   2. Left ventricular ejection fraction is severely decreased, calculated by Vasquez's biplane at 22%.   3. There is global hypokinesis of the left ventricle with minor regional variations.   4. Spectral Doppler shows a restrictive pattern of left ventricular diastolic filling.   5. Left ventricular cavity size is severely dilated.   6. Abnormal septal motion consistent with post-operative status.   7. No left  ventricular thrombus visualized.   8. There is reduced right ventricular systolic function.   9. The left atrium is enlarged.  10. The right atrium is mild to moderately dilated.  11. Right ventricular systolic pressure is within normal limits.  12. S/p 29mm Konect Resilia bioprosthetic valved conduit. AVR gradients were not assessed by spectral Doppler though no obvious AI by color Doppler  13. S/p 32mm Gelweave ascending aortic conduit and hemiarch.  14. Compared with the prior exam, preop echo done on 9/20/2024 [facility], the LV was already severely dilated with moderately reduced function with a severely dilated aorta with moderate AI and the patient is now s/p acending aortic and arch replacement and Konect AVR. The LV remains severely dilated with a reducetion in LV systolic function , now severely reduced. The AVR gradients were not assessed today but there is no AI.    TRANSTHORACIC ECHOCARDIOGRAM REPORT        Patient Name:      NAN MCKEON MCBRIDE      Reading Physician:    [id number] Clari Geronimo MD  Study Date:        9/25/2024      PHYSICIAN INTERPRETATION:  Left Ventricle: Left ventricular ejection fraction is severely decreased, calculated by Vasquez's biplane at 22%. There is global hypokinesis of the left ventricle with minor regional variations. The left ventricular cavity size is severely dilated. Abnormal (paradoxical) septal motion consistent with post-operative status. Spectral Doppler shows a restrictive pattern of left ventricular diastolic filling. There is no definite left ventricular thrombus visualized.  Left Atrium: The left atrium is enlarged.  Right Ventricle: The right ventricle is upper limits of normal in size. There is reduced right ventricular systolic function.  Right Atrium: The right atrium is mild to moderately dilated.  Aortic Valve: There is a prosthetic aortic valve present. There is an Chiu bioprosthetic  aortic valve, with a 29 mm reported size. There is no evidence of aortic valve regurgitation. S/p 29mm Konect Resilia bioprosthetic valved conduit. AVR gradients were not assessed by spectral Doppler though no obvious AI by color Doppler.  Mitral Valve: The mitral valve is normal in structure. There is mild mitral valve regurgitation.  Tricuspid Valve: The tricuspid valve is structurally normal. There is trace to mild tricuspid regurgitation. The Doppler estimated RVSP is within normal limits at 25.8 mmHg.  Pulmonic Valve: The pulmonic valve is not well visualized. The pulmonic valve regurgitation was not well visualized.  Pericardium: Trivial to small pericardial effusion.  Aorta: The aortic root is abnormal. S/p 32mm Gelweave ascending aortic conduit and hemiarch.  In comparison to the previous echocardiogram(s): Compared with the prior exam, preop echo done on 9/20/2024 [facility], the LV was already severely dilated with moderately reduced function with a severely dilated aorta with moderate AI and the patient is now s/p acending aortic and arch replacement and Konect AVR. The LV remains severely dilated with a reducetion in LV systolic function , now severely reduced. The AVR gradients were not assessed today but there is no AI.     CONCLUSIONS:   1. Poorly visualized anatomical structures due to suboptimal image quality.   2. Left ventricular ejection fraction is severely decreased, calculated by Vasquez's biplane at 22%.   3. There is global hypokinesis of the left ventricle with minor regional variations.   4. Spectral Doppler shows a restrictive pattern of left ventricular diastolic filling.   5. Left ventricular cavity size is severely dilated.   6. Abnormal septal motion consistent with post-operative status.   7. No left ventricular thrombus visualized.   8. There is reduced right ventricular systolic function.   9. The left atrium is enlarged.  10. The right atrium is mild to moderately  "dilated.  11. Right ventricular systolic pressure is within normal limits.  12. S/p 29mm Konect Resilia bioprosthetic valved conduit. AVR gradients were not assessed by spectral Doppler though no obvious AI by color Doppler  13. S/p 32mm Gelweave ascending aortic conduit and hemiarch.  14. Compared with the prior exam, preop echo done on 9/20/2024 [facility], the LV was already severely dilated with moderately reduced function with a severely dilated aorta with moderate AI and the patient is now s/p acending aortic and arch replacement and Konect AVR. The LV remains severely dilated with a reducetion in LV systolic function , now severely reduced. The AVR gradients were not assessed today but there is no AI.      IMPRESSION & PLAN:    POD # 8 s/p Aortic root replacement and aortic valve replacement: Bentall procedure with a 29 mm connect valve conduit  - Increase activity/ ambulation; PT/OT  - Encourage IS, C/DB; respiratory therapy; wean O2 as aroldo   - Cardiac rehab referral   - Continue cardiac meds: ASA, BB, statin   - Pain and anticonstipation meds  - 2v CXR 9/29  - Postop echo 9/25 limited completed EF 22%, reduced RV function  - Pravenna removed 9/29  - CUT epicardial wires prior to discharge   - Tele until discharge  - Optimize nutrition and electrolytes    Rhythm  Hx LBBB; Paroxysma AFIB - Currently in Afib rate controlled  - Tele: 10/1 Afib Rate control, changed metop tartrate to XL. D/w Dr Perdomo Anticoagulation \" NO NEED for anticoagulation at this time.\"  - 9/29 paroxysmal AFIB  rate control,  send message to dr Perdomo for initiation of anticoagulation-> pending response.   - 9/28 AFIB with RVR 120s  amio bolus 150s started amio po 400 mg BID. Converted to NSR  Hr 60's   - Continue metoprolol 100 mg daily  XL    - Continue Amio 400mg BID;  consider reducing dose to 200 mg daily  at discharge.   - Adjust medications as tolerated    Acute heart failure- most recent EF 22%  - Will need GDMT " therapy   - Add epleranone 25mg today   - Metoprolol increased to 50mg BID transition to XL on 10/1   - Estimates for SGLT2i >$500 will not start   - Will add low dose entresto as tolerated--> hold for now given resolution of LIZ and need for CTA today  - Life vest  Ordered on 10/1/24 EF per ECHO 22%  - 10/1 lasix 80 mg IV   BID increased  per HF recs     Acute Blood Loss Anemia   Recent Labs     10/01/24  0652 24  0623 24  0641 24  0709 24  0705 24  0008 24  0324   HGB 10.5* 10.7* 10.5* 10.7* 11.0* 10.3* 11.1*   HCT 31.4* 32.9* 31.2* 32.1* 33.2* 31.7* 33.2*   - MV, PO Iron x1mo- dc today  - Daily labs, transfuse as indicated    Thrombocytopenia- resolved  Recent Labs     10/01/24  0652 24  0623 24  0641 24  0709 24  0705 24  0008 24  0324    175 154 116* 118* 80* 89*   - Etiology likely postop/CPB related  - Continue to trend with daily CBCs    Volume/Electrolyte Status: Preop wt Weight: 111 kg (245 lb 1.6 oz) Chronic systolic heart failure with EF 35 % on echo  Total body fluid overload  Vitals:    10/01/24 0551   Weight: 114 kg (251 lb 4.8 oz)   - Weight: xx kg  - Lasix 40 mgx1 IV .   - 10/1 Lasix increased to 80 Mg BID per HF   - Replete electrolytes for hypokalemia/hypomagnesemia/hypophosphatemia as needed  K replaced ,    - Daily weights and strict I&Os  - Daily RFP while admitted  - Add scheduled lasix 40mg BID today    Leukocytosis- resolved likely inflammatory  Recent Labs     10/01/24  0652 24  0623 24  0641 24  0709 24  0705 24  0008 24  0324   WBC 9.6 8.9 7.4 7.5 10.1 9.5 14.5*     Temp (36hrs), Av.5 °C (97.7 °F), Min:35.5 °C (95.9 °F), Max:36.9 °C (98.4 °F)  - monitor for s/s infection  - Periop Ancef x48hrs   - daily CBC to follow    CKD stage III: baseline SCr 1.08-1.2. cyanokit administration-  In OR      Creatinine   Date Value Ref Range Status   10/01/2024 1.48 (H)  0.50 - 1.30 mg/dL Final   2024 1.30 0.50 - 1.30 mg/dL Final   2024 1.33 (H) 0.50 - 1.30 mg/dL Final   -daily RFP  - Holding  ACE/ ARBS   -Received gentle IV hydration overnight   for CTA chest today   -renally dose all medications  -avoid nephrotoxic drugs  -accurate I/Os    SHOAIB (compliant with home CPAP at night)   Oxygenating well on RA  -aggressive bronchial hygiene  -continue home CPAP    GERD   - Cont PPI   - Avoid taking pills on an empty stomach  - Eat small frequent meals  - eat sitting up in the chair   - avoid acidic foods      Hypertension  Prior to surgery significant hypertension on multiple medications  Systolic (24hrs), Av , Min:103 , Max:121   - Clonidine 0.1mg BID dc'd now with appropriate BP control  - Will hold on re-initiation of losartan  - Will add Entresto as tolerated-  - DC terazosin given contraindication in heart failure    Hyperlipidemia:   Lab Results   Component Value Date    CHOL 157 04/10/2024    HDL 53.0 04/10/2024    TRIG 55 04/10/2024   -continue statin  -follow up lipid panel with PCP/ cardio as appropriate    Hypersomnolence disorder.   - Sleep/wake cycle hygiene   - REST protocol    OA / Gout   - PT/OT   - Hot and cold packs  - Acetaminophen     VTE Prophylaxis: SCDs/TEDs, ambulation, SQ heparin  Code Status: Full Code    Dispo  - PT/OT recs Home   - Would benefit from homecare RN for cardiac surgery carepath  - Anticipate discharge  Wednesday per patient's request   - Will continue to assess discharge needs    SERGIO Mendoza-CNP  Cardiac Surgery [facility]  [facility]  Team Phone [phone]    10/1/2024  3:12 PM

## 2024-10-01 NOTE — DISCHARGE INSTRUCTIONS
Don't forget to “Keep Your Move in the Tube!!”     Please refer to the “Move in the Tube” handout.  -- Load bearing activities can be completed if you are “staying in the tube.” If you are attempting load bearing activities, let pain be your guide with when trying an activity “out of the tube”. If an activity hurts or is uncomfortable go back to doing it while you stay “in the tube”. There is no time limit to “stay in the tube”.     -- Non-load bearing activities, which are your activities of daily living, can be completed with your arms “out of the tube” as long as you remain pain free. Some activities of daily living examples are dressing, personal care, showering, washing hair, and toilet hygiene.     Don't forget to KEEP YOUR MOVE IN THE TUBE and think of a T. Misael dinosaur!    =====================================================================                             **IMPORTANT**  Prevention of Infective Endocarditis (Heart Infections) After Cardiac Surgery:    Infective endocarditis occurs when bacteria enters the bloodstream and then attaches to the heart. Patients with a new heart valve, repaired heart valve, or graft used to repair/replace their aorta are at higher risk for developing this because of the prosthetic (man-made) medical material in their heart. Endocarditis is rare but when you undergo certain medical or dental procedures, as listed below, it can give bacteria an opportunity to enter the blood and cause this type of infection. To prevent this, preventative antibiotics taken before these procedures are required.     Antibiotics are always required PRIOR to the following:  - Dental work with gingival, periapical, or other gingival perforation (such as tooth extractions, dental abscess drainage, and dental cleanings)  - Respiratory procedures with incisions or biopsies   - Cardiac or vascular procedures to place or replace prosthetic material (such as heart valves, stents,  etc.)    Antibiotics are required in the following situations ONLY if an infection is already present:  - Skin or soft tissue procedures with infected tissue  - Gastrointestinal procedures with GI infections  - Urinary or genital procedure with acute genitourinary infections    Antibiotic examples you should expect to be prescribed:  - Amoxicillin or Ampicillin are usually the  first choice  - Cephalexin, Clindamycin, or Vancomycin may be used if you are unable to tolerate/allergic to Amoxicillin or Ampicillin  - Amoxicillin or Ampicillin (if allergic, use Vancomycin) will cover for enterococcus if you have a known acute biliary tract infection   - Specific antibiotics for a known organism should be used when treating an active infection    Please notify your dentist/primary physician/cardiologist PRIOR to these types of procedures to obtain the proper antibiotics and prevent a serious infection in your heart. When in doubt, always ask!   Additionally, good oral hygiene (routine brushing, flossing, and dental care) and prompt treatment of other infections (such as UTIs and skin infections) are equally as important to prevent endocarditis!!    =============================================================    Additional Post-Operative Instructions:  - Remember to use your Incentive spirometer 10x/hr while awake. Remember to cough and deep breath.  - No NSAIDs (common over-the-counter NSAIDs are ibuprofen/Motrin/Advil, naproxen/Naprosyn/Aleve) for 3 months after cardiac surgery; if NSAIDs needed after 3 months, clear use with cardiologist before starting.       Your home medications may have changed after surgery. Carefully compare this list with your prescription bottles at home and set aside any medications you are told to not take so you do not confuse them. Do not dispose of any medications until your follow-up, since your doctors may restart some at your follow-up appointments. It is important to bring a complete,  current list of your medications to any medical appointments or hospitalizations.    For any questions about your discharge, please call the cardiac surgery office at [phone]

## 2024-10-02 ENCOUNTER — HOME HEALTH ADMISSION (OUTPATIENT)
Dept: HOME HEALTH SERVICES | Facility: HOME HEALTH | Age: 69
End: 2024-10-02
Payer: MEDICARE

## 2024-10-02 LAB
ALBUMIN SERPL BCP-MCNC: 3.3 G/DL (ref 3.4–5)
ALBUMIN SERPL BCP-MCNC: 3.4 G/DL (ref 3.4–5)
ANION GAP SERPL CALC-SCNC: 14 MMOL/L (ref 10–20)
ANION GAP SERPL CALC-SCNC: 15 MMOL/L (ref 10–20)
BUN SERPL-MCNC: 38 MG/DL (ref 6–23)
BUN SERPL-MCNC: 39 MG/DL (ref 6–23)
CALCIUM SERPL-MCNC: 8.3 MG/DL (ref 8.6–10.6)
CALCIUM SERPL-MCNC: 8.5 MG/DL (ref 8.6–10.6)
CHLORIDE SERPL-SCNC: 100 MMOL/L (ref 98–107)
CHLORIDE SERPL-SCNC: 102 MMOL/L (ref 98–107)
CO2 SERPL-SCNC: 25 MMOL/L (ref 21–32)
CO2 SERPL-SCNC: 27 MMOL/L (ref 21–32)
CREAT SERPL-MCNC: 1.63 MG/DL (ref 0.5–1.3)
CREAT SERPL-MCNC: 1.63 MG/DL (ref 0.5–1.3)
EGFRCR SERPLBLD CKD-EPI 2021: 46 ML/MIN/1.73M*2
EGFRCR SERPLBLD CKD-EPI 2021: 46 ML/MIN/1.73M*2
ERYTHROCYTE [DISTWIDTH] IN BLOOD BY AUTOMATED COUNT: 14.3 % (ref 11.5–14.5)
GLUCOSE SERPL-MCNC: 124 MG/DL (ref 74–99)
GLUCOSE SERPL-MCNC: 99 MG/DL (ref 74–99)
HCT VFR BLD AUTO: 33.1 % (ref 41–52)
HGB BLD-MCNC: 10.9 G/DL (ref 13.5–17.5)
MAGNESIUM SERPL-MCNC: 2.27 MG/DL (ref 1.6–2.4)
MCH RBC QN AUTO: 27.8 PG (ref 26–34)
MCHC RBC AUTO-ENTMCNC: 32.9 G/DL (ref 32–36)
MCV RBC AUTO: 84 FL (ref 80–100)
NRBC BLD-RTO: 0 /100 WBCS (ref 0–0)
PHOSPHATE SERPL-MCNC: 4.6 MG/DL (ref 2.5–4.9)
PHOSPHATE SERPL-MCNC: 5 MG/DL (ref 2.5–4.9)
PLATELET # BLD AUTO: 241 X10*3/UL (ref 150–450)
POTASSIUM SERPL-SCNC: 4.1 MMOL/L (ref 3.5–5.3)
POTASSIUM SERPL-SCNC: 4.1 MMOL/L (ref 3.5–5.3)
RBC # BLD AUTO: 3.92 X10*6/UL (ref 4.5–5.9)
SODIUM SERPL-SCNC: 137 MMOL/L (ref 136–145)
SODIUM SERPL-SCNC: 138 MMOL/L (ref 136–145)
WBC # BLD AUTO: 9.8 X10*3/UL (ref 4.4–11.3)

## 2024-10-02 PROCEDURE — 2500000005 HC RX 250 GENERAL PHARMACY W/O HCPCS: Performed by: NURSE PRACTITIONER

## 2024-10-02 PROCEDURE — 2500000001 HC RX 250 WO HCPCS SELF ADMINISTERED DRUGS (ALT 637 FOR MEDICARE OP): Performed by: NURSE PRACTITIONER

## 2024-10-02 PROCEDURE — 80069 RENAL FUNCTION PANEL: CPT | Performed by: NURSE PRACTITIONER

## 2024-10-02 PROCEDURE — 1200000002 HC GENERAL ROOM WITH TELEMETRY DAILY

## 2024-10-02 PROCEDURE — 83735 ASSAY OF MAGNESIUM: CPT | Performed by: NURSE PRACTITIONER

## 2024-10-02 PROCEDURE — 2500000002 HC RX 250 W HCPCS SELF ADMINISTERED DRUGS (ALT 637 FOR MEDICARE OP, ALT 636 FOR OP/ED): Performed by: PHYSICIAN ASSISTANT

## 2024-10-02 PROCEDURE — 36416 COLLJ CAPILLARY BLOOD SPEC: CPT | Performed by: NURSE PRACTITIONER

## 2024-10-02 PROCEDURE — 85027 COMPLETE CBC AUTOMATED: CPT | Performed by: NURSE PRACTITIONER

## 2024-10-02 PROCEDURE — 99232 SBSQ HOSP IP/OBS MODERATE 35: CPT | Performed by: INTERNAL MEDICINE

## 2024-10-02 PROCEDURE — 2500000001 HC RX 250 WO HCPCS SELF ADMINISTERED DRUGS (ALT 637 FOR MEDICARE OP): Performed by: PHYSICIAN ASSISTANT

## 2024-10-02 PROCEDURE — 36415 COLL VENOUS BLD VENIPUNCTURE: CPT | Performed by: NURSE PRACTITIONER

## 2024-10-02 PROCEDURE — 99232 SBSQ HOSP IP/OBS MODERATE 35: CPT | Performed by: NURSE PRACTITIONER

## 2024-10-02 PROCEDURE — 2500000004 HC RX 250 GENERAL PHARMACY W/ HCPCS (ALT 636 FOR OP/ED): Performed by: NURSE PRACTITIONER

## 2024-10-02 RX ORDER — OXYCODONE HYDROCHLORIDE 5 MG/1
5 TABLET ORAL EVERY 6 HOURS PRN
Status: DISCONTINUED | OUTPATIENT
Start: 2024-10-02 | End: 2024-10-03 | Stop reason: HOSPADM

## 2024-10-02 RX ORDER — DOCUSATE SODIUM 100 MG/1
100 CAPSULE, LIQUID FILLED ORAL 2 TIMES DAILY
Status: DISCONTINUED | OUTPATIENT
Start: 2024-10-02 | End: 2024-10-03 | Stop reason: HOSPADM

## 2024-10-02 RX ORDER — ACETAMINOPHEN 325 MG/1
650 TABLET ORAL EVERY 6 HOURS PRN
Status: DISCONTINUED | OUTPATIENT
Start: 2024-10-02 | End: 2024-10-03 | Stop reason: HOSPADM

## 2024-10-02 RX ORDER — HYDRALAZINE HYDROCHLORIDE 25 MG/1
25 TABLET, FILM COATED ORAL 3 TIMES DAILY
Status: DISCONTINUED | OUTPATIENT
Start: 2024-10-02 | End: 2024-10-03

## 2024-10-02 RX ADMIN — AMIODARONE HYDROCHLORIDE 400 MG: 200 TABLET ORAL at 20:41

## 2024-10-02 RX ADMIN — HEPARIN SODIUM 5000 UNITS: 5000 INJECTION, SOLUTION INTRAVENOUS; SUBCUTANEOUS at 08:22

## 2024-10-02 RX ADMIN — EPLERENONE 25 MG: 25 TABLET, FILM COATED ORAL at 08:28

## 2024-10-02 RX ADMIN — FUROSEMIDE 80 MG: 10 INJECTION, SOLUTION INTRAVENOUS at 16:41

## 2024-10-02 RX ADMIN — DOCUSATE SODIUM 100 MG: 100 CAPSULE, LIQUID FILLED ORAL at 20:41

## 2024-10-02 RX ADMIN — HEPARIN SODIUM 5000 UNITS: 5000 INJECTION, SOLUTION INTRAVENOUS; SUBCUTANEOUS at 16:41

## 2024-10-02 RX ADMIN — METOPROLOL SUCCINATE 100 MG: 100 TABLET, EXTENDED RELEASE ORAL at 08:17

## 2024-10-02 RX ADMIN — FUROSEMIDE 80 MG: 10 INJECTION, SOLUTION INTRAVENOUS at 08:17

## 2024-10-02 RX ADMIN — ATORVASTATIN CALCIUM 20 MG: 20 TABLET, FILM COATED ORAL at 20:41

## 2024-10-02 RX ADMIN — HYDRALAZINE HYDROCHLORIDE 25 MG: 25 TABLET ORAL at 11:47

## 2024-10-02 RX ADMIN — ACETAMINOPHEN 650 MG: 325 TABLET, FILM COATED ORAL at 06:10

## 2024-10-02 RX ADMIN — AMIODARONE HYDROCHLORIDE 400 MG: 200 TABLET ORAL at 08:17

## 2024-10-02 RX ADMIN — OXYCODONE HYDROCHLORIDE 5 MG: 5 TABLET ORAL at 02:49

## 2024-10-02 RX ADMIN — HEPARIN SODIUM 5000 UNITS: 5000 INJECTION, SOLUTION INTRAVENOUS; SUBCUTANEOUS at 00:33

## 2024-10-02 RX ADMIN — LIDOCAINE 1 PATCH: 4 PATCH TOPICAL at 08:17

## 2024-10-02 RX ADMIN — ASPIRIN 81 MG: 81 TABLET, COATED ORAL at 08:17

## 2024-10-02 RX ADMIN — PANTOPRAZOLE SODIUM 40 MG: 40 TABLET, DELAYED RELEASE ORAL at 06:10

## 2024-10-02 ASSESSMENT — PAIN DESCRIPTION - LOCATION
LOCATION: CHEST
LOCATION: CHEST

## 2024-10-02 ASSESSMENT — PAIN SCALES - GENERAL
PAINLEVEL_OUTOF10: 5 - MODERATE PAIN
PAINLEVEL_OUTOF10: 5 - MODERATE PAIN
PAINLEVEL_OUTOF10: 3

## 2024-10-02 NOTE — PROGRESS NOTES
10/02/24 1139   Discharge Planning   Living Arrangements Spouse/significant other   Support Systems Spouse/significant other   Type of Residence Private residence   Home or Post Acute Services In home services   Expected Discharge Disposition Home H   Does the patient need discharge transport arranged? No     Patient s/p cardiac surgery.  Patient will be discharging home with a new lifevest.  Discharge pending HF management.  ADOD 1-2 days.

## 2024-10-02 NOTE — PROGRESS NOTES
"CARDIAC SURGERY DAILY PROGRESS NOTE    David Chatman is a 68 y.o. male presenting with shortness of breath. He presented to his cardiologist as an outpt for these complaints. Echo showed an EF of 35-40% and ascending aneurysm 6.2. He was directed to the ED. BNP 1015, troponins, 21,16,15. CT chest revealed large ascending aneurysm up to 6.2 cm. No evidence of acute aortic dissection. He was given IV lasix with relief of symptoms and admitted for further evaluation. He is currently planned for cardiac catheterization. Asymptomatic. The pt reports his blood pressures are under control currently. CT surgery consulted for aneurysm evaluation at OSH and he was transferred to [facility] for further care.    9/23/2024 Operation Procedures: by Trino Perdomo  #1 standard median sternotomy  #2 innominate trunk arterial cannulation  #3 ascending aortic replacement and hemiarch replacement with a 32 Gelweave graft  #4 aortic root replacement and aortic valve replacement: Bentall procedure with a 29 mm connect valve conduit  #5 left atrial appendage closure with a 35 mm AtriCure clip  #6 left femoral arterial line placement    CTICU course: weaned off drips; afib RVR req amio    Transferred to T3 SDU on 9/26  ========================    Interval History:   No  issues  overnight     SUBJECTIVE:  No sleeping overnight     Objective   /71 (BP Location: Right arm, Patient Position: Sitting)   Pulse 93   Temp 36.9 °C (98.4 °F) (Temporal)   Resp 19   Ht 1.854 m (6' 1\")   Wt 113 kg (248 lb 0.3 oz)   SpO2 92%   BMI 32.72 kg/m²   0-10 (Numeric) Pain Score: 5 - Moderate pain   3 Day Weight Change: -0.667 kg (-1 lb 7.5 oz) per day    Intake and Output    Intake/Output Summary (Last 24 hours) at 10/2/2024 1459  Last data filed at 10/2/2024 1347  Gross per 24 hour   Intake 800 ml   Output 2675 ml   Net -1875 ml       Physical Exam  Physical Exam  Vitals and nursing note reviewed.   Constitutional:       Appearance: Normal appearance. "   HENT:      Head: Atraumatic.      Mouth/Throat:      Mouth: Mucous membranes are moist.   Eyes:      Conjunctiva/sclera: Conjunctivae normal.   Neck:      Vascular: JVD present.   Cardiovascular:      Rate and Rhythm: Normal rate. Rhythm irregular.      Pulses: Normal pulses.      Heart sounds: Normal heart sounds.      Comments: TELE: controlled afib, LBBB 70s-90s   Epicardial wires capped   Pulmonary:      Effort: Pulmonary effort is normal.      Breath sounds: Normal breath sounds.      Comments: On RA  Mildly diminished bases   Sternum stable   Abdominal:      General: Bowel sounds are normal.      Palpations: Abdomen is soft.      Comments: + BM 10/1   Genitourinary:     Comments: Voids independently   Musculoskeletal:      Cervical back: Neck supple.      Right lower le+ Edema present.      Left lower le+ Edema present.   Skin:     General: Skin is warm and dry.      Capillary Refill: Capillary refill takes less than 2 seconds.      Comments: MSI JACK well approx no s/sx of infections, no erythema     Neurological:      General: No focal deficit present.      Mental Status: He is alert and oriented to person, place, and time.   Psychiatric:         Mood and Affect: Mood normal.         Behavior: Behavior normal.       Medications  Scheduled medications  acetaminophen, 650 mg, oral, q8h DESHAWN  amiodarone, 400 mg, oral, BID  aspirin, 81 mg, oral, Daily  atorvastatin, 20 mg, oral, Nightly  eplerenone, 25 mg, oral, Daily  furosemide, 80 mg, intravenous, BID  heparin (porcine), 5,000 Units, subcutaneous, q8h  [Held by provider] hydrALAZINE, 25 mg, oral, TID  lidocaine, 1 patch, transdermal, Daily  metoprolol succinate XL, 100 mg, oral, Daily  oxygen, , inhalation, Continuous -   pantoprazole, 40 mg, oral, Daily before breakfast  polyethylene glycol, 17 g, oral, Daily  [Held by provider] sacubitriL-valsartan, 0.5 tablet, oral, BID  sennosides-docusate sodium, 2 tablet, oral, BID    Continuous  medications   PRN medications  PRN medications: bisacodyl, naloxone, oxyCODONE    Labs  Results for orders placed or performed during the hospital encounter of 09/11/24 (from the past 24 hour(s))   Renal function panel   Result Value Ref Range    Glucose 124 (H) 74 - 99 mg/dL    Sodium 135 (L) 136 - 145 mmol/L    Potassium 4.2 3.5 - 5.3 mmol/L    Chloride 100 98 - 107 mmol/L    Bicarbonate 22 21 - 32 mmol/L    Anion Gap 17 10 - 20 mmol/L    Urea Nitrogen 40 (H) 6 - 23 mg/dL    Creatinine 1.61 (H) 0.50 - 1.30 mg/dL    eGFR 47 (L) >60 mL/min/1.73m*2    Calcium 8.3 (L) 8.6 - 10.6 mg/dL    Phosphorus 5.1 (H) 2.5 - 4.9 mg/dL    Albumin 3.5 3.4 - 5.0 g/dL   Magnesium   Result Value Ref Range    Magnesium 2.27 1.60 - 2.40 mg/dL   CBC   Result Value Ref Range    WBC 9.8 4.4 - 11.3 x10*3/uL    nRBC 0.0 0.0 - 0.0 /100 WBCs    RBC 3.92 (L) 4.50 - 5.90 x10*6/uL    Hemoglobin 10.9 (L) 13.5 - 17.5 g/dL    Hematocrit 33.1 (L) 41.0 - 52.0 %    MCV 84 80 - 100 fL    MCH 27.8 26.0 - 34.0 pg    MCHC 32.9 32.0 - 36.0 g/dL    RDW 14.3 11.5 - 14.5 %    Platelets 241 150 - 450 x10*3/uL   Renal Function Panel   Result Value Ref Range    Glucose 99 74 - 99 mg/dL    Sodium 137 136 - 145 mmol/L    Potassium 4.1 3.5 - 5.3 mmol/L    Chloride 100 98 - 107 mmol/L    Bicarbonate 27 21 - 32 mmol/L    Anion Gap 14 10 - 20 mmol/L    Urea Nitrogen 39 (H) 6 - 23 mg/dL    Creatinine 1.63 (H) 0.50 - 1.30 mg/dL    eGFR 46 (L) >60 mL/min/1.73m*2    Calcium 8.5 (L) 8.6 - 10.6 mg/dL    Phosphorus 5.0 (H) 2.5 - 4.9 mg/dL    Albumin 3.4 3.4 - 5.0 g/dL           IMAGING/ DIAGNOSTIC TESTING:  I have personally reviewed the following test result(s):      CT ANGIO CHEST W AND WO IV CONTRAST;  9/30/2024 4:05 pm  IMPRESSION:  1. Status post ascending aortic replacement (32 Gelweave graft),  aortic root replacement and aortic valve replacement (Bentall  procedure with 29mm Konect Resilia bioprosthetic valved conduit).  2. There is perigraft low-attenuation material  without evidence of  anastomotic leak/hemorrhage.  3. Widely patent coronary ostia.  4. Status post left atrial appendage AtriClip deployment. No evidence  of contrast opacification of the STEVEN.  5. Biventricular and biatrial dilatation. Correlate with TTE.  6. Small circumferential pericardial effusion. Correlate with TTE.  7. Small bilateral pleural effusions.          XR CHEST 1 VIEW; 9/27/2024 4:49 am  IMPRESSION:  1.  Postoperative atelectasis without pneumothorax.    TRANSTHORACIC ECHOCARDIOGRAM REPORT 9/25/2024  CONCLUSIONS:   1. Poorly visualized anatomical structures due to suboptimal image quality.   2. Left ventricular ejection fraction is severely decreased, calculated by Vasquez's biplane at 22%.   3. There is global hypokinesis of the left ventricle with minor regional variations.   4. Spectral Doppler shows a restrictive pattern of left ventricular diastolic filling.   5. Left ventricular cavity size is severely dilated.   6. Abnormal septal motion consistent with post-operative status.   7. No left ventricular thrombus visualized.   8. There is reduced right ventricular systolic function.   9. The left atrium is enlarged.  10. The right atrium is mild to moderately dilated.  11. Right ventricular systolic pressure is within normal limits.  12. S/p 29mm Konect Resilia bioprosthetic valved conduit. AVR gradients were not assessed by spectral Doppler though no obvious AI by color Doppler  13. S/p 32mm Gelweave ascending aortic conduit and hemiarch.  14. Compared with the prior exam, preop echo done on 9/20/2024 [facility], the LV was already severely dilated with moderately reduced function with a severely dilated aorta with moderate AI and the patient is now s/p acending aortic and arch replacement and Konect AVR. The LV remains severely dilated with a reducetion in LV systolic function , now severely reduced. The AVR gradients were not assessed today but there is no  "AI.    ================  IMPRESSION & PLAN:  ================  POD # 9 s/p Aortic root replacement and aortic valve replacement: Bentall procedure with a 29 mm connect valve conduit on 9/23  - Increase activity/ ambulation; PT/OT  - Encourage IS, C/DB; respiratory therapy; wean O2 as aroldo -> on RA  - Cardiac rehab referral   - Continue cardiac meds: ASA, BB, statin   - Pain and anticonstipation meds  - 2v CXR 9/29  - Postop echo 9/25 limited completed EF 22%, reduced RV function, no AI  - Pravenna removed 9/29  - CUT epicardial wires prior to discharge   - Tele until discharge  - Optimize nutrition and electrolytes    Rhythm  Hx LBBB; Paroxysma AFIB - Currently in Afib rate controlled  - 10/2 TELE - controlled afib, LBBB  - Tele: 10/1 Afib Rate control, changed metop tartrate to XL. D/w Dr Perdomo Anticoagulation \" NO NEED for anticoagulation at this time.\"  - 9/29 paroxysmal AFIB  rate control,  send message to dr Perdomo for initiation of anticoagulation-> pending response.   - 9/28 AFIB with RVR 120s  amio bolus 150s started amio po 400 mg BID. Converted to NSR  Hr 60's   - Continue metoprolol 100 mg daily  XL    - Continue Amio 400mg BID;  plan on reducing dose to 200 mg daily  at discharge.   - Adjust medications as tolerated    Acute heart failure- most recent EF 22%  - appreciate HF consult, adjusting meds per HF recs  - Will need GDMT therapy   - Add epleranone 25mg today   - Metoprolol increased to 50mg BID transition to XL on 10/1   - Estimates for SGLT2i >$500 will not start   - Will add low dose entresto as tolerated  - Life vest  Ordered on 10/1/24 EF per ECHO 22% -> Life Vest fitted 10/2 and in pt's room  - 10/1 lasix 80 mg IV  BID increased  per HF recs   - 10/2 d/w Dr Henderson -> not ready for discharge; added hydral 25 mg TID; repeating renal panel this afternoon and may start Entresto this gurpreet    Acute Blood Loss Anemia   Recent Labs     10/02/24  0600 10/01/24  0652 09/30/24  0623 09/29/24  0641 " 24  0709 24  0705 24  0008   HGB 10.9* 10.5* 10.7* 10.5* 10.7* 11.0* 10.3*   HCT 33.1* 31.4* 32.9* 31.2* 32.1* 33.2* 31.7*   - MV, PO Iron x1mo- dc today  - Daily labs, transfuse as indicated    Thrombocytopenia- resolved  Recent Labs     10/02/24  0600 10/01/24  0652 24  0623 24  0641 24  0709 24  0705 24  0008    212 175 154 116* 118* 80*   - Etiology likely postop/CPB related  - Continue to trend with daily CBCs    Volume/Electrolyte Status: Preop wt 114 kg; Admit wt Weight: 111 kg (245 lb 1.6 oz) Chronic systolic heart failure  Total body fluid overload  Vitals:    10/02/24 0443   Weight: 113 kg (248 lb 0.3 oz)   - Weight: 113, 114, 115 xx kg  - Lasix 40 mgx1 IV .   - 10/1 Lasix increased to 80 Mg BID per HF   - Replete electrolytes for hypokalemia/hypomagnesemia/hypophosphatemia as needed  K replaced ,   - Daily weights and strict I&Os  - Daily RFP while admitted    Leukocytosis- resolved likely inflammatory  Recent Labs     10/02/24  0600 10/01/24  0652 24  0623 24  0641 24  0709 24  0705 24  0008   WBC 9.8 9.6 8.9 7.4 7.5 10.1 9.5     Temp (36hrs), Av.4 °C (97.5 °F), Min:35.5 °C (95.9 °F), Max:36.9 °C (98.4 °F)  - monitor for s/s infection  - Periop Ancef x48hrs   - daily CBC to follow    CKD stage III: baseline SCr 1.08-1.2. cyanokit administration-  In OR      Creatinine   Date Value Ref Range Status   10/02/2024 1.63 (H) 0.50 - 1.30 mg/dL Final   10/01/2024 1.61 (H) 0.50 - 1.30 mg/dL Final   10/01/2024 1.48 (H) 0.50 - 1.30 mg/dL Final   2024 1.30 0.50 - 1.30 mg/dL Final   - daily RFP  - Received gentle IV hydration overnight   for CTA chest  - renally dose all medications  - avoid nephrotoxic drugs  - accurate I/Os  - HF GDMT as tolerated per HF recs    SHOAIB (compliant with home CPAP at night)   Oxygenating well on RA  -aggressive bronchial hygiene  -continue home CPAP    GERD   - Cont PPI   -  Avoid taking pills on an empty stomach  - Eat small frequent meals  - eat sitting up in the chair   - avoid acidic foods    Hypertension  Prior to surgery significant hypertension on multiple medications  Systolic (24hrs), Av , Min:84 , Max:125   - Clonidine 0.1mg BID is dc'd now with appropriate BP control  - Will hold on re-initiation of losartan  - adjusting meds as per HF recs  - DC terazosin given contraindication in heart failure    Hyperlipidemia:   Lab Results   Component Value Date    CHOL 157 04/10/2024    HDL 53.0 04/10/2024    TRIG 55 04/10/2024   -continue statin  -follow up lipid panel with PCP/ cardio as appropriate    Hypersomnolence disorder.   - Sleep/wake cycle hygiene   - REST protocol    OA / Gout   - PT/OT   - Hot and cold packs  - Acetaminophen     VTE Prophylaxis: SCDs/TEDs, ambulation, SQ heparin  Code Status: Full Code    Dispo  - PT/OT recs Home PT  - Would benefit from homecare RN for cardiac surgery carepath and PT  - 10/2 held off on discharge as per HF, and pt agrees  - Will continue to assess discharge needs    SERGIO Lovell-CNP  Cardiac Surgery [facility]  [facility]  Team Phone [phone]

## 2024-10-02 NOTE — PROGRESS NOTES
Advanced Heart Failure Progress Note   Consulting Team: CT surgery  Primary Cardiologist: Dr. Carter  Reason for Consult: HFrEF. Now s/p thoracic aorta aneursym repair and AVR+STEVEN clip.    Subjective   NAEO. He denies SOB, RIOJAS, or chest pain, but legs are still quite swollen compared to his baseline. His Cr is still elevated as of this morning without return to baseline. We discussed the importance of monitoring his renal function for further improvement prior to discharging from the hospital. He is agreeable to staying for another day.    Objective   Physical Exam  Vitals:    10/02/24 0443   BP: 114/76   Pulse: 94   Resp: 18   Temp: 36.4 °C (97.5 °F)   SpO2: 96%     GEN:  NAD.  CV: irregularly irregular, no m/r/g. LE edema: +2 pitting edema to knees bilaterally. JVD up to angle of mandible  LUNGS: CTA B/L  ABD: Soft, NT/ND, NBS  SKIN: Warm, well perfused. No skin rashes or abnormal lesions.   EXT: No clubbing, cyanosis, or edema.  NEURO: AAOX3, not following commands    I have personally reviewed the following images and laboratory findings:  ECG:  sinus rhythm, LAD, LBBB     Results for orders placed during the hospital encounter of 09/10/24    Transthoracic Echo Complete    Narrative  [facility]  [address] [place] [address], [place] [id number]  Phone [phone]    TRANSTHORACIC ECHOCARDIOGRAM REPORT    Patient Name:      NAN Norman Physician:    [id number] Jackeline Davies MD  Study Date:        9/10/2024            Ordering Provider:    [id number] AAMIR CARTER  MRN/PID:           [id number]             Fellow:  Accession#:        [id number]         Nurse:  Date of Birth/Age: 1955 / 68 years Sonographer:          Ai PELLETIER  Gender:            M                    Additional Staff:  Height:            185.42 cm            Admit Date:  Weight:            117.03 kg            Admission Status:     Outpatient  BSA / BMI:         2.40 m2 / 34.04      Department Location:  [address]  Pressure: 125 /72 mmHg    Study Type:    TRANSTHORACIC ECHO (TTE) COMPLETE  Diagnosis/ICD: Shortness of breath-R06.02; Essential (primary) hypertension-I10  Indication:    Rule out CAD HLD HTN Shortness of Breath  CPT Codes:     Echo Complete w Full [id number]    Patient History:  Pertinent History: HTN HLD PAF.    Study Detail: The following Echo studies were performed: 2D, M-Mode, Doppler,  color flow, Strain and 3D.      PHYSICIAN INTERPRETATION:  Left Ventricle: The left ventricular systolic function is moderately decreased, with a visually estimated ejection fraction of 35-40%. There is global hypokinesis of the left ventricle with minor regional variations. The left ventricular cavity size is severely dilated. Left Ventricular Global Longitudinal Strain - -10.5 %. Spectral Doppler shows a normal pattern of left ventricular diastolic filling.  Left Atrium: The left atrium is moderately dilated.  Right Ventricle: The right ventricle is normal in size. There is normal right ventricular global systolic function.  Right Atrium: The right atrium is moderately dilated.  Aortic Valve: The aortic valve was not well visualized. There is no aortic valve thickening. There is no evidence of aortic valve stenosis.  The aortic valve dimensionless index is 1.03. There is moderate aortic valve regurgitation. The peak instantaneous gradient of the aortic valve is 16.5 mmHg. The mean gradient of the aortic valve is 8.0 mmHg. Likely tricuspid aortic valve but images slightly suboptimal.  Mitral Valve: The mitral valve is abnormal. There is moderate mitral valve regurgitation which is centrally directed.  Tricuspid Valve: The tricuspid valve is structurally normal. There is trace tricuspid regurgitation. The right ventricular systolic pressure is unable to be estimated.  Pulmonic Valve: The pulmonic valve is structurally normal. There is physiologic pulmonic valve regurgitation.  Pericardium: There is a trivial pericardial  effusion.  Aorta: The aortic root is normal. There is an aneurysm involving the ascending aorta. Severe ascending aortic aneurysm of 6.2 cm at the largest diameter.  Systemic Veins: The inferior vena cava appears to be of normal size, IVC inspiratory collapse greater than 50%.      CONCLUSIONS:  1. The left ventricular systolic function is moderately decreased, with a visually estimated ejection fraction of 35-40%.  2. There is global hypokinesis of the left ventricle with minor regional variations.  3. Left ventricular cavity size is severely dilated.  4. There is normal right ventricular global systolic function.  5. The left atrium is moderately dilated.  6. The right atrium is moderately dilated.  7. Moderate mitral valve regurgitation.  8. Trace tricuspid regurgitation is visualized.  9. Aortic valve stenosis is not present.  10. Likely tricuspid aortic valve but images slightly suboptimal.  11. Moderate aortic valve regurgitation.  12. Aneurysm of the ascending aorta.    Limited TTE 9/25  CONCLUSIONS:   1. Poorly visualized anatomical structures due to suboptimal image quality.   2. Left ventricular ejection fraction is severely decreased, calculated by Vasquez's biplane at 22%.   3. There is global hypokinesis of the left ventricle with minor regional variations.   4. Spectral Doppler shows a restrictive pattern of left ventricular diastolic filling.   5. Left ventricular cavity size is severely dilated.   6. Abnormal septal motion consistent with post-operative status.   7. No left ventricular thrombus visualized.   8. There is reduced right ventricular systolic function.   9. The left atrium is enlarged.  10. The right atrium is mild to moderately dilated.  11. Right ventricular systolic pressure is within normal limits.  12. S/p 29mm Konect Resilia bioprosthetic valved conduit. AVR gradients were not assessed by spectral Doppler though no obvious AI by color Doppler  13. S/p 32mm Gelweave ascending aortic  conduit and hemiarch.  14. Compared with the prior exam, preop echo done on 9/20/2024 [facility], the LV was already severely dilated with moderately reduced function with a severely dilated aorta with moderate AI and the patient is now s/p acending aortic and arch replacement and Konect AVR. The LV remains severely dilated with a reducetion in LV systolic function , now severely reduced. The AVR gradients were not assessed today but there is no AI.    Imaging  Chest x-ray: None today    cMRI: 9/12/24  IMPRESSION:  1. Dilated LV (EDVi-182 ml/m2) with reduced systolic function.  Quantitative LVEF 34 %.  2. Normal RV size (EDVi-88 ml/m2)and systolic function. Quantitative  RVEF49%.  3. Aneurysmal dilatation of the ascending aorta measuring up 6.4 cm.  Dilated aortic root measuring 4.5 x 1.5 x 4.2 cm.  4. Moderate aortic regurgitation. Aortic regurgitant fraction is 36%.  5. Small focal transmural (%) LGE/scar involving the apical  lateral wall. Finding may represent prior embolic event versus prior  myocarditis.  6. Nonspecific replacement fibrosis noted at the level of the basal  mid septum.  7. Small bilateral pleural effusion.  8. Small circumferential pericardial effusion.    [facility]: 9/11/24  1. The left main is a large vessel that bifurcates into the LAD and circumflex and had no obstructive disease.  2. The LAD was a large vessel that extended to the apex and wraps around the apex to supply the distal inferoapical wall. The LAD had no obstructive disease.  3. The circumflex was a large codominant vessel with no obstructive disease in the circumflex or its marginal branches.  4. The right coronary artery was unsuccessfully imaged nonselective imaging briefly showed evidence of vessel patency.    Lab Review   Results for orders placed or performed during the hospital encounter of 09/11/24 (from the past 24 hour(s))   Renal function panel   Result Value Ref Range    Glucose 124 (H) 74 - 99 mg/dL     "Sodium 135 (L) 136 - 145 mmol/L    Potassium 4.2 3.5 - 5.3 mmol/L    Chloride 100 98 - 107 mmol/L    Bicarbonate 22 21 - 32 mmol/L    Anion Gap 17 10 - 20 mmol/L    Urea Nitrogen 40 (H) 6 - 23 mg/dL    Creatinine 1.61 (H) 0.50 - 1.30 mg/dL    eGFR 47 (L) >60 mL/min/1.73m*2    Calcium 8.3 (L) 8.6 - 10.6 mg/dL    Phosphorus 5.1 (H) 2.5 - 4.9 mg/dL    Albumin 3.5 3.4 - 5.0 g/dL   Magnesium   Result Value Ref Range    Magnesium 2.27 1.60 - 2.40 mg/dL   CBC   Result Value Ref Range    WBC 9.8 4.4 - 11.3 x10*3/uL    nRBC 0.0 0.0 - 0.0 /100 WBCs    RBC 3.92 (L) 4.50 - 5.90 x10*6/uL    Hemoglobin 10.9 (L) 13.5 - 17.5 g/dL    Hematocrit 33.1 (L) 41.0 - 52.0 %    MCV 84 80 - 100 fL    MCH 27.8 26.0 - 34.0 pg    MCHC 32.9 32.0 - 36.0 g/dL    RDW 14.3 11.5 - 14.5 %    Platelets 241 150 - 450 x10*3/uL   Renal Function Panel   Result Value Ref Range    Glucose 99 74 - 99 mg/dL    Sodium 137 136 - 145 mmol/L    Potassium 4.1 3.5 - 5.3 mmol/L    Chloride 100 98 - 107 mmol/L    Bicarbonate 27 21 - 32 mmol/L    Anion Gap 14 10 - 20 mmol/L    Urea Nitrogen 39 (H) 6 - 23 mg/dL    Creatinine 1.63 (H) 0.50 - 1.30 mg/dL    eGFR 46 (L) >60 mL/min/1.73m*2    Calcium 8.5 (L) 8.6 - 10.6 mg/dL    Phosphorus 5.0 (H) 2.5 - 4.9 mg/dL    Albumin 3.4 3.4 - 5.0 g/dL         No results found for: \"CKTOTAL\", \"CKMB\", \"CKMBINDEX\", \"TROPONINI\"  Lab Results   Component Value Date    WBC 9.8 10/02/2024    HGB 10.9 (L) 10/02/2024    HCT 33.1 (L) 10/02/2024    MCV 84 10/02/2024     10/02/2024     No results found for: \"CHOL\", \"TRIG\", \"HDL\", \"LDLDIRECT\"    Triglycerides   Date/Time Value Ref Range Status   04/10/2024 10:44 AM 55 40 - 150 mg/dL Final   10/20/2022 07:48 AM 63 40 - 150 MG/DL Final   09/07/2021 01:21 PM 82 40 - 150 MG/DL Final        Assessment and Plan   David Chatman is a 68 y.o. male with a PMH of HLD,HTN, SHOAIB who was being worked up for SOB, presented to the hospital at the advice of his cardiologist after he was found to have an " LVEF of 35-40% with moderate AR with CT angio chest showing aneursym of thoracic aorta 6.2cm. C 9/11 showed no obstructive disease in the left main, LAD, or circumflex vessel; inability to engage the RCA. Underwent cMRI 9/12 that showed dilated LV with reduced LVEF 34%, normal RV size and systolic function; dilation of ascending aorta measuring 6.4 cm with moderate aortic regurg; small focal transmural LGE/scar involving the apical lateral wall and nonspecific replacement fibrosis noted at level of the basal mid septum. He ultimately went to the OR with Dr. Perdomo on 9/23 and is s/p ascending aortic replacement and hemiarch replacement with a 32 Gelweave graft, aortic root replacement and aortic valve replacement with a 29 mm connect valve conduit, STEVEN closure. Post op hospital course complicated with afib RVR requiring amiodarone and LIZ. HF re-consulted to assist in the management of HFrEF.     #New onset HFrEF(NYHA Class II-III/AHA Stage C).   #now s/p thoracic aortic aneurysm repair, AVR   Cardiomyopathy likely related to valvulopathy (moderate AR) vs idiopathic. Some note of small focal transmural scar in the apical lateral wall on cMRI though out of proportion to cardiomyopathy present.    Admit proBNP 1015, trop 18-->16-->21, EKG LBBB.  -TTE 09/10 showed an LVEF of 35-40%, moderate AR, moderate MR, normal RV systolic function and aneurysm of ascending aorta. Nuclear perfusion scan negative for inducible ischemia.  -TTE 09/25 showed an LV that is severely dilated with EF of 22%, reduced RVSF. No obvious AI by color doppler. Mild MVR. Trace to mild TVR.   -LHC 09/11 at OSH with left system without significant CAD, although the RCA difficult to engage.   -cMRI 09/12: Dilated LV with reduced systolic function, LVEF 34 %. Aneurysmal dilatation of the ascending aorta measuring up 6.4 cm. Dilated aortic root measuring 4.5 x 1.5 x 4.2 cm. Moderate aortic regurgitation. Small focal transmural (%) LGE/scar  involving the apical lateral wall. Nonspecific replacement fibrosis noted at the level of the basal mid septum.  - Diuresis: Recommend continued diuresis with IV lasix 80 mg BID. Goal NN 1.5-2.0L  - GDMT:  -BB: continue metoprolol 50mg bid, plan to transition to metoprolol XL 100mg daily prior to discharge.  -Vasodilator: Start hydralazine 25 mg q8hrs  -ACE/ARB/ARNI: Check chem in afternoon. If improving, please switch hydralazine to Entresto 12-13mg q12hrs.  -MRA: continue eplerenone 25mg daily.  -SGLT2i: cost prohibitive. Will consider starting in outpatient   setting if able.   - Inotropes: not indicated  - Mechanical support:  not indicated  - AICD/CRT-D/Lifevest: Potentially a candidate in the future for CRT given HFrEF EF<35%, QRS>150 and LBBB.  - Strict I/Os, Daily Na < 2g daily, daily weights  - Will need follow up with outpatient HF team. Consider setting up follow up with Dr. Buck in [facility].     This plan was discussed with Dr. Henderson, HF attending. For any questions or concerns, feel free to reach out via [employer] chat or page at [phone].    Boris Mccray MD   Heart Failure Fellow  PGY-4

## 2024-10-02 NOTE — TELEPHONE ENCOUNTER
Patient is currently admitted to [facility]. He had surgery with Conor to repair his aortic root. I will ask him to call us once he is out of the hospital to schedule a follow up for when you return.

## 2024-10-03 ENCOUNTER — DOCUMENTATION (OUTPATIENT)
Dept: HOME HEALTH SERVICES | Facility: HOME HEALTH | Age: 69
End: 2024-10-03
Payer: MEDICARE

## 2024-10-03 VITALS
SYSTOLIC BLOOD PRESSURE: 120 MMHG | RESPIRATION RATE: 17 BRPM | DIASTOLIC BLOOD PRESSURE: 84 MMHG | OXYGEN SATURATION: 95 % | HEART RATE: 71 BPM | WEIGHT: 244.8 LBS | TEMPERATURE: 97 F | BODY MASS INDEX: 32.44 KG/M2 | HEIGHT: 73 IN

## 2024-10-03 PROBLEM — Z95.828 S/P ASCENDING AORTIC REPLACEMENT: Status: ACTIVE | Noted: 2024-10-03

## 2024-10-03 PROBLEM — I71.21 THORACIC ASCENDING AORTIC ANEURYSM (CMS-HCC): Status: RESOLVED | Noted: 2024-09-10 | Resolved: 2024-10-03

## 2024-10-03 PROBLEM — D69.6 THROMBOCYTOPENIA (CMS-HCC): Status: RESOLVED | Noted: 2024-09-21 | Resolved: 2024-10-03

## 2024-10-03 PROBLEM — Z95.2 S/P AORTIC VALVE REPLACEMENT AND AORTOPLASTY: Status: ACTIVE | Noted: 2024-10-03

## 2024-10-03 LAB
ALBUMIN SERPL BCP-MCNC: 3.5 G/DL (ref 3.4–5)
ANION GAP SERPL CALC-SCNC: 16 MMOL/L (ref 10–20)
BUN SERPL-MCNC: 42 MG/DL (ref 6–23)
CALCIUM SERPL-MCNC: 8.6 MG/DL (ref 8.6–10.6)
CHLORIDE SERPL-SCNC: 101 MMOL/L (ref 98–107)
CO2 SERPL-SCNC: 25 MMOL/L (ref 21–32)
CREAT SERPL-MCNC: 1.74 MG/DL (ref 0.5–1.3)
EGFRCR SERPLBLD CKD-EPI 2021: 42 ML/MIN/1.73M*2
ERYTHROCYTE [DISTWIDTH] IN BLOOD BY AUTOMATED COUNT: 14.5 % (ref 11.5–14.5)
GLUCOSE SERPL-MCNC: 96 MG/DL (ref 74–99)
HCT VFR BLD AUTO: 33.1 % (ref 41–52)
HGB BLD-MCNC: 10.8 G/DL (ref 13.5–17.5)
LABORATORY COMMENT REPORT: NORMAL
MAGNESIUM SERPL-MCNC: 2.32 MG/DL (ref 1.6–2.4)
MCH RBC QN AUTO: 27.8 PG (ref 26–34)
MCHC RBC AUTO-ENTMCNC: 32.6 G/DL (ref 32–36)
MCV RBC AUTO: 85 FL (ref 80–100)
NRBC BLD-RTO: 0 /100 WBCS (ref 0–0)
PATH REPORT.FINAL DX SPEC: NORMAL
PATH REPORT.GROSS SPEC: NORMAL
PATH REPORT.RELEVANT HX SPEC: NORMAL
PATH REPORT.TOTAL CANCER: NORMAL
PHOSPHATE SERPL-MCNC: 4.9 MG/DL (ref 2.5–4.9)
PLATELET # BLD AUTO: 225 X10*3/UL (ref 150–450)
POTASSIUM SERPL-SCNC: 3.9 MMOL/L (ref 3.5–5.3)
RBC # BLD AUTO: 3.89 X10*6/UL (ref 4.5–5.9)
SODIUM SERPL-SCNC: 138 MMOL/L (ref 136–145)
WBC # BLD AUTO: 10.8 X10*3/UL (ref 4.4–11.3)

## 2024-10-03 PROCEDURE — 85027 COMPLETE CBC AUTOMATED: CPT | Performed by: NURSE PRACTITIONER

## 2024-10-03 PROCEDURE — 99239 HOSP IP/OBS DSCHRG MGMT >30: CPT | Performed by: NURSE PRACTITIONER

## 2024-10-03 PROCEDURE — 2500000004 HC RX 250 GENERAL PHARMACY W/ HCPCS (ALT 636 FOR OP/ED): Performed by: NURSE PRACTITIONER

## 2024-10-03 PROCEDURE — 36416 COLLJ CAPILLARY BLOOD SPEC: CPT | Performed by: NURSE PRACTITIONER

## 2024-10-03 PROCEDURE — 2500000002 HC RX 250 W HCPCS SELF ADMINISTERED DRUGS (ALT 637 FOR MEDICARE OP, ALT 636 FOR OP/ED): Performed by: PHYSICIAN ASSISTANT

## 2024-10-03 PROCEDURE — 2500000002 HC RX 250 W HCPCS SELF ADMINISTERED DRUGS (ALT 637 FOR MEDICARE OP, ALT 636 FOR OP/ED): Performed by: NURSE PRACTITIONER

## 2024-10-03 PROCEDURE — 83735 ASSAY OF MAGNESIUM: CPT | Performed by: NURSE PRACTITIONER

## 2024-10-03 PROCEDURE — 80069 RENAL FUNCTION PANEL: CPT | Performed by: NURSE PRACTITIONER

## 2024-10-03 PROCEDURE — 99232 SBSQ HOSP IP/OBS MODERATE 35: CPT | Performed by: INTERNAL MEDICINE

## 2024-10-03 PROCEDURE — 2500000001 HC RX 250 WO HCPCS SELF ADMINISTERED DRUGS (ALT 637 FOR MEDICARE OP): Performed by: NURSE PRACTITIONER

## 2024-10-03 PROCEDURE — 2500000001 HC RX 250 WO HCPCS SELF ADMINISTERED DRUGS (ALT 637 FOR MEDICARE OP): Performed by: PHYSICIAN ASSISTANT

## 2024-10-03 PROCEDURE — 97530 THERAPEUTIC ACTIVITIES: CPT | Mod: GP,CQ

## 2024-10-03 RX ORDER — TORSEMIDE 20 MG/1
40 TABLET ORAL DAILY
Status: DISCONTINUED | OUTPATIENT
Start: 2024-10-03 | End: 2024-10-03 | Stop reason: HOSPADM

## 2024-10-03 RX ORDER — EPLERENONE 25 MG/1
25 TABLET, FILM COATED ORAL DAILY
Qty: 30 TABLET | Refills: 0 | Status: SHIPPED | OUTPATIENT
Start: 2024-10-03

## 2024-10-03 RX ORDER — METOPROLOL SUCCINATE 100 MG/1
100 TABLET, EXTENDED RELEASE ORAL DAILY
Qty: 30 TABLET | Refills: 0 | Status: SHIPPED | OUTPATIENT
Start: 2024-10-04

## 2024-10-03 RX ORDER — TORSEMIDE 20 MG/1
40 TABLET ORAL DAILY
Qty: 60 TABLET | Refills: 0 | Status: SHIPPED | OUTPATIENT
Start: 2024-10-03

## 2024-10-03 RX ORDER — ASPIRIN 81 MG/1
81 TABLET ORAL DAILY
COMMUNITY
Start: 2024-10-04 | End: 2025-10-04

## 2024-10-03 RX ORDER — ATORVASTATIN CALCIUM 20 MG/1
20 TABLET, FILM COATED ORAL NIGHTLY
Qty: 30 TABLET | Refills: 0 | Status: SHIPPED | OUTPATIENT
Start: 2024-10-03

## 2024-10-03 RX ORDER — HYDRALAZINE HYDROCHLORIDE 25 MG/1
12.5 TABLET, FILM COATED ORAL 3 TIMES DAILY
Status: DISCONTINUED | OUTPATIENT
Start: 2024-10-03 | End: 2024-10-03 | Stop reason: HOSPADM

## 2024-10-03 RX ORDER — POTASSIUM CHLORIDE 20 MEQ/1
10 TABLET, EXTENDED RELEASE ORAL ONCE
Status: COMPLETED | OUTPATIENT
Start: 2024-10-03 | End: 2024-10-03

## 2024-10-03 RX ORDER — POLYETHYLENE GLYCOL 3350 17 G/17G
17 POWDER, FOR SOLUTION ORAL DAILY PRN
COMMUNITY
Start: 2024-10-03 | End: 2024-10-08 | Stop reason: WASHOUT

## 2024-10-03 RX ORDER — AMIODARONE HYDROCHLORIDE 200 MG/1
200 TABLET ORAL DAILY
Qty: 30 TABLET | Refills: 0 | Status: SHIPPED | OUTPATIENT
Start: 2024-10-03 | End: 2024-11-02

## 2024-10-03 RX ORDER — ACETAMINOPHEN 325 MG/1
650 TABLET ORAL EVERY 6 HOURS PRN
COMMUNITY
Start: 2024-10-03

## 2024-10-03 RX ORDER — HYDRALAZINE HYDROCHLORIDE 25 MG/1
12.5 TABLET, FILM COATED ORAL 3 TIMES DAILY
Qty: 45 TABLET | Refills: 0 | Status: SHIPPED | OUTPATIENT
Start: 2024-10-03

## 2024-10-03 RX ORDER — DOCUSATE SODIUM 100 MG/1
100 CAPSULE, LIQUID FILLED ORAL 2 TIMES DAILY PRN
COMMUNITY
Start: 2024-10-03 | End: 2024-10-08 | Stop reason: WASHOUT

## 2024-10-03 RX ADMIN — HEPARIN SODIUM 5000 UNITS: 5000 INJECTION, SOLUTION INTRAVENOUS; SUBCUTANEOUS at 00:38

## 2024-10-03 RX ADMIN — HEPARIN SODIUM 5000 UNITS: 5000 INJECTION, SOLUTION INTRAVENOUS; SUBCUTANEOUS at 08:57

## 2024-10-03 RX ADMIN — ACETAMINOPHEN 650 MG: 325 TABLET ORAL at 02:20

## 2024-10-03 RX ADMIN — AMIODARONE HYDROCHLORIDE 400 MG: 200 TABLET ORAL at 08:58

## 2024-10-03 RX ADMIN — EPLERENONE 25 MG: 25 TABLET, FILM COATED ORAL at 12:21

## 2024-10-03 RX ADMIN — OXYCODONE HYDROCHLORIDE 5 MG: 5 TABLET ORAL at 02:20

## 2024-10-03 RX ADMIN — ASPIRIN 81 MG: 81 TABLET, COATED ORAL at 08:57

## 2024-10-03 RX ADMIN — POTASSIUM CHLORIDE 10 MEQ: 1500 TABLET, EXTENDED RELEASE ORAL at 08:58

## 2024-10-03 RX ADMIN — PANTOPRAZOLE SODIUM 40 MG: 40 TABLET, DELAYED RELEASE ORAL at 06:32

## 2024-10-03 RX ADMIN — TORSEMIDE 40 MG: 20 TABLET ORAL at 12:39

## 2024-10-03 RX ADMIN — METOPROLOL SUCCINATE 100 MG: 100 TABLET, EXTENDED RELEASE ORAL at 08:57

## 2024-10-03 ASSESSMENT — COGNITIVE AND FUNCTIONAL STATUS - GENERAL
MOBILITY SCORE: 23
CLIMB 3 TO 5 STEPS WITH RAILING: A LITTLE

## 2024-10-03 ASSESSMENT — PAIN - FUNCTIONAL ASSESSMENT
PAIN_FUNCTIONAL_ASSESSMENT: 0-10
PAIN_FUNCTIONAL_ASSESSMENT: 0-10

## 2024-10-03 ASSESSMENT — PAIN SCALES - GENERAL
PAINLEVEL_OUTOF10: 0 - NO PAIN
PAINLEVEL_OUTOF10: 5 - MODERATE PAIN
PAINLEVEL_OUTOF10: 0 - NO PAIN

## 2024-10-03 ASSESSMENT — PAIN DESCRIPTION - LOCATION: LOCATION: CHEST

## 2024-10-03 NOTE — PROGRESS NOTES
Physical Therapy    Physical Therapy Treatment    Patient Name: David Chatman  MRN: [id number]  Department: [place] [address]  Room: [address] [place] [address]  Today's Date: 10/3/2024  Time Calculation  Start Time: 0824  Stop Time: 0840  Time Calculation (min): 16 min         Assessment/Plan   PT Assessment  PT Assessment Results: Decreased strength, Decreased endurance  Rehab Prognosis: Excellent  Evaluation/Treatment Tolerance: Patient tolerated treatment well  Medical Staff Made Aware: Yes  End of Session Communication: Bedside nurse  Assessment Comment: Pt continues to make excellent progress, demos low fall risk and improving endurance. Pt reports feeling weak, offers excellent effort. Remains appropriate for low intensity therapy  End of Session Patient Position: Up in chair, Alarm off, not on at start of session  PT Plan  Inpatient/Swing Bed or Outpatient: Inpatient  PT Plan  Treatment/Interventions: Bed mobility, Transfer training, Gait training, Stair training, Balance training, Strengthening, Endurance training, Therapeutic exercise, Therapeutic activity  PT Plan: Ongoing PT  PT Frequency: 3 times per week  PT Discharge Recommendations: Low intensity level of continued care  PT Recommended Transfer Status: Assist x1, Assistive device  PT - OK to Discharge: Yes      General Visit Information:   PT  Visit  PT Received On: 10/03/24  Response to Previous Treatment: Patient with no complaints from previous session.  General  Prior to Session Communication: Bedside nurse  Patient Position Received: Bed, 2 rail up, Alarm off, not on at start of session  General Comment: Pt sitting up at EOB, pleasant and motivated  Per handoff with RN, pt is appropriate for therapy, vitals are stable and pain is controlled. Other concerns prior to tx are: none    Subjective   Precautions:  Precautions  Medical Precautions: Cardiac precautions, Fall precautions  Post-Surgical Precautions: Move in the Tube    Vital Signs (Past 2hrs)        Date/Time  "Vitals Session Patient Position Pulse Resp SpO2 BP MAP (mmHg)    10/03/24 0824 During PT  --  84  --  --  --  --              Objective   Pain:  Pain Assessment  Pain Assessment: 0-10  0-10 (Numeric) Pain Score: 0 - No pain  Cognition:  Cognition  Overall Cognitive Status: Within Functional Limits  Orientation Level: Oriented X4    Treatments:  Therapeutic Exercise  Therapeutic Exercise Performed: Yes  Therapeutic Exercise Activity 1: Single LE press-up to 4\" aerobic step, 10x without UE support or assist, side stepping 2x10/LE    Balance/Neuromuscular Re-Education  Balance/Neuromuscular Re-Education Activity Performed: Yes  Balance/Neuromuscular Re-Education Activity 1: Shafer balance assessment completed, fwd reach deferred to ensure compliance with MITT precautions, pt scoring 52/52 available    Ambulation/Gait Training  Ambulation/Gait Training Performed: Yes  Ambulation/Gait Training 1  Surface 1: Level tile  Device 1: No device  Assistance 1: Distant supervision  Comments/Distance (ft) 1: 350'x1  Transfers  Transfer: Yes  Transfer 1  Transfer From 1: Sit to, Stand to  Transfer to 1: Sit  Technique 1: Sit to stand, Stand to sit  Transfer Level of Assistance 1: Independent    Stairs  Stairs: Yes  Stairs  Rails 1: Right  Device 1: Railing  Assistance 1: Distant supervision  Comment/Number of Steps 1: 12 steps, recip sequence    Outcome Measures:    [facility] Basic Mobility  Turning from your back to your side while in a flat bed without using bedrails: None  Moving from lying on your back to sitting on the side of a flat bed without using bedrails: None  Moving to and from bed to chair (including a wheelchair): None  Standing up from a chair using your arms (e.g. wheelchair or bedside chair): None  To walk in hospital room: None  Climbing 3-5 steps with railing: A little  Basic Mobility - Total Score: 23    Education Documentation  Precautions, taught by Yolie Solis PTA at 10/3/2024  9:19 AM.  Learner: " Patient  Readiness: Eager  Method: Explanation, Demonstration  Response: Verbalizes Understanding, Demonstrated Understanding    Body Mechanics, taught by Yolie Solis PTA at 10/3/2024  9:19 AM.  Learner: Patient  Readiness: Eager  Method: Explanation, Demonstration  Response: Verbalizes Understanding, Demonstrated Understanding    Mobility Training, taught by Yolie Solis PTA at 10/3/2024  9:19 AM.  Learner: Patient  Readiness: Eager  Method: Explanation, Demonstration  Response: Verbalizes Understanding, Demonstrated Understanding    Education Comments  No comments found.        OP EDUCATION:       Encounter Problems       Encounter Problems (Active)       Balance       Pt will demonstrated ability to score at least 24/28 on the Tinetti balance assessment tool to ensure safety upon D/C.  (Progressing)       Start:  09/24/24    Expected End:  10/08/24               Mobility       Pt will demonstrated ability to ambulate >/=250ft with proper form and no balance deficits for safe home going.   (Progressing)       Start:  09/24/24    Expected End:  10/08/24            Pt will demonstrate ability to ascend/descend 1 flight of stairs with unilateral rail and no balance deficits for safe home going.  (Progressing)       Start:  09/24/24    Expected End:  10/08/24               PT Transfers       Pt will demonstrate ability to complete 5X STS in < 12 sec with good from and consistency between transfers for safe D/C.  (Progressing)       Start:  09/24/24    Expected End:  10/08/24                 MIKALA Fontaine

## 2024-10-03 NOTE — SIGNIFICANT EVENT
Atrial and ventricular wires cut at skin level at 1140 due to surgeon preference.  Patient instructed to notify radiology of retained epicardial wires prior to any MRI procedure, and to notify Dr Perdomo of any visible wires or s/s infection.     SERGIO Lovell-CNP   [facility] Cardiac Surgery  Team phone [phone]

## 2024-10-03 NOTE — PROGRESS NOTES
Advanced Heart Failure Progress Note   Consulting Team: CT surgery  Primary Cardiologist: Dr. Carter  Reason for Consult: HFrEF. Now s/p thoracic aorta aneursym repair and AVR+STEVEN clip.    Subjective   Yesterday, he became hypotensive with 25 mg of hydralazine. Attempted to switch to half dose Entresto, but it was deferred due to borderline BP. He was diuresed 1L NN with 80 mg IV lasix BID. His leg swelling has improved somewhat but still present. He denies any new symptoms and feels ready to go home.    Objective   Physical Exam  Vitals:    10/03/24 0919   BP: 122/80   Pulse:    Resp: 17   Temp: 36.3 °C (97.3 °F)   SpO2: 96%     GEN:  NAD.  CV: irregularly irregular, no m/r/g. LE edema: +2 pitting edema to knees bilaterally. JVD at 10 cm, but improved from prior  LUNGS: CTA B/L  ABD: Soft, NT/ND, NBS  SKIN: Warm, well perfused. No skin rashes or abnormal lesions.   EXT: No clubbing, cyanosis, or edema.  NEURO: AAOX3, not following commands    I have personally reviewed the following images and laboratory findings:  ECG:  sinus rhythm, LAD, LBBB     Results for orders placed during the hospital encounter of 09/10/24    Transthoracic Echo Complete    Narrative  [address], [place] [id number]  Phone [phone]    TRANSTHORACIC ECHOCARDIOGRAM REPORT    Patient Name:      NAN Norman Physician:    [id number] Jackeline Davies MD  Study Date:        9/10/2024            Ordering Provider:    [id number] AAMIR CARTER  MRN/PID:           [id number]             Fellow:  Accession#:        [id number]         Nurse:  Date of Birth/Age: 1955 / 68 years Sonographer:          Ai PELLETIER  Gender:            M                    Additional Staff:  Height:            185.42 cm            Admit Date:  Weight:            117.03 kg            Admission Status:     Outpatient  BSA / BMI:         2.40 m2 / 34.04      Department Location:  kg/m2  Blood Pressure: 125 /72  mmHg    Study Type:    TRANSTHORACIC ECHO (TTE) COMPLETE  Diagnosis/ICD: Shortness of breath-R06.02; Essential (primary) hypertension-I10  Indication:    Rule out CAD HLD HTN Shortness of Breath  CPT Codes:     Echo Complete w Full [id number]    Patient History:  Pertinent History: HTN HLD PAF.    Study Detail: The following Echo studies were performed: 2D, M-Mode, Doppler,  color flow, Strain and 3D.      PHYSICIAN INTERPRETATION:  Left Ventricle: The left ventricular systolic function is moderately decreased, with a visually estimated ejection fraction of 35-40%. There is global hypokinesis of the left ventricle with minor regional variations. The left ventricular cavity size is severely dilated. Left Ventricular Global Longitudinal Strain - -10.5 %. Spectral Doppler shows a normal pattern of left ventricular diastolic filling.  Left Atrium: The left atrium is moderately dilated.  Right Ventricle: The right ventricle is normal in size. There is normal right ventricular global systolic function.  Right Atrium: The right atrium is moderately dilated.  Aortic Valve: The aortic valve was not well visualized. There is no aortic valve thickening. There is no evidence of aortic valve stenosis.  The aortic valve dimensionless index is 1.03. There is moderate aortic valve regurgitation. The peak instantaneous gradient of the aortic valve is 16.5 mmHg. The mean gradient of the aortic valve is 8.0 mmHg. Likely tricuspid aortic valve but images slightly suboptimal.  Mitral Valve: The mitral valve is abnormal. There is moderate mitral valve regurgitation which is centrally directed.  Tricuspid Valve: The tricuspid valve is structurally normal. There is trace tricuspid regurgitation. The right ventricular systolic pressure is unable to be estimated.  Pulmonic Valve: The pulmonic valve is structurally normal. There is physiologic pulmonic valve regurgitation.  Pericardium: There is a trivial pericardial effusion.  Aorta: The  aortic root is normal. There is an aneurysm involving the ascending aorta. Severe ascending aortic aneurysm of 6.2 cm at the largest diameter.  Systemic Veins: The inferior vena cava appears to be of normal size, IVC inspiratory collapse greater than 50%.      CONCLUSIONS:  1. The left ventricular systolic function is moderately decreased, with a visually estimated ejection fraction of 35-40%.  2. There is global hypokinesis of the left ventricle with minor regional variations.  3. Left ventricular cavity size is severely dilated.  4. There is normal right ventricular global systolic function.  5. The left atrium is moderately dilated.  6. The right atrium is moderately dilated.  7. Moderate mitral valve regurgitation.  8. Trace tricuspid regurgitation is visualized.  9. Aortic valve stenosis is not present.  10. Likely tricuspid aortic valve but images slightly suboptimal.  11. Moderate aortic valve regurgitation.  12. Aneurysm of the ascending aorta.    Limited TTE 9/25  CONCLUSIONS:   1. Poorly visualized anatomical structures due to suboptimal image quality.   2. Left ventricular ejection fraction is severely decreased, calculated by Vasquez's biplane at 22%.   3. There is global hypokinesis of the left ventricle with minor regional variations.   4. Spectral Doppler shows a restrictive pattern of left ventricular diastolic filling.   5. Left ventricular cavity size is severely dilated.   6. Abnormal septal motion consistent with post-operative status.   7. No left ventricular thrombus visualized.   8. There is reduced right ventricular systolic function.   9. The left atrium is enlarged.  10. The right atrium is mild to moderately dilated.  11. Right ventricular systolic pressure is within normal limits.  12. S/p 29mm Konect Resilia bioprosthetic valved conduit. AVR gradients were not assessed by spectral Doppler though no obvious AI by color Doppler  13. S/p 32mm Gelweave ascending aortic conduit and  hemiarch.  14. Compared with the prior exam, preop echo done on 9/20/2024 [facility], the LV was already severely dilated with moderately reduced function with a severely dilated aorta with moderate AI and the patient is now s/p acending aortic and arch replacement and Konect AVR. The LV remains severely dilated with a reducetion in LV systolic function , now severely reduced. The AVR gradients were not assessed today but there is no AI.    Imaging  Chest x-ray: None today    cMRI: 9/12/24  IMPRESSION:  1. Dilated LV (EDVi-182 ml/m2) with reduced systolic function.  Quantitative LVEF 34 %.  2. Normal RV size (EDVi-88 ml/m2)and systolic function. Quantitative  RVEF49%.  3. Aneurysmal dilatation of the ascending aorta measuring up 6.4 cm.  Dilated aortic root measuring 4.5 x 1.5 x 4.2 cm.  4. Moderate aortic regurgitation. Aortic regurgitant fraction is 36%.  5. Small focal transmural (%) LGE/scar involving the apical  lateral wall. Finding may represent prior embolic event versus prior  myocarditis.  6. Nonspecific replacement fibrosis noted at the level of the basal  mid septum.  7. Small bilateral pleural effusion.  8. Small circumferential pericardial effusion.    [facility]: 9/11/24  1. The left main is a large vessel that bifurcates into the LAD and circumflex and had no obstructive disease.  2. The LAD was a large vessel that extended to the apex and wraps around the apex to supply the distal inferoapical wall. The LAD had no obstructive disease.  3. The circumflex was a large codominant vessel with no obstructive disease in the circumflex or its marginal branches.  4. The right coronary artery was unsuccessfully imaged nonselective imaging briefly showed evidence of vessel patency.    Lab Review   Results for orders placed or performed during the hospital encounter of 09/11/24 (from the past 24 hour(s))   Renal Function Panel   Result Value Ref Range    Glucose 124 (H) 74 - 99 mg/dL    Sodium 138  "136 - 145 mmol/L    Potassium 4.1 3.5 - 5.3 mmol/L    Chloride 102 98 - 107 mmol/L    Bicarbonate 25 21 - 32 mmol/L    Anion Gap 15 10 - 20 mmol/L    Urea Nitrogen 38 (H) 6 - 23 mg/dL    Creatinine 1.63 (H) 0.50 - 1.30 mg/dL    eGFR 46 (L) >60 mL/min/1.73m*2    Calcium 8.3 (L) 8.6 - 10.6 mg/dL    Phosphorus 4.6 2.5 - 4.9 mg/dL    Albumin 3.3 (L) 3.4 - 5.0 g/dL   Magnesium   Result Value Ref Range    Magnesium 2.32 1.60 - 2.40 mg/dL   CBC   Result Value Ref Range    WBC 10.8 4.4 - 11.3 x10*3/uL    nRBC 0.0 0.0 - 0.0 /100 WBCs    RBC 3.89 (L) 4.50 - 5.90 x10*6/uL    Hemoglobin 10.8 (L) 13.5 - 17.5 g/dL    Hematocrit 33.1 (L) 41.0 - 52.0 %    MCV 85 80 - 100 fL    MCH 27.8 26.0 - 34.0 pg    MCHC 32.6 32.0 - 36.0 g/dL    RDW 14.5 11.5 - 14.5 %    Platelets 225 150 - 450 x10*3/uL   Renal Function Panel   Result Value Ref Range    Glucose 96 74 - 99 mg/dL    Sodium 138 136 - 145 mmol/L    Potassium 3.9 3.5 - 5.3 mmol/L    Chloride 101 98 - 107 mmol/L    Bicarbonate 25 21 - 32 mmol/L    Anion Gap 16 10 - 20 mmol/L    Urea Nitrogen 42 (H) 6 - 23 mg/dL    Creatinine 1.74 (H) 0.50 - 1.30 mg/dL    eGFR 42 (L) >60 mL/min/1.73m*2    Calcium 8.6 8.6 - 10.6 mg/dL    Phosphorus 4.9 2.5 - 4.9 mg/dL    Albumin 3.5 3.4 - 5.0 g/dL         No results found for: \"CKTOTAL\", \"CKMB\", \"CKMBINDEX\", \"TROPONINI\"  Lab Results   Component Value Date    WBC 10.8 10/03/2024    HGB 10.8 (L) 10/03/2024    HCT 33.1 (L) 10/03/2024    MCV 85 10/03/2024     10/03/2024     No results found for: \"CHOL\", \"TRIG\", \"HDL\", \"LDLDIRECT\"    Triglycerides   Date/Time Value Ref Range Status   04/10/2024 10:44 AM 55 40 - 150 mg/dL Final   10/20/2022 07:48 AM 63 40 - 150 MG/DL Final   09/07/2021 01:21 PM 82 40 - 150 MG/DL Final        Assessment and Plan   David Chatman is a 68 y.o. male with a PMH of HLD,HTN, SHOAIB who was being worked up for SOB, presented to the hospital at the advice of his cardiologist after he was found to have an LVEF of 35-40% with " moderate AR with CT angio chest showing aneursym of thoracic aorta 6.2cm. LHC 9/11 showed no obstructive disease in the left main, LAD, or circumflex vessel; inability to engage the RCA. Underwent cMRI 9/12 that showed dilated LV with reduced LVEF 34%, normal RV size and systolic function; dilation of ascending aorta measuring 6.4 cm with moderate aortic regurg; small focal transmural LGE/scar involving the apical lateral wall and nonspecific replacement fibrosis noted at level of the basal mid septum. He ultimately went to the OR with Dr. Perdomo on 9/23 and is s/p ascending aortic replacement and hemiarch replacement with a 32 Gelweave graft, aortic root replacement and aortic valve replacement with a 29 mm connect valve conduit, STEVEN closure. Post op hospital course complicated with afib RVR requiring amiodarone and LIZ. HF re-consulted to assist in the management of HFrEF.     #New onset HFrEF(NYHA Class II-III/AHA Stage C).   #now s/p thoracic aortic aneurysm repair, AVR, STEVEN closure   #LIZ  Cardiomyopathy likely related to valvulopathy (moderate AR) vs idiopathic. Some note of small focal transmural scar in the apical lateral wall on cMRI though out of proportion to cardiomyopathy present. He underwent TAA and AVR with Dr. Perdomo on 9/23 and HF is following for GDMT management. He is still hypervolemic on exam with persistent LIZ, which we suspect is 2/2 contrast nephropathy from CTA on 9/30. Thus, we are unable to fold in an ACE/ARB/ARNI. To accommodate his desire for discharge today, he will take hydralazine 12.5 mg TID on discharge (hypotensive with 25 mg) for afterload reduction and continue diuresis as outpatient with torsemide 40 mg daily until follow up w/ HF.  - Diuresis: Torsemide 40 mg daily on discharge  - GDMT:  -BB: continue metoprolol 50mg bid, plan to transition to metoprolol XL 100mg daily prior to discharge.  -Vasodilator: Start hydralazine 12.5 mg q8hrs on discharge.  -ACE/ARB/ARNI: Hold off on  Entresto due to renal function.  -MRA: continue eplerenone 25mg daily.  -SGLT2i: cost prohibitive. Will consider starting in outpatient   setting if able.   - Inotropes: not indicated  - Mechanical support:  not indicated  - AICD/CRT-D/Lifevest: Potentially a candidate in the future for CRT given HFrEF EF<35%, QRS>150 and LBBB.  - Strict I/Os, Daily Na < 2g daily, daily weights  - Will need follow up with outpatient HF team. Recommend close follow up with Dr. Buck in [facility], [place].    This plan was discussed with Dr. Henderson, HF attending. For any questions or concerns, feel free to reach out via [employer] chat or page at [phone].    Boris Mccray MD   Heart Failure Fellow  PGY-4

## 2024-10-03 NOTE — PROGRESS NOTES
"CARDIAC SURGERY DAILY PROGRESS NOTE    David Chatman is a 68 y.o. male presenting with shortness of breath. He presented to his cardiologist as an outpt for these complaints. Echo showed an EF of 35-40% and ascending aneurysm 6.2. He was directed to the ED. BNP 1015, troponins, 21,16,15. CT chest revealed large ascending aneurysm up to 6.2 cm. No evidence of acute aortic dissection. He was given IV lasix with relief of symptoms and admitted for further evaluation. He is currently planned for cardiac catheterization. Asymptomatic. The pt reports his blood pressures are under control currently. CT surgery consulted for aneurysm evaluation at OSH and he was transferred to [facility] for further care.    9/23/2024 Operation Procedures: by Trino Perdomo  #1 standard median sternotomy  #2 innominate trunk arterial cannulation  #3 ascending aortic replacement and hemiarch replacement with a 32 Gelweave graft  #4 aortic root replacement and aortic valve replacement: Bentall procedure with a 29 mm connect valve conduit  #5 left atrial appendage closure with a 35 mm AtriCure clip  #6 left femoral arterial line placement    CTICU course: weaned off drips; afib RVR req amio    Transferred to T3 SDU on 9/26  ========================    Interval History:   No  issues  overnight     SUBJECTIVE:  No sleeping overnight     Objective   /80 (BP Location: Right arm, Patient Position: Lying)   Pulse 84   Temp 36.3 °C (97.3 °F) (Temporal)   Resp 17   Ht 1.854 m (6' 1\")   Wt 111 kg (244 lb 12.8 oz)   SpO2 96%   BMI 32.30 kg/m²   0-10 (Numeric) Pain Score: 0 - No pain   3 Day Weight Change: -1.331 kg (-2 lb 14.9 oz) per day    Intake and Output    Intake/Output Summary (Last 24 hours) at 10/3/2024 1148  Last data filed at 10/3/2024 0541  Gross per 24 hour   Intake 860 ml   Output 1525 ml   Net -665 ml       Physical Exam  Physical Exam  Vitals and nursing note reviewed.   Constitutional:       Appearance: Normal appearance. "   HENT:      Head: Atraumatic.      Mouth/Throat:      Mouth: Mucous membranes are moist.   Eyes:      Conjunctiva/sclera: Conjunctivae normal.   Neck:      Vascular: JVD present.   Cardiovascular:      Rate and Rhythm: Normal rate and regular rhythm.      Pulses: Normal pulses.      Heart sounds: Normal heart sounds.      Comments: TELE: SR 1st degree LBBB 70s-90s; in and out of controlled afib yesterday   Epicardial wires capped   Pulmonary:      Effort: Pulmonary effort is normal.      Breath sounds: Normal breath sounds.      Comments: On RA  Mildly diminished bases   Sternum stable   Abdominal:      General: Bowel sounds are normal.      Palpations: Abdomen is soft.      Comments: + BM 10/1   Genitourinary:     Comments: Voids independently   Musculoskeletal:      Cervical back: Neck supple.      Right lower le+ Edema present.      Left lower le+ Edema present.   Skin:     General: Skin is warm and dry.      Capillary Refill: Capillary refill takes less than 2 seconds.      Comments: MSI JACK well approx no s/sx of infections, no erythema     Neurological:      General: No focal deficit present.      Mental Status: He is alert and oriented to person, place, and time.   Psychiatric:         Mood and Affect: Mood normal.         Behavior: Behavior normal.       Medications  Scheduled medications  amiodarone, 400 mg, oral, BID  aspirin, 81 mg, oral, Daily  atorvastatin, 20 mg, oral, Nightly  docusate sodium, 100 mg, oral, BID  eplerenone, 25 mg, oral, Daily  heparin (porcine), 5,000 Units, subcutaneous, q8h  hydrALAZINE, 12.5 mg, oral, TID  lidocaine, 1 patch, transdermal, Daily  metoprolol succinate XL, 100 mg, oral, Daily  oxygen, , inhalation, Continuous - 02/  pantoprazole, 40 mg, oral, Daily before breakfast  polyethylene glycol, 17 g, oral, Daily  [Held by provider] sacubitriL-valsartan, 0.5 tablet, oral, BID  torsemide, 40 mg, oral, Daily    Continuous medications   PRN medications  PRN  medications: acetaminophen, bisacodyl, naloxone, oxyCODONE    Labs  Results for orders placed or performed during the hospital encounter of 09/11/24 (from the past 24 hour(s))   Renal Function Panel   Result Value Ref Range    Glucose 124 (H) 74 - 99 mg/dL    Sodium 138 136 - 145 mmol/L    Potassium 4.1 3.5 - 5.3 mmol/L    Chloride 102 98 - 107 mmol/L    Bicarbonate 25 21 - 32 mmol/L    Anion Gap 15 10 - 20 mmol/L    Urea Nitrogen 38 (H) 6 - 23 mg/dL    Creatinine 1.63 (H) 0.50 - 1.30 mg/dL    eGFR 46 (L) >60 mL/min/1.73m*2    Calcium 8.3 (L) 8.6 - 10.6 mg/dL    Phosphorus 4.6 2.5 - 4.9 mg/dL    Albumin 3.3 (L) 3.4 - 5.0 g/dL   Magnesium   Result Value Ref Range    Magnesium 2.32 1.60 - 2.40 mg/dL   CBC   Result Value Ref Range    WBC 10.8 4.4 - 11.3 x10*3/uL    nRBC 0.0 0.0 - 0.0 /100 WBCs    RBC 3.89 (L) 4.50 - 5.90 x10*6/uL    Hemoglobin 10.8 (L) 13.5 - 17.5 g/dL    Hematocrit 33.1 (L) 41.0 - 52.0 %    MCV 85 80 - 100 fL    MCH 27.8 26.0 - 34.0 pg    MCHC 32.6 32.0 - 36.0 g/dL    RDW 14.5 11.5 - 14.5 %    Platelets 225 150 - 450 x10*3/uL   Renal Function Panel   Result Value Ref Range    Glucose 96 74 - 99 mg/dL    Sodium 138 136 - 145 mmol/L    Potassium 3.9 3.5 - 5.3 mmol/L    Chloride 101 98 - 107 mmol/L    Bicarbonate 25 21 - 32 mmol/L    Anion Gap 16 10 - 20 mmol/L    Urea Nitrogen 42 (H) 6 - 23 mg/dL    Creatinine 1.74 (H) 0.50 - 1.30 mg/dL    eGFR 42 (L) >60 mL/min/1.73m*2    Calcium 8.6 8.6 - 10.6 mg/dL    Phosphorus 4.9 2.5 - 4.9 mg/dL    Albumin 3.5 3.4 - 5.0 g/dL           IMAGING/ DIAGNOSTIC TESTING:  I have personally reviewed the following test result(s):      CT ANGIO CHEST W AND WO IV CONTRAST;  9/30/2024 4:05 pm  IMPRESSION:  1. Status post ascending aortic replacement (32 Gelweave graft),  aortic root replacement and aortic valve replacement (Bentall  procedure with 29mm Konect Resilia bioprosthetic valved conduit).  2. There is perigraft low-attenuation material without evidence of  anastomotic  leak/hemorrhage.  3. Widely patent coronary ostia.  4. Status post left atrial appendage AtriClip deployment. No evidence  of contrast opacification of the STEVEN.  5. Biventricular and biatrial dilatation. Correlate with TTE.  6. Small circumferential pericardial effusion. Correlate with TTE.  7. Small bilateral pleural effusions.      XR chest 2 views 09/29/2024  Impression  1. No significant change in postoperative atelectasis and effusions  without pneumothorax.    TRANSTHORACIC ECHOCARDIOGRAM REPORT 9/25/2024  CONCLUSIONS:   1. Poorly visualized anatomical structures due to suboptimal image quality.   2. Left ventricular ejection fraction is severely decreased, calculated by Vasquez's biplane at 22%.   3. There is global hypokinesis of the left ventricle with minor regional variations.   4. Spectral Doppler shows a restrictive pattern of left ventricular diastolic filling.   5. Left ventricular cavity size is severely dilated.   6. Abnormal septal motion consistent with post-operative status.   7. No left ventricular thrombus visualized.   8. There is reduced right ventricular systolic function.   9. The left atrium is enlarged.  10. The right atrium is mild to moderately dilated.  11. Right ventricular systolic pressure is within normal limits.  12. S/p 29mm Konect Resilia bioprosthetic valved conduit. AVR gradients were not assessed by spectral Doppler though no obvious AI by color Doppler  13. S/p 32mm Gelweave ascending aortic conduit and hemiarch.  14. Compared with the prior exam, preop echo done on [facility]/20/2024 [facility], the LV was already severely dilated with moderately reduced function with a severely dilated aorta with moderate AI and the patient is now s/p acending aortic and arch replacement and Konect AVR. The LV remains severely dilated with a reducetion in LV systolic function , now severely reduced. The AVR gradients were not assessed today but there is no  "AI.    ================  IMPRESSION & PLAN:  ================  POD # 10 s/p Aortic root replacement and aortic valve replacement: Bentall procedure with a 29 mm connect valve conduit on 9/23  - Increase activity/ ambulation; PT/OT  - Encourage IS, C/DB; respiratory therapy; wean O2 as aroldo -> on RA  - Cardiac rehab referral   - Continue cardiac meds: ASA, BB, statin   - Pain and anticonstipation meds  - 2v CXR 9/29  - Postop echo 9/25 limited completed EF 22%, reduced RV function, no AI  - Pravenna removed 9/29  - CUT epicardial wires prior to discharge; 10/3 wires cut  - Tele until discharge  - Optimize nutrition and electrolytes    Rhythm  Hx LBBB; Paroxysma AFIB   - 10/3 SR 1st degree LBBB  - 10/2 TELE - controlled afib, LBBB  - Tele: 10/1 Afib Rate control, changed metop tartrate to XL. D/w Dr Perdomo Anticoagulation \" NO NEED for anticoagulation at this time.\"  - 9/29 paroxysmal AFIB  rate control,  send message to dr Perdomo for initiation of anticoagulation-> pending response.   - 9/28 AFIB with RVR 120s  amio bolus 150s started amio po 400 mg BID. Converted to NSR  Hr 60's   - Continue metoprolol 100 mg daily  XL    - Continue Amio 400mg BID;  plan on reducing dose to 200 mg daily  at discharge.     Acute heart failure- most recent EF 22%  - appreciate HF consult, adjusting meds per HF recs  - Will need GDMT therapy   - Add epleranone 25mg today   - Metoprolol increased to 50mg BID transition to XL on 10/1   - Estimates for SGLT2i >$500 will not start   - Will add low dose entresto as tolerated  - Life vest  Ordered on 10/1/24 EF per ECHO 22% -> Life Vest fitted 10/2 and in pt's room  - 10/1 lasix 80 mg IV  BID increased  per HF recs   - 10/2 d/w Dr Henderson -> not ready for discharge; added hydral 25 mg TID; repeating renal panel this afternoon and may start Entresto this gurpreet; held hydralazine in pm and started Entresto for this gurpreet (did not get Entresto dose d/t BP parameter)  -10/3 stopped Entresto; stopped " lasix; started hydralazine 12.5 mg TID, started hydralazine 40 mg daily; ok to discharge per Dr Henderson; requested 7-10 d HF f/up with Dr Buck    Acute Blood Loss Anemia   Recent Labs     10/03/24  0607 10/02/24  0600 10/01/24  0652 24  0623 24  0641 24  0709 24  0705   HGB 10.8* 10.9* 10.5* 10.7* 10.5* 10.7* 11.0*   HCT 33.1* 33.1* 31.4* 32.9* 31.2* 32.1* 33.2*   - MV; no need for iron  - Daily labs, transfuse as indicated    Thrombocytopenia- resolved  Recent Labs     10/03/24  0607 10/02/24  0600 10/01/24  0652 24  0623 24  0641 24  0709 24  0705    241 212 175 154 116* 118*   - Etiology likely postop/CPB related  - Continue to trend with daily CBCs    Volume/Electrolyte Status: Preop wt 114 kg; Admit wt Weight: 111 kg (245 lb 1.6 oz) Chronic systolic heart failure  Total body fluid overload  Vitals:    10/03/24 0533   Weight: 111 kg (244 lb 12.8 oz)   - Weight: 111, 113, 114, 115 xx kg  - Lasix 40 mgx1 IV .   - 10/1 Lasix increased to 80 Mg BID per HF; 10/3 changed to torsemide 40 mg daily  - Replete electrolytes for hypokalemia/hypomagnesemia/hypophosphatemia as needed  K replaced , ; 10/3 replaced K  - Daily weights and strict I&Os  - Daily RFP while admitted    Leukocytosis- resolved, likely inflammatory  Recent Labs     10/03/24  0607 10/02/24  0600 10/01/24  0652 24  0623 24  0641 24  0709 24  0705   WBC 10.8 9.8 9.6 8.9 7.4 7.5 10.1     Temp (36hrs), Av.5 °C (97.7 °F), Min:36.1 °C (97 °F), Max:36.9 °C (98.4 °F)  - monitor for s/s infection  - Periop Ancef x48hrs   - daily CBC to follow    CKD stage III: baseline SCr 1.08-1.2. cyanokit administration-  In OR      Creatinine   Date Value Ref Range Status   10/03/2024 1.74 (H) 0.50 - 1.30 mg/dL Final   10/02/2024 1.63 (H) 0.50 - 1.30 mg/dL Final   10/02/2024 1.63 (H) 0.50 - 1.30 mg/dL Final   10/01/2024 1.61 (H) 0.50 - 1.30 mg/dL Final   10/01/2024 1.48  (H) 0.50 - 1.30 mg/dL Final   - daily RFP  - Received gentle IV hydration overnight   for CTA chest  - renally dose all medications  - avoid nephrotoxic drugs  - accurate I/Os  - HF GDMT as tolerated per HF recs    SHOAIB (compliant with home CPAP at night)   Oxygenating well on RA  -aggressive bronchial hygiene  -continue home CPAP    GERD   - Cont PPI   - Avoid taking pills on an empty stomach  - Eat small frequent meals  - eat sitting up in the chair   - avoid acidic foods    Hypertension  Prior to surgery significant hypertension on multiple medications  Systolic (24hrs), Av , Min:84 , Max:122   - Clonidine 0.1mg BID is dc'd now with appropriate BP control  - Will hold on re-initiation of losartan  - adjusting meds as per HF recs  - DC terazosin given contraindication in heart failure    Hyperlipidemia:   Lab Results   Component Value Date    CHOL 157 04/10/2024    HDL 53.0 04/10/2024    TRIG 55 04/10/2024   -continue statin  -follow up lipid panel with PCP/ cardio as appropriate    Hypersomnolence disorder.   - Sleep/wake cycle hygiene   - REST protocol    OA / Gout   - PT/OT   - Hot and cold packs  - Acetaminophen     VTE Prophylaxis: SCDs/TEDs, ambulation, SQ heparin  Code Status: Full Code    Dispo  - PT/OT recs Home PT  - Would benefit from homecare RN for cardiac surgery carepath and PT  - 10/2 held off on discharge as per HF, and pt agrees; 10/3 ok for discharge with close HF f/up  - Will continue to assess discharge needs    SERGIO Lovell-CNP  Cardiac Surgery [facility]  [facility]  Team Phone [phone]

## 2024-10-03 NOTE — DISCHARGE SUMMARY
Discharge Diagnosis  Acute systolic heart failure  Ascending aortic aneurysm  S/p ascending aortic and hemiarch replacement; ARR/AVR    Issues Requiring Follow-Up  Pt to f/up with PCP, Cardiology, HF Cardiology, cardiac surgery    Test Results Pending At Discharge  Pending Labs       No current pending labs.            Hospital Course   David Chatman is a 68 y.o. male presenting with shortness of breath. He presented to his cardiologist as an outpt for these complaints. Echo showed an EF of 35-40% and ascending aneurysm 6.2. He was directed to the ED. BNP 1015, troponins, 21,16,15. CT chest revealed large ascending aneurysm up to 6.2 cm. No evidence of acute aortic dissection. He was given IV lasix with relief of symptoms and admitted for further evaluation. He is currently planned for cardiac catheterization. Asymptomatic. The pt reports his blood pressures are under control currently. CT surgery consulted for aneurysm evaluation at OSH and [place] [facility] [place].    9/23/2024 Operation Procedures: by Trino Perdomo  #1 standard median sternotomy  #2 innominate trunk arterial cannulation  #3 ascending aortic replacement and hemiarch replacement with a 32 Gelweave graft  #4 aortic root replacement and aortic valve replacement: Bentall procedure with a 29 mm connect valve conduit  #5 left atrial appendage closure with a 35 mm AtriCure clip  #6 left femoral arterial line placement    CTICU course: weaned off drips; afib RVR req amio    Transferred to T3 SDU on 9/26  ========================  Floor Course:  - Preop weight: Weight: 111 kg (245 lb 1.6 oz)kg, discharge wt:   Vitals:    10/01/24 0551   Weight: 114 kg (251 lb 4.8 oz)   Kg  - HF consult followed; unable to start Entresto d/t renal function; no SGLT2i >$500 so not started; will f/up with HF outpt  - day of discharge Cr 1.74 (baseline 1.08-1.2)  - On ASA, statin, long acting BB, hydralazine, eplerenone, torsemide by discharge  - DC'd  terazosin given contraindication in heart failure  - LifeVest obtained and delivered to patient before discharge  - Epicardial wires CUT on 10/3  - telemetry at discharge : SR 1st degree LBBB  - had intermittent afib, rate controlled; on BB and amio; no AC at this time per Dr Perdomo; pt to f/up with cardiology and Dr Perdomo  - continued home CPAP  - 2v CXR done 9/29  - Postop echo done 9/25 - EF 22%, reduced RV function, no AI  - Postop CTA done 9/30 - reviewed by Dr Perdomo  - Cardiac rehab referral was placed  - PT recs home and low intensity therapy  - Anticipate discharge to home with homecare    On day of discharge, vital signs were stable and no acute distress was noted. All questions were answered. After VS and labs were reviewed it was determined the patient was stable for discharge.   Discharged with home care RN and PT on Thursday, October 3, 2024; POD#10    =====================================================  Hospital day of discharge management- spent >30 minutes coordinating the discharge and counseling/educating patient and family regarding discharge instructions.  ====================================================  HISTORY:  Past Medical History:  Diagnosis Date  · Gout    · Hyperlipidemia    · Hypertension    · Paroxysmal atrial fibrillation (Multi) 08/24/2023     Surgical History  Past Surgical History:  Procedure Laterality Date  · SINUS SURGERY         Social History  Tobacco Use  · Smoking status: Never  · Smokeless tobacco: Never  Vaping Use  · Vaping status: Never Used  Substance Use Topics  · Alcohol use: Not Currently      Alcohol/week: 2.0 standard drinks of alcohol      Types: 2 Cans of beer per week      Comment: once a week  · Drug use: Never     Allergies:  Patient has no known allergies.     Prior to Admission medications   Below are the medications taken prior to admission at [facility]. Pt will require new prescriptions at discharge for all medications he will continue. [Comment in  brackets indicate changes made while admitted].   Benazepril-hydrochlorothiazide 20-25 mg, oral, Daily   Clonidine 0.1 mg, oral, Q12H [frequency increased to Q8H]  Felodipine ER 5 mg, oral, Daily  Omeprazole 20 mg DR, oral, Daily  Simvastatin 10 mg, oral, Daily   Terazosin 5 mg, oral, Daily [dose increased to 10 mg]   ======================================================================       Pertinent Physical Exam At Time of Discharge  Physical Exam  Please see progress note of 10/3/2024 for physical exam      Home Medications     Medication List      START taking these medications     acetaminophen 325 mg tablet; Commonly known as: Tylenol; Take 2 tablets   (650 mg) by mouth every 6 hours if needed for mild pain (1 - 3) or   moderate pain (4 - 6).   amiodarone 200 mg tablet; Commonly known as: Pacerone; Take 1 tablet   (200 mg) by mouth once daily. For 1 month   aspirin 81 mg EC tablet; Take 1 tablet (81 mg) by mouth once daily.;   Start taking on: October 4, 2024   atorvastatin 20 mg tablet; Commonly known as: Lipitor; Take 1 tablet (20   mg) by mouth once daily at bedtime.   docusate sodium 100 mg capsule; Commonly known as: Colace; Take 1   capsule (100 mg) by mouth 2 times a day as needed for constipation (hard   stools).   eplerenone 25 mg tablet; Commonly known as: Inspra; Take 1 tablet (25   mg) by mouth once daily.   hydrALAZINE 25 mg tablet; Commonly known as: Apresoline; Take 0.5   tablets (12.5 mg) by mouth 3 times a day.   polyethylene glycol 17 gram packet; Commonly known as: Glycolax,   Miralax; Take 17 g by mouth once daily as needed (constipation).   torsemide 40 mg tablet; Take 40 mg by mouth once daily.     CHANGE how you take these medications     metoprolol succinate  mg 24 hr tablet; Commonly known as:   Toprol-XL; Take 1 tablet (100 mg) by mouth once daily. Do not crush or   chew.; Start taking on: October 4, 2024; What changed: medication   strength, how much to take     CONTINUE  taking these medications     fluticasone 50 mcg/actuation nasal spray; Commonly known as: Flonase;   Administer 2 sprays into each nostril once daily. Shake gently. Before   first use, prime pump. After use, clean tip and replace cap.   pantoprazole 20 mg EC tablet; Commonly known as: ProtoNix; Take 1 tablet   (20 mg) by mouth once daily in the morning. Take before meals. Do not   crush, chew, or split.     STOP taking these medications     cloNIDine 0.1 mg tablet; Commonly known as: Catapres   heparin (porcine) 5,000 unit/mL injection   losartan 100 mg tablet; Commonly known as: Cozaar   simvastatin 10 mg tablet; Commonly known as: Zocor   terazosin 10 mg capsule; Commonly known as: Hytrin       Outpatient Follow-Up  Future Appointments   Date Time Provider Department Center   10/14/2024  2:40 [id number] [id number] [facility]   10/23/2024  9:30 AM Trino Perdomo MD [id number] Academic   2/21/2025  9:45 AM Bernardo Ramírez MD [id number] Geo / Jason Beltran, APRN-CNP  [facility] Cardiac surgery  Team phone [phone]

## 2024-10-03 NOTE — HH CARE COORDINATION
Home Care received a Referral for Nursing and Physical Therapy. We have processed the referral for a Start of Care on 10/4-10/5.     If you have any questions or concerns, please feel free to contact us at [phone]. Follow the prompts, enter your five digit zip code, and you will be directed to your care team on EAST 1.

## 2024-10-04 ENCOUNTER — PATIENT OUTREACH (OUTPATIENT)
Dept: PRIMARY CARE | Facility: CLINIC | Age: 69
End: 2024-10-04
Payer: MEDICARE

## 2024-10-04 NOTE — PROGRESS NOTES
Discharge Facility: [facility]  Discharge Diagnosis: Acute systolic heart failure  Ascending aortic aneurysm, S/p ascending aortic and hemiarch replacement; ARR/AVR  Admission Date: 9/11/24  Discharge Date: 10/3/24    PCP Appointment Date: 10/8/24  Specialist Appointment Date: Unknown  Hospital Encounter and Summary Linked: Yes    See discharge assessment below for further details    Engagement  Call Start Time: 0830 (10/4/2024  8:38 AM)    Medications  Medications reviewed with patient/caregiver?: Yes (10/4/2024  8:38 AM)  Is the patient having any side effects they believe may be caused by any medication additions or changes?: No (10/4/2024  8:38 AM)  Does the patient have all medications ordered at discharge?: Yes (10/4/2024  8:38 AM)  Care Management Interventions: No intervention needed (10/4/2024  8:38 AM)  Prescription Comments: Pt sent home with script (10/4/2024  8:38 AM)  Is the patient taking all medications as directed (includes completed medication regime)?: Yes (10/4/2024  8:38 AM)  Care Management Interventions: Provided patient education (10/4/2024  8:38 AM)  Medication Comments: Pt denies problems obtaining or affording medication (10/4/2024  8:38 AM)    Appointments  Does the patient have a primary care provider?: Yes (10/4/2024  8:38 AM)  Care Management Interventions: Verified appointment date/time/provider (10/4/2024  8:38 AM)  Has the patient kept scheduled appointments due by today?: Yes (10/4/2024  8:38 AM)  Care Management Interventions: Advised patient to keep appointment (10/4/2024  8:38 AM)    Self Management  What is the home health agency?: [facility] (10/4/2024  8:38 AM)  Has home health visited the patient within 72 hours of discharge?: No (10/4/2024  8:38 AM)    Patient Teaching  Does the patient have access to their discharge instructions?: Yes (10/4/2024  8:38 AM)  Care Management Interventions: Reviewed instructions with patient (10/4/2024  8:38 AM)  What is the  patient's perception of their health status since discharge?: Improving (10/4/2024  8:38 AM)  Is the patient/caregiver able to teach back the hierarchy of who to call/visit for symptoms/problems? PCP, Specialist, Home Health nurse, Urgent Care, ED, 911: Yes (10/4/2024  8:38 AM)    Wrap Up  Wrap Up Additional Comments: This CM spoke with pt via phone. Pt reports doing well at home since discharge. New meds reviewed. Pt denies CP and SOB. Pt aware of my availability for non-emergent concerns. Contact info provided to patient (10/4/2024  8:38 AM)      Mulugeta Stack LPN

## 2024-10-06 ENCOUNTER — HOME CARE VISIT (OUTPATIENT)
Dept: HOME HEALTH SERVICES | Facility: HOME HEALTH | Age: 69
End: 2024-10-06
Payer: MEDICARE

## 2024-10-06 ENCOUNTER — HOME CARE VISIT (OUTPATIENT)
Dept: HOME HEALTH SERVICES | Facility: HOME HEALTH | Age: 69
End: 2024-10-06

## 2024-10-06 VITALS
OXYGEN SATURATION: 97 % | HEART RATE: 72 BPM | SYSTOLIC BLOOD PRESSURE: 112 MMHG | TEMPERATURE: 97.9 F | RESPIRATION RATE: 20 BRPM | DIASTOLIC BLOOD PRESSURE: 78 MMHG

## 2024-10-06 PROCEDURE — G0299 HHS/HOSPICE OF RN EA 15 MIN: HCPCS | Mod: HHH

## 2024-10-06 PROCEDURE — 0023 HH SOC

## 2024-10-06 PROCEDURE — 169592 NO-PAY CLAIM PROCEDURE

## 2024-10-06 PROCEDURE — 1090000001 HH PPS REVENUE CREDIT

## 2024-10-06 PROCEDURE — 1090000002 HH PPS REVENUE DEBIT

## 2024-10-06 PROCEDURE — G0151 HHCP-SERV OF PT,EA 15 MIN: HCPCS | Mod: HHH

## 2024-10-06 ASSESSMENT — ACTIVITIES OF DAILY LIVING (ADL)
AMBULATION ASSISTANCE: STAND BY ASSIST
AMBULATION ASSISTANCE: INDEPENDENT
ENTERING_EXITING_HOME: STAND BY ASSIST
AMBULATION ASSISTANCE: 1
AMBULATION ASSISTANCE: 1
OASIS_M1830: 03

## 2024-10-06 ASSESSMENT — PAIN SCALES - PAIN ASSESSMENT IN ADVANCED DEMENTIA (PAINAD)
FACIALEXPRESSION: 0 - SMILING OR INEXPRESSIVE.
NEGVOCALIZATION: 0
CONSOLABILITY: 0 - NO NEED TO CONSOLE.
BREATHING: 0
NEGVOCALIZATION: 0 - NONE.
BODYLANGUAGE: 0 - RELAXED.
CONSOLABILITY: 0
FACIALEXPRESSION: 0
TOTALSCORE: 0
BODYLANGUAGE: 0

## 2024-10-06 ASSESSMENT — ENCOUNTER SYMPTOMS
PAIN LOCATION: CHEST
LOSS OF SENSATION IN FEET: 0
APPETITE LEVEL: GOOD
HIGHEST PAIN SEVERITY IN PAST 24 HOURS: 3/10
CHANGE IN APPETITE: UNCHANGED
LOWEST PAIN SEVERITY IN PAST 24 HOURS: 2/10
PAIN LOCATION - PAIN SEVERITY: 3/10
DYSPNEA ACTIVITY LEVEL: AFTER AMBULATING 10 - 20 FT
PERSON REPORTING PAIN: PATIENT
DEPRESSION: 0
PAIN SEVERITY GOAL: 0/10
PERSON REPORTING PAIN: PATIENT
PAIN: 1
LOWER EXTREMITY EDEMA: 1
FATIGUE: 1
TREMORS: 1
PAIN LOCATION - PAIN FREQUENCY: INTERMITTENT
PAIN LOCATION - EXACERBATING FACTORS: SWELLING OF FEET
STOOL FREQUENCY: LESS THAN DAILY
PAIN SEVERITY GOAL: 0/10
PAIN LOCATION - PAIN SEVERITY: 5/10
PAIN LOCATION - PAIN DURATION: VARIES
DRY SKIN: 1
PAIN LOCATION - EXACERBATING FACTORS: COUGHING
SHORTNESS OF BREATH: 1
PAIN LOCATION - PAIN QUALITY: SHARP
LAST BOWEL MOVEMENT: 67118
PAIN LOCATION - RELIEVING FACTORS: REST
OCCASIONAL FEELINGS OF UNSTEADINESS: 0
LOWEST PAIN SEVERITY IN PAST 24 HOURS: 0/10
SUBJECTIVE PAIN PROGRESSION: UNCHANGED
FORGETFULNESS: 1
BOWEL PATTERN NORMAL: 1
PAIN: 1

## 2024-10-06 NOTE — HOME HEALTH
From Chart:  David Chatman is a 68 y.o. male presenting with shortness of breath. He presented to his cardiologist as an outpt for these complaints. Echo showed an EF of 35-40% and ascending aneurysm 6.2. He was directed to the ED. BNP 1015, troponins, 21,16,15. CT chest revealed large ascending aneurysm up to 6.2 cm. No evidence of acute aortic dissection. He was given IV lasix with relief of symptoms and admitted for further evaluation. He is currently planned for cardiac catheterization. Asymptomatic. The pt reports his blood pressures are under control currently. CT surgery consulted for aneurysm evaluation at OSH and he was transferred to [facility] for further care.     9/23/2024 Operation Procedures: by Trino Perdomo   #1 standard median sternotomy   #2 innominate trunk arterial cannulation   #3 ascending aortic replacement and hemiarch replacement with a 32 Gelweave graft  #4 aortic root replacement and aortic valve replacement: Bentall procedure with a 29 mm connect valve conduit   #5 left atrial appendage closure with a 35 mm AtriCure clip   #6 left femoral arterial line placement    Open heart surgery.  Knew I was sick only one month ago.  Had to wait about 10 days for the surgery.  Retired from [employer].    Lifting, pushing, pulling 10 pounds, no driving.  Walking is OK.   Recently walking and stretching a little bit more.    TUG   13 seconds    Written exercises of  1.  Toe Pumps  2.  Ankle pumps  3.  Seated full arc quads  4.  Seated marching       For up to 2 minutes or 20 repetitions, in sitting or in supine every waking hour.  Also walking to the mailbox and back again.

## 2024-10-07 LAB
ATRIAL RATE: 163 BPM
Q ONSET: 207 MS
QRS COUNT: 16 BEATS
QRS DURATION: 146 MS
QT INTERVAL: 354 MS
QTC CALCULATION(BAZETT): 449 MS
QTC FREDERICIA: 415 MS
R AXIS: 0 DEGREES
T AXIS: 177 DEGREES
T OFFSET: 384 MS
VENTRICULAR RATE: 97 BPM

## 2024-10-07 PROCEDURE — 1090000002 HH PPS REVENUE DEBIT

## 2024-10-07 PROCEDURE — 1090000001 HH PPS REVENUE CREDIT

## 2024-10-08 ENCOUNTER — OFFICE VISIT (OUTPATIENT)
Dept: PRIMARY CARE | Facility: CLINIC | Age: 69
End: 2024-10-08
Payer: MEDICARE

## 2024-10-08 VITALS
BODY MASS INDEX: 32.07 KG/M2 | HEART RATE: 74 BPM | HEIGHT: 73 IN | WEIGHT: 242 LBS | SYSTOLIC BLOOD PRESSURE: 124 MMHG | OXYGEN SATURATION: 96 % | TEMPERATURE: 96.1 F | DIASTOLIC BLOOD PRESSURE: 70 MMHG

## 2024-10-08 DIAGNOSIS — I10 BENIGN HYPERTENSION: ICD-10-CM

## 2024-10-08 DIAGNOSIS — I79.0 ANEURYSM OF AORTA IN DISEASES CLASSIFIED ELSEWHERE: Primary | ICD-10-CM

## 2024-10-08 PROCEDURE — 1111F DSCHRG MED/CURRENT MED MERGE: CPT | Performed by: FAMILY MEDICINE

## 2024-10-08 PROCEDURE — 99495 TRANSJ CARE MGMT MOD F2F 14D: CPT | Performed by: FAMILY MEDICINE

## 2024-10-08 PROCEDURE — 3074F SYST BP LT 130 MM HG: CPT | Performed by: FAMILY MEDICINE

## 2024-10-08 PROCEDURE — 1036F TOBACCO NON-USER: CPT | Performed by: FAMILY MEDICINE

## 2024-10-08 PROCEDURE — 3008F BODY MASS INDEX DOCD: CPT | Performed by: FAMILY MEDICINE

## 2024-10-08 PROCEDURE — 1159F MED LIST DOCD IN RCRD: CPT | Performed by: FAMILY MEDICINE

## 2024-10-08 PROCEDURE — 1090000002 HH PPS REVENUE DEBIT

## 2024-10-08 PROCEDURE — 1126F AMNT PAIN NOTED NONE PRSNT: CPT | Performed by: FAMILY MEDICINE

## 2024-10-08 PROCEDURE — 1124F ACP DISCUSS-NO DSCNMKR DOCD: CPT | Performed by: FAMILY MEDICINE

## 2024-10-08 PROCEDURE — 1160F RVW MEDS BY RX/DR IN RCRD: CPT | Performed by: FAMILY MEDICINE

## 2024-10-08 PROCEDURE — 3078F DIAST BP <80 MM HG: CPT | Performed by: FAMILY MEDICINE

## 2024-10-08 PROCEDURE — 1090000001 HH PPS REVENUE CREDIT

## 2024-10-08 ASSESSMENT — PAIN SCALES - GENERAL: PAINLEVEL: 0-NO PAIN

## 2024-10-08 NOTE — ASSESSMENT & PLAN NOTE
- Hospital documentation reviewed and discussed  -Continue with routine cardiology/thoracic surgery follow-up per protocol

## 2024-10-08 NOTE — PROGRESS NOTES
"       Outpatient Visit Note    Chief Complaint   Patient presents with    Hospital Follow-up         HPI:  Patient: David Chatman  : 1955  PCP: Amandeep Vaca DO  MRN: [id number]  Program: Disease Specific Discharge Navigator  Status: Enrolled  Effective Dates: 10/3/2024 - present  Responsible Staff: Roseanne Shah RN  Social Determinants to be Addressed: Physical Activity, Social Connections    Transitional Care Management  Status: Enrolled  Effective Dates: 10/4/2024 - present  Responsible Staff: Mulugeta Stack LPN  Social Determinants to be Addressed: Physical Activity, Social Connections         David Chatman is a 69 y.o. male presenting today for follow-up after being discharged from the hospital 5 days ago. The main problem requiring admission was aortic aneurysm. The discharge summary and/or Transitional Care Management documentation was reviewed. Medication reconciliation was performed as indicated via the \"Phillip as Reviewed\" timestamp.     David Chatman was contacted by Transitional Care Management services two days after his discharge. This encounter and supporting documentation was reviewed.    Past medical history significant for hypertension, hyperlipidemia, gout, chronic sinus issues followed by ENT, SHOAIB on CPAP, osteoarthritis /Osgood Mejias of left knee and prediabetes.    He was last seen in the office in 2024 for annual Medicare Wellness Visit.            He was previously established with [facility] with last visit on 2023 for 6 month follow-up.    In interval, patient presented to the emergency department on 2024 secondary to complaints of shortness of breath.  Stated that symptoms had been going on for approximately 1 month to which she was having notable trouble breathing at night creating sleep disruption.  Stated that he had been trying to lose weight to help with symptoms that he felt tightness in his chest when he became short of " breath.  EKG was performed showing normal sinus rhythm with left bundle branch block.  Blood work showed stable but intermediate troponins with significantly elevated BNP.  Chest x-ray revealed pulmonary vascular congestion with small effusion consistent with heart failure.  He was given Lasix with admission recommended though patient declined preferring to follow-up with cardiology as outpatient.  He did have subsequent outpatient cardiology follow-up with Dr. Carter on 8/12/2024.  At that time, patient was continued on antihypertensive regimen with plans for pharmacologic stress test and echocardiogram.  GI consultation was additionally noted secondary to anemia.  Dietary modification was advocated.    He subsequently returned to the emergency department on 9/10/2024 secondary to chest pain.  Was noted to have thoracic aneurysm during echocardiogram with cardiology.  Aneurysm measured at 6.2% with chest discomfort.  Cardiology contacted thoracic surgery and recommended CT angio at hospital to ensure no dissection.  Plan was separately then set for cardiac catheterization.  Blood work at emergency department was unremarkable with troponin within appropriate range.  CT angio without evidence of aortic dissection though prominent aortic aneurysm is appreciated.  Patient was admitted for cardiac surgery evaluation and cardiac catheterization.  He was ultimately admitted with diagnosis of CHF to which acute decline in EF and large thoracic aortic aneurysm was appreciated.  Cardiac cath was performed which did not show any significant coronary lesions but CT surgery evaluated and felt thoracic aneurysm was significant and required urgent surgery.  [facility] was ultimately [facility].  Patient ultimately underwent thoracic surgery on 9/23/2024.  Patient was stabilized with medication regimen adjusted.  He was ultimately discharged on regimen of amiodarone, atorvastatin, hydralazine, eplerenone,  metoprolol.    Overall he states to be doing generally well with no reports of chest pain.  Is actively wearing LifeVest.  Notes that his exertional capacity is very limited, stating to get quite fatigued.  Has been taking it easy since returning home from the hospital.  Has been compliant with new medication regimen denying any adverse side effects.  Has noted sleep disruption.  Has appointment for follow-up scheduled with cardiology and cardiac surgery           Advanced directives: None on file      Current Medications  Current Outpatient Medications   Medication Instructions    acetaminophen (TYLENOL) 650 mg, oral, Every 6 hours PRN    amiodarone (PACERONE) 200 mg, oral, Daily, For 1 month    aspirin 81 mg, oral, Daily    atorvastatin (LIPITOR) 20 mg, oral, Nightly    eplerenone (INSPRA) 25 mg, oral, Daily    fluticasone (Flonase) 50 mcg/actuation nasal spray 2 sprays, Each Nostril, Daily, Shake gently. Before first use, prime pump. After use, clean tip and replace cap.    hydrALAZINE (APRESOLINE) 12.5 mg, oral, 3 times daily    metoprolol succinate XL (TOPROL-XL) 100 mg, oral, Daily, Do not crush or chew.    torsemide (DEMADEX) 40 mg, oral, Daily        Allergies  No Known Allergies     Past Medical History:   Diagnosis Date    Gout     Hyperlipidemia     Hypertension     SHOAIB (obstructive sleep apnea)     Paroxysmal atrial fibrillation (Multi) 08/24/2023      Past Surgical History:   Procedure Laterality Date    CARDIAC CATHETERIZATION N/A 9/11/2024    Procedure: Left Heart Cath;  Surgeon: Zacarias Jarquin DO;  Location: [facility] Cardiac Cath Lab;  Service: Cardiovascular;  Laterality: N/A;    SINUS SURGERY       No family history on file.  Social History     Tobacco Use    Smoking status: Never    Smokeless tobacco: Never   Vaping Use    Vaping status: Never Used   Substance Use Topics    Alcohol use: Not Currently     Alcohol/week: 2.0 standard drinks of alcohol     Types: 2 Cans of beer per week     Comment: once  a week    Drug use: Never       ROS  All pertinent positive symptoms are included in the history of present illness.  All other systems have been reviewed and are negative and noncontributory to this patient's current ailments.    VITAL SIGNS  Vitals:    10/08/24 0944   BP: 124/70   Pulse: 74   Temp: 35.6 °C (96.1 °F)   SpO2: 96%       PHYSICAL EXAM  GENERAL APPEARANCE: alert and oriented, Pleasant and cooperative, No Acute Distress  HEENT: EOMI, PERRLA, MMM  HEART: RRR, normal S1S2, no murmurs, click or rubs  LUNGS: clear to auscultation bilaterally, no wheezes/rhonchi/rales  EXTREMITIES: no edema, normal ROM  SKIN: normal, no rash, unremarkable  NEUROLOGIC EXAM: non-focal exam  MUSCULOSKELETAL: no gross abnormalities  PSYCH: affect is normal, eye contact is good    The complexity of medical decision making for this patient's transitional care is moderate.    Assessment/Plan   Problem List Items Addressed This Visit             ICD-10-CM    Benign hypertension I10     - Blood pressure stable in office today         Aneurysm of aorta in diseases classified elsewhere - Primary I79.0     - Hospital documentation reviewed and discussed  -Continue with routine cardiology/thoracic surgery follow-up per protocol            Counseling:       Medication education:         Education:  The patient is counseled regarding potential side-effects of all new medications        Understanding:  Patient expressed understanding        Adherence:  No barriers to adherence identified    ** Please excuse any errors in grammar or translation related to this dictation. Voice recognition software was utilized to prepare this document. **

## 2024-10-09 PROCEDURE — 1090000002 HH PPS REVENUE DEBIT

## 2024-10-09 PROCEDURE — 1090000001 HH PPS REVENUE CREDIT

## 2024-10-10 ENCOUNTER — LAB REQUISITION (OUTPATIENT)
Dept: LAB | Facility: HOSPITAL | Age: 69
End: 2024-10-10
Payer: MEDICARE

## 2024-10-10 ENCOUNTER — HOME CARE VISIT (OUTPATIENT)
Dept: HOME HEALTH SERVICES | Facility: HOME HEALTH | Age: 69
End: 2024-10-10
Payer: MEDICARE

## 2024-10-10 VITALS
TEMPERATURE: 98.5 F | OXYGEN SATURATION: 94 % | HEART RATE: 70 BPM | RESPIRATION RATE: 20 BRPM | SYSTOLIC BLOOD PRESSURE: 116 MMHG | DIASTOLIC BLOOD PRESSURE: 78 MMHG

## 2024-10-10 DIAGNOSIS — I42.2 OTHER HYPERTROPHIC CARDIOMYOPATHY (MULTI): ICD-10-CM

## 2024-10-10 LAB
ALBUMIN SERPL BCP-MCNC: 3.3 G/DL (ref 3.4–5)
ANION GAP SERPL CALCULATED.3IONS-SCNC: 11 MMOL/L (ref 10–20)
ANION GAP SERPL CALCULATED.3IONS-SCNC: 11 MMOL/L (ref 10–20)
BUN SERPL-MCNC: 20 MG/DL (ref 6–23)
BUN SERPL-MCNC: 20 MG/DL (ref 6–23)
CALCIUM SERPL-MCNC: 8.2 MG/DL (ref 8.6–10.3)
CALCIUM SERPL-MCNC: 8.2 MG/DL (ref 8.6–10.3)
CHLORIDE SERPL-SCNC: 99 MMOL/L (ref 98–107)
CHLORIDE SERPL-SCNC: 99 MMOL/L (ref 98–107)
CO2 SERPL-SCNC: 30 MMOL/L (ref 21–32)
CO2 SERPL-SCNC: 30 MMOL/L (ref 21–32)
CREAT SERPL-MCNC: 1.16 MG/DL (ref 0.5–1.3)
CREAT SERPL-MCNC: 1.16 MG/DL (ref 0.5–1.3)
EGFRCR SERPLBLD CKD-EPI 2021: 69 ML/MIN/1.73M*2
EGFRCR SERPLBLD CKD-EPI 2021: 69 ML/MIN/1.73M*2
GLUCOSE SERPL-MCNC: 101 MG/DL (ref 74–99)
GLUCOSE SERPL-MCNC: 101 MG/DL (ref 74–99)
MAGNESIUM SERPL-MCNC: 2.18 MG/DL (ref 1.6–2.4)
PHOSPHATE SERPL-MCNC: 4.1 MG/DL (ref 2.5–4.9)
POTASSIUM SERPL-SCNC: 4.3 MMOL/L (ref 3.5–5.3)
POTASSIUM SERPL-SCNC: 4.3 MMOL/L (ref 3.5–5.3)
SODIUM SERPL-SCNC: 136 MMOL/L (ref 136–145)
SODIUM SERPL-SCNC: 136 MMOL/L (ref 136–145)

## 2024-10-10 PROCEDURE — 1090000001 HH PPS REVENUE CREDIT

## 2024-10-10 PROCEDURE — 80069 RENAL FUNCTION PANEL: CPT

## 2024-10-10 PROCEDURE — 80048 BASIC METABOLIC PNL TOTAL CA: CPT

## 2024-10-10 PROCEDURE — G0299 HHS/HOSPICE OF RN EA 15 MIN: HCPCS | Mod: HHH

## 2024-10-10 PROCEDURE — 1090000002 HH PPS REVENUE DEBIT

## 2024-10-10 PROCEDURE — 83735 ASSAY OF MAGNESIUM: CPT

## 2024-10-10 ASSESSMENT — ENCOUNTER SYMPTOMS
HIGHEST PAIN SEVERITY IN PAST 24 HOURS: 1/10
DYSPNEA ACTIVITY LEVEL: AFTER AMBULATING MORE THAN 20 FT
PAIN LOCATION - PAIN FREQUENCY: INFREQUENT
CHANGE IN APPETITE: DECREASED
LOWER EXTREMITY EDEMA: 1
SUBJECTIVE PAIN PROGRESSION: RAPIDLY IMPROVING
PAIN LOCATION - RELIEVING FACTORS: REST POSITIONING
CHEST PAIN QUALITY: ACHING
PAIN SEVERITY GOAL: 0/10
PAIN LOCATION - PAIN DURATION: SHORT
SHORTNESS OF BREATH: 1
PAIN: 1
LIMITED RANGE OF MOTION: 1
PAIN LOCATION - PAIN SEVERITY: 1/10
DYSPNEA ON EXERTION: 1
PAIN LOCATION: CHEST
MUSCLE WEAKNESS: 1
LAST BOWEL MOVEMENT: 67123
FATIGUES EASILY: 1
APPETITE LEVEL: FAIR
LOWEST PAIN SEVERITY IN PAST 24 HOURS: 1/10

## 2024-10-11 DIAGNOSIS — Z86.79 HISTORY OF ATRIAL FIBRILLATION: ICD-10-CM

## 2024-10-11 DIAGNOSIS — Z95.2 STATUS POST AORTIC VALVE REPLACEMENT: ICD-10-CM

## 2024-10-11 DIAGNOSIS — I50.22 CHRONIC SYSTOLIC CONGESTIVE HEART FAILURE: Primary | ICD-10-CM

## 2024-10-11 PROCEDURE — 1090000001 HH PPS REVENUE CREDIT

## 2024-10-11 PROCEDURE — 1090000002 HH PPS REVENUE DEBIT

## 2024-10-11 PROCEDURE — G0180 MD CERTIFICATION HHA PATIENT: HCPCS | Performed by: THORACIC SURGERY (CARDIOTHORACIC VASCULAR SURGERY)

## 2024-10-11 NOTE — PROGRESS NOTES
Contacting David status post 9/23/24 Bio bental avr asc surgery. Experienced post op a fib. Currently traveling to [facility]  BP today 116/78. [place] appointment. Having difficulty at night sleeping, still in recliner with some shortness of breath. Last weigh this morning 229 lbs. Stated incisions intact. Confirmed dhiraj bonilla with cxr next week 10/23/24. Miriam Esteban RN

## 2024-10-12 PROCEDURE — 1090000001 HH PPS REVENUE CREDIT

## 2024-10-12 PROCEDURE — 1090000002 HH PPS REVENUE DEBIT

## 2024-10-14 ENCOUNTER — OFFICE VISIT (OUTPATIENT)
Dept: CARDIOLOGY | Facility: CLINIC | Age: 69
End: 2024-10-14
Payer: MEDICARE

## 2024-10-14 ENCOUNTER — TELEMEDICINE CLINICAL SUPPORT (OUTPATIENT)
Dept: CARDIAC SURGERY | Facility: HOSPITAL | Age: 69
End: 2024-10-14
Payer: MEDICARE

## 2024-10-14 ENCOUNTER — TELEPHONE (OUTPATIENT)
Dept: CARDIAC SURGERY | Facility: HOSPITAL | Age: 69
End: 2024-10-14

## 2024-10-14 VITALS
SYSTOLIC BLOOD PRESSURE: 114 MMHG | WEIGHT: 235 LBS | OXYGEN SATURATION: 93 % | HEIGHT: 72 IN | DIASTOLIC BLOOD PRESSURE: 82 MMHG | BODY MASS INDEX: 31.83 KG/M2 | HEART RATE: 77 BPM

## 2024-10-14 DIAGNOSIS — I44.7 LEFT BUNDLE BRANCH BLOCK: ICD-10-CM

## 2024-10-14 DIAGNOSIS — Z95.828 S/P ASCENDING AORTIC REPLACEMENT: ICD-10-CM

## 2024-10-14 DIAGNOSIS — I50.20 HFREF (HEART FAILURE WITH REDUCED EJECTION FRACTION): Primary | ICD-10-CM

## 2024-10-14 DIAGNOSIS — I50.20 HFREF (HEART FAILURE WITH REDUCED EJECTION FRACTION): ICD-10-CM

## 2024-10-14 DIAGNOSIS — I50.21 ACUTE SYSTOLIC HEART FAILURE: ICD-10-CM

## 2024-10-14 DIAGNOSIS — I42.8 NON-ISCHEMIC CARDIOMYOPATHY (MULTI): ICD-10-CM

## 2024-10-14 DIAGNOSIS — Z95.3 S/P AORTIC VALVE REPLACEMENT WITH PORCINE VALVE: ICD-10-CM

## 2024-10-14 DIAGNOSIS — Z09 POSTOP CHECK: ICD-10-CM

## 2024-10-14 DIAGNOSIS — Z95.2 S/P AORTIC VALVE REPLACEMENT AND AORTOPLASTY: ICD-10-CM

## 2024-10-14 PROCEDURE — G2211 COMPLEX E/M VISIT ADD ON: HCPCS | Performed by: STUDENT IN AN ORGANIZED HEALTH CARE EDUCATION/TRAINING PROGRAM

## 2024-10-14 PROCEDURE — 3079F DIAST BP 80-89 MM HG: CPT | Performed by: STUDENT IN AN ORGANIZED HEALTH CARE EDUCATION/TRAINING PROGRAM

## 2024-10-14 PROCEDURE — 3008F BODY MASS INDEX DOCD: CPT | Performed by: STUDENT IN AN ORGANIZED HEALTH CARE EDUCATION/TRAINING PROGRAM

## 2024-10-14 PROCEDURE — 99215 OFFICE O/P EST HI 40 MIN: CPT | Performed by: STUDENT IN AN ORGANIZED HEALTH CARE EDUCATION/TRAINING PROGRAM

## 2024-10-14 PROCEDURE — 1036F TOBACCO NON-USER: CPT | Performed by: STUDENT IN AN ORGANIZED HEALTH CARE EDUCATION/TRAINING PROGRAM

## 2024-10-14 PROCEDURE — 3074F SYST BP LT 130 MM HG: CPT | Performed by: STUDENT IN AN ORGANIZED HEALTH CARE EDUCATION/TRAINING PROGRAM

## 2024-10-14 PROCEDURE — 1111F DSCHRG MED/CURRENT MED MERGE: CPT | Performed by: STUDENT IN AN ORGANIZED HEALTH CARE EDUCATION/TRAINING PROGRAM

## 2024-10-14 PROCEDURE — 1159F MED LIST DOCD IN RCRD: CPT | Performed by: STUDENT IN AN ORGANIZED HEALTH CARE EDUCATION/TRAINING PROGRAM

## 2024-10-14 RX ORDER — METOPROLOL SUCCINATE 100 MG/1
150 TABLET, EXTENDED RELEASE ORAL DAILY
Qty: 135 TABLET | Refills: 3 | Status: SHIPPED | OUTPATIENT
Start: 2024-10-14 | End: 2025-10-14

## 2024-10-14 RX ORDER — TORSEMIDE 20 MG/1
40 TABLET ORAL DAILY PRN
Qty: 180 TABLET | Refills: 3 | Status: SHIPPED | OUTPATIENT
Start: 2024-10-14 | End: 2025-10-14

## 2024-10-14 RX ORDER — EPLERENONE 25 MG/1
25 TABLET, FILM COATED ORAL DAILY
Qty: 90 TABLET | Refills: 3 | Status: SHIPPED | OUTPATIENT
Start: 2024-10-14 | End: 2025-10-14

## 2024-10-14 RX ORDER — ATORVASTATIN CALCIUM 20 MG/1
80 TABLET, FILM COATED ORAL NIGHTLY
Qty: 360 TABLET | Refills: 3 | Status: SHIPPED | OUTPATIENT
Start: 2024-10-14 | End: 2025-10-14

## 2024-10-14 RX ORDER — ASPIRIN 81 MG/1
81 TABLET ORAL DAILY
Qty: 90 TABLET | Refills: 3 | Status: SHIPPED | OUTPATIENT
Start: 2024-10-14 | End: 2025-10-14

## 2024-10-14 RX ORDER — SACUBITRIL AND VALSARTAN 24; 26 MG/1; MG/1
1 TABLET, FILM COATED ORAL 2 TIMES DAILY
Qty: 180 TABLET | Refills: 3 | Status: SHIPPED | OUTPATIENT
Start: 2024-10-14 | End: 2025-10-14

## 2024-10-14 RX ORDER — ATORVASTATIN CALCIUM 20 MG/1
80 TABLET, FILM COATED ORAL NIGHTLY
Qty: 360 TABLET | Refills: 3 | Status: SHIPPED | OUTPATIENT
Start: 2024-10-14 | End: 2024-10-14

## 2024-10-14 ASSESSMENT — ENCOUNTER SYMPTOMS: DEPRESSION: 0

## 2024-10-14 NOTE — PATIENT INSTRUCTIONS
If you have any questions or need cardiac medication refills, please call the Heart Failure office at [phone], option 6.  You may also contact the  Heart Failure Nursing team via email at [email or web address] (Please include your name and date of birth). To reach Dr. Buck's office please call [phone] / Fax: [phone]. To schedule an office appointment call [phone].     If I placed a referral today for a specialist/procedure, please call the general scheduling line at [phone]. You may also schedule appointments on the [facility]. For radiology scheduling (Cardiac calcium score, CTA with HeartFlow, Cardiac MRI, NM cardiac stress test, PET viability study) the phone number is [phone].     - Continue current medications with the exception of:   -- start jardiance 10mg daily  -- start Entresto 24-26mg twice daily  -- Increase Metoprolol XL to 150mg daily  -- stop hydralazine  -- take torsemide 40mg daily as needed for weight gain, swelling or shortness of breath  - Labwork: 1 week  - Imaging/Procedures: Echocardiogram in 3 months  - Referrals:   -- Cardiac Rehab  -- Clinical Pharmacy  - Followup: 3 months

## 2024-10-14 NOTE — PROGRESS NOTES
Primary Care Physician: Amandeep Vaca DO  Patient's Location: [facility] [id number]  Primary Cardiologist: Dr. Jonathon Carter    Date of Visit: 10/14/2024  1:00 PM EDT  Location of visit: [facility]   Type of Visit: New    HPI / Summary:   David Chatman is a very pleasant 69 y.o. male presenting for management of Stage C NICM/HFrEF (last EF 22% 09/2024, from 35-40% diagnosis). He otherwise has a history of HTN, HLD, pAF, LBBB and ascending aortic aneursym (6.2cm) with associated moderate AR now s/p AscAoReplacement and #[id number] Bentall + STEVEN clipping with Dr. Perdomo on 09/23/2024 ([facility]).     Initial CV History:   9/11/2024 - 10/3/2024 (22 days) [facility]  presented with dyspnea to his cardiologist. Echocardiography revealed a left ventricular ejection fraction of 35-40% and an ascending aortic aneurysm measuring 6.2 cm. The patient was referred to the emergency department, where further evaluation confirmed the presence of an ascending aortic aneurysm and heart failure. CMR showed EF 34%. He underwent a cAscAoReplacement and #29AVR Bentall + STEVEN clipping with Dr. Perdomo on 09/23/2024 ([place]). The patient experienced atrial fibrillation and required antiarrhythmic medications. Postoperative echocardiogram showed EF 22%and computed tomography angiography were performed to assess the surgical outcome. The echocardiogram revealed a reduced right ventricular function and no aortic insufficiency. The CT angiography showed no evidence of aneurysm recurrence or other complications.  The patient's creatinine level increased postoperatively from 1.08-1.2 to 1.74.  The patient was prescribed a life vest for arrhythmia monitoring.  The patient was referred to cardiac rehabilitation and physical therapy.  The patient's pre-operative weight was 111 kg (245 lb), and his discharge weight was 114 kg (251 lb).    Interval history:   He feels well. Swelling has improved significant.   Renal function improved  "to baseline    Today:  He is accompanied by: wife who provides additional clinical history     No chest pain. He has significant fatigue. Sleeps during day, doesn't sleep at tnight.     He denies: dyspnea at rest, LE swelling, syncope, PND, orthopnea, chest pain, palpitation.    ROS: Full 10 pt review of symptoms of negative unless discussed above.     Objective   Medical History:   He has a past medical history of Gout, Hyperlipidemia, Hypertension, SHOAIB (obstructive sleep apnea), and Paroxysmal atrial fibrillation (Multi) (08/24/2023).  Surgical Hx:   He has a past surgical history that includes Sinus surgery and Cardiac catheterization (N/A, 9/11/2024).   Social Hx:   He reports that he has never smoked. He has never used smokeless tobacco. He reports that he does not currently use alcohol after a past usage of about 2.0 standard drinks of alcohol per week. He reports that he does not use drugs.  Family Hx:   His family history is not on file.   Allergies:   No Known Allergies  Exam:       10/14/2024    12:51 PM 10/10/2024    10:11 AM 10/8/2024     9:44 AM 10/6/2024    12:20 PM 10/3/2024     1:11 PM 10/3/2024     9:19 AM   Vitals   Systolic 114 116 124 112 120 122   Diastolic 82 78 70 78 84 80   Heart Rate 77 70 74 72 71    Temp  36.9 °C (98.5 °F) 35.6 °C (96.1 °F) 36.6 °C (97.9 °F) 36.1 °C (97 °F) 36.3 °C (97.3 °F)   Resp  20  20 17 17   Height (in) 1.829 m (6')  1.854 m (6' 1\")      Weight (lb) 235  242      BMI 31.87 kg/m2  31.93 kg/m2      BSA (m2) 2.33 m2  2.38 m2      Visit Report Report  Report        Wt Readings from Last 5 Encounters:   10/14/24 107 kg (235 lb)   10/08/24 110 kg (242 lb)   10/03/24 111 kg (244 lb 12.8 oz)   09/10/24 111 kg (245 lb)   08/20/24 117 kg (258 lb)     GEN: Pleasant, well-appearing, no acute distress.  HEENT: JVP not elevated, no icterus. No HJR  CHEST: No wheeze, good air movement. Sternotomy well healing  CV: Normal rate, regular rhythm. Normal S1, fixed split S2, no " "m/r/g  ABD: Soft, ND, NT  EXT: Warm, well perfused, No LE edema.   NEURO: Pleasant, Oriented to plan    Labs:   CMP:  Recent Labs     10/10/24  1040 10/03/24  0607 10/02/24  1452 10/02/24  0600 10/01/24  2203     136 138 138 137 135*   K 4.3  4.3 3.9 4.1 4.1 4.2   CL 99  99 101 102 100 100   CO2 30  30 25 25 27 22   ANIONGAP 11  11 16 15 14 17   BUN 20  20 42* 38* 39* 40*   CREATININE 1.16  1.16 1.74* 1.63* 1.63* 1.61*   EGFR 69  69 42* 46* 46* 47*   MG 2.18 2.32  --  2.27  --      Recent Labs     10/10/24  1040 10/03/24  0607 10/02/24  1452 09/13/24  0829 09/11/24  0403 09/10/24  2043 07/30/24  0916   ALBUMIN 3.3* 3.5 3.3*   < > 4.0 4.2 4.0   ALKPHOS  --   --   --   --  126* 140* 113   ALT  --   --   --   --  25 27 40   AST  --   --   --   --  18 19 30   BILITOT  --   --   --   --  0.8 0.6 1.0   LIPASE  --   --   --   --   --  25  --     < > = values in this interval not displayed.   CBC:  Recent Labs     10/03/24  0607 10/02/24  0600 10/01/24  0652 09/30/24  0623 09/29/24  0641   WBC 10.8 9.8 9.6 8.9 7.4   HGB 10.8* 10.9* 10.5* 10.7* 10.5*   HCT 33.1* 33.1* 31.4* 32.9* 31.2*    241 212 175 154   MCV 85 84 84 85 84   HEME/ENDO:  Recent Labs     04/10/24  1044 10/20/22  0748 09/07/21  1321 12/05/19  0747   FERRITIN 389 369  --   --    IRONSAT  --  39.1 29.3  --    TSH 1.11  --  1.84  --    HGBA1C 5.6 5.9 5.8 5.7    CARDIAC: No results for input(s): \"LDH\", \"CKMB\", \"TROPHS\", \"BNP\" in the last 76309 hours.    No lab exists for component: \"CK\", \"CKMBP\"  Recent Labs     04/10/24  1044 10/20/22  0748 09/07/21  1321   CHOL 157 164 180   LDLCALC 93 99 117   HDL 53.0 52 47   TRIG 55 63 82   MICRO: No results for input(s): \"ESR\", \"CRP\", \"PROCAL\" in the last 46[address] hours.No results found for the last 90 days.      Notable Studies:   EKG:   Recent Labs     09/25/24  2125 09/24/24  1703 09/17/24  1245   VENTRATE 97 81 70   ATRRATE 163 81 70   QTCFRED 415 437 480   QRSDUR 146 144 154   QTCCALCB 449 460 492 "     Encounter Date: 09/11/24   Electrocardiogram 12-lead PRN for arrhythmia   Result Value    Ventricular Rate 70    Atrial Rate 70    MO Interval 182    QRS Duration 154    QT Interval 456    QTC Calculation(Bazett) 492    P Axis -13    R Axis -35    T Axis 140    QRS Count 12    Q Onset 209    P Onset 118    P Offset 172    T Offset 437    QTC Fredericia 480    Narrative    Normal sinus rhythm  Left axis deviation  Left bundle branch block  Abnormal ECG  When compared with ECG of 14-SEP-2024 11:21,  No significant change was found    Confirmed by Lawrence Reeves ([id number]) on 10/1/2024 8:40:56 AM     Echocardiogram:   Recent Labs     09/25/24  1619 09/23/24  0625 09/10/24  1330   EF 22 33 38   LVIDD  --   --  7.16   RV 25.8  --   --    RVFRWALLPKSP 3.83  --  9.57   TAPSE 1.0  --  2.1     Transthoracic Echo (TTE) Limited 09/25/2024    1. Poorly visualized anatomical structures due to suboptimal image quality.   2. Left ventricular ejection fraction is severely decreased, calculated by Vasquez's biplane at 22%.   3. There is global hypokinesis of the left ventricle with minor regional variations.   4. Spectral Doppler shows a restrictive pattern of left ventricular diastolic filling.   5. Left ventricular cavity size is severely dilated.   6. Abnormal septal motion consistent with post-operative status.   7. No left ventricular thrombus visualized.   8. There is reduced right ventricular systolic function.   9. The left atrium is enlarged.  10. The right atrium is mild to moderately dilated.  11. Right ventricular systolic pressure is within normal limits.  12. S/p 29mm Konect Resilia bioprosthetic valved conduit. AVR gradients were not assessed by spectral Doppler though no obvious AI by color Doppler  13. S/p 32mm Gelweave ascending aortic conduit and hemiarch.  14. Compared with the prior exam, preop echo done on 9/20/2024 [facility], the LV was already severely dilated with moderately reduced function  with a severely dilated aorta with moderate AI and the patient is now s/p acending aortic and arch replacement and Konect AVR. The LV remains severely dilated with a reducetion in LV systolic function , now severely reduced. The AVR gradients were not assessed today but there is no AI.    Transthoracic Echo (TTE) Complete With 3D And Strain 09/10/2024  Left Ventricle: The left ventricular systolic function is moderately decreased, with a visually estimated ejection fraction of 35-40%. There is global hypokinesis of the left ventricle with minor regional variations. The left ventricular cavity size is severely dilated. Left Ventricular Global Longitudinal Strain - -10.5 %. Spectral Doppler shows a normal pattern of left ventricular diastolic filling.  Left Atrium: The left atrium is moderately dilated.  Right Ventricle: The right ventricle is normal in size. There is normal right ventricular global systolic function.  Right Atrium: The right atrium is moderately dilated.  Aortic Valve: The aortic valve was not well visualized. There is no aortic valve thickening. There is no evidence of aortic valve stenosis.  The aortic valve dimensionless index is 1.03. There is moderate aortic valve regurgitation. The peak instantaneous gradient of the aortic valve is 16.5 mmHg. The mean gradient of the aortic valve is 8.0 mmHg. Likely tricuspid aortic valve but images slightly suboptimal.  Mitral Valve: The mitral valve is abnormal. There is moderate mitral valve regurgitation which is centrally directed.  Tricuspid Valve: The tricuspid valve is structurally normal. There is trace tricuspid regurgitation. The right ventricular systolic pressure is unable to be estimated.  Pulmonic Valve: The pulmonic valve is structurally normal. There is physiologic pulmonic valve regurgitation.  Pericardium: There is a trivial pericardial effusion.  Aorta: The aortic root is normal. There is an aneurysm involving the ascending aorta. Severe  ascending aortic aneurysm of 6.2 cm at the largest diameter.  Systemic Veins: The inferior vena cava appears to be of normal size, IVC inspiratory collapse greater than 50%.      CONCLUSIONS:  1. The left ventricular systolic function is moderately decreased, with a visually estimated ejection fraction of 35-40%.  2. There is global hypokinesis of the left ventricle with minor regional variations.  3. Left ventricular cavity size is severely dilated.  4. There is normal right ventricular global systolic function.  5. The left atrium is moderately dilated.  6. The right atrium is moderately dilated.  7. Moderate mitral valve regurgitation.  8. Trace tricuspid regurgitation is visualized.  9. Aortic valve stenosis is not present.  10. Likely tricuspid aortic valve but images slightly suboptimal.  11. Moderate aortic valve regurgitation.  12. Aneurysm of the ascending aorta.      Coronary Angiography:   Left Heart Cath 09/11/2024  In the setting of large 6.2 cm ascending aorta dilation his coronary arteries were difficult to cannulate and image. For LAD imaging a JL 4.5, JL 5, JL 6, catheters were unsuccessful due to catheter prolapse. A 6 Urdu AL 3 catheter provided adequate nonselective imaging of the left coronary system. The right coronary artery originated just above the right coronary cusp and anterior orientation based on the prior CT scan of the chest. I was unsuccessful at selectively imaging the right coronary artery and had only 1 brief fluoroscopic image of flow in the right coronary artery using an AL 3 catheter. AL 2 and Aaron right 3 catheters were also unsuccessful at engaging the right coronary ostium.      1. The left main is a large vessel that bifurcates into the LAD and circumflex and had no obstructive disease.      2. The LAD was a large vessel that extended to the apex and wraps around the apex to supply the distal inferoapical wall. The LAD had no obstructive disease.      3. The  circumflex was a large codominant vessel with no obstructive disease in the circumflex or its marginal branches.      4. The right coronary artery was unsuccessfully imaged nonselective imaging briefly showed evidence of vessel patency.    Let ventriculogram deferred to limit IV contrast load. Echocardiography shoed significant LV and LA dilation with estimated moderately decreased LV systolic function with LVEF 35-40% and modearate aortic valve regurgitation.          Impression:      #1 large 6.2 cm ascending aorta dilation.      2. Inability to selectively or nonselectively engage the right coronary artery, and patient would benefit from CCTA imaging to confirm coronary anatomy.      3. No obstructive disease in the left main, LAD, or circumflex vessels.      Description of the Procedure:  After informed consent was obtained for the cardiac catheterization procedure the patient was brought to the cardiac catheterization lab and prepped and draped in a sterile fashion with the remainder procedure performed in sterile technique. 10 cc of 1% lidocaine use of the right groin for local anesthesia and 3 mg of Versed and 75 mcg of fentanyl were used for sedation during the procedure.      Right Heart Cath: No results found for this or any previous visit from the past 1800 days.    Cardiac Scoring: No results found for this or any previous visit from the past 1800 days.    Cardiac MRI:   MR cardiac morphology and function w and wo IV contrast 09/12/2024  CARDIAC CHAMBERS  Normal atrioventricular and ventriculoarterial concordance    LEFT ATRIUM  Severely dilated (HANNAH-93.2 ml/m2).    RIGHT ATRIUM  Unable to determine due to images    INTERATRIAL SEPTUM  Intact.    LEFT VENTRICLE:  1. Dilated LV size and reduced systolic function. Quantitative LVEF 34%.  2. There is global hypokinesis of the left ventricle with minor  regional variation.  3. Normal LV wall thickness and normal LV indexed mass.  4. Normal native T2 values  (<55ms)/ and normal T2 STIR imaging.  5. Normal ECV 29%  6. No evidence of LV thrombus.  7. Small focal transmural (%) LGE/scar involving the apical  lateral wall. Finding may represent prior embolic event versus prior  myocarditis. Nonspecific replacement fibrosis noted at the level of  the basal mid septum.    MEASUREMENTS  ----------------------------------------------------------------------  ------------------ VOLUMETRIC ANALYSIS  ----------------------------------------------  .--------------------------------------------------------.                   LV     Reference  RV    Reference   +------+----------+-------+-----------+------+-----------+   EDV   ml          426              206           ml/mï¿½       182               88     ESV   ml          257              105           ml/mï¿½       110               45     CO    L/min     10.66             6.35           L/min/mï¿½   4.55             2.72     MASS  g           253                            g/mï¿½        108                      SV    ml          169              101           ml/mï¿½        72               43     EF    %            40               49    '------+----------+-------+-----------+------+-----------'    CARDIAC OUTPUT HR:  63 bpm  LV DIMENSIONS  ----------------------------------------------  WALL THICKNESS - ANTEROSEPTAL:  0.93 cm  WALL THICKNESS - INFEROLATERAL:  0.8 cm  LV RIC:  7.63 cm  LV ESD:  6.86 cm    LA DIMENSIONS (LV SYSTOLE)  ----------------------------------------------  DIAMETER:  6.41 cm  AREA - 2 CHAMBER:  46.33 cmï¿½  LENGTH - 2 CHAMBER:  8.29 cm  AREA - 4 CHAMBER:  45.94 cmï¿½  LENGTH - 4 CHAMBER:  9.38 cm  VOLUME:  218 ml  VOLUME NORMALIZED:  93.2 ml/mï¿½    AORTIC ROOT DIMENSIONS  ----------------------------------------------  ANNULUS:  3.53 cm  SINUS OF VALSALVA:  4.41 cm  SINOTUBULAR JUNCTION:  5.79 cm    EXTRACELLULAR  VOLUME MEASUREMENT  ----------------------------------------------  PRE-CONTRAST T1 MYOCARDIUM:  985.3 msec  PRE-CONTRAST T1 LV CAVITY:  1675.5 msec  POST-CONTRAST T1 MYOCARDIUM:  464.31 msec  POST-CONTRAST T1 LV CAVITY:  326.09 msec  HEMATOCRIT:  37.6 %  ECV:  29 %    FINDINGS  ----------------------------------------------  LV SCAR SIZE (17 SEGMENT):  9 %      RIGHT VENTRICLE  The right ventricle appears normal in size(EDVi-88 ml/m2), shape, and  has normal qualitative systolic function. Quantitative RVEF49 % no  segmental wall motion abnormalities. No abnormal delayed enhancement  in the myocardium.    INTERVENTRICULAR SEPTUM  Intact.    AORTIC VALVE  The aortic valve is trileaflet  There is  moderate aortic regurgitation.  Flow quantification through the ascending aorta:  Forward volume =164 cc/beat  Reverse volume = 59 cc/beat  Net forward volume = 105 cc/beat  Aortic regurgitant fraction = 36 %    MITRAL VALVE  There is  mild mitral regurgitation.  Integrating LV volumetric and aortic flow quantification data reveals:  Quantitative mitral regurgitant volume = 5 cc/beat  Quantitative mitral regurgitant fraction =  %    TRICUSPID VALVE  There is qualitative trivial tricuspid regurgitation.    PULMONARY VALVE:  Not assessed.    PERICARDIUM:  The pericardium is normal. There is small pericardial effussion.    THORACIC AORTA  Aneurysmal dilatation of the ascending aorta measuring at 6.3 cm.  The aortic root is aneurysmal measures 4.5 x 4.5 x 4.2 cm.  The thoracic aorta appears normal in course, caliber, and contour.  There is no evidence for acute aortic pathology. The arch vessel  branching pattern is  normal.   All the arch branch vessels appear  widely patent in their proximal portions.    AORTIC ROOT DIMENSIONS:  Aortic root(sinus of valsalva): 4.41 cm  Annulus: 3.53 cm  Sinotubular junction:5.79 cm    PULMONARY ARTERIES  The central pulmonary arteries appear  normal (MPA-3.1 cm, RPA- 2.5  cm, LPA-2.3  cm).    SYSTEMIC AND PULMONARY VEINS  Normal systemic venous and pulmonary venous return.  The SVC is of normal caliber. IVC appears normal  Normal pulmonary venous anatomy.    CHEST  The chest wall is normal.  Limited imaging through the lungs reveals no gross abnormalities.  Small bilateral pleural effusion.    UPPER ABDOMEN  Limited imaging through the upper abdomen reveals no abnormalities of  the visualized organs.    Impression  1. Dilated LV (EDVi-182 ml/m2) with reduced systolic function.  Quantitative LVEF 34 %.  2. Normal RV size (EDVi-88 ml/m2)and systolic function. Quantitative  RVEF49%.  3. Aneurysmal dilatation of the ascending aorta measuring up 6.4 cm.  Dilated aortic root measuring 4.5 x 1.5 x 4.2 cm.  4. Moderate aortic regurgitation. Aortic regurgitant fraction is 36%.  5. Small focal transmural (%) LGE/scar involving the apical  lateral wall. Finding may represent prior embolic event versus prior  myocarditis.  6. Nonspecific replacement fibrosis noted at the level of the basal  mid septum.  7. Small bilateral pleural effusion.  8. Small circumferential pericardial effusion.      Nuclear:No results found for this or any previous visit from the past 1800 days.    Metabolic Stress: No results found for this or any previous visit from the past 1800 days.      Current Outpatient Medications   Medication Instructions    amiodarone (PACERONE) 200 mg, oral, Daily, For 1 month    aspirin 81 mg, oral, Daily    atorvastatin (LIPITOR) 80 mg, oral, Nightly    empagliflozin (JARDIANCE) 10 mg, oral, Daily    eplerenone (INSPRA) 25 mg, oral, Daily    hydrALAZINE (APRESOLINE) 12.5 mg, oral, 3 times daily    metoprolol succinate XL (TOPROL-XL) 150 mg, oral, Daily, Do not crush or chew.    sacubitriL-valsartan (Entresto) 24-26 mg tablet 1 tablet, oral, 2 times daily    torsemide (DEMADEX) 40 mg, oral, Daily PRN      Notable Therapies: *GDMT*   Class  Agent SAFETY    *ARNI* / ACE / ARB   Last BP: 114/82, Last  "BUN/Cr (GFR): 10/10/2024: 20; 20/1.16; 1.16 (69; 69)    *Beta-Blocker*  metoprolol succinate XL 100mg daily Last HR: 77    *MRA*  eplerenone 25mg daily Last K: 10/10/2024: 4.3; 4.3     *SGLT2*   Last A1c 4/10/2024: 5.6     Diuretic  torsemide 40mg daily Last BNP: No results found for requested labs within last 3650 days.    Hydralazine/ISDN  hydrALAZINE 12.5mg TID     Digoxin  Last Digoxin level: No results found for requested labs within last 3650 days.    Anticoagulation   Last Hgb: 10/3/2024: 10.8    Anti-arrhythmic  amiodarone 200mg daily (1 month only) Last QTc: 9/25/2024: 449    Antiplatelet  Aspirin 81mg daily Last Platelet: 10/3/2024: 225    Lipid-Lowering  atorvastatin 20mg daily Last Tchol 4/10/2024: 157 / LDL No results found for requested labs within last 3650 days. or 4/10/2024: 93 / HDL 4/10/2024: 53.0 / TRIG 4/10/2024: 55    Other        Device(s)  Consideration for CRT-D if EF remains <35% EF: 9/25/2024: 22%, QRS: 9/25/2024: 146ms    Cardiac Rehab,   if EF <35/MI/OHS  Referred post [facility]      Problems Addressed:   # HFrEF / Chronic Systolic Heart Failure, last EF 9/25/2024: 22%, dx'd 09/2024 with EF 35-40%,   # Non-ischemic Cardiomyopathy, ACC/AHA Stage C, NYHA II, Warm, Euvolemic. Possibly 2/2 long standing moderate AR vs uncontrolled long standing HTN(patient on 5 different anti hypertensive agents at home) vs electrical dyssynchrony EKG noted with LBBB vs prior MI due to coronary embolism per findings on cMRI of \"small focal transmural scar in the apical lateral wall.\" Though [facility] with no significant CAD  # Ascending Aortic Aneurysm with moderate AI now s/p ascending aortic replacement w 29mm Konect: Recovering well. Referred to cardiac rehab.   # LBBB (QRS 146ms): pre-existing AVR. Consideration for CRT-D if EF <35%.   # Paroxysmal Atrial Fibrillation s/p STEVEN clipping: not on A/C in the post-operative setting.   # Hypertension: Last BP: 114/82.  # Hyperlipidemia: Last Tchol 4/10/2024: 157 / LDL 93 / " HDL 53.0 / TRIG 55. Continue statin  # Obesity: Last BMI: 31.86. Diet exercise, cardiac rehab  # CKD: Last BUN/Cr (GFR): 10/10/2024: 20; 20/1.16; 1.16 (69; 69)  - GDMT as above  -- start jardiance 10mg daily  -- start Entresto 24-26mg twice daily  -- Increase Metoprolol XL to 150mg daily  -- stop hydralazine  -- take torsemide 40mg daily as needed for weight gain, swelling or shortness of breath  - Labwork: 1 week  - Echo in 3 months for EF assessment prior to CRT-D consideration  -- Cardiac Rehab  -- Clinical Pharmacy  - 3 month followup    Patient Instructions   If you have any questions or need cardiac medication refills, please call the Heart Failure office at [phone], option 6.  You may also contact the  Heart Failure Nursing team via email at [email or web address] (Please include your name and date of birth). To reach Dr. Buck's office please call [phone] / Fax: [phone]. To schedule an office appointment call [phone].     If I placed a referral today for a specialist/procedure, please call the general scheduling line at [phone]. You may also schedule appointments on the [facility]izbeth. For radiology scheduling (Cardiac calcium score, CTA with HeartFlow, Cardiac MRI, NM cardiac stress test, PET viability study) the phone number is [phone].     - Continue current medications with the exception of:   -- start jardiance 10mg daily  -- start Entresto 24-26mg twice daily  -- Increase Metoprolol XL to 150mg daily  -- stop hydralazine  -- take torsemide 40mg daily as needed for weight gain, swelling or shortness of breath  - Labwork: 1 week  - Imaging/Procedures: Echocardiogram in 3 months  - Referrals:   -- Cardiac Rehab  -- Clinical Pharmacy  - Followup: 3 months       Orders:  Orders Placed This Encounter   Procedures    B-Type Natriuretic Peptide    Comprehensive Metabolic Panel    Referral to Cardiac Rehab    Referral to Clinical Pharmacy    Transthoracic Echo (TTE)  Complete      Followup Appts:  Future Appointments   Date Time Provider Department Center   10/15/2024 To Be Determined Clarke Carl, PT [facility]   10/17/2024 To Be Determined Essie Sanchez LPN [facility]   10/22/2024 To Be Determined Clarke Carl, PT [facility]   10/23/2024  9:30 AM Trino Perdomo MD [id number]   10/24/2024 To Be Determined Oksana Hardwick RN [facility]   10/29/2024 To Be Determined Clarke Carl, PT [facility]   1/8/2025  1:00 PM [facility] STRESS/ECHO LAB [id number] [place]   1/27/2025  9:30 AM Yomi Buck MD [id number] [facility]   2/21/2025  9:45 AM Bernardo Ramírez MD [id number] [place] / [facility]       Time Spent: I spent 93 minutes reviewing medical testing, obtaining medical history and counselling and educating on diagnosis and documenting clinical encounter.   ____________________________________________________________  Yomi Buck MD  Section of Advanced Heart Failure and Cardiac Transplantation  Division of Cardiovascular Medicine  [facility]

## 2024-10-15 ENCOUNTER — HOME CARE VISIT (OUTPATIENT)
Dept: HOME HEALTH SERVICES | Facility: HOME HEALTH | Age: 69
End: 2024-10-15
Payer: MEDICARE

## 2024-10-15 PROCEDURE — G0151 HHCP-SERV OF PT,EA 15 MIN: HCPCS | Mod: HHH

## 2024-10-15 ASSESSMENT — ENCOUNTER SYMPTOMS
PERSON REPORTING PAIN: PATIENT
DENIES PAIN: 1

## 2024-10-15 ASSESSMENT — ACTIVITIES OF DAILY LIVING (ADL)
AMBULATION ASSISTANCE: 1
AMBULATION ASSISTANCE: MINIMUM ASSIST

## 2024-10-15 NOTE — PROGRESS NOTES
David returns status post 9/23/24 root asc 29mm konect biobental removal of pleural effusion. Miriam Esteban RN  This is a 69 years old male patient who is here for his first postoperative follow-up visit.  I have performed a open heart surgery with aortic root and ascending replacement with a bio Bentall.  The patient is here today doing actually very well.  He is complaining about being worn out and fatigued most of the day.  He is very easy to go to sleep.  He is wearing the vest for sudden death protection.  He is going to see his cardiologist for this in a month or 2.  From my perspective I have reviewed the postoperative x-ray and it shows a significant residual left lower effusion that need to be tapped.  I have explained this to the patient we will coordinate this to happen as an outpatient in the next day or 2.  In the meantime I suggested to continue with his postoperative medication.  I recommended cardiac rehab after the left pleural drainage.  And will continue to see him in 6 more months with a echocardiogram.  In the meantime I also suggested that he keep us updated about the left plaural tapping so we can make sure he recover properly.

## 2024-10-15 NOTE — HOME HEALTH
Lost a little weight, I am off of my sleep cycle, need to get back on it.   No pain now or in the past 24 hours.  I think my feet are much less swollen.   Strength is not where I want it to be.  I would like to see my strength return.  I lost 12 to 13 pounds.    From Chart: David Chatman is a 68 y.o. male presenting with shortness of breath. He presented to his cardiologist as an outpt for these complaints. Echo showed an EF of 35-40% and ascending aneurysm 6.2. He was directed to the ED. BNP 1015, troponins, 21,16,15. CT chest revealed large ascending aneurysm up to 6.2 cm. No evidence of acute aortic dissection. He was given IV lasix with relief of symptoms and admitted for further evaluation. He is currently planned for cardiac catheterization. Asymptomatic. The pt reports his blood pressures are under control currently. CT surgery consulted for aneurysm evaluation at OSH and [place] [facility]. 9/23/2024 Operation Procedures: by Trino Perdomo #1 standard median sternotomy #2 innominate trunk arterial cannulation #3 ascending aortic replacement and hemiarch replacement with a 32 Gelweave graft #4 aortic root replacement and aortic valve replacement: Bentall procedure with a 29 mm connect valve conduit #5 left atrial appendage closure with a 35 mm AtriCure clip #6 left femoral arterial line placement Open heart surgery. Knew I was sick only one month ago. Had to wait about 10 days for the surgery. Retired from [employer] at [employer]. Lifting, pushing, pulling 10 pounds, no driving. Walking is OK. Recently walking and stretching a little bit more.    TUG 13 seconds     Written exercises of   1. Toe Pumps   2. Ankle pumps   3. Seated full arc quads   4. Seated marching For up to 2 minutes or 20 repetitions, in sitting or in supine every waking hour. Also walking to the mailbox and back again.    Patient challenged to walk as far as he is able.  He responded by walking for about 300  feet over 2 and a half minutes.

## 2024-10-16 ENCOUNTER — PATIENT OUTREACH (OUTPATIENT)
Dept: PRIMARY CARE | Facility: CLINIC | Age: 69
End: 2024-10-16
Payer: MEDICARE

## 2024-10-16 NOTE — PROGRESS NOTES
Call regarding appt. with PCP on (10/8/24) after hospitalization.  At time of outreach call the patient feels as if their condition has (improved) since last visit.  Reviewed the PCP appointment with the pt and addressed any questions or concerns.      Mulugeta Stack LPN

## 2024-10-17 ENCOUNTER — HOME CARE VISIT (OUTPATIENT)
Dept: HOME HEALTH SERVICES | Facility: HOME HEALTH | Age: 69
End: 2024-10-17
Payer: MEDICARE

## 2024-10-17 VITALS
BODY MASS INDEX: 30.2 KG/M2 | HEIGHT: 72 IN | WEIGHT: 223 LBS | DIASTOLIC BLOOD PRESSURE: 82 MMHG | OXYGEN SATURATION: 97 % | RESPIRATION RATE: 16 BRPM | HEART RATE: 76 BPM | TEMPERATURE: 97.7 F | SYSTOLIC BLOOD PRESSURE: 124 MMHG

## 2024-10-17 PROCEDURE — G0299 HHS/HOSPICE OF RN EA 15 MIN: HCPCS | Mod: HHH

## 2024-10-17 ASSESSMENT — ENCOUNTER SYMPTOMS
PERSON REPORTING PAIN: PATIENT
DENIES PAIN: 1
CHANGE IN APPETITE: INCREASED

## 2024-10-18 ENCOUNTER — HOSPITAL ENCOUNTER (OUTPATIENT)
Dept: RADIOLOGY | Facility: HOSPITAL | Age: 69
Discharge: HOME | End: 2024-10-18
Payer: MEDICARE

## 2024-10-18 DIAGNOSIS — R06.02 SOB (SHORTNESS OF BREATH) ON EXERTION: ICD-10-CM

## 2024-10-18 DIAGNOSIS — Z95.2 STATUS POST AORTIC VALVE REPLACEMENT: ICD-10-CM

## 2024-10-18 DIAGNOSIS — I42.0 DILATED CARDIOMYOPATHY (MULTI): ICD-10-CM

## 2024-10-18 DIAGNOSIS — I10 BENIGN HYPERTENSION: ICD-10-CM

## 2024-10-18 PROCEDURE — 71046 X-RAY EXAM CHEST 2 VIEWS: CPT

## 2024-10-18 PROCEDURE — 2550000001 HC RX 255 CONTRASTS: Performed by: INTERNAL MEDICINE

## 2024-10-18 PROCEDURE — 71275 CT ANGIOGRAPHY CHEST: CPT

## 2024-10-22 ENCOUNTER — HOME CARE VISIT (OUTPATIENT)
Dept: HOME HEALTH SERVICES | Facility: HOME HEALTH | Age: 69
End: 2024-10-22
Payer: MEDICARE

## 2024-10-22 DIAGNOSIS — Z95.2 S/P AORTIC VALVE REPLACEMENT AND AORTOPLASTY: Primary | ICD-10-CM

## 2024-10-22 PROCEDURE — G0151 HHCP-SERV OF PT,EA 15 MIN: HCPCS | Mod: HHH

## 2024-10-22 ASSESSMENT — ENCOUNTER SYMPTOMS
DENIES PAIN: 1
PERSON REPORTING PAIN: PATIENT

## 2024-10-22 NOTE — HOME HEALTH
I am going to be evaluated 11 5 24 by cardiac rehabiliation.  No other significant news.  I get sent to the mailbox and back from time as well as to the shed back and forth.         From Chart: David Chatman is a 68 y.o. male presenting with shortness of breath. He presented to his cardiologist as an outpt for these complaints. Echo showed an EF of 35-40% and ascending aneurysm 6.2. He was directed to the ED. BNP 1015, troponins, 21,16,15. CT chest revealed large ascending aneurysm up to 6.2 cm. No evidence of acute aortic dissection. He was given IV lasix with relief of symptoms and admitted for further evaluation. He is currently planned for cardiac catheterization. Asymptomatic. The pt reports his blood pressures are under control currently. CT surgery consulted for aneurysm evaluation at OSH and [place] [facility]. 9/23/2024 Operation Procedures: by Dr. Trino Perdomo     #1 standard median sternotomy   #2 innominate trunk arterial cannulation   #3 ascending aortic replacement and hemiarch replacement with a 32 Gelweave graft   #4 aortic root replacement and aortic valve replacement: Bentall procedure with a 29 mm connect valve conduit   #5 left atrial appendage closure with a 35 mm AtriCure clip   #6 left femoral arterial line placement Open heart surgery.            Knew I was sick only one month ago. Had to wait about 10 days for the surgery. Retired from [employer] at [employer]. Lifting, pushing, pulling 10 pounds, no driving. Walking is OK. Recently walking and stretching a little bit more.    74 bpm.    After walking more than 500 feet over 3     TUG 10 seconds     Written exercises of   1. Toe Pumps   2. Ankle pumps   3. Seated full arc quads   4. Seated marching          For up to 2 minutes or 20 repetitions, in sitting or in supine every waking hour.          Also walking to the mailbox and back again. Patient challenged to walk as far as he is able. He responded by  walking for about 500 feet over 3 minutes and 30 seconds.

## 2024-10-23 ENCOUNTER — OFFICE VISIT (OUTPATIENT)
Dept: CARDIAC SURGERY | Facility: HOSPITAL | Age: 69
End: 2024-10-23
Payer: MEDICARE

## 2024-10-23 VITALS
DIASTOLIC BLOOD PRESSURE: 83 MMHG | HEART RATE: 76 BPM | OXYGEN SATURATION: 92 % | HEIGHT: 72 IN | BODY MASS INDEX: 30.15 KG/M2 | SYSTOLIC BLOOD PRESSURE: 120 MMHG | WEIGHT: 222.6 LBS

## 2024-10-23 DIAGNOSIS — Z95.828 S/P ASCENDING AORTIC REPLACEMENT: ICD-10-CM

## 2024-10-23 DIAGNOSIS — J90 PLEURAL EFFUSION: ICD-10-CM

## 2024-10-23 PROCEDURE — 3008F BODY MASS INDEX DOCD: CPT | Performed by: THORACIC SURGERY (CARDIOTHORACIC VASCULAR SURGERY)

## 2024-10-23 PROCEDURE — 99211 OFF/OP EST MAY X REQ PHY/QHP: CPT | Performed by: THORACIC SURGERY (CARDIOTHORACIC VASCULAR SURGERY)

## 2024-10-23 PROCEDURE — 1159F MED LIST DOCD IN RCRD: CPT | Performed by: THORACIC SURGERY (CARDIOTHORACIC VASCULAR SURGERY)

## 2024-10-23 PROCEDURE — 1126F AMNT PAIN NOTED NONE PRSNT: CPT | Performed by: THORACIC SURGERY (CARDIOTHORACIC VASCULAR SURGERY)

## 2024-10-23 PROCEDURE — 3074F SYST BP LT 130 MM HG: CPT | Performed by: THORACIC SURGERY (CARDIOTHORACIC VASCULAR SURGERY)

## 2024-10-23 PROCEDURE — 1111F DSCHRG MED/CURRENT MED MERGE: CPT | Performed by: THORACIC SURGERY (CARDIOTHORACIC VASCULAR SURGERY)

## 2024-10-23 PROCEDURE — 1036F TOBACCO NON-USER: CPT | Performed by: THORACIC SURGERY (CARDIOTHORACIC VASCULAR SURGERY)

## 2024-10-23 PROCEDURE — 3079F DIAST BP 80-89 MM HG: CPT | Performed by: THORACIC SURGERY (CARDIOTHORACIC VASCULAR SURGERY)

## 2024-10-23 ASSESSMENT — PAIN SCALES - GENERAL: PAINLEVEL_OUTOF10: 0-NO PAIN

## 2024-10-26 ENCOUNTER — HOME CARE VISIT (OUTPATIENT)
Dept: HOME HEALTH SERVICES | Facility: HOME HEALTH | Age: 69
End: 2024-10-26
Payer: MEDICARE

## 2024-10-26 VITALS
HEART RATE: 73 BPM | WEIGHT: 220 LBS | HEIGHT: 72 IN | TEMPERATURE: 97.4 F | RESPIRATION RATE: 12 BRPM | DIASTOLIC BLOOD PRESSURE: 84 MMHG | SYSTOLIC BLOOD PRESSURE: 128 MMHG | OXYGEN SATURATION: 95 % | BODY MASS INDEX: 29.8 KG/M2

## 2024-10-26 PROCEDURE — G0299 HHS/HOSPICE OF RN EA 15 MIN: HCPCS | Mod: HHH

## 2024-10-26 ASSESSMENT — PAIN SCALES - PAIN ASSESSMENT IN ADVANCED DEMENTIA (PAINAD)
BODYLANGUAGE: 0 - RELAXED.
CONSOLABILITY: 0 - NO NEED TO CONSOLE.
BODYLANGUAGE: 0
TOTALSCORE: 0
BREATHING: 0
NEGVOCALIZATION: 0
FACIALEXPRESSION: 0
FACIALEXPRESSION: 0 - SMILING OR INEXPRESSIVE.
CONSOLABILITY: 0
NEGVOCALIZATION: 0 - NONE.

## 2024-10-26 ASSESSMENT — ACTIVITIES OF DAILY LIVING (ADL)
OASIS_M1830: 00
HOME_HEALTH_OASIS: 00

## 2024-10-26 ASSESSMENT — ENCOUNTER SYMPTOMS: DENIES PAIN: 1

## 2024-10-28 ENCOUNTER — TELEPHONE (OUTPATIENT)
Dept: CARDIOLOGY | Facility: HOSPITAL | Age: 69
End: 2024-10-28
Payer: COMMERCIAL

## 2024-10-28 DIAGNOSIS — I44.7 LEFT BUNDLE BRANCH BLOCK: ICD-10-CM

## 2024-10-28 DIAGNOSIS — Z95.828 S/P ASCENDING AORTIC REPLACEMENT: ICD-10-CM

## 2024-10-28 DIAGNOSIS — Z95.3 S/P AORTIC VALVE REPLACEMENT WITH PORCINE VALVE: ICD-10-CM

## 2024-10-28 DIAGNOSIS — I50.20 HFREF (HEART FAILURE WITH REDUCED EJECTION FRACTION): ICD-10-CM

## 2024-10-28 DIAGNOSIS — Z95.2 S/P AORTIC VALVE REPLACEMENT AND AORTOPLASTY: ICD-10-CM

## 2024-10-28 DIAGNOSIS — I42.8 NON-ISCHEMIC CARDIOMYOPATHY (MULTI): ICD-10-CM

## 2024-10-28 DIAGNOSIS — I50.21 ACUTE SYSTOLIC HEART FAILURE: ICD-10-CM

## 2024-10-28 RX ORDER — METOPROLOL SUCCINATE 100 MG/1
150 TABLET, EXTENDED RELEASE ORAL DAILY
Qty: 135 TABLET | Refills: 3 | Status: SHIPPED | OUTPATIENT
Start: 2024-10-28 | End: 2024-10-30 | Stop reason: SDUPTHER

## 2024-10-28 RX ORDER — ASPIRIN 81 MG/1
81 TABLET ORAL DAILY
Qty: 90 TABLET | Refills: 3 | Status: SHIPPED | OUTPATIENT
Start: 2024-10-28 | End: 2025-10-28

## 2024-10-28 RX ORDER — HYDRALAZINE HYDROCHLORIDE 25 MG/1
12.5 TABLET, FILM COATED ORAL 3 TIMES DAILY
Qty: 135 TABLET | Refills: 3 | Status: SHIPPED | OUTPATIENT
Start: 2024-10-28 | End: 2024-10-30 | Stop reason: SDUPTHER

## 2024-10-28 RX ORDER — SACUBITRIL AND VALSARTAN 24; 26 MG/1; MG/1
1 TABLET, FILM COATED ORAL 2 TIMES DAILY
Qty: 180 TABLET | Refills: 3 | Status: SHIPPED | OUTPATIENT
Start: 2024-10-28 | End: 2024-10-30 | Stop reason: SDUPTHER

## 2024-10-28 RX ORDER — EPLERENONE 25 MG/1
25 TABLET, FILM COATED ORAL DAILY
Qty: 90 TABLET | Refills: 3 | Status: SHIPPED | OUTPATIENT
Start: 2024-10-28 | End: 2024-10-30 | Stop reason: SDUPTHER

## 2024-10-28 RX ORDER — ATORVASTATIN CALCIUM 80 MG/1
80 TABLET, FILM COATED ORAL NIGHTLY
Qty: 90 TABLET | Refills: 3 | Status: SHIPPED | OUTPATIENT
Start: 2024-10-28 | End: 2024-10-30 | Stop reason: SDUPTHER

## 2024-10-28 RX ORDER — TORSEMIDE 20 MG/1
40 TABLET ORAL DAILY PRN
Qty: 180 TABLET | Refills: 3 | Status: SHIPPED | OUTPATIENT
Start: 2024-10-28 | End: 2024-10-30 | Stop reason: SDUPTHER

## 2024-10-28 RX ORDER — AMIODARONE HYDROCHLORIDE 200 MG/1
200 TABLET ORAL DAILY
Qty: 90 TABLET | Refills: 3 | Status: SHIPPED | OUTPATIENT
Start: 2024-10-28 | End: 2024-10-30 | Stop reason: SDUPTHER

## 2024-10-30 DIAGNOSIS — I44.7 LEFT BUNDLE BRANCH BLOCK: ICD-10-CM

## 2024-10-30 DIAGNOSIS — Z95.3 S/P AORTIC VALVE REPLACEMENT WITH PORCINE VALVE: ICD-10-CM

## 2024-10-30 DIAGNOSIS — I50.21 ACUTE SYSTOLIC HEART FAILURE: ICD-10-CM

## 2024-10-30 DIAGNOSIS — Z95.2 S/P AORTIC VALVE REPLACEMENT AND AORTOPLASTY: ICD-10-CM

## 2024-10-30 DIAGNOSIS — I42.8 NON-ISCHEMIC CARDIOMYOPATHY (MULTI): ICD-10-CM

## 2024-10-30 DIAGNOSIS — Z95.828 S/P ASCENDING AORTIC REPLACEMENT: ICD-10-CM

## 2024-10-30 DIAGNOSIS — I50.20 HFREF (HEART FAILURE WITH REDUCED EJECTION FRACTION): ICD-10-CM

## 2024-10-30 RX ORDER — SACUBITRIL AND VALSARTAN 24; 26 MG/1; MG/1
1 TABLET, FILM COATED ORAL 2 TIMES DAILY
Qty: 180 TABLET | Refills: 3 | Status: SHIPPED | OUTPATIENT
Start: 2024-10-30 | End: 2025-10-30

## 2024-10-30 RX ORDER — AMIODARONE HYDROCHLORIDE 200 MG/1
200 TABLET ORAL DAILY
Qty: 90 TABLET | Refills: 3 | Status: SHIPPED | OUTPATIENT
Start: 2024-10-30 | End: 2025-10-30

## 2024-10-30 RX ORDER — ATORVASTATIN CALCIUM 80 MG/1
80 TABLET, FILM COATED ORAL NIGHTLY
Qty: 90 TABLET | Refills: 3 | Status: SHIPPED | OUTPATIENT
Start: 2024-10-30 | End: 2025-10-30

## 2024-10-30 RX ORDER — EPLERENONE 25 MG/1
25 TABLET, FILM COATED ORAL DAILY
Qty: 90 TABLET | Refills: 3 | Status: SHIPPED | OUTPATIENT
Start: 2024-10-30 | End: 2025-10-30

## 2024-10-30 RX ORDER — METOPROLOL SUCCINATE 100 MG/1
150 TABLET, EXTENDED RELEASE ORAL DAILY
Qty: 135 TABLET | Refills: 3 | Status: SHIPPED | OUTPATIENT
Start: 2024-10-30 | End: 2025-10-30

## 2024-10-30 RX ORDER — HYDRALAZINE HYDROCHLORIDE 25 MG/1
12.5 TABLET, FILM COATED ORAL 3 TIMES DAILY
Qty: 135 TABLET | Refills: 3 | Status: SHIPPED | OUTPATIENT
Start: 2024-10-30 | End: 2025-10-30

## 2024-10-30 RX ORDER — TORSEMIDE 20 MG/1
40 TABLET ORAL DAILY PRN
Qty: 180 TABLET | Refills: 3 | Status: SHIPPED | OUTPATIENT
Start: 2024-10-30 | End: 2025-10-30

## 2024-11-01 ENCOUNTER — PATIENT OUTREACH (OUTPATIENT)
Dept: PRIMARY CARE | Facility: CLINIC | Age: 69
End: 2024-11-01
Payer: COMMERCIAL

## 2024-11-05 ENCOUNTER — HOSPITAL ENCOUNTER (OUTPATIENT)
Dept: RADIOLOGY | Facility: HOSPITAL | Age: 69
Discharge: HOME | End: 2024-11-05
Payer: MEDICARE

## 2024-11-05 ENCOUNTER — APPOINTMENT (OUTPATIENT)
Dept: PHARMACY | Facility: HOSPITAL | Age: 69
End: 2024-11-05
Payer: MEDICARE

## 2024-11-05 VITALS
TEMPERATURE: 97.5 F | RESPIRATION RATE: 16 BRPM | SYSTOLIC BLOOD PRESSURE: 110 MMHG | HEART RATE: 67 BPM | OXYGEN SATURATION: 97 % | DIASTOLIC BLOOD PRESSURE: 72 MMHG

## 2024-11-05 DIAGNOSIS — Z95.828 S/P ASCENDING AORTIC REPLACEMENT: ICD-10-CM

## 2024-11-05 DIAGNOSIS — J90 PLEURAL EFFUSION: ICD-10-CM

## 2024-11-05 PROCEDURE — 32555 ASPIRATE PLEURA W/ IMAGING: CPT

## 2024-11-05 PROCEDURE — C1729 CATH, DRAINAGE: HCPCS

## 2024-11-05 PROCEDURE — 2720000007 HC OR 272 NO HCPCS

## 2024-11-05 PROCEDURE — 7100000009 HC PHASE TWO TIME - INITIAL BASE CHARGE

## 2024-11-05 PROCEDURE — 2500000004 HC RX 250 GENERAL PHARMACY W/ HCPCS (ALT 636 FOR OP/ED): Performed by: RADIOLOGY

## 2024-11-05 PROCEDURE — 7100000010 HC PHASE TWO TIME - EACH INCREMENTAL 1 MINUTE

## 2024-11-05 RX ORDER — LIDOCAINE HYDROCHLORIDE 10 MG/ML
INJECTION, SOLUTION EPIDURAL; INFILTRATION; INTRACAUDAL; PERINEURAL
Status: COMPLETED | OUTPATIENT
Start: 2024-11-05 | End: 2024-11-05

## 2024-11-05 ASSESSMENT — PAIN SCALES - GENERAL
PAINLEVEL_OUTOF10: 0 - NO PAIN

## 2024-11-05 ASSESSMENT — COLUMBIA-SUICIDE SEVERITY RATING SCALE - C-SSRS
6. HAVE YOU EVER DONE ANYTHING, STARTED TO DO ANYTHING, OR PREPARED TO DO ANYTHING TO END YOUR LIFE?: NO
1. IN THE PAST MONTH, HAVE YOU WISHED YOU WERE DEAD OR WISHED YOU COULD GO TO SLEEP AND NOT WAKE UP?: NO
2. HAVE YOU ACTUALLY HAD ANY THOUGHTS OF KILLING YOURSELF?: NO

## 2024-11-05 NOTE — Clinical Note
2L of rob fluid collected in total from left lung. Catheter removed and bandaid applied by Dr Robin.

## 2024-11-05 NOTE — Clinical Note
5fr yeuh to left loweer back and rob fluid obtained. Pt became diaphoretic and dizzy. Pt layed down on cart and fluids wide open. Dr Robin and RN and US tech remained with patient.

## 2024-11-05 NOTE — Clinical Note
Hypotensive.  Placed in trendelenburg and hung and started normal saline 500ml infusing and placed on [id number] o2 nc

## 2024-11-05 NOTE — DISCHARGE INSTRUCTIONS
Patient and Family Education  If you have questions or need to change the time or date of your Thoracentesis appointment, call   Radiology Scheduling at [phone].  What is a Thoracentesis?  Thoracentesis is a procedure where a needle or catheter is placed into the back to withdrawn  fluid from the pleural space. The pleural space is outside the lung, but inside the chest. A thin  needle is placed into the pleural space, and the fluid is withdrawn. Sometimes the procedure is  performed for diagnostic purposes, and only a small amount of fluid is withdrawn. In other  cases, the procedure is performed for therapeutic purposes, to remove fluid. The procedure can  take up to 1 hour. Sometimes a chest x-ray may be obtained after the procedure.  Before the Test:  ? If you take blood thinning medication, you Must ask your doctor when you should stop  taking the medicine. You may need to stop taking the medicine up to 7 days prior to the  procedure.  ? Do Not Drink or Eat Anything after midnight the day before the test (unless your  primary doctor tells you otherwise).  ? You should take any medications you normally take with a small amount of clear liquid.  Be sure to take any high blood pressure medications that your primary doctor has  prescribed.  ? If you have diabetes, ask your Radiologist or the Radiology Nurse if you should take your  medicine or adjust the dosage before the test.  ? Make plans for a ride home if you are an outpatient.  During the Test:  ? This procedure is performed with the patient sitting upright.  ? An IV may be placed.  ? You will be given medication to numb the procedure site.  ? You may feel pressure during the procedure.     Page 2 of 3  After the Test:  ? Your stay in the department will be for approximately 30 minutes to 1 hour after the  procedure.  ? Your blood pressure and heart rate will be checked prior to leaving the department.  ? A chest X-Ray or post procedure Ultrasound will  be obtained immediately following  your procedure.  Follow these guidelines for a safe recovery:  ? Activity:  o You’ll need someone to drive you home.  o You may drive the next day.  o Limit activity for 24 hours after the test.  ? Diet:  o You may resume your normal diet.  ? Medicines:  o You may resume your medications.  Call your doctor Right Away if you have:  ? Bleeding from the procedure site. If bleeding occurs, put firm pressure on the area.  ? Shortness of breath or trouble breathing.  ? Increased pain at the procedure site.  ? Dizziness or fainting.  ? If you are not able to contact your primary doctor, go to the nearest Emergency Room.  Also call your Doctor if you have:  ? Redness, swelling, pus-like drainage, or fluid at the procedure site.  ? Fever over 100.4 degrees Fahrenheit or night sweats.  ? Pain or tenderness at the procedure site.  ? Any questions.   How to Reach your Doctor:    Call Dr. Perdomo at [phone] with problems or questions.  This info is a general resource. It is not meant to replace your health care provider’s advice.  Ask your doctor or health care team any questions. Always follow their instructions.

## 2024-11-06 ENCOUNTER — CLINICAL SUPPORT (OUTPATIENT)
Dept: CARDIAC REHAB | Facility: CLINIC | Age: 69
End: 2024-11-06
Payer: MEDICARE

## 2024-11-06 VITALS
HEIGHT: 72 IN | SYSTOLIC BLOOD PRESSURE: 112 MMHG | WEIGHT: 219 LBS | OXYGEN SATURATION: 95 % | DIASTOLIC BLOOD PRESSURE: 66 MMHG | BODY MASS INDEX: 29.66 KG/M2

## 2024-11-06 DIAGNOSIS — Z95.2 S/P AORTIC VALVE REPLACEMENT AND AORTOPLASTY: ICD-10-CM

## 2024-11-06 DIAGNOSIS — Z95.2 S/P AORTIC VALVE REPLACEMENT AND AORTOPLASTY: Primary | ICD-10-CM

## 2024-11-06 ASSESSMENT — DUKE ACTIVITY SCORE INDEX (DASI)
CAN YOU HAVE SEXUAL RELATIONS: YES
CAN YOU PARTICIPATE IN STRENOUS SPORTS LIKE SWIMMING, SINGLES TENNIS, FOOTBALL, BASKETBALL, OR SKIING: NO
DASI METS SCORE: 7
CAN YOU RUN A SHORT DISTANCE: NO
CAN YOU DO MODERATE WORK AROUND THE HOUSE LIKE VACUUMING, SWEEPING FLOORS OR CARRYING GROCERIES: YES
TOTAL_SCORE: 34.7
CAN YOU DO HEAVY WORK AROUND THE HOUSE LIKE SCRUBBING FLOORS OR LIFTING AND MOVING HEAVY FURNITURE: NO
CAN YOU CLIMB A FLIGHT OF STAIRS OR WALK UP A HILL: YES
CAN YOU PARTICIPATE IN MODERATE RECREATIONAL ACTIVITIES LIKE GOLF, BOWLING, DANCING, DOUBLES TENNIS OR THROWING A BASEBALL OR FOOTBALL: YES
CAN YOU WALK INDOORS, SUCH AS AROUND YOUR HOUSE: YES
CAN YOU WALK A BLOCK OR TWO ON LEVEL GROUND: YES
CAN YOU DO LIGHT WORK AROUND THE HOUSE LIKE DUSTING OR WASHING DISHES: YES
CAN YOU DO YARD WORK LIKE RAKING LEAVES, WEEDING OR PUSHING A MOWER: YES
CAN YOU TAKE CARE OF YOURSELF (EAT, DRESS, BATHE, OR USE TOILET): YES

## 2024-11-06 ASSESSMENT — PATIENT HEALTH QUESTIONNAIRE - PHQ9
SUM OF ALL RESPONSES TO PHQ QUESTIONS 1-9: 0
SUM OF ALL RESPONSES TO PHQ QUESTIONS 1-9: 0
7. TROUBLE CONCENTRATING ON THINGS, SUCH AS READING THE NEWSPAPER OR WATCHING TELEVISION: NOT AT ALL
1. LITTLE INTEREST OR PLEASURE IN DOING THINGS: NOT AT ALL
2. FEELING DOWN, DEPRESSED OR HOPELESS: NOT AT ALL
6. FEELING BAD ABOUT YOURSELF - OR THAT YOU ARE A FAILURE OR HAVE LET YOURSELF OR YOUR FAMILY DOWN: NOT AT ALL
4. FEELING TIRED OR HAVING LITTLE ENERGY: NOT AT ALL
3. TROUBLE FALLING OR STAYING ASLEEP OR SLEEPING TOO MUCH: NOT AT ALL
8. MOVING OR SPEAKING SO SLOWLY THAT OTHER PEOPLE COULD HAVE NOTICED. OR THE OPPOSITE, BEING SO FIGETY OR RESTLESS THAT YOU HAVE BEEN MOVING AROUND A LOT MORE THAN USUAL: NOT AT ALL
9. THOUGHTS THAT YOU WOULD BE BETTER OFF DEAD, OR OF HURTING YOURSELF: NOT AT ALL
5. POOR APPETITE OR OVEREATING: NOT AT ALL
SUM OF ALL RESPONSES TO PHQ9 QUESTIONS 1 & 2: 0

## 2024-11-06 NOTE — PROGRESS NOTES
Cardiac Rehabilitation Initial Treatment Plan    Name: David Chatman  Medical Record Number: [id number]  YOB: 1955  Age: 69 y.o.    Today’s Date: 11/6/2024  Primary Care Physician: Amandeep Vaca DO  Referring Physician: Yomi Buck MD  Program Location: [address]  Primary Diagnosis:   1. S/P aortic valve replacement and aortoplasty  Referral to Cardiac Rehab         Onset/Date of Diagnosis: 9/23/24  Initial Assessment, not yet started program.  AACVPR Risk Stratification: High  Falls Risk: Low  Psychosocial Assessment   Pre PHQ-9: 0  Sent PH-Q 9 to MD if score > 20: No; score < 20  Pt reported/currently experiencing stress: No  Patient uses stress management skills: No   History of: no history of anxiety or depression  Currently seeing a mental health provider: No  Social Support: Yes, Whom:Spouse and family  Quality of Life Survey: SF-36   SF-36 Pre Post   Physical Component Score  TBD   Mental Component Score  TBD     Learning Assessment:  Learning assessment/barriers: None  Preferred learning method: Visual  Barriers: None  Comments:  Stages of Change:Preparation    Psychosocial Plan  Goal Status: Initial Assessment; goals not yet started  Psychosocial Goals: Maintain or lower PH-Q 9 score by discharge  Psychosocial Interventions/Education: To be done in Cardiac Rehab.        Nutrition Assessment:    Hyperlipidemia: Yes     Lipids:   Lab Results   Component Value Date    CHOL 157 04/10/2024    HDL 53.0 04/10/2024    TRIG 55 04/10/2024       Current Dietary Guidelines:  no special , watches sodium  Barriers to dietary change: no    Diet Habit Survey: Picture Your Plate  Pre: Initial survey given. Pending completion and return from patient.  Post: To be done at discharge.    Diabetes Assessment    Lab Results   Component Value Date    HGBA1C 5.6 04/10/2024       History of Diabetes: No    Weight Management    Height: 182.9 cm (6')  Weight: 99.3 kg (219  lb)  BMI (Calculated): 29.7  No data recorded    Nutrition Plan  Goal Status: Initial Assessment; goals not yet started  Nutrition Goals: Adapt a low-sodium, DASH diet prior to discharge and Learn how to read and interpret nutrition labels prior to discharge  Nutrition Interventions/Education:   To be done in Cardiac Rehab.        Exercise Assessment    No  Mode: NA  Frequency: NA  Duration: NA    Exercise Prescription     Exercise Prescription based on: Duke Activity Status Index (DASI)    DASI Score: 34.7   MET Score: 7   Frequency:  3 days/week   Mode: Treadmill, NuStep, and Recumbent Cycle   Duration: 24 total aerobic minutes   Intensity: RPE 12-14  Target HR:  To be calculated after 6 attended sessions.  MET Level: 2.9  Patient wears supplemental O2: No     Modality Workload METs Duration (minutes)   1 Pre-Exercise   4:00   2 Treadmill 2.4 mph  2.9 8 :00   3 NuStep Load 3 @ 56 bennett  2.5 8 :00   4 Recumbent Bike Load 3 @ 55 rpm  2.8 8 :00   5 Schwinn Airdyne Load 1.3  2.9 8 :00   6 Post-Exercise   4:00     Resistance Training: No   Home Exercise Prescription given: To be given prior to discharge from program.    Exercise Plan  Goal Status: Initial Assessment; goals not yet started  Exercise Goals: Increase exercise MET level by 5-10% each week, Increase total exercise duration to 30-45 minutes, and Establish a home exercise program before discharge  Exercise Interventions/Education:           Other Core Components/Risk Factor Assessment:    Medication adherence  Current Medications:   Medication Documentation Review Audit       Reviewed by Zeina Seo RN (Registered Nurse) on 11/06/24 at 1025      Medication Order Taking? Sig Documenting Provider Last Dose Status   amiodarone (Pacerone) 200 mg tablet [id number] Yes Take 1 tablet (200 mg) by mouth once daily. For 1 month Yomi Buck MD  Active   aspirin 81 mg EC tablet [id number] Yes Take 1 tablet (81 mg) by mouth once daily. Yomi Buck MD   Active   atorvastatin (Lipitor) 80 mg tablet [id number] Yes Take 1 tablet (80 mg) by mouth once daily at bedtime. Yomi Buck MD  Active   empagliflozin (Jardiance) 10 mg [id number]  Take 1 tablet (10 mg) by mouth once daily.   Patient not taking: Reported on 11/6/2024    Yomi Buck MD  Active   eplerenone (Inspra) 25 mg tablet [id number] Yes Take 1 tablet (25 mg) by mouth once daily. Yomi Buck MD  Active   hydrALAZINE (Apresoline) 25 mg tablet [id number] Yes Take 0.5 tablets (12.5 mg) by mouth 3 times a day. Ymoi Buck MD  Active   metoprolol succinate XL (Toprol-XL) 100 mg 24 hr tablet [id number] Yes Take 1.5 tablets (150 mg) by mouth once daily. Do not crush or chew. Yomi Buck MD  Active   sacubitriL-valsartan (Entresto) 24-26 mg tablet [id number]  Take 1 tablet by mouth 2 times a day.   Patient not taking: Reported on 11/6/2024    Yomi Buck MD  Active   torsemide (Demadex) 20 mg tablet [id number] Yes Take 2 tablets (40 mg) by mouth once daily as needed (swelling, weight gain). Yomi Buck MD  Active                                 Medication compliance:  Not taking entresto and jardiance due to cost   Uses pill box/organizer: No    Carries medication list: No     Blood Pressure Management  History of Hypertension: Yes   Medication Changes: No   Resting BP:  Visit Vitals  /66        Heart Failure Management  Hx of Heart Failure: current diagnosis  Type (selection): HFrEF  Most recent EF: 22  Onset of heart failure diagnosis: 9/23/24  Last heart failure hospitalization: 9/23/24  Number of HF admissions per year: 1  Symptoms: Fatigue with exertion  Is there a family Hx of HF: No   Does patient obtain daily weight Yes       Heart Failure Reassessment: Initial Treatment Plan. Will reassess in 30 days.  Heart Failure Goals: Able to verbalize signs and symptoms of fluid retention and when to contact MD, Adapt a low sodium diet and verbalize  guidelines, and Obtain daily weight and verbalize proper method of obtaining weights  Smoking/Tobacco Assessment  Social History     Tobacco Use   Smoking Status Never   Smokeless Tobacco Never       Other Core Component Plan  Goal Status: Initial Assessment; goals not yet started  Other Core Component Goals: Medication compliance, Verbalize medication usage and drug actions by discharge, and Achieve resting BP of < 130/80 by discharge  Other Core Component Interventions/Education:           Individual Patient Goals:    Decrease fluid in lungs by discharge  Increase heart function on next echo    Goal Status: Initial Assessment; goals not yet started    Staff Comments:  Midline incision well healed    Rehab Staff Signature: Zeina Seo RN

## 2024-11-11 ENCOUNTER — CLINICAL SUPPORT (OUTPATIENT)
Dept: CARDIAC REHAB | Facility: CLINIC | Age: 69
End: 2024-11-11
Payer: MEDICARE

## 2024-11-11 DIAGNOSIS — Z95.2 S/P AORTIC VALVE REPLACEMENT AND AORTOPLASTY: ICD-10-CM

## 2024-11-11 PROCEDURE — 93798 PHYS/QHP OP CAR RHAB W/ECG: CPT | Performed by: INTERNAL MEDICINE

## 2024-11-12 ENCOUNTER — CLINICAL SUPPORT (OUTPATIENT)
Dept: CARDIAC REHAB | Facility: CLINIC | Age: 69
End: 2024-11-12
Payer: MEDICARE

## 2024-11-12 DIAGNOSIS — Z95.2 S/P AORTIC VALVE REPLACEMENT AND AORTOPLASTY: ICD-10-CM

## 2024-11-12 PROCEDURE — 93798 PHYS/QHP OP CAR RHAB W/ECG: CPT | Performed by: INTERNAL MEDICINE

## 2024-11-12 NOTE — PROGRESS NOTES
David Chatman  1955  [id number]  69 [place]    [facility] [place] [id number] [place]      Cardiac/Pulmonary Rehab Nutrition Education    11/12/2024  Reviewed PYP with patient. PYP score 48. Encouraged pt to add 1 fruit and vegetable per day. Also discussed decreasing processed foods and sugar in coffee. Pt has already decreased the processed meats in his diet and his wife is trying to make lower sodium soups. He struggles to eat when he is by himself. Usually uses more processed foods when having to cook for himself. Pt has been buying a high protein soup. Encouraged him to take a picture of the label and bring in it so we can look at it together. Encouraged to further read PYP recommendations at home and follow up with questions as needed. Suggested pt bring wife to nutrition education lectures.    Signature Payal Rankin, TO, LD

## 2024-11-15 ENCOUNTER — CLINICAL SUPPORT (OUTPATIENT)
Dept: CARDIAC REHAB | Facility: CLINIC | Age: 69
End: 2024-11-15
Payer: MEDICARE

## 2024-11-15 DIAGNOSIS — Z95.2 S/P AORTIC VALVE REPLACEMENT AND AORTOPLASTY: ICD-10-CM

## 2024-11-15 PROCEDURE — 93798 PHYS/QHP OP CAR RHAB W/ECG: CPT | Performed by: INTERNAL MEDICINE

## 2024-11-15 NOTE — PROGRESS NOTES
Cardiac Rehab Education  David G Compa   [id number]    11/15/2024  Cardiac Rehab Education    11/15/2024 Education on sodium intake was done. Discussed with patient the risks of excess levels of sodium and how to decrease dietary intake with provided list of substitutions. Handouts were given for home reference.           Signature Zeina Seo RN      Signature Zeina Seo RN

## 2024-11-18 ENCOUNTER — CLINICAL SUPPORT (OUTPATIENT)
Dept: CARDIAC REHAB | Facility: CLINIC | Age: 69
End: 2024-11-18
Payer: MEDICARE

## 2024-11-18 DIAGNOSIS — Z95.2 S/P AORTIC VALVE REPLACEMENT AND AORTOPLASTY: ICD-10-CM

## 2024-11-18 PROCEDURE — 93798 PHYS/QHP OP CAR RHAB W/ECG: CPT | Performed by: INTERNAL MEDICINE

## 2024-11-19 ENCOUNTER — CLINICAL SUPPORT (OUTPATIENT)
Dept: CARDIAC REHAB | Facility: CLINIC | Age: 69
End: 2024-11-19
Payer: MEDICARE

## 2024-11-19 DIAGNOSIS — Z95.2 S/P AORTIC VALVE REPLACEMENT AND AORTOPLASTY: ICD-10-CM

## 2024-11-19 PROCEDURE — 93798 PHYS/QHP OP CAR RHAB W/ECG: CPT | Performed by: INTERNAL MEDICINE

## 2024-11-22 ENCOUNTER — CLINICAL SUPPORT (OUTPATIENT)
Dept: CARDIAC REHAB | Facility: CLINIC | Age: 69
End: 2024-11-22
Payer: MEDICARE

## 2024-11-22 DIAGNOSIS — Z95.2 S/P AORTIC VALVE REPLACEMENT AND AORTOPLASTY: ICD-10-CM

## 2024-11-22 PROCEDURE — 93798 PHYS/QHP OP CAR RHAB W/ECG: CPT | Performed by: INTERNAL MEDICINE

## 2024-11-25 ENCOUNTER — CLINICAL SUPPORT (OUTPATIENT)
Dept: CARDIAC REHAB | Facility: CLINIC | Age: 69
End: 2024-11-25
Payer: MEDICARE

## 2024-11-25 DIAGNOSIS — Z95.2 S/P AORTIC VALVE REPLACEMENT AND AORTOPLASTY: ICD-10-CM

## 2024-11-25 PROCEDURE — 93798 PHYS/QHP OP CAR RHAB W/ECG: CPT | Performed by: INTERNAL MEDICINE

## 2024-11-26 ENCOUNTER — CLINICAL SUPPORT (OUTPATIENT)
Dept: CARDIAC REHAB | Facility: CLINIC | Age: 69
End: 2024-11-26
Payer: MEDICARE

## 2024-11-26 DIAGNOSIS — Z95.2 S/P AORTIC VALVE REPLACEMENT AND AORTOPLASTY: ICD-10-CM

## 2024-11-26 PROCEDURE — 93798 PHYS/QHP OP CAR RHAB W/ECG: CPT | Performed by: INTERNAL MEDICINE

## 2024-11-26 NOTE — PROGRESS NOTES
Pharmacist Clinic: Cardiology Management    David Chatman is a 69 y.o. male was referred to Clinical Pharmacy Team for heart failure management.     Referring Provider: Yomi Buck MD    THIS IS A NEW PATIENT APPOINTMENT. PATIENT WILL BE ESTABLISHING CARE WITH CLINICAL PHARMACY.    Appointment was completed by the patient who was reached at .    REVIEW OF PAST APPNT (IF APPLICABLE):   N/A    Allergies Reviewed? Yes    No Known Allergies    Past Medical History:   Diagnosis Date    Gout     Hyperlipidemia     Hypertension     SHOAIB (obstructive sleep apnea)     Paroxysmal atrial fibrillation (Multi) 08/24/2023       Current Outpatient Medications on File Prior to Visit   Medication Sig Dispense Refill    amiodarone (Pacerone) 200 mg tablet Take 1 tablet (200 mg) by mouth once daily. For 1 month 90 tablet 3    aspirin 81 mg EC tablet Take 1 tablet (81 mg) by mouth once daily. 90 tablet 3    atorvastatin (Lipitor) 80 mg tablet Take 1 tablet (80 mg) by mouth once daily at bedtime. 90 tablet 3    empagliflozin (Jardiance) 10 mg Take 1 tablet (10 mg) by mouth once daily. (Patient not taking: Reported on 11/6/2024) 90 tablet 3    eplerenone (Inspra) 25 mg tablet Take 1 tablet (25 mg) by mouth once daily. 90 tablet 3    hydrALAZINE (Apresoline) 25 mg tablet Take 0.5 tablets (12.5 mg) by mouth 3 times a day. 135 tablet 3    metoprolol succinate XL (Toprol-XL) 100 mg 24 hr tablet Take 1.5 tablets (150 mg) by mouth once daily. Do not crush or chew. 135 tablet 3    sacubitriL-valsartan (Entresto) 24-26 mg tablet Take 1 tablet by mouth 2 times a day. (Patient not taking: Reported on 11/6/2024) 180 tablet 3    torsemide (Demadex) 20 mg tablet Take 2 tablets (40 mg) by mouth once daily as needed (swelling, weight gain). 180 tablet 3     No current facility-administered medications on file prior to visit.         RELEVANT LAB RESULTS:  Lab Results   Component Value Date    BILITOT 0.8 09/11/2024    CALCIUM  "8.2 (L) 10/10/2024    CALCIUM 8.2 (L) 10/10/2024    CO2 30 10/10/2024    CO2 30 10/10/2024    CL 99 10/10/2024    CL 99 10/10/2024    CREATININE 1.16 10/10/2024    CREATININE 1.16 10/10/2024    GLUCOSE 101 (H) 10/10/2024    GLUCOSE 101 (H) 10/10/2024    ALKPHOS 126 (H) 09/11/2024    K 4.3 10/10/2024    K 4.3 10/10/2024    PROT 6.4 09/11/2024     10/10/2024     10/10/2024    AST 18 09/11/2024    ALT 25 09/11/2024    BUN 20 10/10/2024    BUN 20 10/10/2024    ANIONGAP 11 10/10/2024    ANIONGAP 11 10/10/2024    MG 2.18 10/10/2024    PHOS 4.1 10/10/2024    ALBUMIN 3.3 (L) 10/10/2024    LIPASE 25 09/10/2024     Lab Results   Component Value Date    TRIG 55 04/10/2024    CHOL 157 04/10/2024    LDLCALC 93 04/10/2024    HDL 53.0 04/10/2024     No results found for: \"BMCBC\", \"CBCDIF\"     PHARMACEUTICAL ASSESSMENT:    MEDICATION RECONCILIATION    Home Pharmacy Reviewed? Yes, describe: [id number] [place]    Added:  - None  Changed:  - Metoprolol succinate 100 mg: patient taking 1 tablet daily instead of 1.5 tablets daily   - Torsemide 20 mg: patient taking daily instead of PRN  Removed:  - None    Drug Interactions? No clinically significant drug interactions requiring change in therapy found at the time of this visit.     Medication Documentation Review Audit       Reviewed by Zeina Seo RN (Registered Nurse) on 11/06/24 at 1025      Medication Order Taking? Sig Documenting Provider Last Dose Status   amiodarone (Pacerone) 200 mg tablet [id number] Yes Take 1 tablet (200 mg) by mouth once daily. For 1 month Yomi Buck MD  Active   aspirin 81 mg EC tablet [id number] Yes Take 1 tablet (81 mg) by mouth once daily. Yomi Buck MD  Active   atorvastatin (Lipitor) 80 mg tablet [id number] Yes Take 1 tablet (80 mg) by mouth once daily at bedtime. Yomi Buck MD  Active   empagliflozin (Jardiance) 10 mg [id number]  Take 1 tablet (10 mg) by mouth once daily.   Patient not taking: Reported on " 11/6/2024    Yomi Buck MD  Active   eplerenone (Inspra) 25 mg tablet [id number] Yes Take 1 tablet (25 mg) by mouth once daily. Yomi Buck MD  Active   hydrALAZINE (Apresoline) 25 mg tablet [id number] Yes Take 0.5 tablets (12.5 mg) by mouth 3 times a day. Yomi Buck MD  Active   metoprolol succinate XL (Toprol-XL) 100 mg 24 hr tablet [id number] Yes Take 1.5 tablets (150 mg) by mouth once daily. Do not crush or chew. Yomi Buck MD  Active   sacubitriL-valsartan (Entresto) 24-26 mg tablet [id number]  Take 1 tablet by mouth 2 times a day.   Patient not taking: Reported on 11/6/2024    Yomi Buck MD  Active   torsemide (Demadex) 20 mg tablet [id number] Yes Take 2 tablets (40 mg) by mouth once daily as needed (swelling, weight gain). Yomi Buck MD  Active                    DISEASE MANAGEMENT ASSESSMENT:     CHF ASSESSMENT     Symptom/Staging:  -Most recent ejection fraction: 22%  -NYHA Stage: II    Results for orders placed during the hospital encounter of 09/11/24    Transthoracic Echo (TTE) Limited    [facility], [address] [place] [address]  Tel [phone] and Fax [phone]    TRANSTHORACIC ECHOCARDIOGRAM REPORT      Patient Name:      NAN Norman Physician:    [id number] Clari Geronimo MD  Study Date:        9/25/2024            Ordering Provider:    [id number] PRASAD PRESSLEY  MRN/PID:           [id number]             Fellow:  Accession#:        [id number]         Nurse:  Date of Birth/Age: 1955 / 69 years Sonographer:          Ron Tariq RDCS, RVT  Gender:            M                    Additional Staff:  Height:            185.42 cm            Admit Date:           9/11/2024  Weight:            113.85 kg            Admission Status:     Inpatient -  Routine  BSA / BMI:         2.37 m2 / 33.12      Encounter#:           [id number]  kg/m2  Blood Pressure:    113/65 mmHg          Department Location:   [facility]    Study Type:    TRANSTHORACIC ECHO (TTE) LIMITED  Diagnosis/ICD: 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    Study Detail: The following Echo studies were performed: 2D, Doppler and color  flow. Definity used as a contrast agent for endocardial border  definition. Total contrast used for this procedure was 4 mL via IV  push.      PHYSICIAN INTERPRETATION:  Left Ventricle: Left ventricular ejection fraction is severely decreased, calculated by Vasquez's biplane at 22%. There is global hypokinesis of the left ventricle with minor regional variations. The left ventricular cavity size is severely dilated. Abnormal (paradoxical) septal motion consistent with post-operative status. Spectral Doppler shows a restrictive pattern of left ventricular diastolic filling. There is no definite left ventricular thrombus visualized.  Left Atrium: The left atrium is enlarged.  Right Ventricle: The right ventricle is upper limits of normal in size. There is reduced right ventricular systolic function.  Right Atrium: The right atrium is mild to moderately dilated.  Aortic Valve: There is a prosthetic aortic valve present. There is an Chiu bioprosthetic aortic valve, with a 29 mm reported size. There is no evidence of aortic valve regurgitation. S/p 29mm Konect Resilia bioprosthetic valved conduit. AVR gradients were not assessed by spectral Doppler though no obvious AI by color Doppler.  Mitral Valve: The mitral valve is normal in structure. There is mild mitral valve regurgitation.  Tricuspid Valve: The tricuspid valve is structurally normal. There is trace to mild tricuspid regurgitation. The Doppler estimated RVSP is within normal limits at 25.8  mmHg.  Pulmonic Valve: The pulmonic valve is not well visualized. The pulmonic valve regurgitation was not well visualized.  Pericardium: Trivial to small pericardial effusion.  Aorta: The aortic root is abnormal. S/p 32mm Gelweave ascending aortic conduit and hemiarch.  In comparison to the previous echocardiogram(s): Compared with the prior exam, preop echo done on 9/20/2024 [facility], the LV was already severely dilated with moderately reduced function with a severely dilated aorta with moderate AI and the patient is now s/p acending aortic and arch replacement and Konect AVR. The LV remains severely dilated with a reducetion in LV systolic function , now severely reduced. The AVR gradients were not assessed today but there is no AI.    CONCLUSIONS:  1. Poorly visualized anatomical structures due to suboptimal image quality.  2. Left ventricular ejection fraction is severely decreased, calculated by Vasquez's biplane at 22%.  3. There is global hypokinesis of the left ventricle with minor regional variations.  4. Spectral Doppler shows a restrictive pattern of left ventricular diastolic filling.  5. Left ventricular cavity size is severely dilated.  6. Abnormal septal motion consistent with post-operative status.  7. No left ventricular thrombus visualized.  8. There is reduced right ventricular systolic function.  9. The left atrium is enlarged.  10. The right atrium is mild to moderately dilated.  11. Right ventricular systolic pressure is within normal limits.  12. S/p 29mm Konect Resilia bioprosthetic valved conduit. AVR gradients were not assessed by spectral Doppler though no obvious AI by color Doppler  13. S/p 32mm Gelweave ascending aortic conduit and hemiarch.  14. Compared with the prior exam, preop echo done on 9/20/2024 [facility], the LV was already severely dilated with moderately reduced function with a severely dilated aorta with moderate AI and the patient is now s/p  acending aortic and arch replacement and Konect AVR. The LV remains severely dilated with a reducetion in LV systolic function , now severely reduced. The AVR gradients were not assessed today but there is no AI.    QUANTITATIVE DATA SUMMARY:    2D MEASUREMENTS:           Normal Ranges:  LVEDV Index:     114 ml/m2      RA VOLUME BY A/L METHOD:          Normal Ranges:  RA Area A4C:             24.8 cm2      LV SYSTOLIC FUNCTION BY 2D PLANIMETRY (MOD):  Normal Ranges:  EF-A4C View:    24 % (>=55%)  EF-A2C View:    19 %  EF-Biplane:     22 %  LV EF Reported: 22 %      LV DIASTOLIC FUNCTION:            Normal Ranges:  MV Peak E:             1.14 m/s   (0.7-1.2 m/s)  MV e'                  0.061 m/s  (>8.0)  MV lateral e'          0.07 m/s  MV medial e'           0.05 m/s  E/e' Ratio:            18.70      (<8.0)  PulmV Sys Alfredito:         49.90 cm/s  PulmV Gray Alfredito:        54.10 cm/s  PulmV S/D Alfredito:         0.90      RIGHT VENTRICLE:  TAPSE: 10.4 mm  RV s'  0.04 m/s      TRICUSPID VALVE/RVSP:          Normal Ranges:  Peak TR Velocity:     2.39 m/s  RV Syst Pressure:     26 mmHg  (< 30mmHg)      Pulmonary Veins:  PulmV Gray Alfredito: 54.10 cm/s  PulmV S/D Alfredito:  0.90  PulmV Sys Alfredito:  49.90 cm/s      [id number] Clari Geronimo MD  Electronically signed on 9/25/2024 at 7:18:07 PM        ** Final **      Guideline-Directed Medical Therapy:  -ARNI: Yes, describe: Entresto 24-26 mg BID  -Beta Blocker: Yes, describe: Metoprolol succinate 100 mg 1.5 tablets (150 mg ) every day   -MRA: Yes, describe: Eplerenone 25 mg every day   -SGLT2i: Yes, describe: Jardiance 10 mg every day     Secondary Prevention:  -The 10-year ASCVD risk score (Don WHITNEY, et al., 2019) is: 20.9%    Values used to calculate the score:      Age: 69 years      Sex: Male      Is Non- : No      Diabetic: Yes      Tobacco smoker: No      Systolic Blood Pressure: 112 mmHg      Is BP treated: Yes      HDL Cholesterol: 53 mg/dL      Total Cholesterol: 157  mg/dL   -Aspirin 81mg? yes  -Statin?: Yes, describe: Atorvastatin 80 mg every day   -HTN?: Yes, describe: Controlled - 112/66 as of 11/6/24    CURRENT PHARMACOTHERAPY:   Jardiance 10 mg every day - patient has not started due to cost   Eplerenone 25 mg every day - patient confirms taking as prescribed  Hydralazine 25 mg TID -stopped at last cardiology appointment 10/14/2024; patient reports still taking  Metoprolol succinate 100 mg 1.5 tablets (150 mg) every day -patient states taking 1 tablet daily, has not increased dose  Entresto 24-26 mg BID - patient has not started due to cost   Torsemide 20 mg 2 tablets daily PRN -patient states taking every day instead of PRN    Affordability: Entresto and Jardiance are cost prohibitive for patient  Adherence/Compliance: See above  Adverse Effects: Does note some diarrhea on occasion but not every morning, is unsure if this is due to medications    Monitoring Weights at Home: Yes, weighs self daily and keeps a log  Home Weight Recordings: 220 lbs    Past In Office Weight Readings:   Wt Readings from Last 6 Encounters:   11/06/24 99.3 kg (219 lb)   10/26/24 99.8 kg (220 lb)   10/23/24 101 kg (222 lb 9.6 oz)   10/17/24 101 kg (223 lb)   10/14/24 107 kg (235 lb)   10/08/24 110 kg (242 lb)       Monitoring Blood Pressure at Home: No; does not have blood pressure cuff. Goes to cardiac rehab Mon/Weds/Fri  Home BP Recordings: None  Cardiac Rehab: 126/80, 130/80, 118/80    Past In Office BP Readings:   BP Readings from Last 6 Encounters:   11/06/24 112/66   11/05/24 110/72   10/26/24 128/84   10/23/24 120/83   10/17/24 124/82   10/14/24 114/82       HEALTH MANAGEMENT    Maintaining fluid restriction (<2 L/day): N/A  Edema/swelling: No  Shortness of breath: No  Trouble sleeping/lying down: No  Dry/hacking cough: Yes; daily. Coughs for a moment and then this resolves, happens at different times during the day (no note correlations)  Recent Hospitalizations: No    EDUCATION/COUNSELING:    - Counseled patient on MOA, expectations, duration of therapy, contraindications, administration, and monitoring parameters  - Counseled patient on lifestyle modifications that can decrease your risk of having complications (smoking cessation, losing weight, daily weights, vaccines)  - Counseled patient on fluid intake and weight management. Recommended to not consume more than 2 liters of fliuids per day. If they have gained more than 2-3 pounds within a 24 hours period (or 5 pounds in a week), contact their cardiologist  - Answered all patient questions and concerns       DISCUSSION/NOTES:   Today's appointment was initial visit to establish care with Clinical Pharmacy. Overall, David is doing well. Does report a daily cough that occurs for a moment before it resolves.   He reports he was taking torsemide 20 mg 2 tablets every day instead of as needed. Will have patient start taking PRN for weight gain, edema, sob as prescribed at 10/14 cardiology appointment. He also reported he has not increased his metoprolol dosage yet, but will start today (metoprolol succinate 150 mg daily).   David has not started Jardiance or Entresto yet due to cost. Reports he has still been taking hydralazine in the interim, documentation to discontinue at 10/14 cardiology visit. As Entresto has not been started, will have patient continue hydralazine until Entresto is initiated (pending  PAP approval). Will also have him obtain safety labs 1 week after starting Jardiance and Entresto.  Will follow up in 1 month.     Patient Assistance Program (PAP)    Application for program to be submitted for the following medications: Jardiance, Entresto    [facility] Permanent Address: [facility]   Prescription Insurance:   Yes   Members of Household: 1   Files Taxes: Yes -patient confirms he is NOT  (is listed as  in chart)        Patient will be email financial information to pharmacist directly at  [email or web address].    Patient verbally reports monthly or yearly income which is less than 400% federal poverty level    Patient aware this process may take up to 6 weeks.     If approved medication must be filled through [facility] and MEDICATION WILL BE MAILED TO PATIENT.         ASSESSMENT:    Assessment/Plan   Problem List Items Addressed This Visit    None        RECOMMENDATIONS/PLAN:    Increase:  Metoprolol succinate 150 mg daily  Change:  Torsemide 20 mg 2 tablets (40 mg) daily as needed for edema, weight gain  Continue:  Hydralazine 25 mg 0.5 tablets (12.5 mg) 3 times daily  Eplerenone 25 mg every day   Follow up: 1 month      Last Appnt with Referring Provider: 10/14/2024  Next Appnt with Referring Provider: 1/27/2025  Clinical Pharmacist follow up: 12/31/2024  VAF/Application Expiration: Yes    Date: Pending  Type of Encounter: Virtual    Cristy See, PharmD    Verbal consent to manage patient's drug therapy was obtained from the patient . They were informed they may decline to participate or withdraw from participation in pharmacy services at any time.    Continue all meds under the continuation of care with the referring provider and clinical pharmacy team.

## 2024-11-27 ENCOUNTER — APPOINTMENT (OUTPATIENT)
Dept: PHARMACY | Facility: HOSPITAL | Age: 69
End: 2024-11-27
Payer: COMMERCIAL

## 2024-11-27 ENCOUNTER — TELEMEDICINE (OUTPATIENT)
Dept: PHARMACY | Facility: HOSPITAL | Age: 69
End: 2024-11-27
Payer: COMMERCIAL

## 2024-11-27 DIAGNOSIS — I44.7 LEFT BUNDLE BRANCH BLOCK: ICD-10-CM

## 2024-11-27 DIAGNOSIS — I42.8 NON-ISCHEMIC CARDIOMYOPATHY (MULTI): ICD-10-CM

## 2024-11-27 DIAGNOSIS — I50.20 HFREF (HEART FAILURE WITH REDUCED EJECTION FRACTION): ICD-10-CM

## 2024-11-27 NOTE — Clinical Note
Naomi Buck, Overall, David is doing well. Does report a daily cough that occurs for a moment before it resolves. He reports he was taking torsemide 20 mg 2 tablets every day instead of as needed. Will have patient start taking PRN as prescribed at his last cardio appointment. He also reported he has not increased his metoprolol dosage yet, but will start today.  David has not started Jardiance or Entresto yet due to cost. Reports he has still been taking hydralazine in the interim, documentation to discontinue at 10/14 cardiology visit. As Entresto has not been started, will have patient continue hydralazine until Entresto is initiated (pending  PAP approval). Will also have him obtain safety labs 1 week after starting Jardiance and Entresto. Will follow up in 1 month.

## 2024-11-27 NOTE — ASSESSMENT & PLAN NOTE
Patient is currently taking 2/4 GDMT therapies. Confirms he will start increased dose of metoprolol today, as prescribed at last cardiology visit. Will plan to initiate Jardiance and Entresto pending  PAP approval, will plan to have patient stop hydralazine at that time, and have safety labs 1 week after starting new therapy. Will follow up in 1 month.

## 2024-11-29 ENCOUNTER — APPOINTMENT (OUTPATIENT)
Dept: CARDIAC REHAB | Facility: CLINIC | Age: 69
End: 2024-11-29
Payer: MEDICARE

## 2024-12-02 ENCOUNTER — CLINICAL SUPPORT (OUTPATIENT)
Dept: CARDIAC REHAB | Facility: CLINIC | Age: 69
End: 2024-12-02
Payer: MEDICARE

## 2024-12-02 ENCOUNTER — DOCUMENTATION (OUTPATIENT)
Dept: CARDIAC REHAB | Facility: CLINIC | Age: 69
End: 2024-12-02
Payer: COMMERCIAL

## 2024-12-02 DIAGNOSIS — Z95.2 S/P AORTIC VALVE REPLACEMENT AND AORTOPLASTY: ICD-10-CM

## 2024-12-02 PROCEDURE — 93798 PHYS/QHP OP CAR RHAB W/ECG: CPT | Performed by: INTERNAL MEDICINE

## 2024-12-02 NOTE — PROGRESS NOTES
Cardiac Rehabilitation 30 day progress report    Name: David Chatman  Medical Record Number: [id number]  YOB: 1955  Age: 69 y.o.    Today’s Date: 12/2/2024  Primary Care Physician: Amandeep Vaca DO  Referring Physician: Yomi Buck MD  Program Location: [address]  Primary Diagnosis:   1. S/P aortic valve replacement and aortoplasty  Referral to Cardiac Rehab         Onset/Date of Diagnosis: 9/23/24  Session #:  8  AACVPR Risk Stratification: High  Falls Risk: Low  Psychosocial Assessment   Pre PHQ-9: 0  Sent PH-Q 9 to MD if score > 20: No; score < 20  Pt reported/currently experiencing stress: No  Patient uses stress management skills: No   History of: no history of anxiety or depression  Currently seeing a mental health provider: No  Social Support: Yes, Whom:Spouse and family  Quality of Life Survey: SF-36   SF-36 Pre Post   Physical Component Score     Mental Component Score       Learning Assessment:  Learning assessment/barriers: None  Preferred learning method: Visual  Barriers: None  Comments:  Stages of Change: Action    Psychosocial Plan  Goal Status: In Progress  Psychosocial Goals: Maintain or lower PH-Q 9 score by discharge  Psychosocial Interventions/Education:   11/6/24 initial PHQ-9 survey reviewed w/ pt at eval/MS        Nutrition Assessment:    Hyperlipidemia: Yes     Lipids:   Lab Results   Component Value Date    CHOL 157 04/10/2024    HDL 53.0 04/10/2024    TRIG 55 04/10/2024       Current Dietary Guidelines:  no special , watches sodium  Barriers to dietary change: no    Diet Habit Survey: Picture Your Plate  Pre: 48  Post: To be done at discharge.    Diabetes Assessment    Lab Results   Component Value Date    HGBA1C 5.6 04/10/2024       History of Diabetes: No    Weight Management    Height: 182.9 cm (6')  Weight: 99.3 kg (219 lb)  BMI (Calculated): 29.7    Nutrition Plan  Goal Status: In Progress  Nutrition Goals: Adapt a low-sodium,  DASH diet prior to discharge and Learn how to read and interpret nutrition labels prior to discharge  Nutrition Interventions/Education:   11/12/2024  Reviewed PYP with patient. PYP score 48. Encouraged pt to add 1 fruit and vegetable per day. Also discussed decreasing processed foods and sugar in coffee. Pt has already decreased the processed meats in his diet and his wife is trying to make lower sodium soups. He struggles to eat when he is by himself. Usually uses more processed foods when having to cook for himself. Pt has been buying a high protein soup. Encouraged him to take a picture of the label and bring in it so we can look at it together. Encouraged to further read PYP recommendations at home and follow up with questions as needed. Suggested pt bring wife to nutrition education lectures./Signature Payal Rankin RDN, LD  1/15/2024 Education on sodium intake was done. Discussed with patient the risks of excess levels of sodium and how to decrease dietary intake with provided list of substitutions. Handouts were given for home reference. /Signature Zeina Seo RN          Exercise Assessment    Home exercise:  No  Mode: NA  Frequency: NA  Duration: NA    Exercise Prescription     Exercise Prescription based on: Duke Activity Status Index (DASI)    DASI Score: 34.7   MET Score: 7   Frequency:  3 days/week   Mode: Treadmill, NuStep, and Recumbent Cycle   Duration: 33 total aerobic minutes   Intensity: RPE 12-14  Target HR:  To be calculated after 6 attended sessions.  MET Level: 3.7  Patient wears supplemental O2: No     Modality Workload METs Duration (minutes)   1 Pre-Exercise   4:00   2 Treadmill 2.4 mph @ 0.5% 3.0 11 :00   3 NuStep Load 5 @ 80 bennett  2.9 11 :00   4 Recumbent Bike Load 5 @ 60 rpm  3.7 11 :00   5 Schwinn Airdyne Load 1.3  2.9 11 :00   6 Post-Exercise   4:00     Resistance Training: No   Home Exercise Prescription given: To be given prior to discharge from program.    Exercise  Plan  Goal Status: In Progress  Exercise Goals: Increase exercise MET level by 5-10% each week, Increase total exercise duration to 30-45 minutes, and Establish a home exercise program before discharge  Exercise Interventions/Education:           Other Core Components/Risk Factor Assessment:    Medication adherence  Current Medications:   Medication Documentation Review Audit       Reviewed by Cristy See, PharmD (Pharmacist) on 11/27/24 at 1008      Medication Order Taking? Sig Documenting Provider Last Dose Status   amiodarone (Pacerone) 200 mg tablet [id number] Yes Take 1 tablet (200 mg) by mouth once daily. For 1 month Yomi Buck MD  Active   aspirin 81 mg EC tablet [id number] Yes Take 1 tablet (81 mg) by mouth once daily. Yomi Buck MD  Active   atorvastatin (Lipitor) 80 mg tablet [id number] Yes Take 1 tablet (80 mg) by mouth once daily at bedtime. Yomi Buck MD  Active   empagliflozin (Jardiance) 10 mg [id number]  Take 1 tablet (10 mg) by mouth once daily.   Patient not taking: Reported on 11/5/2024    Yomi Buck MD  Active   eplerenone (Inspra) 25 mg tablet [id number] Yes Take 1 tablet (25 mg) by mouth once daily. Yomi Buck MD  Active   hydrALAZINE (Apresoline) 25 mg tablet [id number] Yes Take 0.5 tablets (12.5 mg) by mouth 3 times a day. Yomi Buck MD  Active   metoprolol succinate XL (Toprol-XL) 100 mg 24 hr tablet [id number] Yes Take 1.5 tablets (150 mg) by mouth once daily. Do not crush or chew.   Patient taking differently: Take 1 tablet (100 mg) by mouth once daily. Do not crush or chew.    Yomi Buck MD  Active   sacubitriL-valsartan (Entresto) 24-26 mg tablet [id number]  Take 1 tablet by mouth 2 times a day.   Patient not taking: Reported on 11/27/2024    Yomi Buck MD  Active   torsemide (Demadex) 20 mg tablet [id number] Yes Take 2 tablets (40 mg) by mouth once daily as needed (swelling, weight gain).   Patient taking  differently: Take 2 tablets (40 mg) by mouth once daily.    Yomi Buck MD  Active                                 Medication compliance:  Not taking entresto and jardiance due to cost   Uses pill box/organizer: No    Carries medication list: No     Blood Pressure Management  History of Hypertension: Yes   Medication Changes: No   Resting BP:  Visit Vitals  /66        Heart Failure Management  Hx of Heart Failure: current diagnosis  Type (selection): HFrEF  Most recent EF: 22  Onset of heart failure diagnosis: 9/23/24  Last heart failure hospitalization: 9/23/24  Number of HF admissions per year: 1  Symptoms: Fatigue with exertion  Is there a family Hx of HF: No   Does patient obtain daily weight Yes       Heart Failure Reassessment: In Progress  Heart Failure Goals: Able to verbalize signs and symptoms of fluid retention and when to contact MD, Adapt a low sodium diet and verbalize guidelines, and Obtain daily weight and verbalize proper method of obtaining weights  Smoking/Tobacco Assessment  Social History     Tobacco Use   Smoking Status Never   Smokeless Tobacco Never       Other Core Component Plan  Goal Status:In progress  Other Core Component Goals: Medication compliance, Verbalize medication usage and drug actions by discharge, and Achieve resting BP of < 130/80 by discharge  Other Core Component Interventions/Education:           Individual Patient Goals:    Decrease fluid in lungs by discharge  Increase heart function on next echo    Goal Status: In Progress    Staff Comments:  Mr. Chatman has been adherent to his exercise sessions since beginning the program.  He has been making good progress on his outcomes and goals.  No cardiac complaints voiced.      Rehab Staff Signature: Lubna Ewing, MS, CCRP

## 2024-12-03 ENCOUNTER — CLINICAL SUPPORT (OUTPATIENT)
Dept: CARDIAC REHAB | Facility: CLINIC | Age: 69
End: 2024-12-03
Payer: MEDICARE

## 2024-12-03 DIAGNOSIS — Z95.2 S/P AORTIC VALVE REPLACEMENT AND AORTOPLASTY: ICD-10-CM

## 2024-12-03 PROCEDURE — 93798 PHYS/QHP OP CAR RHAB W/ECG: CPT | Performed by: INTERNAL MEDICINE

## 2024-12-05 ENCOUNTER — TELEPHONE (OUTPATIENT)
Dept: PHARMACY | Facility: HOSPITAL | Age: 69
End: 2024-12-05
Payer: COMMERCIAL

## 2024-12-05 NOTE — TELEPHONE ENCOUNTER
Patient Assistance Program Approval:     We are pleased to inform you that your application for assistance has been approved.     This approval is valid through  12/4/2025  as long as the following criteria continue to be satisfied:     Your medication (Jardiance, Entresto) remains covered under your current insurance plan.   Your prescriber does not discontinue therapy.   You do not seek reimbursement from any other private or government-funded programs for the  medication.    Under this program, the pharmacy will first bill your insurance plan for your indemnified specified medication. The [employer] Assistance Fund will then offset your copay balance, so that your out-of pocket expense for your specialty medication will be $0.00.    Reminded patient to stop hydralazine per previous cardiology visit 10/14/2024. Patient also confirms he will have labs drawn 1 week after starting Entresto. Will follow up in 1 month.    Cristy See, PharmD

## 2024-12-06 ENCOUNTER — HOSPITAL ENCOUNTER (INPATIENT)
Facility: HOSPITAL | Age: 69
LOS: 2 days | Discharge: ANOTHER HOSPITAL | End: 2024-12-08
Attending: STUDENT IN AN ORGANIZED HEALTH CARE EDUCATION/TRAINING PROGRAM | Admitting: INTERNAL MEDICINE
Payer: MEDICARE

## 2024-12-06 ENCOUNTER — APPOINTMENT (OUTPATIENT)
Dept: RADIOLOGY | Facility: HOSPITAL | Age: 69
DRG: 065 | End: 2024-12-06
Payer: MEDICARE

## 2024-12-06 ENCOUNTER — APPOINTMENT (OUTPATIENT)
Dept: CARDIOLOGY | Facility: HOSPITAL | Age: 69
DRG: 065 | End: 2024-12-06
Payer: MEDICARE

## 2024-12-06 ENCOUNTER — APPOINTMENT (OUTPATIENT)
Dept: CARDIAC REHAB | Facility: CLINIC | Age: 69
End: 2024-12-06
Payer: MEDICARE

## 2024-12-06 DIAGNOSIS — Z95.828 S/P ASCENDING AORTIC REPLACEMENT: ICD-10-CM

## 2024-12-06 DIAGNOSIS — I42.8 NON-ISCHEMIC CARDIOMYOPATHY (MULTI): ICD-10-CM

## 2024-12-06 DIAGNOSIS — H51.8 DYSCONJUGATE GAZE: ICD-10-CM

## 2024-12-06 DIAGNOSIS — R47.02 DYSPHASIA: Primary | ICD-10-CM

## 2024-12-06 DIAGNOSIS — I50.20 HFREF (HEART FAILURE WITH REDUCED EJECTION FRACTION): ICD-10-CM

## 2024-12-06 DIAGNOSIS — I42.0 DILATED CARDIOMYOPATHY (MULTI): ICD-10-CM

## 2024-12-06 DIAGNOSIS — I50.21 ACUTE SYSTOLIC HEART FAILURE: ICD-10-CM

## 2024-12-06 DIAGNOSIS — I44.7 LEFT BUNDLE BRANCH BLOCK: ICD-10-CM

## 2024-12-06 DIAGNOSIS — Z95.2 S/P AORTIC VALVE REPLACEMENT AND AORTOPLASTY: ICD-10-CM

## 2024-12-06 DIAGNOSIS — I63.9 CEREBROVASCULAR ACCIDENT (CVA), UNSPECIFIED MECHANISM (MULTI): ICD-10-CM

## 2024-12-06 DIAGNOSIS — I63.431 CEREBRAL INFARCTION DUE TO EMBOLISM OF RIGHT POSTERIOR CEREBRAL ARTERY (MULTI): ICD-10-CM

## 2024-12-06 DIAGNOSIS — I63.9 OCCIPITAL STROKE (MULTI): ICD-10-CM

## 2024-12-06 LAB
ALBUMIN SERPL BCP-MCNC: 3.7 G/DL (ref 3.4–5)
ALP SERPL-CCNC: 155 U/L (ref 33–136)
ALT SERPL W P-5'-P-CCNC: 21 U/L (ref 10–52)
ANION GAP SERPL CALCULATED.3IONS-SCNC: 15 MMOL/L (ref 10–20)
AORTIC VALVE MEAN GRADIENT: 9 MMHG
AORTIC VALVE PEAK VELOCITY: 2.15 M/S
APPEARANCE UR: CLEAR
APTT PPP: 21.9 SECONDS (ref 22–32.5)
AST SERPL W P-5'-P-CCNC: 17 U/L (ref 9–39)
ATRIAL RATE: 63 BPM
AV PEAK GRADIENT: 18 MMHG
AVA (PEAK VEL): 1.69 CM2
AVA (VTI): 1.6 CM2
BASOPHILS # BLD AUTO: 0.05 X10*3/UL (ref 0–0.1)
BASOPHILS NFR BLD AUTO: 0.7 %
BILIRUB SERPL-MCNC: 0.6 MG/DL (ref 0–1.2)
BILIRUB UR STRIP.AUTO-MCNC: NEGATIVE MG/DL
BNP SERPL-MCNC: 1196 PG/ML (ref 0–99)
BUN SERPL-MCNC: 29 MG/DL (ref 6–23)
CALCIUM SERPL-MCNC: 9 MG/DL (ref 8.6–10.3)
CARDIAC TROPONIN I PNL SERPL HS: 13 NG/L (ref 0–20)
CHLORIDE SERPL-SCNC: 103 MMOL/L (ref 98–107)
CHOLEST SERPL-MCNC: 118 MG/DL (ref 0–199)
CHOLESTEROL/HDL RATIO: 3.6
CO2 SERPL-SCNC: 25 MMOL/L (ref 21–32)
COLOR UR: YELLOW
CREAT SERPL-MCNC: 1.1 MG/DL (ref 0.5–1.3)
EGFRCR SERPLBLD CKD-EPI 2021: 74 ML/MIN/1.73M*2
EJECTION FRACTION APICAL 4 CHAMBER: 22.3
EJECTION FRACTION: 23 %
EOSINOPHIL # BLD AUTO: 0.25 X10*3/UL (ref 0–0.7)
EOSINOPHIL NFR BLD AUTO: 3.3 %
ERYTHROCYTE [DISTWIDTH] IN BLOOD BY AUTOMATED COUNT: 16.3 % (ref 11.5–14.5)
EST. AVERAGE GLUCOSE BLD GHB EST-MCNC: 128 MG/DL
FLUAV RNA RESP QL NAA+PROBE: NOT DETECTED
FLUBV RNA RESP QL NAA+PROBE: NOT DETECTED
GLUCOSE BLD MANUAL STRIP-MCNC: 139 MG/DL (ref 74–99)
GLUCOSE BLD MANUAL STRIP-MCNC: 167 MG/DL (ref 74–99)
GLUCOSE SERPL-MCNC: 170 MG/DL (ref 74–99)
GLUCOSE UR STRIP.AUTO-MCNC: NORMAL MG/DL
HBA1C MFR BLD: 6.1 %
HCT VFR BLD AUTO: 35.7 % (ref 41–52)
HDLC SERPL-MCNC: 32.6 MG/DL
HGB BLD-MCNC: 11.4 G/DL (ref 13.5–17.5)
IMM GRANULOCYTES # BLD AUTO: 0.05 X10*3/UL (ref 0–0.7)
IMM GRANULOCYTES NFR BLD AUTO: 0.7 % (ref 0–0.9)
INR PPP: 1.2 (ref 0.9–1.2)
KETONES UR STRIP.AUTO-MCNC: NEGATIVE MG/DL
LDLC SERPL CALC-MCNC: 69 MG/DL
LEFT ATRIUM VOLUME AREA LENGTH INDEX BSA: 39.1 ML/M2
LEFT VENTRICLE INTERNAL DIMENSION DIASTOLE: 6.74 CM (ref 3.5–6)
LEFT VENTRICULAR OUTFLOW TRACT DIAMETER: 2.1 CM
LEUKOCYTE ESTERASE UR QL STRIP.AUTO: NEGATIVE
LYMPHOCYTES # BLD AUTO: 2.03 X10*3/UL (ref 1.2–4.8)
LYMPHOCYTES NFR BLD AUTO: 26.9 %
MAGNESIUM SERPL-MCNC: 1.81 MG/DL (ref 1.6–2.4)
MCH RBC QN AUTO: 25.4 PG (ref 26–34)
MCHC RBC AUTO-ENTMCNC: 31.9 G/DL (ref 32–36)
MCV RBC AUTO: 80 FL (ref 80–100)
MITRAL VALVE E/A RATIO: 0.56
MONOCYTES # BLD AUTO: 0.61 X10*3/UL (ref 0.1–1)
MONOCYTES NFR BLD AUTO: 8.1 %
MUCOUS THREADS #/AREA URNS AUTO: NORMAL /LPF
NEUTROPHILS # BLD AUTO: 4.56 X10*3/UL (ref 1.2–7.7)
NEUTROPHILS NFR BLD AUTO: 60.3 %
NITRITE UR QL STRIP.AUTO: NEGATIVE
NON HDL CHOLESTEROL: 85 MG/DL (ref 0–149)
NRBC BLD-RTO: 0 /100 WBCS (ref 0–0)
P AXIS: 2 DEGREES
P OFFSET: 131 MS
P ONSET: 94 MS
PH UR STRIP.AUTO: 6 [PH]
PLATELET # BLD AUTO: 271 X10*3/UL (ref 150–450)
POTASSIUM SERPL-SCNC: 3.5 MMOL/L (ref 3.5–5.3)
PR INTERVAL: 220 MS
PROT SERPL-MCNC: 7 G/DL (ref 6.4–8.2)
PROT UR STRIP.AUTO-MCNC: NORMAL MG/DL
PROTHROMBIN TIME: 12.2 SECONDS (ref 9.3–12.7)
Q ONSET: 204 MS
QRS COUNT: 10 BEATS
QRS DURATION: 160 MS
QT INTERVAL: 522 MS
QTC CALCULATION(BAZETT): 534 MS
QTC FREDERICIA: 530 MS
R AXIS: -16 DEGREES
RBC # BLD AUTO: 4.49 X10*6/UL (ref 4.5–5.9)
RBC # UR STRIP.AUTO: NEGATIVE /UL
RBC #/AREA URNS AUTO: NORMAL /HPF
RIGHT VENTRICLE FREE WALL PEAK S': 7.72 CM/S
RIGHT VENTRICLE PEAK SYSTOLIC PRESSURE: 34.8 MMHG
SARS-COV-2 RNA RESP QL NAA+PROBE: NOT DETECTED
SODIUM SERPL-SCNC: 139 MMOL/L (ref 136–145)
SP GR UR STRIP.AUTO: 1.03
T AXIS: 214 DEGREES
T OFFSET: 465 MS
TRICUSPID ANNULAR PLANE SYSTOLIC EXCURSION: 1.3 CM
TRIGL SERPL-MCNC: 82 MG/DL (ref 0–149)
UROBILINOGEN UR STRIP.AUTO-MCNC: NORMAL MG/DL
VENTRICULAR RATE: 63 BPM
VLDL: 16 MG/DL (ref 0–40)
WBC # BLD AUTO: 7.6 X10*3/UL (ref 4.4–11.3)
WBC #/AREA URNS AUTO: NORMAL /HPF

## 2024-12-06 PROCEDURE — 36415 COLL VENOUS BLD VENIPUNCTURE: CPT | Performed by: STUDENT IN AN ORGANIZED HEALTH CARE EDUCATION/TRAINING PROGRAM

## 2024-12-06 PROCEDURE — 2500000001 HC RX 250 WO HCPCS SELF ADMINISTERED DRUGS (ALT 637 FOR MEDICARE OP): Performed by: INTERNAL MEDICINE

## 2024-12-06 PROCEDURE — 97165 OT EVAL LOW COMPLEX 30 MIN: CPT | Mod: GO

## 2024-12-06 PROCEDURE — 70450 CT HEAD/BRAIN W/O DYE: CPT

## 2024-12-06 PROCEDURE — 92523 SPEECH SOUND LANG COMPREHEN: CPT | Mod: GN | Performed by: SPEECH-LANGUAGE PATHOLOGIST

## 2024-12-06 PROCEDURE — 70551 MRI BRAIN STEM W/O DYE: CPT | Performed by: RADIOLOGY

## 2024-12-06 PROCEDURE — 71045 X-RAY EXAM CHEST 1 VIEW: CPT | Mod: FOREIGN READ | Performed by: RADIOLOGY

## 2024-12-06 PROCEDURE — 70551 MRI BRAIN STEM W/O DYE: CPT

## 2024-12-06 PROCEDURE — 82947 ASSAY GLUCOSE BLOOD QUANT: CPT

## 2024-12-06 PROCEDURE — 70450 CT HEAD/BRAIN W/O DYE: CPT | Performed by: RADIOLOGY

## 2024-12-06 PROCEDURE — 93880 EXTRACRANIAL BILAT STUDY: CPT

## 2024-12-06 PROCEDURE — 84075 ASSAY ALKALINE PHOSPHATASE: CPT | Performed by: STUDENT IN AN ORGANIZED HEALTH CARE EDUCATION/TRAINING PROGRAM

## 2024-12-06 PROCEDURE — 81001 URINALYSIS AUTO W/SCOPE: CPT | Performed by: STUDENT IN AN ORGANIZED HEALTH CARE EDUCATION/TRAINING PROGRAM

## 2024-12-06 PROCEDURE — 71045 X-RAY EXAM CHEST 1 VIEW: CPT

## 2024-12-06 PROCEDURE — 70547 MR ANGIOGRAPHY NECK W/O DYE: CPT

## 2024-12-06 PROCEDURE — 93306 TTE W/DOPPLER COMPLETE: CPT

## 2024-12-06 PROCEDURE — 85025 COMPLETE CBC W/AUTO DIFF WBC: CPT | Performed by: STUDENT IN AN ORGANIZED HEALTH CARE EDUCATION/TRAINING PROGRAM

## 2024-12-06 PROCEDURE — 85730 THROMBOPLASTIN TIME PARTIAL: CPT | Performed by: STUDENT IN AN ORGANIZED HEALTH CARE EDUCATION/TRAINING PROGRAM

## 2024-12-06 PROCEDURE — 97161 PT EVAL LOW COMPLEX 20 MIN: CPT | Mod: GP

## 2024-12-06 PROCEDURE — 36415 COLL VENOUS BLD VENIPUNCTURE: CPT | Performed by: INTERNAL MEDICINE

## 2024-12-06 PROCEDURE — 83036 HEMOGLOBIN GLYCOSYLATED A1C: CPT | Mod: TRILAB | Performed by: INTERNAL MEDICINE

## 2024-12-06 PROCEDURE — 99291 CRITICAL CARE FIRST HOUR: CPT | Mod: 25 | Performed by: STUDENT IN AN ORGANIZED HEALTH CARE EDUCATION/TRAINING PROGRAM

## 2024-12-06 PROCEDURE — 99222 1ST HOSP IP/OBS MODERATE 55: CPT | Performed by: INTERNAL MEDICINE

## 2024-12-06 PROCEDURE — 93880 EXTRACRANIAL BILAT STUDY: CPT | Performed by: RADIOLOGY

## 2024-12-06 PROCEDURE — 80061 LIPID PANEL: CPT | Performed by: INTERNAL MEDICINE

## 2024-12-06 PROCEDURE — 70544 MR ANGIOGRAPHY HEAD W/O DYE: CPT

## 2024-12-06 PROCEDURE — 92610 EVALUATE SWALLOWING FUNCTION: CPT | Mod: GN | Performed by: SPEECH-LANGUAGE PATHOLOGIST

## 2024-12-06 PROCEDURE — 93306 TTE W/DOPPLER COMPLETE: CPT | Performed by: INTERNAL MEDICINE

## 2024-12-06 PROCEDURE — 94760 N-INVAS EAR/PLS OXIMETRY 1: CPT

## 2024-12-06 PROCEDURE — 84484 ASSAY OF TROPONIN QUANT: CPT | Performed by: STUDENT IN AN ORGANIZED HEALTH CARE EDUCATION/TRAINING PROGRAM

## 2024-12-06 PROCEDURE — 70547 MR ANGIOGRAPHY NECK W/O DYE: CPT | Performed by: RADIOLOGY

## 2024-12-06 PROCEDURE — 2060000001 HC INTERMEDIATE ICU ROOM DAILY

## 2024-12-06 PROCEDURE — 2500000001 HC RX 250 WO HCPCS SELF ADMINISTERED DRUGS (ALT 637 FOR MEDICARE OP): Performed by: STUDENT IN AN ORGANIZED HEALTH CARE EDUCATION/TRAINING PROGRAM

## 2024-12-06 PROCEDURE — 83735 ASSAY OF MAGNESIUM: CPT | Performed by: STUDENT IN AN ORGANIZED HEALTH CARE EDUCATION/TRAINING PROGRAM

## 2024-12-06 PROCEDURE — 83880 ASSAY OF NATRIURETIC PEPTIDE: CPT | Performed by: INTERNAL MEDICINE

## 2024-12-06 PROCEDURE — 99223 1ST HOSP IP/OBS HIGH 75: CPT | Performed by: INTERNAL MEDICINE

## 2024-12-06 PROCEDURE — 87636 SARSCOV2 & INF A&B AMP PRB: CPT | Performed by: STUDENT IN AN ORGANIZED HEALTH CARE EDUCATION/TRAINING PROGRAM

## 2024-12-06 PROCEDURE — 70544 MR ANGIOGRAPHY HEAD W/O DYE: CPT | Performed by: RADIOLOGY

## 2024-12-06 PROCEDURE — 93005 ELECTROCARDIOGRAM TRACING: CPT

## 2024-12-06 PROCEDURE — 80053 COMPREHEN METABOLIC PANEL: CPT | Performed by: STUDENT IN AN ORGANIZED HEALTH CARE EDUCATION/TRAINING PROGRAM

## 2024-12-06 PROCEDURE — 2500000004 HC RX 250 GENERAL PHARMACY W/ HCPCS (ALT 636 FOR OP/ED): Performed by: INTERNAL MEDICINE

## 2024-12-06 PROCEDURE — 85610 PROTHROMBIN TIME: CPT | Performed by: STUDENT IN AN ORGANIZED HEALTH CARE EDUCATION/TRAINING PROGRAM

## 2024-12-06 RX ORDER — LABETALOL HYDROCHLORIDE 5 MG/ML
10 INJECTION, SOLUTION INTRAVENOUS EVERY 10 MIN PRN
Status: ACTIVE | OUTPATIENT
Start: 2024-12-06 | End: 2024-12-08

## 2024-12-06 RX ORDER — HYDRALAZINE HYDROCHLORIDE 20 MG/ML
10 INJECTION INTRAMUSCULAR; INTRAVENOUS
Status: ACTIVE | OUTPATIENT
Start: 2024-12-06 | End: 2024-12-08

## 2024-12-06 RX ORDER — EPLERENONE 50 MG/1
25 TABLET, FILM COATED ORAL DAILY
Status: DISCONTINUED | OUTPATIENT
Start: 2024-12-07 | End: 2024-12-08 | Stop reason: HOSPADM

## 2024-12-06 RX ORDER — ENOXAPARIN SODIUM 100 MG/ML
40 INJECTION SUBCUTANEOUS EVERY 24 HOURS
Status: DISCONTINUED | OUTPATIENT
Start: 2024-12-06 | End: 2024-12-08 | Stop reason: HOSPADM

## 2024-12-06 RX ORDER — CLOPIDOGREL BISULFATE 75 MG/1
75 TABLET ORAL DAILY
Status: DISCONTINUED | OUTPATIENT
Start: 2024-12-06 | End: 2024-12-08 | Stop reason: HOSPADM

## 2024-12-06 RX ORDER — ATORVASTATIN CALCIUM 40 MG/1
40 TABLET, FILM COATED ORAL NIGHTLY
Status: DISCONTINUED | OUTPATIENT
Start: 2024-12-06 | End: 2024-12-06

## 2024-12-06 RX ORDER — CLOPIDOGREL BISULFATE 75 MG/1
75 TABLET ORAL ONCE
Status: COMPLETED | OUTPATIENT
Start: 2024-12-06 | End: 2024-12-06

## 2024-12-06 RX ORDER — ATORVASTATIN CALCIUM 80 MG/1
80 TABLET, FILM COATED ORAL NIGHTLY
Status: DISCONTINUED | OUTPATIENT
Start: 2024-12-06 | End: 2024-12-08 | Stop reason: HOSPADM

## 2024-12-06 RX ORDER — ONDANSETRON HYDROCHLORIDE 2 MG/ML
4 INJECTION, SOLUTION INTRAVENOUS EVERY 6 HOURS PRN
Status: DISCONTINUED | OUTPATIENT
Start: 2024-12-06 | End: 2024-12-08 | Stop reason: HOSPADM

## 2024-12-06 RX ORDER — HYDRALAZINE HYDROCHLORIDE 25 MG/1
25 TABLET, FILM COATED ORAL EVERY 6 HOURS PRN
Status: DISCONTINUED | OUTPATIENT
Start: 2024-12-08 | End: 2024-12-08 | Stop reason: HOSPADM

## 2024-12-06 RX ORDER — POLYETHYLENE GLYCOL 3350 17 G/17G
17 POWDER, FOR SOLUTION ORAL DAILY
Status: DISCONTINUED | OUTPATIENT
Start: 2024-12-06 | End: 2024-12-08 | Stop reason: HOSPADM

## 2024-12-06 RX ADMIN — ENOXAPARIN SODIUM 40 MG: 40 INJECTION SUBCUTANEOUS at 12:27

## 2024-12-06 RX ADMIN — CLOPIDOGREL BISULFATE 75 MG: 75 TABLET ORAL at 08:38

## 2024-12-06 RX ADMIN — ATORVASTATIN CALCIUM 80 MG: 80 TABLET, FILM COATED ORAL at 21:02

## 2024-12-06 RX ADMIN — SACUBITRIL AND VALSARTAN 1 TABLET: 24; 26 TABLET, FILM COATED ORAL at 21:02

## 2024-12-06 RX ADMIN — ONDANSETRON 4 MG: 2 INJECTION INTRAMUSCULAR; INTRAVENOUS at 14:15

## 2024-12-06 SDOH — ECONOMIC STABILITY: HOUSING INSECURITY: IN THE LAST 12 MONTHS, WAS THERE A TIME WHEN YOU WERE NOT ABLE TO PAY THE MORTGAGE OR RENT ON TIME?: NO

## 2024-12-06 SDOH — SOCIAL STABILITY: SOCIAL INSECURITY: DOES ANYONE TRY TO KEEP YOU FROM HAVING/CONTACTING OTHER FRIENDS OR DOING THINGS OUTSIDE YOUR HOME?: NO

## 2024-12-06 SDOH — SOCIAL STABILITY: SOCIAL NETWORK
DO YOU BELONG TO ANY CLUBS OR ORGANIZATIONS SUCH AS CHURCH GROUPS, UNIONS, FRATERNAL OR ATHLETIC GROUPS, OR SCHOOL GROUPS?: NO

## 2024-12-06 SDOH — SOCIAL STABILITY: SOCIAL INSECURITY: ARE THERE ANY APPARENT SIGNS OF INJURIES/BEHAVIORS THAT COULD BE RELATED TO ABUSE/NEGLECT?: NO

## 2024-12-06 SDOH — SOCIAL STABILITY: SOCIAL NETWORK: HOW OFTEN DO YOU GET TOGETHER WITH FRIENDS OR RELATIVES?: ONCE A WEEK

## 2024-12-06 SDOH — HEALTH STABILITY: PHYSICAL HEALTH: ON AVERAGE, HOW MANY MINUTES DO YOU ENGAGE IN EXERCISE AT THIS LEVEL?: 40 MIN

## 2024-12-06 SDOH — ECONOMIC STABILITY: INCOME INSECURITY: IN THE PAST 12 MONTHS HAS THE ELECTRIC, GAS, OIL, OR WATER COMPANY THREATENED TO SHUT OFF SERVICES IN YOUR HOME?: NO

## 2024-12-06 SDOH — SOCIAL STABILITY: SOCIAL INSECURITY: WERE YOU ABLE TO COMPLETE ALL THE BEHAVIORAL HEALTH SCREENINGS?: YES

## 2024-12-06 SDOH — ECONOMIC STABILITY: FOOD INSECURITY: WITHIN THE PAST 12 MONTHS, THE FOOD YOU BOUGHT JUST DIDN'T LAST AND YOU DIDN'T HAVE MONEY TO GET MORE.: NEVER TRUE

## 2024-12-06 SDOH — ECONOMIC STABILITY: FOOD INSECURITY: WITHIN THE PAST 12 MONTHS, YOU WORRIED THAT YOUR FOOD WOULD RUN OUT BEFORE YOU GOT THE MONEY TO BUY MORE.: NEVER TRUE

## 2024-12-06 SDOH — SOCIAL STABILITY: SOCIAL INSECURITY: HAVE YOU HAD THOUGHTS OF HARMING ANYONE ELSE?: NO

## 2024-12-06 SDOH — SOCIAL STABILITY: SOCIAL INSECURITY: DO YOU FEEL ANYONE HAS EXPLOITED OR TAKEN ADVANTAGE OF YOU FINANCIALLY OR OF YOUR PERSONAL PROPERTY?: NO

## 2024-12-06 SDOH — SOCIAL STABILITY: SOCIAL INSECURITY: ARE YOU OR HAVE YOU BEEN THREATENED OR ABUSED PHYSICALLY, EMOTIONALLY, OR SEXUALLY BY ANYONE?: NO

## 2024-12-06 SDOH — SOCIAL STABILITY: SOCIAL INSECURITY
WITHIN THE LAST YEAR, HAVE YOU BEEN RAPED OR FORCED TO HAVE ANY KIND OF SEXUAL ACTIVITY BY YOUR PARTNER OR EX-PARTNER?: NO

## 2024-12-06 SDOH — ECONOMIC STABILITY: FOOD INSECURITY: HOW HARD IS IT FOR YOU TO PAY FOR THE VERY BASICS LIKE FOOD, HOUSING, MEDICAL CARE, AND HEATING?: NOT HARD AT ALL

## 2024-12-06 SDOH — SOCIAL STABILITY: SOCIAL INSECURITY: ARE YOU MARRIED, WIDOWED, DIVORCED, SEPARATED, NEVER MARRIED, OR LIVING WITH A PARTNER?: MARRIED

## 2024-12-06 SDOH — SOCIAL STABILITY: SOCIAL NETWORK: HOW OFTEN DO YOU ATTEND CHURCH OR RELIGIOUS SERVICES?: NEVER

## 2024-12-06 SDOH — SOCIAL STABILITY: SOCIAL NETWORK: IN A TYPICAL WEEK, HOW MANY TIMES DO YOU TALK ON THE PHONE WITH FAMILY, FRIENDS, OR NEIGHBORS?: ONCE A WEEK

## 2024-12-06 SDOH — SOCIAL STABILITY: SOCIAL INSECURITY: WITHIN THE LAST YEAR, HAVE YOU BEEN AFRAID OF YOUR PARTNER OR EX-PARTNER?: NO

## 2024-12-06 SDOH — SOCIAL STABILITY: SOCIAL INSECURITY: HAS ANYONE EVER THREATENED TO HURT YOUR FAMILY OR YOUR PETS?: NO

## 2024-12-06 SDOH — ECONOMIC STABILITY: HOUSING INSECURITY: AT ANY TIME IN THE PAST 12 MONTHS, WERE YOU HOMELESS OR LIVING IN A SHELTER (INCLUDING NOW)?: NO

## 2024-12-06 SDOH — SOCIAL STABILITY: SOCIAL INSECURITY: WITHIN THE LAST YEAR, HAVE YOU BEEN HUMILIATED OR EMOTIONALLY ABUSED IN OTHER WAYS BY YOUR PARTNER OR EX-PARTNER?: NO

## 2024-12-06 SDOH — ECONOMIC STABILITY: HOUSING INSECURITY: IN THE PAST 12 MONTHS, HOW MANY TIMES HAVE YOU MOVED WHERE YOU WERE LIVING?: 1

## 2024-12-06 SDOH — SOCIAL STABILITY: SOCIAL INSECURITY: DO YOU FEEL UNSAFE GOING BACK TO THE PLACE WHERE YOU ARE LIVING?: NO

## 2024-12-06 SDOH — ECONOMIC STABILITY: TRANSPORTATION INSECURITY: IN THE PAST 12 MONTHS, HAS LACK OF TRANSPORTATION KEPT YOU FROM MEDICAL APPOINTMENTS OR FROM GETTING MEDICATIONS?: NO

## 2024-12-06 SDOH — SOCIAL STABILITY: SOCIAL INSECURITY: ABUSE: ADULT

## 2024-12-06 SDOH — HEALTH STABILITY: PHYSICAL HEALTH: ON AVERAGE, HOW MANY DAYS PER WEEK DO YOU ENGAGE IN MODERATE TO STRENUOUS EXERCISE (LIKE A BRISK WALK)?: 3 DAYS

## 2024-12-06 SDOH — HEALTH STABILITY: MENTAL HEALTH
DO YOU FEEL STRESS - TENSE, RESTLESS, NERVOUS, OR ANXIOUS, OR UNABLE TO SLEEP AT NIGHT BECAUSE YOUR MIND IS TROUBLED ALL THE TIME - THESE DAYS?: NOT AT ALL

## 2024-12-06 SDOH — SOCIAL STABILITY: SOCIAL INSECURITY: HAVE YOU HAD ANY THOUGHTS OF HARMING ANYONE ELSE?: NO

## 2024-12-06 SDOH — ECONOMIC STABILITY: HOUSING INSECURITY: IN THE PAST 12 MONTHS, HOW MANY TIMES HAVE YOU MOVED WHERE YOU WERE LIVING?: 0

## 2024-12-06 SDOH — SOCIAL STABILITY: SOCIAL NETWORK: HOW OFTEN DO YOU ATTEND MEETINGS OF THE CLUBS OR ORGANIZATIONS YOU BELONG TO?: NEVER

## 2024-12-06 ASSESSMENT — COGNITIVE AND FUNCTIONAL STATUS - GENERAL
PATIENT BASELINE BEDBOUND: NO
WALKING IN HOSPITAL ROOM: A LOT
TOILETING: A LITTLE
DRESSING REGULAR UPPER BODY CLOTHING: A LITTLE
DAILY ACTIVITIY SCORE: 24
MOBILITY SCORE: 15
DRESSING REGULAR LOWER BODY CLOTHING: A LOT
CLIMB 3 TO 5 STEPS WITH RAILING: TOTAL
PERSONAL GROOMING: A LITTLE
MOVING FROM LYING ON BACK TO SITTING ON SIDE OF FLAT BED WITH BEDRAILS: A LITTLE
MOBILITY SCORE: 24
DAILY ACTIVITIY SCORE: 24
TURNING FROM BACK TO SIDE WHILE IN FLAT BAD: A LITTLE
MOVING TO AND FROM BED TO CHAIR: A LITTLE
MOBILITY SCORE: 24
DAILY ACTIVITIY SCORE: 16
HELP NEEDED FOR BATHING: A LOT
EATING MEALS: A LITTLE
STANDING UP FROM CHAIR USING ARMS: A LITTLE

## 2024-12-06 ASSESSMENT — ACTIVITIES OF DAILY LIVING (ADL)
TOILETING: INDEPENDENT
DRESSING YOURSELF: INDEPENDENT
LACK_OF_TRANSPORTATION: NO
PATIENT'S MEMORY ADEQUATE TO SAFELY COMPLETE DAILY ACTIVITIES?: YES
ADL_ASSISTANCE: INDEPENDENT
WALKS IN HOME: INDEPENDENT
HEARING - RIGHT EAR: FUNCTIONAL
GROOMING: INDEPENDENT
LACK_OF_TRANSPORTATION: NO
JUDGMENT_ADEQUATE_SAFELY_COMPLETE_DAILY_ACTIVITIES: YES
LACK_OF_TRANSPORTATION: NO
ASSISTIVE_DEVICE: DENTURES UPPER
BATHING_ASSISTANCE: MODERATE
ADEQUATE_TO_COMPLETE_ADL: YES
BATHING: INDEPENDENT
ADL_ASSISTANCE: INDEPENDENT
FEEDING YOURSELF: INDEPENDENT
HEARING - LEFT EAR: FUNCTIONAL

## 2024-12-06 ASSESSMENT — PAIN SCALES - WONG BAKER: WONGBAKER_NUMERICALRESPONSE: NO HURT

## 2024-12-06 ASSESSMENT — ENCOUNTER SYMPTOMS
SHORTNESS OF BREATH: 0
FATIGUE: 1
NAUSEA: 1
FACIAL ASYMMETRY: 0
DIZZINESS: 1
CHEST TIGHTNESS: 0
WEAKNESS: 1
CONFUSION: 1
PHOTOPHOBIA: 0
SPEECH DIFFICULTY: 1
PALPITATIONS: 0
NUMBNESS: 1
COUGH: 0

## 2024-12-06 ASSESSMENT — PATIENT HEALTH QUESTIONNAIRE - PHQ9
1. LITTLE INTEREST OR PLEASURE IN DOING THINGS: NOT AT ALL
2. FEELING DOWN, DEPRESSED OR HOPELESS: NOT AT ALL
SUM OF ALL RESPONSES TO PHQ9 QUESTIONS 1 & 2: 0

## 2024-12-06 ASSESSMENT — LIFESTYLE VARIABLES
PRESCIPTION_ABUSE_PAST_12_MONTHS: NO
HOW OFTEN DO YOU HAVE 6 OR MORE DRINKS ON ONE OCCASION: NEVER
HOW OFTEN DO YOU HAVE A DRINK CONTAINING ALCOHOL: 2-4 TIMES A MONTH
AUDIT-C TOTAL SCORE: 2
AUDIT-C TOTAL SCORE: 2
SKIP TO QUESTIONS 9-10: 1
HOW MANY STANDARD DRINKS CONTAINING ALCOHOL DO YOU HAVE ON A TYPICAL DAY: 1 OR 2
SUBSTANCE_ABUSE_PAST_12_MONTHS: NO

## 2024-12-06 ASSESSMENT — PAIN SCALES - GENERAL
PAINLEVEL_OUTOF10: 0 - NO PAIN

## 2024-12-06 ASSESSMENT — PAIN - FUNCTIONAL ASSESSMENT
PAIN_FUNCTIONAL_ASSESSMENT: 0-10

## 2024-12-06 NOTE — TREATMENT PLAN
Pt was off the floor when I tried to see him.  His wife was available and provided Hx.  Orders for stroke placed.  Will see him tomorrow

## 2024-12-06 NOTE — ED PROVIDER NOTES
HPI   Chief Complaint   Patient presents with    Flu Symptoms    Eye Problem     Blurry/Double vision        Patient presents complaining of double vision and difficulty speaking.  He reports that he went to bed around 10:30 PM last night and felt normal.  On awakening this morning, he noticed that he had double vision.  Due to his double vision, he had to hold onto things to be able to walk so that he did not fall.  He reports that he feels that it is difficult to speak as well.  Nothing like this is happened previously.              Patient History   Past Medical History:   Diagnosis Date    Gout     Hyperlipidemia     Hypertension     SHOAIB (obstructive sleep apnea)     Paroxysmal atrial fibrillation (Multi) 08/24/2023     Past Surgical History:   Procedure Laterality Date    CARDIAC CATHETERIZATION N/A 9/11/2024    Procedure: Left Heart Cath;  Surgeon: Zacarias Jarquin DO;  Location: [facility] Cardiac [facility] Lab;  Service: Cardiovascular;  Laterality: N/A;    SINUS SURGERY       No family history on file.  Social History     Tobacco Use    Smoking status: Never    Smokeless tobacco: Never   Vaping Use    Vaping status: Never Used   Substance Use Topics    Alcohol use: Not Currently     Alcohol/week: 2.0 standard drinks of alcohol     Types: 2 Cans of beer per week     Comment: once a week    Drug use: Never       Physical Exam   ED Triage Vitals [12/06/24 0737]   Temperature Heart Rate Respirations BP   37.7 °C (99.8 °F) 61 18 (!) 142/100      Pulse Ox Temp Source Heart Rate Source Patient Position   96 % Oral -- --      BP Location FiO2 (%)     -- --       Physical Exam  HENT:      Head: Normocephalic.   Eyes:      General: No scleral icterus.     Pupils: Pupils are equal, round, and reactive to light.      Comments: Patient with disconjugate gaze.  Patient unable to look medially with either eye.   Cardiovascular:      Rate and Rhythm: Normal rate.   Pulmonary:      Effort: Pulmonary effort is normal.   Neurological:       Mental Status: He is alert.      Comments: Patient awake and alert, answers questions appropriately.  Disconjugate gaze as well as some loss of verbal fluency with some stuttering.  No slurring of speech.           ED Course & MDM   ED Course as of 12/06/24 0849   Fri Dec 06, 2024   0753 EKG interpreted by me: Sinus rhythm with first-degree AV block, rate 63.  Normal axis.  Left bundle branch block.  Sgarbossa criteria not met.  Compared with previous EKG dated 25 September 2024, no changes are seen. [ML]      ED Course User Index  [ML] Phillip Saenz MD         Diagnoses as of 12/06/24 0849   Dysphasia   Dysconjugate gaze   Cerebrovascular accident (CVA), unspecified mechanism (Multi)                 No data recorded     Jacqueline Coma Scale Score: 15 (12/06/24 0743 : Candy Katz RN)       NIH Stroke Scale: 2 (12/06/24 0751 : Phillip Saenz MD)                   Medical Decision Making  Upon my initial evaluation of patient, I determined that he should receive brain attack activation.  He was then sent to CAT scan.  CT result reveals acute/subacute CVA.  Patient's case was discussed with brain attack neurologist, Dr. Gilbert, who recommends that patient be given Plavix in addition to his aspirin.  Laboratory studies are otherwise unremarkable.  Patient accepted to medicine service for further evaluation.  Continues to have disconjugate gaze and reports that he feels that his vision is blurry.  Parts of this chart were completed with dictation software, please excuse any errors in transcription.        Procedure  Critical Care    Performed by: Phillip Saenz MD  Authorized by: Phillip Saenz MD    Critical care provider statement:     Critical care time (minutes):  32    Critical care time was exclusive of:  Separately billable procedures and treating other patients    Critical care was necessary to treat or prevent imminent or life-threatening deterioration of the following conditions:  CNS failure or compromise     Critical care was time spent personally by me on the following activities:  Development of treatment plan with patient or surrogate, discussions with consultants, evaluation of patient's response to treatment, examination of patient, obtaining history from patient or surrogate, ordering and performing treatments and interventions, ordering and review of laboratory studies, ordering and review of radiographic studies, pulse oximetry, re-evaluation of patient's condition and review of old charts    Care discussed with: admitting provider         Phillip Saenz MD  12/06/24 0850

## 2024-12-06 NOTE — PROGRESS NOTES
12/06/24 1317   Discharge Planning   Living Arrangements Spouse/significant other   Support Systems Spouse/significant other;Children   Assistance Needed Wife reports patient is independent of all ADLs and IADLs; reports he was snow-blowing this week.   Type of Residence Private residence   Number of Stairs to Enter Residence 3   Number of Stairs Within Residence 12   Do you have animals or pets at home? Yes   Type of Animals or Pets cat   Who is requesting discharge planning? Provider   Home or Post Acute Services None   Expected Discharge Disposition Rehab   Does the patient need discharge transport arranged? Yes   RoundTrip coordination needed? Yes   Has discharge transport been arranged? No   Financial Resource Strain   How hard is it for you to pay for the very basics like food, housing, medical care, and heating? Not hard   Housing Stability   In the last 12 months, was there a time when you were not able to pay the mortgage or rent on time? N   In the past 12 months, how many times have you moved where you were living? 0   At any time in the past 12 months, were you homeless or living in a shelter (including now)? N   Transportation Needs   In the past 12 months, has lack of transportation kept you from medical appointments or from getting medications? no   In the past 12 months, has lack of transportation kept you from meetings, work, or from getting things needed for daily living? No   Patient Choice   Provider Choice list and CMS website (https://medicare.gov/care-compare#search) for post-acute Quality and Resource Measure Data were provided and reviewed with: Family   Patient / Family choosing to utilize agency / facility established prior to hospitalization No   Stroke Family Assessment   Stroke Family Assessment Needed Yes   Primary Caregiver/Family After Discharge Spouse/Significant Other   Additional Support if 24 hour Supervision Required Plan for IRF pending acceptance.   Physical Layout of Home  Steps to Enter Home;Two Story   Caregiver/Family can provide assistance with ADL's Yes   Caregiver/Family can provide mobility assistance for transfers in and out of bed, chair or car Yes   Medications: Caregiver/Family can obtain prescriptions Yes   Medications: Caregiver/Family can assist with medication administration Yes   Caregiver/Family agrees with discharge plan to home Other  (OT is recommending high intensity. Discharge to IRF discussed and referral made to  [facility])   Caregiver/Family verbalizes capability of caring for patient at home Other  (As above)   Intensity of Service   Intensity of Service 0-30 min     MSW met with patient's wife at bedside; patient off the floor for testing. Patient is typically independent at home per wife. OT is recommending high intensity upon discharge. Same discussed with spouse and she is agreeable. Provided freedom of choice and she is requesting  [facility]. Referral submitted to the same. Medical team updated.     1:49 PM   [facility] is reviewing patient's referral. They need PT evaluation which is ordered. Patient will not require pre-cert prior to discharge.  [facility] will advise pending review of further work-up and PT evaluation. Care Transitions will follow.

## 2024-12-06 NOTE — PROGRESS NOTES
Speech-Language Pathology    SLP Adult Inpatient Speech-Language Cognition    Patient Name: David Chatman  MRN: [id number]  Today's Date: 12/6/2024   Time Calculation  Start Time: 1000  Stop Time: 1030  Time Calculation (min): 30 min         Current Problem:   1. Dysphasia        2. Dysconjugate gaze        3. Cerebrovascular accident (CVA), unspecified mechanism (Multi)        4. Occipital stroke (Multi)  Carotid duplex bilateral    Carotid duplex bilateral      5. Cerebral infarction due to embolism of right posterior cerebral artery (Multi)  Carotid duplex bilateral    Carotid duplex bilateral      6. S/P aortic valve replacement and aortoplasty  Transthoracic Echo Complete    Transthoracic Echo Complete      7. S/P ascending aortic replacement  Transthoracic Echo Complete    Transthoracic Echo Complete      8. HFrEF (heart failure with reduced ejection fraction)  Transthoracic Echo Complete    Transthoracic Echo Complete      9. Dilated cardiomyopathy (Multi)  Transthoracic Echo Complete    Transthoracic Echo Complete            SLP Assessment:  Pt presents with mild dysarthria characterized by slightly slurred and disorganized speech patterns during conversation and throughout assessment. Pt was assessed using the Quick Aphasia Battery (scores noted below). At this time pt is appropriate for skilled speech therapy services to provide compensatory strategies for dysarthria, and participate in utilization of strategies.       SLP Plan:  Plan  Inpatient/Swing Bed or Outpatient: Inpatient  SLP Plan: Skilled SLP  Duration: 2 weeks  Frequency: 2x/week  Next Treatment Priority:  (Clear Speech Strats)  Discussed POC: Patient, Caregiver/family  Discussed Risks/Benefits: Yes, Patient, Caregiver/Family  Patient/Caregiver Agreeable: Yes      Subjective   Current Problem: Mild Dysarthria.   Pt was pleasant and cooperative. His wife was in the room during the evaluation. Pt was agreeable to participating in assessment and  asked appropriate questions regarding assessment, diagnosis, and POC.       General Visit Information:  Relevant Imaging Results:  IMPRESSION from CT Scan on 12/6/24:  Focal area of gray-white matter differentiation loss consistent with  acute/subacute infarction right occipital lobe. No evidence for  hemorrhage.      Objective   Pt participated in assessment via the Quick Aphasia Battery with the following results:      Summary        Word comprehension 10.00   Sentence comprehension 8.33   Word finding 10.00   Grammatical construction 9.50   Speech motor programming 10.00   Repetition 9.17   Reading 9.17   QAB overall 9.50         His score of 9.50 indicates WFL, no aphasia. During evaluation he presented to be mildly dysarthric and reported signs consistent with dysarthria.    Goals: (Established: 12/6/24  Target Date: 2 weeks )  LTG 1: Pt will improve increase intelligibility in multiple contexts (I.e words, phrases, conversation) to better communicate personal/medical wants and needs.    STG 1: Pt will learn and utilize clear speech strategies to improve intelligibility per report and as observed during session.   Goal Initiated: 12/6/24   Target Date: 2 weeks   Status: Goal Initiated  Today's Progress: Pt was introduced to clear speech strategies including the functionality and the target. Pt was given a strategy to recall clear speech strategies as follows: Speak Up, Over-articulate, Slow down (I.e S.O.S).     STG 2: Pt will participate in articulatory precision tasks with words/phrase to improve rate of speech with 80% accuracy.  Goal Initiated:12/6/24  Target Date: 2 weeks  Status: Goal Initiated   Today's Progress: N/A     Pain:  Pain Assessment  Pain Assessment: 0-10  0-10 (Numeric) Pain Score: 0 - No pain     Motor Speech Production:  Motor Speech Production  Assessments Used:  (QAB)  Oral Motor : WFL  Automatic Speech: WFL, Counting to 10  Repetition: WFL  Intelligibility: Impaired  Diadochokinetic  Rate: Impaired  Paralinguistic Features: WFL  Respiratory Support: WFL  Motor Speech Disorder: Dysarthria    Verbal:  Verbal Expression  Primary Mode of Expression: Verbal  Primary Language: English  Confrontation Naming: Within Functional Limits  Repetition: Impaired    Inpatient Education:  Patient education completed regarding results and recommendations of SLP evaluation, as well as clear speech strategies to utilize to aid in communication breakdowns. Pt verbalizes understanding with all patient questions answered to satisfaction     Disclaimer: This was completed in collaboration with supervising therapist, Corinne Casey M.A. CCC-SLP

## 2024-12-06 NOTE — CONSULTS
"Consults  History Of Present Illness:    David Chatman is a 69 y.o. male presenting with symptoms of a new stroke felt to be cardioembolic.  He did not receive TNK or vascular treatment because of his uncertain time.  And low NIH score.  He had a CT scan of his brain that shows an acute/subacute right occipital lobe infarct and also suspect brainstem involvement with signs and symptoms.  Given these findings with his severe cardiomyopathy it is felt that his etiology was cardioembolic.    He had a history of severe cardiomyopathy with aortic valve regurgitation.  He had ascending aorta replacement and #29 Bentall bioprosthetic aortic valve replacement with left atrial appendage clipping on 9/23/2024 at [facility] by Dr. Perdomo, and he follows with his cardiologist Dr. Buck.  He had a preoperative left ventricular ejection fraction of 34% and a postoperative echo September 25, 2024 showed left ventricular ejection fraction of 22% with severe left ventricular dilation and was given LifeVest with guideline directed medical therapy.  He is on clopidogrel and was going to start Eliquis anticoagulation but had not yet received that medication through and assistance program.    EKG shows sinus rhythm rate 63 with mild first-degree AV block, QRS duration 160 ms, QTc 534ms.     Last Recorded Vitals:  Vitals:    12/06/24 0800 12/06/24 0830 12/06/24 0900 12/06/24 0935   BP: 136/82 (!) 137/92 (!) 133/94 137/85   BP Location:    Left arm   Patient Position:    Sitting   Pulse: 64 60 60 65   Resp: 14 15 16    Temp:    36.1 °C (97 °F)   TempSrc:    Temporal   SpO2: 94% 94% 95% 97%   Weight:    98.9 kg (218 lb)   Height:    1.854 m (6' 1\")       Last Labs:  CBC - 12/6/2024:  7:42 AM  7.6 11.4 271    35.7      CMP - 12/6/2024:  7:42 AM  9.0 7.0 17 --- 0.6   4.1 3.7 21 155      PTT - 12/6/2024:  8:01 AM  1.2   12.2 21.9     Troponin I, High Sensitivity   Date/Time Value Ref Range Status   12/06/2024 07:42 AM 13 0 - 20 ng/L Final "     BNP   Date/Time Value Ref Range Status   12/06/2024 09:36 AM 1,196 (H) 0 - 99 pg/mL Final     Hemoglobin A1C   Date/Time Value Ref Range Status   04/10/2024 10:44 AM 5.6 See below % Final   10/20/2022 07:48 AM 5.9 4.0 - 6.0 % Final     Comment:     Hemoglobin A1C levels are related to mean blood glucose during the   preceding 2-3 months. The relationship table below may be used as a   general guide. Each 1% increase in HGB A1C is a reflection of an   increase in mean glucose of approximately 30 mg/dl.   Reference: Diabetes Care, volume 29, supplement 1 Jan. 2006                        HGB A1C ................. Approx. Mean Glucose   _______________________________________________   6%   ...............................  120 mg/dl   7%   ...............................  150 mg/dl   8%   ...............................  180 mg/dl   9%   ...............................  210 mg/dl   10%  ...............................  240 mg/dl  Performed at [address] [address]     09/07/2021 01:21 PM 5.8 4.0 - 6.0 % Final     Comment:     Hemoglobin A1C levels are related to mean blood glucose during the   preceding 2-3 months. The relationship table below may be used as a   general guide. Each 1% increase in HGB A1C is a reflection of an   increase in mean glucose of approximately 30 mg/dl.   Reference: Diabetes Care, volume 29, supplement 1 Jan. 2006                        HGB A1C ................. Approx. Mean Glucose   _______________________________________________   6%   ...............................  120 mg/dl   7%   ...............................  150 mg/dl   8%   ...............................  180 mg/dl   9%   ...............................  210 mg/dl   10%  ...............................  240 mg/dl  [address] [address] [facility] [address]       LDL Calculated   Date/Time Value Ref Range Status   12/06/2024 07:42 AM 69 <=99 mg/dL Final     Comment:                                  Near   Borderline      AGE      Desirable  Optimal    High     High     Very High     0-19 Y     0 - 109     ---    110-129   >/= 130     ----    20-24 Y     0 - 119     ---    120-159   >/= 160     ----      >24 Y     0 -  99   100-129  130-159   160-189     >/=190     04/10/2024 10:44 AM 93 65 - 130 mg/dL Final   10/20/2022 07:48 AM 99 65 - 130 MG/DL Final   09/07/2021 01:21  65 - 130 MG/DL Final   12/05/2019 07:47 AM 90 65 - 130 MG/DL Final     VLDL   Date/Time Value Ref Range Status   12/06/2024 07:42 AM 16 0 - 40 mg/dL Final      Last I/O:  No intake/output data recorded.    Past Cardiology Tests (Last 3 Years):  EKG:  ECG 12 lead 12/06/2024      Electrocardiogram 12-lead PRN for arrhythmia 09/25/2024      ECG 12 lead 09/24/2024      Electrocardiogram 12-lead PRN for arrhythmia 09/17/2024      ECG 12 Lead 09/14/2024      Electrocardiogram, 12-lead 09/11/2024      ECG 12 lead 09/10/2024      ECG 12 lead 07/30/2024      ECG 12 lead 07/30/2024    Echo:  Transthoracic Echo (TTE) Limited 09/25/2024      Anesthesia Intraoperative Transesophageal Echocardiogram 09/23/2024      Transthoracic Echo Complete 09/10/2024    Ejection Fractions:  EF   Date/Time Value Ref Range Status   09/25/2024 04:19 PM 22 %    09/23/2024 06:25 AM 33 %    09/10/2024 01:30 PM 38 %      Cath:  Cardiac Catheterization Procedure 09/11/2024    Stress Test:  Nuclear Stress Test 09/10/2024    Cardiac Imaging:  MR cardiac morphology and function w and wo IV contrast 09/12/2024      Past Medical History:  He has a past medical history of Gout, Hyperlipidemia, Hypertension, SHOAIB (obstructive sleep apnea), and Paroxysmal atrial fibrillation (Multi) (08/24/2023).    Past Surgical History:  He has a past surgical history that includes Sinus surgery and Cardiac catheterization (N/A, 9/11/2024).      Social History:  He reports that he has never smoked. He has never used smokeless tobacco. He reports that he does not currently use alcohol  after a past usage of about 2.0 standard drinks of alcohol per week. He reports that he does not use drugs.    Family History:  No family history on file.     Allergies:  Patient has no known allergies.    Inpatient Medications:  Scheduled medications   Medication Dose Route Frequency    clopidogrel  75 mg oral Daily    enoxaparin  40 mg subcutaneous q24h    polyethylene glycol  17 g oral Daily     PRN medications   Medication    hydrALAZINE    Followed by    [START ON 12/8/2024] hydrALAZINE    labetaloL    oxygen     Continuous Medications   Medication Dose Last Rate     Outpatient Medications:  Current Outpatient Medications   Medication Instructions    amiodarone (PACERONE) 200 mg, oral, Daily, For 1 month    aspirin 81 mg, oral, Daily    atorvastatin (LIPITOR) 80 mg, oral, Nightly    empagliflozin (JARDIANCE) 10 mg, oral, Daily    eplerenone (INSPRA) 25 mg, oral, Daily    hydrALAZINE (APRESOLINE) 12.5 mg, oral, 3 times daily    metoprolol succinate XL (TOPROL-XL) 150 mg, oral, Daily, Do not crush or chew.    sacubitriL-valsartan (Entresto) 24-26 mg tablet 1 tablet, oral, 2 times daily    torsemide (DEMADEX) 40 mg, oral, Daily PRN       Physical Exam:  Neck:  Normal jugular venous pressure, carotid upstrokes brisk with no bruits  Lungs:  Clear to auscultation without wheezing or rhonchi or rales, some dullness in his left base  Heart:  Regular rate and rhythm normal S1 and S2, no murmurs gallops rubs or clicks, PMI normal, no RV lift and his sternotomy is well-healed  Abdomen:  Soft, good bowel sounds, nontender, no hepatosplenomegaly, no pulsatile mass or bruits.  Extremities:  Good radial, femoral, pedal pulses without pretibial edema  Neurological: Some dysarthria, diplopia persists and mild dizziness in the upright position     Assessment/Plan   #1 ischemic stroke involving the right occipital lobe and signs of brainstem involvement consistent with vertebral artery territory embolism.  Agree with plans for  Eliquis anticoagulation per neurology recommendations.  2.  History of ascending aorta replacement and #29 Bentall bioprosthetic aortic valve replacement and left atrial appendage clipping  3.  History of left pleural effusion postoperatively with thoracentesis.  His chest x-ray on his present admission shows a small left pleural effusion and he remains on diuretic therapy  4.  Nonischemic cardiomyopathy with severe left ventricular dilation and severe left ventricular systolic dysfunction  5.  Chronic systolic heart failure, on guideline directed medical therapy for cardiomyopathy and congestive heart failure  6.  Prolonged QTc interval     12/6: I agree with his guideline directed medical therapy.  I also agree with neurology opinion of beginning Eliquis, with suspicion of cardioembolic event in the setting of his severe severe cardiomyopathy.  Echocardiography has been ordered with a saline bubble contrast study and results are pending and will be reviewed today.  Continue his diuretic therapy for his left pleural effusion and he already has follow-up with his cardiologist Dr. Buck arranged. Will  Follow him with you on an as-needed basis.        Code Status:  Full Code          Zacarias Jarquin DO

## 2024-12-06 NOTE — PROGRESS NOTES
Physical Therapy    Physical Therapy Evaluation    Patient Name: David Chatman  MRN: [id number]  Department: [address]  Room: [address] [place], [place] [address]  Today's Date: 12/6/2024   Time Calculation  Start Time: 1026  Stop Time: 1035  Time Calculation (min): 9 min    Assessment/Plan   PT Assessment  PT Assessment Results: Decreased strength, Decreased endurance, Impaired balance, Decreased mobility, Decreased coordination, Impaired vision, Decreased safety awareness, Impaired judgement  Rehab Prognosis: Good  Barriers to Discharge: assist x 1  Evaluation/Treatment Tolerance: Patient limited by fatigue  Medical Staff Made Aware: Yes  End of Session Communication: Bedside nurse  End of Session Patient Position: Bed, 3 rail up, Alarm on      Assessment Comment: 68 y/o male who presents with impaired balance and coordination, decreased activity tolerance and generalized weakness. Pt is an increased falls risk and is limited in mobility due to double vision and poor coordination. Would benefit from cont PT services while in-house and post acute to maximize safe, independent mobility.      IP OR SWING BED PT PLAN  Inpatient or Swing Bed: Inpatient  PT Plan  Treatment/Interventions: Bed mobility, Transfer training, Gait training, Stair training, Neuromuscular re-education, Balance training, Strengthening, Endurance training, Therapeutic exercise, Therapeutic activity, Positioning  PT Plan: Ongoing PT  PT Frequency: 5 times per week  PT Discharge Recommendations: High intensity level of continued care  Equipment Recommended upon Discharge: Wheeled walker  PT Recommended Transfer Status: Assist x1, Assistive device  PT - OK to Discharge: Yes    Subjective   General Visit Information:  General  Reason for Referral: impaired mobility; acute/subacute R occipital infarction  Referred By: Dr. Angeles  Past Medical History Relevant to Rehab: heart failure, afib, gout, hyperlipidemia, SHOAIB  Family/Caregiver Present: Yes  Caregiver Feedback: spouse  present, supportive  Prior to Session Communication: Bedside nurse  Patient Position Received: Up in chair, Alarm off, caregiver present (OT at bedside)  Preferred Learning Style: verbal, visual, auditory  General Comment: 70 y/o male who presented to ED with double vision, ataxia. Cleared for mobility per nursing. Pt sitting up in chair with OT at bedside. Agreeable to participate.  Home Living:  Home Living  Type of Home: House  Lives With: Spouse  Home Adaptive Equipment: None  Home Layout: Two level, Stairs to alternate level with rails  Alternate Level Stairs-Rails:  (unilateral)  Home Access: Stairs to enter with rails  Entrance Stairs-Rails:  (unilateral)  Entrance Stairs-Number of Steps: 3 RAYMUNDO  Bathroom Accessibility: 2nd story  Home Living Comments: Second floor main bathroom and bedroom  Prior Level of Function:  Prior Function Per Pt/Caregiver Report  Level of [place]: Independent with ADLs and functional transfers, Independent with homemaking with ambulation  Receives Help From: Family  ADL Assistance: Independent  Homemaking Assistance: Independent  Ambulatory Assistance: Independent  Vocational: Retired  Hand Dominance: Right  Prior Function Comments: no recent falls  Precautions:  Precautions  Hearing/Visual Limitations: hearing WFL, + acute diplopia (attempted to patch R eye to minimize double vision)  Medical Precautions: Fall precautions         Objective   Pain:  Pain Assessment  Pain Assessment: 0-10  0-10 (Numeric) Pain Score: 0 - No pain  Cognition:  Cognition  Overall Cognitive Status: Within Functional Limits  Orientation Level: Oriented X4  Cognition Comments: able to follow commands. decreased safety awareness.  Insight: Mild  Impulsive: Mildly    General Assessments:     Activity Tolerance  Endurance: Decreased tolerance for upright activites  Activity Tolerance Comments: fatigues esaily    Sensation  Light Touch: No apparent deficits    Strength  Strength Comments: B LEs > 3/5  "observed  Coordination  Lower Body Coordination: impaired  Trunk Coordination: impaired  Coordination Comment: ataxic with ambulation     Static Sitting Balance  Static Sitting-Level of Assistance: Close supervision  Static Sitting-Comment/Number of Minutes: unsupported on EOB    Static Standing Balance  Static Standing-Level of Assistance: Minimum assistance  Static Standing-Comment/Number of Minutes: UE support of walker  Dynamic Standing Balance  Dynamic Standing-Comments: unsteady during ambulation due to ataxia  Functional Assessments:  Bed Mobility 1  Bed Mobility 1: Sitting to supine  Level of Assistance 1: Minimum assistance  Bed Mobility Comments 1: to elevate LEs    Transfer 1  Technique 1: Sit to stand, Stand to sit  Transfer Device 1: Walker  Transfer Level of Assistance 1: Minimum assistance, Moderate assistance  Trials/Comments 1: from chair with arms. Required control of walker    Ambulation/Gait Training  Ambulation/Gait Training Performed: Yes  Ambulation/Gait Training 1  Surface 1: Level tile  Device 1: Rolling walker  Assistance 1: Minimum assistance, Moderate assistance  Quality of Gait 1: Scissoring, Ataxic, Forward flexed posture, Decreased step length, Narrow base of support  Comments/Distance (ft) 1: about 10 ft. Pt reported, \"I'll go farther when I'm not seeing double.\"  Extremity/Trunk Assessments:  RLE   RLE : Within Functional Limits  LLE   LLE : Within Functional Limits  Outcome Measures:  [facility] Basic Mobility  Turning from your back to your side while in a flat bed without using bedrails: A little  Moving from lying on your back to sitting on the side of a flat bed without using bedrails: A little  Moving to and from bed to chair (including a wheelchair): A little  Standing up from a chair using your arms (e.g. wheelchair or bedside chair): A little  To walk in hospital room: A lot  Climbing 3-5 steps with railing: Total  Basic Mobility - Total Score: 15    Encounter Problems       " Encounter Problems (Active)       Balance       dynamic (Progressing)       Start:  12/06/24    Expected End:  12/20/24       No LOB with standing activities with UE support of walker            Mobility       bed mobility (Progressing)       Start:  12/06/24    Expected End:  12/20/24       Pt will perform sup to/from sit transfer with supervision          ambulation (Progressing)       Start:  12/06/24    Expected End:  12/20/24       Pt will amb > 60  ft with wheeled walker and CGA            PT Transfers       sit to stand (Progressing)       Start:  12/06/24    Expected End:  12/20/24       Pt will perform sit to stand transfer with walker and supervision            Safety       LTG - Patient will utilize safety techniques (Progressing)       Start:  12/06/24    Expected End:  12/20/24                   Education Documentation  Handouts, taught by Zulma Mccartney PT at 12/6/2024  2:57 PM.  Learner: Patient  Readiness: Acceptance  Method: Explanation  Response: Needs Reinforcement    Precautions, taught by Zulma Mccartney PT at 12/6/2024  2:57 PM.  Learner: Patient  Readiness: Acceptance  Method: Explanation  Response: Needs Reinforcement    Mobility Training, taught by Zulma Mccartney PT at 12/6/2024  2:57 PM.  Learner: Patient  Readiness: Acceptance  Method: Explanation  Response: Needs Reinforcement    Education Comments  No comments found.

## 2024-12-06 NOTE — PROGRESS NOTES
Inpatient Speech-Language Pathology Clinical Swallow Evaluation       Patient Name: David Chatman  MRN: [id number]  : 1955  Today's Date: 24  Time Calculation  Start Time: 1000  Stop Time: 1030  Time Calculation (min): 30 min       Reason for Consultation: Pt was referred for a clinical swallow evaluation to assess adequacy of swallow function.     Recommendations:  Risk for Aspiration: No   Solid Diet Recommendations : Regular (IDDSI Level 7)   Liquid Diet Recommendations: Thin (IDDSI Level 0)   Medication Administration Recommendations: Whole, With Liquid      Skilled dysphagia treatment is not warranted  at next level of care.     Assessment:  Consistencies Trialed: Consistencies Trialed: Thin (IDDSI Level 0) - Straw, Pureed/extremely thick (IDDSI Level 4), Regular (IDDSI Level 7)      Pt participated in a clinical bedside swallowing evaluation. Pt denied hx of dysphagia, PNA, and weight loss. Pt was assessed using the [place] 3oz. Water protocol, an Oral Mechanism Exam, and multiple PO trials. The results are as follows:    Oral Phase: Pt demonstrated adequate mastication during trial of solids with a kelly cracker. Pt had no anterior spillage of bolus, oral prep time was less than 10 seconds, and an absence of residue was noted.     Pharyngeal Phase: Pt showed no overt s/s of aspiration during PO trials. Vocal quality remained consistent as measured by conversation after PO trials and the absence of coughing.                  SLP Assessment:  Pt was upright upon SLP arrival. Pt participated in multiple PO trials and demonstrated no overt s/s of aspiration (I.e coughing/choking) on all trials. OME was WFL, he appeared to be closing his left eye d/t diplopia. When self-feeding he was noted to mildly misjudge his mouth and the table when placing his food back onto the tray. This deficits if more likely attributed to his diplopia rather than decrease in functional ability. At this time, his swallow  function is adequate evident by no overt s/s of aspiration during bedside evaluation. He is appropriate to consume regular diet (IDDSI Level 7) and thin liquids (IDDSI Level 0).       Relevant Imaging Results:  IMPRESSION from CT on 12/6/24:  Focal area of gray-white matter differentiation loss consistent with  acute/subacute infarction right occipital lobe. No evidence for  hemorrhage.  Plan:  SLP Plan: No skilled SLP   Discussed POC: Patient, Caregiver/family   Discussed Risks/Benefits: Yes, Patient, Caregiver/Family   Patient/Caregiver Agreeable: Yes     Skilled dysphagia treatment is not warranted due to there are no overt s/s of aspiration nor dysphagia at bedside    Subjective   Pt was pleasant and cooperative. His wife was present in the room for the evaluation. Pt was upright in bed upon clinical assessment. Gave verbal consent for participation in evaluation.        General Visit Information:  Past medical history:   Per H&P:   David Chatman is a 69 y.o. male presenting with 8 hour history of diplopia, dysphasia, gait disturbance, nausea and vomiting.   His last known well time as 2230 on 12/5. He woke up at 0100 on 12/6 with double vision and nausea. He had some weakness and difficulty getting to the bathroom but did not fall. He reported symptoms of diplopia and difficulty speaking. At 0600 his symptoms worsened.   He reports that he has never had an incident like this before. Patient states that this past September he underwent heart valve replacement surgery and he is not currently taking any anticoagulant medication     Pain:  Pain Assessment  Pain Assessment: 0-10  0-10 (Numeric) Pain Score: 0 - No pain     Education:   Patient education completed regarding results and recommendations of SLP evaluation. Pt verbalizes understanding with all patient questions answered to satisfaction.     Care Team involvement:   Communicated with bedside nurse prior to evaluation with clearance for patient  participation obtained    Disclaimer: This was completed in collaboration with supervising therapist, Corinne Casey M.A. CCC-SLP

## 2024-12-06 NOTE — TELECONSULT
HPI: Telestroke consult at 8:22 am   69 y.o. male presenting with diplopia, dysarthria, and ataxia on awakening.    Last known Well: 10 pm yesterday  NIH Stroke Scale Reported: 2    Imaging Results:  CT Head: CT brain attack head wo IV contrast    Result Date: 12/6/2024  Focal area of gray-white matter differentiation loss consistent with acute/subacute infarction right occipital lobe. No evidence for hemorrhage.     MACRO: Farhat Haywood communicated by EPIC secure chat with  DARIO RMHMAN on 12/6/2024 at 8:12 am.  (**-RCF-**) Findings:  See findings.     Signed by: Farhat Haywood 12/6/2024 8:12 AM Dictation workstation:   [id number]   CTA Head/Neck: n/a  MRI: No MRI head results found for the past 12 months    Assessment:   Working Diagnosis: Ischemic Stroke- symptoms consistent with brainstem involvement with additional subacute infarct in R occipital lobe, all consistent with VB territory embolism.  Given past history  of chronic heart failure EF 22%, afib, and unremarkable carotid US a few months ago suspect this is cardioembolic.      Recommendations:   IV tNK is not recommended/ Rationale time   Patient is NOT a candidate for endovascular treatment/ Rationale low NIH      Additional Recommendations:   Admit for neurologic monitoring, evaluation and management.   Stroke best practices- Swallow eval prior to any PO.  Initiate DVT prophylaxis  Risk factor control- Initial permissive hypertension with serial assessments then gradual reduction by increments of 10-15% as tolerated to ultimate goal <130/80, Intensive statin to goal LDL < 70, Glucose < 180 mg/dl, smoking cessation.   Dual antiplatelet therapy aspirin with clopdiogrel for now BUT suspect he will warrant anticoagulation at DC.  Complete evaluation with MRI to delineate infarct, vessel imaging (MRA head/neck), tele, echo with bubble study.      Jayde Gilbert MD  Consult completed by: TELEPHONE communication was used to provide this telehealth service.   Time includes consultation with ED provider and extensive review of data- history, medical records, test results, and neuroimaging studies: 11 - 20 mins was spent in consultation

## 2024-12-06 NOTE — PROGRESS NOTES
Occupational Therapy    Evaluation    Patient Name: David Chatman  MRN: [id number]  Department: [facility]  Room: [address] [place], [place] [address]  Today's Date: 12/6/2024  Time Calculation  Start Time: 1020  Stop Time: 1033  Time Calculation (min): 13 min    Assessment  IP OT Assessment  OT Assessment: 70 y/o male presenting with c/o diplopia, dysarthria, ataxia.  On eval he requires significantly increased assist for xfers, mobility, and self care d/t decreased strength, balance, activity tolerance, coordination and safety awareness. Skilled OT services are required to maximize safety/IND prior to DC.  Prognosis: Excellent  Barriers to Discharge: Other (Comment) (high fall risk, ataxia, limited by double vision)  Evaluation/Treatment Tolerance: Patient tolerated treatment well  Medical Staff Made Aware: Yes  End of Session Communication: Bedside nurse  End of Session Patient Position: Bed, 3 rail up, Alarm on  Plan:  Treatment Interventions: ADL retraining, Visual perceptual retraining, Functional transfer training, UE strengthening/ROM, Endurance training, Patient/family training, Equipment evaluation/education, Neuromuscular reeducation, Compensatory technique education, Cognitive reorientation  OT Frequency: 4 times per week  OT Discharge Recommendations: High intensity level of continued care  Equipment Recommended upon Discharge: Wheeled walker  OT Recommended Transfer Status: Assist of 1, Assist of 2 (assist x 1-2 for safety)  OT - OK to Discharge: Yes    Subjective   Current Problem:  1. Dysphasia        2. Dysconjugate gaze        3. Cerebrovascular accident (CVA), unspecified mechanism (Multi)        4. Occipital stroke (Multi)  Carotid duplex bilateral    Carotid duplex bilateral      5. Cerebral infarction due to embolism of right posterior cerebral artery (Multi)  Carotid duplex bilateral    Carotid duplex bilateral      6. S/P aortic valve replacement and aortoplasty  Transthoracic Echo Complete    Transthoracic Echo Complete       7. S/P ascending aortic replacement  Transthoracic Echo Complete    Transthoracic Echo Complete      8. HFrEF (heart failure with reduced ejection fraction)  Transthoracic Echo Complete    Transthoracic Echo Complete      9. Dilated cardiomyopathy (Multi)  Transthoracic Echo Complete    Transthoracic Echo Complete        General:  General  Reason for Referral: Decreased ADLS, acute/subacute infarction right occipital lobe  Referred By: Dr. Angeles  Past Medical History Relevant to Rehab: heart failure, afib, gout, hyperlipidemia, SHOAIB  Family/Caregiver Present: Yes  Caregiver Feedback: spouse present, supportive  Prior to Session Communication: Bedside nurse  Patient Position Received: Bed, 2 rail up, Alarm off, caregiver present (sitting EOB with nsg staff)  Preferred Learning Style: verbal, visual, auditory  General Comment: Cleared by nsg, pt met sitting EOB, agreeable to OT session  Precautions:  Hearing/Visual Limitations: hearing WFL, + acute diplopia  Medical Precautions: Fall precautions    Pain:  Pain Assessment  Pain Assessment: 0-10  0-10 (Numeric) Pain Score: 0 - No pain    Objective   Cognition:  Overall Cognitive Status: Within Functional Limits  Orientation Level: Oriented X4  Following Commands: Follows multistep commands with repetition  Safety Judgment: Decreased awareness of need for assistance  Cognition Comments: decreased safety awareness and insight at times  Insight: Mild  Impulsive: Mildly    Home Living:  Type of Home: House  Lives With: Spouse  Home Adaptive Equipment: None  Home Layout: Two level, Stairs to alternate level with rails  Alternate Level Stairs-Rails:  (unilateral)  Alternate Level Stairs-Number of Steps: full flight to 2nd story  Home Access: Stairs to enter with rails  Entrance Stairs-Rails:  (unilateral)  Entrance Stairs-Number of Steps: 3 RAYMUNDO  Bathroom Accessibility: 2nd story     Prior Function:  Level of [place]: Independent with ADLs and functional  transfers, Independent with homemaking with ambulation  Receives Help From: Family  ADL Assistance: Independent  Homemaking Assistance: Independent  Ambulatory Assistance: Independent  Vocational: Retired  Hand Dominance: Right  Prior Function Comments: no recent falls    ADL:  Eating Assistance: Stand by  Eating Deficit: Setup  Grooming Assistance: Minimal  Grooming Deficit: Steadying (standing sinkside hand hygiene)  Bathing Assistance: Moderate  Bathing Deficit:  (anticipated)  UE Dressing Assistance: Minimal  UE Dressing Deficit:  (anticipated)  LE Dressing Assistance: Moderate  LE Dressing Deficit:  (anticipated)  Toileting Assistance with Device: Minimal  Toileting Deficit: Clothing management up, Clothing management down  ADL Comments: very unsteady at times in static and dynamic standing    Activity Tolerance:  Endurance: Decreased tolerance for upright activites  Activity Tolerance Comments: fatigues quickly, limited by diplopia    Bed Mobility/Transfers:   Bed Mobility  Bed Mobility: Yes  Bed Mobility 1  Bed Mobility 1: Sitting to supine  Level of Assistance 1: Minimum assistance  Bed Mobility Comments 1: to elevate LEs    Transfers  Transfer: Yes  Transfer 1  Technique 1: Sit to stand, Stand to sit  Transfer Device 1: Walker  Transfer Level of Assistance 1: Minimum assistance, Moderate assistance  Trials/Comments 1: min-mod A for stability and controlled descent at times. cued on hand placement and walker mgmt    Functional Mobility:  Functional Mobility  Functional Mobility Performed: Yes  Functional Mobility 1  Surface 1: Level tile  Device 1: Rolling walker  Assistance 1: Moderate assistance  Comments 1: pt completes short household distance x 3 trials today with FWW in use, mod A for stability, walker mgmt and maintaining proximity to device. very unsteady at times    Sitting Balance:  Static Sitting Balance  Static Sitting-Balance Support: Feet supported, Bilateral upper extremity supported  Static  Sitting-Level of Assistance: Distant supervision  Static Sitting-Comment/Number of Minutes: EOB sitting    Vision: Vision - Basic Assessment  Current Vision: Does not wear glasses  Patient Visual Report: Diplopia   and Vision - Complex Assessment  Diplopia Assessment: Present all the time/all directions  Vision Comments: trialed eye patch with gauze and tape but pt reported no improvement in symptoms  Sensation:  Light Touch: No apparent deficits (denies numbness/tingling)  Strength:  Strength Comments: BUES 4/5 grossly  Coordination:  Movements are Fluid and Coordinated: No  Upper Body Coordination: WFL  Lower Body Coordination: impaired  Trunk Coordination: impaired   Hand Function:  Hand Function  Gross Grasp: Functional  Coordination: Functional  Extremities: RUE   RUE : Within Functional Limits and LUE   LUE: Within Functional Limits    Outcome Measures: [facility] Daily Activity  Putting on and taking off regular lower body clothing: A lot  Bathing (including washing, rinsing, drying): A lot  Putting on and taking off regular upper body clothing: A little  Toileting, which includes using toilet, bedpan or urinal: A little  Taking care of personal grooming such as brushing teeth: A little  Eating Meals: A little  Daily Activity - Total Score: 16      Education Documentation  Body Mechanics, taught by Edgard Plummer OT at 12/6/2024 11:10 AM.  Learner: Patient  Readiness: Acceptance  Method: Explanation  Response: Needs Reinforcement    Precautions, taught by Edgard Plummer OT at 12/6/2024 11:10 AM.  Learner: Patient  Readiness: Acceptance  Method: Explanation  Response: Needs Reinforcement    ADL Training, taught by Edgard Plummer OT at 12/6/2024 11:10 AM.  Learner: Patient  Readiness: Acceptance  Method: Explanation  Response: Needs Reinforcement    Education Comments  No comments found.      Goals:   Encounter Problems       Encounter Problems (Active)       OT Goals       ADLS (Progressing)       Start:  12/06/24     Expected End:  12/20/24       Patient will complete ADL tasks, with CGA using AE need in order to increase patient's safety and independence with self-care tasks.           Functional Transfers (Progressing)       Start:  12/06/24    Expected End:  12/20/24       Patient will complete functional transfers with CGA using least restrictive device, in order to increase patient's safety and independence with daily tasks.           Activity Tolerance (Progressing)       Start:  12/06/24    Expected End:  12/20/24       Patient will demonstrate the ability to participate in functional activity at least >/= 20 minutes in order to increase patient's safety and independence with daily tasks.

## 2024-12-06 NOTE — H&P
History Of Present Illness  David Chatman is a 69 y.o. male presenting with 8 hour history of diplopia, dysphasia, gait disturbance, nausea and vomiting.     His last known well time as 2230 on 12/5. He woke up at 0100 on 12/6 with double vision and nausea. He had some weakness and difficulty getting to the bathroom but did not fall. He reported symptoms of diplopia and difficulty speaking. At 0600 his symptoms worsened.     He reports that he has never had an incident like this before. Patient states that this past September he underwent heart valve replacement surgery and he is not currently taking any anticoagulant medication.    Past Medical History  He has a past medical history of Gout, Hyperlipidemia, Hypertension, SHOAIB (obstructive sleep apnea), and Paroxysmal atrial fibrillation (Multi) (08/24/2023).    Surgical History  He has a past surgical history that includes Sinus surgery and Cardiac catheterization (N/A, 9/11/2024).     Social History  He reports that he has never smoked. He has never used smokeless tobacco. He reports that he does not currently use alcohol after a past usage of about 2.0 standard drinks of alcohol per week. He reports that he does not use drugs.    Family History  No family history on file.     Allergies  Patient has no known allergies.    Review of Systems   Constitutional:  Positive for fatigue.   Eyes:  Negative for photophobia and visual disturbance.   Respiratory:  Negative for cough, chest tightness and shortness of breath.    Cardiovascular:  Negative for chest pain, palpitations and leg swelling.   Gastrointestinal:  Positive for nausea.   Musculoskeletal:  Positive for gait problem.   Neurological:  Positive for dizziness, speech difficulty, weakness and numbness. Negative for facial asymmetry.   Psychiatric/Behavioral:  Positive for confusion.         Physical Exam  HENT:      Head: Normocephalic and atraumatic.      Nose: Nose normal.   Eyes:      Extraocular Movements:       Left eye: Abnormal extraocular motion present.      Conjunctiva/sclera: Conjunctivae normal.      Comments: Left ptosis present   Cardiovascular:      Rate and Rhythm: Normal rate and regular rhythm.   Pulmonary:      Effort: Pulmonary effort is normal.      Breath sounds: Normal breath sounds.   Musculoskeletal:         General: Normal range of motion.      Cervical back: Normal range of motion.   Skin:     General: Skin is warm and dry.   Neurological:      Mental Status: He is alert.      Cranial Nerves: Cranial nerve deficit present. No facial asymmetry.      Sensory: Sensation is intact.      Motor: No weakness or abnormal muscle tone.      Comments: Dysphasia present   Psychiatric:         Behavior: Behavior normal.         Thought Content: Thought content normal.          Last Recorded Vitals  BP (!) 133/94   Pulse 60   Temp 37.7 °C (99.8 °F) (Oral)   Resp 16   Wt 99.8 kg (220 lb)   SpO2 95%     Relevant Results  Scheduled medications  clopidogrel, 75 mg, oral, Daily  enoxaparin, 40 mg, subcutaneous, q24h  polyethylene glycol, 17 g, oral, Daily      Continuous medications     PRN medications  PRN medications: hydrALAZINE **FOLLOWED BY** [START ON 12/8/2024] hydrALAZINE, labetaloL, oxygen  Results for orders placed or performed during the hospital encounter of 12/06/24 (from the past 24 hours)   CBC and Auto Differential   Result Value Ref Range    WBC 7.6 4.4 - 11.3 x10*3/uL    nRBC 0.0 0.0 - 0.0 /100 WBCs    RBC 4.49 (L) 4.50 - 5.90 x10*6/uL    Hemoglobin 11.4 (L) 13.5 - 17.5 g/dL    Hematocrit 35.7 (L) 41.0 - 52.0 %    MCV 80 80 - 100 fL    MCH 25.4 (L) 26.0 - 34.0 pg    MCHC 31.9 (L) 32.0 - 36.0 g/dL    RDW 16.3 (H) 11.5 - 14.5 %    Platelets 271 150 - 450 x10*3/uL    Neutrophils % 60.3 40.0 - 80.0 %    Immature Granulocytes %, Automated 0.7 0.0 - 0.9 %    Lymphocytes % 26.9 13.0 - 44.0 %    Monocytes % 8.1 2.0 - 10.0 %    Eosinophils % 3.3 0.0 - 6.0 %    Basophils % 0.7 0.0 - 2.0 %     Neutrophils Absolute 4.56 1.20 - 7.70 x10*3/uL    Immature Granulocytes Absolute, Automated 0.05 0.00 - 0.70 x10*3/uL    Lymphocytes Absolute 2.03 1.20 - 4.80 x10*3/uL    Monocytes Absolute 0.61 0.10 - 1.00 x10*3/uL    Eosinophils Absolute 0.25 0.00 - 0.70 x10*3/uL    Basophils Absolute 0.05 0.00 - 0.10 x10*3/uL   Comprehensive metabolic panel   Result Value Ref Range    Glucose 170 (H) 74 - 99 mg/dL    Sodium 139 136 - 145 mmol/L    Potassium 3.5 3.5 - 5.3 mmol/L    Chloride 103 98 - 107 mmol/L    Bicarbonate 25 21 - 32 mmol/L    Anion Gap 15 10 - 20 mmol/L    Urea Nitrogen 29 (H) 6 - 23 mg/dL    Creatinine 1.10 0.50 - 1.30 mg/dL    eGFR 74 >60 mL/min/1.73m*2    Calcium 9.0 8.6 - 10.3 mg/dL    Albumin 3.7 3.4 - 5.0 g/dL    Alkaline Phosphatase 155 (H) 33 - 136 U/L    Total Protein 7.0 6.4 - 8.2 g/dL    AST 17 9 - 39 U/L    Bilirubin, Total 0.6 0.0 - 1.2 mg/dL    ALT 21 10 - 52 U/L   Troponin I, High Sensitivity   Result Value Ref Range    Troponin I, High Sensitivity 13 0 - 20 ng/L   Magnesium   Result Value Ref Range    Magnesium 1.81 1.60 - 2.40 mg/dL   Lipid Panel   Result Value Ref Range    Cholesterol 118 0 - 199 mg/dL    HDL-Cholesterol 32.6 mg/dL    Cholesterol/HDL Ratio 3.6     LDL Calculated 69 <=99 mg/dL    VLDL 16 0 - 40 mg/dL    Triglycerides 82 0 - 149 mg/dL    Non HDL Cholesterol 85 0 - 149 mg/dL   Sars-CoV-2 and Influenza A/B PCR   Result Value Ref Range    Flu A Result Not Detected Not Detected    Flu B Result Not Detected Not Detected    Coronavirus 2019, PCR Not Detected Not Detected   POCT GLUCOSE   Result Value Ref Range    POCT Glucose 167 (H) 74 - 99 mg/dL   ECG 12 lead   Result Value Ref Range    Ventricular Rate 63 BPM    Atrial Rate 63 BPM    SD Interval 220 ms    QRS Duration 160 ms    QT Interval 522 ms    QTC Calculation(Bazett) 534 ms    P Axis 2 degrees    R Axis -16 degrees    T Axis 214 degrees    QRS Count 10 beats    Q Onset 204 ms    P Onset 94 ms    P Offset 131 ms    T Offset  465 ms    QTC Fredericia 530 ms   Protime-INR   Result Value Ref Range    Protime 12.2 9.3 - 12.7 seconds    INR 1.2 0.9 - 1.2   APTT   Result Value Ref Range    aPTT 21.9 (L) 22.0 - 32.5 seconds       ECG 12 lead    Result Date: 12/6/2024  Poor data quality, interpretation may be adversely affected Sinus rhythm with 1st degree AV block Left bundle branch block Abnormal ECG When compared with ECG of 25-SEP-2024 21:20, Sinus rhythm has replaced Atrial fibrillation Vent. rate has decreased BY  34 BPM QT has lengthened Confirmed by Geo Schwartz ([id number]) on 12/6/2024 8:55:28 AM    XR chest 1 view    Result Date: 12/6/2024  STUDY: Chest Radiograph;  12/06/2024 8:19 PM INDICATION: Flu-like symptoms.  COMPARISON: 9/24/2024, 9/25/2024, 9/26/2024 and 9/27/2024 ACCESSION NUMBER(S): [id number] ORDERING CLINICIAN: DARIO DUTTON TECHNIQUE:  Frontal chest was obtained at 08:17 hours. FINDINGS: CARDIOMEDIASTINAL SILHOUETTE: Heart is enlarged.  Prior cardiac surgery  LUNGS: Small to moderate left pleural effusion is new.  Artifact from EKG wires overlie the chest.  Atelectasis left lower lobe.  No pneumothorax.  No definitive regions of airspace consolidation. Sclerosis along the anterior margin of the right first rib appears chronic.  ABDOMEN: No remarkable upper abdominal findings.  BONES: No acute osseous changes.    Small to moderate size left pleural effusion is new compared to prior exam. Left lower lobe atelectasis. Prior cardiac surgery. Signed by Dario Dunn MD    CT brain attack head wo IV contrast    Result Date: 12/6/2024  Interpreted By:  Farhta Haywood, STUDY: CT BRAIN ATTACK HEAD WO IV CONTRAST; 12/6/2024 8:01 am   INDICATION: Signs/Symptoms:Stroke Evaluation. Double vision. Difficulty speaking.   COMPARISON: None.   ACCESSION NUMBER(S): [id number]   ORDERING CLINICIAN: DARIO DUTTON   TECHNIQUE: A helical acquisition data was obtained.   One or more of the following dose reduction techniques were used: Automated  exposure control Adjustment of the mA and/or kV according to patient size, and/or use of iterative reconstruction technique.   FINDINGS: Intracranial findings: There is no evidence for intracranial hemorrhage or mass effect. There is a small focus of loss of gray-white matter differentiation involving the right occipital lobe suspicious for an area of acute/subacute infarction (Series 9, Image 46).   Paranasal sinuses and temporal bone findings:  The visualized portions of the paranasal sinuses, mastoid air cells, and middle ear cavities are clear.   Orbital findings: The visualized portions of the orbits are unremarkable.       Focal area of gray-white matter differentiation loss consistent with acute/subacute infarction right occipital lobe. No evidence for hemorrhage.     MACRO: Farhat Haywood communicated by EPIC secure chat with  DARIO DUTTON on 12/6/2024 at 8:12 am.  (**-RCF-**) Findings:  See findings.     Signed by: Farhat Haywood 12/6/2024 8:12 AM Dictation workstation:   [id number]        Assessment/Plan   Impression: This is a 69 year old male with past medical history of hyperlipidemia , HFrEF s/p aortic valve replacement, atrial fibrillation who presents with 8 hour history of diplopia, dysphasia, gait disturbance, and nausea.      Acute/subacute cerebral infarction due to embolism of right posterior cerebral artery, possible brainstem involvement. Patient reports symptoms of diplopia, gait disturbance, nausea, vomiting, and paresthesias above the R lip. Physical exam significant for L oculomotor dysfunction and dysphasia, suggesting brainstem involvement. CT head 12/6 shows focal area of gray-white matter differentiation loss consistent with acute/subacute infarction right occipital lobe without evidence of hemorrhage, indicating ischemic PCA stroke. Continue to manage with Plavix 75 mg daily for secondary prevention, neurology consulted and following.  Dysphasia. Patient reports fluency difficulty.  Likely secondary to midbrain involvement (corticobulbar tracts) supplied by L MCA and PCA watershed territory. Continue to manage as above.  Diplopia, secondary to left oculomotor dysfunction with ptosis on physical exam, suggesting oculomotor nerve dysfunction. Possible midbrain (L oculomotor nerve nucleus) involvement. Continue to manage as above.  Nausea, secondary to diplopia. Symptomatic treatment to alleviate nausea and improve hydration recommended.   S/P aortic valve replacement and aortoplasty. Patient reports that he is not currently on antiplatelet or anticoagulant therapy. ECG significant for 1st degree AV block, LBBB, decreased ventricular rate and QT lengthening. Suspect possible cardioembolic stroke due to past medical history of atrial fibrillation. Plan to consult cardiology for cardiac evaluation and anticoagulation.     JW-patient seen with MS3 history and physical confirmed.  Patient with likely embolic event with a right posterior cerebral artery, neurology consulted.  Plavix was initiated.  Patient may be candidate for Eliquis given his history of aortic valve.  Echocardiogram ordered.  Cardiology consult for HFrEF and guidance regarding anticoagulation.  Awaiting MRI/MRA.  Awaiting carotid ultrasounds.  Patient will be admitted to the stepdown unit with further medical management pending results of studies.  Patient is full code.  Assessment & Plan  Dysphasia    Dysconjugate gaze    Cerebrovascular accident (CVA), unspecified mechanism (Multi)    Occipital stroke (Multi)    Cerebral infarction due to embolism of right posterior cerebral artery (Multi)             VALDEMAR SOSA

## 2024-12-07 LAB
GLUCOSE BLD MANUAL STRIP-MCNC: 104 MG/DL (ref 74–99)
GLUCOSE BLD MANUAL STRIP-MCNC: 108 MG/DL (ref 74–99)
GLUCOSE BLD MANUAL STRIP-MCNC: 128 MG/DL (ref 74–99)
GLUCOSE BLD MANUAL STRIP-MCNC: 86 MG/DL (ref 74–99)

## 2024-12-07 PROCEDURE — 97530 THERAPEUTIC ACTIVITIES: CPT | Mod: GO

## 2024-12-07 PROCEDURE — 82947 ASSAY GLUCOSE BLOOD QUANT: CPT

## 2024-12-07 PROCEDURE — 2500000004 HC RX 250 GENERAL PHARMACY W/ HCPCS (ALT 636 FOR OP/ED): Performed by: INTERNAL MEDICINE

## 2024-12-07 PROCEDURE — 2500000001 HC RX 250 WO HCPCS SELF ADMINISTERED DRUGS (ALT 637 FOR MEDICARE OP): Performed by: INTERNAL MEDICINE

## 2024-12-07 PROCEDURE — 94760 N-INVAS EAR/PLS OXIMETRY 1: CPT

## 2024-12-07 PROCEDURE — 99232 SBSQ HOSP IP/OBS MODERATE 35: CPT | Performed by: INTERNAL MEDICINE

## 2024-12-07 PROCEDURE — 2060000001 HC INTERMEDIATE ICU ROOM DAILY

## 2024-12-07 RX ADMIN — ENOXAPARIN SODIUM 40 MG: 40 INJECTION SUBCUTANEOUS at 09:52

## 2024-12-07 RX ADMIN — SACUBITRIL AND VALSARTAN 1 TABLET: 24; 26 TABLET, FILM COATED ORAL at 20:43

## 2024-12-07 RX ADMIN — ATORVASTATIN CALCIUM 80 MG: 80 TABLET, FILM COATED ORAL at 20:43

## 2024-12-07 RX ADMIN — CLOPIDOGREL BISULFATE 75 MG: 75 TABLET ORAL at 09:52

## 2024-12-07 RX ADMIN — SACUBITRIL AND VALSARTAN 1 TABLET: 24; 26 TABLET, FILM COATED ORAL at 09:53

## 2024-12-07 RX ADMIN — METOPROLOL SUCCINATE 150 MG: 100 TABLET, FILM COATED, EXTENDED RELEASE ORAL at 09:52

## 2024-12-07 RX ADMIN — EPLERENONE 25 MG: 50 TABLET, FILM COATED ORAL at 09:53

## 2024-12-07 RX ADMIN — EMPAGLIFLOZIN 10 MG: 10 TABLET, FILM COATED ORAL at 09:52

## 2024-12-07 RX ADMIN — POLYETHYLENE GLYCOL 3350 17 G: 17 POWDER, FOR SOLUTION ORAL at 09:52

## 2024-12-07 ASSESSMENT — COGNITIVE AND FUNCTIONAL STATUS - GENERAL
MOVING FROM LYING ON BACK TO SITTING ON SIDE OF FLAT BED WITH BEDRAILS: A LITTLE
MOBILITY SCORE: 13
DAILY ACTIVITIY SCORE: 17
EATING MEALS: A LITTLE
STANDING UP FROM CHAIR USING ARMS: A LOT
MOVING TO AND FROM BED TO CHAIR: A LOT
WALKING IN HOSPITAL ROOM: A LOT
DRESSING REGULAR UPPER BODY CLOTHING: A LITTLE
CLIMB 3 TO 5 STEPS WITH RAILING: TOTAL
DRESSING REGULAR LOWER BODY CLOTHING: A LITTLE
TOILETING: A LITTLE
HELP NEEDED FOR BATHING: A LOT
PERSONAL GROOMING: A LITTLE
TURNING FROM BACK TO SIDE WHILE IN FLAT BAD: A LITTLE

## 2024-12-07 ASSESSMENT — PAIN SCALES - GENERAL
PAINLEVEL_OUTOF10: 0 - NO PAIN

## 2024-12-07 ASSESSMENT — PAIN - FUNCTIONAL ASSESSMENT
PAIN_FUNCTIONAL_ASSESSMENT: 0-10
PAIN_FUNCTIONAL_ASSESSMENT: 0-10

## 2024-12-07 ASSESSMENT — PAIN SCALES - WONG BAKER: WONGBAKER_NUMERICALRESPONSE: NO HURT

## 2024-12-07 NOTE — PROGRESS NOTES
David Chatman is a 69 y.o. male on day 1 of admission presenting with Dysphasia.      Subjective   Patient is doing okay today.  Really wants to go home and not go to physical therapy.  Wants to try doing physical therapy from home.       Objective     Last Recorded Vitals  /89 (BP Location: Right arm, Patient Position: Sitting)   Pulse 82   Temp 36.7 °C (98.1 °F) (Temporal)   Resp 20   Wt 99.7 kg (219 lb 12.8 oz)   SpO2 95%   Intake/Output last 3 Shifts:    Intake/Output Summary (Last 24 hours) at 12/7/2024 1337  Last data filed at 12/6/2024 2100  Gross per 24 hour   Intake --   Output 200 ml   Net -200 ml       Admission Weight  Weight: 99.8 kg (220 lb) (12/06/24 0737)    Daily Weight  12/07/24 : 99.7 kg (219 lb 12.8 oz)    Image Results  Carotid duplex bilateral  Narrative: Interpreted By:  Farhat Haywood,   STUDY:  [id number]; 12/6/2024 1:20 pm      INDICATION:  Signs/Symptoms:stroke : Slurred speech. Double vision. Legs not  working.      COMPARISON:  09/12/2024      ACCESSION NUMBER(S):  [id number]      ORDERING CLINICIAN:  JEIMY GIVENS      TECHNIQUE:  Carotid Examination using B-mode, color flow and doppler spectral  analysis.      FINDINGS:  RIGHT CAROTID SYSTEM  DCCA PSV/EDV: 41.2 cm/s / 12.7 cm/s cm/sec  ECA PSV: 57.5 cm/s cm/sec  PICA PSV/EDV: 38.1 / 11.9 cm/sec  LIGIA PSV/EDV: 42.1 cm/s / 17.9 cm/s cm/sec  DICA PSV/EDV: 52.5 cm/s / 38.1 cm/s cm/sec  JERRY/VCC Ratio: 0.9  VERT : Antegrade      LEFT CAROTID SYSTEM  DCCA PSV/EDV: 35.6 cm/s,7.4 cm/s / 7.4 cm/s cm/sec  ECA PSV: 63.3 cm/s cm/sec  PICA PSV/EDV: 57.2 cm/s / 21.5 cm/s cm/sec  LIGIA PSV/EDV: 51 / 20 cm/sec  DICA PSV/EDV: 127.1 cm/s / 21.5 cm/s cm/sec  JERRY/VCC Ratio: 1.6  VERT : Antegrade      DUPLEX IMAGES:  Right Carotid System: There is minimal plaque within the proximal  right ICA..      Left Carotid System: There is minimal plaque within the proximal left  ICA.      The estimate of the degree of stenosis included in this report  is  based on the NASCET method for calculating stenosis, using the  internal carotid artery distal to the stenosis as the reference point.      Impression: Less than 50% stenosis right ICA and left ICA.      MACRO:  None.      Signed by: Farhat Haywood 12/6/2024 3:12 PM  Dictation workstation:   [id number]  Transthoracic Echo Complete              [facility]  [address] , [place] [id number]              Phone [phone]    TRANSTHORACIC ECHOCARDIOGRAM REPORT    Patient Name:       NAN Norman Physician:    [id number] Phelan DO  Study Date:         12/6/2024            Ordering Provider:    [id number] SAIGE PHELAN  MRN/PID:            [id number]             Fellow:  Accession#:         [id number]         Nurse:  Date of Birth/Age:  1955 / 69 years Sonographer:          Jacy Reed RDCS  Gender Assigned at                      Additional Staff:  Birth:  Height:             185.42 cm            Admit Date:  Weight:             98.88 kg             Admission Status:     Inpatient -                                                                 Routine  BSA / BMI:          2.23 m2 / 28.76      Department Location:  [facility]                      kg/m2  Blood Pressure: 137 /85 mmHg    Study Type:    TRANSTHORACIC ECHO (TTE) COMPLETE  Diagnosis/ICD: Presence of prosthetic heart valve-Z95.2; Presence of other                 vascular implants and grafts-Z95.828; Unspecified systolic                 (congestive) heart failure (CHF)-I50.20; Dilated                 cardiomyopathy-I42.0  Indication:    S/P AVR, Dilated Cardiomyopathy, S/P Ascending aortic replacement  CPT Codes:     Echo Complete w Full [id number]    Patient History:  Valve Disorders:   Aortic Valve  Replacement.  Pertinent History: Cardiomyopathy, A-Fib, HTN, CHF, Chest Pain, Hyperlipidemia                     and LBBB, S/P AVR 9/24, Dilated Cardiomyopathy, S/P Ascending                     aortic replacement, Aortoplasty.    Study Detail: The following Echo studies were performed: 2D, M-Mode, Doppler and                color flow.       PHYSICIAN INTERPRETATION:  Left Ventricle: There is global hypokinesis of the left ventricle with minor regional variations. The left ventricular cavity size is mild to moderately dilated. There is normal septal and normal posterior left ventricular wall thickness. Abnormal (paradoxical) septal motion consistent with post-operative status. Spectral Doppler shows a Grade I (impaired relaxation pattern) of left ventricular diastolic filling with normal left atrial filling pressure. There is no definite left ventricular thrombus visualized.  Left Atrium: The left atrium is normal in size. A bubble study using agitated saline was performed. Bubble study is negative.  Right Ventricle: The right ventricle is normal in size. There is low normal right ventricular systolic function.  Right Atrium: The right atrium is normal in size.  Aortic Valve: There is a prosthetic aortic valve present. The aortic valve dimensionless index is 0.46. There is no evidence of aortic valve regurgitation. The peak instantaneous gradient of the aortic valve is 18 mmHg. The mean gradient of the aortic valve is 9 mmHg. Ascending aorta graft appears normall.  Mitral Valve: The mitral valve is normal in structure. There is mild mitral valve regurgitation.  Tricuspid Valve: The tricuspid valve is structurally normal. There is mild tricuspid regurgitation. The Doppler estimated RVSP is mildly elevated right ventricular systolic pressure at 34.8 mmHg.  Pulmonic Valve: The pulmonic valve is structurally normal. There is no indication of pulmonic valve regurgitation.  Pericardium: Trivial pericardial effusion.  There is no evidence of cardiac tamponade.  Pleural: There is a moderate left pleural effusion.  Aorta: The aortic root is normal. Ascending aorta graft appears normal.       CONCLUSIONS:   1. There is global hypokinesis of the left ventricle with minor regional variations.   2. Spectral Doppler shows a Grade I (impaired relaxation pattern) of left ventricular diastolic filling with normal left atrial filling pressure.   3. Left ventricular cavity size is mild to moderately dilated.   4. Abnormal septal motion consistent with post-operative status.   5. No left ventricular thrombus visualized.   6. There is low normal right ventricular systolic function.   7. There is a moderate pleural effusion.   8. There is no evidence of cardiac tamponade.   9. Mildly elevated right ventricular systolic pressure.  10. Ascending aorta graft appears normall.    QUANTITATIVE DATA SUMMARY:     2D MEASUREMENTS:            Normal Ranges:  LAs:             2.30 cm    (2.7-4.0cm)  IVSd:            0.63 cm    (0.6-1.1cm)  LVPWd:           0.95 cm    (0.6-1.1cm)  LVIDd:           6.74 cm    (3.9-5.9cm)  LVIDs:           6.06 cm  LV Mass Index:   100.6 g/m2  LV % FS          10.1 %       LA VOLUME:                    Normal Ranges:  LA Vol A4C:        75.9 ml    (22+/-6mL/m2)  LA Vol A2C:        91.4 ml  LA Vol BP:         87.3 ml  LA Vol Index A4C:  34.0ml/m2  LA Vol Index A2C:  40.9 ml/m2  LA Vol Index BP:   39.1 ml/m2  LA Area A4C:       25.5 cm2  LA Area A2C:       26.7 cm2  LA Major Axis A4C: 7.3 cm  LA Major Axis A2C: 6.6 cm  LA Volume Index:   36.5 ml/m2  LA Vol A4C:        69.7 ml  LA Vol A2C:        87.3 ml  LA Vol Index BSA:  35.2 ml/m2       RA VOLUME BY A/L METHOD:          Normal Ranges:  RA Area A4C:             32.6 cm2       M-MODE MEASUREMENTS:         Normal Ranges:  Ao Root:             3.15 cm (2.0-3.7cm)       AORTA MEASUREMENTS:         Normal Ranges:  Asc Ao, d:          2.90 cm (2.1-3.4cm)       LV SYSTOLIC FUNCTION  BY 2D PLANIMETRY (MOD):                       Normal Ranges:  EF-A4C View:    22 % (>=55%)  LV EF Reported: 23 %       LV DIASTOLIC FUNCTION:           Normal Ranges:  MV Peak E:             0.61 m/s  (0.7-1.2 m/s)  MV Peak A:             1.10 m/s  (0.42-0.7 m/s)  E/A Ratio:             0.56      (1.0-2.2)  MV e'                  0.067 m/s (>8.0)  MV lateral e'          0.09 m/s  MV medial e'           0.04 m/s  E/e' Ratio:            9.09      (<8.0)       MITRAL VALVE:          Normal Ranges:  MV DT:        168 msec (150-240msec)       AORTIC VALVE:                      Normal Ranges:  AoV Vmax:                2.15 m/s  (<=1.7m/s)  AoV Peak P.5 mmHg (<20mmHg)  AoV Mean P.0 mmHg  (1.7-11.5mmHg)  LVOT Max Alfredito:            1.05 m/s  (<=1.1m/s)  AoV VTI:                 42.60 cm  (18-25cm)  LVOT VTI:                19.70 cm  LVOT Diameter:           2.10 cm   (1.8-2.4cm)  AoV Area, VTI:           1.60 cm2  (2.5-5.5cm2)  AoV Area,Vmax:           1.69 cm2  (2.5-4.5cm2)  AoV Dimensionless Index: 0.46       RIGHT VENTRICLE:  RV Basal 4.26 cm  RV Mid   1.87 cm  RV Major 10.1 cm  TAPSE:   12.7 mm  RV s'    0.08 m/s       TRICUSPID VALVE/RVSP:          Normal Ranges:  Peak TR Velocity:     2.82 m/s  RV Syst Pressure:     35 mmHg  (< 30mmHg)  IVC Diam:             1.17 cm       [id number] Zacarias Jarquin DO  Electronically signed on 2024 at 1:02:23 PM       ** Final **  ECG 12 lead  ** Poor data quality, interpretation may be adversely affected  Sinus rhythm with 1st degree AV block  Left bundle branch block  Abnormal ECG  When compared with ECG of 25-SEP-2024 21:20,  Sinus rhythm has replaced Atrial fibrillation  Vent. rate has decreased BY  34 BPM  QT has lengthened  Confirmed by Geo Schwartz ([id number]) on 2024 8:55:28 AM  XR chest 1 view  Narrative: STUDY:  Chest Radiograph;  2024 8:19 PM   INDICATION:  Flu-like symptoms.    COMPARISON:  2024, 2024, 2024 and  9/27/2024  ACCESSION NUMBER(S):  [id number]  ORDERING CLINICIAN:  DARIO DUTTON  TECHNIQUE:  Frontal chest was obtained at 08:17 hours.  FINDINGS:  CARDIOMEDIASTINAL SILHOUETTE:  Heart is enlarged.  Prior cardiac surgery     LUNGS:  Small to moderate left pleural effusion is new.  Artifact from EKG  wires overlie the chest.  Atelectasis left lower lobe.  No  pneumothorax.  No definitive regions of airspace consolidation.   Sclerosis along the anterior margin of the right first rib appears  chronic.     ABDOMEN:  No remarkable upper abdominal findings.     BONES:  No acute osseous changes.  Impression: Small to moderate size left pleural effusion is new compared to prior  exam.  Left lower lobe atelectasis.  Prior cardiac surgery.  Signed by Dario Dunn MD  CT brain attack head wo IV contrast  Narrative: Interpreted By:  Farhat Haywood,   STUDY:  CT BRAIN ATTACK HEAD WO IV CONTRAST; 12/6/2024 8:01 am      INDICATION:  Signs/Symptoms:Stroke Evaluation. Double vision. Difficulty speaking.      COMPARISON:  None.      ACCESSION NUMBER(S):  [id number]      ORDERING CLINICIAN:  DARIO DUTTON      TECHNIQUE:  A helical acquisition data was obtained.      One or more of the following dose reduction techniques were used:  Automated exposure control Adjustment of the mA and/or kV according  to patient size, and/or use of iterative reconstruction technique.      FINDINGS:  Intracranial findings: There is no evidence for intracranial  hemorrhage or mass effect. There is a small focus of loss of  gray-white matter differentiation involving the right occipital lobe  suspicious for an area of acute/subacute infarction (Series 9, Image  46).      Paranasal sinuses and temporal bone findings:  The visualized  portions of the paranasal sinuses, mastoid air cells, and middle ear  cavities are clear.      Orbital findings: The visualized portions of the orbits are  unremarkable.      Impression: Focal area of gray-white matter  differentiation loss consistent with  acute/subacute infarction right occipital lobe. No evidence for  hemorrhage.          MACRO:  Farhat Haywood communicated by EPIC secure chat with  DARIO DUTTON on  12/6/2024 at 8:12 am.  (**-RCF-**) Findings:  See findings.          Signed by: Farhat Haywood 12/6/2024 8:12 AM  Dictation workstation:   [id number]      Physical Exam  Early no acute distress  HEENT PERRL still with slight lateral rectus dysfunction left eye  Cardiovascular S1-S2 regular rate rhythm no murmurs rubs or gallops possible prosthetic valve click heard  Lungs clear to auscultation bilaterally  Abdomen bowel sounds present nontender  Extremities no clubbing cyanosis edema    Relevant Results  Scheduled medications  atorvastatin, 80 mg, oral, Nightly  clopidogrel, 75 mg, oral, Daily  empagliflozin, 10 mg, oral, Daily  enoxaparin, 40 mg, subcutaneous, q24h  eplerenone, 25 mg, oral, Daily  metoprolol succinate XL, 150 mg, oral, Daily  polyethylene glycol, 17 g, oral, Daily  sacubitriL-valsartan, 1 tablet, oral, BID      Continuous medications     PRN medications  PRN medications: hydrALAZINE **FOLLOWED BY** [START ON 12/8/2024] hydrALAZINE, labetaloL, ondansetron, oxygen  Results for orders placed or performed during the hospital encounter of 12/06/24 (from the past 24 hours)   Urinalysis with Reflex Culture and Microscopic   Result Value Ref Range    Color, Urine Yellow Light-Yellow, Yellow, Dark-Yellow    Appearance, Urine Clear Clear    Specific Gravity, Urine 1.027 1.005 - 1.035    pH, Urine 6.0 5.0, 5.5, 6.0, 6.5, 7.0, 7.5, 8.0    Protein, Urine 20 (TRACE) NEGATIVE, 10 (TRACE), 20 (TRACE) mg/dL    Glucose, Urine Normal Normal mg/dL    Blood, Urine NEGATIVE NEGATIVE    Ketones, Urine NEGATIVE NEGATIVE mg/dL    Bilirubin, Urine NEGATIVE NEGATIVE    Urobilinogen, Urine Normal Normal mg/dL    Nitrite, Urine NEGATIVE NEGATIVE    Leukocyte Esterase, Urine NEGATIVE NEGATIVE   Urinalysis Microscopic   Result  Value Ref Range    WBC, Urine NONE 1-5, NONE /HPF    RBC, Urine NONE NONE, 1-2, 3-5 /HPF    Mucus, Urine FEW Reference range not established. /LPF   POCT GLUCOSE   Result Value Ref Range    POCT Glucose 139 (H) 74 - 99 mg/dL   POCT GLUCOSE   Result Value Ref Range    POCT Glucose 108 (H) 74 - 99 mg/dL   POCT GLUCOSE   Result Value Ref Range    POCT Glucose 128 (H) 74 - 99 mg/dL     Impression: 69-year-old gentleman admitted with right PCA CVA    Plan:    1.  CVA-likely embolic  -MRI/MRA pending at this time  -Awaiting further recommendations from neurology regarding anticoagulation  -Plan is for acute rehab I discussed with the patient that if he is able to show that he is strong enough to go home we would not necessarily need to send him to SNF.  However we will need to wait for their assessment.    2.  Chronic HFrEF  -Stable, continue cardiac management, appreciate cardiology evaluated the patient during this hospital stay    3.  History of bioprosthetic aortic valve  -Continue with current plan to start oral anticoagulation therapy.  -Otherwise valve seems to be working as it should    DVT prophylaxis             Assessment/Plan                                Francisco Angeles MD

## 2024-12-07 NOTE — CONSULTS
Inpatient consult to Neurology  Consult performed by: Bee Tapia MD  Consult ordered by: Francisco Angeles MD          History Of Present Illness  David Chatman is a 69 y.o. male presenting with new onset axial and gait ataxia, diplopia, dysarthria.  Patient's wife reports that at 6 AM yesterday he was stumbling in the bathroom and appeared wobbly.  When she went to check on him he was pale, pouring sweat very dysarthric and had a concerning double vision.  She does report that on September 23 he underwent aortic valve replacement via open heart surgery but had recovered since then.  He has never experienced the symptoms before and today he is no better than yesterday.     Past Medical History  He has a past medical history of Gout, Hyperlipidemia, Hypertension, SHOAIB (obstructive sleep apnea), and Paroxysmal atrial fibrillation (Multi) (08/24/2023).    Surgical History  He has a past surgical history that includes Sinus surgery and Cardiac catheterization (N/A, 9/11/2024).     Social History  He reports that he has never smoked. He has never used smokeless tobacco. He reports that he does not currently use alcohol after a past usage of about 2.0 standard drinks of alcohol per week. He reports that he does not use drugs.     Allergies  Patient has no known allergies.    Medications  Medications Prior to Admission   Medication Sig Dispense Refill Last Dose/Taking    amiodarone (Pacerone) 200 mg tablet Take 1 tablet (200 mg) by mouth once daily. For 1 month 90 tablet 3     aspirin 81 mg EC tablet Take 1 tablet (81 mg) by mouth once daily. 90 tablet 3     atorvastatin (Lipitor) 80 mg tablet Take 1 tablet (80 mg) by mouth once daily at bedtime. 90 tablet 3     empagliflozin (Jardiance) 10 mg Take 1 tablet (10 mg) by mouth once daily. 90 tablet 3     eplerenone (Inspra) 25 mg tablet Take 1 tablet (25 mg) by mouth once daily. 90 tablet 3     hydrALAZINE (Apresoline) 25 mg tablet Take 0.5 tablets (12.5 mg) by  "mouth 3 times a day. 135 tablet 3     metoprolol succinate XL (Toprol-XL) 100 mg 24 hr tablet Take 1.5 tablets (150 mg) by mouth once daily. Do not crush or chew. 135 tablet 3     sacubitriL-valsartan (Entresto) 24-26 mg tablet Take 1 tablet by mouth 2 times a day. 180 tablet 3     torsemide (Demadex) 20 mg tablet Take 2 tablets (40 mg) by mouth once daily as needed (swelling, weight gain). (Patient taking differently: Take 2 tablets (40 mg) by mouth once daily.) 180 tablet 3      Review of Systems    Scheduled medications  atorvastatin, 80 mg, oral, Nightly  clopidogrel, 75 mg, oral, Daily  empagliflozin, 10 mg, oral, Daily  enoxaparin, 40 mg, subcutaneous, q24h  eplerenone, 25 mg, oral, Daily  metoprolol succinate XL, 150 mg, oral, Daily  polyethylene glycol, 17 g, oral, Daily  sacubitriL-valsartan, 1 tablet, oral, BID      Continuous medications     PRN medications  PRN medications: hydrALAZINE **FOLLOWED BY** [START ON 12/8/2024] hydrALAZINE, labetaloL, ondansetron, oxygen    Last Recorded Vitals   Blood pressure 133/89, pulse 82, temperature 36.7 °C (98.1 °F), temperature source Temporal, resp. rate 20, height 1.854 m (6' 1\"), weight 99.7 kg (219 lb 12.8 oz), SpO2 95%.    Heart is RRR, Lungs CTAB  Neurologically, pt is awake and oriented x4. Attention, concentration, memory, cortical processing are intact. No aphasia  Cranial nerves: VFF, diplopia related to failure of the yoking at the medullary level, No nystagmus, Face is symmetric to sensory and motor, mild dysarthria, Tongue protrudes midline, Shrug symmetric  Motor: 5/5 strength B throughout, no tremor or asterixis  Sensation is intact bilaterally throughout  Coordination: Right hemiataxia  DTR symmetric  Gait unstable due to ataxia    Relevant Results    Results for orders placed or performed during the hospital encounter of 12/06/24 (from the past 96 hours)   CBC and Auto Differential   Result Value Ref Range    WBC 7.6 4.4 - 11.3 x10*3/uL    nRBC 0.0 " 0.0 - 0.0 /100 WBCs    RBC 4.49 (L) 4.50 - 5.90 x10*6/uL    Hemoglobin 11.4 (L) 13.5 - 17.5 g/dL    Hematocrit 35.7 (L) 41.0 - 52.0 %    MCV 80 80 - 100 fL    MCH 25.4 (L) 26.0 - 34.0 pg    MCHC 31.9 (L) 32.0 - 36.0 g/dL    RDW 16.3 (H) 11.5 - 14.5 %    Platelets 271 150 - 450 x10*3/uL    Neutrophils % 60.3 40.0 - 80.0 %    Immature Granulocytes %, Automated 0.7 0.0 - 0.9 %    Lymphocytes % 26.9 13.0 - 44.0 %    Monocytes % 8.1 2.0 - 10.0 %    Eosinophils % 3.3 0.0 - 6.0 %    Basophils % 0.7 0.0 - 2.0 %    Neutrophils Absolute 4.56 1.20 - 7.70 x10*3/uL    Immature Granulocytes Absolute, Automated 0.05 0.00 - 0.70 x10*3/uL    Lymphocytes Absolute 2.03 1.20 - 4.80 x10*3/uL    Monocytes Absolute 0.61 0.10 - 1.00 x10*3/uL    Eosinophils Absolute 0.25 0.00 - 0.70 x10*3/uL    Basophils Absolute 0.05 0.00 - 0.10 x10*3/uL   Comprehensive metabolic panel   Result Value Ref Range    Glucose 170 (H) 74 - 99 mg/dL    Sodium 139 136 - 145 mmol/L    Potassium 3.5 3.5 - 5.3 mmol/L    Chloride 103 98 - 107 mmol/L    Bicarbonate 25 21 - 32 mmol/L    Anion Gap 15 10 - 20 mmol/L    Urea Nitrogen 29 (H) 6 - 23 mg/dL    Creatinine 1.10 0.50 - 1.30 mg/dL    eGFR 74 >60 mL/min/1.73m*2    Calcium 9.0 8.6 - 10.3 mg/dL    Albumin 3.7 3.4 - 5.0 g/dL    Alkaline Phosphatase 155 (H) 33 - 136 U/L    Total Protein 7.0 6.4 - 8.2 g/dL    AST 17 9 - 39 U/L    Bilirubin, Total 0.6 0.0 - 1.2 mg/dL    ALT 21 10 - 52 U/L   Troponin I, High Sensitivity   Result Value Ref Range    Troponin I, High Sensitivity 13 0 - 20 ng/L   Magnesium   Result Value Ref Range    Magnesium 1.81 1.60 - 2.40 mg/dL   Lipid Panel   Result Value Ref Range    Cholesterol 118 0 - 199 mg/dL    HDL-Cholesterol 32.6 mg/dL    Cholesterol/HDL Ratio 3.6     LDL Calculated 69 <=99 mg/dL    VLDL 16 0 - 40 mg/dL    Triglycerides 82 0 - 149 mg/dL    Non HDL Cholesterol 85 0 - 149 mg/dL   Hemoglobin A1C   Result Value Ref Range    Hemoglobin A1C 6.1 (H) See comment %    Estimated Average  Glucose 128 Not Established mg/dL   Sars-CoV-2 and Influenza A/B PCR   Result Value Ref Range    Flu A Result Not Detected Not Detected    Flu B Result Not Detected Not Detected    Coronavirus 2019, PCR Not Detected Not Detected   POCT GLUCOSE   Result Value Ref Range    POCT Glucose 167 (H) 74 - 99 mg/dL   ECG 12 lead   Result Value Ref Range    Ventricular Rate 63 BPM    Atrial Rate 63 BPM    ND Interval 220 ms    QRS Duration 160 ms    QT Interval 522 ms    QTC Calculation(Bazett) 534 ms    P Axis 2 degrees    R Axis -16 degrees    T Axis 214 degrees    QRS Count 10 beats    Q Onset 204 ms    P Onset 94 ms    P Offset 131 ms    T Offset 465 ms    QTC Fredericia 530 ms   Protime-INR   Result Value Ref Range    Protime 12.2 9.3 - 12.7 seconds    INR 1.2 0.9 - 1.2   APTT   Result Value Ref Range    aPTT 21.9 (L) 22.0 - 32.5 seconds   B-Type Natriuretic Peptide   Result Value Ref Range    BNP 1,196 (H) 0 - 99 pg/mL   Transthoracic Echo Complete   Result Value Ref Range    AV pk aparna 2.15 m/s    LVOT diam 2.10 cm    AV mn grad 9 mmHg    MV E/A ratio 0.56     Tricuspid annular plane systolic excursion 1.3 cm    LA vol index A/L 39.1 ml/m2    LV EF 23 %    RV free wall pk S' 7.72 cm/s    RVSP 34.8 mmHg    LVIDd 6.74 cm    AV pk grad 18 mmHg    Aortic Valve Area by Continuity of VTI 1.60 cm2    Aortic Valve Area by Continuity of Peak Velocity 1.69 cm2    LV A4C EF 22.3    Urinalysis with Reflex Culture and Microscopic   Result Value Ref Range    Color, Urine Yellow Light-Yellow, Yellow, Dark-Yellow    Appearance, Urine Clear Clear    Specific Gravity, Urine 1.027 1.005 - 1.035    pH, Urine 6.0 5.0, 5.5, 6.0, 6.5, 7.0, 7.5, 8.0    Protein, Urine 20 (TRACE) NEGATIVE, 10 (TRACE), 20 (TRACE) mg/dL    Glucose, Urine Normal Normal mg/dL    Blood, Urine NEGATIVE NEGATIVE    Ketones, Urine NEGATIVE NEGATIVE mg/dL    Bilirubin, Urine NEGATIVE NEGATIVE    Urobilinogen, Urine Normal Normal mg/dL    Nitrite, Urine NEGATIVE NEGATIVE     Leukocyte Esterase, Urine NEGATIVE NEGATIVE   Urinalysis Microscopic   Result Value Ref Range    WBC, Urine NONE 1-5, NONE /HPF    RBC, Urine NONE NONE, 1-2, 3-5 /HPF    Mucus, Urine FEW Reference range not established. /LPF   POCT GLUCOSE   Result Value Ref Range    POCT Glucose 139 (H) 74 - 99 mg/dL   POCT GLUCOSE   Result Value Ref Range    POCT Glucose 108 (H) 74 - 99 mg/dL   POCT GLUCOSE   Result Value Ref Range    POCT Glucose 128 (H) 74 - 99 mg/dL        MR brain wo IV contrast    Result Date: 12/7/2024  Interpreted By:  Kelley Blackburn, STUDY: MR BRAIN WO IV CONTRAST; MR ANGIO NECK WO IV CONTRAST; MR ANGIO HEAD WO IV CONTRAST;  12/6/2024 7:37 pm   INDICATION: Signs/Symptoms:occipital stroke, please do MRA as well; Signs/Symptoms:stroke.  Stroke protocol.     COMPARISON: Head CT December 6, 2024   ACCESSION NUMBER(S): [id number]; [id number]; [id number]   ORDERING CLINICIAN: JEIMY GIVENS; FRANCHESKA EATON   TECHNIQUE: Axial T2, FLAIR, DWI, gradient echo T2 and  sagittal and coronal T1 weighted images of brain were acquired.   Time-of-flight MRA of the head  and neck was performed. The images were reviewed as source images and maximum intensity projections.   FINDINGS: Brain:   CSF Spaces: There is prominence of ventricles and sulci compatible with diffuse parenchymal volume loss.   Parenchyma: There is a focus of increased signal on diffusion-weighted imaging with subtle diminished signal on the ADC map within the dorsal aspect of the superior avila/inferior midbrain. There is little to no corresponding signal abnormality on FLAIR and T2 weighted imaging. There are scattered, nonspecific hyperintensities on FLAIR and T2 weighted imaging in the subcortical and periventricular white matter. There are a few punctate foci of susceptibility artifact on gradient echo T2 weighted imaging in the cerebral hemispheres which could be related to remote microhemorrhages or mineralization. There are areas of  encephalomalacia in the cerebellum and right occipital lobe. There is no mass effect or midline shift.   Paranasal Sinuses and Mastoids: There is mild mucosal thickening within scattered paranasal sinuses. The mastoid air cells are clear.   MRA of head:   Anterior circulation:  There is multifocal irregularity and potential narrowing of the petrous segments of the internal carotid arteries bilaterally thought to be due at least in part to artifact although atherosclerotic disease and associated stenosis can not be excluded. The intracranial internal carotid arteries are mildly degraded by artifact but appear to be patent. The proximal anterior and middle cerebral arteries are patent. There is subtle fullness at the junction of the A1 and A2 segments of the right TORY which may correspond to partial volume averaging with a bend in the vessel although a small aneurysm is difficult to entirely exclude. There is also a tiny focal outpouching arising from the communicating segment of the left ICA that appears to correspond to the origin of a small caliber posterior communicating artery which is also degraded by artifact.   Posterior circulation:    The intracranial segments of the vertebral arteries and the basilar artery are patent. There is subtle narrowing of the P1 segment of the right PCA. There is fullness at the junction of the PCOM and P1 and P2 segments of the left PCA measuring approximately 2 mm.   MRA of neck:   The source images are somewhat degraded by artifact and patient motion.   Carotid vessels:   The visualized aspects of the common carotid arteries are patent. There is mild irregularity and attenuation of flow related signal at the bifurcations and origins of the ICAs likely due to a combination of artifact and atherosclerotic plaque. There is tortuosity of the distal cervical ICAs. Attenuated flow related signal in the region of tortuosity is thought to be due to artifact. There is otherwise no  measurable stenosis.   Vertebral vessels:   Both vessels are somewhat degraded by artifact. The left is dominant and tortuous. They are otherwise grossly patent.       MRI Brain:   1. There is abnormal signal on diffusion-weighted imaging within the dorsal aspect of the superior avila/inferior brain posteriorly at the midline. It is uncertain if this is due to artifact versus punctate focus of acute to early subacute ischemic injury. 2. Nonspecific white matter changes most likely represent small-vessel ischemic disease in a patient of this age. Areas of encephalomalacia in the cerebellum and right occipital lobe likely represent remote infarcts.     MRA:   1. Somewhat limited MRA of the head and neck due to artifact and motion degradation. Within these limitations, no proximal large branch vessel cutoff or hemodynamically significant stenosis is identified. 2. Possible small aneurysm versus partial volume averaging at the junction of the left posterior communicating artery with the P1 and P2 segments of the left PCA. Additionally there is subtle fullness at the junction of the A1 and A2 segments of the right TORY which could reflect partial volume averaging with a bend in the vessel.   MACRO: None   Signed by: Kelley Blackburn 12/7/2024 1:58 PM Dictation workstation:   [id number]    MR angio head wo IV contrast    Result Date: 12/7/2024  Interpreted By:  Kelley Blackburn, STUDY: MR BRAIN WO IV CONTRAST; MR ANGIO NECK WO IV CONTRAST; MR ANGIO HEAD WO IV CONTRAST;  12/6/2024 7:37 pm   INDICATION: Signs/Symptoms:occipital stroke, please do MRA as well; Signs/Symptoms:stroke.  Stroke protocol.     COMPARISON: Head CT December 6, 2024   ACCESSION NUMBER(S): [id number]; [id number]; [id number]   ORDERING CLINICIAN: JEIMY EATON   TECHNIQUE: Axial T2, FLAIR, DWI, gradient echo T2 and  sagittal and coronal T1 weighted images of brain were acquired.   Time-of-flight MRA of the head  and neck was  performed. The images were reviewed as source images and maximum intensity projections.   FINDINGS: Brain:   CSF Spaces: There is prominence of ventricles and sulci compatible with diffuse parenchymal volume loss.   Parenchyma: There is a focus of increased signal on diffusion-weighted imaging with subtle diminished signal on the ADC map within the dorsal aspect of the superior avila/inferior midbrain. There is little to no corresponding signal abnormality on FLAIR and T2 weighted imaging. There are scattered, nonspecific hyperintensities on FLAIR and T2 weighted imaging in the subcortical and periventricular white matter. There are a few punctate foci of susceptibility artifact on gradient echo T2 weighted imaging in the cerebral hemispheres which could be related to remote microhemorrhages or mineralization. There are areas of encephalomalacia in the cerebellum and right occipital lobe. There is no mass effect or midline shift.   Paranasal Sinuses and Mastoids: There is mild mucosal thickening within scattered paranasal sinuses. The mastoid air cells are clear.   MRA of head:   Anterior circulation:  There is multifocal irregularity and potential narrowing of the petrous segments of the internal carotid arteries bilaterally thought to be due at least in part to artifact although atherosclerotic disease and associated stenosis can not be excluded. The intracranial internal carotid arteries are mildly degraded by artifact but appear to be patent. The proximal anterior and middle cerebral arteries are patent. There is subtle fullness at the junction of the A1 and A2 segments of the right TORY which may correspond to partial volume averaging with a bend in the vessel although a small aneurysm is difficult to entirely exclude. There is also a tiny focal outpouching arising from the communicating segment of the left ICA that appears to correspond to the origin of a small caliber posterior communicating artery which is  also degraded by artifact.   Posterior circulation:    The intracranial segments of the vertebral arteries and the basilar artery are patent. There is subtle narrowing of the P1 segment of the right PCA. There is fullness at the junction of the PCOM and P1 and P2 segments of the left PCA measuring approximately 2 mm.   MRA of neck:   The source images are somewhat degraded by artifact and patient motion.   Carotid vessels:   The visualized aspects of the common carotid arteries are patent. There is mild irregularity and attenuation of flow related signal at the bifurcations and origins of the ICAs likely due to a combination of artifact and atherosclerotic plaque. There is tortuosity of the distal cervical ICAs. Attenuated flow related signal in the region of tortuosity is thought to be due to artifact. There is otherwise no measurable stenosis.   Vertebral vessels:   Both vessels are somewhat degraded by artifact. The left is dominant and tortuous. They are otherwise grossly patent.       MRI Brain:   1. There is abnormal signal on diffusion-weighted imaging within the dorsal aspect of the superior avila/inferior brain posteriorly at the midline. It is uncertain if this is due to artifact versus punctate focus of acute to early subacute ischemic injury. 2. Nonspecific white matter changes most likely represent small-vessel ischemic disease in a patient of this age. Areas of encephalomalacia in the cerebellum and right occipital lobe likely represent remote infarcts.     MRA:   1. Somewhat limited MRA of the head and neck due to artifact and motion degradation. Within these limitations, no proximal large branch vessel cutoff or hemodynamically significant stenosis is identified. 2. Possible small aneurysm versus partial volume averaging at the junction of the left posterior communicating artery with the P1 and P2 segments of the left PCA. Additionally there is subtle fullness at the junction of the A1 and A2 segments  of the right TORY which could reflect partial volume averaging with a bend in the vessel.   MACRO: None   Signed by: Kelley Blackburn 12/7/2024 1:58 PM Dictation workstation:   [id number]    MR angio neck wo IV contrast    Result Date: 12/7/2024  Interpreted By:  Kelley Blackburn, STUDY: MR BRAIN WO IV CONTRAST; MR ANGIO NECK WO IV CONTRAST; MR ANGIO HEAD WO IV CONTRAST;  12/6/2024 7:37 pm   INDICATION: Signs/Symptoms:occipital stroke, please do MRA as well; Signs/Symptoms:stroke.  Stroke protocol.     COMPARISON: Head CT December 6, 2024   ACCESSION NUMBER(S): [id number]; [id number]; [id number]   ORDERING CLINICIAN: JEIMY EATON   TECHNIQUE: Axial T2, FLAIR, DWI, gradient echo T2 and  sagittal and coronal T1 weighted images of brain were acquired.   Time-of-flight MRA of the head  and neck was performed. The images were reviewed as source images and maximum intensity projections.   FINDINGS: Brain:   CSF Spaces: There is prominence of ventricles and sulci compatible with diffuse parenchymal volume loss.   Parenchyma: There is a focus of increased signal on diffusion-weighted imaging with subtle diminished signal on the ADC map within the dorsal aspect of the superior avila/inferior midbrain. There is little to no corresponding signal abnormality on FLAIR and T2 weighted imaging. There are scattered, nonspecific hyperintensities on FLAIR and T2 weighted imaging in the subcortical and periventricular white matter. There are a few punctate foci of susceptibility artifact on gradient echo T2 weighted imaging in the cerebral hemispheres which could be related to remote microhemorrhages or mineralization. There are areas of encephalomalacia in the cerebellum and right occipital lobe. There is no mass effect or midline shift.   Paranasal Sinuses and Mastoids: There is mild mucosal thickening within scattered paranasal sinuses. The mastoid air cells are clear.   MRA of head:   Anterior  circulation:  There is multifocal irregularity and potential narrowing of the petrous segments of the internal carotid arteries bilaterally thought to be due at least in part to artifact although atherosclerotic disease and associated stenosis can not be excluded. The intracranial internal carotid arteries are mildly degraded by artifact but appear to be patent. The proximal anterior and middle cerebral arteries are patent. There is subtle fullness at the junction of the A1 and A2 segments of the right TORY which may correspond to partial volume averaging with a bend in the vessel although a small aneurysm is difficult to entirely exclude. There is also a tiny focal outpouching arising from the communicating segment of the left ICA that appears to correspond to the origin of a small caliber posterior communicating artery which is also degraded by artifact.   Posterior circulation:    The intracranial segments of the vertebral arteries and the basilar artery are patent. There is subtle narrowing of the P1 segment of the right PCA. There is fullness at the junction of the PCOM and P1 and P2 segments of the left PCA measuring approximately 2 mm.   MRA of neck:   The source images are somewhat degraded by artifact and patient motion.   Carotid vessels:   The visualized aspects of the common carotid arteries are patent. There is mild irregularity and attenuation of flow related signal at the bifurcations and origins of the ICAs likely due to a combination of artifact and atherosclerotic plaque. There is tortuosity of the distal cervical ICAs. Attenuated flow related signal in the region of tortuosity is thought to be due to artifact. There is otherwise no measurable stenosis.   Vertebral vessels:   Both vessels are somewhat degraded by artifact. The left is dominant and tortuous. They are otherwise grossly patent.       MRI Brain:   1. There is abnormal signal on diffusion-weighted imaging within the dorsal aspect of the  superior avila/inferior brain posteriorly at the midline. It is uncertain if this is due to artifact versus punctate focus of acute to early subacute ischemic injury. 2. Nonspecific white matter changes most likely represent small-vessel ischemic disease in a patient of this age. Areas of encephalomalacia in the cerebellum and right occipital lobe likely represent remote infarcts.     MRA:   1. Somewhat limited MRA of the head and neck due to artifact and motion degradation. Within these limitations, no proximal large branch vessel cutoff or hemodynamically significant stenosis is identified. 2. Possible small aneurysm versus partial volume averaging at the junction of the left posterior communicating artery with the P1 and P2 segments of the left PCA. Additionally there is subtle fullness at the junction of the A1 and A2 segments of the right TORY which could reflect partial volume averaging with a bend in the vessel.   MACRO: None   Signed by: Kelley Blackburn 12/7/2024 1:58 PM Dictation workstation:   [id number]    Carotid duplex bilateral    Result Date: 12/6/2024  Interpreted By:  Farhat Haywood, STUDY: [id number]; 12/6/2024 1:20 pm   INDICATION: Signs/Symptoms:stroke : Slurred speech. Double vision. Legs not working.   COMPARISON: 09/12/2024   ACCESSION NUMBER(S): [id number]   ORDERING CLINICIAN: JEIMY GIVENS   TECHNIQUE: Carotid Examination using B-mode, color flow and doppler spectral analysis.   FINDINGS: RIGHT CAROTID SYSTEM DCCA PSV/EDV: 41.2 cm/s / 12.7 cm/s cm/sec ECA PSV: 57.5 cm/s cm/sec PICA PSV/EDV: 38.1 / 11.9 cm/sec LIGIA PSV/EDV: 42.1 cm/s / 17.9 cm/s cm/sec DICA PSV/EDV: 52.5 cm/s / 38.1 cm/s cm/sec JERRY/VCC Ratio: 0.9 VERT : Antegrade   LEFT CAROTID SYSTEM DCCA PSV/EDV: 35.6 cm/s,7.4 cm/s / 7.4 cm/s cm/sec ECA PSV: 63.3 cm/s cm/sec PICA PSV/EDV: 57.2 cm/s / 21.5 cm/s cm/sec LIGIA PSV/EDV: 51 / 20 cm/sec DICA PSV/EDV: 127.1 cm/s / 21.5 cm/s cm/sec JERRY/VCC Ratio: 1.6 VERT : Antegrade   DUPLEX  IMAGES: Right Carotid System: There is minimal plaque within the proximal right ICA..   Left Carotid System: There is minimal plaque within the proximal left ICA.   The estimate of the degree of stenosis included in this report is based on the NASCET method for calculating stenosis, using the internal carotid artery distal to the stenosis as the reference point.       Less than 50% stenosis right ICA and left ICA.   MACRO: None.   Signed by: Farhat Haywood 12/6/2024 3:12 PM Dictation workstation:   [id number]    Transthoracic Echo Complete    Result Date: 12/6/2024            [facility] [address], [place] [id number]             Phone [phone] TRANSTHORACIC ECHOCARDIOGRAM REPORT Patient Name:       NAN Norman Physician:    Ryan Phelan DO Study Date:         12/6/2024            Ordering Provider:    [id number] PHELAN MRN/PID:            [id number]             Fellow: Accession#:         [id number]         Nurse: Date of Birth/Age:  1955 / 69 years Sonographer:          Jacy Reed                                                                [facility] Gender Assigned at  M                    Additional Staff: Birth: Height:             185.42 cm            Admit Date: Weight:             98.88 kg             Admission Status:     Inpatient -                                                                Routine BSA / BMI:          2.23 m2 / 28.76      Department Location:  [facility]                     kg/m2 Blood Pressure: 137 /85 mmHg Study Type:    TRANSTHORACIC ECHO (TTE) COMPLETE Diagnosis/ICD: Presence of prosthetic heart valve-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 Pertinent History: Cardiomyopathy, A-Fib, HTN, CHF, Chest Pain, Hyperlipidemia                    and LBBB, S/P AVR 9/24, Dilated Cardiomyopathy, S/P Ascending                    aortic replacement, Aortoplasty. Study Detail: The following Echo studies were performed: 2D, M-Mode, Doppler and               color flow.  PHYSICIAN INTERPRETATION: Left Ventricle: There is global hypokinesis of the left ventricle with minor regional variations. The left ventricular cavity size is mild to moderately dilated. There is normal septal and normal posterior left ventricular wall thickness. Abnormal (paradoxical) septal motion consistent with post-operative status. Spectral Doppler shows a Grade I (impaired relaxation pattern) of left ventricular diastolic filling with normal left atrial filling pressure. There is no definite left ventricular thrombus visualized. Left Atrium: The left atrium is normal in size. A bubble study using agitated saline was performed. Bubble study is negative. Right Ventricle: The right ventricle is normal in size. There is low normal right ventricular systolic function. Right Atrium: The right atrium is normal in size. Aortic Valve: There is a prosthetic aortic valve present. The aortic valve dimensionless index is 0.46. There is no evidence of aortic valve regurgitation. The peak instantaneous gradient of the aortic valve is 18 mmHg. The mean gradient of the aortic valve is 9 mmHg. Ascending aorta graft appears normall. Mitral Valve: The mitral valve is normal in structure. There is mild mitral valve regurgitation. Tricuspid Valve: The tricuspid valve is structurally normal. There is mild tricuspid regurgitation. The Doppler estimated RVSP is mildly elevated right ventricular systolic  pressure at 34.8 mmHg. Pulmonic Valve: The pulmonic valve is structurally normal. There is no indication of pulmonic valve regurgitation. Pericardium: Trivial pericardial effusion. There is no evidence of cardiac tamponade. Pleural: There is a moderate left pleural effusion. Aorta: The aortic root is normal. Ascending aorta graft appears normal.  CONCLUSIONS:  1. There is global hypokinesis of the left ventricle with minor regional variations.  2. Spectral Doppler shows a Grade I (impaired relaxation pattern) of left ventricular diastolic filling with normal left atrial filling pressure.  3. Left ventricular cavity size is mild to moderately dilated.  4. Abnormal septal motion consistent with post-operative status.  5. No left ventricular thrombus visualized.  6. There is low normal right ventricular systolic function.  7. There is a moderate pleural effusion.  8. There is no evidence of cardiac tamponade.  9. Mildly elevated right ventricular systolic pressure. 10. Ascending aorta graft appears normall. QUANTITATIVE DATA SUMMARY:  2D MEASUREMENTS:            Normal Ranges: LAs:             2.30 cm    (2.7-4.0cm) IVSd:            0.63 cm    (0.6-1.1cm) LVPWd:           0.95 cm    (0.6-1.1cm) LVIDd:           6.74 cm    (3.9-5.9cm) LVIDs:           6.06 cm LV Mass Index:   100.6 g/m2 LV % FS          10.1 %  LA VOLUME:                    Normal Ranges: LA Vol A4C:        75.9 ml    (22+/-6mL/m2) LA Vol A2C:        91.4 ml LA Vol BP:         87.3 ml LA Vol Index A4C:  34.0ml/m2 LA Vol Index A2C:  40.9 ml/m2 LA Vol Index BP:   39.1 ml/m2 LA Area A4C:       25.5 cm2 LA Area A2C:       26.7 cm2 LA Major Axis A4C: 7.3 cm LA Major Axis A2C: 6.6 cm LA Volume Index:   36.5 ml/m2 LA Vol A4C:        69.7 ml LA Vol A2C:        87.3 ml LA Vol Index BSA:  35.2 ml/m2  RA VOLUME BY A/L METHOD:          Normal Ranges: RA Area A4C:             32.6 cm2  M-MODE MEASUREMENTS:         Normal Ranges: Ao Root:             3.15 cm  (2.0-3.7cm)  AORTA MEASUREMENTS:         Normal Ranges: Asc Ao, d:          2.90 cm (2.1-3.4cm)  LV SYSTOLIC FUNCTION BY 2D PLANIMETRY (MOD):                      Normal Ranges: EF-A4C View:    22 % (>=55%) LV EF Reported: 23 %  LV DIASTOLIC FUNCTION:           Normal Ranges: MV Peak E:             0.61 m/s  (0.7-1.2 m/s) MV Peak A:             1.10 m/s  (0.42-0.7 m/s) E/A Ratio:             0.56      (1.0-2.2) MV e'                  0.067 m/s (>8.0) MV lateral e'          0.09 m/s MV medial e'           0.04 m/s E/e' Ratio:            9.09      (<8.0)  MITRAL VALVE:          Normal Ranges: MV DT:        168 msec (150-240msec)  AORTIC VALVE:                      Normal Ranges: AoV Vmax:                2.15 m/s  (<=1.7m/s) AoV Peak P.5 mmHg (<20mmHg) AoV Mean P.0 mmHg  (1.7-11.5mmHg) LVOT Max Alfredito:            1.05 m/s  (<=1.1m/s) AoV VTI:                 42.60 cm  (18-25cm) LVOT VTI:                19.70 cm LVOT Diameter:           2.10 cm   (1.8-2.4cm) AoV Area, VTI:           1.60 cm2  (2.5-5.5cm2) AoV Area,Vmax:           1.69 cm2  (2.5-4.5cm2) AoV Dimensionless Index: 0.46  RIGHT VENTRICLE: RV Basal 4.26 cm RV Mid   1.87 cm RV Major 10.1 cm TAPSE:   12.7 mm RV s'    0.08 m/s  TRICUSPID VALVE/RVSP:          Normal Ranges: Peak TR Velocity:     2.82 m/s RV Syst Pressure:     35 mmHg  (< 30mmHg) IVC Diam:             1.17 cm  [id number] Zacarias Jarquin DO Electronically signed on 2024 at 1:02:23 PM  ** Final **     ECG 12 lead    Result Date: 2024   Poor data quality, interpretation may be adversely affected Sinus rhythm with 1st degree AV block Left bundle branch block Abnormal ECG When compared with ECG of 25-SEP-2024 21:20, Sinus rhythm has replaced Atrial fibrillation Vent. rate has decreased BY  34 BPM QT has lengthened Confirmed by Geo Schwartz ([id number]) on 2024 8:55:28 AM    XR chest 1 view    Result Date: 2024  STUDY: Chest Radiograph;  2024 8:19 PM INDICATION:  Flu-like symptoms.  COMPARISON: 9/24/2024, 9/25/2024, 9/26/2024 and 9/27/2024 ACCESSION NUMBER(S): [id number] ORDERING CLINICIAN: DARIO DUTTON TECHNIQUE:  Frontal chest was obtained at 08:17 hours. FINDINGS: CARDIOMEDIASTINAL SILHOUETTE: Heart is enlarged.  Prior cardiac surgery  LUNGS: Small to moderate left pleural effusion is new.  Artifact from EKG wires overlie the chest.  Atelectasis left lower lobe.  No pneumothorax.  No definitive regions of airspace consolidation. Sclerosis along the anterior margin of the right first rib appears chronic.  ABDOMEN: No remarkable upper abdominal findings.  BONES: No acute osseous changes.    Small to moderate size left pleural effusion is new compared to prior exam. Left lower lobe atelectasis. Prior cardiac surgery. Signed by Dario Dunn MD    CT brain attack head wo IV contrast    Result Date: 12/6/2024  Interpreted By:  Farhat Haywood, STUDY: CT BRAIN ATTACK HEAD WO IV CONTRAST; 12/6/2024 8:01 am   INDICATION: Signs/Symptoms:Stroke Evaluation. Double vision. Difficulty speaking.   COMPARISON: None.   ACCESSION NUMBER(S): [id number]   ORDERING CLINICIAN: DARIO DUTTON   TECHNIQUE: A helical acquisition data was obtained.   One or more of the following dose reduction techniques were used: Automated exposure control Adjustment of the mA and/or kV according to patient size, and/or use of iterative reconstruction technique.   FINDINGS: Intracranial findings: There is no evidence for intracranial hemorrhage or mass effect. There is a small focus of loss of gray-white matter differentiation involving the right occipital lobe suspicious for an area of acute/subacute infarction (Series 9, Image 46).   Paranasal sinuses and temporal bone findings:  The visualized portions of the paranasal sinuses, mastoid air cells, and middle ear cavities are clear.   Orbital findings: The visualized portions of the orbits are unremarkable.       Focal area of gray-white matter  differentiation loss consistent with acute/subacute infarction right occipital lobe. No evidence for hemorrhage.     MACRO: Farhat Haywood communicated by EPIC secure chat with  DARIO DUTTON on 12/6/2024 at 8:12 am.  (**-RCF-**) Findings:  See findings.     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  He does have intracranial atherosclerosis despite a normal lipid profile.  His A1c is mildly elevated to 6.1.  His stroke evaluation is essentially complete and he is on appropriate therapy with aspirin Plavix statin.  He will need discharge to rehab when otherwise medically cleared to do so.  He should follow-up with me in 1 month.  I will sign off.    I personally spent 60 minutes today, exclusive of procedures, providing care for this patient, including preparation, face to face time, documentation and other services such as review of medical records, diagnostic result, patient education, counseling, coordination of care as specified in the encounter.

## 2024-12-07 NOTE — PROGRESS NOTES
Physical Therapy    Physical Therapy Treatment    Patient Name: David Chatman  MRN: [id number]  Department: [address]  Room: [address] [place] [address]  Today's Date: 12/7/2024  Time Calculation  Start Time: 1130  Stop Time: 1158  Time Calculation (min): 28 min         Assessment/Plan   PT Assessment  PT Assessment Results: Decreased strength, Decreased endurance, Impaired balance, Decreased mobility, Decreased coordination, Impaired vision, Decreased safety awareness, Impaired judgement  Rehab Prognosis: Good  Barriers to Discharge: Assist of 2 for gait with RW  Evaluation/Treatment Tolerance: Patient limited by fatigue  End of Session Communication: Bedside nurse  Assessment Comment: Impaired balance and coordination High falls risk Limited mobility due to double vision  End of Session Patient Position: Up in chair, Alarm off, not on at start of session (Needs at reach)     PT Plan  Treatment/Interventions: Bed mobility, Transfer training, Gait training, Neuromuscular re-education, Balance training, Strengthening, Endurance training, Therapeutic exercise, Therapeutic activity, Positioning  PT Plan: Ongoing PT  PT Frequency: 5 times per week  PT Discharge Recommendations: High intensity level of continued care  Equipment Recommended upon Discharge: Wheeled walker  PT Recommended Transfer Status: Assist x2, Assistive device  PT - OK to Discharge: Yes      General Visit Information:   PT  Visit  PT Received On: 12/07/24  General  Reason for Referral: impaired mobility; acute/subacute R occipital infarction  Referred By: Dr. Angeles  Past Medical History Relevant to Rehab: heart failure, afib, gout, hyperlipidemia, SHOAIB  Family/Caregiver Present: Yes  Caregiver Feedback: Step daughter and her   Prior to Session Communication: Bedside nurse  Patient Position Received: Up in chair, Alarm off, not on at start of session  Preferred Learning Style: visual, verbal  General Comment: Cleared by nurse to see. Patient up in bedside chair.  Agreeable to therapy    Subjective   Precautions:  Precautions  Hearing/Visual Limitations: hearing WFL, + acute diplopia (attempted to patch R eye to minimize double vision)  Medical Precautions: Fall precautions    Vital Signs (Past 2hrs)                 Objective   Pain:  Pain Assessment  Pain Assessment: 0-10  0-10 (Numeric) Pain Score: 0 - No pain  Cognition:  Cognition  Overall Cognitive Status: Within Functional Limits  Orientation Level: Oriented X4  Following Commands: Follows multistep commands with repetition  Safety Judgment: Decreased awareness of need for assistance  Cognition Comments: Able to follow commands Decreased safety awareness  Insight: Mild  Impulsive: Mildly  Coordination:  Coordination Comment: ataxic with ambulation  Postural Control:  Static Standing Balance  Static Standing-Level of Assistance: Moderate assistance  Static Standing-Comment/Number of Minutes: B UE support on RW  Dynamic Standing Balance  Dynamic Standing-Level of Assistance: Moderate assistance (x 2)  Dynamic Standing-Comments: Unsteady during ambulation due to ataxia  Extremity/Trunk Assessments:    Activity Tolerance:  Activity Tolerance  Endurance: Decreased tolerance for upright activites  Activity Tolerance Comments: Fatiques easily  Treatments:  Therapeutic Exercise  Therapeutic Exercise Performed: Yes  Therapeutic Exercise Activity 1: B LE seated ther ex: heela ndtoe raises,LAQs, hip flexion,mercedes hip adduction,resistive hip abduction x 15 Cues to slow down to have more control B LEs with ther ex;    Ambulation/Gait Training  Ambulation/Gait Training Performed: Yes  Ambulation/Gait Training 1  Surface 1: Level tile  Device 1: Rolling walker  Assistance 1: Moderate assistance (x 2)  Quality of Gait 1: Scissoring, Ataxic, Forward flexed posture, Decreased step length, Narrow base of support  Comments/Distance (ft) 1: 20' x 2 with RW with Mod A x 2 to steady for safety with scissoring,NBOS,shuffling steps Seated rest  between walks needed due to fatique  Transfers  Transfer: Yes  Transfer 1  Transfer From 1: Chair with arms to  Transfer to 1: Stand  Technique 1: Sit to stand, Stand to sit  Transfer Device 1: Walker  Transfer Level of Assistance 1: Minimum assistance, +2  Trials/Comments 1: STS x 2 trials with Min A x 2 to steady for safety. impulsive. Assist with safe hand placement  Transfers 2  Transfer From 2: Stand to  Transfer to 2: Sit, Chair with arms  Technique 2: Stand to sit, Sit to stand  Transfer Device 2: Walker  Transfer Level of Assistance 2: Minimum assistance, +2  Trials/Comments 2: STS x 2 trials with Min A x 2 to steady Assist with safe hand placement Impulsive    Outcome Measures:  [facility] Basic Mobility  Turning from your back to your side while in a flat bed without using bedrails: A little  Moving from lying on your back to sitting on the side of a flat bed without using bedrails: A little  Moving to and from bed to chair (including a wheelchair): A lot  Standing up from a chair using your arms (e.g. wheelchair or bedside chair): A lot  To walk in hospital room: A lot  Climbing 3-5 steps with railing: Total  Basic Mobility - Total Score: 13    Education Documentation  Handouts, taught by Leonora Truong PTA at 12/7/2024  1:06 PM.  Learner: Patient  Readiness: Acceptance  Method: Explanation, Teach-back  Response: Verbalizes Understanding, Needs Reinforcement    Precautions, taught by Leonora Truong PTA at 12/7/2024  1:06 PM.  Learner: Patient  Readiness: Acceptance  Method: Explanation, Teach-back  Response: Verbalizes Understanding, Needs Reinforcement    Mobility Training, taught by Leonora Truong PTA at 12/7/2024  1:06 PM.  Learner: Patient  Readiness: Acceptance  Method: Explanation, Teach-back  Response: Verbalizes Understanding, Needs Reinforcement    Education Comments  No comments found.        OP EDUCATION:       Encounter Problems       Encounter Problems (Active)       Balance        dynamic (Progressing)       Start:  12/06/24    Expected End:  12/20/24       No LOB with standing activities with UE support of walker            Mobility       bed mobility (Progressing)       Start:  12/06/24    Expected End:  12/20/24       Pt will perform sup to/from sit transfer with supervision          ambulation (Progressing)       Start:  12/06/24    Expected End:  12/20/24       Pt will amb > 60  ft with wheeled walker and CGA            PT Transfers       sit to stand (Progressing)       Start:  12/06/24    Expected End:  12/20/24       Pt will perform sit to stand transfer with walker and supervision            Safety       LTG - Patient will utilize safety techniques (Progressing)       Start:  12/06/24    Expected End:  12/20/24

## 2024-12-07 NOTE — CARE PLAN
The patient's goals for the shift include  safety when ambulating    The clinical goals for the shift include neuro checks, labs, vitals    Over the shift, the patient did not make progress toward the following goals. Barriers to progression include none. Recommendations to address these barriers include none.

## 2024-12-07 NOTE — PROGRESS NOTES
Occupational Therapy    OT Treatment    Patient Name: David Chatman  MRN: [id number]  Department: [address]  Room: [address] [place] [address]  Today's Date: 12/7/2024  Time Calculation  Start Time: 0845  Stop Time: 0908  Time Calculation (min): 23 min        Assessment:  OT Assessment: Pt. is currently still functioning below baseline and requires assist for ADL tasks and transfers due to poor balance and awareness  Prognosis: Excellent  Barriers to Discharge: Other (Comment) (high fall risk, ataxia)  Evaluation/Treatment Tolerance: Patient tolerated treatment well  End of Session Communication: Bedside nurse  End of Session Patient Position: Bed, 3 rail up, Alarm off, not on at start of session  OT Assessment Results: Decreased ADL status, Decreased upper extremity range of motion, Decreased upper extremity strength, Decreased safe judgment during ADL, Decreased endurance, Visual deficit, Decreased fine motor control, Decreased functional mobility, Decreased IADLs  Prognosis: Excellent  Barriers to Discharge: Other (Comment) (high fall risk, ataxia)  Evaluation/Treatment Tolerance: Patient tolerated treatment well  Plan:  Treatment Interventions: ADL retraining, UE strengthening/ROM, Functional transfer training, Patient/family training, Endurance training, Compensatory technique education  OT Frequency: 4 times per week  OT Discharge Recommendations: High intensity level of continued care  Equipment Recommended upon Discharge: Wheeled walker  OT Recommended Transfer Status: Assist of 1, Assist of 2 (assist x 1-2 for safety)  OT - OK to Discharge: Yes  Treatment Interventions: ADL retraining, UE strengthening/ROM, Functional transfer training, Patient/family training, Endurance training, Compensatory technique education    Subjective   Previous Visit Info:     General:  General  Reason for Referral: impaired mobility; acute/subacute R occipital infarction  Referred By: Dr. Angeles  Past Medical History Relevant to Rehab: heart  failure, afib, gout, hyperlipidemia, SHOAIB  Family/Caregiver Present: No  Prior to Session Communication: Bedside nurse  Patient Position Received: Bed, 3 rail up, Alarm off, not on at start of session  Preferred Learning Style: verbal, visual, auditory  General Comment: Pt. met in supine and agreeable to participate in skilled OT  Precautions:  Hearing/Visual Limitations: hearing WFL, + acute diplopia (attempted to patch R eye to minimize double vision)    Vital Signs (Past 2hrs)        Date/Time Vitals Session Patient Position Pulse Resp SpO2 BP MAP (mmHg)    12/07/24 0723 --  --  88  20  97 %  125/99  104     12/07/24 0745 --  --  --  --  96 %  --  --                         Pain:  Pain Assessment  0-10 (Numeric) Pain Score: 0 - No pain  Ng-Baker FACES Pain Rating: No hurt  Patient's Stated Pain Goal: No pain    Objective    Cognition:  Cognition  Orientation Level: Oriented X4  Impulsive: Mildly  Coordination:  Upper Body Coordination: WFL  Lower Body Coordination: impaired  Activities of Daily Living: Feeding  Feeding Level of Assistance: Setup  Feeding Where Assessed: Bed level    LE Dressing  LE Dressing: Yes  Sock Level of Assistance: Setup  LE Dressing Where Assessed: Edge of bed  LE Dressing Comments: Pt. completed donning and doffing socks with figure 4 tech  Functional Standing Tolerance:  Time: 1 minute x5  Functional Standing Tolerance Comments: Pt. completed standing tolerance and balance activity at EOB this date. Pt. able to stand w/ single UE spport from FWW while reaching across midline and outside of his MARTHA with CGA-MIN A to maintain balance  Bed Mobility/Transfers: Bed Mobility 1  Bed Mobility 1: Supine to sitting  Level of Assistance 1: Close supervision    Transfers  Transfer: Yes  Transfer 1  Transfer From 1: Bed to  Transfer to 1: Stand  Technique 1: Sit to stand, Stand to sit  Transfer Device 1: Walker  Transfer Level of Assistance 1: Contact guard, Moderate verbal cues  Transfers  2  Transfer From 2: Bed to  Transfer to 2: Chair with arms  Technique 2: Stand pivot  Transfer Device 2: Walker  Transfer Level of Assistance 2: Minimum assistance, Moderate verbal cues  Trials/Comments 2: ataxic gait and required increased cues for walker management  Transfers 3  Transfer From 3: Chair with arms to  Transfer to 3: Bed  Technique 3: Stand pivot  Transfer Device 3: Walker  Transfer Level of Assistance 3: Minimum assistance    Standing Balance:  Static Standing Balance  Static Standing-Balance Support: Left upper extremity supported, Right upper extremity supported  Static Standing-Level of Assistance: Minimum assistance      Vision:  Double Vision: Education      Outcome Measures:[facility] Daily Activity  Putting on and taking off regular lower body clothing: A little  Bathing (including washing, rinsing, drying): A lot  Putting on and taking off regular upper body clothing: A little  Toileting, which includes using toilet, bedpan or urinal: A little  Taking care of personal grooming such as brushing teeth: A little  Eating Meals: A little  Daily Activity - Total Score: 17        Education Documentation  Body Mechanics, taught by Davina Mendez OT at 12/7/2024  9:17 AM.  Learner: Patient  Readiness: Acceptance  Method: Explanation  Response: Needs Reinforcement, Verbalizes Understanding    Precautions, taught by Davina Mendez OT at 12/7/2024  9:17 AM.  Learner: Patient  Readiness: Acceptance  Method: Explanation  Response: Needs Reinforcement, Verbalizes Understanding    ADL Training, taught by Davina Mendez OT at 12/7/2024  9:17 AM.  Learner: Patient  Readiness: Acceptance  Method: Explanation  Response: Needs Reinforcement, Verbalizes Understanding    Education Comments  No comments found.        OP EDUCATION:  Education  Individual(s) Educated: Patient  Education Provided: Fall precautons, Risk and benefits of OT discussed with patient or other, POC discussed and agreed  upon  Risk and Benefits Discussed with Patient/Caregiver/Other: yes  Patient/Caregiver Demonstrated Understanding: yes    Goals:  Encounter Problems       Encounter Problems (Active)       OT Goals       ADLS (Progressing)       Start:  12/06/24    Expected End:  12/20/24       Patient will complete ADL tasks, with CGA using AE need in order to increase patient's safety and independence with self-care tasks.           Functional Transfers (Progressing)       Start:  12/06/24    Expected End:  12/20/24       Patient will complete functional transfers with CGA using least restrictive device, in order to increase patient's safety and independence with daily tasks.           Activity Tolerance (Progressing)       Start:  12/06/24    Expected End:  12/20/24       Patient will demonstrate the ability to participate in functional activity at least >/= 20 minutes in order to increase patient's safety and independence with daily tasks.

## 2024-12-08 ENCOUNTER — HOSPITAL ENCOUNTER (INPATIENT)
Facility: HOSPITAL | Age: 69
End: 2024-12-08
Attending: INTERNAL MEDICINE | Admitting: INTERNAL MEDICINE
Payer: MEDICARE

## 2024-12-08 VITALS
HEART RATE: 74 BPM | OXYGEN SATURATION: 96 % | HEIGHT: 73 IN | TEMPERATURE: 97.5 F | DIASTOLIC BLOOD PRESSURE: 84 MMHG | SYSTOLIC BLOOD PRESSURE: 118 MMHG | BODY MASS INDEX: 28.73 KG/M2 | RESPIRATION RATE: 17 BRPM | WEIGHT: 216.8 LBS

## 2024-12-08 DIAGNOSIS — I50.21 ACUTE SYSTOLIC HEART FAILURE: Primary | ICD-10-CM

## 2024-12-08 DIAGNOSIS — I63.9 ISCHEMIC STROKE (MULTI): ICD-10-CM

## 2024-12-08 LAB
ANION GAP SERPL CALCULATED.3IONS-SCNC: 13 MMOL/L (ref 10–20)
BUN SERPL-MCNC: 19 MG/DL (ref 6–23)
CALCIUM SERPL-MCNC: 8.7 MG/DL (ref 8.6–10.3)
CHLORIDE SERPL-SCNC: 107 MMOL/L (ref 98–107)
CO2 SERPL-SCNC: 25 MMOL/L (ref 21–32)
CREAT SERPL-MCNC: 0.96 MG/DL (ref 0.5–1.3)
EGFRCR SERPLBLD CKD-EPI 2021: 87 ML/MIN/1.73M*2
ERYTHROCYTE [DISTWIDTH] IN BLOOD BY AUTOMATED COUNT: 16.8 % (ref 11.5–14.5)
GLUCOSE BLD MANUAL STRIP-MCNC: 138 MG/DL (ref 74–99)
GLUCOSE BLD MANUAL STRIP-MCNC: 91 MG/DL (ref 74–99)
GLUCOSE SERPL-MCNC: 95 MG/DL (ref 74–99)
HCT VFR BLD AUTO: 37.6 % (ref 41–52)
HGB BLD-MCNC: 11.8 G/DL (ref 13.5–17.5)
MCH RBC QN AUTO: 25.2 PG (ref 26–34)
MCHC RBC AUTO-ENTMCNC: 31.4 G/DL (ref 32–36)
MCV RBC AUTO: 80 FL (ref 80–100)
NRBC BLD-RTO: 0 /100 WBCS (ref 0–0)
PLATELET # BLD AUTO: 264 X10*3/UL (ref 150–450)
POTASSIUM SERPL-SCNC: 3.7 MMOL/L (ref 3.5–5.3)
RBC # BLD AUTO: 4.69 X10*6/UL (ref 4.5–5.9)
SODIUM SERPL-SCNC: 141 MMOL/L (ref 136–145)
WBC # BLD AUTO: 7.2 X10*3/UL (ref 4.4–11.3)

## 2024-12-08 PROCEDURE — 85027 COMPLETE CBC AUTOMATED: CPT | Performed by: INTERNAL MEDICINE

## 2024-12-08 PROCEDURE — 82374 ASSAY BLOOD CARBON DIOXIDE: CPT | Performed by: INTERNAL MEDICINE

## 2024-12-08 PROCEDURE — 2500000001 HC RX 250 WO HCPCS SELF ADMINISTERED DRUGS (ALT 637 FOR MEDICARE OP): Performed by: INTERNAL MEDICINE

## 2024-12-08 PROCEDURE — 1180000001 HC REHAB PRIVATE ROOM DAILY

## 2024-12-08 PROCEDURE — 82947 ASSAY GLUCOSE BLOOD QUANT: CPT

## 2024-12-08 PROCEDURE — 36415 COLL VENOUS BLD VENIPUNCTURE: CPT | Performed by: INTERNAL MEDICINE

## 2024-12-08 PROCEDURE — 2500000004 HC RX 250 GENERAL PHARMACY W/ HCPCS (ALT 636 FOR OP/ED): Performed by: INTERNAL MEDICINE

## 2024-12-08 PROCEDURE — 94760 N-INVAS EAR/PLS OXIMETRY 1: CPT

## 2024-12-08 RX ORDER — CLOPIDOGREL BISULFATE 75 MG/1
75 TABLET ORAL DAILY
Status: ON HOLD
Start: 2024-12-09

## 2024-12-08 RX ORDER — ASPIRIN 81 MG/1
81 TABLET ORAL DAILY
Status: DISCONTINUED | OUTPATIENT
Start: 2024-12-08 | End: 2024-12-08 | Stop reason: HOSPADM

## 2024-12-08 RX ORDER — ALUMINUM HYDROXIDE, MAGNESIUM HYDROXIDE, AND SIMETHICONE 2400; 240; 2400 MG/30ML; MG/30ML; MG/30ML
30 SUSPENSION ORAL EVERY 6 HOURS PRN
Status: DISCONTINUED | OUTPATIENT
Start: 2024-12-08 | End: 2024-12-21 | Stop reason: HOSPADM

## 2024-12-08 RX ORDER — SACUBITRIL AND VALSARTAN 24; 26 MG/1; MG/1
1 TABLET, FILM COATED ORAL 2 TIMES DAILY
Status: DISCONTINUED | OUTPATIENT
Start: 2024-12-08 | End: 2024-12-21 | Stop reason: HOSPADM

## 2024-12-08 RX ORDER — ASPIRIN 81 MG/1
81 TABLET ORAL DAILY
Status: DISCONTINUED | OUTPATIENT
Start: 2024-12-09 | End: 2024-12-10

## 2024-12-08 RX ORDER — POLYETHYLENE GLYCOL 3350 17 G/17G
17 POWDER, FOR SOLUTION ORAL DAILY PRN
Status: DISCONTINUED | OUTPATIENT
Start: 2024-12-08 | End: 2024-12-21 | Stop reason: HOSPADM

## 2024-12-08 RX ORDER — BISACODYL 5 MG
10 TABLET, DELAYED RELEASE (ENTERIC COATED) ORAL DAILY PRN
Status: DISCONTINUED | OUTPATIENT
Start: 2024-12-08 | End: 2024-12-21 | Stop reason: HOSPADM

## 2024-12-08 RX ORDER — TORSEMIDE 20 MG/1
40 TABLET ORAL DAILY
Status: ON HOLD
Start: 2024-12-08

## 2024-12-08 RX ORDER — EPLERENONE 50 MG/1
25 TABLET, FILM COATED ORAL DAILY
Status: DISCONTINUED | OUTPATIENT
Start: 2024-12-09 | End: 2024-12-21 | Stop reason: HOSPADM

## 2024-12-08 RX ORDER — ACETAMINOPHEN 160 MG/5ML
650 SOLUTION ORAL EVERY 4 HOURS PRN
Status: DISCONTINUED | OUTPATIENT
Start: 2024-12-08 | End: 2024-12-16

## 2024-12-08 RX ORDER — TORSEMIDE 20 MG/1
40 TABLET ORAL DAILY
Status: DISCONTINUED | OUTPATIENT
Start: 2024-12-09 | End: 2024-12-21 | Stop reason: HOSPADM

## 2024-12-08 RX ORDER — ATORVASTATIN CALCIUM 80 MG/1
80 TABLET, FILM COATED ORAL NIGHTLY
Status: DISCONTINUED | OUTPATIENT
Start: 2024-12-08 | End: 2024-12-21 | Stop reason: HOSPADM

## 2024-12-08 RX ORDER — ACETAMINOPHEN 325 MG/1
650 TABLET ORAL EVERY 4 HOURS PRN
Status: DISCONTINUED | OUTPATIENT
Start: 2024-12-08 | End: 2024-12-16

## 2024-12-08 RX ORDER — HYDRALAZINE HYDROCHLORIDE 25 MG/1
12.5 TABLET, FILM COATED ORAL 3 TIMES DAILY
Status: DISCONTINUED | OUTPATIENT
Start: 2024-12-08 | End: 2024-12-11

## 2024-12-08 RX ORDER — CLOPIDOGREL BISULFATE 75 MG/1
75 TABLET ORAL DAILY
Status: DISCONTINUED | OUTPATIENT
Start: 2024-12-09 | End: 2024-12-10

## 2024-12-08 RX ADMIN — CLOPIDOGREL BISULFATE 75 MG: 75 TABLET ORAL at 08:37

## 2024-12-08 RX ADMIN — ENOXAPARIN SODIUM 40 MG: 40 INJECTION SUBCUTANEOUS at 08:38

## 2024-12-08 RX ADMIN — EPLERENONE 25 MG: 50 TABLET, FILM COATED ORAL at 08:37

## 2024-12-08 RX ADMIN — METOPROLOL SUCCINATE 150 MG: 100 TABLET, FILM COATED, EXTENDED RELEASE ORAL at 08:37

## 2024-12-08 RX ADMIN — SACUBITRIL AND VALSARTAN 1 TABLET: 24; 26 TABLET, FILM COATED ORAL at 08:37

## 2024-12-08 RX ADMIN — ASPIRIN 81 MG: 81 TABLET, COATED ORAL at 08:37

## 2024-12-08 RX ADMIN — EMPAGLIFLOZIN 10 MG: 10 TABLET, FILM COATED ORAL at 08:38

## 2024-12-08 SDOH — SOCIAL STABILITY: SOCIAL INSECURITY: WITHIN THE LAST YEAR, HAVE YOU BEEN HUMILIATED OR EMOTIONALLY ABUSED IN OTHER WAYS BY YOUR PARTNER OR EX-PARTNER?: NO

## 2024-12-08 SDOH — SOCIAL STABILITY: SOCIAL INSECURITY: ARE YOU MARRIED, WIDOWED, DIVORCED, SEPARATED, NEVER MARRIED, OR LIVING WITH A PARTNER?: MARRIED

## 2024-12-08 SDOH — SOCIAL STABILITY: SOCIAL INSECURITY: DO YOU FEEL ANYONE HAS EXPLOITED OR TAKEN ADVANTAGE OF YOU FINANCIALLY OR OF YOUR PERSONAL PROPERTY?: NO

## 2024-12-08 SDOH — ECONOMIC STABILITY: FOOD INSECURITY: WITHIN THE PAST 12 MONTHS, THE FOOD YOU BOUGHT JUST DIDN'T LAST AND YOU DIDN'T HAVE MONEY TO GET MORE.: NEVER TRUE

## 2024-12-08 SDOH — ECONOMIC STABILITY: FOOD INSECURITY: WITHIN THE PAST 12 MONTHS, YOU WORRIED THAT YOUR FOOD WOULD RUN OUT BEFORE YOU GOT THE MONEY TO BUY MORE.: NEVER TRUE

## 2024-12-08 SDOH — SOCIAL STABILITY: SOCIAL NETWORK: HOW OFTEN DO YOU ATTEND CHURCH OR RELIGIOUS SERVICES?: NEVER

## 2024-12-08 SDOH — ECONOMIC STABILITY: FOOD INSECURITY: HOW HARD IS IT FOR YOU TO PAY FOR THE VERY BASICS LIKE FOOD, HOUSING, MEDICAL CARE, AND HEATING?: NOT VERY HARD

## 2024-12-08 SDOH — SOCIAL STABILITY: SOCIAL INSECURITY: DOES ANYONE TRY TO KEEP YOU FROM HAVING/CONTACTING OTHER FRIENDS OR DOING THINGS OUTSIDE YOUR HOME?: NO

## 2024-12-08 SDOH — SOCIAL STABILITY: SOCIAL INSECURITY: HAVE YOU HAD THOUGHTS OF HARMING ANYONE ELSE?: NO

## 2024-12-08 SDOH — SOCIAL STABILITY: SOCIAL NETWORK: IN A TYPICAL WEEK, HOW MANY TIMES DO YOU TALK ON THE PHONE WITH FAMILY, FRIENDS, OR NEIGHBORS?: ONCE A WEEK

## 2024-12-08 SDOH — SOCIAL STABILITY: SOCIAL NETWORK: HOW OFTEN DO YOU GET TOGETHER WITH FRIENDS OR RELATIVES?: ONCE A WEEK

## 2024-12-08 SDOH — ECONOMIC STABILITY: HOUSING INSECURITY: AT ANY TIME IN THE PAST 12 MONTHS, WERE YOU HOMELESS OR LIVING IN A SHELTER (INCLUDING NOW)?: NO

## 2024-12-08 SDOH — SOCIAL STABILITY: SOCIAL NETWORK: HOW OFTEN DO YOU ATTEND MEETINGS OF THE CLUBS OR ORGANIZATIONS YOU BELONG TO?: NEVER

## 2024-12-08 SDOH — ECONOMIC STABILITY: INCOME INSECURITY: IN THE PAST 12 MONTHS HAS THE ELECTRIC, GAS, OIL, OR WATER COMPANY THREATENED TO SHUT OFF SERVICES IN YOUR HOME?: NO

## 2024-12-08 SDOH — SOCIAL STABILITY: SOCIAL INSECURITY: ABUSE: ADULT

## 2024-12-08 SDOH — SOCIAL STABILITY: SOCIAL INSECURITY: DO YOU FEEL UNSAFE GOING BACK TO THE PLACE WHERE YOU ARE LIVING?: NO

## 2024-12-08 SDOH — SOCIAL STABILITY: SOCIAL INSECURITY: WITHIN THE LAST YEAR, HAVE YOU BEEN AFRAID OF YOUR PARTNER OR EX-PARTNER?: NO

## 2024-12-08 SDOH — SOCIAL STABILITY: SOCIAL INSECURITY: HAS ANYONE EVER THREATENED TO HURT YOUR FAMILY OR YOUR PETS?: NO

## 2024-12-08 SDOH — SOCIAL STABILITY: SOCIAL INSECURITY: ARE YOU OR HAVE YOU BEEN THREATENED OR ABUSED PHYSICALLY, EMOTIONALLY, OR SEXUALLY BY ANYONE?: NO

## 2024-12-08 SDOH — ECONOMIC STABILITY: HOUSING INSECURITY: IN THE LAST 12 MONTHS, WAS THERE A TIME WHEN YOU WERE NOT ABLE TO PAY THE MORTGAGE OR RENT ON TIME?: NO

## 2024-12-08 SDOH — HEALTH STABILITY: PHYSICAL HEALTH: ON AVERAGE, HOW MANY MINUTES DO YOU ENGAGE IN EXERCISE AT THIS LEVEL?: 40 MIN

## 2024-12-08 SDOH — SOCIAL STABILITY: SOCIAL INSECURITY: HAVE YOU HAD ANY THOUGHTS OF HARMING ANYONE ELSE?: NO

## 2024-12-08 SDOH — SOCIAL STABILITY: SOCIAL INSECURITY: ARE THERE ANY APPARENT SIGNS OF INJURIES/BEHAVIORS THAT COULD BE RELATED TO ABUSE/NEGLECT?: NO

## 2024-12-08 SDOH — ECONOMIC STABILITY: HOUSING INSECURITY: IN THE PAST 12 MONTHS, HOW MANY TIMES HAVE YOU MOVED WHERE YOU WERE LIVING?: 0

## 2024-12-08 SDOH — HEALTH STABILITY: PHYSICAL HEALTH: ON AVERAGE, HOW MANY DAYS PER WEEK DO YOU ENGAGE IN MODERATE TO STRENUOUS EXERCISE (LIKE A BRISK WALK)?: 3 DAYS

## 2024-12-08 SDOH — ECONOMIC STABILITY: TRANSPORTATION INSECURITY: IN THE PAST 12 MONTHS, HAS LACK OF TRANSPORTATION KEPT YOU FROM MEDICAL APPOINTMENTS OR FROM GETTING MEDICATIONS?: NO

## 2024-12-08 ASSESSMENT — ACTIVITIES OF DAILY LIVING (ADL)
BATHING: INDEPENDENT
DOMESTIC_CHORES: INDEPENDENT
DRESSING: INDEPENDENT
BLADDER: CONTINENT
LACK_OF_TRANSPORTATION: NO
ASSISTIVE_DEVICE: DENTURES UPPER
TOILETING: INDEPENDENT
BOWEL: CONTINENT
LACK_OF_TRANSPORTATION: NO

## 2024-12-08 ASSESSMENT — BRIEF INTERVIEW FOR MENTAL STATUS (BIMS)
BIMS SUMMARY SCORE: 14
ASKED TO RECALL BED: YES, NO CUE REQUIRED
WHAT DAY OF THE WEEK IS IT: CORRECT
ASKED TO RECALL BLUE: YES, NO CUE REQUIRED
COGNITIVE PATTERN ASSESSMENT USED: BIMS
WHAT MONTH IS IT: ACCURATE WITHIN 5 DAYS
INITIAL REPETITION OF BED BLUE SOCK - FIRST ATTEMPT: 3
ASKED TO RECALL SOCK: YES, AFTER CUEING ("SOMETHING TO WEAR")
WHAT YEAR IS IT: CORRECT
GENERAL FUNCTIONAL COGNITION RATING: INDEPENDENT

## 2024-12-08 ASSESSMENT — LIFESTYLE VARIABLES
SKIP TO QUESTIONS 9-10: 1
HOW OFTEN DO YOU HAVE A DRINK CONTAINING ALCOHOL: 2-4 TIMES A MONTH
SUBSTANCE_ABUSE_PAST_12_MONTHS: NO
HOW MANY STANDARD DRINKS CONTAINING ALCOHOL DO YOU HAVE ON A TYPICAL DAY: 1 OR 2
AUDIT-C TOTAL SCORE: 2
PRESCIPTION_ABUSE_PAST_12_MONTHS: NO
HOW OFTEN DO YOU HAVE 6 OR MORE DRINKS ON ONE OCCASION: NEVER
AUDIT-C TOTAL SCORE: 2

## 2024-12-08 ASSESSMENT — PAIN SCALES - GENERAL
PAINLEVEL_OUTOF10: 0 - NO PAIN

## 2024-12-08 ASSESSMENT — PAIN - FUNCTIONAL ASSESSMENT
PAIN_FUNCTIONAL_ASSESSMENT: 0-10

## 2024-12-08 ASSESSMENT — PATIENT HEALTH QUESTIONNAIRE - PHQ9
SUM OF ALL RESPONSES TO PHQ9 QUESTIONS 1 & 2: 0
1. LITTLE INTEREST OR PLEASURE IN DOING THINGS: NOT AT ALL
2. FEELING DOWN, DEPRESSED OR HOPELESS: NOT AT ALL

## 2024-12-08 NOTE — PROGRESS NOTES
Message received from bedside Rn who states pt is eager to dc to facility and asking if its possible to do so today. It appears [facility] requested some updates which were just sent, if they are satisfied with the information sent it is possible for dc to them today, await response.      12/08/24 1127   Discharge Planning   Expected Discharge Disposition Rehab  ([facility])

## 2024-12-08 NOTE — PROGRESS NOTES
[facility] confirmed they can accept pt on this day, requesting 5 pm transport and for pt to eat prior to dc, this has been passed along to bedside Rn and attending       12/08/24 2212   Discharge Planning   Expected Discharge Disposition Rehab  ([facility])   Does the patient need discharge transport arranged? Yes   RoundTrip coordination needed? Yes   Has discharge transport been arranged? Yes   What day is the transport expected? 12/08/24   What time is the transport expected? 1700

## 2024-12-08 NOTE — NURSING NOTE
Patient admitted to unit via ambulance transport in . Patient assisted over to bed from chair. Patient slightly unsteady due to double vision from stroke. Regular diet, no pain and A&Ox 3. Patient has been educated about using call light and not getting up alone.  MD has been made aware of admission and meds/ orders in Epic

## 2024-12-08 NOTE — CARE PLAN
The patient's goals for the shift include      The clinical goals for the shift include Patient have no new neuro changes and remain safe throughout shift.

## 2024-12-09 ENCOUNTER — APPOINTMENT (OUTPATIENT)
Dept: CARDIAC REHAB | Facility: CLINIC | Age: 69
End: 2024-12-09
Payer: MEDICARE

## 2024-12-09 LAB
ANION GAP SERPL CALCULATED.3IONS-SCNC: 14 MMOL/L (ref 10–20)
BUN SERPL-MCNC: 24 MG/DL (ref 6–23)
CALCIUM SERPL-MCNC: 8.9 MG/DL (ref 8.6–10.3)
CHLORIDE SERPL-SCNC: 107 MMOL/L (ref 98–107)
CO2 SERPL-SCNC: 24 MMOL/L (ref 21–32)
CREAT SERPL-MCNC: 1.07 MG/DL (ref 0.5–1.3)
EGFRCR SERPLBLD CKD-EPI 2021: 76 ML/MIN/1.73M*2
ERYTHROCYTE [DISTWIDTH] IN BLOOD BY AUTOMATED COUNT: 17.2 % (ref 11.5–14.5)
GLUCOSE SERPL-MCNC: 93 MG/DL (ref 74–99)
HCT VFR BLD AUTO: 40.5 % (ref 41–52)
HGB BLD-MCNC: 12.7 G/DL (ref 13.5–17.5)
MCH RBC QN AUTO: 25.5 PG (ref 26–34)
MCHC RBC AUTO-ENTMCNC: 31.4 G/DL (ref 32–36)
MCV RBC AUTO: 81 FL (ref 80–100)
NRBC BLD-RTO: 0 /100 WBCS (ref 0–0)
PLATELET # BLD AUTO: 268 X10*3/UL (ref 150–450)
POTASSIUM SERPL-SCNC: 3.7 MMOL/L (ref 3.5–5.3)
RBC # BLD AUTO: 4.99 X10*6/UL (ref 4.5–5.9)
SODIUM SERPL-SCNC: 141 MMOL/L (ref 136–145)
WBC # BLD AUTO: 9.2 X10*3/UL (ref 4.4–11.3)

## 2024-12-09 PROCEDURE — 92523 SPEECH SOUND LANG COMPREHEN: CPT | Mod: GN

## 2024-12-09 PROCEDURE — 97163 PT EVAL HIGH COMPLEX 45 MIN: CPT | Mod: GP

## 2024-12-09 PROCEDURE — 97530 THERAPEUTIC ACTIVITIES: CPT | Mod: GO

## 2024-12-09 PROCEDURE — 97116 GAIT TRAINING THERAPY: CPT | Mod: GP

## 2024-12-09 PROCEDURE — 2500000001 HC RX 250 WO HCPCS SELF ADMINISTERED DRUGS (ALT 637 FOR MEDICARE OP): Performed by: INTERNAL MEDICINE

## 2024-12-09 PROCEDURE — 85027 COMPLETE CBC AUTOMATED: CPT | Performed by: INTERNAL MEDICINE

## 2024-12-09 PROCEDURE — 1180000001 HC REHAB PRIVATE ROOM DAILY

## 2024-12-09 PROCEDURE — 92507 TX SP LANG VOICE COMM INDIV: CPT | Mod: GN

## 2024-12-09 PROCEDURE — 82374 ASSAY BLOOD CARBON DIOXIDE: CPT | Performed by: INTERNAL MEDICINE

## 2024-12-09 PROCEDURE — 97110 THERAPEUTIC EXERCISES: CPT | Mod: GP

## 2024-12-09 PROCEDURE — 97166 OT EVAL MOD COMPLEX 45 MIN: CPT | Mod: GO

## 2024-12-09 PROCEDURE — 97112 NEUROMUSCULAR REEDUCATION: CPT | Mod: GP

## 2024-12-09 PROCEDURE — 97535 SELF CARE MNGMENT TRAINING: CPT | Mod: GO

## 2024-12-09 PROCEDURE — 36415 COLL VENOUS BLD VENIPUNCTURE: CPT | Performed by: INTERNAL MEDICINE

## 2024-12-09 PROCEDURE — 99223 1ST HOSP IP/OBS HIGH 75: CPT | Performed by: INTERNAL MEDICINE

## 2024-12-09 RX ORDER — LOPERAMIDE HYDROCHLORIDE 2 MG/1
2 CAPSULE ORAL 4 TIMES DAILY PRN
Status: DISCONTINUED | OUTPATIENT
Start: 2024-12-09 | End: 2024-12-21 | Stop reason: HOSPADM

## 2024-12-09 ASSESSMENT — PAIN - FUNCTIONAL ASSESSMENT
PAIN_FUNCTIONAL_ASSESSMENT: 0-10

## 2024-12-09 ASSESSMENT — COGNITIVE AND FUNCTIONAL STATUS - GENERAL
TOILETING: A LITTLE
PERSONAL GROOMING: A LITTLE
HELP NEEDED FOR BATHING: A LITTLE
DRESSING REGULAR LOWER BODY CLOTHING: A LITTLE
DRESSING REGULAR UPPER BODY CLOTHING: A LITTLE
DAILY ACTIVITIY SCORE: 18
EATING MEALS: A LITTLE

## 2024-12-09 ASSESSMENT — BALANCE ASSESSMENTS
PICK UP OBJECT FROM THE FLOOR FROM A STANDING POSITION: UNABLE TO TRY/NEEDS ASSIST TO KEEP FROM LOSING BALANCE OR FALLING
STANDING ON ONE LEG: UNABLE TO TRY NEEDS ASSIST TO PREVENT FALL
STANDING TO SITTING: NEEDS MINIMAL AID TO STAND OR STABILIZE
STANDING UNSUPPORTED ONE FOOT IN FRONT: LOSES BALANCE WHILE STEPPING OR STANDING
PLACE ALTERNATE FOOT ON STEP OR STOOL WHILE STANDING UNSUPPORTED: NEEDS ASSISTANCE TO KEEP FROM FALLING/UNABLE TO TRY
SITTING WITH BACK UNSUPPORTED BUT FEET SUPPORTED ON FLOOR OR ON A STOOL: ABLE TO SIT SAFELY AND SECURELY FOR 2 MINUTES
REACHING FORWARD WITH OUTSTRETCHED ARM WHILE STANDING: LOSES BALANCE WHILE TRYING/REQUIRES EXTERNAL SUPPORT
LONG VERSION TOTAL SCORE (MAX 56): 6
STANDING UNSUPPORTED WITH EYES CLOSED: NEEDS HELP TO KEEP FROM FALLING
TRANSFERS: NEEDS ONE PERSON TO ASSIST
PLACE ALTERNATE FOOT ON STEP OR STOOL WHILE STANDING UNSUPPORTED: NEEDS ASSIST TO KEEP FROM LOSING BALANCE OR FALLING
STANDING TO SITTING: NEEDS ASSIST TO SIT
STANDING UNSUPPORTED WITH FEET TOGETHER: NEEDS HELP TO ATTAIN POSITION AND UNABLE TO HOLD FOR 15 SECONDS
STANDING UNSUPPORTED: UNABLE TO STAND 30 SECONDS UNSUPPORTED
TURN 360 DEGREES: NEEDS ASSISTANCE WHILE TURNING

## 2024-12-09 ASSESSMENT — ACTIVITIES OF DAILY LIVING (ADL)
ADL_ASSISTANCE: INDEPENDENT
BATHING_ASSISTANCE: MINIMAL
ADL_ASSISTANCE: INDEPENDENT
HOME_MANAGEMENT_TIME_ENTRY: 38
HOME_MANAGEMENT_TIME_ENTRY: 7

## 2024-12-09 ASSESSMENT — ENCOUNTER SYMPTOMS
DIARRHEA: 0
COUGH: 0
FEVER: 0
VOMITING: 0
SHORTNESS OF BREATH: 0
HEADACHES: 0
APPETITE CHANGE: 0
ABDOMINAL PAIN: 0
NAUSEA: 0
CHILLS: 0

## 2024-12-09 ASSESSMENT — PAIN SCALES - GENERAL
PAINLEVEL_OUTOF10: 0 - NO PAIN

## 2024-12-09 NOTE — NURSING NOTE
Assumed care of patient at report. Patient sitting up in WC eating breakfast. Not much appetite per patient in AM. Patient denies any pain and will have evals this AM as he is a new patient. Will monitor for changes through end of shift

## 2024-12-09 NOTE — H&P
[facility]  Admission History and Physical    History of present illness    This is a 69-year-old man who presented to [facility] on December 6, 2024 when he woke up with double vision and difficulty speaking.  MRI did show abnormal diffusion weighted imaging in the superior avila and inferior midbrain posteriorly in the midline.  Placed on aspirin and Plavix.  He has a history of severe cardiomyopathy with aortic valve regurgitation, he had ascending aorta replacement with left atrial appendage clipping in September 2024.  The patient was going to transition to [facility] but had not done so because of financial reasons.  Currently, he says that he continues to have double vision which is his main deficit.  He feels very weak through his hips and legs.  He does not notice weakness more so on 1 side versus the other.  No chest pain or shortness of breath is reported.  He is very motivated begin a rehabilitation program.    Pre-admission functional status: Independent with ADLs and IADLs    Support System and Family Circumstances: Unclear    Past Medical History:   Diagnosis Date    Gout     Hyperlipidemia     Hypertension     SHOAIB (obstructive sleep apnea)     Paroxysmal atrial fibrillation (Multi) 08/24/2023        Past Surgical History:   Procedure Laterality Date    CARDIAC CATHETERIZATION N/A 9/11/2024    Procedure: Left Heart Cath;  Surgeon: Zacarias Jarquin DO;  Location: [facility] Cardiac Cath Lab;  Service: Cardiovascular;  Laterality: N/A;    SINUS SURGERY          No family history on file.    Social History     Socioeconomic History    Marital status:      Spouse name: Not on file    Number of children: Not on file    Years of education: Not on file    Highest education level: Not on file   Occupational History    Not on file   Tobacco Use    Smoking status: Never    Smokeless tobacco: Never   Vaping Use    Vaping status: Never Used    Substance and Sexual Activity    Alcohol use: Not Currently     Alcohol/week: 2.0 standard drinks of alcohol     Types: 2 Cans of beer per week     Comment: once a week    Drug use: Never    Sexual activity: Defer   Other Topics Concern    Not on file   Social History Narrative    Not on file     Social Drivers of Health     Financial Resource Strain: Low Risk  (12/8/2024)    Overall Financial Resource Strain (CARDIA)     Difficulty of Paying Living Expenses: Not very hard   Food Insecurity: No Food Insecurity (12/8/2024)    Hunger Vital Sign     Worried About Running Out of Food in the Last Year: Never true     Ran Out of Food in the Last Year: Never true   Transportation Needs: No Transportation Needs (12/8/2024)    PRAPARE - Transportation     Lack of Transportation (Medical): No     Lack of Transportation (Non-Medical): No   Physical Activity: Insufficiently Active (12/8/2024)    Exercise Vital Sign     Days of Exercise per Week: 3 days     Minutes of Exercise per Session: 40 min   Stress: No Stress Concern Present (12/8/2024)    [employer] - Occupational Stress Questionnaire     Feeling of Stress : Not at all   Social Connections: Socially Isolated (12/8/2024)    Social Connection and Isolation Panel [NHANES]     Frequency of Communication with Friends and Family: Once a week     Frequency of Social Gatherings with Friends and Family: Once a week     Attends Druze Services: Never     Active Member of Clubs or Organizations: No     Attends Club or Organization Meetings: Never     Marital Status:    Intimate Partner Violence: Not At Risk (12/8/2024)    Humiliation, Afraid, Rape, and Kick questionnaire     Fear of Current or Ex-Partner: No     Emotionally Abused: No     Physically Abused: No     Sexually Abused: No   Housing Stability: Low Risk  (12/8/2024)    Housing Stability Vital Sign     Unable to Pay for Housing in the Last Year: No     Number of Times Moved in the  "Last Year: 0     Homeless in the Last Year: No       Review of Systems   Constitutional:  Negative for appetite change, chills and fever.   Respiratory:  Negative for cough and shortness of breath.    Cardiovascular:  Negative for chest pain.   Gastrointestinal:  Negative for abdominal pain, diarrhea, nausea and vomiting.   Neurological:  Negative for headaches.   All other systems reviewed and are negative.       Objective   /90 (BP Location: Right arm, Patient Position: Lying)   Pulse 80   Temp 36.7 °C (98.1 °F) (Temporal)   Resp 18   Ht 1.854 m (6' 0.99\")   Wt 98 kg (216 lb 0.8 oz)   SpO2 94%   BMI 28.51 kg/m²     Physical Exam  Vitals reviewed.   Constitutional:       General: He is not in acute distress.     Appearance: He is not toxic-appearing.   HENT:      Head: Normocephalic and atraumatic.      Mouth/Throat:      Mouth: Mucous membranes are moist.   Eyes:      Pupils: Pupils are equal, round, and reactive to light.   Cardiovascular:      Rate and Rhythm: Normal rate and regular rhythm.      Heart sounds: No murmur heard.  Pulmonary:      Breath sounds: Normal breath sounds. No wheezing, rhonchi or rales.   Abdominal:      General: There is no distension.      Palpations: Abdomen is soft.   Musculoskeletal:      Right lower leg: No edema.      Left lower leg: No edema.   Neurological:      Mental Status: He is alert and oriented to person, place, and time.     Diplopia.  The eyes do not converge.  It is hard to discern if he has ataxia or if it is all related to his visual deficit.  He is very weak through the hips and legs this seems bilateral.    Recent Lab Results:  Results for orders placed or performed during the hospital encounter of 12/08/24 (from the past 24 hours)   CBC   Result Value Ref Range    WBC 9.2 4.4 - 11.3 x10*3/uL    nRBC 0.0 0.0 - 0.0 /100 WBCs    RBC 4.99 4.50 - 5.90 x10*6/uL    Hemoglobin 12.7 (L) 13.5 - 17.5 g/dL    Hematocrit 40.5 (L) 41.0 - 52.0 %    MCV 81 80 - 100 fL "    MCH 25.5 (L) 26.0 - 34.0 pg    MCHC 31.4 (L) 32.0 - 36.0 g/dL    RDW 17.2 (H) 11.5 - 14.5 %    Platelets 268 150 - 450 x10*3/uL   Basic Metabolic Panel   Result Value Ref Range    Glucose 93 74 - 99 mg/dL    Sodium 141 136 - 145 mmol/L    Potassium 3.7 3.5 - 5.3 mmol/L    Chloride 107 98 - 107 mmol/L    Bicarbonate 24 21 - 32 mmol/L    Anion Gap 14 10 - 20 mmol/L    Urea Nitrogen 24 (H) 6 - 23 mg/dL    Creatinine 1.07 0.50 - 1.30 mg/dL    eGFR 76 >60 mL/min/1.73m*2    Calcium 8.9 8.6 - 10.3 mg/dL        Imaging Results:  MR brain wo IV contrast    Result Date: 12/7/2024  Interpreted By:  Kelley Blackburn, STUDY: MR BRAIN WO IV CONTRAST; MR ANGIO NECK WO IV CONTRAST; MR ANGIO HEAD WO IV CONTRAST;  12/6/2024 7:37 pm   INDICATION: Signs/Symptoms:occipital stroke, please do MRA as well; Signs/Symptoms:stroke.  Stroke protocol.     COMPARISON: Head CT December 6, 2024   ACCESSION NUMBER(S): [id number]; [id number]; [id number]   ORDERING CLINICIAN: JEIMY EATON   TECHNIQUE: Axial T2, FLAIR, DWI, gradient echo T2 and  sagittal and coronal T1 weighted images of brain were acquired.   Time-of-flight MRA of the head  and neck was performed. The images were reviewed as source images and maximum intensity projections.   FINDINGS: Brain:   CSF Spaces: There is prominence of ventricles and sulci compatible with diffuse parenchymal volume loss.   Parenchyma: There is a focus of increased signal on diffusion-weighted imaging with subtle diminished signal on the ADC map within the dorsal aspect of the superior avila/inferior midbrain. There is little to no corresponding signal abnormality on FLAIR and T2 weighted imaging. There are scattered, nonspecific hyperintensities on FLAIR and T2 weighted imaging in the subcortical and periventricular white matter. There are a few punctate foci of susceptibility artifact on gradient echo T2 weighted imaging in the cerebral hemispheres which could be related to  remote microhemorrhages or mineralization. There are areas of encephalomalacia in the cerebellum and right occipital lobe. There is no mass effect or midline shift.   Paranasal Sinuses and Mastoids: There is mild mucosal thickening within scattered paranasal sinuses. The mastoid air cells are clear.   MRA of head:   Anterior circulation:  There is multifocal irregularity and potential narrowing of the petrous segments of the internal carotid arteries bilaterally thought to be due at least in part to artifact although atherosclerotic disease and associated stenosis can not be excluded. The intracranial internal carotid arteries are mildly degraded by artifact but appear to be patent. The proximal anterior and middle cerebral arteries are patent. There is subtle fullness at the junction of the A1 and A2 segments of the right TORY which may correspond to partial volume averaging with a bend in the vessel although a small aneurysm is difficult to entirely exclude. There is also a tiny focal outpouching arising from the communicating segment of the left ICA that appears to correspond to the origin of a small caliber posterior communicating artery which is also degraded by artifact.   Posterior circulation:    The intracranial segments of the vertebral arteries and the basilar artery are patent. There is subtle narrowing of the P1 segment of the right PCA. There is fullness at the junction of the PCOM and P1 and P2 segments of the left PCA measuring approximately 2 mm.   MRA of neck:   The source images are somewhat degraded by artifact and patient motion.   Carotid vessels:   The visualized aspects of the common carotid arteries are patent. There is mild irregularity and attenuation of flow related signal at the bifurcations and origins of the ICAs likely due to a combination of artifact and atherosclerotic plaque. There is tortuosity of the distal cervical ICAs. Attenuated flow related signal in the region of tortuosity  is thought to be due to artifact. There is otherwise no measurable stenosis.   Vertebral vessels:   Both vessels are somewhat degraded by artifact. The left is dominant and tortuous. They are otherwise grossly patent.       MRI Brain:   1. There is abnormal signal on diffusion-weighted imaging within the dorsal aspect of the superior avila/inferior brain posteriorly at the midline. It is uncertain if this is due to artifact versus punctate focus of acute to early subacute ischemic injury. 2. Nonspecific white matter changes most likely represent small-vessel ischemic disease in a patient of this age. Areas of encephalomalacia in the cerebellum and right occipital lobe likely represent remote infarcts.     MRA:   1. Somewhat limited MRA of the head and neck due to artifact and motion degradation. Within these limitations, no proximal large branch vessel cutoff or hemodynamically significant stenosis is identified. 2. Possible small aneurysm versus partial volume averaging at the junction of the left posterior communicating artery with the P1 and P2 segments of the left PCA. Additionally there is subtle fullness at the junction of the A1 and A2 segments of the right TORY which could reflect partial volume averaging with a bend in the vessel.   MACRO: None   Signed by: Kelley Blackburn 12/7/2024 1:58 PM Dictation workstation:   [id number]    MR angio head wo IV contrast    Result Date: 12/7/2024  Interpreted By:  Kelley Blackburn, STUDY: MR BRAIN WO IV CONTRAST; MR ANGIO NECK WO IV CONTRAST; MR ANGIO HEAD WO IV CONTRAST;  12/6/2024 7:37 pm   INDICATION: Signs/Symptoms:occipital stroke, please do MRA as well; Signs/Symptoms:stroke.  Stroke protocol.     COMPARISON: Head CT December 6, 2024   ACCESSION NUMBER(S): [id number]; [id number]; [id number]   ORDERING CLINICIAN: JEIMY EATON   TECHNIQUE: Axial T2, FLAIR, DWI, gradient echo T2 and  sagittal and coronal T1 weighted images of brain were  acquired.   Time-of-flight MRA of the head  and neck was performed. The images were reviewed as source images and maximum intensity projections.   FINDINGS: Brain:   CSF Spaces: There is prominence of ventricles and sulci compatible with diffuse parenchymal volume loss.   Parenchyma: There is a focus of increased signal on diffusion-weighted imaging with subtle diminished signal on the ADC map within the dorsal aspect of the superior avila/inferior midbrain. There is little to no corresponding signal abnormality on FLAIR and T2 weighted imaging. There are scattered, nonspecific hyperintensities on FLAIR and T2 weighted imaging in the subcortical and periventricular white matter. There are a few punctate foci of susceptibility artifact on gradient echo T2 weighted imaging in the cerebral hemispheres which could be related to remote microhemorrhages or mineralization. There are areas of encephalomalacia in the cerebellum and right occipital lobe. There is no mass effect or midline shift.   Paranasal Sinuses and Mastoids: There is mild mucosal thickening within scattered paranasal sinuses. The mastoid air cells are clear.   MRA of head:   Anterior circulation:  There is multifocal irregularity and potential narrowing of the petrous segments of the internal carotid arteries bilaterally thought to be due at least in part to artifact although atherosclerotic disease and associated stenosis can not be excluded. The intracranial internal carotid arteries are mildly degraded by artifact but appear to be patent. The proximal anterior and middle cerebral arteries are patent. There is subtle fullness at the junction of the A1 and A2 segments of the right TORY which may correspond to partial volume averaging with a bend in the vessel although a small aneurysm is difficult to entirely exclude. There is also a tiny focal outpouching arising from the communicating segment of the left ICA that appears to correspond to the origin of a  small caliber posterior communicating artery which is also degraded by artifact.   Posterior circulation:    The intracranial segments of the vertebral arteries and the basilar artery are patent. There is subtle narrowing of the P1 segment of the right PCA. There is fullness at the junction of the PCOM and P1 and P2 segments of the left PCA measuring approximately 2 mm.   MRA of neck:   The source images are somewhat degraded by artifact and patient motion.   Carotid vessels:   The visualized aspects of the common carotid arteries are patent. There is mild irregularity and attenuation of flow related signal at the bifurcations and origins of the ICAs likely due to a combination of artifact and atherosclerotic plaque. There is tortuosity of the distal cervical ICAs. Attenuated flow related signal in the region of tortuosity is thought to be due to artifact. There is otherwise no measurable stenosis.   Vertebral vessels:   Both vessels are somewhat degraded by artifact. The left is dominant and tortuous. They are otherwise grossly patent.       MRI Brain:   1. There is abnormal signal on diffusion-weighted imaging within the dorsal aspect of the superior avila/inferior brain posteriorly at the midline. It is uncertain if this is due to artifact versus punctate focus of acute to early subacute ischemic injury. 2. Nonspecific white matter changes most likely represent small-vessel ischemic disease in a patient of this age. Areas of encephalomalacia in the cerebellum and right occipital lobe likely represent remote infarcts.     MRA:   1. Somewhat limited MRA of the head and neck due to artifact and motion degradation. Within these limitations, no proximal large branch vessel cutoff or hemodynamically significant stenosis is identified. 2. Possible small aneurysm versus partial volume averaging at the junction of the left posterior communicating artery with the P1 and P2 segments of the left PCA. Additionally there is  subtle fullness at the junction of the A1 and A2 segments of the right TORY which could reflect partial volume averaging with a bend in the vessel.   MACRO: None   Signed by: Kelley Blackburn 12/7/2024 1:58 PM Dictation workstation:   [id number]    MR angio neck wo IV contrast    Result Date: 12/7/2024  Interpreted By:  Kelley Blackburn, STUDY: MR BRAIN WO IV CONTRAST; MR ANGIO NECK WO IV CONTRAST; MR ANGIO HEAD WO IV CONTRAST;  12/6/2024 7:37 pm   INDICATION: Signs/Symptoms:occipital stroke, please do MRA as well; Signs/Symptoms:stroke.  Stroke protocol.     COMPARISON: Head CT December 6, 2024   ACCESSION NUMBER(S): [id number]; [id number]; [id number]   ORDERING CLINICIAN: JEIMY GIVENS; FRANCHESKA EATON   TECHNIQUE: Axial T2, FLAIR, DWI, gradient echo T2 and  sagittal and coronal T1 weighted images of brain were acquired.   Time-of-flight MRA of the head  and neck was performed. The images were reviewed as source images and maximum intensity projections.   FINDINGS: Brain:   CSF Spaces: There is prominence of ventricles and sulci compatible with diffuse parenchymal volume loss.   Parenchyma: There is a focus of increased signal on diffusion-weighted imaging with subtle diminished signal on the ADC map within the dorsal aspect of the superior avila/inferior midbrain. There is little to no corresponding signal abnormality on FLAIR and T2 weighted imaging. There are scattered, nonspecific hyperintensities on FLAIR and T2 weighted imaging in the subcortical and periventricular white matter. There are a few punctate foci of susceptibility artifact on gradient echo T2 weighted imaging in the cerebral hemispheres which could be related to remote microhemorrhages or mineralization. There are areas of encephalomalacia in the cerebellum and right occipital lobe. There is no mass effect or midline shift.   Paranasal Sinuses and Mastoids: There is mild mucosal thickening within scattered paranasal sinuses. The mastoid  air cells are clear.   MRA of head:   Anterior circulation:  There is multifocal irregularity and potential narrowing of the petrous segments of the internal carotid arteries bilaterally thought to be due at least in part to artifact although atherosclerotic disease and associated stenosis can not be excluded. The intracranial internal carotid arteries are mildly degraded by artifact but appear to be patent. The proximal anterior and middle cerebral arteries are patent. There is subtle fullness at the junction of the A1 and A2 segments of the right TORY which may correspond to partial volume averaging with a bend in the vessel although a small aneurysm is difficult to entirely exclude. There is also a tiny focal outpouching arising from the communicating segment of the left ICA that appears to correspond to the origin of a small caliber posterior communicating artery which is also degraded by artifact.   Posterior circulation:    The intracranial segments of the vertebral arteries and the basilar artery are patent. There is subtle narrowing of the P1 segment of the right PCA. There is fullness at the junction of the PCOM and P1 and P2 segments of the left PCA measuring approximately 2 mm.   MRA of neck:   The source images are somewhat degraded by artifact and patient motion.   Carotid vessels:   The visualized aspects of the common carotid arteries are patent. There is mild irregularity and attenuation of flow related signal at the bifurcations and origins of the ICAs likely due to a combination of artifact and atherosclerotic plaque. There is tortuosity of the distal cervical ICAs. Attenuated flow related signal in the region of tortuosity is thought to be due to artifact. There is otherwise no measurable stenosis.   Vertebral vessels:   Both vessels are somewhat degraded by artifact. The left is dominant and tortuous. They are otherwise grossly patent.       MRI Brain:   1. There is abnormal signal on  diffusion-weighted imaging within the dorsal aspect of the superior avila/inferior brain posteriorly at the midline. It is uncertain if this is due to artifact versus punctate focus of acute to early subacute ischemic injury. 2. Nonspecific white matter changes most likely represent small-vessel ischemic disease in a patient of this age. Areas of encephalomalacia in the cerebellum and right occipital lobe likely represent remote infarcts.     MRA:   1. Somewhat limited MRA of the head and neck due to artifact and motion degradation. Within these limitations, no proximal large branch vessel cutoff or hemodynamically significant stenosis is identified. 2. Possible small aneurysm versus partial volume averaging at the junction of the left posterior communicating artery with the P1 and P2 segments of the left PCA. Additionally there is subtle fullness at the junction of the A1 and A2 segments of the right TORY which could reflect partial volume averaging with a bend in the vessel.   MACRO: None   Signed by: Kelley Blackburn 12/7/2024 1:58 PM Dictation workstation:   [id number]    Carotid duplex bilateral    Result Date: 12/6/2024  Interpreted By:  Farhat Haywood, STUDY: [id number]; 12/6/2024 1:20 pm   INDICATION: Signs/Symptoms:stroke : Slurred speech. Double vision. Legs not working.   COMPARISON: 09/12/2024   ACCESSION NUMBER(S): [id number]   ORDERING CLINICIAN: JEIMY GIVENS   TECHNIQUE: Carotid Examination using B-mode, color flow and doppler spectral analysis.   FINDINGS: RIGHT CAROTID SYSTEM DCCA PSV/EDV: 41.2 cm/s / 12.7 cm/s cm/sec ECA PSV: 57.5 cm/s cm/sec PICA PSV/EDV: 38.1 / 11.9 cm/sec LIGIA PSV/EDV: 42.1 cm/s / 17.9 cm/s cm/sec DICA PSV/EDV: 52.5 cm/s / 38.1 cm/s cm/sec JERRY/VCC Ratio: 0.9 VERT : Antegrade   LEFT CAROTID SYSTEM DCCA PSV/EDV: 35.6 cm/s,7.4 cm/s / 7.4 cm/s cm/sec ECA PSV: 63.3 cm/s cm/sec PICA PSV/EDV: 57.2 cm/s / 21.5 cm/s cm/sec LIGIA PSV/EDV: 51 / 20 cm/sec DICA PSV/EDV: 127.1 cm/s / 21.5  cm/s cm/sec JERRY/VCC Ratio: 1.6 VERT : Antegrade   DUPLEX IMAGES: Right Carotid System: There is minimal plaque within the proximal right ICA..   Left Carotid System: There is minimal plaque within the proximal left ICA.   The estimate of the degree of stenosis included in this report is based on the NASCET method for calculating stenosis, using the internal carotid artery distal to the stenosis as the reference point.       Less than 50% stenosis right ICA and left ICA.   MACRO: None.   Signed by: Farhat Haywood 12/6/2024 3:12 PM Dictation workstation:   [id number]    Transthoracic Echo Complete    Result Date: 12/6/2024            [facility] [address], [place] [id number]             Phone [phone] TRANSTHORACIC ECHOCARDIOGRAM REPORT Patient Name:       NAN Norman Physician:    [id number] Phelan DO Study Date:         12/6/2024            Ordering Provider:    Ryan PHELAN MRN/PID:            [id number]             Fellow: Accession#:         [id number]         Nurse: Date of Birth/Age:  1955 / 69 years Sonographer:          Jacy Reed RDCS Gender Assigned at  M                    Additional Staff: Birth: Height:             185.42 cm            Admit Date: Weight:             98.88 kg             Admission Status:     Inpatient -                                                                Routine BSA / BMI:          2.23 m2 / 28.76      Department Location:  [facility]                     kg/m2 Blood Pressure: 137 /85 mmHg Study Type:    TRANSTHORACIC ECHO (TTE) COMPLETE Diagnosis/ICD: Presence of prosthetic heart valve-Z95.2; Presence of other                vascular implants and grafts-Z95.828; Unspecified systolic                (congestive) heart  failure (CHF)-I50.20; Dilated                cardiomyopathy-I42.0 Indication:    S/P AVR, Dilated Cardiomyopathy, S/P Ascending aortic replacement CPT Codes:     Echo Complete w Full Doppler-[id number] Patient History: Valve Disorders:   Aortic Valve Replacement. Pertinent History: Cardiomyopathy, A-Fib, HTN, CHF, Chest Pain, Hyperlipidemia                    and LBBB, S/P AVR 9/24, Dilated Cardiomyopathy, S/P Ascending                    aortic replacement, Aortoplasty. Study Detail: The following Echo studies were performed: 2D, M-Mode, Doppler and               color flow.  PHYSICIAN INTERPRETATION: Left Ventricle: There is global hypokinesis of the left ventricle with minor regional variations. The left ventricular cavity size is mild to moderately dilated. There is normal septal and normal posterior left ventricular wall thickness. Abnormal (paradoxical) septal motion consistent with post-operative status. Spectral Doppler shows a Grade I (impaired relaxation pattern) of left ventricular diastolic filling with normal left atrial filling pressure. There is no definite left ventricular thrombus visualized. Left Atrium: The left atrium is normal in size. A bubble study using agitated saline was performed. Bubble study is negative. Right Ventricle: The right ventricle is normal in size. There is low normal right ventricular systolic function. Right Atrium: The right atrium is normal in size. Aortic Valve: There is a prosthetic aortic valve present. The aortic valve dimensionless index is 0.46. There is no evidence of aortic valve regurgitation. The peak instantaneous gradient of the aortic valve is 18 mmHg. The mean gradient of the aortic valve is 9 mmHg. Ascending aorta graft appears normall. Mitral Valve: The mitral valve is normal in structure. There is mild mitral valve regurgitation. Tricuspid Valve: The tricuspid valve is structurally normal. There is mild tricuspid regurgitation. The Doppler estimated RVSP is  mildly elevated right ventricular systolic pressure at 34.8 mmHg. Pulmonic Valve: The pulmonic valve is structurally normal. There is no indication of pulmonic valve regurgitation. Pericardium: Trivial pericardial effusion. There is no evidence of cardiac tamponade. Pleural: There is a moderate left pleural effusion. Aorta: The aortic root is normal. Ascending aorta graft appears normal.  CONCLUSIONS:  1. There is global hypokinesis of the left ventricle with minor regional variations.  2. Spectral Doppler shows a Grade I (impaired relaxation pattern) of left ventricular diastolic filling with normal left atrial filling pressure.  3. Left ventricular cavity size is mild to moderately dilated.  4. Abnormal septal motion consistent with post-operative status.  5. No left ventricular thrombus visualized.  6. There is low normal right ventricular systolic function.  7. There is a moderate pleural effusion.  8. There is no evidence of cardiac tamponade.  9. Mildly elevated right ventricular systolic pressure. 10. Ascending aorta graft appears normall. QUANTITATIVE DATA SUMMARY:  2D MEASUREMENTS:            Normal Ranges: LAs:             2.30 cm    (2.7-4.0cm) IVSd:            0.63 cm    (0.6-1.1cm) LVPWd:           0.95 cm    (0.6-1.1cm) LVIDd:           6.74 cm    (3.9-5.9cm) LVIDs:           6.06 cm LV Mass Index:   100.6 g/m2 LV % FS          10.1 %  LA VOLUME:                    Normal Ranges: LA Vol A4C:        75.9 ml    (22+/-6mL/m2) LA Vol A2C:        91.4 ml LA Vol BP:         87.3 ml LA Vol Index A4C:  34.0ml/m2 LA Vol Index A2C:  40.9 ml/m2 LA Vol Index BP:   39.1 ml/m2 LA Area A4C:       25.5 cm2 LA Area A2C:       26.7 cm2 LA Major Axis A4C: 7.3 cm LA Major Axis A2C: 6.6 cm LA Volume Index:   36.5 ml/m2 LA Vol A4C:        69.7 ml LA Vol A2C:        87.3 ml LA Vol Index BSA:  35.2 ml/m2  RA VOLUME BY A/L METHOD:          Normal Ranges: RA Area A4C:             32.6 cm2  M-MODE MEASUREMENTS:         Normal  Ranges: Ao Root:             3.15 cm (2.0-3.7cm)  AORTA MEASUREMENTS:         Normal Ranges: Asc Ao, d:          2.90 cm (2.1-3.4cm)  LV SYSTOLIC FUNCTION BY 2D PLANIMETRY (MOD):                      Normal Ranges: EF-A4C View:    22 % (>=55%) LV EF Reported: 23 %  LV DIASTOLIC FUNCTION:           Normal Ranges: MV Peak E:             0.61 m/s  (0.7-1.2 m/s) MV Peak A:             1.10 m/s  (0.42-0.7 m/s) E/A Ratio:             0.56      (1.0-2.2) MV e'                  0.067 m/s (>8.0) MV lateral e'          0.09 m/s MV medial e'           0.04 m/s E/e' Ratio:            9.09      (<8.0)  MITRAL VALVE:          Normal Ranges: MV DT:        168 msec (150-240msec)  AORTIC VALVE:                      Normal Ranges: AoV Vmax:                2.15 m/s  (<=1.7m/s) AoV Peak P.5 mmHg (<20mmHg) AoV Mean P.0 mmHg  (1.7-11.5mmHg) LVOT Max Alfredito:            1.05 m/s  (<=1.1m/s) AoV VTI:                 42.60 cm  (18-25cm) LVOT VTI:                19.70 cm LVOT Diameter:           2.10 cm   (1.8-2.4cm) AoV Area, VTI:           1.60 cm2  (2.5-5.5cm2) AoV Area,Vmax:           1.69 cm2  (2.5-4.5cm2) AoV Dimensionless Index: 0.46  RIGHT VENTRICLE: RV Basal 4.26 cm RV Mid   1.87 cm RV Major 10.1 cm TAPSE:   12.7 mm RV s'    0.08 m/s  TRICUSPID VALVE/RVSP:          Normal Ranges: Peak TR Velocity:     2.82 m/s RV Syst Pressure:     35 mmHg  (< 30mmHg) IVC Diam:             1.17 cm  [id number] Zacarias Jarquin DO Electronically signed on 2024 at 1:02:23 PM  ** Final **     ECG 12 lead    Result Date: 2024   Poor data quality, interpretation may be adversely affected Sinus rhythm with 1st degree AV block Left bundle branch block Abnormal ECG When compared with ECG of 25-SEP-2024 21:20, Sinus rhythm has replaced Atrial fibrillation Vent. rate has decreased BY  34 BPM QT has lengthened Confirmed by Geo Schwartz ([id number]) on 2024 8:55:28 AM    XR chest 1 view    Result Date: 2024  STUDY: Chest  Radiograph;  12/06/2024 8:19 PM INDICATION: Flu-like symptoms.  COMPARISON: 9/24/2024, 9/25/2024, 9/26/2024 and 9/27/2024 ACCESSION NUMBER(S): [id number] ORDERING CLINICIAN: DARIO DUTTON TECHNIQUE:  Frontal chest was obtained at 08:17 hours. FINDINGS: CARDIOMEDIASTINAL SILHOUETTE: Heart is enlarged.  Prior cardiac surgery  LUNGS: Small to moderate left pleural effusion is new.  Artifact from EKG wires overlie the chest.  Atelectasis left lower lobe.  No pneumothorax.  No definitive regions of airspace consolidation. Sclerosis along the anterior margin of the right first rib appears chronic.  ABDOMEN: No remarkable upper abdominal findings.  BONES: No acute osseous changes.    Small to moderate size left pleural effusion is new compared to prior exam. Left lower lobe atelectasis. Prior cardiac surgery. Signed by Dario Dunn MD    CT brain attack head wo IV contrast    Result Date: 12/6/2024  Interpreted By:  Farhat Haywood, STUDY: CT BRAIN ATTACK HEAD WO IV CONTRAST; 12/6/2024 8:01 am   INDICATION: Signs/Symptoms:Stroke Evaluation. Double vision. Difficulty speaking.   COMPARISON: None.   ACCESSION NUMBER(S): [id number]   ORDERING CLINICIAN: DARIO DUTTON   TECHNIQUE: A helical acquisition data was obtained.   One or more of the following dose reduction techniques were used: Automated exposure control Adjustment of the mA and/or kV according to patient size, and/or use of iterative reconstruction technique.   FINDINGS: Intracranial findings: There is no evidence for intracranial hemorrhage or mass effect. There is a small focus of loss of gray-white matter differentiation involving the right occipital lobe suspicious for an area of acute/subacute infarction (Series 9, Image 46).   Paranasal sinuses and temporal bone findings:  The visualized portions of the paranasal sinuses, mastoid air cells, and middle ear cavities are clear.   Orbital findings: The visualized portions of the orbits are unremarkable.        Focal area of gray-white matter differentiation loss consistent with acute/subacute infarction right occipital lobe. No evidence for hemorrhage.     MACRO: Farhat Chastity communicated by EPIC secure chat with  DARIO DUTTON on 12/6/2024 at 8:12 am.  (**-RCF-**) Findings:  See findings.     Signed by: Farhat Haywood 12/6/2024 8:12 AM Dictation workstation:   [id number]       Medications:  Scheduled medications  aspirin, 81 mg, oral, Daily  atorvastatin, 80 mg, oral, Nightly  clopidogrel, 75 mg, oral, Daily  empagliflozin, 10 mg, oral, Daily  eplerenone, 25 mg, oral, Daily  hydrALAZINE, 12.5 mg, oral, TID  metoprolol succinate XL, 150 mg, oral, Daily  sacubitriL-valsartan, 1 tablet, oral, BID  torsemide, 40 mg, oral, Daily      Continuous medications     PRN medications  PRN medications: acetaminophen **OR** acetaminophen, alum-mag hydroxide-simeth, bisacodyl, polyethylene glycol    Assessment/Plan   Admitted for comprehensive inpatient rehabilitation including PT, OT, RT, SLP, and supportive services to improve functional outcome of impaired mobility, ADLs, transfers, and self-care.     Problem   Ischemic Stroke (Multi)      Stroke, occipital lobe, avila/midbrain with diplopia and diffuse weakness.  Continue with medical management for secondary prevention: DAPT, statin -- will coordinate w/ neuro and cardio as there was discussion of transition to AC  DVT prophylaxis has been ordered: mechanical  Lifestyle interventions for stroke prevention will also be emphasized.  A falls prevention strategy will be employed.  The importance of follow up with the neurologist will be emphasized.    Chronic systolic HF on guideline directed therapy, cont BB, Entresto, eplerenone, jardiance - PRN torsemide    Diplopia    Htn stable    Rehab Plan of Care :  The Interdisciplinary Team will work collaboratively to address the patient problems and goals to be managed by the Individualized Interdisciplinary Plan of Care. See the IPOC  note for a complete review of the plan of care.    Medical Necessity:  This patient requires, and is capable of participating in, an intensive and coordinated interdisciplinary acute inpatient rehabilitation program. He requires close rehabilitation physician monitoring and management to monitor his complex medical conditions and coordinate rehabilitation care. The patient's rehabilitation goals and medical complexity cannot adequately be managed in a less intensive setting. Potential risks for clinical complications include: falls, cardiac events.    Rehabilitation Potential: good    Willi Kirk MD

## 2024-12-09 NOTE — PROGRESS NOTES
Occupational Therapy       12/09/24 [id number]   OT Last Visit   OT Received On 12/09/24   General   Reason for Referral Impaired ADLs   Past Medical History Relevant to Rehab heart failure, afib, gout, hyperlipidemia, SHOAIB, HTN, DJD, tinnitus   Family/Caregiver Present Yes   Caregiver Feedback Spouse   Prior to Session Communication Bedside nurse   Patient Position Received Bed, 3 rail up;Alarm on   Preferred Learning Style verbal;visual   General Comment Cleared by nursing and agreeable for therapy   Precautions   Hearing/Visual Limitations +diploplia   Medical Precautions Fall precautions   Cognition   Overall Cognitive Status WFL   Safety Judgment Decreased awareness of need for assistance   Cognition Comments +slurred speech   LE Dressing   Shoe Level of Assistance Setup;Close supervision   LE Dressing Where Assessed Edge of bed   LE Dressing Comments donning shoes using figure-4 technique   Bed Mobility 1   Bed Mobility 1 Supine to sitting   Level of Assistance 1 Close supervision   Transfer 1   Transfer From 1 Bed to   Transfer to 1 Wheelchair   Technique 1 Stand pivot;To right   Transfer Device 1 Gait belt   Transfer Level of Assistance 1 Minimum assistance   Trials/Comments 1 cues for technique   Therapeutic Activity   Therapeutic Activity 1 Bell's Cancellation Test with LUE using stylus:  5 minutes 10 seconds, 1 miss, 9 distractors   Therapeutic Activity 2 UE ergometer x 5 minutes duration   IP OT Assessment   OT Assessment Pt was receptive to therapy and instructions throughout.  Pt fatigues quickly.   Prognosis Good   Barriers to Discharge Other (Comment)  (impaired safety awareness, falls risk)   Evaluation/Treatment Tolerance Patient limited by fatigue   Medical Staff Made Aware Yes   End of Session Communication Bedside nurse   End of Session Patient Position Up in chair;Alarm on   OT Assessment   OT Assessment Results Decreased ADL status;Decreased upper extremity range of motion;Decreased upper  extremity strength;Decreased safe judgment during ADL;Decreased endurance;Visual deficit;Decreased fine motor control;Decreased functional mobility;Decreased IADLs   Strengths Premorbid level of function   Barriers to Participation Comorbidities   Education   Individual(s) Educated Patient   Education Provided Other  (ADL and functional transfer retraining)   Inpatient/Swing Bed or Outpatient   Inpatient/Swing Bed or Outpatient Inpatient   Inpatient Plan   Treatment Interventions ADL retraining;Functional transfer training;UE strengthening/ROM;Endurance training;Patient/family training;Compensatory technique education   OT Frequency 5 times per week   OT Discharge Recommendations Low intensity level of continued care   Equipment Recommended upon Discharge Other (comment)  (TBD)   OT Recommended Transfer Status Assist of 1   OT - OK to Discharge Yes

## 2024-12-09 NOTE — PROGRESS NOTES
Physical Therapy Treatment Note        12/09/24 [id number]   PT  Visit   PT Received On 12/09/24   Response to Previous Treatment Patient reporting fatigue but able to participate.   General   Reason for Referral Impaired ADLs and mobility skills.  Diploplia, Dysarthria, impaired activity Tolerance   Past Medical History Relevant to Rehab heart failure, afib, gout, hyperlipidemia, SHOAIB, HTN, DJD, tinnitus   Therapeutic Exercise   Therapeutic Exercise Activity 1 Standing Heel Raises 2x15 ll bars   Therapeutic Exercise Activity 2 Standing Mini 1/2 Squats ll bars  1x10   Balance/Neuromuscular Re-Education   Balance/Neuromuscular Re-Education Activity 1 ll bars sidestepping Much more difficult lead right due to listing Right.  Task broken down to weight shifting then proceeded to sidestepping   Ambulation/Gait Training 1   Surface 1 Level tile   Device 1 Rolling walker   Assistance 1 Moderate assistance;Moderate verbal cues   Quality of Gait 1 Scissoring;Ataxic;Forward flexed posture;Decreased step length;NBOS   Comments/Distance (ft) 1 50 feet x2 Increased Listing Right with fatigue.  LLE Scizzors   Gait Support Devices Gait belt;Wheelchair follow   Transfer 1   Transfer From 1 Wheelchair to   Transfer to 1 Stand   Technique 1 Sit to stand;Stand to sit   Transfer Device 1 Gait belt   Transfer Level of Assistance 1 Minimum assistance;Moderate assistance   Trials/Comments 1 assist for trunk down with fatigue.  x 5 reps   Wheelchair Activities   Propulsion Type 1 Manual   Level 1 Level tile   Method 1 Right upper extremity;Left upper extremity;Right lower extremity;Left lower extremity   Level of Assistance 1 Contact guard;Minimum assistance   Description/Details 1 100 feet   Other Activity   Other Activity 1 NUSTEP level 4 x 10 min  (RPR 5/10 Moderately Hrd)   Other Activity 2 722 Steps   Activity Tolerance   Endurance Decreased tolerance for upright activites   Rate of Perceived Exertion (RPE) 5/10 after NUStep   PT  Assessment   PT Assessment Results Decreased strength;Decreased endurance;Impaired balance;Decreased mobility;Decreased coordination;Impaired vision;Decreased safety awareness;Impaired judgement   Assessment Comment Improved gait distance pm   End of Session Patient Position Up in chair;Alarm on   Outpatient Education   Education Comment Reminded to use call bell for BR, or back to bed.   PT Plan   Treatment/Interventions Transfer training;Gait training;Balance training;Therapeutic exercise   PT Recommended Transfer Status Assist x1   PT - OK to Discharge Yes

## 2024-12-09 NOTE — PROGRESS NOTES
Physical Therapy EVALUATION AND TREATMENT        12/09/24 [id number]   PT  Visit   PT Received On 12/09/24   General   Reason for Referral impaired mobility; acute/subacute R occipital infarction   Referred By Dr. Angeles   Past Medical History Relevant to Rehab heart failure, afib, gout, hyperlipidemia, SHOAIB   Prior to Session Communication Bedside nurse   Patient Position Received Up in chair;Alarm off, not on at start of session   Preferred Learning Style visual;verbal   General Comment Cleared by nurse to see. Patient up in bedside chair. Agreeable to therapy. Chief Complaint of Diploplia  Wheelchair Alarm and Bed Alarm.  Some Impulsivity, pt admits fall previously before admitted here.        Hearing/Visual Limitations hearing WFL, + acute diplopia (attempted to patch R eye to minimize double vision)   Medical Precautions Fall precautions   Pain Assessment   Pain Assessment 0-10   0-10 (Numeric) Pain Score 0 - No pain   Cognition   Overall Cognitive Status WFL   Orientation Level Oriented X4   Home Living   Type of Home House   Lives With Spouse   Home Adaptive Equipment None   Home Layout Two level;Stairs to alternate level with rails   Alternate Level Stairs-Number of Steps full flight to 2nd story   Home Access Stairs to enter with rails   Entrance Stairs-Number of Steps 3 RAYMUNDO   Bathroom Accessibility 2nd story  (1/2 bath on 1st floor.  All bedrooms 2nd floor)   Home Living Comments No shower 1st floor but can shift BR to 1st.   Prior Function Per Pt/Caregiver Report   Level of [place] Independent with ADLs and functional transfers;Independent with homemaking with ambulation   Receives Help From Family   ADL Assistance Independent   Homemaking Assistance Independent   Ambulatory Assistance Independent   Vocational Retired   Hand Dominance Right   Prior Function Comments no recent falls   Activity Tolerance   Endurance Decreased tolerance for upright activites   Activity Tolerance Comments Sabine  easily   Strength   Strength Comments Hips 4/5 Knee and ankles 5/5   Coordination   Lower Body Coordination impaired   Trunk Coordination impaired   Rapid Alternating Movements Dyskinesia   Coordination Comment ataxic with ambulation   Postural Control   Trunk Control lists Right   Righting Reactions Diminished High Fall nRisk   Posture Comment Lists Right Scizzors LLE   Static Sitting Balance   Static Sitting-Level of Assistance Close supervision   Static Sitting-Comment/Number of Minutes x 5 min   Dynamic Sitting Balance   Dynamic Sitting-Balance Support Feet supported   Dynamic Sitting-Level of Assistance Contact guard   Dynamic Sitting-Balance Reaching across midline   Static Standing Balance   Static Standing-Balance Support No upper extremity supported   Static Standing-Comment/Number of Minutes LOB right 2 seconds   Dynamic Standing Balance   Dynamic Standing-Comments 6/56 GOETZ HIGH FALL RISK   Bed Mobility 1   Bed Mobility 1 Supine to sitting;Sitting to supine;Rolling right;Rolling left   Level of Assistance 1 Minimum assistance;Minimal verbal cues;Minimal tactile cues   Bed Mobility Comments 1 Dizzy wirh supinr to Sit   Transfer 1   Transfer From 1 Wheelchair to   Transfer to 1 Stand   Technique 1 Sit to stand;Stand to sit   Transfer Device 1 Walker   Transfer Level of Assistance 1 Minimum assistance;+1 to manage equipment   Trials/Comments 1 with RW   Transfers 2   Transfer From 2 Mat to   Transfer to 2 Mat;Wheelchair   Technique 2 Squat pivot   Transfer Level of Assistance 2 Minimum assistance   Trials/Comments 2 Low Bottom Pivot Right   Transfers 3   Transfer From 3 Wheelchair to   Transfer to 3 Mat   Technique 3 Lateral with transfer board   Transfer Level of Assistance 3 Minimum assistance   Ambulation/Gait Training 1   Surface 1 Level tile   Device 1 Rolling walker   Assistance 1 Moderate assistance;Moderate verbal cues   Quality of Gait 1 Scissoring;Ataxic;Forward flexed posture;Decreased step  length;NBOS   Comments/Distance (ft) 1 Moderate Listing Right  LLE SCizzors Heavy LOB.  30 feet, 20 feet for TUG   Gait Support Devices Gait belt;Wheelchair follow   Stairs   Stairs Yes   Stairs   Rails 1 Bilateral   Assistance 1 Moderate assistance   Comment/Number of Steps 1 4 steps Bilateral Rails soft  Knee    Object From Floor   Devices Reacher   Assist Level Dependent   Comments LOB   Wheelchair Activities   Propulsion Yes   Propulsion Type 1 Manual   Level 1 Level tile   Method 1 Right upper extremity;Left upper extremity;Right lower extremity;Left lower extremity   Level of Assistance 1 Contact guard;Minimum assistance   Description/Details 1 100 feet   General Observation   General Observation Chief Complaint of diploplia Holds Right eye closed.  MD ordered intermittent patching.  Requested pt have spouse bring in shoes, and order eye patch.  Pt does not want to use eye patch supplied here.   Strength RLE   R Hip Flexion 4/5   R Hip Extension 4/5   R Hip ABduction 4/5   R Hip ADduction 4/5   R Knee Flexion 4+/5   R Knee Extension 4+/5   R Ankle Dorsiflexion 4+/5   R Ankle Plantar Flexion 4+/5   Strength LLE   L Hip Flexion 4/5   L Hip Extension 4/5   L Hip ABduction 4/5   L Hip ADduction 4/5   L Knee Flexion 4/5   L Knee Extension 4+/5   L Ankle Dorsiflexion 4+/5   L Ankle Plantar Flexion 4+/5   PT Assessment   PT Assessment Results Decreased strength;Decreased endurance;Impaired balance;Decreased mobility;Decreased coordination;Impaired vision;Decreased safety awareness;Impaired judgement   Rehab Prognosis Excellent   Barriers to Discharge Double vision, 2 story home with shower upstairs   Evaluation/Treatment Tolerance Patient limited by fatigue   Assessment Comment Patient is a 69 y.o. year old male  who was independent prior to recent Right PCA CVA with severe Ataxia and high Fall Risk.   Patient is currently below baseline function. Patient now requires assist for all mobility. Patient presents  with decreased strength, balance, activity tolerance, coordination, 0/10pain. This has resulted in the inability to transfer, ambulate, and negotiate stairs safely. Patient will benefit from therapeutic exercise to improve strength, ROM and activity tolerance; therapeutic activity to improve safe mobility; neuromuscular reeducation to improve coordination, proprioception, balance, and motor planning; gait training to promote safe ambulation; modalities PRN for pain relief. Patient will benefit from intense rehab to address all impairments and activity limitations to facilitate functional independence and return to home safely.     End of Session Patient Position Up in chair;Alarm off, not on at start of session  (Speech Therapy)   IP OR SWING BED PT PLAN   Inpatient or Swing Bed Inpatient   PT Plan   Treatment/Interventions Bed mobility;Transfer training;Gait training;Stair training;Balance training;Neuromuscular re-education;Strengthening;Endurance training;Therapeutic exercise;Therapeutic activity;Home exercise program;Positioning;Postural re-education;Wheelchair management   PT Frequency 90 min/5 days per week  (and 45 Min 1x/week)   Equipment Recommended upon Discharge   (TBA'd)   PT Recommended Transfer Status Assist x1  (Low Bottom w/c to bed)   PT - OK to Discharge Yes      12/09/24 0800   Shafer Balance Scale   1. Sitting to Standing 1   2. Standing Unsupported 0   3. Sitting with Back Unsupported but Feet Supported on Floor or on a Stool 4   4. Standing to Sitting 0   5.  Transfers 1   6. Standing Unsupported with Eyes Closed 0  (5 vseconds with Heavy List Right)   7. Standing Unsupported with Feet Together 0   8. Reach Forward with Outstretched Arm While Standing 0   9.  Object from Floor from a Standing Position 0   10. Turning to Look Behind Over Left and Right Shoulders While Standing 0   11. Turn 360 Degrees 0   12. Place Alternate Foot on Step or Stool While Standing Unsupported 0   13. Standing  Unsupported One Foot in Front 0   14. Standing on One Leg 0   Goetz Balance Score 6   Timed Up and Go Test   TUG Manual 1 min 50 seconds with RW   Other Measures   5x Sit to Stand 28 seconds needed LE support       Supine Therapeutic Exercise   Activity Repetitions Weights Comments   Bridging     [] 1 x 15   [x] 2 x 15   []  [] Bilateral   [] Right    [] Left     [] Bolster   [x] No Bolster   Hip abd /add  [x] 1 x 15   [] 2 x 15   []  [x] Bilateral   [] Right    [] Left     Side lying effortful assist for positioning    SLR  [x] 1 x 15   [] 2 x 15   []  [x] Bilateral   [] Right    [] Left     Very effortful        blem: IRF PT STG Problem  Goal: Patient will roll right and left with contact guard assist to facilitate mobility.  Outcome: Progressing  Goal: Patient will transfer supine to sit and sit to supine with supervision assist to facilitate mobility.  Outcome: Progressing  Goal: Patient will transfer sit to stand and stand to sit with contact guard assist to facilitate mobility.  Outcome: Progressing  Goal: Patient will transfer bed to chair and chair to bed with contact guard assist to facilitate mobility.  Outcome: Progressing  Goal: Patient will amb 100 feet rolling walker device including two turns on even surface with minimal assist to facilitate safe mobility.    Outcome: Progressing  Goal: Patient will negotiate 8 stairs with two rail(s) and minimal} assist with no device for in home and community.  Outcome: Progressing  Goal: Patient will propel wheelchair 150 feet with two turns on even, uneven surface with independent assist to facilitate safe mobility.   Outcome: Progressing  Goal: Patient will perform TUG with least restrictive assistive device in 60 seconds or less to promote mobility and reduce fall risk with dynamic standing tasks.   Outcome: Progressing  Goal: Patient will improve GOETZ score to  20/56  to promote mobility and reduce fall risk with dynamic standing tasks.   Outcome:  Progressing  Goal: Patient will increase strength in BLE  by 4+/5  grade throughout to improve functional mobility.   Outcome: Progressing  Goal: Patient will  object from floor with rolling walker device, with reacher, and minimal assist to promote safe retrieval of dropped items.  Outcome: Progressing     Problem: IRF PT LTG Problem  Goal: Patient will roll right and left with independent assist to facilitate mobility.  Outcome: Progressing  Goal: Patient will transfer supine to sit and sit to supine with independent assist to facilitate mobility.  Outcome: Progressing  Goal: Patient will transfer sit to stand and stand to sit with independent assist to facilitate mobility.  Outcome: Progressing  Goal: Patient will transfer bed to chair and chair to bed with independent assist to facilitate mobility.  Outcome: Progressing  Goal: Patient will amb 150 feet rolling walker device including two turns on even surface with independent assist to facilitate safe mobility.    Outcome: Progressing  Goal: Patient will negotiate 12 stairs with two rail(s) and independent} assist with no device for in home and community.  Outcome: Progressing  Goal: Patient will propel wheelchair 150+ feet with two turns on even, uneven surface with independent assist to facilitate safe mobility.   Outcome: Progressing  Goal: Patient will perform TUG with least restrictive assistive device in 45 seconds or less to promote mobility and reduce fall risk with dynamic standing tasks.   Outcome: Progressing  Goal: Patient will improve GOETZ score to  30/56  to promote mobility and reduce fall risk with dynamic standing tasks.   Outcome: Progressing  Goal: Patient will increase BLE strength to 5//5 to improve functional mobility.   Outcome: Progressing  Goal: Patient will  object from floor with rolling walker device, with reacher, and independent assist to promote safe retrieval of dropped items.  Outcome: Progressing

## 2024-12-09 NOTE — CARE PLAN
The patient's goals for the shift include  sleep    The clinical goals for the shift include  maintain safety: promote sleep

## 2024-12-09 NOTE — INDIVIDUALIZED OVERALL PLAN OF CARE NOTE
Physical Medicine and Rehabilitation  Individualized Overall Plan of Care    Patient Name: David Chatman  Medical Record Number: [id number]  YOB: 1955  Sex: Male  Payor Info: Payor: MEDICARE / Plan: MEDICARE PART A AND B / Product Type: *No Product type* /     Primary Rehab (Etiologic) Diagnosis: Ischemic stroke (Multi)   IGC: Stroke: 01.1 Left Body Involvement (Right Brain)    Estimated Length of Stay: 14 days    Medical functional prognosis is good for the patient to be discharged home at modified independent} with higher level assist for higher level ADL's.    Patient/Family anticipated outcome: Home as independent as possible.    Functional Outcome/Goal:  Bed mobility: modified independent  Transfer sit to stand: modified independent  Ambulation with: modified independent  Upper extremity dressing: modified independent  Lower extremity dressing: modified independent  Stairs: modified independent  Communicate needs and wants as independent as able  Tolerate least restrictive diet independently     Anticipated Interventions:  Therapeutic exercises  Gait Training   Transfer training  Balance and high-level mobility training  Exercises to improve balance and mobility  ADL retraining for grooming, bathing, ADL activities  Exercises to improve strength and endurance  Cognitive skills and training  Speech and language training  High level executive functioning training  Swallow advancement and training    Required Therapy:  Physical therapy 90 minutes 5 days/week for 14 days.  Occupational therapy 90 minutes 5 days/week for 14 days.  Speech therapy 45 minutes 5 days/week for 14 days.    Patient has Good Rehab potential.    Assessment/Plan   Assessment & Plan  Ischemic stroke (Multi)             Willi Kirk MD  12/9/2024 8:46 AM

## 2024-12-09 NOTE — NURSING NOTE
Pt Laying in his bed.  Pt stated he was tired after  Physical therapy. Pt hasn't had any incontinent bowel issues since this morning after medication.this afternoon. Spoke with pt about falls and the importance of calling staff to assist with all movements around bed and bathroom. Pt acknowledged and agreed he would use call bell more. Pt in good spirits and looking forward to improved visual function.

## 2024-12-09 NOTE — CARE PLAN
Problem: IRF OT STG Problem  Goal: Pt will perform toileting task with use of toilet safety frame with supervision including toilet hygiene and clothing management.  Outcome: Progressing  Goal: Pt will perform bathing task with use of shower chair with supervision.  Outcome: Progressing  Goal: Pt will perform lower body dressing without use of reacher, sock aide at seated level with set-up, supervision.  Outcome: Progressing  Goal: Pt will perform toilet transfer with use of grab bar with supervision to standard toilet.  Outcome: Progressing  Goal: Pt will increase 9-Hole Peg Test score by 4 seconds demonstrating improvement in fine motor coordination in BUE for ADL performance.  Outcome: Progressing  Goal: Pt will increase Box and Blocks score by 5 blocks demonstrating improvement in gross motor coordination in BUE for ADL performance.  Outcome: Progressing     Problem: IRF OT LTG Problem  Goal: Pt will perform oral hygiene activity seated with modified independent.  Outcome: Progressing  Goal: Pt will perform toileting task with use of toilet safety frame with modified independent including toilet hygiene and clothing management.  Outcome: Progressing  Goal: Pt will perform bathing task with use of shower chair with stand by assist.  Outcome: Progressing  Goal: Pt will perform upper body dressing without use of adaptive equipment at seated level with modified independent.  Outcome: Progressing  Goal: Pt will perform lower body dressing without use of reacher, sock aide at seated level with modified independent.  Outcome: Progressing  Goal: Pt will don/doff socks, shoes without use of reacher, long handled shoehorn with modified independent.  Outcome: Progressing  Goal: Pt will perform toilet transfer with use of RW with modified independent to toilet safety frame. .  Outcome: Progressing  Goal: Pt will perform toilet transfer with use of RW with modified independent to toilet safety frame. .  Outcome:  Progressing  Goal: Pt will perform light home management activity with use of RW with supervision with safe technique.  Outcome: Progressing  Goal: Pt will increase 9-Hole Peg Test score by 6 seconds demonstrating improvement in fine motor coordination in BUE for ADL performance.  Outcome: Progressing  Goal: Pt will increase Box and Blocks score by 10 blocks demonstrating improvement in gross motor coordination in BUE or ADL performance.  Outcome: Progressing  Goal: Pt will complete car transfer with supervision.  Outcome: Progressing

## 2024-12-09 NOTE — CARE PLAN
Problem: IRF PT STG Problem  Goal: Patient will roll right and left with contact guard assist to facilitate mobility.  Outcome: Progressing  Goal: Patient will transfer supine to sit and sit to supine with supervision assist to facilitate mobility.  Outcome: Progressing  Goal: Patient will transfer sit to stand and stand to sit with contact guard assist to facilitate mobility.  Outcome: Progressing  Goal: Patient will transfer bed to chair and chair to bed with contact guard assist to facilitate mobility.  Outcome: Progressing  Goal: Patient will amb 100 feet rolling walker device including two turns on even surface with minimal assist to facilitate safe mobility.    Outcome: Progressing  Goal: Patient will negotiate 8 stairs with two rail(s) and minimal} assist with no device for in home and community.  Outcome: Progressing  Goal: Patient will propel wheelchair 150 feet with two turns on even, uneven surface with independent assist to facilitate safe mobility.   Outcome: Progressing  Goal: Patient will perform TUG with least restrictive assistive device in 60 seconds or less to promote mobility and reduce fall risk with dynamic standing tasks.   Outcome: Progressing  Goal: Patient will improve GOETZ score to  20/56  to promote mobility and reduce fall risk with dynamic standing tasks.   Outcome: Progressing  Goal: Patient will increase strength in BLE  by 4+/5  grade throughout to improve functional mobility.   Outcome: Progressing  Goal: Patient will  object from floor with rolling walker device, with reacher, and minimal assist to promote safe retrieval of dropped items.  Outcome: Progressing     Problem: IRF PT LTG Problem  Goal: Patient will roll right and left with independent assist to facilitate mobility.  Outcome: Progressing  Goal: Patient will transfer supine to sit and sit to supine with independent assist to facilitate mobility.  Outcome: Progressing  Goal: Patient will transfer sit to stand and  stand to sit with independent assist to facilitate mobility.  Outcome: Progressing  Goal: Patient will transfer bed to chair and chair to bed with independent assist to facilitate mobility.  Outcome: Progressing  Goal: Patient will amb 150 feet rolling walker device including two turns on even surface with independent assist to facilitate safe mobility.    Outcome: Progressing  Goal: Patient will negotiate 12 stairs with two rail(s) and independent} assist with no device for in home and community.  Outcome: Progressing  Goal: Patient will propel wheelchair 150+ feet with two turns on even, uneven surface with independent assist to facilitate safe mobility.   Outcome: Progressing  Goal: Patient will perform TUG with least restrictive assistive device in 45 seconds or less to promote mobility and reduce fall risk with dynamic standing tasks.   Outcome: Progressing  Goal: Patient will improve GOETZ score to  30/56  to promote mobility and reduce fall risk with dynamic standing tasks.   Outcome: Progressing  Goal: Patient will increase BLE strength to 5//5 to improve functional mobility.   Outcome: Progressing  Goal: Patient will  object from floor with rolling walker device, with reacher, and independent assist to promote safe retrieval of dropped items.  Outcome: Progressing

## 2024-12-09 NOTE — PROGRESS NOTES
David Chatman is a 69 year old male admitted to  [facility] 12.8.24. LSW is following for discharge planning. Met with patient to review Care transitions role and acute rehab discharge planning process.  At baseline, patient lives with spouse and is independent with ADL's and IADL's. There are 3 stairs to enter the 2 story residence. Patient does not require an assistive device for ambulation. No advanced directives in HIM and LSW is available for document completion upon request. PCP is Dr. Vaca. Pharmacy of choice is [facility] mail order or [facility]. Patient hopes to meet therapy goals and return home. LSW will continue to follow as a resource for transition of care and will facilitate family training, updates regarding insurance, meeting with patient to answer questions or concerns, and collaborate with IDT on patient needs to ensure safe return home. Patient was thankful for meeting with LSW and has no questions or concerns at this time. ADOD is evolving due to new admission status.

## 2024-12-09 NOTE — PROGRESS NOTES
Speech-Language Pathology    SLP Adult IRF CCR Speech-Language Cognition Evaluation/Treatment    Patient Name: David Chatman  MRN: [id number]  Today's Date: 12/9/2024   Time Calculation  Start Time: 0905  Stop Time: 0955  Time Calculation (min): 50 min     1/4sp    Current Problem: R PCA CVA (left body involvement    FROM H&P: 69-year-old man who presented to [facility] on December 6, 2024 when he woke up with double vision and difficulty speaking.  MRI did show abnormal diffusion weighted imaging in the superior avila and inferior midbrain posteriorly in the midline.  Placed on aspirin and Plavix.  He has a history of severe cardiomyopathy with aortic valve regurgitation, he had ascending aorta replacement with left atrial appendage clipping in September 2024.  The patient was going to transition to [facility] but had not done so because of financial reasons.  Currently, he says that he continues to have double vision which is his main deficit.  He feels very weak through his hips and legs.  He does not notice weakness more so on 1 side versus the other.  No chest pain or shortness of breath is reported.      SLP Assessment:    QAB score (form 2, see results below) is 9.77 which indicates no aphasia. Subtest response performance indicates presence of mild dysarthria. Pt demonstrated inconsistent diadochokinetic rate and an overall fast rate of speech.     SLUMS Examination score is 16/30 (see results below) which indicates moderate cognitive-linguistic difficulties. Areas of difficulty include calculations, thought organization, verbal sequencing, STM/delayed recall/paragraph recall, and clock construction task. Pt did not appear aware of errors on clock construction task.     SLP Plan:  Plan  Inpatient/Swing Bed or Outpatient: Inpatient  SLP TX Plan: Continue Plan of Care  SLP Frequency: 4x per week  Duration: 3 weeks  SLP Discharge Recommendations: Other (Comment) (TBS pending progress)  Next Treatment  Priority: clear speech strategies, CLQT  Discussed POC: Patient  Discussed Risks/Benefits: Yes, Patient  Patient/Caregiver Agreeable: Yes  SPEECH-LANGUAGE GOALS (established 12/9, anticipate end 3 weeks):  Pt will  utilize speech intelligibility strategies with 80% acc to improve communication skills.  PROGRESS: Provided instruction and handout for clear speech strategies. Focused on slow rate. Noted improved intelligibility with increased volume.  STATUS: established, continue  2.    Pt will  complete articulatory precision tasks with 80% accuracy to improve intelligibility.   PROGRESS: Cues to decrease rate with simple sentence level tasks and consonant blends.   STATUS: established, continue   LTG: Pt will increase motor speech function to the highest possible speech level for improved functional communication within daily living tasks.      3. Pt will improve STM/recall to 80% accuracy with cues to increase awareness of daily functional information.  PROGRESS: Pt is uncertain at this time, however, believes he may be close to a baseline with STM/recall.  STATUS: established, continue  4. Pt will improve problem solving skills to 80% accuracy with cues to increase safety awareness within immediate environment.  PROGRESS: Cues to increase awareness of L side when utilizing wheelchair. Cues to utilize brakes. Instruction for call button use.  STATUS: established, continue  5. Ongoing assessment if indicated for further goal development or to determine progress, as needed.  PROGRESS: Consider CLQT assessment to further address cognitive-linguistic skills  STATUS: established, continue.  LTG 1: Pt will optimize cognitive-linguistic skills necessary for return to least restrictive living environment and to reduce caregiver dependence.     Subjective   Pt seen upright in wheelchair. Identity confirmed via patient wristband.     General Visit Information:  General Information  Chart Reviewed: Yes  Reason for Referral:  Speech-Language/Cognition Evaluation in the setting of R PCA CVA, right brain stroke (left body involvement)  Referred By: Dr. Kirk  Past Medical History Relevant to Rehab: heart failure, afib, gout, hyperlipidemia, SHOAIB  Prior to Session Communication: Bedside nurse    Objective       Pain:  Pain Assessment  0-10 (Numeric) Pain Score: 0 - No pain    Cognition:  Cognition  Orientation Level: Oriented X4  Safety Judgment: Decreased awareness of need for assistance  Memory: Exceptions to WFL  Short-Term Memory: Impaired  Problem Solving: Exceptions to WFL  Simple Functional Tasks: Impaired  Numeric Reasoning: Exceptions to WFL   Simple Calculations: Impaired     Motor Speech Production:  Motor Speech Production  Assessments Used: QAB  Oral Motor : WFL  Automatic Speech: Counting to 10, Naming MILENA, Naming JOLIE, WFL  Repetition: WFL  Intelligibility: Impaired  Diadochokinetic Rate: Impaired  Paralinguistic Features: WFL  Respiratory Support: WFL  Motor Speech Disorder: Dysarthria    Auditory Comprehension:   Auditory Comprehension  Yes/No Questions: Within Functional Limits  Commands: Within Functional Limits    Verbal:  Verbal Expression  Primary Mode of Expression: Verbal  Primary Language: English  Confrontation Naming: Within Functional Limits  Repetition: Within Functional Limits    Written Expression:  Written Expression  Dominant Hand: Left    SLP Outcome Measures:   The Quick Aphasia Battery (QAB) is a multidimensional language assessment that quantifies strengths and weaknesses across core language domains.  Eight subtest measures are used to develop a language profile : (1) Level of consciousness; (2) Connected speech; (3) Word comprehension; (4) Sentence comprehension; (5) Picture naming; (6) Repetition; (7) Reading aloud; and (8) Motor speech    Summary QAB Form 2        Word comprehension 10.00   Sentence comprehension 10.00   Word finding 9.83   Grammatical construction 10.00   Speech motor programming  10.00   Repetition 10.00   Reading 10.00   QAB overall 9.77     QAB overall score        Severity  0.00-4.99                     severe  5.00-7.49                     moderate  7.50-8.89                     mild  8.90-10.00                  no aphasia     The (SLUMS) [employer] Mental Status : 16/30 = moderate cognitive-lingusitic difficulties (dementia range)  (orientation 3/3, money calculations 0/3, generative naming 2/3, 5 word recall 2/5, mental manipulation 1/2, clock drawing 2/4, shape ID 2/2, paragraph recall 4/8)      The total possible SLUMS score ranges from 1-30. For persons with high school education a score from 1-20 = Dementia, 21-26 = Mild Neurocognitive Disorder, and 27-30 = Normal.  For persons with less than high school education a score from 1-19 = Dementia, 20-24 = Mild Neurocognitive Disorder, and 25-30 = Normal.     Inpatient Education:  Adult Inpatient Education  Individual(s) Educated: Patient  Written Education : handouts fro what is stroke, effects of right brain (left body involvement), and what is dysarthria  Verbal Education : QAB ans SLUMS Examination results, clear speech strategies  Risk and Benefits Discussed with Patient/Caregiver/Other: yes  Patient/Caregiver Demonstrated Understanding: yes  Plan of Care Discussed and Agreed Upon: yes  Patient Response to Education: Patient/Caregiver Verbalized Understanding of Information

## 2024-12-09 NOTE — PROGRESS NOTES
Occupational Therapy Evaluation and Treatment           12/09/24 5788-6738   OT Last Visit   OT Received On 12/09/24   General   Reason for Referral Impaired ADLs s/p CVA   Referred By Dr. Kirk   Past Medical History Relevant to Rehab heart failure, afib, gout, hyperlipidemia, SHOAIB, HTN, DJD, tinnitus   Family/Caregiver Present No   Prior to Session Communication Bedside nurse   Patient Position Received Up in chair;Alarm off, not on at start of session   Preferred Learning Style visual;verbal   General Comment Cleared by nursing and agreeable for therapy   Precautions   Hearing/Visual Limitations +readers; +acute diploplia (declining a patch at this time); hearing diminished d/t tinnitus   Medical Precautions Fall precautions   Pain Assessment   Pain Assessment 0-10   0-10 (Numeric) Pain Score 0 - No pain   Cognition   Overall Cognitive Status WFL   Orientation Level Oriented X4   Following Commands Follows one step commands consistently   Safety Judgment Decreased awareness of need for assistance   Cognition Comments +slurred speech   Insight Mild   Impulsive Mildly   Home Living   Type of Home House   Lives With Spouse  (spouse is retired)   Home Adaptive Equipment None   Home Layout Two level;Stairs to alternate level with rails;Bed/bath upstairs;1/2 bath on main level;Laundry in basement   Alternate Level Stairs-Rails Right   Alternate Level Stairs-Number of Steps 12  (to basement and second floor)   Home Access Stairs to enter with rails   Entrance Stairs-Rails Both   Entrance Stairs-Number of Steps 3   Bathroom Shower/Tub Walk-in shower   Bathroom Toilet Handicapped height  (on second floor (not sure about the first floor))   Bathroom Equipment Built-in shower seat;Hand-held shower hose   Prior Function Per Pt/Caregiver Report   Level of [place] Independent with ADLs and functional transfers;Independent with homemaking with ambulation   Receives Help From Other (Comment)  (Shares IADLs with spouse)    ADL Assistance Independent   Homemaking Assistance Independent   Ambulatory Assistance Independent   Vocational Retired  (Maintenance)   Leisure enjoys fixing cars   Hand Dominance Left   Prior Function Comments +drives   ADL   Eating Assistance Modified independent (Device)   Eating Deficit   (+dentures)   Grooming Assistance Stand by   Grooming Deficit Setup;Supervision/safety  (to shave)   Bathing Assistance Minimal   Bathing Deficit Steadying;Supervision/safety   UE Dressing Assistance Stand by   UE Dressing Deficit Setup  (donning/doffing a pullover shirt)   LE Dressing Assistance Minimal   LE Dressing Deficit Steadying;Supervision/safety  (donning/doffing undewear and pants; setup/supervision assist donning/doffing socks using figure-4 technique)   Toileting Assistance with Device Minimal   Toileting Deficit Supervison/safety;Verbal cueing;Steadying   Activity Tolerance   Endurance Decreased tolerance for upright activites   Activity Tolerance Comments Fatigues easily   Bed Mobility 1   Bed Mobility 1 Sitting to supine   Level of Assistance 1 Close supervision   Transfer 1   Transfer From 1 Wheelchair to  (and from)   Transfer to 1 Toilet   Technique 1 Stand pivot;To right;To left   Transfer Device 1 Gait belt  (grab bar)   Transfer Level of Assistance 1 Minimum assistance   Trials/Comments 1 pivot transfer with cues throughout for technique d/t scissoring and listing right   Transfers 2   Transfer From 2 Wheelchair to  (and from)   Transfer to 2   (shower chair)   Technique 2 Stand pivot;To right;To left   Transfer Device 2 Gait belt  (grab bar)   Transfer Level of Assistance 2 Minimum assistance   Trials/Comments 2 pivot transfer with cues throughout for techique d/t scissoring and listing right   Transfers 3   Transfer From 3 Wheelchair to   Transfer to 3 Bed   Technique 3 Stand pivot;To right   Transfer Device 3 Gait belt  (siderail)   Transfer Level of Assistance 3 Minimum assistance  (pivot transfers  with cues throughout d/t scissoring and listing right)   Vision - Basic Assessment   Patient Visual Report Diplopia   Sensation   Light Touch No apparent deficits   Sensation Comment Denies tingling/numbness   Strength   Strength Comments BUEs=~4+/5 (equal)   Coordination   Upper Body Coordination   (impaired)   Rapid Alternating Movements Dyskinesia   Finger to Target Dysdiadokinesia   Coordination Comment +ataxia bilaterally, right>left   Hand Function   Gross Grasp Functional   RUE    RUE  WFL   LUE    LUE WFL   IP OT Assessment   OT Assessment 70 yo male presents with diploplia, ataxia, dysarthria, decreased safety awareness, and impaired activity tolerance following an acute right occipatal infarct.  Pt requires OT servicdes to maximize functional capacity and safety with mobility.   Prognosis Good   Barriers to Discharge Other (Comment)  (impaired safety awareness, falls risk)   Evaluation/Treatment Tolerance Patient limited by fatigue   Medical Staff Made Aware Yes   End of Session Communication Bedside nurse   End of Session Patient Position Bed, 3 rail up;Alarm on   OT Assessment   OT Assessment Results Decreased ADL status;Decreased upper extremity range of motion;Decreased upper extremity strength;Decreased safe judgment during ADL;Decreased endurance;Visual deficit;Decreased fine motor control;Decreased functional mobility;Decreased IADLs   Strengths Premorbid level of function   Barriers to Participation Comorbidities   Education   Individual(s) Educated Patient   Education Provided Fall precautons;Risk and benefits of OT discussed with patient or other;POC discussed and agreed upon   Patient Response to Education Patient/Caregiver Verbalized Understanding of Information   Inpatient/Swing Bed or Outpatient   Inpatient/Swing Bed or Outpatient Inpatient   Inpatient Plan   Treatment Interventions ADL retraining;Visual perceptual retraining;Functional transfer training;Endurance training;Cognitive  reorientation;Patient/family training;Equipment evaluation/education;Fine motor coordination activities;Compensatory technique education   OT Frequency 5 times per week   OT Discharge Recommendations Low intensity level of continued care   Equipment Recommended upon Discharge   (TBD)   OT Recommended Transfer Status Assist of 1   OT - OK to Discharge Yes         CARE PLAN    Problem: IRF OT STG Problem  Goal: Pt will perform toileting task with use of toilet safety frame with supervision including toilet hygiene and clothing management.  Outcome: Progressing  Goal: Pt will perform bathing task with use of shower chair with supervision.  Outcome: Progressing  Goal: Pt will perform lower body dressing without use of reacher, sock aide at seated level with set-up, supervision.  Outcome: Progressing  Goal: Pt will perform toilet transfer with use of grab bar with supervision to standard toilet.  Outcome: Progressing  Goal: Pt will increase 9-Hole Peg Test score by 4 seconds demonstrating improvement in fine motor coordination in BUE for ADL performance.  Outcome: Progressing  Goal: Pt will increase Box and Blocks score by 5 blocks demonstrating improvement in gross motor coordination in BUE for ADL performance.  Outcome: Progressing     Problem: IRF OT LTG Problem  Goal: Pt will perform oral hygiene activity seated with modified independent.  Outcome: Progressing  Goal: Pt will perform toileting task with use of toilet safety frame with modified independent including toilet hygiene and clothing management.  Outcome: Progressing  Goal: Pt will perform bathing task with use of shower chair with stand by assist.  Outcome: Progressing  Goal: Pt will perform upper body dressing without use of adaptive equipment at seated level with modified independent.  Outcome: Progressing  Goal: Pt will perform lower body dressing without use of reacher, sock aide at seated level with modified independent.  Outcome: Progressing  Goal: Pt  will don/doff socks, shoes without use of reacher, long handled shoehorn with modified independent.  Outcome: Progressing  Goal: Pt will perform toilet transfer with use of RW with modified independent to toilet safety frame. .  Outcome: Progressing  Goal: Pt will perform toilet transfer with use of RW with modified independent to toilet safety frame. .  Outcome: Progressing  Goal: Pt will perform light home management activity with use of RW with supervision with safe technique.  Outcome: Progressing  Goal: Pt will increase 9-Hole Peg Test score by 6 seconds demonstrating improvement in fine motor coordination in BUE for ADL performance.  Outcome: Progressing  Goal: Pt will increase Box and Blocks score by 10 blocks demonstrating improvement in gross motor coordination in BUE or ADL performance.  Outcome: Progressing  Goal: Pt will complete car transfer with supervision.  Outcome: Progressing              OUTCOME MEASURES     12/09/24 1000   [facility] Daily Activity   Putting on and taking off regular lower body clothing 3   Bathing (including washing, rinsing, drying) 3   Putting on and taking off regular upper body clothing 3   Toileting, which includes using toilet, bedpan or urinal 3   Taking care of personal grooming such as brushing teeth 3   Eating Meals 3   Daily Activity - Total Score 18   OT Adult Other Outcome Measures   9 Hole Peg Test 53 seconds=right; 52 seconds=left   Box and Block 18=right, 19=left   Other Outcome Measures  strength=85# right, 78i# left

## 2024-12-10 ENCOUNTER — APPOINTMENT (OUTPATIENT)
Dept: CARDIAC REHAB | Facility: CLINIC | Age: 69
End: 2024-12-10
Payer: MEDICARE

## 2024-12-10 PROCEDURE — 97110 THERAPEUTIC EXERCISES: CPT | Mod: GP

## 2024-12-10 PROCEDURE — 97110 THERAPEUTIC EXERCISES: CPT | Mod: GO,CO

## 2024-12-10 PROCEDURE — 99232 SBSQ HOSP IP/OBS MODERATE 35: CPT | Performed by: INTERNAL MEDICINE

## 2024-12-10 PROCEDURE — 92507 TX SP LANG VOICE COMM INDIV: CPT | Mod: GN

## 2024-12-10 PROCEDURE — 97116 GAIT TRAINING THERAPY: CPT | Mod: GP

## 2024-12-10 PROCEDURE — 2500000001 HC RX 250 WO HCPCS SELF ADMINISTERED DRUGS (ALT 637 FOR MEDICARE OP): Performed by: INTERNAL MEDICINE

## 2024-12-10 PROCEDURE — 97112 NEUROMUSCULAR REEDUCATION: CPT | Mod: GP

## 2024-12-10 PROCEDURE — 1180000001 HC REHAB PRIVATE ROOM DAILY

## 2024-12-10 PROCEDURE — 97535 SELF CARE MNGMENT TRAINING: CPT | Mod: GO,CO

## 2024-12-10 PROCEDURE — 97530 THERAPEUTIC ACTIVITIES: CPT | Mod: GO,CO

## 2024-12-10 ASSESSMENT — PAIN - FUNCTIONAL ASSESSMENT
PAIN_FUNCTIONAL_ASSESSMENT: 0-10

## 2024-12-10 ASSESSMENT — PAIN SCALES - GENERAL
PAINLEVEL_OUTOF10: 0 - NO PAIN

## 2024-12-10 ASSESSMENT — ACTIVITIES OF DAILY LIVING (ADL)
HOME_MANAGEMENT_TIME_ENTRY: 20
HOME_MANAGEMENT_TIME_ENTRY: 45

## 2024-12-10 NOTE — PROGRESS NOTES
12/10/24 1300 to 1330   OT Last Visit   OT Received On 12/10/24   General   Reason for Referral CVA   Referred By Dr. Kirk   Past Medical History Relevant to Rehab heart failure, afib, gout, hyperlipidemia, SHOAIB, HTN, DJD, tinnitus   Prior to Session Communication Bedside nurse   Patient Position Received Up in chair;Alarm on   Preferred Learning Style verbal;visual   General Comment Patient agreeable to therapy.   Precautions   Hearing/Visual Limitations +diploplia   Medical Precautions Fall precautions   Pain Assessment   Pain Assessment 0-10   0-10 (Numeric) Pain Score 0 - No pain   Transfer 1   Technique 1 Sit to stand;Stand to sit   Transfer Device 1 Gait belt   Transfer Level of Assistance 1 Minimum assistance   Trials/Comments 1 cues for handplacement multiple times   Static Standing Balance   Static Standing-Balance Support Right upper extremity supported   Static Standing-Level of Assistance Minimum assistance   Static Standing-Comment/Number of Minutes Patient completing activity on vertical board stood for 6 mins then stood for 2 mins. Transitioned to utilizing hips on counter and no hand support   Car Transfers   Car Transfer From Walker   Car Transfer Technique Ambulating   Car Transfers Verbal cues;Dependent  (2 nd person for safety patient retropulsive on turn mod assist of 1 second person safety)   IP OT Assessment   End of Session Communication Bedside nurse   End of Session Patient Position Up in chair;Alarm on   OT Assessment   OT Assessment Results Decreased ADL status;Decreased upper extremity range of motion;Decreased upper extremity strength;Decreased safe judgment during ADL;Decreased endurance;Visual deficit;Decreased fine motor control;Decreased functional mobility;Decreased IADLs   Strengths Premorbid level of function   Barriers to Participation Comorbidities   Inpatient/Swing Bed or Outpatient   Inpatient/Swing Bed or Outpatient Inpatient   Inpatient Plan   OT - OK to Discharge Yes

## 2024-12-10 NOTE — PROGRESS NOTES
"Physical Therapy       12/10/24 0480-1366   PT  Visit   PT Received On 12/10/24   Response to Previous Treatment Patient with no complaints from previous session.   General   Reason for Referral CVA   Referred By Dr. Kirk   Past Medical History Relevant to Rehab heart failure, afib, gout, hyperlipidemia, SHOAIB, HTN, DJD, tinnitus   Patient Position Received Up in chair;Alarm on   General Comment Patient agreeable to therapy.   Precautions   Hearing/Visual Limitations +diploplia   Medical Precautions Fall precautions   Pain Assessment   Pain Assessment 0-10   0-10 (Numeric) Pain Score 0 - No pain   Ambulation/Gait Training 1   Surface 1 Level tile   Device 1 Rolling walker   Assistance 1 Minimum assistance;Moderate assistance   Quality of Gait 1 Ataxic;Forward flexed posture;Decreased step length;NBOS;Listing   Comments/Distance (ft) 1 100' x 2 increased right list with fatigue. No scissoring noted, patient cognizant of foot placement and making effort to not scissor. Includes turns.   Gait Support Devices Gait belt;Wheelchair follow   Transfer 1   Technique 1 Sit to stand;Stand to sit   Transfer Device 1 Gait belt   Transfer Level of Assistance 1 Minimum assistance   Trials/Comments 1 cues for hand placement   Stairs   Rails 1 Bilateral   Device 1 Railing   Support Devices 1 Gait belt   Assistance 1 Minimum assistance;Moderate assistance   Comment/Number of Steps 1 4, 6\" steps, non reciprocal pattern. Cues to increase MARTHA.   Other Activity   Other Activity 1 Nu step level 4 x 10 minutes.  687 Steps   Activity Tolerance   Endurance Tolerates 30 min exercise with multiple rests   PT Assessment   PT Assessment Results Decreased strength;Decreased endurance;Impaired balance;Decreased mobility;Decreased coordination;Impaired vision;Decreased safety awareness;Impaired judgement   Rehab Prognosis Excellent   Barriers to Discharge diplopia   Evaluation/Treatment Tolerance Patient tolerated treatment well   End of Session " Patient Position Up in chair;Alarm on   PT Plan   Treatment/Interventions Transfer training;Gait training;Stair training;Therapeutic exercise;Endurance training;Therapeutic activity   PT Plan Ongoing PT   PT Recommended Transfer Status Assist x1;Assistive device   PT - OK to Discharge Yes

## 2024-12-10 NOTE — PROGRESS NOTES
"[facility] Rehabilitation  Physician Progress Note    Subjective   David Chatman is a 69 y.o. male admitted to inpatient rehabilitation unit.    The patient is adjusting well to the acute rehabilitation unit and has been oriented to their surroundings and the routine.  Fall precautions have been explained in detail.  The therapy evaluations have been completed and reviewed/approved by me.  The patient denies chest pain and shortness of breath.  No new complaints are conveyed.    An interdisciplinary team meeting was held today.  I was in attendance and discussed this patient's functional progress with the physical and occupational therapist, as well as the speech therapist if they are involved in the patient's care.  Also present were representatives from the nursing staff and case management.  PLEASE REFER TO THE INTERDISCIPLINARY TEAM DOCUMENTATION FOR ADDITIONAL COMMENTS ON THE PATIENT'S CLINICAL AND FUNCTIONAL CONDITION.  I agree with the decisions made by the team.  The patient's medical and functional condition continue to require frequent physician visits, 24 hour rehabilitation nursing care and an interdisciplinary approach, such that this level of care could not be provided outside of an inpatient rehabilitation facility.  The patient's anticipated discharge plan and date was reviewed and approved by me.    Transfers are min assist.  Stairs mod assist with 2 rails.    TUG 1:50  GOETZ 6    Objective   /71 (BP Location: Right arm, Patient Position: Lying)   Pulse 83   Temp 37 °C (98.6 °F) (Temporal)   Resp 18   Ht 1.854 m (6' 0.99\")   Wt 98 kg (216 lb 0.8 oz)   SpO2 97%   BMI 28.51 kg/m²      Physical Exam  Constitutional:       General: He is not in acute distress.     Appearance: He is not toxic-appearing.   HENT:      Head: Normocephalic and atraumatic.      Mouth/Throat:      Mouth: Mucous membranes are moist.   Eyes:      Pupils: Pupils are equal, " "round, and reactive to light.   Cardiovascular:      Rate and Rhythm: Normal rate and regular rhythm.      Heart sounds: No murmur heard.  Pulmonary:      Breath sounds: Normal breath sounds. No wheezing, rhonchi or rales.   Abdominal:      General: There is no distension.      Palpations: Abdomen is soft.   Musculoskeletal:      Right lower leg: No edema.      Left lower leg: No edema.   Neurological:      Mental Status: He is alert and oriented to person, place, and time.      Diplopia.  The eyes do not converge.  It is hard to discern if he has ataxia or if it is all related to his visual deficit.  He is very weak through the hips and legs this seems bilateral.    Labs  BMP:  Results from last 7 days   Lab Units 12/09/24  0519   CREATININE mg/dL 1.07   BUN mg/dL 24*   SODIUM mmol/L 141   POTASSIUM mmol/L 3.7   CHLORIDE mmol/L 107   CO2 mmol/L 24     CBC:  Results from last 7 days   Lab Units 12/09/24  0519   WBC AUTO x10*3/uL 9.2   HEMOGLOBIN g/dL 12.7*   HEMATOCRIT % 40.5*   MCV fL 81   PLATELETS AUTO x10*3/uL 268     Coagulation:  Results from last 7 days   Lab Units 12/06/24  0801   INR  1.2   PROTIME seconds 12.2        Assesment and Plan    Ischemic Stroke (Multi)      Stroke, occipital lobe, avila/midbrain with diplopia and diffuse weakness.  Continue with medical management for secondary prevention, PT, OT    Recent thoracic aorta repair.     CMP/Chronic systolic HF on guideline directed therapy, cont BB, Entresto, eplerenone, jardiance - PRN torsemide    Was to start Eliquis but has not due to cost.  He was able to get it though a program so is available outpatient.  I corrdinatede with the neurologist who feels that DAPT can be stopped and Eliquis can be started, as suggested by the cardiology notes as well (\"Agree with plans for Eliquis anticoagulation per neurology recommendations.\")  Will stop DAPT and start Eliquis.  Patient is aware and in agreement.  I also approved w/ Dr. Buck.     Diplopia   "   Htn stable    Willi Kirk MD

## 2024-12-10 NOTE — PROGRESS NOTES
Speech-Language Pathology    SLP Adult [facility] Speech-Language Pathology Treatment     Patient Name: David Chatman  MRN: [id number]  Today's Date: 12/10/2024  Time Calculation  Start Time: 0903  Stop Time: 0958  Time Calculation (min): 55 min     2/4    Current Problem:   R PCA CVA (left body involvement)    SLP Assessment:  SLP Assessment Results: Motor Speech Deficits  Treatment Tolerance: Patient tolerated treatment well  Medical Staff Made Aware: Yes  Strengths: Family/Caregiver Support  Education Provided: Yes     Plan:  Inpatient/Swing Bed or Outpatient: Inpatient  SLP TX Plan: Continue Plan of Care  SLP Frequency: 4x per week  Duration: 3 weeks  SLP Discharge Recommendations: Other (Comment) (TBD pending progress)  Next Treatment Priority: clear speech strategies  Discussed POC: Patient  Discussed Risks/Benefits: Yes  Patient/Caregiver Agreeable: Yes      Subjective   Pt seen upright in wheelchair. Double vision persists.     General Visit Information:   Referred By: Dr. Kirk  Past Medical History Relevant to Rehab: heart failure, afib, gout, hyperlipidemia, SHOAIB, HTN, DJD, tinnitus  Prior to Session Communication: Bedside nurse    Pain Assessment:  Pain Assessment  0-10 (Numeric) Pain Score: 0 - No pain      Objective   SPEECH-LANGUAGE GOALS (established 12/9, anticipate end 3 weeks):  Pt will  utilize speech intelligibility strategies with 80% acc to improve communication skills.  PROGRESS: Focused on slow rate. Attempted metronome. However, pt felt more distracting. Provided education on tapping strategy. Pt utilized occasionally for multisyllabic words.  STATUS: continue, progressing  2.    Pt will  complete articulatory precision tasks with 80% accuracy to improve intelligibility.   PROGRESS: Excellent carryover with utilizing slow rate with sentence level stimuli and multisyllable words.              STATUS: continue, progressing   LTG: Pt will increase motor speech function to the highest possible  "speech level for improved functional communication within daily living tasks.       3. Pt will improve STM/recall to 80% accuracy with cues to increase awareness of daily functional information.  PROGRESS: Pt feels that he is at a baseline mentation. See below for CLQT results.  STATUS: established, continue  4. Pt will improve problem solving skills to 80% accuracy with cues to increase safety awareness within immediate environment.  PROGRESS: There were a few episodes of \"swerving lines\" with maze task. However, alleys were not crossed. The correct solution was obtained for both concrete and abstract mazes. Pt demonstrated improved awareness for \"swerving lines.\" Double vision persists.  STATUS: continue  5. Ongoing assessment if indicated for further goal development or to determine progress, as needed.  PROGRESS: CLQT was completed. See below. No new goals are indicated at this time.  STATUS: not clinically indicated at this time for new goals.  LTG 1: Pt will optimize cognitive-linguistic skills necessary for return to least restrictive living environment and to reduce caregiver dependence.     SLP Outcome Measures:   The Cognitive Linguistic Quick Test (CLQT) is a criterion-referenced measure to quickly assess strengths and weaknesses in five cognitive domains.  Attention Domain:   192, WNL (4)  Memory Domain:   175, WNL (4)  Executive Function Domain:  24, WNL (4)  Language Domain:   32, WNL (4)  Visuospatial Skills Domain:      Composite Severity Ratin.0 WNL      Inpatient Education:  Adult Inpatient Education  Individual(s) Educated: Patient  Verbal Education : CLQT results, clear speech strategies  Risk and Benefits Discussed with Patient/Caregiver/Other: yes  Patient/Caregiver Demonstrated Understanding: yes  Plan of Care Discussed and Agreed Upon: yes  Patient Response to Education: Patient/Caregiver Verbalized Understanding of Information                     "

## 2024-12-10 NOTE — PROGRESS NOTES
"Physical Therapy       12/10/24 2874-1928   PT  Visit   PT Received On 12/10/24   Response to Previous Treatment Patient reporting fatigue but able to participate.   General   Reason for Referral CVA   Referred By Dr. Kirk   Past Medical History Relevant to Rehab heart failure, afib, gout, hyperlipidemia, SHOAIB, HTN, DJD, tinnitus   Patient Position Received Up in chair;Alarm on   General Comment diplopia. Declines eye patch with btape. Performs activities with one eye closed.   Precautions   Hearing/Visual Limitations +diploplia   Medical Precautions Fall precautions   Pain Assessment   Pain Assessment 0-10   0-10 (Numeric) Pain Score 0 - No pain   Cognition   Orientation Level Oriented X4   Coordination   Coordination Comment ataxia   Postural Control   Trunk Control lists right   Therapeutic Exercise   Therapeutic Exercise Activity 1 standing heel raises 1 x 15, 2#   Therapeutic Exercise Activity 2 standing hip flex 1 x 15, 2#   Therapeutic Exercise Activity 3 standing knee flex 1 x 15, 2#   Therapeutic Exercise Activity 4 standing hip ext 1 x 15, 2#   Therapeutic Exercise Activity 5 standing hip abd 1 x 15, 2#   Therapeutic Exercise Activity 6 mini squats 1 x 15   Therapeutic Exercise Activity 7 STS 1 x 15, no UE support, CGA/min assist, cues for midline with good carryover.   Balance/Neuromuscular Re-Education   Balance/Neuromuscular Re-Education Activity 1 cone reach blue pads min assist, multidirectional sway   Balance/Neuromuscular Re-Education Activity 2 balloon toss blue pads min assist, multidirectional sway, decreased accuracy of hitting balloon   Balance/Neuromuscular Re-Education Activity 3 ball with dowel too blue pads, min assist, multi directional sway with posterio LOB x 1 and min assist to regain   Balance/Neuromuscular Re-Education Activity 4 side stepping ll bars, no UE support, min assist   Balance/Neuromuscular Re-Education Activity 5 toe taps to targets on 6\" step in ll bars, no UE support, " min  assist. Cues for placement and accuracy of right foot. Posterior and right lean with LOB x 2 and min assist to regain.   Balance/Neuromuscular Re-Education Activity 6 coordination ladder ll bars, no UE support, min/mod assist   Balance/Neuromuscular Re-Education Activity 7 toe taps to large orange cones, one UE support min assist. Cues for accuracy, placement and control   Balance/Neuromuscular Re-Education Activity 8 forward amb in ll bars, one UE support, min/mod assist. Cues to remin centerednin ll bars as patient tends to veer right. Con briefly correct with cues.   Balance/Neuromuscular Re-Education Activity 9 backward amb in ll bars, one UE support, min/mod assist. Decreased step length and foot clearance. Right list. Posterior lean.   Ambulation/Gait Training 1   Surface 1 Level tile   Device 1 Rolling walker   Assistance 1 Moderate assistance   Quality of Gait 1 Ataxic;Forward flexed posture;Decreased step length;NBOS   Comments/Distance (ft) 1 50' x 2, increased right list with fatigue. No scissoring noted, patient cognizant of foot placement and making effort to not scissor.   Gait Support Devices Gait belt;Wheelchair follow   Transfer 1   Technique 1 Sit to stand;Stand to sit   Transfer Device 1 Gait belt   Transfer Level of Assistance 1 Minimum assistance   Trials/Comments 1 cues for hand placement   Wheelchair Activities   Propulsion Type 1 Manual   Level 1 Level tile  (CARPET)   Method 1 Right upper extremity;Left upper extremity;Right lower extremity;Left lower extremity   Level of Assistance 1 Close supervision   Description/Details 1 100' Includes turns   Activity Tolerance   Endurance Decreased tolerance for upright activites   PT Assessment   PT Assessment Results Decreased strength;Decreased endurance;Impaired balance;Decreased mobility;Decreased coordination;Impaired vision;Decreased safety awareness;Impaired judgement   Rehab Prognosis Excellent   Barriers to Discharge diplopia    Assessment Comment Patient presents with diplopia, balance, and coordination deficits that impact functional mobility. Pateint cognizant of right list and scissoring with tasks and amb and makes a concentrated effort to correct. Patient declines eye patching with eye patch and tape and reports that spouse is ordering eye patch with elastic band. He performs mobility with one eye closed. Excellent effort given in therapy. Will continue with PT POC recently set on evaluation.   End of Session Patient Position Up in chair;Alarm on   PT Plan   Treatment/Interventions Transfer training;Gait training;Balance training;Neuromuscular re-education;Strengthening;Therapeutic exercise;Therapeutic activity;Wheelchair management   PT Plan Ongoing PT   PT Recommended Transfer Status Assist x1;Assistive device   PT - OK to Discharge Yes

## 2024-12-10 NOTE — NURSING NOTE
Assumed care of patient at 1900, Patient resting in bed with call light in reach   No complaints voiced.

## 2024-12-10 NOTE — PROGRESS NOTES
12/10/24 0800 to 0900   General   Reason for Referral Impaired ADLs and mobility skills.  Diploplia, Dysarthria, impaired activity Tolerance   Referred By Dr. Kirk   Past Medical History Relevant to Rehab heart failure, afib, gout, hyperlipidemia, SHOAIB, HTN, DJD, tinnitus   Prior to Session Communication Bedside nurse   Patient Position Received Bed, 3 rail up;Alarm on   Preferred Learning Style verbal;visual   General Comment Cleared by nursing and agreeable for therapy   Precautions   Hearing/Visual Limitations +diploplia   Medical Precautions Fall precautions   Pain Assessment   Pain Assessment 0-10   0-10 (Numeric) Pain Score 0 - No pain   Feeding   Feeding Level of Assistance Setup   Feeding Where Assessed Bed level   Grooming   Grooming Level of Assistance Modified independent   Grooming Where Assessed Sitting sinkside   UE Bathing   UE Bathing Level of Assistance Setup   UE Bathing Where Assessed Sitting sinkside   UE Bathing Comments appears forgetful   UE Dressing   UE Dressing Level of Assistance Setup   UE Dressing Where Assessed Wheelchair   UE Dressing Comments able to talib and doff over head shirt   LE Dressing   Sock Level of Assistance Setup   Shoe Level of Assistance Setup;Close supervision   LE Dressing Where Assessed Wheelchair   LE Dressing Comments patient educated in cross leg technique   Toileting   Toileting Level of Assistance Minimum assistance   Where Assessed Toilet   Toileting Comments standing to urinate managed pants steadying with cues to hold grab bar.   Transfers   Transfer Yes   Transfer 1   Transfer From 1 Bed to   Transfer to 1 Wheelchair   Technique 1 To right;Stand pivot   Transfer Device 1 Gait belt   Transfer Level of Assistance 1 Minimum assistance   Trials/Comments 1 cues for handplacement and direction of turn   Toilet Transfers   Toilet Transfer From Wheelchair   Toilet Transfer Type To and from   Toilet Transfer to Standard toilet   Toilet Transfer Technique Stand  pivot;To right;To left   Toilet Transfers Minimal assistance   Toilet Transfers Comments cues for handplacement and foot distance   Balance/Neuromuscular Re-Education   Balance/Neuromuscular Re-Education Activity Performed Yes   Balance/Neuromuscular Re-Education Activity 1 static balance activity initially with unilateral support completing a matching activity with LUE. Transistioning to no hand support with wide [place] patient increased swaying and lateral lean. Patient instructed to hold counter decreased awareness of safety. Increased swaying with reaching to left. Min a for steadying. Stood for a total of 5 mins   IP OT Assessment   OT Assessment Patient receptive to therapy impaired safety awareness. Patient with  diplopia verbalizing difficulty with depth perception. Transfer are min a with the ataxia. Will continue with POC   Prognosis Good   Evaluation/Treatment Tolerance Patient limited by fatigue   Medical Staff Made Aware Yes   End of Session Communication Bedside nurse   OT Assessment   OT Assessment Results Decreased ADL status;Decreased upper extremity range of motion;Decreased upper extremity strength;Decreased safe judgment during ADL;Decreased endurance;Visual deficit;Decreased fine motor control;Decreased functional mobility;Decreased IADLs   Strengths Premorbid level of function   Barriers to Participation Comorbidities   Education   Individual(s) Educated Patient   Education Provided   (Educated on stroke rehabilitation)   Risk and Benefits Discussed with Patient/Caregiver/Other yes   Patient/Caregiver Demonstrated Understanding yes   Plan of Care Discussed and Agreed Upon yes   Patient Response to Education Patient/Caregiver Verbalized Understanding of Information   Inpatient/Swing Bed or Outpatient   Inpatient/Swing Bed or Outpatient Inpatient   Inpatient Plan   Treatment Interventions ADL retraining   OT - OK to Discharge Yes

## 2024-12-11 PROCEDURE — 97535 SELF CARE MNGMENT TRAINING: CPT | Mod: CO,GO

## 2024-12-11 PROCEDURE — 97116 GAIT TRAINING THERAPY: CPT | Mod: GP

## 2024-12-11 PROCEDURE — 97530 THERAPEUTIC ACTIVITIES: CPT | Mod: GO,CO

## 2024-12-11 PROCEDURE — 2500000001 HC RX 250 WO HCPCS SELF ADMINISTERED DRUGS (ALT 637 FOR MEDICARE OP): Performed by: INTERNAL MEDICINE

## 2024-12-11 PROCEDURE — 97110 THERAPEUTIC EXERCISES: CPT | Mod: GP

## 2024-12-11 PROCEDURE — 97112 NEUROMUSCULAR REEDUCATION: CPT | Mod: GP

## 2024-12-11 PROCEDURE — 97110 THERAPEUTIC EXERCISES: CPT | Mod: CO,GO

## 2024-12-11 PROCEDURE — 1180000001 HC REHAB PRIVATE ROOM DAILY

## 2024-12-11 PROCEDURE — 92507 TX SP LANG VOICE COMM INDIV: CPT | Mod: GN

## 2024-12-11 ASSESSMENT — ACTIVITIES OF DAILY LIVING (ADL)
HOME_MANAGEMENT_TIME_ENTRY: 45
BATHING_LEVEL_OF_ASSISTANCE: MINIMUM ASSISTANCE
BATHING_WHERE_ASSESSED: SHOWER
BATHING_EQUIPMENT_NEEDED: LONG-HANDLED SPONGE

## 2024-12-11 ASSESSMENT — PAIN SCALES - GENERAL
PAINLEVEL_OUTOF10: 0 - NO PAIN

## 2024-12-11 ASSESSMENT — PAIN - FUNCTIONAL ASSESSMENT
PAIN_FUNCTIONAL_ASSESSMENT: 0-10

## 2024-12-11 NOTE — PROGRESS NOTES
Speech-Language Pathology    SLP Adult [facility] Speech-Language Pathology Treatment     Patient Name: David Chatman  MRN: [id number]  Today's Date: 12/11/2024  Time Calculation  Start Time: 1005  Stop Time: 1040  Time Calculation (min): 35 min     3/4sp    Current Problem:   R PCA CVA (left body involvement)     SLP Assessment:  SLP TX Intervention Outcome: Making Progress Towards Goals  SLP Assessment Results: Motor Speech Deficits  Treatment Tolerance: Patient tolerated treatment well  Medical Staff Made Aware: Yes  Strengths: Motivation, Family/Caregiver Support  Education Provided: Yes     Plan:  Inpatient/Swing Bed or Outpatient: Inpatient  SLP TX Plan: Continue Plan of Care  SLP Frequency: 4x per week  Duration: 3 weeks  SLP Discharge Recommendations: Other (Comment) (TBD pending progress)  Next Treatment Priority: clear speech strategies, dysarthria  Discussed POC: Patient  Discussed Risks/Benefits: Yes  Patient/Caregiver Agreeable: Yes      Subjective   Pt seen upright in wheelchair.     General Visit Information:   Referred By: Dr. Kirk  Past Medical History Relevant to Rehab: heart failure, afib, gout, hyperlipidemia, SHOAIB, HTN, DJD, tinnitus  Prior to Session Communication: Bedside nurse    Pain Assessment:  Pain Assessment  0-10 (Numeric) Pain Score: 0 - No pain    Objective   SPEECH-LANGUAGE GOALS (established 12/9, anticipate end 3 weeks):  Pt will  utilize speech intelligibility strategies with 80% acc to improve communication skills.  PROGRESS: 75% with cues with structured conversation tasks. Focused on slow rate and pronouncing ends of words. Speech rate tends to increase as task progresses. However, pt states this is also part of a baseline communication.  STATUS: continue, progressing with slow rate and emphasis on final phonemes.  2.    Pt will  complete articulatory precision tasks with 80% accuracy to improve intelligibility.   PROGRESS: Excellent carryover with utilizing slow rate with  sentence level stimuli and multisyllable words. 85% for multisyllable words. 85% for alliterative phrases and tongue twisters (bilabials and alveolars).   STATUS: continue, address with more complex stimuli as pt is able due to double vision (I.e oral reading of longer passages).   LTG: Pt will increase motor speech function to the highest possible speech level for improved functional communication within daily living tasks.       3. Pt will improve STM/recall to 80% accuracy with cues to increase awareness of daily functional information.  PROGRESS: Pt feels that he is at a baseline mentation. He is able to recall tx routine and wheels self to scheduled appointment times. ACHIEVED  STATUS: GOAL MET - 12/11  4. Pt will improve problem solving skills to 80% accuracy with cues to increase safety awareness within immediate environment.  PROGRESS: Although double vision persists, no episodes of impulsivity. Pt demonstrates good use of wheelchair brakes and travels from room to therapy rooms. ACHIEVED.  STATUS: GOAL MET - 12/11  5. Ongoing assessment if indicated for further goal development or to determine progress, as needed.  PROGRESS: CLQT was completed previously. No new goals are indicated at this time.  STATUS: Goal met as far as determining further goal development/needs.   LTG 1: Pt will optimize cognitive-linguistic skills necessary for return to least restrictive living environment and to reduce caregiver dependence.     Motor Speech:   Motor Speech Intervention : Compensatory Speech Strategies, Sentence Repetitions    Inpatient Education:  Adult Inpatient Education  Individual(s) Educated: Patient  Verbal Education : progress, goals  Risk and Benefits Discussed with Patient/Caregiver/Other: yes  Patient/Caregiver Demonstrated Understanding: yes  Plan of Care Discussed and Agreed Upon: yes  Patient Response to Education: Patient/Caregiver Verbalized Understanding of Information

## 2024-12-11 NOTE — NURSING NOTE
Routine vital signs obtained and BP running low, systolic of 89, RN contacted Dr Kirk to adjust blood pressure medications

## 2024-12-11 NOTE — PROGRESS NOTES
12/11/24 0800 to 0900   OT Last Visit   OT Received On 12/11/24   General   Reason for Referral CVA   Referred By Dr. Kirk   Past Medical History Relevant to Rehab heart failure, afib, gout, hyperlipidemia, SHOAIB, HTN, DJD, tinnitus   Prior to Session Communication   (Discussed poc with OTR)   Preferred Learning Style verbal;visual   General Comment Patient agreeable to therapy. Patient reported sleeping well   Precautions   Hearing/Visual Limitations +diploplia   Medical Precautions Fall precautions   Pain Assessment   Pain Assessment 0-10   0-10 (Numeric) Pain Score 0 - No pain   Feeding   Feeding Level of Assistance Setup   Feeding Where Assessed Bed level   Grooming   Grooming Level of Assistance Modified independent   Grooming Where Assessed Sitting sinkside   UE Bathing   UE Bathing Level of Assistance Setup   UE Bathing Where Assessed Wheelchair   LE Bathing   LE Bathing Adaptive Equipment Long-handled sponge   LE Bathing Level of Assistance Minimum assistance  (instanding phase with cues for hand hold on grab bar cross leg technique for LES)   LE Bathing Where Assessed Shower   UE Dressing   UE Dressing Level of Assistance Setup   UE Dressing Where Assessed Wheelchair   UE Dressing Comments able to talib and doff over head shirt   LE Dressing   LE Dressing Yes   Pants Level of Assistance Minimum assistance  (steadying assist for balance during hiking phase with cues to hold grab bar unilaterally. Patient instructed in 3 point stance. Cross leg technique to thread pants and underwear)   Sock Level of Assistance Setup   Shoe Level of Assistance Setup   LE Dressing Where Assessed Wheelchair   Toileting   Toileting Level of Assistance Minimum assistance   Where Assessed Toilet   Toileting Comments staning to urinate min a for balance able to complete clothing   Transfers   Transfer Yes   Transfer 1   Transfer From 1 Bed to   Transfer to 1 Wheelchair   Technique 1 Sit to stand;Stand to sit   Transfer Device 1  Gait belt   Transfer Level of Assistance 1 Minimum assistance   Trials/Comments 1 cues for handplacement and turning orentation   Transfers 2   Transfer From 2 Wheelchair to   Transfer to 2 Stand   Technique 2 Sit to stand;Stand to sit   Transfer Device 2 Gait belt   Transfer Level of Assistance 2 Contact guard   Trials/Comments 2 cues for handplacement   Shower Transfers   Shower Transfer From Wheelchair   Shower Transfer Type To and from   Shower Transfer to Shower seat with back   Shower Transfer Technique Stand pivot;To right;To left   Shower Transfers Minimal assistance   Shower Transfers Comments cues for handplacement   Therapeutic Exercise   Therapeutic Exercise Performed Yes   Therapeutic Exercise Activity 1 UBE 5 mins to improve strength and endurance for adls and Iadls no breaks completed with set up.   Therapeutic Exercise Activity 2 Static and dynamic standing balance with no hand hold completing different tasks with opposite hands wide chelsea. Patient swaying laterally and and backwards patient able to adjust foot support no cues. Min a for stability.   IP OT Assessment   OT Assessment Patient receptive to therapy requires continued cues for holding stable surface during standing phase of bathing. Continuing to work on safety awareness. Patient progressing with OT goals nadine continue with POC   Prognosis Good   Evaluation/Treatment Tolerance Patient limited by fatigue   Medical Staff Made Aware Yes   End of Session Communication Bedside nurse   End of Session Patient Position Up in chair;Alarm on   OT Assessment   OT Assessment Results Decreased ADL status   Barriers to Discharge Other (Comment)   Strengths Premorbid level of function   Barriers to Participation Comorbidities   Education   Individual(s) Educated Patient   Education Provided   (Patient instructed in unilateral hand hold during adls and Iadls for stability with balance)   Risk and Benefits Discussed with Patient/Caregiver/Other yes    Patient/Caregiver Demonstrated Understanding yes   Plan of Care Discussed and Agreed Upon yes   Education Comment patient will need continued instruction.   Inpatient/Swing Bed or Outpatient   Inpatient/Swing Bed or Outpatient Inpatient   Inpatient Plan   Treatment Interventions ADL retraining   OT - OK to Discharge Yes

## 2024-12-11 NOTE — PROGRESS NOTES
12/11/24 1300 to 1330   OT Last Visit   OT Received On 12/11/24   General   Reason for Referral CVA   Referred By Dr. Kirk   Past Medical History Relevant to Rehab heart failure, afib, gout, hyperlipidemia, SHOAIB, HTN, DJD, tinnitus   Prior to Session Communication Bedside nurse  (due to low BP nurse requested seated therapy only 86/59)   Patient Position Received Up in chair;Alarm on   Preferred Learning Style verbal;visual   General Comment agreeable to therapy   Precautions   Hearing/Visual Limitations +diploplia   Medical Precautions Fall precautions   Vital Signs   Vitals Session During OT   Heart Rate 77   Heart Rate Source Brachial   /66   MAP (mmHg) 78   BP Location Right arm   BP Method Automatic   Pain Assessment   Pain Assessment 0-10   0-10 (Numeric) Pain Score 0 - No pain   Cognition   Orientation Level Oriented X4   Therapeutic Exercise   Therapeutic Exercise Performed Yes   Therapeutic Exercise Activity 1 pre's 1 lbs 15 reps with cues for form, speed, and to control ataxic movements. Completed to increase strength and endurance for adls and Iadls.   Therapeutic Activity   Therapeutic Activity Performed Yes   Therapeutic Activity 1 Fine motor activity using RUE with bolts of different sizes turning clock wise and anti clock wise completed with ataxia note reaching for the bolt   Therapeutic Activity 2 Activity for depth perception and UE ataxia and accuracy. Using small washer and towel rods vertically at arms length. Patient had difficulty with both UE visually and reaching for the too and acurately sliding washer onto the too. Patient under reaching with both UE's and difficulty controlling the movement.   IP OT Assessment   End of Session Communication Bedside nurse   End of Session Patient Position Up in chair;Alarm on   Inpatient/Swing Bed or Outpatient   Inpatient/Swing Bed or Outpatient Inpatient   Inpatient Plan   Treatment Interventions UE strengthening/ROM   OT - OK to Discharge Yes

## 2024-12-11 NOTE — PROGRESS NOTES
IDT held 12.10.24 to review patient progress and discharge plan. LSW met with patient and spouse to provide update from meeting. Reviewed 2-3 week anticipated length of stay. Patient hopes to return home as soon as able. Family training will be scheduled as patient status continues to improve. LSW will continue to follow. No questions or concerns noted at this time.

## 2024-12-11 NOTE — CARE PLAN
The patient's goals for the shift include      The clinical goals for the shift include No Fall    Over the shift, the patient did not make progress toward the following goals. Barriers to progression include impaired mobility. Recommendations to address these barriers include improve mobility.

## 2024-12-11 NOTE — NURSING NOTE
Assumed care of pt @ 0730, report received, pt here s/p ischemic CVA with deficits of double vision and aphasia, pt axox4, pleasant but forgetful, lung sounds CTA, apical regular, no edema noted, call light within reach, continuing to monitor.

## 2024-12-11 NOTE — PROGRESS NOTES
Physical Therapy       12/11/24 0927-8212   PT  Visit   PT Received On 12/11/24   Response to Previous Treatment Patient with no complaints from previous session.   General   Reason for Referral CVA   Referred By Dr. Kirk   Past Medical History Relevant to Rehab heart failure, afib, gout, hyperlipidemia, SHOAIB, HTN, DJD, tinnitus   General Comment Patient agreeable to therapy. Patient reported sleeping well   Precautions   Hearing/Visual Limitations +diploplia   Medical Precautions Fall precautions   Vital Signs   BP Method Automatic   Pain Assessment   Pain Assessment 0-10   0-10 (Numeric) Pain Score 0 - No pain   Cognition   Overall Cognitive Status WFL   Orientation Level Oriented X4   Safety/Judgement X   Postural Control   Trunk Control Static Stand listing Right   Righting Reactions Slow Left side   Static Sitting Balance   Static Sitting-Level of Assistance Close supervision   Static Sitting-Comment/Number of Minutes x 5mins   Therapeutic Exercise   Therapeutic Exercise Performed Yes   Therapeutic Exercise Activity 1 w/c push ups 2x10   Therapeutic Exercise Activity 2 Standing Heel Raises  x 1 min   Therapeutic Exercise Activity 3 Standing Squats x 1 min emphasis on slow bend and lift   Therapeutic Exercise Activity 4 Bridging 2x15   Therapeutic Exercise Activity 5 Supine SLR 2x15 3# LLOE 0# RLE effortful   Therapeutic Exercise Activity 6 Target Great Toe to T[place]get whiloe supine moderate ataxia   Therapeutic Exercise Activity 7 Hooklying 2x15 blue tband   Therapeutic Activity   Therapeutic Activity Performed Yes   Therapeutic Activity 1  object from floor with reacher Mod ind   Balance/Neuromuscular Re-Education   Balance/Neuromuscular Re-Education Activity Performed Yes   Balance/Neuromuscular Re-Education Activity 1 alternate tapping 6 inch step Listing Right   Balance/Neuromuscular Re-Education Activity 2 sidestepping without UEs DEmo before for weight shift Left.  CGx1 ll bars garbbing with major  LOB when turning   Balance/Neuromuscular Re-Education Activity 3 WBOS eyes open, eyes closed timed sway and listing right but able to do 30 seconds before support needed   Bed Mobility 1   Bed Mobility 1 Supine to sitting;Sitting to supine   Level of Assistance 1 Close supervision   Bed Mobility Comments 1 safety reminders   Ambulation/Gait Training 1   Surface 1 Level tile   Device 1 Rolling walker   Assistance 1 Minimum assistance;Moderate assistance   Quality of Gait 1 Ataxic;Forward flexed posture;Decreased step length;NBOS;Listing   Gait Support Devices Gait belt;Wheelchair follow   Transfers   Transfer Yes   Transfer 1   Transfer From 1 Wheelchair to   Transfer to 1 Stand   Technique 1 Sit to stand;Stand to sit   Transfer Level of Assistance 1 Close supervision   Trials/Comments 1 reminders for brakes on   Transfers 2   Transfer From 2 Wheelchair to   Transfer to 2 Chair with arms   Technique 2 Stand pivot   Transfer Level of Assistance 2 Close supervision   Trials/Comments 2 to NUSTEP   Stairs   Rails 1 Bilateral   Device 1 Railing   Assistance 1 Independent   Comment/Number of Steps 1 1 flight 2 Rails reciprocal pattern    Object From Floor   Devices Reacher   Assist Level Modified independent   Comments and RW washclothes from floor   Wheelchair Activities   Propulsion Yes   Propulsion Type 1 Manual   Level 1 Level tile   Method 1 Right upper extremity;Left upper extremity;Right lower extremity;Left lower extremity   Level of Assistance 1 Modified independent   Description/Details 1 ad yana at his  slower pace in halls and room ad yana Keeps one eys closed due to diploplia   PT Assessment   PT Assessment Results Decreased strength;Decreased endurance;Impaired balance;Decreased mobility;Decreased coordination;Impaired vision;Decreased safety awareness;Impaired judgement   Barriers to Discharge diplopia   End of Session Patient Position Up in chair;Alarm on   PT Plan   Treatment/Interventions Bed  mobility;Transfer training;Gait training;Balance training;Therapeutic exercise;Wheelchair management;Therapeutic activity   PT Recommended Transfer Status Assist x1;Assistive device   PT - OK to Discharge Yes

## 2024-12-11 NOTE — PROGRESS NOTES
Physical Therapy       12/11/24 [id number]   PT  Visit   PT Received On 12/11/24   Response to Previous Treatment Patient with no complaints from previous session.   General   Reason for Referral CVA   Referred By Dr. Kirk   Past Medical History Relevant to Rehab heart failure, afib, gout, hyperlipidemia, SHOAIB, HTN, DJD, tinnitus   General Comment Diploplia Pt hold Left Eye closed tpo reduce redundancy   Precautions   Hearing/Visual Limitations +diploplia   Medical Precautions Fall precautions   Post-Surgical Precautions MITT   Vital Signs   Vital Signs Comment BP monitored during ambulation pm session.  BP remained stable and creeped up some.  Nsg cleard him for ambulation.   Pain Assessment   Pain Assessment 0-10   0-10 (Numeric) Pain Score 0 - No pain   Cognition   Overall Cognitive Status WFL   Orientation Level Oriented X4   Coordination   Coordination Comment ataxia BLE   Dynamic Standing Balance   Dynamic Standing-Level of Assistance Moderate assistance  (x1)   Dynamic Standing-Balance Turning  (and AMB without AD)   Therapeutic Exercise   Therapeutic Exercise Activity 1 Standing Heel Raises 2x15 BP checked after   Balance/Neuromuscular Re-Education   Balance/Neuromuscular Re-Education Activity 1 360 turns without AD  MIn/Mod or needs UE support   Balance/Neuromuscular Re-Education Activity 2 Turns with RW started but effortful   Ambulation/Gait Training 1   Surface 1 Level tile   Device 1 Rolling walker   Assistance 1 Minimum assistance;Moderate assistance   Quality of Gait 1 Ataxic;Forward flexed posture;Decreased step length;NBOS;Listing   Comments/Distance (ft) 1 Low BP per nsg [place] attempts with BP monitored   Gait Support Devices Gait belt;Wheelchair follow   Transfer 1   Transfer From 1 Wheelchair to   Transfer to 1 Stand   Transfer Device 1 Gait belt   Transfer Level of Assistance 1 Contact guard   Trials/Comments 1 with RW x5   Wheelchair Activities   Propulsion Type 1 Manual   Level 1 Level tile    Method 1 Right upper extremity;Left upper extremity;Right lower extremity;Left lower extremity   Level of Assistance 1 Modified independent   Description/Details 1 ad yana   Activity Tolerance   Endurance Tolerates 30 min exercise with multiple rests   PT Plan   Treatment/Interventions Transfer training;Gait training;Neuromuscular re-education;Therapeutic exercise   PT Recommended Transfer Status Assist x1;Assistive device   PT - OK to Discharge Yes

## 2024-12-12 PROCEDURE — 97110 THERAPEUTIC EXERCISES: CPT | Mod: GO

## 2024-12-12 PROCEDURE — 97116 GAIT TRAINING THERAPY: CPT | Mod: GP

## 2024-12-12 PROCEDURE — 92507 TX SP LANG VOICE COMM INDIV: CPT | Mod: GN

## 2024-12-12 PROCEDURE — 97110 THERAPEUTIC EXERCISES: CPT | Mod: GP

## 2024-12-12 PROCEDURE — 97112 NEUROMUSCULAR REEDUCATION: CPT | Mod: GP

## 2024-12-12 PROCEDURE — 1180000001 HC REHAB PRIVATE ROOM DAILY

## 2024-12-12 PROCEDURE — 97530 THERAPEUTIC ACTIVITIES: CPT | Mod: GO

## 2024-12-12 PROCEDURE — 97535 SELF CARE MNGMENT TRAINING: CPT | Mod: GO

## 2024-12-12 PROCEDURE — 2500000001 HC RX 250 WO HCPCS SELF ADMINISTERED DRUGS (ALT 637 FOR MEDICARE OP): Performed by: INTERNAL MEDICINE

## 2024-12-12 ASSESSMENT — ACTIVITIES OF DAILY LIVING (ADL)
BATHING_WHERE_ASSESSED: WHEELCHAIR
BATHING_LEVEL_OF_ASSISTANCE: MINIMUM ASSISTANCE
HOME_MANAGEMENT_TIME_ENTRY: 15

## 2024-12-12 ASSESSMENT — PAIN SCALES - GENERAL
PAINLEVEL_OUTOF10: 0 - NO PAIN

## 2024-12-12 ASSESSMENT — PAIN SCALES - WONG BAKER
WONGBAKER_NUMERICALRESPONSE: NO HURT
WONGBAKER_NUMERICALRESPONSE: NO HURT

## 2024-12-12 ASSESSMENT — PAIN - FUNCTIONAL ASSESSMENT
PAIN_FUNCTIONAL_ASSESSMENT: 0-10

## 2024-12-12 NOTE — PROGRESS NOTES
12/12/24 4002-1784   OT Last Visit   OT Received On 12/12/24   General   Reason for Referral CVA   Referred By Dr. Kirk   Past Medical History Relevant to Rehab heart failure, afib, gout, hyperlipidemia, SHOAIB, HTN, DJD, tinnitus   Family/Caregiver Present No   Prior to Session Communication Bedside nurse   Patient Position Received Up in chair;Alarm on   Preferred Learning Style verbal;visual   General Comment Diploplia Pt holds left eye closed to reduce redundancy   Precautions   Hearing/Visual Limitations +diploplia   Medical Precautions Fall precautions   Post-Surgical Precautions MITT   Pain Assessment   Pain Assessment 0-10   0-10 (Numeric) Pain Score 0 - No pain   Ng-Barnett FACES Pain Rating 0   Cognition   Overall Cognitive Status WFL   Orientation Level Oriented X4   Following Commands Follows one step commands without difficulty   Safety Judgment Decreased awareness of need for assistance   Insight Mild   Impulsive Mildly   Coordination   Movements are Fluid and Coordinated No   Coordination Comment ataxia BLE   RUE    RUE  WFL   LUE    LUE WFL   Functional Standing Tolerance   Time 5 min   Activity during functional activities   Functional Standing Tolerance Comments with seated RBs between activities   Therapeutic Activity   Therapeutic Activity Performed Yes   Therapeutic Activity 1 Joy Peg board to improve FMC with good tolerance and ability to complete the task with extra time  (pt continues to present with diplopia and has to close one eye during activity)   Therapeutic Activity 2 Pipe Tree Activities in standing (completed 2 designs in standing) with support of the counter top; provided vcs to maintain midline as pt tends to lean towards the right.   IP OT Assessment   Evaluation/Treatment Tolerance Patient tolerated treatment well   Medical Staff Made Aware Yes   End of Session Communication Bedside nurse   End of Session Patient Position Up in chair;Alarm on   OT Assessment   OT Assessment  Results Decreased endurance;Decreased fine motor control   Barriers to Discharge None   Strengths Premorbid level of function   Barriers to Participation Comorbidities   Inpatient/Swing Bed or Outpatient   Inpatient/Swing Bed or Outpatient Inpatient   Inpatient Plan   Treatment Interventions Functional transfer training;UE strengthening/ROM;Endurance training;Patient/family training;Compensatory technique education   OT Frequency 5 times per week   OT Discharge Recommendations Low intensity level of continued care   Equipment Recommended upon Discharge Other (comment)  (TBD)   OT Recommended Transfer Status Assist of 1   OT - OK to Discharge Yes

## 2024-12-12 NOTE — PROGRESS NOTES
Physical Therapy Treatment        12/12/24 8603-5442   PT  Visit   PT Received On 12/12/24   Response to Previous Treatment Patient with no complaints from previous session.   General   Reason for Referral CVA   Referred By Dr. Kirk   Past Medical History Relevant to Rehab heart failure, afib, gout, hyperlipidemia, SHOAIB, HTN, DJD, tinnitus   General Comment Diploplia Pt holds left eye closed to reduce redundancy.  Not big eater   Precautions   Hearing/Visual Limitations +diploplia   Medical Precautions Fall precautions   Pain Assessment   Pain Assessment 0-10   0-10 (Numeric) Pain Score 0 - No pain   Cognition   Overall Cognitive Status WFL   Orientation Level Oriented X4   Therapeutic Exercise   Therapeutic Exercise Activity 1 Standing Heel Raises 2x15   Therapeutic Exercise Activity 2 Squat Walking while Arm/Arm   ++ Fatigue   Balance/Neuromuscular Re-Education   Balance/Neuromuscular Re-Education Activity 1 sidestepping with green tband No Ues LOB x2   Balance/Neuromuscular Re-Education Activity 2 TApping 6 inch step alternating 2 x10 ll bars   Balance/Neuromuscular Re-Education Activity 3 RLE foot on 6 inch step, LLE tap on and down x 10 reps then alternating to opp LE.  Pt   Ambulation/Gait Training 1   Surface 1 Level tile   Device 1 Rolling walker   Assistance 1 Minimum assistance   Quality of Gait 1 Ataxic;Forward flexed posture;Decreased step length;NBOS;Listing   Comments/Distance (ft) 1 100 feet RW Lists Right, ataxia RLE staggers but attempts correction.  Min assist recovery when turning backing up.   Gait Support Devices Gait belt   Ambulation/Gait Training 2   Surface 2 Level tile   Device 2 No device   Gait Support Devices   (Arm and Arm x2 1.5 LB RLE)   Assistance 2 Minimum assistance  (x2)   Quality of Gait 2 Inconsistent stride length;Ataxic;Scissoring   Comments/Distance (ft) 2 Increased support to work on posture and gait speed.  100 bx3 1.5 lb added RLE for sensory feedback.  Pt unsteady.   Remains High FAll Risk   Transfer 1   Transfer From 1 Sit to   Transfer to 1 Stand   Technique 1 Sit to stand;Stand to sit   Transfer Device 1 Gait belt   Transfer Level of Assistance 1 Contact guard   Transfers 2   Technique 2 Stand pivot   Transfer Device 2 Gait belt   Transfer Level of Assistance 2 Contact guard   Trials/Comments 2 RW   Wheelchair Activities   Propulsion Type 1 Manual   Level 1 Level tile   Method 1 Right upper extremity;Left upper extremity;Right lower extremity;Left lower extremity   Level of Assistance 1 Modified independent   Description/Details 1 150 feet+   PT Assessment   Barriers to Discharge diploplia   Assessment Comment Steady progress but still high   PT Plan   Treatment/Interventions Bed mobility;Transfer training;Gait training;Neuromuscular re-education

## 2024-12-12 NOTE — PROGRESS NOTES
Physical Therapy Treatment Note        12/12/24 8268-3020   PT  Visit   PT Received On 12/12/24   Response to Previous Treatment Patient with no complaints from previous session.   General   Reason for Referral CVA   Referred By Dr. Kirk   Past Medical History Relevant to Rehab heart failure, afib, gout, hyperlipidemia, SHOAIB, HTN, DJD, tinnitus   General Comment Diploplia Pt holds left eye closed to reduce redundancy   Precautions   Hearing/Visual Limitations +diploplia   Medical Precautions Fall precautions   Pain Assessment   Pain Assessment 0-10   0-10 (Numeric) Pain Score 0 - No pain   Cognition   Orientation Level Oriented X4   Therapeutic Exercise   Therapeutic Exercise Activity 1 Hip Add sets 2x15   Therapeutic Exercise Activity 2 STS 2x10 No UEs   Balance/Neuromuscular Re-Education   Balance/Neuromuscular Re-Education Activity Performed Yes   Balance/Neuromuscular Re-Education Activity 1 Balloon Toss blue therpads alternating UEs Mod assist with LOB right   Balance/Neuromuscular Re-Education Activity 2 Sidestepping   Balance/Neuromuscular Re-Education Activity 3 Hurdles Small ll bars MIN/mod assist   Balance/Neuromuscular Re-Education Activity 4 Coordination Ladder ll bars Min assist lists right LOB x3   Balance/Neuromuscular Re-Education Activity 5 360 turns ll bars LOB x3 needs UEs for correction   Ambulation/Gait Training 1   Surface 1 Level tile   Device 1 Rolling walker   Assistance 1 Minimum assistance;Contact guard   Quality of Gait 1 Ataxic;Forward flexed posture;Decreased step length;NBOS;Listing   Comments/Distance (ft) 1 BP 98/68 Asymptomatic today. 95,100 x2 RW Min assist with LOB turns, stagger right.  Diploplia ++   Gait Support Devices Gait belt;Wheelchair follow   Transfer 1   Transfer From 1 Sit to   Transfer to 1 Stand   Technique 1 Sit to stand;Stand to sit   Transfer Device 1 Gait belt   Transfer Level of Assistance 1 Contact guard   Trials/Comments 1 x10   Transfers 2   Technique 2  Stand pivot   Transfer Device 2 Gait belt   Transfer Level of Assistance 2 Contact guard   Trials/Comments 2 RW   Stairs   Rails 1 Bilateral   Device 1 Railing   Support Devices 1 Gait belt   Assistance 1 Contact guard   Comment/Number of Steps 1 1 Flight 2 RAils cues for foot placement Ataxic   Wheelchair Activities   Propulsion Type 1 Manual   Method 1 Right upper extremity;Left upper extremity;Right lower extremity;Left lower extremity   Level of Assistance 1 Modified independent   Description/Details 1 ad yana holds 1 eye closed   Other Activity   Other Activity 1 NUSTEP  level 4 x 8 min   PT Assessment   Barriers to Discharge diplopia   End of Session Patient Position Up in chair;Alarm on   PT Plan   Treatment/Interventions Bed mobility;Transfer training;Gait training;Stair training;Balance training;Neuromuscular re-education;Therapeutic exercise;Wheelchair management;Therapeutic activity  Pt voicing his desire to be home by João.    PT Recommended Transfer Status Assist x1   PT - OK to Discharge Yes

## 2024-12-12 NOTE — CARE PLAN
The patient's goals for the shift include      The clinical goals for the shift include saftey awareness

## 2024-12-12 NOTE — PROGRESS NOTES
Speech-Language Pathology    SLP Adult IRF Speech-Language Pathology Treatment     Patient Name: David Chatman  MRN: [id number]  Today's Date: 12/12/2024  Time Calculation  Start Time: 0900  Stop Time: 0945  Time Calculation (min): 45 min     4/4sp    Current Problem:   R PCA CVA (left body involvement)     SLP Assessment:  SLP TX Intervention Outcome: Making Progress Towards Goals  SLP Assessment Results: Motor Speech Deficits  Treatment Tolerance: Patient tolerated treatment well  Medical Staff Made Aware: Yes  Strengths: Motivation, Family/Caregiver Support  Education Provided: Yes     Plan:  Inpatient/Swing Bed or Outpatient: Inpatient  SLP TX Plan: Continue Plan of Care  SLP Plan: Skilled SLP  SLP Frequency: 4x per week  Duration: 3 weeks  SLP Discharge Recommendations: Home with no further SLP  Next Treatment Priority: clear speech strategies, discharge planning  Discussed POC: Patient  Discussed Risks/Benefits: Yes  Patient/Caregiver Agreeable: Yes      Subjective   Pt wheeled self to scheduled ST appointment.     General Visit Information:   Referred By: Dr. Kirk  Past Medical History Relevant to Rehab: heart failure, afib, gout, hyperlipidemia, SHOAIB, HTN, DJD, tinnitus  Prior to Session Communication: Bedside nurse    Pain Assessment:  Pain Assessment  0-10 (Numeric) Pain Score: 0 - No pain      Objective   SPEECH-LANGUAGE GOALS (established 12/9, anticipate end 3 weeks):  Pt will  utilize speech intelligibility strategies with 80% acc to improve communication skills.  PROGRESS: 80% with structured conversation tasks. Excellent increased awareness of strategies. Significantly increased self-correction. ACHIEVED current goal. Increase accuracy to 85%.  STATUS: continue, address increased accuracy of 85%.  2.    Pt will  complete articulatory precision tasks with 80% accuracy to improve intelligibility.   PROGRESS: Excellent carryover with utilizing slow rate with paragraph level stimuli. Provided education  that fatigue will affect overall speech. Pt feels that he was very tired on previous tx day, feels better today. 85%-90% for oral paragraph reading. Mild occasional difficulties were related to double vision. ACHIEVED current goal. Increase accuracy to 90%.  STATUS: continue, address at increased accuracy level of 90%   LTG: Pt will increase motor speech function to the highest possible speech level for improved functional communication within daily living tasks.       3. Pt will improve STM/recall to 80% accuracy with cues to increase awareness of daily functional information.  PROGRESS: Pt feels that he is at a baseline mentation. He is able to recall tx routine and wheels self to scheduled appointment times. ACHIEVED  STATUS: GOAL MET - 12/11  4. Pt will improve problem solving skills to 80% accuracy with cues to increase safety awareness within immediate environment.  PROGRESS: Although double vision persists, no episodes of impulsivity. Pt demonstrates good use of wheelchair brakes and travels from room to therapy rooms. ACHIEVED.  STATUS: GOAL MET - 12/11  5. Ongoing assessment if indicated for further goal development or to determine progress, as needed.  PROGRESS: CLQT was completed previously. No new goals are indicated at this time.  STATUS: Goal met as far as determining further goal development/needs.   LTG 1: Pt will optimize cognitive-linguistic skills necessary for return to least restrictive living environment and to reduce caregiver dependence.     Motor Speech:   Motor Speech Intervention : Compensatory Speech Strategies    Inpatient Education:  Adult Inpatient Education  Individual(s) Educated: Patient  Verbal Education : progress, goals  Risk and Benefits Discussed with Patient/Caregiver/Other: yes  Patient/Caregiver Demonstrated Understanding: yes  Plan of Care Discussed and Agreed Upon: yes  Patient Response to Education: Patient/Caregiver Verbalized Understanding of Information

## 2024-12-12 NOTE — NURSING NOTE
Assumed care of  patient at 1900. Pt. denied pain/discomfort. Call light within reach. Safety measures remain in place. Will cont. to monitor.

## 2024-12-12 NOTE — PROGRESS NOTES
12/12/24 7891-0533   OT Last Visit   OT Received On 12/12/24   General   Reason for Referral CVA   Referred By Dr. Kirk   Past Medical History Relevant to Rehab heart failure, afib, gout, hyperlipidemia, SHOAIB, HTN, DJD, tinnitus   Family/Caregiver Present No   Prior to Session Communication Bedside nurse   Patient Position Received Up in chair;Alarm on   Preferred Learning Style verbal;visual   General Comment Diploplia Pt holds left eye closed to reduce redundancy   Precautions   Hearing/Visual Limitations +diploplia   Medical Precautions Fall precautions   Post-Surgical Precautions MITT   Pain Assessment   Pain Assessment 0-10   0-10 (Numeric) Pain Score 0 - No pain   Ng-Barnett FACES Pain Rating 0   Clinical Progression Resolved   Patient's Stated Pain Goal No pain   Cognition   Overall Cognitive Status WFL   Orientation Level Oriented X4   Following Commands Follows one step commands without difficulty   Safety Judgment Decreased awareness of need for assistance   Cognition Comments slurred speech improving   Memory X   Short-Term Memory Impaired   Problem Solving X   Simple Functional Tasks Impaired   Numeric Reasoning X   Simple Calculations Impaired   Insight Mild   Impulsive Mildly   Coordination   Movements are Fluid and Coordinated No   Lower Body Coordination impaired   Trunk Coordination impaired   Coordination Comment ataxia BLE   RUE    RUE  WFL   LUE    LUE WFL   Grooming   Grooming Level of Assistance Modified independent   Grooming Where Assessed Sitting sinkside   Grooming Comments wash/dry face, brush teeth   UE Bathing   UE Bathing Level of Assistance Setup   UE Bathing Where Assessed Wheelchair   UE Bathing Comments sink bath this date   LE Bathing   LE Bathing Level of Assistance Minimum assistance  (when in stance at grab bar)   LE Bathing Where Assessed Wheelchair   LE Bathing Comments sink bath this date   UE Dressing   UE Dressing Level of Assistance Setup   UE Dressing Where Assessed  "Wheelchair   UE Dressing Comments able to talib/doff pullover shirt   LE Dressing   LE Dressing Yes   Pants Level of Assistance Minimum assistance  (to steady during pants management over hips with alternating unilateral support of the grab bar; pt utilizes crossed leg technique to thread BLEs through pant legs)   LE Dressing Where Assessed Wheelchair   Functional Standing Tolerance   Time 5 minutes standing   Activity during functional activity standing at the table   Functional Standing Tolerance Comments pt required seated RB at 3:15 min and then performed 1:45 min standing   Bed Mobility   Bed Mobility No   Functional Mobility   Functional Mobility Performed No   Modalities   Modalities Used No   Dynamic Standing Balance   Dynamic Standing-Balance Support Bilateral upper extremity supported   Dynamic Standing-Level of Assistance Minimum assistance   Dynamic Standing-Balance Reaching for objects   Dynamic Standing-Comments min assist to steady   Shower Transfers   Shower Transfer From Wheelchair   Shower Transfer Type To and from   Shower Transfer to Shower seat with back   Shower Transfer Technique Stand pivot;To right;To left   Shower Transfers Minimal assistance   Shower Transfers Comments vcs provided for safe hand placement   Therapeutic Exercise   Therapeutic Exercise Performed Yes   Therapeutic Exercise Activity 1 UBE level 2 with BUEs standing for 5 minutes with one seated RB after 3:15 min.   Therapeutic Exercise Activity 2 Blue theraband strengthening exercises with blue theraband for 20 reps x 1 set maintaining MITT precautions   Therapeutic Activity   Therapeutic Activity Performed Yes   Therapeutic Activity 1 BITs Trailmaking A standing at FWW for 1:19 min, 1 error, 6 interruptions   Vision   Double Vision Education   IP OT Assessment   OT Assessment Pt making progress towards established goals.  Pt declines patching glasses and reports spouse is going to provide a \"pirates\" eye patch. Continue OT per " POC to maximize functional independence and safety for safe transition home.   Prognosis Good   Barriers to Discharge Home No anticipated barriers   Evaluation/Treatment Tolerance Patient limited by fatigue   Medical Staff Made Aware Yes   End of Session Communication Bedside nurse   End of Session Patient Position Up in chair;Alarm on   OT Assessment   OT Assessment Results Decreased ADL status   Barriers to Discharge None   Strengths Premorbid level of function   Barriers to Participation Comorbidities   Education   Individual(s) Educated Patient   Home Program AROM;Strengthening   Risk and Benefits Discussed with Patient/Caregiver/Other yes   Patient/Caregiver Demonstrated Understanding yes   Plan of Care Discussed and Agreed Upon yes   Patient Response to Education Patient/Caregiver Performed Return Demonstration of Exercises/Activities   Education Comment patient will need continued instruction.   Inpatient/Swing Bed or Outpatient   Inpatient/Swing Bed or Outpatient Inpatient   Inpatient Plan   Treatment Interventions ADL retraining;Functional transfer training;UE strengthening/ROM;Cognitive reorientation;Patient/family training;Equipment evaluation/education;Compensatory technique education   OT Frequency 5 times per week   OT Discharge Recommendations Low intensity level of continued care   Equipment Recommended upon Discharge   (TBD)   OT Recommended Transfer Status Assist of 1   OT - OK to Discharge Yes

## 2024-12-13 ENCOUNTER — APPOINTMENT (OUTPATIENT)
Dept: CARDIAC REHAB | Facility: CLINIC | Age: 69
End: 2024-12-13
Payer: MEDICARE

## 2024-12-13 PROCEDURE — 97530 THERAPEUTIC ACTIVITIES: CPT | Mod: GO,CO

## 2024-12-13 PROCEDURE — 97116 GAIT TRAINING THERAPY: CPT | Mod: GP

## 2024-12-13 PROCEDURE — 2500000001 HC RX 250 WO HCPCS SELF ADMINISTERED DRUGS (ALT 637 FOR MEDICARE OP): Performed by: INTERNAL MEDICINE

## 2024-12-13 PROCEDURE — 97112 NEUROMUSCULAR REEDUCATION: CPT | Mod: GP

## 2024-12-13 PROCEDURE — 99232 SBSQ HOSP IP/OBS MODERATE 35: CPT | Performed by: INTERNAL MEDICINE

## 2024-12-13 PROCEDURE — 1180000001 HC REHAB PRIVATE ROOM DAILY

## 2024-12-13 PROCEDURE — 97530 THERAPEUTIC ACTIVITIES: CPT | Mod: GP

## 2024-12-13 ASSESSMENT — PAIN - FUNCTIONAL ASSESSMENT
PAIN_FUNCTIONAL_ASSESSMENT: 0-10

## 2024-12-13 ASSESSMENT — PAIN SCALES - GENERAL
PAINLEVEL_OUTOF10: 0 - NO PAIN

## 2024-12-13 NOTE — NURSING NOTE
Assumed care of patient after receiving report from outgoing RN.  Patient AAO x 3, on RA, no distress noted, breathing unlabored.  Patient up in chair, sets up breakfast tray,  No further needs at this time, call light within reach.  Plan of care ongoing.

## 2024-12-13 NOTE — PROGRESS NOTES
Occupational Therapy       12/13/24 [id number]   OT Last Visit   OT Received On 12/13/24   General   Reason for Referral CVA   Referred By Dr. Kirk   Past Medical History Relevant to Rehab heart failure, afib, gout, hyperlipidemia, SHOAIB, HTN, DJD, tinnitus   Prior to Session Communication Bedside nurse   Patient Position Received Up in chair;Alarm on   Preferred Learning Style verbal;visual   General Comment Diploplia. Pt declined bathing this date.   Precautions   Hearing/Visual Limitations +diploplia   Medical Precautions Fall precautions   Post-Surgical Precautions MITT   Pain Assessment   Pain Assessment 0-10   0-10 (Numeric) Pain Score 0 - No pain   Cognition   Overall Cognitive Status WFL   Coordination   Coordination Comment ataxia BLE   Feeding   Feeding Level of Assistance Independent   Feeding Where Assessed Wheelchair   Car Transfers   Car Transfer From Walker   Car Transfer Technique Ambulating   Car Transfers Contact guard   Car Transfers Comments Pt able to perform car transfer with FWW. CGA required and VC's on safety.   Kitchen Mobility   Kitchen Mobility Level of Assistance Contact guard;Close supervision   Kitchen-Mobility Level Walker   Kitchen Activity Retrieve items;Transport items   Kitchen Mobility Comments Pt educated on technique to navigate around OT kitchen. EDU on speed of transfer and safety. Pt required CGA/ CS at times D/T ataxia ( thats improving).   Light Housekeeping   Light Housekeeping Level of Assistance Close supervision   Light Housekeeping Level Other (Comment)   Light Housekeeping Pt was able to washed dishes while in stance at OT sink. VC's for MARTHA and foot placement & use of countertop for stability as needed.   Therapeutic Activity   Therapeutic Activity 1 Performed visual tracking exercises to multiple targets, cards, letter finding  seated at table top. Vc's for technique for scanning, restbreaks and patching as needed. Pt continues with double vision.   Therapeutic  Activity 2 Issued pt visual tracking HEP, word search, letter tracking/ matching to work IND in room.   IP OT Assessment   OT Assessment Pt making progress towards established goals.Continue OT per POC to maximize functional independence and safety for safe transition home.   Prognosis Good   Barriers to Discharge Home No anticipated barriers   Evaluation/Treatment Tolerance Patient tolerated treatment well   Medical Staff Made Aware Yes   End of Session Communication Bedside nurse   End of Session Patient Position Up in chair;Alarm on   OT Assessment   OT Assessment Results Decreased endurance;Decreased fine motor control   Barriers to Discharge None   Strengths Premorbid level of function   Barriers to Participation Comorbidities   Education   Individual(s) Educated Patient   Education Provided Fall precautons   Patient Response to Education Patient/Caregiver Performed Return Demonstration of Exercises/Activities   Inpatient/Swing Bed or Outpatient   Inpatient/Swing Bed or Outpatient Inpatient   Inpatient Plan   Treatment Interventions Functional transfer training;UE strengthening/ROM;Endurance training;Neuromuscular reeducation   OT Frequency 5 times per week   OT Discharge Recommendations Low intensity level of continued care   Equipment Recommended upon Discharge Other (comment)  (TBD)   OT Recommended Transfer Status Assist of 1   OT - OK to Discharge Yes

## 2024-12-13 NOTE — PROGRESS NOTES
Physical Therapy       12/13/24 [id number]   PT  Visit   PT Received On 12/13/24   Response to Previous Treatment Patient with no complaints from previous session.   General   Reason for Referral CVA   Referred By Dr. Kirk   Past Medical History Relevant to Rehab heart failure, afib, gout, hyperlipidemia, SHOAIB, HTN, DJD, tinnitus   Prior to Session Communication Bedside nurse   Patient Position Received Up in chair;Alarm on   Preferred Learning Style verbal;visual   General Comment + Diplopia   Precautions   Hearing/Visual Limitations +diploplia   Medical Precautions Fall precautions   Vital Signs   Heart Rate 77   Heart Rate Source Monitor   Resp 18   SpO2 97 %   BP 98/67   MAP (mmHg) 77   BP Location Right arm   BP Method Automatic   Pain Assessment   Pain Assessment 0-10   0-10 (Numeric) Pain Score 0 - No pain   Cognition   Overall Cognitive Status WFL   Coordination   Movements are Fluid and Coordinated No   Coordination Comment ataxia B LE   Therapeutic Exercise   Therapeutic Exercise Activity 1 Standing mini squats 2x15   Balance/Neuromuscular Re-Education   Balance/Neuromuscular Re-Education Activity 1 colored circles on floor and 6 inch step with task of tapping colored circles in various sequences to challenge balance and coordination min A x 1 alternating no UE support and 1 UE support   Balance/Neuromuscular Re-Education Activity 2 Side stepping to L and R no UE support with blue theraband around ankles min A x 1   Ambulation/Gait Training 1   Surface 1 Level tile   Device 1 Rolling walker   Assistance 1 Minimum assistance   Quality of Gait 1 Ataxic;Forward flexed posture;Decreased step length;NBOS;Listing   Comments/Distance (ft) 1 100 feet x 2 RW Lists Right, ataxia RLE staggers but attempts correction.  Min assist recovery when turning backing up.   Gait Support Devices Gait belt   Transfer 1   Transfer From 1 Wheelchair to   Transfer to 1 Stand   Technique 1 Sit to stand;Stand to sit   Transfer Level  of Assistance 1 Contact guard   Trials/Comments 1 with max cues for safety   Activity Tolerance   Endurance Tolerates 30 min exercise with multiple rests   Early Mobility/Exercise Safety Screen Proceed with mobilization - No exclusion criteria met   Activity Tolerance Comments fatigues with activity, but works strongly throughout session , pushes self   PT Assessment   PT Assessment Results Decreased strength;Decreased endurance;Impaired balance;Decreased mobility;Decreased coordination;Impaired vision;Decreased safety awareness;Impaired judgement   Rehab Prognosis Excellent   Barriers to Discharge diploplia   Barriers to Discharge Home Physical needs   Evaluation/Treatment Tolerance Patient tolerated treatment well   Medical Staff Made Aware Yes   Strengths Premorbid level of function   Barriers to Participation Comorbidities   End of Session Communication Bedside nurse   Assessment Comment Continues to make strong progress, limited by diplopia and ataxia, remains high fall risk   End of Session Patient Position Up in chair;Alarm on   PT Plan   Treatment/Interventions Transfer training;Gait training;Neurodevelopmental intervention;Strengthening;Endurance training;Therapeutic exercise;Therapeutic activity   PT Plan Ongoing PT   PT Recommended Transfer Status Assist x1   PT - OK to Discharge Yes

## 2024-12-13 NOTE — PROGRESS NOTES
Occupational Therapy       12/13/24 [id number]   OT Last Visit   OT Received On 12/13/24   General   Reason for Referral CVA   Referred By Dr. Kirk   Past Medical History Relevant to Rehab heart failure, afib, gout, hyperlipidemia, SHOAIB, HTN, DJD, tinnitus   Prior to Session Communication Bedside nurse   Patient Position Received Up in chair;Alarm on   Preferred Learning Style verbal;visual   General Comment + Diplopia   Precautions   Hearing/Visual Limitations +diploplia   Medical Precautions Fall precautions   Pain Assessment   Pain Assessment 0-10   0-10 (Numeric) Pain Score 0 - No pain   Cognition   Overall Cognitive Status WFL   Coordination   Coordination Comment ataxia B LE   Therapeutic Activity   Therapeutic Activity 1 Standing activities at white board to challange double vision, balance deficits and UE coordination. performed writing, tapping target and visual scanning between 3 items.   Therapeutic Activity 2 Seated visual scanning Convergence, & R & L tracking, L eye with increased difficulty tracking to R side. Intermittent patching to R Vs L eye/ or no patching during tasks in order to decreased diplopia.   Therapeutic Activity 3 Standing transfers in OT room and visitors lounge. Transfered with FWW and min assist for stability. VC's to keep both eyes opne while in visitors. without patching/ closing L eye, pt required min/mod assist at time to correct balamce errors.   Vision   Double Vision Patching;Education   IP OT Assessment   OT Assessment Pt making progress towards established goals.Continue OT per POC to maximize functional independence and safety for safe transition home.   Prognosis Good   Barriers to Discharge Home No anticipated barriers   Evaluation/Treatment Tolerance Patient tolerated treatment well   Medical Staff Made Aware Yes   End of Session Communication Bedside nurse   End of Session Patient Position Up in chair;Alarm on   OT Assessment   OT Assessment Results Decreased  endurance;Decreased fine motor control   Barriers to Discharge None   Strengths Premorbid level of function   Barriers to Participation Comorbidities   Education   Individual(s) Educated Patient   Education Provided Fall precautons   Home Program AROM;Strengthening   Patient Response to Education Patient/Caregiver Performed Return Demonstration of Exercises/Activities   Inpatient/Swing Bed or Outpatient   Inpatient/Swing Bed or Outpatient Inpatient   Inpatient Plan   Treatment Interventions Functional transfer training;Neuromuscular reeducation   OT Frequency 5 times per week   OT Discharge Recommendations Low intensity level of continued care   Equipment Recommended upon Discharge Other (comment)  (TBD)   OT Recommended Transfer Status Assist of 1   OT - OK to Discharge Yes

## 2024-12-13 NOTE — CARE PLAN
The patient's goals for the shift include      The clinical goals for the shift include improve double vision

## 2024-12-13 NOTE — PROGRESS NOTES
IDT held this date to review patient overall progress and discharge plan. Family training scheduled for 12.17.24 at 9am to review patient discharge needs. LSW will continue to follow.

## 2024-12-13 NOTE — PROGRESS NOTES
"[facility]  Physician Progress Note    Subjective   David Chatman is a 69 y.o. male admitted to inpatient rehabilitation unit.    The patient continues to participate well with all therapy disciplines involved.  No new issues are identified by the nursing staff.  The patient denies chest pain and dyspnea.    Objective   /69 (BP Location: Right arm, Patient Position: Lying)   Pulse 77   Temp 36.6 °C (97.9 °F) (Temporal)   Resp 18   Ht 1.854 m (6' 0.99\")   Wt 98 kg (216 lb 0.8 oz)   SpO2 95%   BMI 28.51 kg/m²      Physical Exam  Constitutional:       General: He is not in acute distress.     Appearance: He is not toxic-appearing.   HENT:      Head: Normocephalic and atraumatic.      Mouth/Throat:      Mouth: Mucous membranes are moist.   Eyes:      Pupils: Pupils are equal, round, and reactive to light.   Cardiovascular:      Rate and Rhythm: Normal rate and regular rhythm.      Heart sounds: No murmur heard.  Pulmonary:      Breath sounds: Normal breath sounds. No wheezing, rhonchi or rales.   Abdominal:      General: There is no distension.      Palpations: Abdomen is soft.   Musculoskeletal:      Right lower leg: No edema.      Left lower leg: No edema.   Neurological:      Mental Status: He is alert and oriented to person, place, and time.      Diplopia.  The eyes do not converge.  It is hard to discern if he has ataxia or if it is all related to his visual deficit.  He is very weak through the hips and legs this seems bilateral.    Labs  BMP:  Results from last 7 days   Lab Units 12/09/24  0519   CREATININE mg/dL 1.07   BUN mg/dL 24*   SODIUM mmol/L 141   POTASSIUM mmol/L 3.7   CHLORIDE mmol/L 107   CO2 mmol/L 24     CBC:  Results from last 7 days   Lab Units 12/09/24  0519   WBC AUTO x10*3/uL 9.2   HEMOGLOBIN g/dL 12.7*   HEMATOCRIT % 40.5*   MCV fL 81   PLATELETS AUTO x10*3/uL 268     Coagulation:           Assesment and Plan    Ischemic " Stroke (Multi)      Stroke, occipital lobe, avila/midbrain with diplopia and diffuse weakness.  Continue with medical management for secondary prevention, PT, OT    C/w Eliquis as per cardio recs    Recent thoracic aorta repair.     CMP/Chronic systolic HF on guideline directed therapy, cont BB, Entresto, eplerenone, jardiance - PRN torsemide     Diplopia     Htn stable    Willi Kirk MD

## 2024-12-13 NOTE — PROGRESS NOTES
Physical Therapy       12/13/24 9256-7055   PT  Visit   PT Received On 12/13/24   Response to Previous Treatment Patient with no complaints from previous session.   General   Reason for Referral CVA   Referred By Dr. Kirk   Past Medical History Relevant to Rehab heart failure, afib, gout, hyperlipidemia, SHOAIB, HTN, DJD, tinnitus   Prior to Session Communication Bedside nurse   Patient Position Received Up in chair;Alarm on   Preferred Learning Style verbal;visual   General Comment + Diplopia   Precautions   Hearing/Visual Limitations +diploplia   Medical Precautions Fall precautions   Pain Assessment   Pain Assessment 0-10   0-10 (Numeric) Pain Score 0 - No pain   Cognition   Overall Cognitive Status WFL   Coordination   Movements are Fluid and Coordinated No   Coordination Comment ataxia B LE   Therapeutic Exercise   Therapeutic Exercise Activity 1 Standing toe raises 2x15   Therapeutic Activity   Therapeutic Activity Performed Yes   Therapeutic Activity 1 sit to stand with 3 pound ball with push forward upon stand 1x10 reps   Therapeutic Activity 2 wheel chair pushes 2x10 reps   Balance/Neuromuscular Re-Education   Balance/Neuromuscular Re-Education Activity 1 side stepping on blue AirEx pad with no UE support min/mod A x 1   Balance/Neuromuscular Re-Education Activity 2 balloon toss blue pads min assist, multidirectional sway, decreased accuracy of hitting balloon   Balance/Neuromuscular Re-Education Activity 3 ball with dowel too blue pads, min assist, multi directional sway with posterio LOB x 1 and min assist to regain   Balance/Neuromuscular Re-Education Activity 4 punching on blue therapads alternating L and R cross punches multidirectional min A x 1   Balance/Neuromuscular Re-Education Activity 5 High hurdles reciprocal pattern no UE support min A x 1   Balance/Neuromuscular Re-Education Activity 6 Coordination ladder no UE support min A x 1   Ambulation/Gait Training 1   Surface 1 Level tile   Device 1  Rolling walker   Assistance 1 Minimum assistance   Quality of Gait 1 Ataxic;Forward flexed posture;Decreased step length;NBOS;Listing   Comments/Distance (ft) 1 100 feet RW Lists Right, ataxia RLE staggers but attempts correction.  Min assist recovery when turning backing up.   Gait Support Devices Gait belt   Ambulation/Gait Training 2   Surface 2 Level tile   Device 2 No device   Gait Support Devices Gait belt   Assistance 2 Minimum assistance  (x 2)   Quality of Gait 2 Inconsistent stride length;Ataxic;Scissoring   Comments/Distance (ft) 2 100 ft x 2 with turn hand held assist, increased fall risk   Transfer 1   Transfer From 1 Wheelchair to   Transfer to 1 Stand   Technique 1 Sit to stand;Stand to sit   Transfer Level of Assistance 1 Minimum assistance   Trials/Comments 1 Initially, patient launches self forward out of chair with mod A x 1 to correct forward momentum, min a x 1 for safety all other attempts.   Stairs   Rails 1 Bilateral   Curb Step 1 No   Device 1 Railing   Support Devices 1 Gait belt   Assistance 1 Contact guard   Comment/Number of Steps 1 8 steps with 2 rails using B UE, nonreciprocal pattern    Object From Floor   Comments tall hurdles from floor with 1 UE support on ll bar, [facility] for safety   Wheelchair Activities   Level 1 Level tile   Method 1 Right upper extremity;Left upper extremity;Right lower extremity;Left lower extremity   Level of Assistance 1 Modified independent   Description/Details 1 150+ ft on unit   Other Activity   Other Activity 1 NuStep level 4 x 10 min   Activity Tolerance   Endurance Tolerates 30 min exercise with multiple rests   Early Mobility/Exercise Safety Screen Proceed with mobilization - No exclusion criteria met   Activity Tolerance Comments fatigues with activity, but works strongly throughout session , pushes self   Rate of Perceived Exertion (RPE) 4/10 post NuStep   PT Assessment   PT Assessment Results Decreased strength;Decreased endurance;Impaired  balance;Decreased mobility;Decreased coordination;Impaired vision;Decreased safety awareness;Impaired judgement   Rehab Prognosis Excellent   Barriers to Discharge diploplia   Barriers to Discharge Home Physical needs   Evaluation/Treatment Tolerance Patient limited by fatigue;Patient tolerated treatment well   Medical Staff Made Aware Yes   Strengths Premorbid level of function   Barriers to Participation Comorbidities   End of Session Communication Bedside nurse   Assessment Comment Continues to make strong progress, limited by diplopia and ataxia, remains high fall risk   End of Session Patient Position Up in chair;Alarm on   PT Plan   Treatment/Interventions Transfer training;Gait training;Stair training;Balance training;Neuromuscular re-education;Therapeutic exercise;Therapeutic activity   PT Plan Ongoing PT   PT Recommended Transfer Status Assist x1   PT - OK to Discharge Yes

## 2024-12-14 PROCEDURE — 97112 NEUROMUSCULAR REEDUCATION: CPT | Mod: GP,CQ

## 2024-12-14 PROCEDURE — 1180000001 HC REHAB PRIVATE ROOM DAILY

## 2024-12-14 PROCEDURE — 2500000001 HC RX 250 WO HCPCS SELF ADMINISTERED DRUGS (ALT 637 FOR MEDICARE OP): Performed by: INTERNAL MEDICINE

## 2024-12-14 PROCEDURE — 97116 GAIT TRAINING THERAPY: CPT | Mod: GP,CQ

## 2024-12-14 PROCEDURE — 97530 THERAPEUTIC ACTIVITIES: CPT | Mod: GP,CQ

## 2024-12-14 ASSESSMENT — ACTIVITIES OF DAILY LIVING (ADL): EFFECT OF PAIN ON DAILY ACTIVITIES: DISCOMFORT

## 2024-12-14 ASSESSMENT — PAIN SCALES - GENERAL
PAINLEVEL_OUTOF10: 0 - NO PAIN
PAINLEVEL_OUTOF10: 2
PAINLEVEL_OUTOF10: 2
PAINLEVEL_OUTOF10: 0 - NO PAIN
PAINLEVEL_OUTOF10: 0 - NO PAIN

## 2024-12-14 ASSESSMENT — PAIN - FUNCTIONAL ASSESSMENT
PAIN_FUNCTIONAL_ASSESSMENT: 0-10

## 2024-12-14 ASSESSMENT — PAIN DESCRIPTION - LOCATION: LOCATION: NECK

## 2024-12-14 ASSESSMENT — PAIN DESCRIPTION - DESCRIPTORS: DESCRIPTORS: DISCOMFORT

## 2024-12-14 NOTE — PROGRESS NOTES
Physical Therapy       12/14/24 [id number]   Precautions   Hearing/Visual Limitations +diploplia   Medical Precautions Fall precautions   Post-Surgical Precautions MITT   Pain Assessment   Pain Assessment 0-10   0-10 (Numeric) Pain Score 2   Pain Type Chronic pain   Pain Location Shoulder   Pain Orientation Right   Pain Interventions   (pt declinded pain interventions)   Therapeutic Activity   Therapeutic Activity 1 nu-step x 10 minutes at level 5 resistance, 689 steps   Balance/Neuromuscular Re-Education   Balance/Neuromuscular Re-Education Activity 1 stacked orange cone pick-up, no UE support, CG/Dwayne.  Vc's for squat technique.   Balance/Neuromuscular Re-Education Activity 2 // bars (no UE support): x2 trails side stepping both directions with blue theraband at ankles, Dwayne.  Vc's for longer step lengths and for glute tightening during loading.   Balance/Neuromuscular Re-Education Activity 3 // bars (no UE support):  forward stepping with blue theraband at ankles, x 3 trials.  CG/Dwayne.  Emphasis on keeping band tight by keeping WBOS.   Ambulation/Gait Training 1   Surface 1 Level tile   Device 1 Rolling walker   Assistance 1 Contact guard;Minimum assistance   Quality of Gait 1 Ataxic;Forward flexed posture;Decreased step length;NBOS;Listing   Comments/Distance (ft) 1 100 ft  x 2, turns included.  Frequent cuing for WBOS.  Instructions given for increasing heel strike to improve foot clearance - good results noted,   Gait Support Devices Gait belt   Transfer 1   Technique 1 Sit to stand;Stand to sit   Transfer Device 1 Gait belt   Transfer Level of Assistance 1 Contact guard   Transfers 2   Transfer From 2 Wheelchair to;Chair with arms to   Transfer to 2 Chair with arms;Wheelchair   Technique 2 Stand pivot   Transfer Device 2 Gait belt   Transfer Level of Assistance 2 Contact guard   Trials/Comments 2 no AD, but use of stable objectgs for support during turn.   Stairs   Rails 1 Bilateral   Device 1 Railing    Support Devices 1 Gait belt   Assistance 1 Contact guard   Comment/Number of Steps 1 up/down 1 flight, up reciprocal/down step-to.  vc's for WBOS and full foot on step.    Object From Floor   Devices Reacher;Rolling walker   Assist Level Contact guard   Comments x3 washcloths from floor   Wheelchair Activities   Propulsion Type 1 Manual   Level 1 Level tile   Method 1 Right upper extremity;Left upper extremity;Right lower extremity;Left lower extremity   Level of Assistance 1 Modified independent   Description/Details 1 ad yana aroiund unit   Activity Tolerance   Endurance Tolerates 30 min exercise with multiple rests   PT Assessment   PT Assessment Results Decreased strength;Decreased endurance;Impaired balance;Decreased mobility;Decreased coordination;Impaired vision;Decreased safety awareness;Impaired judgement   Rehab Prognosis Excellent   Evaluation/Treatment Tolerance Patient tolerated treatment well   End of Session Patient Position Up in chair;Alarm on   PT Plan   Inpatient/Swing Bed or Outpatient Inpatient   PT Plan   Treatment/Interventions Bed mobility;Transfer training;Gait training;Stair training;Neuromuscular re-education;Therapeutic exercise;Therapeutic activity   PT Plan Ongoing PT   PT Recommended Transfer Status Assist x1

## 2024-12-14 NOTE — NURSING NOTE
Assumed care of pt at start of shift. At 0845 pt was found in bathroom sitting in his wheelchair with about a 3cm superficial laceration on his forehead. Pt stated he was bent over trying to pull up his pants and he hit his head on the toilet paper dispenser. Message sent to  and was advised to just clean wound and leave open to air at this time.

## 2024-12-14 NOTE — CARE PLAN
The patient's goals for the shift include  Rest    The clinical goals for the shift include no injury    Over the shift, the patient did not make progress toward the following goals. Barriers to progression include compliance. Recommendations to address these barriers include education, reminders, rounding.

## 2024-12-15 VITALS
RESPIRATION RATE: 18 BRPM | DIASTOLIC BLOOD PRESSURE: 65 MMHG | SYSTOLIC BLOOD PRESSURE: 107 MMHG | TEMPERATURE: 97.9 F | BODY MASS INDEX: 28.63 KG/M2 | OXYGEN SATURATION: 97 % | WEIGHT: 216.05 LBS | HEART RATE: 69 BPM | HEIGHT: 73 IN

## 2024-12-15 PROCEDURE — 2500000001 HC RX 250 WO HCPCS SELF ADMINISTERED DRUGS (ALT 637 FOR MEDICARE OP): Performed by: INTERNAL MEDICINE

## 2024-12-15 PROCEDURE — 1180000001 HC REHAB PRIVATE ROOM DAILY

## 2024-12-15 ASSESSMENT — PAIN - FUNCTIONAL ASSESSMENT
PAIN_FUNCTIONAL_ASSESSMENT: 0-10

## 2024-12-15 ASSESSMENT — PAIN SCALES - GENERAL
PAINLEVEL_OUTOF10: 0 - NO PAIN
PAINLEVEL_OUTOF10: 0 - NO PAIN
PAINLEVEL_OUTOF10: 2

## 2024-12-15 ASSESSMENT — PAIN DESCRIPTION - DESCRIPTORS: DESCRIPTORS: DISCOMFORT

## 2024-12-15 ASSESSMENT — ACTIVITIES OF DAILY LIVING (ADL): EFFECT OF PAIN ON DAILY ACTIVITIES: DISCOMFORT

## 2024-12-16 ENCOUNTER — APPOINTMENT (OUTPATIENT)
Dept: CARDIAC REHAB | Facility: CLINIC | Age: 69
End: 2024-12-16
Payer: MEDICARE

## 2024-12-16 PROCEDURE — 97116 GAIT TRAINING THERAPY: CPT | Mod: GP

## 2024-12-16 PROCEDURE — 97112 NEUROMUSCULAR REEDUCATION: CPT | Mod: GP

## 2024-12-16 PROCEDURE — 97112 NEUROMUSCULAR REEDUCATION: CPT | Mod: GO,CO

## 2024-12-16 PROCEDURE — 99232 SBSQ HOSP IP/OBS MODERATE 35: CPT | Performed by: INTERNAL MEDICINE

## 2024-12-16 PROCEDURE — 1180000001 HC REHAB PRIVATE ROOM DAILY

## 2024-12-16 PROCEDURE — 92507 TX SP LANG VOICE COMM INDIV: CPT | Mod: GN

## 2024-12-16 PROCEDURE — 97110 THERAPEUTIC EXERCISES: CPT | Mod: GP

## 2024-12-16 PROCEDURE — 2500000001 HC RX 250 WO HCPCS SELF ADMINISTERED DRUGS (ALT 637 FOR MEDICARE OP): Performed by: INTERNAL MEDICINE

## 2024-12-16 RX ORDER — ACETAMINOPHEN 325 MG/1
975 TABLET ORAL EVERY 6 HOURS PRN
Status: DISCONTINUED | OUTPATIENT
Start: 2024-12-16 | End: 2024-12-21 | Stop reason: HOSPADM

## 2024-12-16 RX ORDER — METOPROLOL SUCCINATE 100 MG/1
100 TABLET, EXTENDED RELEASE ORAL DAILY
Status: DISCONTINUED | OUTPATIENT
Start: 2024-12-17 | End: 2024-12-21 | Stop reason: HOSPADM

## 2024-12-16 ASSESSMENT — PAIN - FUNCTIONAL ASSESSMENT
PAIN_FUNCTIONAL_ASSESSMENT: 0-10

## 2024-12-16 ASSESSMENT — COGNITIVE AND FUNCTIONAL STATUS - GENERAL
TOILETING: A LITTLE
DAILY ACTIVITIY SCORE: 21
HELP NEEDED FOR BATHING: A LITTLE
DRESSING REGULAR LOWER BODY CLOTHING: A LITTLE

## 2024-12-16 ASSESSMENT — BALANCE ASSESSMENTS
STANDING ON ONE LEG: UNABLE TO TRY NEEDS ASSIST TO PREVENT FALL
TRANSFERS: NEEDS ONE PERSON TO ASSIST
STANDING UNSUPPORTED ONE FOOT IN FRONT: LOSES BALANCE WHILE STEPPING OR STANDING
STANDING UNSUPPORTED: ABLE TO STAND SAFELY FOR 2 MINUTES
STANDING UNSUPPORTED WITH EYES CLOSED: ABLE TO STAND 10 SECONDS WITH SUPERVISION
REACHING FORWARD WITH OUTSTRETCHED ARM WHILE STANDING: CAN REACH FORWARD 12 CM (5 INCHES)
STANDING TO SITTING: CONTROLS DESCENT BY USING HANDS
STANDING UNSUPPORTED WITH FEET TOGETHER: ABLE TO PLACE FEET TOGETHER INDEPENDENTLY BUT UNABLE TO HOLD FOR 30 SECONDS
PICK UP OBJECT FROM THE FLOOR FROM A STANDING POSITION: UNABLE TO TRY/NEEDS ASSIST TO KEEP FROM LOSING BALANCE OR FALLING
PLACE ALTERNATE FOOT ON STEP OR STOOL WHILE STANDING UNSUPPORTED: NEEDS ASSIST TO KEEP FROM LOSING BALANCE OR FALLING
SITTING WITH BACK UNSUPPORTED BUT FEET SUPPORTED ON FLOOR OR ON A STOOL: ABLE TO SIT SAFELY AND SECURELY FOR 2 MINUTES
STANDING TO SITTING: NEEDS MINIMAL AID TO STAND OR STABILIZE
TURN 360 DEGREES: NEEDS CLOSE SUPERVISION OR VERBAL CUEING
PLACE ALTERNATE FOOT ON STEP OR STOOL WHILE STANDING UNSUPPORTED: NEEDS ASSISTANCE TO KEEP FROM FALLING/UNABLE TO TRY
LONG VERSION TOTAL SCORE (MAX 56): 22

## 2024-12-16 ASSESSMENT — PAIN SCALES - GENERAL
PAINLEVEL_OUTOF10: 0 - NO PAIN
PAINLEVEL_OUTOF10: 2
PAINLEVEL_OUTOF10: 0 - NO PAIN
PAINLEVEL_OUTOF10: 0 - NO PAIN
PAINLEVEL_OUTOF10: 2

## 2024-12-16 ASSESSMENT — ACTIVITIES OF DAILY LIVING (ADL)
BATHING_LEVEL_OF_ASSISTANCE: SETUP
HOME_MANAGEMENT_TIME_ENTRY: 30
BATHING_EQUIPMENT_NEEDED: LONG-HANDLED SPONGE;REMOVEABLE SHOWER HEAD
BATHING_WHERE_ASSESSED: SHOWER

## 2024-12-16 NOTE — PROGRESS NOTES
Physical Therapy       12/16/24 [id number]   PT  Visit   PT Received On 12/16/24   Response to Previous Treatment Patient with no complaints from previous session.   General   Reason for Referral CVA   Referred By Dr. Kirk   Past Medical History Relevant to Rehab heart failure, afib, gout, hyperlipidemia, SHOAIB, HTN, DJD, tinnitus   Patient Position Received Up in chair;Alarm on   Preferred Learning Style verbal;visual   General Comment + Diplopia   Precautions   Hearing/Visual Limitations +diploplia   Medical Precautions Fall precautions   Post-Surgical Precautions MITT   Pain Assessment   Pain Assessment 0-10   0-10 (Numeric) Pain Score 2   Pain Type Acute pain   Pain Location Shoulder   Pain Orientation Right   Pain Interventions   (pt denied need for intervention)   Response to Interventions No change in pain   Therapeutic Exercise   Therapeutic Exercise Activity 1 seated imaginary pedalling, 2x15 reps, each LE   Balance/Neuromuscular Re-Education   Balance/Neuromuscular Re-Education Activity 1 // bars: side-stepping with green theraband at ankles, no UE support, CG/Dwayne   Balance/Neuromuscular Re-Education Activity 2 blue therapads:  cone reaching, no UE support, Dwayne   Balance/Neuromuscular Re-Education Activity 3 blue therapads:  ball with dowel too, no UE support, Dwayne   Balance/Neuromuscular Re-Education Activity 4 blue therapads:  balloon toss, no UE support, Dwayne   Ambulation/Gait Training 1   Surface 1 Level tile;Carpet   Device 1 Rolling walker   Assistance 1 Minimum assistance   Quality of Gait 1 Ataxic;Forward flexed posture;Decreased step length;NBOS;Listing   Comments/Distance (ft) 1 110 ft x 2, turns included.  Ataxic with NBOS noted.  Vc's for WBOS and upright posture.   Gait Support Devices Gait belt   Transfer 1   Technique 1 Sit to stand;Stand to sit   Transfer Device 1 Gait belt   Transfer Level of Assistance 1 Contact guard   Wheelchair Activities   Propulsion Type 1 Manual   Level 1 Level  pippa   Method 1 Right upper extremity;Left upper extremity;Right lower extremity;Left lower extremity   Level of Assistance 1 Modified independent   Description/Details 1 ad yana around unit   Activity Tolerance   Endurance Tolerates 30 min exercise with multiple rests   PT Assessment   PT Assessment Results Decreased strength;Decreased endurance;Impaired balance;Decreased mobility;Decreased coordination;Impaired vision;Decreased safety awareness;Impaired judgement   Rehab Prognosis Excellent   Evaluation/Treatment Tolerance Patient tolerated treatment well   End of Session Patient Position Up in chair;Alarm on   PT Plan   Inpatient/Swing Bed or Outpatient Inpatient   PT Plan   Treatment/Interventions Bed mobility;Transfer training;Gait training;Stair training;Neuromuscular re-education;Therapeutic exercise;Therapeutic activity   PT Plan Ongoing PT   PT Recommended Transfer Status Assist x1;Assistive device

## 2024-12-16 NOTE — CARE PLAN
Problem: IRF PT STG Problem  Goal: Patient will roll right and left with contact guard assist to facilitate mobility.  Outcome: Met  Goal: Patient will transfer supine to sit and sit to supine with supervision assist to facilitate mobility.  Outcome: Progressing  Goal: Patient will transfer sit to stand and stand to sit with contact guard assist to facilitate mobility.  Outcome: Met  Goal: Patient will transfer bed to chair and chair to bed with contact guard assist to facilitate mobility.  Outcome: Met  Goal: Patient will amb 100 feet rolling walker device including two turns on even surface with minimal assist to facilitate safe mobility.    Outcome: Met  Goal: Patient will negotiate 8 stairs with two rail(s) and minimal} assist with no device for in home and community.  Outcome: Met  Goal: Patient will propel wheelchair 150 feet with two turns on even, uneven surface with independent assist to facilitate safe mobility.   Outcome: Met  Goal: Patient will perform TUG with least restrictive assistive device in 60 seconds or less to promote mobility and reduce fall risk with dynamic standing tasks.   Outcome: Progressing  Goal: Patient will improve GOETZ score to  20/56  to promote mobility and reduce fall risk with dynamic standing tasks.   Outcome: Met  Goal: Patient will increase strength in BLE  by 4+/5  grade throughout to improve functional mobility.   Outcome: Progressing  Goal: Patient will  object from floor with rolling walker device, with reacher, and minimal assist to promote safe retrieval of dropped items.  Outcome: Met     Problem: IRF PT LTG Problem  Goal: Patient will roll right and left with independent assist to facilitate mobility.  Outcome: Progressing  Goal: Patient will transfer supine to sit and sit to supine with independent assist to facilitate mobility.  Outcome: Progressing  Goal: Patient will transfer sit to stand and stand to sit with independent assist to facilitate  mobility.  Outcome: Progressing  Goal: Patient will transfer bed to chair and chair to bed with independent assist to facilitate mobility.  Outcome: Progressing  Goal: Patient will amb 150 feet rolling walker device including two turns on even surface with independent assist to facilitate safe mobility.    Outcome: Progressing  Goal: Patient will negotiate 12 stairs with two rail(s) and independent} assist with no device for in home and community.  Outcome: Progressing  Goal: Patient will propel wheelchair 150+ feet with two turns on even, uneven surface with independent assist to facilitate safe mobility.   Outcome: Progressing  Goal: Patient will perform TUG with least restrictive assistive device in 45 seconds or less to promote mobility and reduce fall risk with dynamic standing tasks.   Outcome: Progressing  Goal: Patient will improve GOETZ score to  30/56  to promote mobility and reduce fall risk with dynamic standing tasks.   Outcome: Progressing  Goal: Patient will increase BLE strength to 5//5 to improve functional mobility.   Outcome: Progressing  Goal: Patient will  object from floor with rolling walker device, with reacher, and independent assist to promote safe retrieval of dropped items.  Outcome: Progressing

## 2024-12-16 NOTE — PROGRESS NOTES
Occupational Therapy       12/16/24 [id number]   OT Last Visit   OT Received On 12/16/24   General   Reason for Referral CVA   Referred By Dr. Kirk   Past Medical History Relevant to Rehab heart failure, afib, gout, hyperlipidemia, SHOAIB, HTN, DJD, tinnitus   Prior to Session Communication Bedside nurse   Patient Position Received Up in chair;Alarm on   Preferred Learning Style verbal;visual   General Comment + Diplopia   Precautions   Hearing/Visual Limitations +diploplia   Medical Precautions Fall precautions   Pain Assessment   Pain Assessment 0-10   0-10 (Numeric) Pain Score 2   Pain Type Acute pain   Pain Location Shoulder   Cognition   Overall Cognitive Status WFL   Orientation Level Oriented X4   Following Commands Follows all commands and directions without difficulty   Transfer 1   Transfer From 1 Sit to   Transfer to 1 Stand   Technique 1 Sit to stand;Stand to sit   Transfer Device 1 Gait belt   Transfer Level of Assistance 1 Contact guard   Therapeutic Exercise   Therapeutic Exercise Activity 1 Challanged visual scanning and use of B/L eyes to perform task- Seated card matching. Still required L eye closing intermittently.   Therapeutic Exercise Activity 2 Challanged FMC- writing name- word search- Unpatched. Pt stated handwriting improving slightly.   Therapeutic Exercise Activity 3 Challanged vision- visual tracking from R to L, convergence. L eye With slight improvement in tracking. L eye unable to cross midline.   IP OT Assessment   OT Assessment Pt making progress towards established goals.Continue OT per POC to maximize functional independence and safety for safe transition home.   Prognosis Good   Barriers to Discharge Home No anticipated barriers   Evaluation/Treatment Tolerance Patient tolerated treatment well   Medical Staff Made Aware Yes   End of Session Communication Bedside nurse   End of Session Patient Position Up in chair;Alarm on   OT Assessment   OT Assessment Results Decreased  endurance;Decreased fine motor control   Barriers to Discharge None   Strengths Premorbid level of function   Barriers to Participation Comorbidities   Education   Individual(s) Educated Patient   Education Provided Fall precautons   Home Program AROM;Strengthening   Patient Response to Education Patient/Caregiver Verbalized Understanding of Information   Inpatient/Swing Bed or Outpatient   Inpatient/Swing Bed or Outpatient Inpatient   Inpatient Plan   Treatment Interventions Visual perceptual retraining;Neuromuscular reeducation   OT Frequency 5 times per week   OT Discharge Recommendations Low intensity level of continued care   Equipment Recommended upon Discharge Other (comment)  (TBD)   OT Recommended Transfer Status Assist of 1   OT - OK to Discharge Yes

## 2024-12-16 NOTE — PROGRESS NOTES
Name and Date of Birth Verified    Medical release Signed    Previous PCP: Dr. Virginia Dumont Verified    Chief Complaint   Patient presents with    New Patient    Establish Care    Back Pain     Since 2019     Patient not fasting for labs, [facility]. Has had back Pain issues 1990 ([address])      1. \"Have you been to the ER, urgent care clinic since your last visit? Hospitalized since your last visit? \"     Yes  3/6/2023  [employer] [facility] of muscle, fascia and tendon of lower back,     2. \"Have you seen or consulted any other health care providers outside of the [address] [place] [address] since your last visit? \"  No      3. For patients aged 39-70: Has the patient had a colonoscopy / FIT/ Cologuard? Yes  Dr. Jose Dickens [facility])    If the patient is female:    4. For patients aged 41-77: Has the patient had a mammogram within the past 2 years? No Last one 2021      5. For patients aged 21-65: Has the patient had a pap smear?       Yes  2020       Health Maintenance Due   Topic Date Due    HIV screen  Never done    Hepatitis C screen  Never done    Cervical cancer screen  Never done    Lipids  Never done    Colorectal Cancer Screen  Never done    Breast cancer screen  Never done    Shingles vaccine (1 of 2) Never done Physical Therapy   WEEKLY PROGRESS NOTE and Treatment        12/16/24 [id number]    PT  Visit   PT Received On 12/16/24   Response to Previous Treatment Patient with no complaints from previous session.   General   Reason for Referral CVA   Referred By Dr. Kirk   Past Medical History Relevant to Rehab heart failure, afib, gout, hyperlipidemia, SHOAIB, HTN, DJD, tinnitus   General Comment + Diplopia   Pain Assessment   Pain Assessment 0-10   0-10 (Numeric) Pain Score 2  (right Shoulder)   Cognition   Overall Cognitive Status WFL   Orientation Level Oriented X4   Strength RLE   R Hip Flexion 4/5   R Hip Extension 4/5   R Hip ABduction 4/5   R Hip ADduction 4/5   R Knee Flexion 4+/5   R Knee Extension 4+/5   R Ankle Dorsiflexion 4+/5   R Ankle Plantar Flexion 4+/5   Strength LLE   L Hip Flexion 4/5   L Hip Extension 4/5   L Hip ABduction 4/5   L Hip ADduction 4/5   L Knee Flexion 4/5   L Knee Extension 4+/5   L Ankle Dorsiflexion 4+/5   L Ankle Plantar Flexion 4+/5   Therapeutic Exercise   Therapeutic Exercise Activity 1 Standing MArching x 1 minute 2.5 BLE  RPE 5/10   Therapeutic Exercise Activity 2 Standing Squats  x 1 min   Therapeutic Exercise Activity 3 CORE alternating elbow/knee 2x15   Therapeutic Exercise Activity 4 STS 2x10 No UEs   Balance/Neuromuscular Re-Education   Balance/Neuromuscular Re-Education Activity 1 GOETZ 22/56   Balance/Neuromuscular Re-Education Activity 2 360 turns x4  16-17 seconds lead left, 12-13 lead right   Balance/Neuromuscular Re-Education Activity 3 6 Inch step tapping alternating min/mod assist   Balance/Neuromuscular Re-Education Activity 4 Step on step then tap up with opposite foor Mod assist with LOB   Bed Mobility 1   Bed Mobility 1 Supine to sitting;Sitting to supine;Rolling right;Rolling left   Level of Assistance 1 Close supervision   Ambulation/Gait Training 1   Surface 1 Level tile   Device 1 Rolling walker   Assistance 1 Contact guard   Quality of Gait 1 Ataxic;Forward flexed  posture;Decreased step length;NBOS;Listing   Comments/Distance (ft) 1 110 lists right Holds Left Eye Closed ++ Diploplia.   Gait Support Devices Gait belt   Ambulation/Gait Training 2   Surface 2 Level tile;Carpet   Device 2 No device   Gait Support Devices Gait belt   Assistance 2 Minimum assistance   Quality of Gait 2 Inconsistent stride length;Ataxic;Scissoring   Comments/Distance (ft) 2 100 feet x2 unsteady with turns   Transfer 1   Transfer From 1 Sit to   Transfer to 1 Stand   Transfer Device 1 Gait belt   Transfer Level of Assistance 1 Contact guard   Trials/Comments 1 with RW CGx1 Lists Right   Stairs   Rails 1 Bilateral   Device 1 Railing   Support Devices 1 Gait belt   Assistance 1 Contact guard   Comment/Number of Steps 1 1 flight reciprocally    Object From Floor   Devices Reacher;Rolling walker   Assist Level Contact guard   Comments x 6 washcloths   Wheelchair Activities   Propulsion Type 1 Manual   Level 1 Level tile   Method 1 Right upper extremity;Left upper extremity;Right lower extremity;Left lower extremity   Description/Details 1 ad yana   Activity Tolerance   Endurance Tolerates 30 min exercise with multiple rests   Early Mobility/Exercise Safety Screen Proceed with mobilization - No exclusion criteria met   PT Assessment   Barriers to Discharge diploplia   End of Session Patient Position Up in chair;Alarm on   Outpatient Education   Education Comment Reminded to use call bell for BR, or back to bed.   PT Plan   Inpatient/Swing Bed or Outpatient Inpatient   PT Plan   Treatment/Interventions Transfer training;Gait training;Stair training;Balance training;Neuromuscular re-education;Therapeutic exercise;Wheelchair management  PT NOTE   Steady progress in all areas.  8 STGs met.  GOETZ score improved from 6/56 to 22/56.  Pt remains high Fall Risk but balance reactions improving.  Right Shoulder Pain from RTC injury.  Family Training will be important to prepare for home going.  Focus on LTGs.      PT Recommended Transfer Status Assist x1   PT - OK to Discharge Yes            Problem: IRF PT STG Problem  Goal: Patient will roll right and left with contact guard assist to facilitate mobility.  Outcome: Met  Goal: Patient will transfer supine to sit and sit to supine with supervision assist to facilitate mobility.  Outcome: Progressing  Goal: Patient will transfer sit to stand and stand to sit with contact guard assist to facilitate mobility.  Outcome: Met  Goal: Patient will transfer bed to chair and chair to bed with contact guard assist to facilitate mobility.  Outcome: Met  Goal: Patient will amb 100 feet rolling walker device including two turns on even surface with minimal assist to facilitate safe mobility.    Outcome: Met  Goal: Patient will negotiate 8 stairs with two rail(s) and minimal} assist with no device for in home and community.  Outcome: Met  Goal: Patient will propel wheelchair 150 feet with two turns on even, uneven surface with independent assist to facilitate safe mobility.   Outcome: Met  Goal: Patient will perform TUG with least restrictive assistive device in 60 seconds or less to promote mobility and reduce fall risk with dynamic standing tasks.   Outcome: Progressing  Goal: Patient will improve GOETZ score to  20/56  to promote mobility and reduce fall risk with dynamic standing tasks.   Outcome: Met  Goal: Patient will increase strength in BLE  by 4+/5  grade throughout to improve functional mobility.   Outcome: Progressing  Goal: Patient will  object from floor with rolling walker device, with reacher, and minimal assist to promote safe retrieval of dropped items.  Outcome: Met     Problem: IRF PT LTG Problem  Goal: Patient will roll right and left with independent assist to facilitate mobility.  Outcome: Progressing  Goal: Patient will transfer supine to sit and sit to supine with independent assist to facilitate mobility.  Outcome: Progressing  Goal: Patient will transfer  sit to stand and stand to sit with independent assist to facilitate mobility.  Outcome: Progressing  Goal: Patient will transfer bed to chair and chair to bed with independent assist to facilitate mobility.  Outcome: Progressing  Goal: Patient will amb 150 feet rolling walker device including two turns on even surface with independent assist to facilitate safe mobility.    Outcome: Progressing  Goal: Patient will negotiate 12 stairs with two rail(s) and independent} assist with no device for in home and community.  Outcome: Progressing  Goal: Patient will propel wheelchair 150+ feet with two turns on even, uneven surface with independent assist to facilitate safe mobility.   Outcome: Progressing  Goal: Patient will perform TUG with least restrictive assistive device in 45 seconds or less to promote mobility and reduce fall risk with dynamic standing tasks.   Outcome: Progressing  Goal: Patient will improve GOETZ score to  30/56  to promote mobility and reduce fall risk with dynamic standing tasks.   Outcome: Progressing  Goal: Patient will increase BLE strength to 5//5 to improve functional mobility.   Outcome: Progressing  Goal: Patient will  object from floor with rolling walker device, with reacher, and independent assist to promote safe retrieval of dropped items.

## 2024-12-16 NOTE — NURSING NOTE
Assumed care of patient at 1900.. Pt. denied pain/discomfort. Call light within reach. Will cont. to monitor.

## 2024-12-16 NOTE — PROGRESS NOTES
"Speech-Language Pathology    SLP Adult IRF CCR Speech-Language Pathology Treatment     Patient Name: David Chatman  MRN: [id number]  Today's Date: 12/16/2024  Time Calculation  Start Time: 1105  Stop Time: 1140  Time Calculation (min): 35 min     1/3sp    Current Problem:   1. Ischemic stroke (Multi)  Referral to Ophthalmology    Referral to Neurology        SLP Assessment:  SLP TX Intervention Outcome: Making Progress Towards Goals  Treatment Tolerance: Patient tolerated treatment well  Medical Staff Made Aware: Yes  Strengths: Motivation, Family/Caregiver Support  Education Provided: Yes     Plan:  Inpatient/Swing Bed or Outpatient: Inpatient  SLP TX Plan: Continue Plan of Care, change frequency to 3x/week  Duration: 3 weeks  SLP Discharge Recommendations: Home with no further SLP  Next Treatment Priority: family training  Discussed POC: Patient  Discussed Risks/Benefits: Yes  Patient/Caregiver Agreeable: Yes      Subjective   Pt seen upright in wheelchair. Reports that vocal quality is \"more raspy\" than usual. Suspect related to fatigue level as pt is seen after PT session and had already completed OT.     General Visit Information:   Referred By: Dr. Kirk  Past Medical History Relevant to Rehab: heart failure, afib, gout, hyperlipidemia, SHOAIB, HTN, DJD, tinnitus      Pain Assessment:  Pain Assessment  0-10 (Numeric) Pain Score: 2  Pain Location: Shoulder  Pain Orientation: Right      Objective   SPEECH-LANGUAGE GOALS (established 12/9, anticipate end 3 weeks):  Pt will  utilize speech intelligibility strategies with 80% acc to improve communication skills.  PROGRESS: 80% with structured conversation tasks. Excellent increased awareness of strategies. Significantly increased self-correction. ACHIEVED current goal. Increase accuracy to 85%. 12/16: Pt verbalizes good understanding to go slow, say ends of words, and overpronounce. Decreased cues required to implement with conversation level structured tasks. 80% " with cues.  STATUS: continue, address increased accuracy of 85%.  2.    Pt will  complete articulatory precision tasks with 80% accuracy to improve intelligibility.   PROGRESS: Excellent carryover with utilizing slow rate with paragraph level stimuli. Provided education that fatigue will affect overall speech. Pt feels that he was very tired on previous tx day, feels better today. 85%-90% for oral paragraph reading. Mild occasional difficulties were related to double vision. ACHIEVED current goal. Increase accuracy to 90%. 12/16: mildly decreased accuracy today possibly due to fatigue level. 80% for oral paragraph reading.  STATUS: continue, address at increased accuracy level of 90%   LTG: Pt will increase motor speech function to the highest possible speech level for improved functional communication within daily living tasks.       3. Pt will improve STM/recall to 80% accuracy with cues to increase awareness of daily functional information.  PROGRESS: Pt feels that he is at a baseline mentation. He is able to recall tx routine and wheels self to scheduled appointment times. ACHIEVED  STATUS: GOAL MET - 12/11  4. Pt will improve problem solving skills to 80% accuracy with cues to increase safety awareness within immediate environment.  PROGRESS: Although double vision persists, no episodes of impulsivity. Pt demonstrates good use of wheelchair brakes and travels from room to therapy rooms. ACHIEVED.  STATUS: GOAL MET - 12/11  5. Ongoing assessment if indicated for further goal development or to determine progress, as needed.  PROGRESS: CLQT was completed previously. No new goals are indicated at this time.  STATUS: Goal met as far as determining further goal development/needs.   LTG 1: Pt will optimize cognitive-linguistic skills necessary for return to least restrictive living environment and to reduce caregiver dependence.     Motor Speech:   Motor Speech Intervention : Compensatory Speech Strategies    SLP  Outcome Measures:  Tikofsky's 50 Word Intelligibility Test - 100%.      The Communicative Participation Item Bank (CPIB) is a self-report with questions describing a variety of situations that involve speaking to others. The patient marks how speech might interfere or affect  communication in a variety of situations. Overall score was 24 which corresponds to a T score of 57.80. A score of 50 represents the average level, and higher scores signifying better participation based on a standard deviation of 10. A higher T score means less interference with communicative participation in daily life.     Inpatient Education:  Adult Inpatient Education  Individual(s) Educated: Patient  Verbal Education : Tikofsky's results, discharge planning  Risk and Benefits Discussed with Patient/Caregiver/Other: yes  Patient/Caregiver Demonstrated Understanding: yes  Plan of Care Discussed and Agreed Upon: yes  Patient Response to Education: Patient/Caregiver Verbalized Understanding of Information

## 2024-12-16 NOTE — PROGRESS NOTES
WEEKLY PROGRESS NOTE  Evaluation date: 12/9    Treatment Rx provided: See daily flowsheet        PRECAUTIONS  Precautions  Hearing/Visual Limitations: +diploplia  Medical Precautions: Fall precautions  Post-Surgical Precautions: Move in the Tube    ADL/ Functional Status:  Eating Independent   Grooming Modified independent   Toilet Hygiene Setup   Shower/Bathe Upper:  Modified independent  Lower:  Setup   Upper Body Dress Modified independent   Lower Body Dress Setup   On/Off Footwear Socks:  Modified independent  Shoes:  Modified independent   Toilet Transfer Set up   Car Transfer Set up   Meal Preparation  Min A   Kitchen Mobility Contact guard, Close supervision     Standardized Tests:  Box of blocks Box and Block: RUE- 38blks/min (from 18)      ; LUE 42 blks/ min (from 19)   9 hole peg test 9 Hole Peg Test: RUE- 41 seconds (from 53 sec); LUE 35 sec (from 52)    strength  Bits [place] cancellation  [place] making A/B Other Outcome Measures:  strength RUE- 98 lbs (from 85); LUE- 84 lbs (from 78) BITS bell cancellation- 3:21 mins x3 distractors, x0 misses     [facility] Daily Activity  Putting on and taking off regular lower body clothing: A little  Bathing (including washing, rinsing, drying): A little  Putting on and taking off regular upper body clothing: None  Toileting, which includes using toilet, bedpan or urinal: A little  Taking care of personal grooming such as brushing teeth: None  Eating Meals: None  Daily Activity - Total Score: 21    Plan Of Care:   Pt attained all STM goals and is progressing towards LTM goals.  Pt is scheduled for a family training with spouse on 12/17/24 at 9:00 am with OT to recommend AE/DME needs prior to returning home.  Continue OT per POC to address remaining LTM goals and begin DC planning for a safe transition home.   Progressing goals-  Problem: IRF OT LTG Problem  Goal: Pt will perform toileting task with use of toilet safety frame with modified independent including toilet  hygiene and clothing management.  Outcome: Progressing  Goal: Pt will perform lower body dressing without use of reacher, sock aide at seated level with modified independent.  Outcome: Progressing  Goal: Pt will perform toilet transfer with use of RW with modified independent to toilet safety frame. .  Outcome: Progressing  Goal: Pt will perform toilet transfer with use of RW with modified independent to toilet safety frame. .  Outcome: Progressing  Goal: Pt will perform light home management activity with use of RW with supervision with safe technique.  Outcome: Progressing   Met goals-  Problem: IRF OT STG Problem  Goal: Pt will perform toileting task with use of toilet safety frame with supervision including toilet hygiene and clothing management.  Outcome: Met  Goal: Pt will perform bathing task with use of shower chair with supervision.  Outcome: Met  Goal: Pt will perform lower body dressing without use of reacher, sock aide at seated level with set-up, supervision.  Outcome: Met  Goal: Pt will perform toilet transfer with use of grab bar with supervision to standard toilet.  Outcome: Met  Goal: Pt will increase 9-Hole Peg Test score by 4 seconds demonstrating improvement in fine motor coordination in BUE for ADL performance.  Outcome: Met  Goal: Pt will increase Box and Blocks score by 5 blocks demonstrating improvement in gross motor coordination in BUE for ADL performance.  Outcome: Met   Problem: IRF OT LTG Problem  Goal: Pt will perform oral hygiene activity seated with modified independent.  Outcome: Met  Goal: Pt will perform bathing task with use of shower chair with stand by assist.  Outcome: Met  Goal: Pt will perform upper body dressing without use of adaptive equipment at seated level with modified independent.  Outcome: Met  Goal: Pt will don/doff socks, shoes without use of reacher, long handled shoehorn with modified independent.  Outcome: Met

## 2024-12-16 NOTE — PROGRESS NOTES
"[facility]  Physician Progress Note    Subjective   David Chatman is a 69 y.o. male admitted to inpatient rehabilitation unit.    Doing well and no complaints - feels he is improving.  Still w/ significant diplopia.    Objective   /65 (BP Location: Right arm, Patient Position: Lying)   Pulse 78   Temp 37 °C (98.6 °F) (Temporal)   Resp 18   Ht 1.854 m (6' 0.99\")   Wt 98 kg (216 lb 0.8 oz)   SpO2 95%   BMI 28.51 kg/m²      Physical Exam  Constitutional:       General: He is not in acute distress.     Appearance: He is not toxic-appearing.   HENT:      Head: Normocephalic and atraumatic.      Mouth/Throat:      Mouth: Mucous membranes are moist.   Eyes:      Pupils: Pupils are equal, round, and reactive to light.   Cardiovascular:      Rate and Rhythm: Normal rate and regular rhythm.      Heart sounds: No murmur heard.  Pulmonary:      Breath sounds: Normal breath sounds. No wheezing, rhonchi or rales.   Abdominal:      General: There is no distension.      Palpations: Abdomen is soft.   Musculoskeletal:      Right lower leg: No edema.      Left lower leg: No edema.   Neurological:      Mental Status: He is alert and oriented to person, place, and time.      Diplopia.  The eyes do not converge.     Labs  BMP:        CBC:        Coagulation:           Assesment and Plan    Ischemic Stroke (Multi)      Stroke, occipital lobe, avila/midbrain with diplopia and diffuse weakness.  Continue with medical management for secondary prevention, PT, OT    C/w Eliquis as per cardio recs    Recent thoracic aorta repair.     CMP/Chronic systolic HF on guideline directed therapy, cont BB, Entresto, eplerenone, jardiance - PRN torsemide - has not needed.     Diplopia     Htn stable    Willi Kirk MD  "

## 2024-12-16 NOTE — PROGRESS NOTES
Called central scheduling for neurologic ophthalmology appointment. Dr. Gonzalez next available appointment is in April per scheduling. Left message for physician's nurse Scooter [phone] to see if there is an opportunity to be seen prior to April. Patient updated. Family training planned for 9am 12.17.24. Patient plans to return home 12.23.24.

## 2024-12-16 NOTE — NURSING NOTE
Assumed care of pt @ 0730, report received, pt here s/p ischemic CVA with deficits of double vision and aphasia, pt axox4, pt has patch over his glasses to help with his diplopia,  lung sounds CTA, apical regular, no edema noted, call light within reach, continuing to monitor.

## 2024-12-16 NOTE — PROGRESS NOTES
Occupational Therapy       12/16/24 7663-3389   OT Last Visit   OT Received On 12/16/24   General   Reason for Referral CVA   Referred By Dr. Kirk   Past Medical History Relevant to Rehab heart failure, afib, gout, hyperlipidemia, SHOAIB, HTN, DJD, tinnitus   Prior to Session Communication Bedside nurse   Patient Position Received Up in chair;Alarm on   Preferred Learning Style verbal;visual   General Comment + Diplopia   Precautions   Hearing/Visual Limitations +diploplia   Medical Precautions Fall precautions   Pain Assessment   Pain Assessment 0-10   0-10 (Numeric) Pain Score 0 - No pain   Cognition   Overall Cognitive Status WFL   Orientation Level Oriented X4   Following Commands Follows all commands and directions without difficulty   Grooming   Grooming Level of Assistance Modified independent   Grooming Where Assessed Sitting sinkside   Grooming Comments Brushing teeth.   UE Bathing   UE Bathing Level of Assistance Modified independent   UE Bathing Where Assessed Shower   UE Bathing Comments Seated on shower chair.   LE Bathing   LE Bathing Adaptive Equipment Long-handled sponge;Removeable shower head   LE Bathing Level of Assistance Setup   LE Bathing Where Assessed Shower   LE Bathing Comments Seated and standing with intermittent use of grab bars.   UE Dressing   UE Dressing Level of Assistance Modified independent   UE Dressing Where Assessed Wheelchair   UE Dressing Comments Able to gather clothing items from closet. Able to don IND while seated.   LE Dressing   Pants Level of Assistance Setup   Sock Level of Assistance Modified independent   Shoe Level of Assistance Modified independent   LE Dressing Where Assessed Wheelchair   LE Dressing Comments Pt's balance improving. Able to perform LBDing while seated via figure 4. Able to stand and hike over hips with UE support.   Toileting   Toileting Level of Assistance Setup   Where Assessed Toilet   Toileting Comments Seated toileting and hygine. Standing  colthing management.   Transfer 1   Transfer From 1 Sit to   Transfer to 1 Stand   Technique 1 Sit to stand;Stand to sit   Transfer Device 1 Gait belt   Transfer Level of Assistance 1 Contact guard   Trials/Comments 1 At grab bar.   Toilet Transfers   Toilet Transfer From Wheelchair   Toilet Transfer Type To and from   Toilet Transfer to Standard toilet   Toilet Transfer Technique To right;To left   Toilet Transfers Set up   Toilet Transfers Comments Cueing for hand placement prior to transfer.   Shower Transfers   Shower Transfer From Wheelchair   Shower Transfer Type To and from   Shower Transfer to Shower seat with back   Shower Transfer Technique To left;To right   Shower Transfers Set up   Shower Transfers Comments Good use of grab bar   Car Transfers   Car Transfer From Walker   Car Transfer Technique Ambulating   Car Transfers Set up   Car Transfers Comments Pt able to perform simulated car transfer with FWW. Pt still with double vision but improved slightly per patient.   Vision   Double Vision Patching;Education  (Pt was educated on riskd and benefits of patching L eye, R eye vs no patching to increase eye muscle strength.)   IP OT Assessment   OT Assessment Pt making progress towards established goals.Continue OT per POC to maximize functional independence and safety for safe transition home.  (Eager to D/C next week.)   Prognosis Good   Barriers to Discharge Home No anticipated barriers   Evaluation/Treatment Tolerance Patient tolerated treatment well   Medical Staff Made Aware Yes   End of Session Communication Bedside nurse   End of Session Patient Position Up in chair;Alarm on   OT Assessment   OT Assessment Results Decreased endurance;Decreased fine motor control   Barriers to Discharge None   Strengths Premorbid level of function   Barriers to Participation Comorbidities   Education   Individual(s) Educated Patient   Education Provided Fall precautons   Home Program AROM;Strengthening   Patient  Response to Education Patient/Caregiver Verbalized Understanding of Information   Inpatient/Swing Bed or Outpatient   Inpatient/Swing Bed or Outpatient Inpatient   Inpatient Plan   Treatment Interventions ADL retraining;Functional transfer training;Neuromuscular reeducation   OT Frequency 5 times per week   OT Discharge Recommendations Low intensity level of continued care   OT Recommended Transfer Status Assist of 1   OT - OK to Discharge Yes      12/16/24 [id number]   [facility] Daily Activity   Putting on and taking off regular lower body clothing 3   Bathing (including washing, rinsing, drying) 3   Putting on and taking off regular upper body clothing 4   Toileting, which includes using toilet, bedpan or urinal 3   Taking care of personal grooming such as brushing teeth 4   Eating Meals 4   Daily Activity - Total Score 21   OT Adult Other Outcome Measures   9 Hole Peg Test RUE- 41 seconds (from 53 sec); LUE 35 sec (from 52)   Box and Block RUE- 38blks/min (from 18)      ; LUE 42 blks/ min (from 19)   Other Outcome Measures  strength RUE- 98 lbs (from 85); LUE- 84 lbs (from 78) BITS bell cancellation- 3:21 mins x3 distractors, x0 misses

## 2024-12-17 ENCOUNTER — APPOINTMENT (OUTPATIENT)
Dept: CARDIAC REHAB | Facility: CLINIC | Age: 69
End: 2024-12-17
Payer: MEDICARE

## 2024-12-17 LAB
ANION GAP SERPL CALCULATED.3IONS-SCNC: 10 MMOL/L (ref 10–20)
BASOPHILS # BLD AUTO: 0.05 X10*3/UL (ref 0–0.1)
BASOPHILS NFR BLD AUTO: 0.8 %
BUN SERPL-MCNC: 24 MG/DL (ref 6–23)
CALCIUM SERPL-MCNC: 8.6 MG/DL (ref 8.6–10.3)
CHLORIDE SERPL-SCNC: 104 MMOL/L (ref 98–107)
CO2 SERPL-SCNC: 30 MMOL/L (ref 21–32)
CREAT SERPL-MCNC: 1.28 MG/DL (ref 0.5–1.3)
EGFRCR SERPLBLD CKD-EPI 2021: 61 ML/MIN/1.73M*2
EOSINOPHIL # BLD AUTO: 0.32 X10*3/UL (ref 0–0.7)
EOSINOPHIL NFR BLD AUTO: 5.1 %
ERYTHROCYTE [DISTWIDTH] IN BLOOD BY AUTOMATED COUNT: 17.4 % (ref 11.5–14.5)
GLUCOSE SERPL-MCNC: 88 MG/DL (ref 74–99)
HCT VFR BLD AUTO: 37.3 % (ref 41–52)
HGB BLD-MCNC: 11.7 G/DL (ref 13.5–17.5)
IMM GRANULOCYTES # BLD AUTO: 0.02 X10*3/UL (ref 0–0.7)
IMM GRANULOCYTES NFR BLD AUTO: 0.3 % (ref 0–0.9)
LYMPHOCYTES # BLD AUTO: 2 X10*3/UL (ref 1.2–4.8)
LYMPHOCYTES NFR BLD AUTO: 31.7 %
MCH RBC QN AUTO: 25.1 PG (ref 26–34)
MCHC RBC AUTO-ENTMCNC: 31.4 G/DL (ref 32–36)
MCV RBC AUTO: 80 FL (ref 80–100)
MONOCYTES # BLD AUTO: 0.63 X10*3/UL (ref 0.1–1)
MONOCYTES NFR BLD AUTO: 10 %
NEUTROPHILS # BLD AUTO: 3.28 X10*3/UL (ref 1.2–7.7)
NEUTROPHILS NFR BLD AUTO: 52.1 %
NRBC BLD-RTO: 0 /100 WBCS (ref 0–0)
PLATELET # BLD AUTO: 206 X10*3/UL (ref 150–450)
POTASSIUM SERPL-SCNC: 3.2 MMOL/L (ref 3.5–5.3)
RBC # BLD AUTO: 4.67 X10*6/UL (ref 4.5–5.9)
SODIUM SERPL-SCNC: 141 MMOL/L (ref 136–145)
WBC # BLD AUTO: 6.3 X10*3/UL (ref 4.4–11.3)

## 2024-12-17 PROCEDURE — 1180000001 HC REHAB PRIVATE ROOM DAILY

## 2024-12-17 PROCEDURE — 97530 THERAPEUTIC ACTIVITIES: CPT | Mod: GO

## 2024-12-17 PROCEDURE — 97116 GAIT TRAINING THERAPY: CPT | Mod: GP

## 2024-12-17 PROCEDURE — 80048 BASIC METABOLIC PNL TOTAL CA: CPT | Performed by: INTERNAL MEDICINE

## 2024-12-17 PROCEDURE — 36415 COLL VENOUS BLD VENIPUNCTURE: CPT | Performed by: INTERNAL MEDICINE

## 2024-12-17 PROCEDURE — 99232 SBSQ HOSP IP/OBS MODERATE 35: CPT | Performed by: INTERNAL MEDICINE

## 2024-12-17 PROCEDURE — 92507 TX SP LANG VOICE COMM INDIV: CPT | Mod: GN

## 2024-12-17 PROCEDURE — 85025 COMPLETE CBC W/AUTO DIFF WBC: CPT | Performed by: INTERNAL MEDICINE

## 2024-12-17 PROCEDURE — 2500000002 HC RX 250 W HCPCS SELF ADMINISTERED DRUGS (ALT 637 FOR MEDICARE OP, ALT 636 FOR OP/ED): Performed by: INTERNAL MEDICINE

## 2024-12-17 PROCEDURE — 97530 THERAPEUTIC ACTIVITIES: CPT | Mod: GP

## 2024-12-17 PROCEDURE — 97112 NEUROMUSCULAR REEDUCATION: CPT | Mod: GP

## 2024-12-17 PROCEDURE — 97110 THERAPEUTIC EXERCISES: CPT | Mod: GO

## 2024-12-17 PROCEDURE — 2500000001 HC RX 250 WO HCPCS SELF ADMINISTERED DRUGS (ALT 637 FOR MEDICARE OP): Performed by: INTERNAL MEDICINE

## 2024-12-17 PROCEDURE — RXMED WILLOW AMBULATORY MEDICATION CHARGE

## 2024-12-17 PROCEDURE — 97535 SELF CARE MNGMENT TRAINING: CPT | Mod: GO

## 2024-12-17 RX ORDER — POTASSIUM CHLORIDE 750 MG/1
10 TABLET, FILM COATED, EXTENDED RELEASE ORAL DAILY
Status: DISCONTINUED | OUTPATIENT
Start: 2024-12-17 | End: 2024-12-21 | Stop reason: HOSPADM

## 2024-12-17 ASSESSMENT — PAIN - FUNCTIONAL ASSESSMENT
PAIN_FUNCTIONAL_ASSESSMENT: 0-10

## 2024-12-17 ASSESSMENT — PAIN SCALES - GENERAL
PAINLEVEL_OUTOF10: 0 - NO PAIN

## 2024-12-17 ASSESSMENT — ACTIVITIES OF DAILY LIVING (ADL)
BATHING_COMMENTS: DECLINED
HOME_MANAGEMENT_TIME_ENTRY: 30

## 2024-12-17 ASSESSMENT — PAIN SCALES - WONG BAKER
WONGBAKER_NUMERICALRESPONSE: NO HURT
WONGBAKER_NUMERICALRESPONSE: NO HURT

## 2024-12-17 NOTE — PROGRESS NOTES
12/17/24 [id number]   OT Last Visit   OT Received On 12/17/24   General   Reason for Referral CVA   Referred By Dr. Kirk   Past Medical History Relevant to Rehab heart failure, afib, gout, hyperlipidemia, SHOAIB, HTN, DJD, tinnitus   Family/Caregiver Present No   Prior to Session Communication Bedside nurse   Patient Position Received Up in chair;Alarm on   Preferred Learning Style verbal;visual   General Comment + Diplopia   Precautions   Hearing/Visual Limitations +diploplia   Medical Precautions Fall precautions   Post-Surgical Precautions MITT   Pain Assessment   Pain Assessment 0-10   0-10 (Numeric) Pain Score 0 - No pain   Ng-Barnett FACES Pain Rating 0   Cognition   Overall Cognitive Status WFL   Orientation Level Oriented X4   Following Commands Follows all commands and directions without difficulty   Safety Judgment Decreased awareness of need for assistance   Coordination   Movements are Fluid and Coordinated No   Upper Body Coordination WFL   Lower Body Coordination impaired   Trunk Coordination impaired   Coordination Comment ataxia noted B LE   RUE    RUE  WFL   LUE    LUE WFL   Dynamic Standing Balance   Dynamic Standing-Balance Support No upper extremity supported  (pt leans into tall counter top)   Dynamic Standing-Level of Assistance Close supervision   Dynamic Standing-Balance Reaching for objects   Dynamic Standing-Comments wc placed behind pt for RBs   Therapeutic Exercise   Therapeutic Exercise Performed Yes   Therapeutic Exercise Activity 1 UBE Level II for 5 mins with BUEs to improve muscle strength and activity tolerance for ADLs.   Therapeutic Activity   Therapeutic Activity Performed Yes   Therapeutic Activity 1 SnapNamesmy card game activity standing at countertop with close supervision with wc placed directly behind pt and locked.  Pt required 1 seated rest break after approximately 5 minutes standing.  Pt stood approximately 5 minutes more prior to completing activity.  (Provided pt skilled  Responded to patient.   Neva Go MA     instruction to identify need to take RBs and to not wait for a LOB resulting in a fall.  Pt verbalized understanding.)   Therapeutic Activity 2 BITS activity seated   IP OT Assessment   Evaluation/Treatment Tolerance Patient tolerated treatment well   Medical Staff Made Aware Yes   End of Session Communication Bedside nurse   End of Session Patient Position Up in chair;Alarm on   OT Assessment   OT Assessment Results Decreased upper extremity strength;Decreased safe judgment during ADL;Decreased endurance   Barriers to Discharge None   Strengths Ability to acquire knowledge   Barriers to Participation Comorbidities   Education   Individual(s) Educated Patient   Education Provided Fall precautons   Home Program AROM;Strengthening   Risk and Benefits Discussed with Patient/Caregiver/Other yes   Patient/Caregiver Demonstrated Understanding yes   Plan of Care Discussed and Agreed Upon yes   Patient Response to Education Patient/Caregiver Verbalized Understanding of Information   Inpatient/Swing Bed or Outpatient   Inpatient/Swing Bed or Outpatient Inpatient   Inpatient Plan   Treatment Interventions Functional transfer training;UE strengthening/ROM;Endurance training;Patient/family training;Compensatory technique education;Fine motor coordination activities   OT Frequency 5 times per week   OT Discharge Recommendations Low intensity level of continued care   OT Recommended Transfer Status Assist of 1   OT - OK to Discharge Yes

## 2024-12-17 NOTE — NURSING NOTE
Assumed care of patient after receiving report from outgoing RN.  Patient up in chair, AAO x 3, on RA, no distress noted, breathing unlabored.  Sets up breakfast tray, chair alarm on, call light within reach.  Plan of care ongoing.

## 2024-12-17 NOTE — DISCHARGE SUMMARY
Discharge Diagnosis  Dysphasia    Issues Requiring Follow-Up  Acute rehab and neurology    Discharge Meds     Medication List      START taking these medications     clopidogrel 75 mg tablet; Commonly known as: Plavix; Take 1 tablet (75   mg) by mouth once daily.     CHANGE how you take these medications     torsemide 20 mg tablet; Commonly known as: Demadex; Take 2 tablets (40   mg) by mouth once daily. As needed daily for peripheral edema.; What   changed: when to take this, reasons to take this, additional instructions     CONTINUE taking these medications     aspirin 81 mg EC tablet; Take 1 tablet (81 mg) by mouth once daily.   atorvastatin 80 mg tablet; Commonly known as: Lipitor; Take 1 tablet (80   mg) by mouth once daily at bedtime.   empagliflozin 10 mg; Commonly known as: Jardiance; Take 1 tablet (10 mg)   by mouth once daily.   Entresto 24-26 mg tablet; Generic drug: sacubitriL-valsartan; Take 1   tablet by mouth 2 times a day.   eplerenone 25 mg tablet; Commonly known as: Inspra; Take 1 tablet (25   mg) by mouth once daily.   hydrALAZINE 25 mg tablet; Commonly known as: Apresoline; Take 0.5   tablets (12.5 mg) by mouth 3 times a day.   metoprolol succinate  mg 24 hr tablet; Commonly known as:   Toprol-XL; Take 1.5 tablets (150 mg) by mouth once daily. Do not crush or   chew.     STOP taking these medications     amiodarone 200 mg tablet; Commonly known as: Pacerone       Test Results Pending At Discharge  Pending Labs       Order Current Status    Extra Urine Gray Tube Collected (12/06/24 2112)    Urinalysis with Reflex Culture and Microscopic In process            Hospital Course   This patient was admitted to the hospital after having new onset ataxia diplopia and dysarthria.  He was found to have an acute stroke.  Stroke workup was done in hospital and neurology was consulted.  Aspirin and Plavix recommended for DAPT and statin recommended despite normal lipid profile.  Patient did well from an  acute inpatient standpoint and was deemed a good candidate for acute rehab.  Patient also with history of aortic valve replacement with a bioprosthetic valve and history of severe cardiomyopathy.  There was thought that this was an embolic stroke but after further discussion with neurology and reviewing of imaging and test results this was resolved.  There was no recommendation to start any DOAC.  He will continue on DAPT.  He was discharged in stable and improved condition to acute rehab.    Pertinent Physical Exam At Time of Discharge  Physical Exam  Generally no acute distress  HEENT PERRL EOMI  Cardiovascular S1-S2 regular rate rhythm  Lungs clear to auscultation  Abdomen nontender bowel sounds present  Outpatient Follow-Up  Future Appointments   Date Time Provider Department Center   12/31/2024  2:40 PM Liu MesserD [id number] [facility]   1/8/2025  1:00 PM CONC STRESS/ECHO LAB [id number] [place]   1/27/2025  9:30 AM Yomi Buck MD [id number] [facility]   2/21/2025  9:45 AM Bernardo Ramírez MD [id number] [place] / Tr   Total time spent on discharge 35 minutes      Francisco Angeles MD   S/P inguinal hernia repair  LIH repair 12/19

## 2024-12-17 NOTE — PROGRESS NOTES
OT Treatment and Family Training     12/17/24 [id number]   OT Last Visit   OT Received On 12/17/24   General   Reason for Referral CVA   Referred By Dr. Kirk   Past Medical History Relevant to Rehab heart failure, afib, gout, hyperlipidemia, SHOAIB, HTN, DJD, tinnitus   Family/Caregiver Present Yes   Caregiver Feedback Family training with wife   Prior to Session Communication Bedside nurse   Patient Position Received Up in chair;Alarm on   Preferred Learning Style verbal;visual   General Comment + Diplopia   Precautions   Hearing/Visual Limitations +diploplia   Medical Precautions Fall precautions   Post-Surgical Precautions MITT   Pain Assessment   Pain Assessment 0-10   Ng-Baker FACES Pain Rating 0   Clinical Progression Not changed   Cognition   Overall Cognitive Status WFL   Orientation Level Oriented X4   Following Commands Follows all commands and directions without difficulty   Safety Judgment Decreased awareness of need for assistance   Impulsive Mildly   Coordination   Movements are Fluid and Coordinated No   Upper Body Coordination WFL   Lower Body Coordination impaired   Coordination Comment ataxia B LE   RUE    RUE  WFL   LUE    LUE WFL   Feeding   Feeding Level of Assistance Independent   Feeding Where Assessed Wheelchair   Feeding Comments independent to self feed including set up   UE Bathing   UE Bathing Comments declined   LE Bathing   LE Bathing Comments declined   UE Dressing   UE Dressing Level of Assistance Modified independent   UE Dressing Where Assessed Wheelchair   UE Dressing Comments to talib pullover sweatshirt   LE Dressing   LE Dressing Comments pt already dressed   Bed Mobility   Bed Mobility Yes   Bed Mobility 1   Bed Mobility 1 Sitting to supine   Level of Assistance 1 Modified independent   Bed Mobility Comments 1 flat bed without bedrail   Bed Mobility 2   Bed Mobility  2 Supine to sitting   Level of Assistance 2 Modified independent   Bed Mobility Comments 2 flat bed without  bedrail   Functional Mobility   Functional Mobility Performed Yes   Functional Mobility 1   Surface 1 Level tile   Device 1 Rolling walker   Assistance 1 Contact guard   Comments 1 short household distances; vcs for foot placement   Transfers   Transfer Yes   Transfer 1   Transfer From 1   (RW)   Transfer to 1 Bed   Technique 1 Stand to sit   Transfer Level of Assistance 1 Set up   Transfers 2   Transfer From 2 Bed to   Transfer to 2   (RW)   Technique 2 Sit to stand   Transfer Level of Assistance 2 Set up   Toilet Transfers   Toilet Transfer From Walker   Toilet Transfer Type To and from   Toilet Transfer to Standard toilet   Toilet Transfer Technique To right;To left   Toilet Transfers Set up   Toilet Transfers Comments cuing for hand placement prior to transfer   Shower Transfers   Shower Transfer From Walker   Shower Transfer Type To and from   Shower Transfer to Shower seat with back   Shower Transfer Technique To left;To right   Shower Transfers Set up   Shower Transfers Comments with support of grab bar   Car Transfers   Car Transfer From Walker   Car Transfer Technique Ambulating   Car Transfers Set up   Car Transfers Comments actual car transfer with safe technique   Therapeutic Exercise   Therapeutic Exercise Performed Yes   Therapeutic Exercise Activity 1 PREs with 1# hand weight in RUE and 3# hand weight in LUE for 15 reps x 1 set with fair tolerance with RUE (pt reports extra activity is bothering his R shoulder with old injury).   Therapeutic Activity   Therapeutic Activity Performed Yes   Therapeutic Activity 1 BITS Bell Cancellation with R lens of glasses patched with good tolerance   Vision   Double Vision Patching;Education   IP OT Assessment   OT Assessment Family training completed with pts spouse present on this date.  Pt/spouse ed on AE/DME needs and issued handouts.  Recommended RW with walker tray, grab bars for shower and by toilet, gait belt, reacher, and toilet safety frame.  Issued  handout on [facility] resources [facility] [employer] [facility] for grab bar installation.  Pts spouse verbalized being pleased with pts progress and that she will be able to provide 24/7 supervision when pt returns home.  Continue OT per POC to maximize level of independence with ADLs for safe transition home.   Prognosis Good   Barriers to Discharge Home No anticipated barriers   Evaluation/Treatment Tolerance Patient tolerated treatment well   Medical Staff Made Aware Yes   End of Session Communication Bedside nurse   End of Session Patient Position Up in chair;Alarm on   OT Assessment   OT Assessment Results Decreased ADL status;Decreased upper extremity strength;Decreased endurance;Decreased functional mobility;Decreased IADLs   Barriers to Discharge None   Strengths Ability to acquire knowledge   Barriers to Participation Comorbidities   Education   Individual(s) Educated Patient   Education Provided Fall precautons   Home Program AROM;Strengthening   Equipment Other  (recommended RW with tray, reacher, grab bar installation in shower and by toilet, gait belt, toilet safety frame)   Community Resources [facility] for grab bar installation   Risk and Benefits Discussed with Patient/Caregiver/Other yes   Patient/Caregiver Demonstrated Understanding yes   Plan of Care Discussed and Agreed Upon yes   Patient Response to Education Patient/Caregiver Verbalized Understanding of Information   Inpatient/Swing Bed or Outpatient   Inpatient/Swing Bed or Outpatient Inpatient   Inpatient Plan   Treatment Interventions ADL retraining;Functional transfer training;UE strengthening/ROM;Endurance training;Patient/family training;Equipment evaluation/education;Compensatory technique education   OT Frequency 5 times per week   OT Discharge Recommendations Low intensity level of continued care   Equipment Recommended upon Discharge   (2 RW (1 upstairs/1 downstairs))   OT  Recommended Transfer Status Assist of 1   OT - OK to Discharge Yes

## 2024-12-17 NOTE — PROGRESS NOTES
Physical Therapy       12/17/24 2043-1970   PT  Visit   PT Received On 12/17/24   Response to Previous Treatment Patient with no complaints from previous session.   General   Reason for Referral CVA   Referred By Dr. Kirk   Past Medical History Relevant to Rehab heart failure, afib, gout, hyperlipidemia, SHOAIB, HTN, DJD, tinnitus   Family/Caregiver Present Yes   Caregiver Feedback   (Wife present for family training session)   Prior to Session Communication Bedside nurse   Patient Position Received Up in chair;Alarm on   General Comment + Diplopia   Precautions   Hearing/Visual Limitations +diploplia   Medical Precautions Fall precautions   Pain Assessment   Pain Assessment 0-10   0-10 (Numeric) Pain Score 0 - No pain   Cognition   Overall Cognitive Status WFL   Impulsive Mildly   Coordination   Movements are Fluid and Coordinated No   Upper Body Coordination WFL   Lower Body Coordination impaired   Coordination Comment ataxia noted B LE   General Observation   General Observation Diplopia   Static Sitting Balance   Static Sitting-Level of Assistance Independent   Dynamic Sitting Balance   Dynamic Sitting-Level of Assistance Close supervision   Static Standing Balance   Static Standing-Level of Assistance Contact guard   Dynamic Standing Balance   Dynamic Standing-Level of Assistance Minimum assistance   Dynamic Standing-Comments no UE support, ambulation without device with turns   Therapeutic Activity   Therapeutic Activity 1 NuStep level 4 x 10 min   Balance/Neuromuscular Re-Education   Balance/Neuromuscular Re-Education Activity Performed   (Obstacle course with 1st 3 activities:)   Balance/Neuromuscular Re-Education Activity 1 alternating high/low hurdles min A x 1   Balance/Neuromuscular Re-Education Activity 2 alternating lilypads (therapads) blue, green, grey min A x 1   Balance/Neuromuscular Re-Education Activity 3 Side stepping on blue AirEx balance beam min/mod A x 1   Balance/Neuromuscular Re-Education  Activity 4 Balloon tennis standing on blue AirEx pad, no UE support min/mod A x 1   Balance/Neuromuscular Re-Education Activity 5 Cone reaching on blue AirEx balance beam min A x 1   Balance/Neuromuscular Re-Education Activity 6 Toe taps on top of orange cones weaving through min A x 1   Balance/Neuromuscular Re-Education Activity 7 picking up orange cones from floor min A x 1   Bed Mobility 1   Bed Mobility 1 Supine to sitting;Sitting to supine;Rolling right;Rolling left   Level of Assistance 1 Set up   Ambulation/Gait Training 1   Surface 1 Level tile;Carpet   Device 1 Rolling walker   Assistance 1 Minimum assistance   Quality of Gait 1 Ataxic;Forward flexed posture;Decreased step length;NBOS;Listing   Comments/Distance (ft) 1 110 ft x 2, turns included.  Ataxic with NBOS noted.  Vc's for WBOS and upright posture. Wife participates with hands on assist.   Gait Support Devices Gait belt   Ambulation/Gait Training 2   Surface 2 Level tile;Carpet   Device 2 No device   Gait Support Devices Gait belt   Assistance 2 Minimum assistance   Quality of Gait 2 Inconsistent stride length;Ataxic;Scissoring   Comments/Distance (ft) 2 60 ft x 2 with turn.Wife participates with hands on assist.   Transfer 1   Transfer From 1 Wheelchair to   Transfer to 1 Stand   Technique 1 Sit to stand;Stand to sit   Transfer Device 1 Gait belt   Transfer Level of Assistance 1 Contact guard   Transfers 2   Transfer From 2 Wheelchair to;Chair with arms to   Transfer to 2   (NuStep)   Technique 2 Stand pivot   Transfer Device 2 Gait belt   Transfer Level of Assistance 2 Contact guard   Trials/Comments 2 cues for safe hand placement   Stairs   Rails 1 Right   Device 1 Railing   Support Devices 1 Gait belt   Assistance 1 Contact guard   Comment/Number of Steps 1 12 steps with 1 rail, cues for safe technique, wife also participates in hands on training   Wheelchair Activities   Level 1 Level tile   Method 1 Right upper extremity;Left upper  extremity;Right lower extremity;Left lower extremity   Level of Assistance 1 Modified independent   Description/Details 1 ad yana on unit   Activity Tolerance   Endurance Tolerates 30 min exercise with multiple rests   Early Mobility/Exercise Safety Screen Proceed with mobilization - No exclusion criteria met   Activity Tolerance Comments limited by diplopia   Rate of Perceived Exertion (RPE) 3/10 post NuStep   PT Assessment   PT Assessment Results Decreased strength;Decreased endurance;Impaired balance;Decreased mobility;Decreased coordination;Impaired vision;Decreased safety awareness;Impaired judgement   Rehab Prognosis Excellent   Barriers to Discharge diploplia   Barriers to Discharge Home Physical needs   Physical Needs Stair navigation into home limited by function/safety   Medical Staff Made Aware Yes   Strengths Ability to acquire knowledge;Attitude of self   Barriers to Participation Comorbidities   End of Session Communication Bedside nurse   Assessment Comment Family training this date with patient's wife. Covered patient's functional balance, coordination difficulties, wife participates in hands on training with transfers, ambulation, stairs, bed mobility. Wife has good understanding of patient's needs upon discharge.   End of Session Patient Position Up in chair;Alarm on   Outpatient Education   Education Comment Family training this date with patient's wife. Covered patient's functional balance, coordination difficulties, wife participates in hands on training with transfers, ambulation, stairs, bed mobility. Wife has good understanding of patient's needs upon discharge. All questions answered, rolling walker recommended for homegoing.   PT Plan   Treatment/Interventions Bed mobility;Transfer training;Gait training;Stair training;Balance training;Neuromuscular re-education;Strengthening;Endurance training;Therapeutic exercise;Therapeutic activity;Postural re-education;Wheelchair management   PT Plan  Ongoing PT   Equipment Recommended upon Discharge   (2 rolling walkers one upstairs and one downstairs)   PT Recommended Transfer Status Assist x1;Assistive device   PT - OK to Discharge Yes

## 2024-12-17 NOTE — PROGRESS NOTES
"[facility] Comprehensive Rehabilitation  Physician Progress Note    Subjective   David Chatman is a 69 y.o. male admitted to inpatient rehabilitation unit.    Improving greatly.  FT completed.  Anxious for DC.    An interdisciplinary team meeting was held today.  I was in attendance and discussed this patient's functional progress with the physical and occupational therapist, as well as the speech therapist if they are involved in the patient's care.  Also present were representatives from the nursing staff and case management.  PLEASE REFER TO THE INTERDISCIPLINARY TEAM DOCUMENTATION FOR ADDITIONAL COMMENTS ON THE PATIENT'S CLINICAL AND FUNCTIONAL CONDITION.  I agree with the decisions made by the team.  The patient's medical and functional condition continue to require frequent physician visits, 24 hour rehabilitation nursing care and an interdisciplinary approach, such that this level of care could not be provided outside of an inpatient rehabilitation facility.  The patient's anticipated discharge plan and date was reviewed and approved by me.      Objective   /66 (BP Location: Right arm, Patient Position: Sitting)   Pulse 67   Temp 36.5 °C (97.7 °F) (Temporal)   Resp 16   Ht 1.854 m (6' 0.99\")   Wt 98 kg (216 lb 0.8 oz)   SpO2 95%   BMI 28.51 kg/m²      Physical Exam  Constitutional:       General: He is not in acute distress.     Appearance: He is not toxic-appearing.   HENT:      Head: Normocephalic and atraumatic.      Mouth/Throat:      Mouth: Mucous membranes are moist.   Eyes:      Pupils: Pupils are equal, round, and reactive to light.   Cardiovascular:      Rate and Rhythm: Normal rate and regular rhythm.      Heart sounds: No murmur heard.  Pulmonary:      Breath sounds: Normal breath sounds. No wheezing, rhonchi or rales.   Abdominal:      General: There is no distension.      Palpations: Abdomen is soft.   Musculoskeletal:      Right lower leg: No edema.      " Left lower leg: No edema.   Neurological:      Mental Status: He is alert and oriented to person, place, and time.      Diplopia.  The eyes do not converge.     Labs  BMP:  Results from last 7 days   Lab Units 12/17/24  0600   CREATININE mg/dL 1.28   BUN mg/dL 24*   SODIUM mmol/L 141   POTASSIUM mmol/L 3.2*   CHLORIDE mmol/L 104   CO2 mmol/L 30       CBC:  Results from last 7 days   Lab Units 12/17/24  0600   WBC AUTO x10*3/uL 6.3   HEMOGLOBIN g/dL 11.7*   HEMATOCRIT % 37.3*   MCV fL 80   PLATELETS AUTO x10*3/uL 206       Coagulation:           Assesment and Plan    Ischemic Stroke (Multi)      Stroke, occipital lobe, avila/midbrain with diplopia and diffuse weakness.  Continue with medical management for secondary prevention, PT, OT    C/w Eliquis as per cardio recs    Recent thoracic aorta repair.     CMP/Chronic systolic HF on guideline directed therapy, cont BB, Entresto, eplerenone, jardiance - PRN torsemide - has not needed.     Diplopia     Htn stable    Willi Kirk MD

## 2024-12-17 NOTE — CONSULTS
"Nutrition Assessement Note    Nutrition Assessment    Reason for Assessment: Length of stay    Reason for Hospital Admission:  David Chatman is a 69 y.o. male who is admitted for ischemic stroke. Recent cardiac surgery on 9/23/24. Plan for discharge on 12/22/24 which he states he is really looking forward to.     Malnutrition Screening Tool (MST)  Have you recently lost weight without trying?: No  Weight Loss Score: 0  Have you been eating poorly because of a decreased appetite?: No  Malnutrition Score: 0  Nutrition Screen  Stage 3 or 4 Pressure Injury or Multiple Non-Healing Wounds: No  Home Tube Feeding or Total Parenteral Nutrition (TPN): No  Dietitian Consult Needed: No    Past Medical History:   Diagnosis Date    Gout     Hyperlipidemia     Hypertension     SHOAIB (obstructive sleep apnea)     Paroxysmal atrial fibrillation (Multi) 08/24/2023      Past Surgical History:   Procedure Laterality Date    CARDIAC CATHETERIZATION N/A 9/11/2024    Procedure: Left Heart Cath;  Surgeon: Zacarias Jarquin DO;  Location: [facility] Cardiac Cath Lab;  Service: Cardiovascular;  Laterality: N/A;    SINUS SURGERY       Nutrition History:  Food and Nutrient History: pt reports his appetite is improving. states his appetite has been decreased since his heart surgery and he would like to gain some wt back. discussed consuming adequate protein at meals and snacks to aid in maintaining muscle mass. discussed high protein foods and oral nutrition supplements. he is currently not interested in supplements but stated he will look into them when he gets home.  Energy Intake: Good > 75 %    Anthropometrics:  Ht: 185.4 cm (6' 0.99\"), Wt: 98 kg (216 lb 0.8 oz), BMI: 28.51  IBW/kg (Dietitian Calculated): 80.91 kg  Percent of IBW: 121 %  Adjusted Body Weight (kg): 85.23 kg    Weight Change:  Daily Weight  12/08/24 : 98 kg (216 lb 0.8 oz)  12/08/24 : 98.3 kg (216 lb 12.8 oz)  11/06/24 : 99.3 kg (219 lb)  10/26/24 : 99.8 kg (220 lb)  10/23/24 : 101 kg " (222 lb 9.6 oz)  10/17/24 : 101 kg (223 lb)  10/14/24 : 107 kg (235 lb)  10/08/24 : 110 kg (242 lb)  10/03/24 : 111 kg (244 lb 12.8 oz)  09/10/24 : 111 kg (245 lb)  08/20/24 : 117 kg (258 lb)  08/12/24 : 113 kg (250 lb)  07/30/24 : 115 kg (252 lb 13.9 oz)  04/10/24 : 116 kg (256 lb 12.8 oz)  03/12/24 : 122 kg (269 lb)     Weight History / % Weight Change: overall 40# (15.6%) wt loss over ~9 months (4/10-12/8) incluidng a 29# (11.8%) wt loss over ~3 months (9/10-12/8) since cardiac sugery  Significant Weight Loss: Yes    Nutrition Focused Physical Exam Findings: deferred    Nutrition Significant Labs:  Lab Results   Component Value Date    WBC 6.3 12/17/2024    HGB 11.7 (L) 12/17/2024    HCT 37.3 (L) 12/17/2024     12/17/2024    CHOL 118 12/06/2024    TRIG 82 12/06/2024    HDL 32.6 12/06/2024    ALT 21 12/06/2024    AST 17 12/06/2024     12/17/2024    K 3.2 (L) 12/17/2024     12/17/2024    CREATININE 1.28 12/17/2024    BUN 24 (H) 12/17/2024    CO2 30 12/17/2024    TSH 1.11 04/10/2024    PSA 0.9 10/20/2022    INR 1.2 12/06/2024    HGBA1C 6.1 (H) 12/06/2024     Nutrition Specific Medications:  apixaban, 5 mg, oral, BID  atorvastatin, 80 mg, oral, Nightly  empagliflozin, 10 mg, oral, Daily  eplerenone, 25 mg, oral, Daily  metoprolol succinate XL, 100 mg, oral, Daily  potassium chloride CR, 10 mEq, oral, Daily  sacubitriL-valsartan, 1 tablet, oral, BID  torsemide, 40 mg, oral, Daily      Dietary Orders (From admission, onward)       Start     Ordered    12/08/24 2307  May Participate in Room Service  ( ROOM SERVICE MAY PARTICIPATE)  Once        Question:  .  Answer:  Yes    12/08/24 2306 12/08/24 1855  Adult diet Regular  Diet effective now        Question:  Diet type  Answer:  Regular    12/08/24 1854                   Estimated Needs:   Estimated Energy Needs  Total Energy Estimated Needs (kCal): 2131 kCal  Total Estimated Energy Need per Day (kCal/kg): 25 kCal/kg  Method for Estimating Needs:  ABW    Estimated Protein Needs  Total Protein Estimated Needs (g): 102 g  Total Protein Estimated Needs (g/kg): 1.2 g/kg  Method for Estimating Needs: ABW    Estimated Fluid Needs  Method for Estimating Needs: 1 ml/kcal or per MD        Nutrition Diagnosis   Nutrition Diagnosis:  Malnutrition Diagnosis  Patient has Malnutrition Diagnosis: No    Nutrition Diagnosis  Patient has Nutrition Diagnosis: Yes  Diagnosis Status (1): New  Nutrition Diagnosis 1: Unintended weight loss  Related to (1): decreased ability to consume/tolerate sufficient energy  As Evidenced by (1): 29# (11.8%) wt loss over ~3 months       Nutrition Interventions/Recommendations   Nutrition Interventions and Recommendations:    Nutrition Prescription:  Individualized Nutrition Prescription Provided for : 2131 kcals and 102g protein to be provided via diet    Nutrition Interventions:   Food and/or Nutrient Delivery Interventions  Interventions: Meals and snacks, Medical food supplement  Meals and Snacks: General healthful diet  Goal: provide as ordered  Medical Food Supplement: Commercial beverage  Goal: pt denied need for supplements at this time    Education Documentation  No documentation found.           Nutrition Monitoring and Evaluation   Monitoring/Evaluation:   Food/Nutrient Related History Monitoring  Monitoring and Evaluation Plan: Energy intake  Energy Intake: Estimated energy intake  Criteria: pt to consume >/= 75% estimated needs    Body Composition/Growth/Weight History  Monitoring and Evaluation Plan: Weight  Weight: Measured weight  Criteria: pt will maintain wt at this time       Time Spent/Follow-up:   Follow Up  Time Spent (min): 30 minutes  Last Date of Nutrition Visit: 12/17/24  Nutrition Follow-Up Needed?: 5-7 days  Follow up Comment: 12/24/24

## 2024-12-17 NOTE — PROGRESS NOTES
Physical Therapy       12/17/24 2600-5268   PT  Visit   PT Received On 12/17/24   Response to Previous Treatment Patient with no complaints from previous session.   General   Reason for Referral CVA   Referred By Dr. Kirk   Past Medical History Relevant to Rehab heart failure, afib, gout, hyperlipidemia, SHOAIB, HTN, DJD, tinnitus   Prior to Session Communication Bedside nurse   Patient Position Received Up in chair;Alarm on   General Comment + Diplopia   Precautions   Hearing/Visual Limitations +diploplia   Medical Precautions Fall precautions   Pain Assessment   0-10 (Numeric) Pain Score 0 - No pain   Cognition   Overall Cognitive Status WFL   Coordination   Upper Body Coordination WFL   Lower Body Coordination impaired   Therapeutic Exercise   Therapeutic Exercise Activity 1 sit to stand with 3# weighted ball 2x15 reps   Therapeutic Exercise Activity 2 standing toe raises 3# BLE 2x15   Therapeutic Exercise Activity 3 hip flexion 2x15 3#B LE   Balance/Neuromuscular Re-Education   Balance/Neuromuscular Re-Education Activity 1 8 alternating toe taps no UE support CGA, slow pace to improve coordination and control   Balance/Neuromuscular Re-Education Activity 2 grey therapads balloon tennis, cone reaching, ball with dowel too min A x 1   Ambulation/Gait Training 1   Surface 1 Level tile;Carpet;Outdoors   Device 1 Rolling walker   Assistance 1 Minimum assistance   Quality of Gait 1 Ataxic;Forward flexed posture;Decreased step length;NBOS;Listing   Comments/Distance (ft) 1 110 ft x 6 over tile, carpet, asphalt, concrete. Cues and assist to attend to differences in pavement.   Gait Support Devices Gait belt   Transfer 1   Transfer From 1 Wheelchair to   Transfer to 1 Stand   Technique 1 Sit to stand;Stand to sit   Transfer Device 1 Walker   Transfer Level of Assistance 1 Contact guard   Activity Tolerance   Endurance Tolerates 30 min exercise with multiple rests   Early Mobility/Exercise Safety Screen Proceed with  mobilization - No exclusion criteria met   Activity Tolerance Comments limited by diplopia   PT Assessment   PT Assessment Results Decreased strength;Decreased endurance;Impaired balance;Decreased mobility;Decreased coordination;Impaired vision;Decreased safety awareness;Impaired judgement   Rehab Prognosis Excellent   Barriers to Discharge diploplia   Barriers to Discharge Home Physical needs   Physical Needs Stair navigation into home limited by function/safety   Evaluation/Treatment Tolerance Patient tolerated treatment well   Medical Staff Made Aware Yes   Strengths Ability to acquire knowledge   Barriers to Participation Comorbidities   End of Session Communication Bedside nurse   Assessment Comment Good functional activity tolerance this date. With fatigue while ambulating outside, patient requires increased assist to manage RW for safe gait technique and walker use.   End of Session Patient Position Up in chair;Alarm on   PT Plan   Treatment/Interventions Transfer training;Gait training;Endurance training;Therapeutic exercise;Therapeutic activity;Neuromuscular re-education   PT Plan Ongoing PT   PT Recommended Transfer Status Assist x1;Assistive device   PT - OK to Discharge Yes

## 2024-12-17 NOTE — PROGRESS NOTES
Speech-Language Pathology    SLP Adult [facility] Speech-Language Pathology Treatment     Patient Name: David Chatman  MRN: [id number]  Today's Date: 12/17/2024  Time Calculation  Start Time: 1100  Stop Time: 1130  Time Calculation (min): 30 min     2/3sp    Current Problem:   1. Ischemic stroke (Multi)  Referral to Ophthalmology    Referral to Neurology        SLP Assessment:  SLP TX Intervention Outcome: Making Progress Towards Goals  Treatment Tolerance: Patient tolerated treatment well  Medical Staff Made Aware: Yes  Strengths: Motivation, Family/Caregiver Support  Education Provided: Yes     Plan:  Inpatient/Swing Bed or Outpatient: Inpatient  SLP TX Plan: Continue Plan of Care, complete POC  SLP Plan: Skilled SLP  SLP Frequency: 3x per week  Duration: 3 weeks  SLP Discharge Recommendations: Home with no further SLP  Next Treatment Priority: speech strategies  Discussed POC: Patient, wife  Discussed Risks/Benefits: Yes  Patient/Caregiver Agreeable: Yes  SLP - OK to Discharge: Yes    Subjective   Pt seen upright in wheelchair. Wife is present for family training.     General Visit Information:   Referred By: Dr. Kirk  Past Medical History Relevant to Rehab: heart failure, afib, gout, hyperlipidemia, SHOAIB, HTN, DJD, tinnitus  Caregiver Feedback: Wife  Prior to Session Communication: Bedside nurse      Pain Assessment:  Pain Assessment  0-10 (Numeric) Pain Score: 0 - No pain      Objective   SPEECH-LANGUAGE GOALS (established 12/9, anticipate end 3 weeks):  Pt will  utilize speech intelligibility strategies with 80% acc to improve communication skills.  PROGRESS: 80% with structured conversation tasks. Excellent increased awareness of strategies. Significantly increased self-correction. ACHIEVED current goal. Increase accuracy to 85%. 12/17: Reviewed progress and strategies with wife. She verbalizes good understanding of going slow, emphasizing ends of words, breaking up longer words. Pt would like to continue try  to implement at a more independent level.  STATUS: continue, address increased accuracy of 85% and increasing pt's independence level.  2.    Pt will  complete articulatory precision tasks with 80% accuracy to improve intelligibility.   PROGRESS: Excellent carryover with utilizing slow rate with paragraph level stimuli. Provided education that fatigue will affect overall speech. Pt feels that he was very tired on previous tx day, feels better today. 85%-90% for oral paragraph reading. Mild occasional difficulties were related to double vision. ACHIEVED current goal. Increase accuracy to 90%. 12/17: Reviewed overall progress. Wife is in agreement that pt is at a baseline level for speech communication, especially when he uses going slow strategy.  STATUS: continue, address at increased accuracy level of 90%   LTG: Pt will increase motor speech function to the highest possible speech level for improved functional communication within daily living tasks.       3. Pt will improve STM/recall to 80% accuracy with cues to increase awareness of daily functional information.  PROGRESS: Pt feels that he is at a baseline mentation. He is able to recall tx routine and wheels self to scheduled appointment times. ACHIEVED  STATUS: GOAL MET - 12/11  4. Pt will improve problem solving skills to 80% accuracy with cues to increase safety awareness within immediate environment.  PROGRESS: Although double vision persists, no episodes of impulsivity. Pt demonstrates good use of wheelchair brakes and travels from room to therapy rooms. ACHIEVED.  STATUS: GOAL MET - 12/11  5. Ongoing assessment if indicated for further goal development or to determine progress, as needed.  PROGRESS: CLQT was completed previously. No new goals are indicated at this time.  STATUS: Goal met as far as determining further goal development/needs.   LTG 1: Pt will optimize cognitive-linguistic skills necessary for return to least restrictive living environment  and to reduce caregiver dependence.  PROGRESS: Reviewed CLQT results with wife. She is in agreement that pt is at a baseline mentation. She verbalizes good understanding of persistent double vision.   STATUS: GOAL MET 12/17 - Pt plans to discharge home next week with spouse.    Motor Speech:   Motor Speech Intervention : Compensatory Speech Strategies    Inpatient Education:  Adult Inpatient Education  Individual(s) Educated: Patient, Spouse  Verbal Education : CLQT results, clear speech strategies, progress, goals, discharge planning  Risk and Benefits Discussed with Patient/Caregiver/Other: yes  Patient/Caregiver Demonstrated Understanding: yes  Plan of Care Discussed and Agreed Upon: yes  Patient Response to Education: Patient/Caregiver Verbalized Understanding of Information

## 2024-12-18 ENCOUNTER — PHARMACY VISIT (OUTPATIENT)
Dept: PHARMACY | Facility: CLINIC | Age: 69
End: 2024-12-18
Payer: COMMERCIAL

## 2024-12-18 PROCEDURE — 97535 SELF CARE MNGMENT TRAINING: CPT | Mod: GO,CO

## 2024-12-18 PROCEDURE — 1180000001 HC REHAB PRIVATE ROOM DAILY

## 2024-12-18 PROCEDURE — 97110 THERAPEUTIC EXERCISES: CPT | Mod: GP

## 2024-12-18 PROCEDURE — 92507 TX SP LANG VOICE COMM INDIV: CPT | Mod: GN

## 2024-12-18 PROCEDURE — 2500000002 HC RX 250 W HCPCS SELF ADMINISTERED DRUGS (ALT 637 FOR MEDICARE OP, ALT 636 FOR OP/ED): Performed by: INTERNAL MEDICINE

## 2024-12-18 PROCEDURE — 97110 THERAPEUTIC EXERCISES: CPT | Mod: GO

## 2024-12-18 PROCEDURE — 97116 GAIT TRAINING THERAPY: CPT | Mod: GP

## 2024-12-18 PROCEDURE — 97530 THERAPEUTIC ACTIVITIES: CPT | Mod: GO,CO

## 2024-12-18 PROCEDURE — 97112 NEUROMUSCULAR REEDUCATION: CPT | Mod: GP

## 2024-12-18 PROCEDURE — 2500000001 HC RX 250 WO HCPCS SELF ADMINISTERED DRUGS (ALT 637 FOR MEDICARE OP): Performed by: INTERNAL MEDICINE

## 2024-12-18 ASSESSMENT — ACTIVITIES OF DAILY LIVING (ADL)
HOME_MANAGEMENT_TIME_ENTRY: 30
BATHING_LEVEL_OF_ASSISTANCE: SETUP
BATHING_EQUIPMENT_NEEDED: REMOVEABLE SHOWER HEAD
BATHING_WHERE_ASSESSED: SHOWER

## 2024-12-18 ASSESSMENT — PAIN - FUNCTIONAL ASSESSMENT
PAIN_FUNCTIONAL_ASSESSMENT: 0-10

## 2024-12-18 ASSESSMENT — PAIN SCALES - GENERAL
PAINLEVEL_OUTOF10: 0 - NO PAIN
PAINLEVEL_OUTOF10: 3
PAINLEVEL_OUTOF10: 0 - NO PAIN

## 2024-12-18 NOTE — PROGRESS NOTES
Physical Therapy Treatment Note    12/18/24 [id number]   PT  Visit   PT Received On 12/18/24   Response to Previous Treatment Patient with no complaints from previous session.   General   Reason for Referral CVA   Referred By Dr. Kirk   Past Medical History Relevant to Rehab heart failure, afib, gout, hyperlipidemia, SHOAIB, HTN, DJD, tinnitus   General Comment Pt cleared by NSG and is agreeable to skilled PT intervention supione upon entrance to room +Diplopia   Precautions   Hearing/Visual Limitations +diploplia   Medical Precautions Fall precautions   Post-Surgical Precautions MITT   Pain Assessment   Pain Assessment 0-10   0-10 (Numeric) Pain Score 0 - No pain   Cognition   Orientation Level Oriented X4   Therapeutic Exercise   Therapeutic Exercise Activity 1 STS 2x10   Therapeutic Exercise Activity 2 Standing Squats 2 x 15   Therapeutic Exercise Activity 3 Standing Heel Raises 2x15   Therapeutic Exercise Activity 4 Standing High Knee Marching 3# BLE x 1 minute   Therapeutic Exercise Activity 5 Seated Ball Squeezes 2x15   Therapeutic Exercise Activity 6 Seated LAQ 2x15 3# ble   Therapeutic Exercise Activity 7 Seated Hip Flexion 3# BLE   Therapeutic Activity   Therapeutic Activity 1 Vacuuming Min/Mod assist   Balance/Neuromuscular Re-Education   Balance/Neuromuscular Re-Education Activity 1 Aglity Ladder Min/Mod assist with LOB Right single foot e   Balance/Neuromuscular Re-Education Activity 2 Sidestepping 3# BLE CGx1/sup   Balance/Neuromuscular Re-Education Activity 3 Coordination ladder ll bars 3# BLE Occ  UE grab   Balance/Neuromuscular Re-Education Activity 4 Tapping BLE 2x15 6 inch step   Balance/Neuromuscular Re-Education Activity 5 Balloon Toss grey therpads Min assist   Balance/Neuromuscular Re-Education Activity 6 Throw/Catch grey therpads LOB x2 Min assist recovery   Bed Mobility 1   Bed Mobility 1 Supine to sitting;Sitting to supine;Rolling left;Rolling right   Level of Assistance 1 Modified independent    Ambulation/Gait Training 1   Surface 1 Level tile;Carpet;Outdoors   Device 1 Rolling walker   Assistance 1 Contact guard   Quality of Gait 1 Ataxic;Forward flexed posture;Decreased step length;NBOS;Listing   Comments/Distance (ft) 1 110 feet x3.  Wearing Glasses with left eye patched.  Pt using glasses intermittently but bfeels best with left eye covered.  Working   Gait Support Devices Gait belt   Ambulation/Gait Training 2   Surface 2 Level tile;Carpet   Device 2 No device   Gait Support Devices Gait belt   Assistance 2 Minimum assistance   Quality of Gait 2 Inconsistent stride length;Ataxic;Scissoring   Comments/Distance (ft) 2 75 feet x 2 and 100 x1   Transfer 1   Transfer From 1 Wheelchair to   Transfer to 1 Stand   Technique 1 Sit to stand;Stand to sit   Transfer Level of Assistance 1 Contact guard;Close supervision   Trials/Comments 1 2x10   Stairs   Rails 1 Right   Device 1 Railing   Support Devices 1 Gait belt   Assistance 1 Contact guard   Comment/Number of Steps 1 2 flights    Object From Floor   Devices No reacher   Assist Level Contact guard   Comments No AD   Wheelchair Activities   Propulsion Yes   Propulsion Type 1 Manual   Level 1 Level tile   Method 1 Right upper extremity;Left upper extremity;Right lower extremity;Left lower extremity   Level of Assistance 1 Modified independent   Description/Details 1 ad yana   Other Activity   Other Activity 1 NUSTEP level 4   Other Activity 2 775 steps   PT Assessment   Barriers to Discharge diploplia   Strengths Ability to acquire knowledge   Barriers to Participation Comorbidities   Assessment Comment Working High Intensity   End of Session Patient Position Up in chair;Alarm on   Outpatient Education   Education Comment ongoing for reason for chosen treatment.   PT Plan   Inpatient/Swing Bed or Outpatient Inpatient   PT Plan   Treatment/Interventions Transfer training;Gait training;Stair training;Balance training;Neuromuscular re-education;Therapeutic  exercise;Therapeutic activity;Wheelchair management;Strengthening;Endurance training   Equipment Recommended upon Discharge Wheeled walker   PT Recommended Transfer Status Assist x1   PT - OK to Discharge Yes

## 2024-12-18 NOTE — PROGRESS NOTES
Occupational Therapy       12/18/24 [id number]   OT Last Visit   OT Received On 12/18/24   General   Reason for Referral CVA   Referred By Dr. Kirk   Past Medical History Relevant to Rehab heart failure, afib, gout, hyperlipidemia, SHOAIB, HTN, DJD, tinnitus   Prior to Session Communication Bedside nurse   Patient Position Received Bed, 3 rail up;Alarm on   Preferred Learning Style verbal;visual   General Comment Pt cleared by NSG and is agreeable to skilledOT intervention supione upon entrance to room +Diplopia   Precautions   Hearing/Visual Limitations +diploplia   Medical Precautions Fall precautions   Pain Assessment   Pain Assessment 0-10   0-10 (Numeric) Pain Score 3   Pain Type Acute pain   Pain Location Shoulder   Pain Orientation Right   Pain Interventions Repositioned   Response to Interventions No change in pain   Cognition   Overall Cognitive Status WFL   Orientation Level Oriented X4   Safety Judgment Decreased awareness of need for assistance   Insight Mild   Impulsive Mildly   Coordination   Movements are Fluid and Coordinated No   Upper Body Coordination WFL   Lower Body Coordination impaired   Rapid Alternating Movements Dyskinesia   Coordination Comment ataxia noted B LE   RUE    RUE  WFL   LUE    LUE WFL   Feeding   Feeding Level of Assistance Independent   Feeding Where Assessed Wheelchair   Feeding Comments Pt self feed breakfast meal to include open packages   Grooming   Grooming Level of Assistance Modified independent   Grooming Where Assessed Sitting sinkside   Grooming Comments Pt brushing teeth items in reach   UE Bathing   UE Bathing Adaptive Equipment Removeable shower head   UE Bathing Level of Assistance Modified independent   UE Bathing Where Assessed Shower   UE Bathing Comments all task seated   LE Bathing   LE Bathing Adaptive Equipment Removeable shower head   LE Bathing Level of Assistance Setup   LE Bathing Where Assessed Shower   LE Bathing Comments Pt efficient iwith use figure  four wash   UE Dressing   UE Dressing Level of Assistance Modified independent   UE Dressing Where Assessed Wheelchair   UE Dressing Comments Pt doff/don pullover garment   LE Dressing   LE Dressing Yes   Pants Level of Assistance Setup   Sock Level of Assistance Modified independent   Shoe Level of Assistance Modified independent   LE Dressing Where Assessed Wheelchair   LE Dressing Comments Pt utilize figure four doff/don socks, thread pants stance at grab bar for increased safety   Toileting   Toileting Level of Assistance Setup   Where Assessed Toilet   Toileting Comments Pt adjusting clothing prior/aftger/hygiene   Functional Standing Tolerance   Time 3 minutes   Activity ADL skills/transfers   Functional Standing Tolerance Comments unilateral hand support   Bed Mobility   Bed Mobility Yes   Bed Mobility 1   Bed Mobility 1 Supine to sitting   Level of Assistance 1 Modified independent   Bed Mobility Comments 1 bed to neutral no use side transfer bar   Bed Mobility 2   Bed Mobility  2 Scooting   Level of Assistance 2 Modified independent   Bed Mobility Comments 2 seated to edgeo of bed   Functional Mobility   Functional Mobility Performed Yes   Functional Mobility 1   Surface 1 Level tile   Device 1 Rolling walker   Assistance 1 Contact guard   Comments 1 15' x 2 to include doorway, turns and backing vc visually scan   Transfers   Transfer Yes   Transfer 1   Transfer From 1 Bed to   Transfer to 1 Stand   Technique 1 Sit to stand   Transfer Device 1 Walker;Gait belt   Transfer Level of Assistance 1 Contact guard   Trials/Comments 1 rw vc hand placement   Transfers 2   Transfer From 2 Wheelchair to   Transfer to 2 Stand   Technique 2 Sit to stand   Transfer Device 2 Gait belt;Walker   Transfer Level of Assistance 2 Contact guard   Trials/Comments 2 vc hand placement   Dynamic Standing Balance   Dynamic Standing-Balance Support No upper extremity supported   Dynamic Standing-Level of Assistance Close supervision    Dynamic Standing-Balance Reaching for objects   Dynamic Standing-Comments Pt in stance 2 minutes BITS program F balance no LOB   Toilet Transfers   Toilet Transfer From Rolling walker   Toilet Transfer Type To and from   Toilet Transfer to Standard toilet   Toilet Transfer Technique To right;To left   Toilet Transfers Set up   Toilet Transfers Comments grab bar  use   Shower Transfers   Shower Transfer From Walker   Shower Transfer Type To and from   Shower Transfer to Shower seat with back   Shower Transfer Technique To left;To right   Shower Transfers Set up   Shower Transfers Comments grab bar use   Therapeutic Activity   Therapeutic Activity Performed Yes   Therapeutic Activity 1 seated ball toss Pt with eyue patch in place   Therapeutic Activity 2 challenged reaction time, hand eyue coordination and environmental awareness  with seated BITS task , eyepatch to galsses   Vision   Double Vision Patching;Education   IP OT Assessment   OT Assessment Pt actively participating and progressiing with established POC.  Will continue toaddress remaining deficits with skilled OT intervention.   Prognosis Good   Barriers to Discharge Home No anticipated barriers   Evaluation/Treatment Tolerance Patient tolerated treatment well   Medical Staff Made Aware Yes   End of Session Communication Bedside nurse  (OTR/L re POC)   End of Session Patient Position Up in chair;Alarm on  (eating breakfast call light in reach all needs met.)   OT Assessment   OT Assessment Results Decreased upper extremity strength;Decreased safe judgment during ADL;Decreased functional mobility   Barriers to Discharge None   Strengths Ability to acquire knowledge;Attitude of self;Coping skills;Support of Caregivers;Support of extended family/friends   Barriers to Participation Comorbidities   Inpatient/Swing Bed or Outpatient   Inpatient/Swing Bed or Outpatient Inpatient   Inpatient Plan   Treatment Interventions ADL retraining;Visual perceptual  retraining;Functional transfer training;Endurance training;Patient/family training;Equipment evaluation/education;Compensatory technique education;Fine motor coordination activities   OT Frequency 5 times per week   Equipment Recommended upon Discharge Wheeled walker  (x 2 for home up/down)   OT Recommended Transfer Status Assist of 1   OT - OK to Discharge Yes

## 2024-12-18 NOTE — PROGRESS NOTES
Family training held this date. Patient and spouse met with PT OT ST to review progress, equipment recommendations, and discharge plan. LSW met with patient post training to identify any barriers to discharge. No barriers identified. Patient and family interested in home health and LSW will follow for referral to facility of choice. Appointment being made with neurologic ophthalmology. Reviewed discharge date of 12.22.24 at IDT and all in agreement with plan. LSW will follow to finalize arrangements.

## 2024-12-18 NOTE — PROGRESS NOTES
12/18/24 [id number]   OT Last Visit   OT Received On 12/18/24   General   Reason for Referral CVA   Referred By Dr. Kirk   Past Medical History Relevant to Rehab heart failure, afib, gout, hyperlipidemia, SHOAIB, HTN, DJD, tinnitus   Family/Caregiver Present No   Prior to Session Communication Bedside nurse   Patient Position Received Up in chair;Alarm on   Preferred Learning Style verbal;visual   General Comment pt agreeable to therapy   Precautions   Hearing/Visual Limitations +diploplia   Medical Precautions Fall precautions   Post-Surgical Precautions MITT   Pain Assessment   Pain Assessment 0-10   0-10 (Numeric) Pain Score 0 - No pain   Cognition   Overall Cognitive Status WFL   Orientation Level Oriented X4   Following Commands Follows all commands and directions without difficulty   Coordination   Movements are Fluid and Coordinated No   Upper Body Coordination WFL   Lower Body Coordination impaired   Trunk Coordination impaired   Coordination Comment ataxia noted B LE   RUE    RUE  WFL   LUE    LUE WFL   Functional Standing Tolerance   Time 8 minutes   Activity functional activity standing at an elevated table   Functional Standing Tolerance Comments unilateral support   Dynamic Standing Balance   Dynamic Standing-Balance Support Left upper extremity supported   Dynamic Standing-Level of Assistance Close supervision   Dynamic Standing-Balance Reaching for objects   Therapeutic Exercise   Therapeutic Exercise Performed Yes   Therapeutic Exercise Activity 1 PREs with 1# weight in RUE and 2# weight in LUE 15 reps x 1 set  (RUE bicep curls, forearm pronation/supination, wrist flexion/extension, triceps  LUE in all planes)   Therapeutic Activity   Therapeutic Activity Performed Yes   Therapeutic Activity 1 card game in standing at table with good tolerance   IP OT Assessment   Evaluation/Treatment Tolerance Patient tolerated treatment well   Medical Staff Made Aware Yes   End of Session Communication Bedside  nurse   End of Session Patient Position Up in chair;Alarm on   OT Assessment   OT Assessment Results Decreased upper extremity strength;Decreased safe judgment during ADL;Decreased functional mobility   Barriers to Discharge None   Strengths Ability to acquire knowledge   Barriers to Participation Comorbidities   Education   Individual(s) Educated Patient   Education Provided Fall precautons   Home Program AROM;Strengthening;Fine motor tasks   Risk and Benefits Discussed with Patient/Caregiver/Other yes   Patient/Caregiver Demonstrated Understanding yes   Plan of Care Discussed and Agreed Upon yes   Patient Response to Education Patient/Caregiver Verbalized Understanding of Information;Patient/Caregiver Performed Return Demonstration of Exercises/Activities   Inpatient/Swing Bed or Outpatient   Inpatient/Swing Bed or Outpatient Inpatient   Inpatient Plan   Treatment Interventions Visual perceptual retraining;UE strengthening/ROM;Functional transfer training;Endurance training;Patient/family training;Equipment evaluation/education;Fine motor coordination activities;Compensatory technique education   OT Frequency 5 times per week   OT Discharge Recommendations Low intensity level of continued care   Equipment Recommended upon Discharge Wheeled walker   OT Recommended Transfer Status Assist of 1   OT - OK to Discharge Yes      show

## 2024-12-18 NOTE — PROGRESS NOTES
Care transitions informed that patient is agreeable to HHC at discharge. List printed and LSW spoke with patient and patient agreeable to have referral sent to [facility]. Referral sent via [facility]port with all appropriate documents for RN/PT/OT. ADOD 12/22/2024.     ADDENDUM: 12/17/2024 at 1610    Care transitions checked [facility]port and at this time [facility] able to accept. LSW Brianna aware. Care tranbsitions will continue to update HHC team and discharge and [facility]. Patient aware.

## 2024-12-18 NOTE — PROGRESS NOTES
Speech-Language Pathology    SLP Adult IRF Speech-Language Pathology Treatment/DISCHARGE NOTE     Patient Name: David Chatman  MRN: [id number]  Today's Date: 12/18/2024  Time Calculation  Start Time: 0857  Stop Time: 0927  Time Calculation (min): 30 min     3/3sp    Current Problem:   1. Acute systolic heart failure  apixaban (Eliquis) 5 mg tablet      2. Ischemic stroke (Multi)  Referral to Ophthalmology    Referral to Neurology    Referral to Home Health        SLP Assessment:  Pt was seen for symbolic dysfunction and dysarthria. Pt achieved excellent progress over the course of therapy. Cognitive-linguistic goals were achieved as listed below. Previous CLQT results indicated that cognitive-linguistic skills were WNL. During family training, wife was also in agreement that pt reached a baseline mentation. Pt and wife verbalized good understanding of persistent diplopia.    Pt and wife were also in agreement that pt has reached a baseline communication level. Pt is comfortable with using clear speech strategies as needed.     Both pt and wife had no further questions. They felt all questions were addressed.    No further ST intervention indicated at this time.     Plan:  SLP TX Plan: Discharge from Speech Therapy  SLP Discharge Recommendations: Home with no further SLP    Patient/Caregiver Agreeable: Yes  SLP - OK to Discharge: Yes      Subjective   Pt wheeled self to scheduled therapy appointment. He is planning home discharge on Sunday.     General Visit Information:   Referred By: Dr. Kirk  Past Medical History Relevant to Rehab: heart failure, afib, gout, hyperlipidemia, SHOAIB, HTN, DJD, tinnitus  Caregiver Feedback: Wife  Prior to Session Communication: Bedside nurse    Pain Assessment:  Pain Assessment  0-10 (Numeric) Pain Score: 0 - No pain      Objective   SPEECH-LANGUAGE GOALS (established 12/9, anticipate end 3 weeks):  Pt will  utilize speech intelligibility strategies with 80% acc to improve  communication skills.  PROGRESS: 80% with structured conversation tasks. Excellent increased awareness of strategies. Significantly increased self-correction. ACHIEVED current goal. Increase accuracy to 85%. 12/18: Pt verbalized good understanding of going slow, pronouncing ends of words. He also verbalizes good understanding of how fatigue affects accuracy, Pt feels comfortable with using strategies at independent level, 90%. ACHIEVED - 12/18  STATUS: GOAL MET - 12/18  2.    Pt will  complete articulatory precision tasks with 80% accuracy to improve intelligibility.   PROGRESS: Excellent carryover with utilizing slow rate with paragraph level stimuli. Provided education that fatigue will affect overall speech. Pt feels that he was very tired on previous tx day, feels better today. 85%-90% for oral paragraph reading. Mild occasional difficulties were related to double vision. ACHIEVED current goal. Increase accuracy to 90%. 12/18: No significant episodes of dysarthria with structured conversation tasks, 90%. ACHIEVED - 12/18  STATUS: GOAL MET - 12/18   LTG: Pt will increase motor speech function to the highest possible speech level for improved functional communication within daily living tasks.       3. Pt will improve STM/recall to 80% accuracy with cues to increase awareness of daily functional information.  PROGRESS: Pt feels that he is at a baseline mentation. He is able to recall tx routine and wheels self to scheduled appointment times. ACHIEVED  STATUS: GOAL MET - 12/11  4. Pt will improve problem solving skills to 80% accuracy with cues to increase safety awareness within immediate environment.  PROGRESS: Although double vision persists, no episodes of impulsivity. Pt demonstrates good use of wheelchair brakes and travels from room to therapy rooms. ACHIEVED.  STATUS: GOAL MET - 12/11  5. Ongoing assessment if indicated for further goal development or to determine progress, as needed.  PROGRESS: CLQT was  completed previously. No new goals are indicated at this time.  STATUS: Goal met as far as determining further goal development/needs.   LTG 1: Pt will optimize cognitive-linguistic skills necessary for return to least restrictive living environment and to reduce caregiver dependence.  PROGRESS: Reviewed CLQT results with wife. She is in agreement that pt is at a baseline mentation. She verbalizes good understanding of persistent double vision. 12/18: ACHIEVED.              STATUS: GOAL MET 12/17 - Pt plans to discharge home next week with spouse.    Inpatient Education:  Adult Inpatient Education  Individual(s) Educated: Patient  Written Education : handouts for what is stroke and the human brain, discussed occipital, avila, and midbrain areas  Verbal Education : discharge plan  Risk and Benefits Discussed with Patient/Caregiver/Other: yes  Patient/Caregiver Demonstrated Understanding: yes  Plan of Care Discussed and Agreed Upon: yes  Patient Response to Education: Patient/Caregiver Verbalized Understanding of Information

## 2024-12-18 NOTE — CARE PLAN
The patient's goals for the shift include      The clinical goals for the shift include maintain safety

## 2024-12-18 NOTE — CARE PLAN
The patient's goals for the shift include      The clinical goals for the shift include improve mobility    Over the shift, the patient did not make progress toward the following goals. Barriers to progression include . Recommendations to address these barriers include .

## 2024-12-19 PROCEDURE — 2500000001 HC RX 250 WO HCPCS SELF ADMINISTERED DRUGS (ALT 637 FOR MEDICARE OP): Performed by: INTERNAL MEDICINE

## 2024-12-19 PROCEDURE — 97112 NEUROMUSCULAR REEDUCATION: CPT | Mod: GP

## 2024-12-19 PROCEDURE — 97530 THERAPEUTIC ACTIVITIES: CPT | Mod: GO,CO

## 2024-12-19 PROCEDURE — 1180000001 HC REHAB PRIVATE ROOM DAILY

## 2024-12-19 PROCEDURE — 97110 THERAPEUTIC EXERCISES: CPT | Mod: GP

## 2024-12-19 PROCEDURE — 99232 SBSQ HOSP IP/OBS MODERATE 35: CPT | Performed by: INTERNAL MEDICINE

## 2024-12-19 PROCEDURE — 97530 THERAPEUTIC ACTIVITIES: CPT | Mod: GP

## 2024-12-19 PROCEDURE — 97116 GAIT TRAINING THERAPY: CPT | Mod: GP

## 2024-12-19 PROCEDURE — 2500000002 HC RX 250 W HCPCS SELF ADMINISTERED DRUGS (ALT 637 FOR MEDICARE OP, ALT 636 FOR OP/ED): Performed by: INTERNAL MEDICINE

## 2024-12-19 ASSESSMENT — PAIN SCALES - GENERAL
PAINLEVEL_OUTOF10: 2
PAINLEVEL_OUTOF10: 0 - NO PAIN
PAINLEVEL_OUTOF10: 0 - NO PAIN
PAINLEVEL_OUTOF10: 3
PAINLEVEL_OUTOF10: 0 - NO PAIN
PAINLEVEL_OUTOF10: 3

## 2024-12-19 ASSESSMENT — PAIN - FUNCTIONAL ASSESSMENT
PAIN_FUNCTIONAL_ASSESSMENT: 0-10

## 2024-12-19 NOTE — PROGRESS NOTES
[facility] has delivered Eliquis to patient. Zero copay due to  program. LSW following to finalize discharge plan and provide additional medication discount programs for prescription refills.     Message left for nurse Scooter ([phone]) of Dr. Gonzalez,  Neurologic Ophthalmologist, to request appointment. Awaiting return call.      Patient will discharge home 12.22.24.

## 2024-12-19 NOTE — CARE PLAN
The patient's goals for the shift include      The clinical goals for the shift include improve mobility

## 2024-12-19 NOTE — PROGRESS NOTES
Physical Therapy       12/19/24 [id number]   PT  Visit   PT Received On 12/19/24   Response to Previous Treatment Patient with no complaints from previous session.   General   Reason for Referral CVA   Referred By Dr. Kirk   Past Medical History Relevant to Rehab heart failure, afib, gout, hyperlipidemia, SHOAIB, HTN, DJD, tinnitus   General Comment Pt upgraded to independent at w/c level.   Precautions   Hearing/Visual Limitations +diploplia   Pain Assessment   Pain Assessment 0-10   0-10 (Numeric) Pain Score 3   Pain Type Acute pain   Pain Location Shoulder   Pain Orientation Right   Balance/Neuromuscular Re-Education   Balance/Neuromuscular Re-Education Activity 1 Stand to High kneel (pad on floor for knees)  then back to Stand x 10   Bed Mobility 1   Bed Mobility 1 Supine to sitting;Sitting to supine;Rolling right;Rolling left   Level of Assistance 1 Independent   Bed Mobility 2   Bed Mobility  2 Scooting   Level of Assistance 2 Independent   Ambulation/Gait Training 1   Surface 1 Level tile;Uneven;Carpet   Device 1 No device   Assistance 1 Contact guard   Quality of Gait 1 Ataxic;Forward flexed posture;Decreased step length;NBOS;Listing   Comments/Distance (ft) 1 135 feet x2 Occasional stagger but correcting with HH support HR   Gait Support Devices Gait belt   Transfer 1   Transfer From 1 Sit to   Transfer to 1 Stand   Technique 1 Sit to stand;Stand to sit   Transfer Level of Assistance 1 Independent   Transfers 2   Transfer to 2 Sit;Stand   Technique 2 Sit to stand;Stand to sit   Transfer Device 2 Gait belt   Transfer Level of Assistance 2 Independent   Transfers 3   Transfer From 3 Wheelchair to   Transfer to 3 Mat   Technique 3 Stand pivot   Transfer Level of Assistance 3 Independent   Wheelchair Activities   Propulsion Type 1 Manual   Level 1 Level tile   Method 1 Right upper extremity;Left upper extremity;Right lower extremity;Left lower extremity   Level of Assistance 1 Modified independent    Description/Details 1 ad yana indepenedently throughout unit   Outpatient Education   Education Comment Discussed w/c level independence.  Alarm removed.  Pt happy to trial independence   PT Plan   PT Recommended Transfer Status Independent   PT - OK to Discharge Yes

## 2024-12-19 NOTE — CARE PLAN
The patient's goals for the shift include      The clinical goals for the shift include no falls    Over the shift, the patient did not make progress toward the following goals. Barriers to progression include . Recommendations to address these barriers include .

## 2024-12-19 NOTE — PROGRESS NOTES
"[facility] Comprehensive Rehabilitation  Physician Progress Note    Subjective   David Chatman is a 69 y.o. male admitted to inpatient rehabilitation unit.    Continues to do well.  The patient denies chest pain and shortness of breath.  The bowels are moving.  Nursing staff report no new issues.  Daily therapy efforts continue.    Objective   /76 (BP Location: Right arm, Patient Position: Lying)   Pulse 79   Temp 36.5 °C (97.7 °F) (Temporal)   Resp 18   Ht 1.854 m (6' 0.99\")   Wt 94.6 kg (208 lb 8.9 oz)   SpO2 95%   BMI 27.52 kg/m²      Physical Exam  Constitutional:       General: He is not in acute distress.     Appearance: He is not toxic-appearing.   HENT:      Head: Normocephalic and atraumatic.      Mouth/Throat:      Mouth: Mucous membranes are moist.   Eyes:      Pupils: Pupils are equal, round, and reactive to light.   Cardiovascular:      Rate and Rhythm: Normal rate and regular rhythm.      Heart sounds: No murmur heard.  Pulmonary:      Breath sounds: Normal breath sounds. No wheezing, rhonchi or rales.   Abdominal:      General: There is no distension.      Palpations: Abdomen is soft.   Musculoskeletal:      Right lower leg: No edema.      Left lower leg: No edema.   Neurological:      Mental Status: He is alert and oriented to person, place, and time.      Diplopia.  The eyes do not converge.     Labs  BMP:  Results from last 7 days   Lab Units 12/17/24  0600   CREATININE mg/dL 1.28   BUN mg/dL 24*   SODIUM mmol/L 141   POTASSIUM mmol/L 3.2*   CHLORIDE mmol/L 104   CO2 mmol/L 30       CBC:  Results from last 7 days   Lab Units 12/17/24  0600   WBC AUTO x10*3/uL 6.3   HEMOGLOBIN g/dL 11.7*   HEMATOCRIT % 37.3*   MCV fL 80   PLATELETS AUTO x10*3/uL 206       Coagulation:           Assesment and Plan    Ischemic Stroke (Multi)      Stroke, occipital lobe, avila/midbrain with diplopia and diffuse weakness.  Continue with medical management for secondary prevention, " PT, OT    C/w Eliquis as per cardio recs    Recent thoracic aorta repair.     CMP/Chronic systolic HF on guideline directed therapy, cont BB, Entresto, eplerenone, jardiance - PRN torsemide - has not needed.     Diplopia     Htn stable    DC planning.    Willi Kirk MD

## 2024-12-19 NOTE — PROGRESS NOTES
Physical Therapy       12/19/24 [id number]   PT  Visit   PT Received On 12/19/24   Response to Previous Treatment Patient with no complaints from previous session.   General   Reason for Referral CVA   Referred By Dr. Kirk   Past Medical History Relevant to Rehab heart failure, afib, gout, hyperlipidemia, SHOAIB, HTN, DJD, tinnitus   General Comment pt agreeable to therapy   Precautions   Hearing/Visual Limitations +diploplia   Medical Precautions Fall precautions   Post-Surgical Precautions MITT   Pain Assessment   Pain Assessment 0-10   0-10 (Numeric) Pain Score 0 - No pain   Cognition   Short-Term Memory Impaired   Processing Speed Delayed   Therapeutic Exercise   Therapeutic Exercise Activity 1 STS 2x15 No UEs push   Therapeutic Exercise Activity 2 Standing Squats   Therapeutic Exercise Activity 3 Standing High Knee marches 3# BLE 2 x 1 minute   Therapeutic Exercise Activity 4 Hip abd with blue tband 2x15   Therapeutic Exercise Activity 6 Sidestepping wit holding basket and blue therbands Cgx1 ll bars   Therapeutic Activity   Therapeutic Activity 1 Carrying weighted basket Min assist   Balance/Neuromuscular Re-Education   Balance/Neuromuscular Re-Education Activity 1 Color wheel tapping CGx1/min assist   Balance/Neuromuscular Re-Education Activity 2 Tapping 6 inch step 3# BLE   Balance/Neuromuscular Re-Education Activity 3 Ball Toss with LOB x3   Balance/Neuromuscular Re-Education Activity 4 Ball Kicking alternating BLE   Bed Mobility 1   Bed Mobility 1 Supine to sitting;Sitting to supine;Rolling left;Rolling right   Level of Assistance 1 Modified independent   Bed Mobility Comments 1 from bbed   Ambulation/Gait Training 1   Surface 1 Level tile;Carpet;Outdoors   Device 1 Rolling walker   Assistance 1 Contact guard   Quality of Gait 1 Ataxic;Forward flexed posture;Decreased step length;NBOS;Listing   Comments/Distance (ft) 1 125 feet x2   Gait Support Devices Gait belt   Ambulation/Gait Training 2   Surface 2  Level tile;Carpet;Uneven   Device 2 No device   Gait Support Devices Gait belt   Assistance 2 Minimum assistance   Comments/Distance (ft) 2 150  feet x 3 Right foot sweep when fatigued.  Diploplia   Transfer 1   Transfer From 1 Sit to   Transfer to 1 Stand   Technique 1 Sit to stand;Stand to sit   Transfer Device 1 Walker   Transfer Level of Assistance 1 Close supervision;Contact guard   Trials/Comments 1 safety cues   Transfers 2   Technique 2 Stand pivot   Transfer Device 2 Gait belt;Walker   Transfer Level of Assistance 2 Contact guard;Close supervision   Stairs   Rails 1 Right   Device 1 Railing   Assistance 1 Contact guard;Close supervision   Comment/Number of Steps 1 1 flight   Wheelchair Activities   Propulsion Type 1 Manual   Level 1 Level tile   Method 1 Right upper extremity;Left upper extremity;Right lower extremity;Left lower extremity   Level of Assistance 1 Modified independent   Description/Details 1 assist for 02 [facility]   PT Assessment   Barriers to Discharge diploplia   Assessment Comment Steady progress in all areas.  Diploplia limits balance skills.  Will need f/u with Neuro Opthamologist.  Rec OPPT, RW gait belt for home.  FAmily Training completed.   Outpatient Education   Education Comment ongoing for reason for chosen treatment.   PT Plan   Inpatient/Swing Bed or Outpatient Inpatient   PT Plan   PT Recommended Transfer Status Assist x1   PT - OK to Discharge Yes

## 2024-12-19 NOTE — PROGRESS NOTES
Physical Therapy Treatment        12/19/24 [id number]   PT  Visit   PT Received On 12/19/24   Response to Previous Treatment Patient with no complaints from previous session.   General   Reason for Referral CVA   Referred By Dr. Kirk   Past Medical History Relevant to Rehab heart failure, afib, gout, hyperlipidemia, SHOAIB, HTN, DJD, tinnitus   General Comment pt agreeable to therapy   Precautions   Hearing/Visual Limitations +diploplia   Medical Precautions Fall precautions   Post-Surgical Precautions MITT   Pain Assessment   Pain Assessment 0-10   0-10 (Numeric) Pain Score 0 - No pain   Cognition   Short-Term Memory Impaired   Processing Speed Delayed   Therapeutic Exercise   Therapeutic Exercise Activity 1 STS 2x15 No UEs push   Therapeutic Exercise Activity 2 Standing Squats   Therapeutic Exercise Activity 3 Standing High Knee marches 3# BLE 2 x 1 minute   Therapeutic Exercise Activity 4 Ball Toss with LOB x3   Therapeutic Exercise Activity 5 Ball Kicking alternating BLE   Therapeutic Exercise Activity 6 Sidestepping wit holding basket and blue therbands Cgx1 ll bars   Therapeutic Activity   Therapeutic Activity 1 Carrying weighted basket Min assist   Balance/Neuromuscular Re-Education   Balance/Neuromuscular Re-Education Activity 1 Color wheel tapping CGx1/min assist   Balance/Neuromuscular Re-Education Activity 2 Tapping 6 inch step 3# BLE   Bed Mobility 1   Bed Mobility 1 Supine to sitting;Sitting to supine;Rolling left;Rolling right   Level of Assistance 1 Modified independent   Bed Mobility Comments 1 from bbed   Ambulation/Gait Training 1   Surface 1 Level tile;Carpet;Outdoors   Device 1 Rolling walker   Assistance 1 Contact guard   Quality of Gait 1 Ataxic;Forward flexed posture;Decreased step length;NBOS;Listing   Comments/Distance (ft) 1 125 feet x2  (Assist for portable 02)   Gait Support Devices Gait belt   Ambulation/Gait Training 2   Surface 2 Level tile;Carpet   Device 2 No device   Gait Support  Devices Gait belt   Assistance 2 Minimum assistance   Comments/Distance (ft) 2 50 feet x2 Pt noted increased back pain.  Lateral sway at spine and hips increased without AD.   Ambulation/Gait Training 3   Surface 3 Level tile;Carpet;Uneven   Device 3 Rollator   Gait Support Devices Gait belt   Assistance 3 Close supervision   Quality of Gait 3 Forward flexed posture   Comments/Distance (ft) 3 125 x2 Initially patient did not li8kie increased speed, but accomodated.  Discussed benefits for seat and transporting items, use in community.   Transfer 1   Transfer From 1 Sit to   Transfer to 1 Stand   Technique 1 Sit to stand;Stand to sit   Transfer Device 1 Walker   Transfer Level of Assistance 1 Close supervision;Contact guard   Trials/Comments 1 safety cues   Transfers 2   Technique 2 Stand pivot   Transfer Device 2 Gait belt;Walker   Transfer Level of Assistance 2 Contact guard;Close supervision   Trials/Comments 2 Spouse observed.   Stairs   Rails 1 Right   Device 1 Railing   Assistance 1 Contact guard;Close supervision   Comment/Number of Steps 1 1 flight   Wheelchair Activities   Propulsion Type 1 Manual   Level 1 Level tile   Method 1 Right upper extremity;Left upper extremity;Right lower extremity;Left lower extremity   Level of Assistance 1 Modified independent   Description/Details 1 assist for 02 tank   PT Assessment   Barriers to Discharge diploplia   Assessment Comment Steady progress with balance tasks as reflected with improving GOETZ Score.  Vision is barrier.  Pt to followup with neuro ophthalmology.  Family training done and patient preparing for discharge this weekend   Outpatient Education   Education Comment ongoing for reason for chosen treatment.   PT Plan   Inpatient/Swing Bed or Outpatient Inpatient   PT Plan   PT Recommended Transfer Status Assist x1   PT - OK to Discharge Yes

## 2024-12-19 NOTE — PROGRESS NOTES
Occupational Therapy       12/19/24 [id number]   OT Last Visit   OT Received On 12/19/24   General   Reason for Referral CVA   Referred By Dr. Kirk   Past Medical History Relevant to Rehab heart failure, afib, gout, hyperlipidemia, SHOAIB, HTN, DJD, tinnitus   Prior to Session Communication Bedside nurse   Patient Position Received Up in chair;Alarm on   Preferred Learning Style verbal;visual   General Comment pt agreeable to therapy  (Declined shower and ADL's.)   Precautions   Hearing/Visual Limitations +diploplia   Medical Precautions Fall precautions   Pain Assessment   Pain Assessment 0-10   0-10 (Numeric) Pain Score 0 - No pain   Kitchen Mobility   Kitchen Mobility Level of Assistance Contact guard   Kitchen-Mobility Level Walker   Kitchen Activity Retrieve items;Transport items   Kitchen Mobility Comments Pt able to navigate around OT kitchen and return items to upper cabinets from countertop. Performed withClose supervision and VC's on balance stratigies.   Light Housekeeping   Light Housekeeping Level of Assistance Contact guard   Light Housekeeping Level   (Performed with and without device)   Light Housekeeping Able to transfer around OT room to put items away. Performed with and without device. Requiored Min assist without device. Pt improving with balance correction, but still a fall risk D/T diplopia.   Health Management   Health Management Level of Assistance Modified independent   Health Management Level Wheelchair   Health Management Challanged pt to read pill bottle for safety and accuracy at home. Pt was able to read bottles with min additional time. Reviewed the use of pill box for ease.   Therapeutic Activity   Therapeutic Activity 1 challange [facility]- Writing name. Pt continues to have difficulty D/T diplopia. VC's and EDU of size of letters and pace.   Vision   Double Vision Patching;Education   Vision Comments Performed visual tracking and scanning with B/L eyes open. Pt continues with diplopia.  Reports working on HEP and vision exercises IND in room.   IP OT Assessment   OT Assessment Pt continues with diplopia causing decreased balance and overall funtion- improving. Pt will continue to work towards goals in established POC.   Prognosis Good   Barriers to Discharge Home No anticipated barriers   Evaluation/Treatment Tolerance Patient tolerated treatment well   Medical Staff Made Aware Yes   End of Session Communication Bedside nurse   End of Session Patient Position Up in chair;Alarm on   OT Assessment   OT Assessment Results Decreased upper extremity strength;Decreased safe judgment during ADL;Decreased functional mobility   Barriers to Discharge None   Strengths Ability to acquire knowledge   Barriers to Participation Comorbidities   Education   Individual(s) Educated Patient   Education Provided Fall precautons   Home Program AROM;Strengthening;Fine motor tasks   [facility] and [facility] for grab bar installation   Patient Response to Education Patient/Caregiver Verbalized Understanding of Information;Patient/Caregiver Performed Return Demonstration of Exercises/Activities   Education Comment patient will need continued instruction.   Inpatient/Swing Bed or Outpatient   Inpatient/Swing Bed or Outpatient Inpatient   Inpatient Plan   Treatment Interventions Visual perceptual retraining;Functional transfer training;Endurance training;Neuromuscular reeducation   OT Frequency 5 times per week   OT Discharge Recommendations Low intensity level of continued care   Equipment Recommended upon Discharge Wheeled walker   OT Recommended Transfer Status Assist of 1   OT - OK to Discharge Yes

## 2024-12-19 NOTE — NURSING NOTE
Assumed care of patient at 1900. Pt. denied pain/discomfort. Call light within reach. Safety measures remain in place. Will cont to monitor.

## 2024-12-19 NOTE — PROGRESS NOTES
Occupational Therapy       12/19/24 [id number]   OT Last Visit   OT Received On 12/19/24   General   Reason for Referral CVA   Referred By Dr. Kirk   Past Medical History Relevant to Rehab heart failure, afib, gout, hyperlipidemia, SHOAIB, HTN, DJD, tinnitus   Prior to Session Communication Bedside nurse   Patient Position Received Up in chair;Alarm on   Preferred Learning Style verbal;visual   General Comment Pt agreeable to OT session. Pt made IND @ WC level this date.   Precautions   Hearing/Visual Limitations +diploplia   Medical Precautions Fall precautions   Vital Signs   Heart Rate 75   Heart Rate Source Brachial   Resp 17   SpO2 95 %   /67   MAP (mmHg) 78   BP Location Right arm   BP Method Automatic   Pain Assessment   Pain Assessment 0-10   0-10 (Numeric) Pain Score 3   Pain Type Acute pain   Pain Location Shoulder   Pain Orientation Right   Therapeutic Activity   Therapeutic Activity 1 Challanged visual scanning by performed balloon volly and ball toss. Pt with decreased ability to hit with RUE D/T pain. Performed seated. Dizziness at times durnig task.   Therapeutic Activity 2 Challanged balance and visual scanning a white board. Pt performed visual scan to target with function reaching with UE. x3 Trials of complete turns to challange balance.   Therapeutic Activity 3 Performed standing card game without support to increase balance. Pt with intermittent swaying- pt able to self correct.   IP OT Assessment   OT Assessment Pt continues with diplopia causing decreased balance and overall funtion- improving. Pt will continue to work towards goals in established POC.   Prognosis Good   Barriers to Discharge Home No anticipated barriers   Evaluation/Treatment Tolerance Patient tolerated treatment well   Medical Staff Made Aware Yes   End of Session Communication Bedside nurse   End of Session Patient Position Up in chair;Alarm on   OT Assessment   OT Assessment Results Decreased upper extremity  strength;Decreased safe judgment during ADL;Decreased functional mobility   Barriers to Discharge None   Strengths Ability to acquire knowledge   Barriers to Participation Comorbidities   Education   Individual(s) Educated Patient   Education Provided Fall precautons   Home Program AROM;Strengthening;Fine motor tasks   [facility] and [facility] for grab bar installation   Patient Response to Education Patient/Caregiver Verbalized Understanding of Information;Patient/Caregiver Performed Return Demonstration of Exercises/Activities   Inpatient/Swing Bed or Outpatient   Inpatient/Swing Bed or Outpatient Inpatient   Inpatient Plan   Treatment Interventions Visual perceptual retraining;Functional transfer training;Endurance training;Neuromuscular reeducation   OT Frequency 5 times per week   OT Discharge Recommendations Low intensity level of continued care   Equipment Recommended upon Discharge Wheeled walker   OT Recommended Transfer Status Assist of 1   OT - OK to Discharge Yes

## 2024-12-20 ENCOUNTER — APPOINTMENT (OUTPATIENT)
Dept: CARDIAC REHAB | Facility: CLINIC | Age: 69
End: 2024-12-20
Payer: MEDICARE

## 2024-12-20 LAB
ANION GAP SERPL CALCULATED.3IONS-SCNC: 10 MMOL/L (ref 10–20)
BUN SERPL-MCNC: 24 MG/DL (ref 6–23)
CALCIUM SERPL-MCNC: 9 MG/DL (ref 8.6–10.3)
CHLORIDE SERPL-SCNC: 104 MMOL/L (ref 98–107)
CO2 SERPL-SCNC: 29 MMOL/L (ref 21–32)
CREAT SERPL-MCNC: 1.23 MG/DL (ref 0.5–1.3)
EGFRCR SERPLBLD CKD-EPI 2021: 64 ML/MIN/1.73M*2
GLUCOSE SERPL-MCNC: 90 MG/DL (ref 74–99)
POTASSIUM SERPL-SCNC: 3.4 MMOL/L (ref 3.5–5.3)
SODIUM SERPL-SCNC: 140 MMOL/L (ref 136–145)

## 2024-12-20 PROCEDURE — 97535 SELF CARE MNGMENT TRAINING: CPT | Mod: GO

## 2024-12-20 PROCEDURE — 97116 GAIT TRAINING THERAPY: CPT | Mod: GP

## 2024-12-20 PROCEDURE — 99232 SBSQ HOSP IP/OBS MODERATE 35: CPT | Performed by: INTERNAL MEDICINE

## 2024-12-20 PROCEDURE — 97112 NEUROMUSCULAR REEDUCATION: CPT | Mod: GP

## 2024-12-20 PROCEDURE — 80048 BASIC METABOLIC PNL TOTAL CA: CPT | Performed by: INTERNAL MEDICINE

## 2024-12-20 PROCEDURE — 1180000001 HC REHAB PRIVATE ROOM DAILY

## 2024-12-20 PROCEDURE — 2500000001 HC RX 250 WO HCPCS SELF ADMINISTERED DRUGS (ALT 637 FOR MEDICARE OP): Performed by: INTERNAL MEDICINE

## 2024-12-20 PROCEDURE — 2500000002 HC RX 250 W HCPCS SELF ADMINISTERED DRUGS (ALT 637 FOR MEDICARE OP, ALT 636 FOR OP/ED): Performed by: INTERNAL MEDICINE

## 2024-12-20 PROCEDURE — 97530 THERAPEUTIC ACTIVITIES: CPT | Mod: GO

## 2024-12-20 PROCEDURE — 97530 THERAPEUTIC ACTIVITIES: CPT | Mod: GP

## 2024-12-20 PROCEDURE — 97110 THERAPEUTIC EXERCISES: CPT | Mod: GO

## 2024-12-20 PROCEDURE — 97110 THERAPEUTIC EXERCISES: CPT | Mod: GP

## 2024-12-20 PROCEDURE — 36415 COLL VENOUS BLD VENIPUNCTURE: CPT | Performed by: INTERNAL MEDICINE

## 2024-12-20 ASSESSMENT — ACTIVITIES OF DAILY LIVING (ADL)
BATHING_WHERE_ASSESSED: SHOWER
BATHING_EQUIPMENT_NEEDED: REMOVEABLE SHOWER HEAD
BATHING_LEVEL_OF_ASSISTANCE: MODIFIED INDEPENDENT
HOME_MANAGEMENT_TIME_ENTRY: 30

## 2024-12-20 ASSESSMENT — PAIN - FUNCTIONAL ASSESSMENT
PAIN_FUNCTIONAL_ASSESSMENT: 0-10

## 2024-12-20 ASSESSMENT — COGNITIVE AND FUNCTIONAL STATUS - GENERAL
DAILY ACTIVITIY SCORE: 24
DRESSING REGULAR LOWER BODY CLOTHING: TOTAL

## 2024-12-20 ASSESSMENT — PAIN SCALES - GENERAL
PAINLEVEL_OUTOF10: 2
PAINLEVEL_OUTOF10: 0 - NO PAIN

## 2024-12-20 ASSESSMENT — PAIN SCALES - WONG BAKER
WONGBAKER_NUMERICALRESPONSE: NO HURT
WONGBAKER_NUMERICALRESPONSE: NO HURT

## 2024-12-20 NOTE — NURSING NOTE
Assumed care of patient after receiving report form outgoing RN.  Patient awake, up in chair, AAO x 3.  Patient on RA, no distress noted, breathing unlabored.  No further needs at this time, plan of care ongoing.  Call light within reach.

## 2024-12-20 NOTE — PROGRESS NOTES
"   12/20/24 [id number]   OT Last Visit   OT Received On 12/20/24   General   Reason for Referral CVA   Referred By Dr. Kirk   Past Medical History Relevant to Rehab heart failure, afib, gout, hyperlipidemia, SHOAIB, HTN, DJD, tinnitus   Family/Caregiver Present No   Patient Position Received Up in chair   Preferred Learning Style verbal;visual   General Comment pt self propelled wc from pt room to tx room independently   Precautions   Hearing/Visual Limitations +diploplia   Medical Precautions Fall precautions   Post-Surgical Precautions MITT   Vital Signs   Heart Rate 74   Heart Rate Source Monitor   Resp 16   SpO2 96 %   /59   MAP (mmHg) 74   BP Location Right arm   BP Method Automatic   Pain Assessment   Pain Assessment 0-10   0-10 (Numeric) Pain Score 0 - No pain   Ng-Barnett FACES Pain Rating 0   Clinical Progression Gradually improving   Patient's Stated Pain Goal No pain   Cognition   Overall Cognitive Status WFL   Orientation Level Oriented X4   Following Commands Follows all commands and directions without difficulty   Safety Judgment Good awareness of safety precautions   Coordination   Movements are Fluid and Coordinated No   Upper Body Coordination WFL   Lower Body Coordination impaired   Coordination Comment ataxia noted B LE   RUE    RUE  WFL   LUE    LUE WFL   Therapeutic Activity   Therapeutic Activity Performed Yes   Therapeutic Activity 1 \"what's different?\" pictures for a visual challenge. Pt able to identify 8/10 differences   Therapeutic Activity 2 word search activity for visual scanning  (R eyeglass lense patched due to diplopia)   IP OT Assessment   Evaluation/Treatment Tolerance Patient tolerated treatment well   Medical Staff Made Aware Yes   End of Session Communication Bedside nurse   End of Session Patient Position Up in chair   OT Assessment   OT Assessment Results Visual deficit   Barriers to Discharge None   Strengths Ability to acquire knowledge   Barriers to Participation " Comorbidities   Education   Individual(s) Educated Patient   Diagnosis and Precautions diplopia and compensatory techniques   Risk and Benefits Discussed with Patient/Caregiver/Other yes   Patient/Caregiver Demonstrated Understanding yes   Plan of Care Discussed and Agreed Upon yes   Patient Response to Education Patient/Caregiver Verbalized Understanding of Information   Inpatient/Swing Bed or Outpatient   Inpatient/Swing Bed or Outpatient Inpatient   Inpatient Plan   Treatment Interventions Visual perceptual retraining   OT Frequency 5 times per week   OT Discharge Recommendations Low intensity level of continued care   Equipment Recommended upon Discharge Wheeled walker   OT Recommended Transfer Status Stand by assist  (SBA walker level and Modified independent at  level)   OT - OK to Discharge Yes

## 2024-12-20 NOTE — PROGRESS NOTES
Physical Therapy       12/20/24 [id number]   PT  Visit   PT Received On 12/20/24   Response to Previous Treatment Patient with no complaints from previous session.   General   Reason for Referral CVA   Referred By Dr. Kirk   Past Medical History Relevant to Rehab heart failure, afib, gout, hyperlipidemia, SHOAIB, HTN, DJD, tinnitus   Family/Caregiver Present No   Patient Position Received Up in chair   Preferred Learning Style verbal;visual   General Comment Patient is agreeable to PT treatment.   Precautions   Hearing/Visual Limitations +diploplia   Medical Precautions Fall precautions   Pain Assessment   Pain Assessment 0-10   0-10 (Numeric) Pain Score 0 - No pain   Cognition   Overall Cognitive Status WFL   Orientation Level Oriented X4   Following Commands Follows all commands and directions without difficulty   Safety Judgment Good awareness of safety precautions   Coordination   Upper Body Coordination WFL   Lower Body Coordination impaired   Coordination Comment ataxia noted B LE   Postural Control   Postural Control WFL   General Observation   General Observation Diplopia   Static Standing Balance   Static Standing-Level of Assistance Close supervision   Dynamic Standing Balance   Dynamic Standing-Level of Assistance Contact guard   Therapeutic Exercise   Therapeutic Exercise Activity 1 standing minisquats 2x15   Therapeutic Exercise Activity 2 steps up on 6 inch step for 2 min   Balance/Neuromuscular Re-Education   Balance/Neuromuscular Re-Education Activity 1 Color wheel tapping CGx1/min assist   Balance/Neuromuscular Re-Education Activity 2 Ball kicking down hallway Dwayne x 1   Ambulation/Gait Training 1   Surface 1 Level tile;Uneven;Carpet   Device 1 No device   Assistance 1 Contact guard   Quality of Gait 1 Ataxic;Forward flexed posture;Decreased step length;NBOS;Listing   Comments/Distance (ft) 1 ambulates 20 min without rest at varying speeds to challenge balance and endurance   Gait Support Devices Gait  belt   Transfer 1   Transfer From 1 Wheelchair to   Transfer to 1 Stand   Technique 1 Sit to stand;Stand to sit   Transfer Level of Assistance 1 Independent   Wheelchair Activities   Propulsion Type 1 Manual   Level 1 Level tile   Method 1 Right upper extremity;Left upper extremity;Right lower extremity;Left lower extremity   Level of Assistance 1 Modified independent   Description/Details 1 ad yana independent through unit   Activity Tolerance   Endurance Tolerates 30 min exercise with multiple rests   Early Mobility/Exercise Safety Screen Proceed with mobilization - No exclusion criteria met   Activity Tolerance Comments Good   PT Assessment   PT Assessment Results Decreased strength;Decreased endurance;Impaired balance;Decreased mobility;Decreased coordination;Impaired vision;Decreased safety awareness;Impaired judgement   Rehab Prognosis Excellent   Barriers to Discharge diploplia   Barriers to Discharge Home No anticipated barriers   Medical Staff Made Aware Yes   Strengths Ability to acquire knowledge;Attitude of self   Barriers to Participation Comorbidities   End of Session Communication Bedside nurse   Assessment Comment Steady progress in all areas.  Diploplia limits balance skills.  Will need f/u with Neuro Opthamologist.  Rec OPPT, RW gait belt for home.  FAmily Training completed.   End of Session Patient Position Up in chair   PT Plan   Treatment/Interventions Transfer training;Gait training;Strengthening;Endurance training;Therapeutic exercise;Therapeutic activity   PT Plan Ongoing PT   Equipment Recommended upon Discharge Wheeled walker   PT Recommended Transfer Status Independent   PT - OK to Discharge Yes

## 2024-12-20 NOTE — PROGRESS NOTES
"[facility]  Physician Progress Note    Subjective   David Chatman is a 69 y.o. male admitted to inpatient rehabilitation unit.    The patient continues to participate well with all therapy disciplines involved.  No new issues are identified by the nursing staff.  The patient denies chest pain and dyspnea.    Independent in room for xfers.    Objective   /70 (BP Location: Right arm, Patient Position: Lying)   Pulse 80   Temp 36.6 °C (97.9 °F) (Temporal)   Resp 16   Ht 1.854 m (6' 0.99\")   Wt 92.9 kg (204 lb 14.4 oz)   SpO2 94%   BMI 27.04 kg/m²      Physical Exam  Constitutional:       General: He is not in acute distress.     Appearance: He is not toxic-appearing.   HENT:      Head: Normocephalic and atraumatic.      Mouth/Throat:      Mouth: Mucous membranes are moist.   Eyes:      Pupils: Pupils are equal, round, and reactive to light.   Cardiovascular:      Rate and Rhythm: Normal rate and regular rhythm.      Heart sounds: No murmur heard.  Pulmonary:      Breath sounds: Normal breath sounds. No wheezing, rhonchi or rales.   Abdominal:      General: There is no distension.      Palpations: Abdomen is soft.   Musculoskeletal:      Right lower leg: No edema.      Left lower leg: No edema.   Neurological:      Mental Status: He is alert and oriented to person, place, and time.      Diplopia.  The eyes do not converge.     Labs  BMP:  Results from last 7 days   Lab Units 12/20/24  0605   CREATININE mg/dL 1.23   BUN mg/dL 24*   SODIUM mmol/L 140   POTASSIUM mmol/L 3.4*   CHLORIDE mmol/L 104   CO2 mmol/L 29       CBC:  Results from last 7 days   Lab Units 12/17/24  0600   WBC AUTO x10*3/uL 6.3   HEMOGLOBIN g/dL 11.7*   HEMATOCRIT % 37.3*   MCV fL 80   PLATELETS AUTO x10*3/uL 206       Coagulation:           Assesment and Plan    Ischemic Stroke (Multi)      Stroke, occipital lobe, avila/midbrain with diplopia and diffuse weakness.  Continue with " medical management for secondary prevention, PT, OT    C/w Eliquis as per cardio recs    Recent thoracic aorta repair.     CMP/Chronic systolic HF on guideline directed therapy, cont BB, Entresto, eplerenone, jardiance - PRN torsemide - has not needed.     Diplopia     Htn stable    DC planning.    Willi Kirk MD

## 2024-12-20 NOTE — CARE PLAN
The patient's goals for the shift include      The clinical goals for the shift include No Falls    Over the shift, the patient did make progress toward the following goals. Barriers to progression include Adhering to education on mobility . Recommendations to address these barriers include reenforcement and education of clinical goals

## 2024-12-20 NOTE — PROGRESS NOTES
Physical Therapy       12/20/24 1407-2332   PT  Visit   PT Received On 12/20/24   Response to Previous Treatment Patient with no complaints from previous session.   General   Reason for Referral CVA   Referred By Dr. Kirk   Past Medical History Relevant to Rehab heart failure, afib, gout, hyperlipidemia, SHOAIB, HTN, DJD, tinnitus   General Comment Cleared by RN, patient agreeable to PT.   Precautions   Hearing/Visual Limitations +diploplia   Pain Assessment   Pain Assessment 0-10   0-10 (Numeric) Pain Score 2   Pain Type Chronic pain   Pain Location Shoulder   Pain Orientation Right   Cognition   Overall Cognitive Status WFL   Coordination   Movements are Fluid and Coordinated No   Coordination Comment ataxia noted B LE   Postural Control   Postural Control WFL   Posture Comment forward head, rounded shoulders, age related changes   General Observation   General Observation Diplopia   Static Sitting Balance   Static Sitting-Level of Assistance Independent   Static Standing Balance   Static Standing-Level of Assistance Close supervision   Static Standing-Comment/Number of Minutes no UE support   Dynamic Standing Balance   Dynamic Standing-Level of Assistance Contact guard;Minimum assistance   Dynamic Standing-Comments Dwayne  during turn   Therapeutic Exercise   Therapeutic Exercise Activity 1 STS 2x15 with forward push of 3# ball in stand   Therapeutic Exercise Activity 2 Standing mini squats with 3#B 2x15   Balance/Neuromuscular Re-Education   Balance/Neuromuscular Re-Education Activity 1 Lillypad obstacle course with green, blue, grey therapads CGA/min A   Balance/Neuromuscular Re-Education Activity 2 alternating low/high hurdles CG/min A x 1   Balance/Neuromuscular Re-Education Activity 3 side step on blue AirEx pad with ball throw catch min A x 1, retropulsive   Balance/Neuromuscular Re-Education Activity 4 weave around orange cones with toe taps CG/min A x 1   Balance/Neuromuscular Re-Education Activity 5 toe  taps on top of 6 inch step no UE CGA alternating left and right x 1 min   Balance/Neuromuscular Re-Education Activity 6 alternating punches on grey therpads   Balance/Neuromuscular Re-Education Activity 7 high knee marches with 3# B ankles x 3 min   Balance/Neuromuscular Re-Education Activity 8 walking ball catch   Bed Mobility 1   Bed Mobility 1 Supine to sitting;Sitting to supine   Level of Assistance 1 Independent   Bed Mobility 2   Bed Mobility  2 Rolling right;Rolling left   Level of Assistance 2 Independent   Ambulation/Gait Training 1   Surface 1 Level tile;Uneven;Carpet   Device 1 No device   Assistance 1 Contact guard   Quality of Gait 1 Ataxic;Forward flexed posture;Decreased step length;NBOS;Listing   Comments/Distance (ft) 1 150 ft x 3 with turns, occasional stagger, but patient takes increased time to correct   Gait Support Devices Gait belt   Transfer 1   Transfer From 1 Wheelchair to   Transfer to 1 Stand   Technique 1 Sit to stand;Stand to sit   Transfer Device 1 Walker   Transfer Level of Assistance 1 Independent   Transfers 2   Transfer From 2 Wheelchair to   Transfer to 2 Stand   Technique 2 Sit to stand;Stand to sit   Transfer Device 2 Gait belt   Transfer Level of Assistance 2 Independent   Trials/Comments 2 to no device   Transfers 3   Transfer From 3 Wheelchair to   Transfer to 3 Mat   Technique 3 Stand pivot   Transfer Device 3 Gait belt   Transfer Level of Assistance 3 Independent   Stairs   Rails 1 Bilateral   Device 1 Railing   Support Devices 1 Gait belt   Assistance 1 Close supervision   Comment/Number of Steps 1 12 steps with 2 rails, reciprocal pattern    Object From Floor   Devices No reacher   Assist Level Contact guard   Comments cones from floor   Wheelchair Activities   Propulsion Type 1 Manual   Level 1 Level tile   Method 1 Right upper extremity;Left upper extremity;Right lower extremity;Left lower extremity   Level of Assistance 1 Modified independent    Description/Details 1 ad yana independent through unit   Other Activity   Other Activity 1 NuStep level 5 x 10 min   Activity Tolerance   Endurance Tolerates 30 min exercise with multiple rests   Early Mobility/Exercise Safety Screen Proceed with mobilization - No exclusion criteria met   Activity Tolerance Comments Good   Rate of Perceived Dyspnea (RPD) Good   Rate of Perceived Exertion (RPE) 4/10 post standing [facility]   PT Assessment   PT Assessment Results Decreased strength;Decreased endurance;Impaired balance;Decreased mobility;Decreased coordination;Impaired vision;Decreased safety awareness;Impaired judgement   Rehab Prognosis Excellent   Barriers to Discharge diploplia   Barriers to Discharge Home No anticipated barriers   Medical Staff Made Aware Yes   Strengths Ability to acquire knowledge;Attitude of self   Barriers to Participation Comorbidities   End of Session Communication Bedside nurse   Assessment Comment Steady progress in all areas.  Diploplia limits balance skills.  Will need f/u with Neuro Opthamologist.  Rec OPPT, RW gait belt for home.  FAmily Training completed.   End of Session Patient Position Up in chair  (no alarm--patient made independent in room for transfers)   PT Plan   Treatment/Interventions Bed mobility;Transfer training;Gait training;Stair training;Balance training;Strengthening;Neuromuscular re-education;Endurance training;Therapeutic exercise;Therapeutic activity   PT Plan Ongoing PT   Equipment Recommended upon Discharge Wheeled walker   PT Recommended Transfer Status Independent   PT - OK to Discharge Yes

## 2024-12-20 NOTE — PROGRESS NOTES
12/20/24 2899-7857   OT Last Visit   OT Received On 12/20/24   General   Reason for Referral CVA   Referred By Dr. Kirk   Past Medical History Relevant to Rehab heart failure, afib, gout, hyperlipidemia, SHOAIB, HTN, DJD, tinnitus   Family/Caregiver Present No   Prior to Session Communication Bedside nurse   Patient Position Received Up in chair   Preferred Learning Style verbal;visual   General Comment pt is agreeable to therapy   Precautions   Hearing/Visual Limitations +diploplia   Medical Precautions Fall precautions   Post-Surgical Precautions MITT   Pain Assessment   Pain Assessment 0-10   0-10 (Numeric) Pain Score 0 - No pain   Ng-Barnett FACES Pain Rating 0   Cognition   Overall Cognitive Status WFL   Orientation Level Oriented X4   Following Commands Follows all commands and directions without difficulty   Safety Judgment Good awareness of safety precautions   Coordination   Movements are Fluid and Coordinated No   Upper Body Coordination WFL   Lower Body Coordination impaired   Coordination Comment ataxia noted B LE   RUE    RUE  WFL   LUE    LUE WFL   Feeding   Feeding Level of Assistance Independent   Feeding Where Assessed Wheelchair   Feeding Comments Pt able to independently feed self including set up   Grooming   Grooming Level of Assistance Modified independent   Grooming Where Assessed Sitting sinkside   Grooming Comments to complete oral care   UE Bathing   UE Bathing Adaptive Equipment Removeable shower head   UE Bathing Level of Assistance Modified independent   UE Bathing Where Assessed Shower   UE Bathing Comments all task seated   LE Bathing   LE Bathing Adaptive Equipment Removeable shower head   LE Bathing Level of Assistance Modified independent   LE Bathing Where Assessed Shower   LE Bathing Comments Pt efficient with use of figure 4 wash   UE Dressing   UE Dressing Level of Assistance Modified independent   UE Dressing Where Assessed Wheelchair   UE Dressing Comments to talib/doff  pullover shirt   LE Dressing   LE Dressing Yes   Pants Level of Assistance Modified independent   Sock Level of Assistance Modified independent   Shoe Level of Assistance Modified independent   LE Dressing Where Assessed Wheelchair   LE Dressing Comments Pt able to utilize the figure four technique for LB dressing   Toileting   Toileting Level of Assistance Modified independent   Where Assessed Toilet   Toileting Comments pt independent with toilet hygiene and pants management up/down   Bed Mobility   Bed Mobility No   Functional Mobility   Functional Mobility Performed Yes   Functional Mobility 1   Surface 1 Level tile   Device 1 Rolling walker   Assistance 1 Distant supervision   Comments 1 15' x 2 to include, doorway, turns, and backing   Toilet Transfers   Toilet Transfer From Wheelchair   Toilet Transfer Type To and from   Toilet Transfer to Standard toilet   Toilet Transfer Technique Stand pivot   Toilet Transfers Modified independence   Toilet Transfers Comments with use of grab bar   Shower Transfers   Shower Transfer From Wheelchair   Shower Transfer Type To and from   Shower Transfer to Shower seat with back   Shower Transfer Technique To left;To right   Shower Transfers Modified independence   Shower Transfers Comments grab bar use   Kitchen Mobility   Kitchen Mobility Level of Assistance Modified independent   Kitchen-Mobility Level Wheelchair   Kitchen Activity Retrieve items;Transport items   Kitchen Mobility Comments pt independent at wc level   Therapeutic Exercise   Therapeutic Exercise Performed Yes   Therapeutic Exercise Activity 1 table carlie with 3# weight with BUES 20 reps x 1 set with good tolerance to improve strength for ADLs  (forwards/backwards, circles (both directions), diagonals (both directions), side to side)   Therapeutic Activity   Therapeutic Activity Performed Yes   Therapeutic Activity 1 see outcome measures standardized assessment results   IP OT Assessment   OT Assessment Pt  has attained all established goals.  Pt requires supervision at walker level and modified independence at wc level during ADLs.  Pt demos good safety awareness and is learning to pace himself for increased safety.  Family training has been completed with pts spouse and AE/DME has been recommended and handouts have been issued.  Pt plans to return home on 12/22/24 with [facility] services and assistance of spouse as needed.   Prognosis Good   Barriers to Discharge Home No anticipated barriers   Evaluation/Treatment Tolerance Patient tolerated treatment well   Medical Staff Made Aware Yes   End of Session Communication Bedside nurse   End of Session Patient Position Up in chair   OT Assessment   OT Assessment Results Decreased upper extremity strength;Decreased endurance;Decreased ADL status;Decreased functional mobility   Barriers to Discharge None   Strengths Ability to acquire knowledge   Barriers to Participation Comorbidities   Education   Individual(s) Educated Patient   Education Provided Fall precautons   Home Program AROM;Strengthening   Risk and Benefits Discussed with Patient/Caregiver/Other yes   Patient/Caregiver Demonstrated Understanding yes   Plan of Care Discussed and Agreed Upon yes   Patient Response to Education Patient/Caregiver Verbalized Understanding of Information   Inpatient/Swing Bed or Outpatient   Inpatient/Swing Bed or Outpatient Inpatient   Inpatient Plan   Treatment Interventions ADL retraining;Functional transfer training;UE strengthening/ROM;Endurance training;Patient/family training;Equipment evaluation/education;Fine motor coordination activities;Compensatory technique education   OT Frequency 5 times per week   OT Discharge Recommendations Low intensity level of continued care   Equipment Recommended upon Discharge Wheeled walker   OT Recommended Transfer Status Stand by assist   OT - OK to Discharge Yes      12/20/24 1100   [facility] Daily Activity   Putting on and taking off regular lower  body clothing 4   Bathing (including washing, rinsing, drying) 4   Putting on and taking off regular upper body clothing 4   Toileting, which includes using toilet, bedpan or urinal 4   Taking care of personal grooming such as brushing teeth 4   Eating Meals 4   Daily Activity - Total Score 24   OT Adult Other Outcome Measures   9 Hole Peg Test RUE: 35 sec LUE: 27 sec   Box and Block RUE: 39 blks/min  LUE: 51 blk/min   Other Outcome Measures  Strength RUE:  102# LUE: 100#   BITs Valera Cancellation: 3:17 min, 0 errors, 0 distractors  Trailmaking A: 1:37 min, 1 error, 10 interruptions

## 2024-12-20 NOTE — PROGRESS NOTES
Patient has reached maximum potential with therapy and is medically clear for discharge. Discharge options/recommendations have been reviewed with patient and spouse. No appeal requested as per Medicare guidelines.      TRANSPORT DATE AND TIME:  11am via spouse on 12.22.24.    EQUIPMENT NEEDS:  Patient and spouse indicated having all needed DME in place at home. Patient declined wheelchair at this time-provided contact info for [facility] if equipment needs rise.     HOME GOING SERVICES:  [facility] RN PT OT to follow. Discharge summary will be provided via [facility] upon completion    Handicapped placard prescription provided in discharge resource folder in patient room.     PRESCRIPTIONS: Eliquis delivered by  [employer]. Discount program information provided     FOLLOW UP APPOINTMENTS:  See AVS for discharge     IDT aware of discharge plan. Patient and spouse aware and in agreement with safe discharge plan for 12.22.24.

## 2024-12-21 VITALS
HEIGHT: 73 IN | BODY MASS INDEX: 27.16 KG/M2 | DIASTOLIC BLOOD PRESSURE: 76 MMHG | OXYGEN SATURATION: 97 % | WEIGHT: 204.9 LBS | SYSTOLIC BLOOD PRESSURE: 108 MMHG | RESPIRATION RATE: 18 BRPM | HEART RATE: 73 BPM | TEMPERATURE: 97.3 F

## 2024-12-21 PROCEDURE — 2500000002 HC RX 250 W HCPCS SELF ADMINISTERED DRUGS (ALT 637 FOR MEDICARE OP, ALT 636 FOR OP/ED): Performed by: INTERNAL MEDICINE

## 2024-12-21 PROCEDURE — 97116 GAIT TRAINING THERAPY: CPT | Mod: GP,CQ

## 2024-12-21 PROCEDURE — 97530 THERAPEUTIC ACTIVITIES: CPT | Mod: GP,CQ

## 2024-12-21 PROCEDURE — 2500000001 HC RX 250 WO HCPCS SELF ADMINISTERED DRUGS (ALT 637 FOR MEDICARE OP): Performed by: INTERNAL MEDICINE

## 2024-12-21 PROCEDURE — 97112 NEUROMUSCULAR REEDUCATION: CPT | Mod: GP,CQ

## 2024-12-21 ASSESSMENT — BRIEF INTERVIEW FOR MENTAL STATUS (BIMS)
ASKED TO RECALL SOCK: YES, NO CUE REQUIRED
WHAT YEAR IS IT: CORRECT
ASKED TO RECALL BLUE: YES, NO CUE REQUIRED
WHAT MONTH IS IT: ACCURATE WITHIN 5 DAYS
COGNITIVE PATTERN ASSESSMENT USED: BIMS
BIMS SUMMARY SCORE: 14
INITIAL REPETITION OF BED BLUE SOCK - FIRST ATTEMPT: 3
WHAT DAY OF THE WEEK IS IT: CORRECT
ASKED TO RECALL BED: YES, AFTER CUEING ("A PIECE OF FURNITURE")

## 2024-12-21 ASSESSMENT — BALANCE ASSESSMENTS
PLACE ALTERNATE FOOT ON STEP OR STOOL WHILE STANDING UNSUPPORTED: ABLE TO COMPLETE 4 STEPS WITHOUT AID WITH SUPERVISION
LONG VERSION TOTAL SCORE (MAX 56): 47
PICK UP OBJECT FROM THE FLOOR FROM A STANDING POSITION: ABLE TO PICK UP SLIPPER SAFELY AND EASILY
STANDING UNSUPPORTED ONE FOOT IN FRONT: ABLE TO TAKE SMALL STEP INDEPENDENTLY AND HOLD 30 SECONDS
SITTING WITH BACK UNSUPPORTED BUT FEET SUPPORTED ON FLOOR OR ON A STOOL: ABLE TO SIT SAFELY AND SECURELY FOR 2 MINUTES
STANDING UNSUPPORTED WITH FEET TOGETHER: ABLE TO PLACE FEET TOGETHER INDEPENDENTLY AND STAND 1 MINUTE SAFELY
TURN 360 DEGREES: ABLE TO TURN 360 DEGREES SAFELY ONE SIDE ONLY 4 SECONDS OR LESS
STANDING TO SITTING: SITS SAFELY WITH MINIMAL USE OF HANDS
STANDING ON ONE LEG: TRIES TO LIFT LEG UNABLE TO HOLD 3 SECONDS BUT REMAINS STANDING INDEPENDENTLY
PLACE ALTERNATE FOOT ON STEP OR STOOL WHILE STANDING UNSUPPORTED: LOOKS BEHIND ONE SIDE ONLY OTHER SIDE SHOWS LESS WEIGHT SHIFT
REACHING FORWARD WITH OUTSTRETCHED ARM WHILE STANDING: CAN REACH FORWARD CONFIDENTLY 25 CM (10 INCHES)
STANDING UNSUPPORTED: ABLE TO STAND SAFELY FOR 2 MINUTES
STANDING UNSUPPORTED WITH EYES CLOSED: ABLE TO STAND 10 SECONDS SAFELY
STANDING TO SITTING: ABLE TO STAND WITHOUT USING HANDS AND STABILIZE INDEPENDENTLY
TRANSFERS: ABLE TO TRANSFER SAFELY WITH MINOR USE OF HANDS

## 2024-12-21 ASSESSMENT — PAIN - FUNCTIONAL ASSESSMENT: PAIN_FUNCTIONAL_ASSESSMENT: 0-10

## 2024-12-21 ASSESSMENT — PAIN SCALES - GENERAL: PAINLEVEL_OUTOF10: 0 - NO PAIN

## 2024-12-21 ASSESSMENT — PATIENT HEALTH QUESTIONNAIRE - PHQ9
1. LITTLE INTEREST OR PLEASURE IN DOING THINGS: NOT AT ALL
SUM OF ALL RESPONSES TO PHQ9 QUESTIONS 1 & 2: 0
2. FEELING DOWN, DEPRESSED OR HOPELESS: NOT AT ALL

## 2024-12-21 NOTE — NURSING NOTE
Assumed care of pt @ 8249, report received, pt here s/p CVA with ataxia, double vision, pt wears eye patch to right eye to help with double vision, pt states he would like to be discharged today rather than tomorrow, RN will talk with Dr Kirk about possible discharge today

## 2024-12-21 NOTE — NURSING NOTE
Pt requesting to be discharge today after therapy session, RN reviewed medications and discharge needs with patient extensively-all needs are in place including medications delivered, RN will notify Dr Kirk if okay for discharge today

## 2024-12-21 NOTE — PROGRESS NOTES
Occupational Therapy  DISCHARGE STATUS  Discharge Date:  12/21/22  Reason for Discharge:  Pt attained all STM/LTM goals  Upper Extremity Status    Left Right   Dominance  x   Gross Motor WFL WFL   Fine Motor WFL WFL   Balance   Static Dynamic   Sitting good good   Standing good good-     Visual / Perceptual  Visual Acuity Impaired   Visual Spatial Impaired   Hearing WFL   Proprioception WFL   Praxis WFL   Memory WFL   Attention WFL   Rt/Lt Neglect Impaired     Discharge Functional Status  Eating Independent   Grooming Modified independent   Toilet Hygiene Modified independent   Shower/Bathe Upper:  Modified independent  Lower:  Modified independent   Upper Body Dress Modified independent   Lower Body Dress Modified independent   On/Off Footwear Socks:  Modified independent  Shoes:  Modified independent   Toilet Transfer Modified independence   Car Transfer Modified independence   Meal Preparation  N/A spouse reports that she will perform this task   Kitchen Mobility Modified independent       12/20/24 1100   [facility] Daily Activity   Putting on and taking off regular lower body clothing 4   Bathing (including washing, rinsing, drying) 4   Putting on and taking off regular upper body clothing 4   Toileting, which includes using toilet, bedpan or urinal 4   Taking care of personal grooming such as brushing teeth 4   Eating Meals 4   Daily Activity - Total Score 24   OT Adult Other Outcome Measures   9 Hole Peg Test RUE: 35 sec LUE: 27 sec   Box and Block RUE: 39 blks/min  LUE: 51 blk/min   Other Outcome Measures  Strength RUE:  102# LUE: 100#   BITs Valera Cancellation: 3:17 min, 0 errors, 0 distractors  Trailmaking A: 1:37 min, 1 error, 10 interruptions     [facility] Daily Activity  Putting on and taking off regular lower body clothing: None  Bathing (including washing, rinsing, drying): None  Putting on and taking off regular upper body clothing: None  Toileting, which includes using toilet, bedpan or urinal:  None  Taking care of personal grooming such as brushing teeth: None  Eating Meals: None  Daily Activity - Total Score: 24    Treatment Summary:  Pt has attained all STM/LTM goals.  Family training completed with spouse, AE/DME recommended and handouts issued.  Pt ed on BUE HEP and handout issued.  Goal Attainment: all goals met   Remaining Goals/Needs:  None  Equipment Recommendations:   Pt has no AE/DME needs at this time.  OT Plan: Skilled OT HHC; home with spouse

## 2024-12-21 NOTE — CARE PLAN
Problem: IRF OT LTG Problem  Goal: Pt will perform toileting task with use of toilet safety frame with modified independent including toilet hygiene and clothing management.  Outcome: Met  Goal: Pt will perform lower body dressing without use of reacher, sock aide at seated level with modified independent.  Outcome: Met  Goal: Pt will perform toilet transfer with use of RW with modified independent to toilet safety frame. .  Outcome: Met  Goal: Pt will perform toilet transfer with use of RW with modified independent to toilet safety frame. .  Outcome: Met  Goal: Pt will perform light home management activity with use of RW with supervision with safe technique.  Outcome: Met  Goal: Pt will increase 9-Hole Peg Test score by 6 seconds demonstrating improvement in fine motor coordination in BUE for ADL performance.  Outcome: Met  Goal: Pt will increase Box and Blocks score by 10 blocks demonstrating improvement in gross motor coordination in BUE or ADL performance.  Outcome: Met  Goal: Pt will complete car transfer with supervision.  Outcome: Met

## 2024-12-21 NOTE — PROGRESS NOTES
Physical Therapy Discharge Summary       24 [id number]   PT  Visit   PT Received On 24   Response to Previous Treatment Patient with no complaints from previous session.   General   Reason for Referral CVA   Referred By Dr. Kirk   Past Medical History Relevant to Rehab heart failure, afib, gout, hyperlipidemia, SHOAIB, HTN, DJD, tinnitus   Patient Position Received Up in chair   Preferred Learning Style verbal;visual   General Comment Patient is agreeable to PT treatment.   Precautions   Hearing/Visual Limitations +diploplia   Medical Precautions Fall precautions   Post-Surgical Precautions MITT   Pain Assessment   Pain Assessment 0-10   0-10 (Numeric) Pain Score 0 - No pain   Strength RLE   R Hip Flexion 5/5   R Hip Extension 5/5   R Hip ABduction 5/5   R Hip ADduction 5/5   R Knee Flexion 5/5   R Knee Extension 5/5   R Ankle Dorsiflexion 5/5   R Ankle Plantar Flexion 5/5   Strength LLE   L Hip Flexion 5/5   L Hip Extension 5/5   L Hip ABduction 5/5   L Hip ADduction 5/5   L Knee Flexion 5/5   L Knee Extension 5/5   L Ankle Dorsiflexion 5/5   L Ankle Plantar Flexion 5/5   Therapeutic Exercise   Therapeutic Exercise Performed   (HEP with black theraband issued and discussed)   Balance/Neuromuscular Re-Education   Balance/Neuromuscular Re-Education Activity 1 GOETZ/56 (from )   Balance/Neuromuscular Re-Education Activity 2 TU.53 sec, no AD (from 1 minute 50 seconds)   Bed Mobility 1   Bed Mobility 1 Supine to sitting;Sitting to supine   Level of Assistance 1 Independent   Bed Mobility 2   Bed Mobility  2 Rolling right;Rolling left   Level of Assistance 2 Independent   Ambulation/Gait Training 1   Surface 1 Level tile;Uneven;Carpet   Device 1 No device   Assistance 1 Close supervision;Contact guard   Quality of Gait 1 Ataxic;Forward flexed posture;Decreased step length;NBOS;Listing   Comments/Distance (ft) 1 150 ft x 2, turns included, obstacle maneuvering and stepping on unstable surfaces.   Periodic instability noted, stephanie with turns and higher level balance tasks with CGA required to regain.   Gait Support Devices Gait belt   Ambulation/Gait Training 2   Surface 2 Level tile;Carpet   Device 2 Rolling walker   Gait Support Devices Gait belt   Assistance 2 Modified independent   Comments/Distance (ft) 2 150 ft x 3, turns, low-pile carpet   Ambulation/Gait Training 3   Surface 3 Level tile   Device 3 Rolling walker   Gait Support Devices Gait belt   Assistance 3 Modified independent   Comments/Distance (ft) 3 10 ft, 50 ft with turns   Transfer 1   Technique 1 Sit to stand;Stand to sit   Transfers 2   Transfer From 2 Wheelchair to;Mat to   Transfer to 2 Mat;Wheelchair   Technique 2 Stand pivot   Transfer Level of Assistance 2 Independent   Stairs   Rails 1 Bilateral   Device 1 Railing   Support Devices 1 Gait belt   Assistance 1 Modified independent   Comment/Number of Steps 1 up/down 1 flight, reciprocal pattern    Object From Floor   Devices No reacher   Assist Level Modified independent   Comments cone from floor   Wheelchair Activities   Propulsion Type 1 Manual   Level 1 Level tile   Method 1 Right upper extremity;Left upper extremity;Right lower extremity;Left lower extremity   Level of Assistance 1 Modified independent   Description/Details 1 ad yana around unit   Activity Tolerance   Endurance Tolerates 30 min exercise with multiple rests   PT Assessment   PT Assessment Results Decreased strength;Decreased endurance;Impaired balance;Decreased mobility;Decreased coordination;Impaired vision;Decreased safety awareness;Impaired judgement   Rehab Prognosis Excellent   Assessment Comment Pt has met all goals set by PT.  GOETZ score improved to 47/56 from 22/56.  TUG time decreased from 1 minute 50 seconds to 14 seconds.  Pt issued RW for home as well as HEP and theraband.  Pt encouraged to continue with OP PT after D/C.   Family training performed with spouse earlier this week.  Pt continues to c/o  diplopia at this point but states that it is slowly improving.    PT:Balance and diplopia are patient's greatest limiting factor at this time. Patient has made excellent functional progress during rehab stay. Continues to have ataxia and loss of balance with mobility which make him a fall risk. Wife has participated in family training and has good understanding of patient's needs for discharge. Rolling walker issued to patient for homegoing. Follow up PT recommended with outpatient physical therapy.     End of Session Patient Position Up in chair   PT Plan   Inpatient/Swing Bed or Outpatient Inpatient   PT Plan   Equipment Recommended upon Discharge Wheeled walker   PT Recommended Transfer Status Independent   PT - OK to Discharge Yes       12/21/24 1000   Shafer Balance Scale   1. Sitting to Standing 4   2. Standing Unsupported 4   3. Sitting with Back Unsupported but Feet Supported on Floor or on a Stool 4   4. Standing to Sitting 4   5.  Transfers 4   6. Standing Unsupported with Eyes Closed 4   7. Standing Unsupported with Feet Together 4   8. Reach Forward with Outstretched Arm While Standing 4   9.  Object from Floor from a Standing Position 4   10. Turning to Look Behind Over Left and Right Shoulders While Standing 3   11. Turn 360 Degrees 3   12. Place Alternate Foot on Step or Stool While Standing Unsupported 2   13. Standing Unsupported One Foot in Front 2   14. Standing on One Leg 1   Shafer Balance Score 47                 Problem: IRF PT STG Problem  Goal: Patient will transfer supine to sit and sit to supine with supervision assist to facilitate mobility.  Outcome: Met  Goal: Patient will perform TUG with least restrictive assistive device in 60 seconds or less to promote mobility and reduce fall risk with dynamic standing tasks.   Outcome: Met  Goal: Patient will increase strength in BLE  by 4+/5  grade throughout to improve functional mobility.   Outcome: Met     Problem: IRF PT LTG Problem  Goal:  Patient will roll right and left with independent assist to facilitate mobility.  Outcome: Met  Goal: Patient will transfer supine to sit and sit to supine with independent assist to facilitate mobility.  Outcome: Met  Goal: Patient will transfer sit to stand and stand to sit with independent assist to facilitate mobility.  Outcome: Met  Goal: Patient will transfer bed to chair and chair to bed with independent assist to facilitate mobility.  Outcome: Met  Goal: Patient will amb 150 feet rolling walker device including two turns on even surface with independent assist to facilitate safe mobility.    Outcome: Met  Goal: Patient will negotiate 12 stairs with two rail(s) and independent} assist with no device for in home and community.  Outcome: Met  Goal: Patient will propel wheelchair 150+ feet with two turns on even, uneven surface with independent assist to facilitate safe mobility.   Outcome: Met  Goal: Patient will perform TUG with least restrictive assistive device in 45 seconds or less to promote mobility and reduce fall risk with dynamic standing tasks.   Outcome: Met  Goal: Patient will improve GOETZ score to  30/56  to promote mobility and reduce fall risk with dynamic standing tasks.   Outcome: Met  Goal: Patient will increase BLE strength to 5//5 to improve functional mobility.   Outcome: Met  Goal: Patient will  object from floor with rolling walker device, with reacher, and independent assist to promote safe retrieval of dropped items.  Outcome: Met

## 2024-12-21 NOTE — CARE PLAN
Problem: IRF PT STG Problem  Goal: Patient will transfer supine to sit and sit to supine with supervision assist to facilitate mobility.  Outcome: Met  Goal: Patient will perform TUG with least restrictive assistive device in 60 seconds or less to promote mobility and reduce fall risk with dynamic standing tasks.   Outcome: Met  Goal: Patient will increase strength in BLE  by 4+/5  grade throughout to improve functional mobility.   Outcome: Met     Problem: IRF PT LTG Problem  Goal: Patient will roll right and left with independent assist to facilitate mobility.  Outcome: Met  Goal: Patient will transfer supine to sit and sit to supine with independent assist to facilitate mobility.  Outcome: Met  Goal: Patient will transfer sit to stand and stand to sit with independent assist to facilitate mobility.  Outcome: Met  Goal: Patient will transfer bed to chair and chair to bed with independent assist to facilitate mobility.  Outcome: Met  Goal: Patient will amb 150 feet rolling walker device including two turns on even surface with independent assist to facilitate safe mobility.    Outcome: Met  Goal: Patient will negotiate 12 stairs with two rail(s) and independent} assist with no device for in home and community.  Outcome: Met  Goal: Patient will propel wheelchair 150+ feet with two turns on even, uneven surface with independent assist to facilitate safe mobility.   Outcome: Met  Goal: Patient will perform TUG with least restrictive assistive device in 45 seconds or less to promote mobility and reduce fall risk with dynamic standing tasks.   Outcome: Met  Goal: Patient will improve GOETZ score to  30/56  to promote mobility and reduce fall risk with dynamic standing tasks.   Outcome: Met  Goal: Patient will increase BLE strength to 5//5 to improve functional mobility.   Outcome: Met  Goal: Patient will  object from floor with rolling walker device, with reacher, and independent assist to promote safe retrieval  of dropped items.  Outcome: Met

## 2024-12-23 ENCOUNTER — APPOINTMENT (OUTPATIENT)
Dept: CARDIAC REHAB | Facility: CLINIC | Age: 69
End: 2024-12-23
Payer: MEDICARE

## 2024-12-23 ENCOUNTER — PATIENT OUTREACH (OUTPATIENT)
Dept: PRIMARY CARE | Facility: CLINIC | Age: 69
End: 2024-12-23
Payer: MEDICARE

## 2024-12-23 NOTE — DISCHARGE SUMMARY
Discharge Diagnosis  Ischemic stroke (Multi)    Issues Requiring Follow-Up      Test Results Pending At Discharge  Pending Labs       No current pending labs.            Hospital Course   This is a 69-year-old man who presented to [facility] on December 6, 2024 when he woke up with double vision and difficulty speaking. MRI did show abnormal diffusion weighted imaging in the superior avila and inferior midbrain posteriorly in the midline. Placed on aspirin and Plavix. He has a history of severe cardiomyopathy with aortic valve regurgitation, he had ascending aorta replacement with left atrial appendage clipping in September 2024. The patient was going to transition to [facility] but had not done so because of financial reasons. Currently, he says that he continues to have double vision which is his main deficit. The patient was admitted to the inpatient rehabilitation unit and received intensive physical therapy, occupational therapy and rehabilitation nursing care.  The patient made strides toward functional independence and at the time of discharge was at a supervised to modified independent level with mobility, transfers and ADLs.  I coordinated with cardiology and neurology and changed DAPT over to AC w/ Eliquis as cardio had suggested prior to stroke. Home health care was arranged.  The patient will be following up with the providers as outlined in the discharge instructions.  Discharge medications were reconciled in detail and the patient and family were educated on appropriate medication use.  Prescriptions were provided either in hardcopy or electronic format.  The clinical condition was stable at the time of discharge.      Pertinent Physical Exam At Time of Discharge  Physical Exam  Constitutional:       General: He is not in acute distress.     Appearance: He is not toxic-appearing.   HENT:      Head: Normocephalic and atraumatic.      Mouth/Throat:      Mouth: Mucous membranes are moist.   Eyes:       Pupils: Pupils are equal, round, and reactive to light.   Cardiovascular:      Rate and Rhythm: Normal rate and regular rhythm.      Heart sounds: No murmur heard.  Pulmonary:      Breath sounds: Normal breath sounds. No wheezing, rhonchi or rales.   Abdominal:      General: There is no distension.      Palpations: Abdomen is soft.   Musculoskeletal:      Right lower leg: No edema.      Left lower leg: No edema.   Neurological:      Mental Status: He is alert and oriented to person, place, and time.      Diplopia.  The eyes do not converge.  It is hard to discern if he has ataxia or if it is all related to his visual deficit.  He is very weak through the hips and legs this seems bilateral.    Home Medications     Medication List      START taking these medications     Eliquis 5 mg tablet; Generic drug: apixaban; Take 1 tablet (5 mg) by   mouth 2 times a day.     CONTINUE taking these medications     atorvastatin 80 mg tablet; Commonly known as: Lipitor; Take 1 tablet (80   mg) by mouth once daily at bedtime.   empagliflozin 10 mg; Commonly known as: Jardiance; Take 1 tablet (10 mg)   by mouth once daily.   Entresto 24-26 mg tablet; Generic drug: sacubitriL-valsartan; Take 1   tablet by mouth 2 times a day.   eplerenone 25 mg tablet; Commonly known as: Inspra; Take 1 tablet (25   mg) by mouth once daily.   metoprolol succinate  mg 24 hr tablet; Commonly known as:   Toprol-XL; Take 1.5 tablets (150 mg) by mouth once daily. Do not crush or   chew.   torsemide 20 mg tablet; Commonly known as: Demadex; Take 2 tablets (40   mg) by mouth once daily. As needed daily for peripheral edema.     STOP taking these medications     aspirin 81 mg EC tablet   clopidogrel 75 mg tablet; Commonly known as: Plavix   hydrALAZINE 25 mg tablet; Commonly known as: Apresoline       Outpatient Follow-Up  Future Appointments   Date Time Provider Department Center   12/31/2024  2:40 PM Cristy See PharmD [id number] [facility]    1/8/2025  1:00 PM CONC STRESS/ECHO LAB [id number] [place]   1/27/2025  9:30 AM Yoim Buck MD [id number] [facility]   2/21/2025  9:45 AM Bernardo Ramírez MD [id number] [place] / Tr   3/11/2025  1:00 PM Amandeep Gonzalez MD PhD [id number] [facility]       Willi Kirk MD

## 2024-12-23 NOTE — PROGRESS NOTES
Discharge Facility: [facility]  Discharge Diagnosis: Ischemic Stroke  Admission Date: 12/8/24  Discharge Date: 12/21/24    PCP Appointment Date: Pt declined to make appointment  Specialist Appointment Date: Unknown  Hospital Encounter and Summary Linked: Yes    See discharge assessment below for further details    Engagement  Call Start Time: 0909 (12/23/2024  9:11 AM)    Medications  Medications reviewed with patient/caregiver?: Yes (12/23/2024  9:11 AM)  Is the patient having any side effects they believe may be caused by any medication additions or changes?: No (12/23/2024  9:11 AM)  Does the patient have all medications ordered at discharge?: Yes (12/23/2024  9:11 AM)  Care Management Interventions: No intervention needed (12/23/2024  9:11 AM)  Prescription Comments: pt sent home with script (12/23/2024  9:11 AM)  Is the patient taking all medications as directed (includes completed medication regime)?: Yes (12/23/2024  9:11 AM)  Care Management Interventions: Provided patient education (12/23/2024  9:11 AM)  Medication Comments: Pt denies problems obtaining or affording medication (12/23/2024  9:11 AM)    Appointments  Does the patient have a primary care provider?: Yes (pt declined to make a follow up appt) (12/23/2024  9:11 AM)  Care Management Interventions: Educated patient on importance of making appointment (12/23/2024  9:11 AM)  Has the patient kept scheduled appointments due by today?: Yes (12/23/2024  9:11 AM)  Care Management Interventions: Educated on importance of keeping appointment (12/23/2024  9:11 AM)    Self Management  What is the home health agency?: [facility] (12/23/2024  9:11 AM)  Has home health visited the patient within 72 hours of discharge?: No (12/23/2024  9:11 AM)    Patient Teaching  Does the patient have access to their discharge instructions?: Yes (12/23/2024  9:11 AM)  Care Management Interventions: Reviewed instructions with patient (12/23/2024  9:11 AM)  What is the  patient's perception of their health status since discharge?: Improving (12/23/2024  9:11 AM)  Is the patient/caregiver able to teach back the hierarchy of who to call/visit for symptoms/problems? PCP, Specialist, Home Health nurse, Urgent Care, ED, 911: Yes (12/23/2024  9:11 AM)  Patient/Caregiver Education Comments: This CM spoke with pt via phone. Pt reports doing well at home since discharge. New meds reviewed. Pt denies CP and SOB. Pt aware of my availability for non-emergent concerns. Contact info provided to patient (12/23/2024  9:11 AM)      Mulugeta Stack LPN

## 2024-12-24 ENCOUNTER — APPOINTMENT (OUTPATIENT)
Dept: CARDIAC REHAB | Facility: CLINIC | Age: 69
End: 2024-12-24
Payer: MEDICARE

## 2024-12-24 ENCOUNTER — DOCUMENTATION (OUTPATIENT)
Dept: CARDIAC REHAB | Facility: CLINIC | Age: 69
End: 2024-12-24
Payer: MEDICARE

## 2024-12-24 NOTE — PROGRESS NOTES
Cardiac Rehabilitation 60 day progress report    Name: David Chatman  Medical Record Number: [id number]  YOB: 1955  Age: 69 y.o.    Today’s Date: 12/24/2024  Primary Care Physician: Amandeep Vaca DO  Referring Physician: Yomi Buck MD  Program Location: [address]  Primary Diagnosis:   1. S/P aortic valve replacement and aortoplasty  Referral to Cardiac Rehab         Onset/Date of Diagnosis: 9/23/24  Session #:  8  AACVPR Risk Stratification: High  Falls Risk: Low  Psychosocial Assessment   Pre PHQ-9: 0  Sent PH-Q 9 to MD if score > 20: No; score < 20  Pt reported/currently experiencing stress: No  Patient uses stress management skills: No   History of: no history of anxiety or depression  Currently seeing a mental health provider: No  Social Support: Yes, Whom:Spouse and family  Quality of Life Survey: SF-36   SF-36 Pre Post   Physical Component Score     Mental Component Score       Learning Assessment:  Learning assessment/barriers: None  Preferred learning method: Visual  Barriers: None  Comments:  Stages of Change: Action    Psychosocial Plan  Goal Status: In Progress  Psychosocial Goals: Maintain or lower PH-Q 9 score by discharge  Psychosocial Interventions/Education:   11/6/24 initial PHQ-9 survey reviewed w/ pt at eval/MS        Nutrition Assessment:    Hyperlipidemia: Yes     Lipids:   Lab Results   Component Value Date    CHOL 118 12/06/2024    HDL 32.6 12/06/2024    TRIG 82 12/06/2024       Current Dietary Guidelines:  no special , watches sodium  Barriers to dietary change: no    Diet Habit Survey: Picture Your Plate  Pre: 48  Post: To be done at discharge.    Diabetes Assessment    Lab Results   Component Value Date    HGBA1C 6.1 (H) 12/06/2024       History of Diabetes: No    Weight Management    Height: 182.9 cm (6')  Weight: 99.3 kg (219 lb)  BMI (Calculated): 29.7    Nutrition Plan  Goal Status: In Progress  Nutrition Goals: Adapt a  low-sodium, DASH diet prior to discharge and Learn how to read and interpret nutrition labels prior to discharge  Nutrition Interventions/Education:   11/12/2024  Reviewed PYP with patient. PYP score 48. Encouraged pt to add 1 fruit and vegetable per day. Also discussed decreasing processed foods and sugar in coffee. Pt has already decreased the processed meats in his diet and his wife is trying to make lower sodium soups. He struggles to eat when he is by himself. Usually uses more processed foods when having to cook for himself. Pt has been buying a high protein soup. Encouraged him to take a picture of the label and bring in it so we can look at it together. Encouraged to further read PYP recommendations at home and follow up with questions as needed. Suggested pt bring wife to nutrition education lectures./Signature Payal Rankin RDN, LD  1/15/2024 Education on sodium intake was done. Discussed with patient the risks of excess levels of sodium and how to decrease dietary intake with provided list of substitutions. Handouts were given for home reference. /Signature Zeina Seo RN          Exercise Assessment    Home exercise:  No  Mode: NA  Frequency: NA  Duration: NA    Exercise Prescription     Exercise Prescription based on: Duke Activity Status Index (DASI)    DASI Score: 34.7   MET Score: 7   Frequency:  3 days/week   Mode: Treadmill, NuStep, and Recumbent Cycle   Duration: 33 total aerobic minutes   Intensity: RPE 12-14  Target HR:  To be calculated after 6 attended sessions.  MET Level: 3.7  Patient wears supplemental O2: No     Modality Workload METs Duration (minutes)   1 Pre-Exercise   4:00   2 Treadmill 2.4 mph @ 0.5% 3.0 11 :00   3 NuStep Load 5 @ 80 bennett  2.9 11 :00   4 Recumbent Bike Load 5 @ 60 rpm  3.7 11 :00   5 Schwinn Airdyne Load 1.3  2.9 11 :00   6 Post-Exercise   4:00     Resistance Training: No   Home Exercise Prescription given: To be given prior to discharge from  program.    Exercise Plan  Goal Status: In Progress  Exercise Goals: Increase exercise MET level by 5-10% each week, Increase total exercise duration to 30-45 minutes, and Establish a home exercise program before discharge  Exercise Interventions/Education:           Other Core Components/Risk Factor Assessment:    Medication adherence  Current Medications:   Medication Documentation Review Audit       Reviewed by Mulugeta Stack LPN (Coordinator) on 12/23/24 at 0915      Medication Order Taking? Sig Documenting Provider Last Dose Status   apixaban (Eliquis) 5 mg tablet [id number] Yes Take 1 tablet (5 mg) by mouth 2 times a day. Willi Kirk MD  Active   Discontinued 12/21/24 1557   atorvastatin (Lipitor) 80 mg tablet [id number] No Take 1 tablet (80 mg) by mouth once daily at bedtime. Yomi Buck MD 12/7/2024 Active   Discontinued 12/21/24 1557   empagliflozin (Jardiance) 10 mg [id number] No Take 1 tablet (10 mg) by mouth once daily. Yomi Buck MD 12/8/2024 Active   eplerenone (Inspra) 25 mg tablet [id number] No Take 1 tablet (25 mg) by mouth once daily. Yomi Buck MD 12/8/2024 Active   Discontinued 12/21/24 1557   metoprolol succinate XL (Toprol-XL) 100 mg 24 hr tablet [id number] No Take 1.5 tablets (150 mg) by mouth once daily. Do not crush or chew. Yomi Buck MD 12/8/2024 Active   sacubitriL-valsartan (Entresto) 24-26 mg tablet [id number] No Take 1 tablet by mouth 2 times a day. Yomi Buck MD 12/8/2024 Active   torsemide (Demadex) 20 mg tablet [id number] No Take 2 tablets (40 mg) by mouth once daily. As needed daily for peripheral edema. Francisco Angeles MD 12/8/2024 Active                                 Medication compliance:  Not taking entresto and jardiance due to cost   Uses pill box/organizer: No    Carries medication list: No     Blood Pressure Management  History of Hypertension: Yes   Medication Changes: No   Resting BP:  Visit Vitals  /66         Heart Failure Management  Hx of Heart Failure: current diagnosis  Type (selection): HFrEF  Most recent EF: 22  Onset of heart failure diagnosis: 9/23/24  Last heart failure hospitalization: 9/23/24  Number of HF admissions per year: 1  Symptoms: Fatigue with exertion  Is there a family Hx of HF: No   Does patient obtain daily weight Yes       Heart Failure Reassessment: In Progress  Heart Failure Goals: Able to verbalize signs and symptoms of fluid retention and when to contact MD, Adapt a low sodium diet and verbalize guidelines, and Obtain daily weight and verbalize proper method of obtaining weights  Smoking/Tobacco Assessment  Social History     Tobacco Use   Smoking Status Never   Smokeless Tobacco Never       Other Core Component Plan  Goal Status:In progress  Other Core Component Goals: Medication compliance, Verbalize medication usage and drug actions by discharge, and Achieve resting BP of < 130/80 by discharge  Other Core Component Interventions/Education:           Individual Patient Goals:    Decrease fluid in lungs by discharge  Increase heart function on next echo    Goal Status: In Progress    Staff Comments:  Mr. Chatman has not attended an exercise session since his last progress report due to a stroke.  A return to rehab date has not yet been determined.    Rehab Staff Signature: Lubna Ewing, MS, CCRP

## 2024-12-27 ENCOUNTER — APPOINTMENT (OUTPATIENT)
Dept: CARDIAC REHAB | Facility: CLINIC | Age: 69
End: 2024-12-27
Payer: MEDICARE

## 2024-12-27 ENCOUNTER — TELEPHONE (OUTPATIENT)
Dept: PRIMARY CARE | Facility: CLINIC | Age: 69
End: 2024-12-27
Payer: MEDICARE

## 2024-12-27 ENCOUNTER — TELEPHONE (OUTPATIENT)
Dept: CARDIOLOGY | Facility: HOSPITAL | Age: 69
End: 2024-12-27
Payer: MEDICARE

## 2024-12-27 NOTE — TELEPHONE ENCOUNTER
Gumaro from [facility] in [place] ([phone])  Pt is in need of  Outpt for OT and PT at [facility] they can do Neuro there. Pt scheduled for a follow up appt on Tues with you.

## 2024-12-30 ENCOUNTER — APPOINTMENT (OUTPATIENT)
Dept: CARDIAC REHAB | Facility: CLINIC | Age: 69
End: 2024-12-30
Payer: MEDICARE

## 2024-12-30 DIAGNOSIS — I50.21 ACUTE SYSTOLIC HEART FAILURE: ICD-10-CM

## 2024-12-30 DIAGNOSIS — Z95.828 S/P ASCENDING AORTIC REPLACEMENT: ICD-10-CM

## 2024-12-30 DIAGNOSIS — I42.8 NON-ISCHEMIC CARDIOMYOPATHY (MULTI): Primary | ICD-10-CM

## 2024-12-31 ENCOUNTER — OFFICE VISIT (OUTPATIENT)
Dept: PRIMARY CARE | Facility: CLINIC | Age: 69
End: 2024-12-31
Payer: MEDICARE

## 2024-12-31 ENCOUNTER — APPOINTMENT (OUTPATIENT)
Dept: CARDIAC REHAB | Facility: CLINIC | Age: 69
End: 2024-12-31
Payer: MEDICARE

## 2024-12-31 ENCOUNTER — APPOINTMENT (OUTPATIENT)
Dept: PHARMACY | Facility: HOSPITAL | Age: 69
End: 2024-12-31
Payer: COMMERCIAL

## 2024-12-31 ENCOUNTER — HOSPITAL ENCOUNTER (OUTPATIENT)
Dept: RADIOLOGY | Facility: HOSPITAL | Age: 69
Discharge: HOME | End: 2024-12-31
Payer: MEDICARE

## 2024-12-31 VITALS
OXYGEN SATURATION: 95 % | TEMPERATURE: 95.5 F | HEART RATE: 65 BPM | DIASTOLIC BLOOD PRESSURE: 68 MMHG | HEIGHT: 72 IN | WEIGHT: 218 LBS | SYSTOLIC BLOOD PRESSURE: 126 MMHG | BODY MASS INDEX: 29.53 KG/M2

## 2024-12-31 DIAGNOSIS — I42.9 CARDIOMYOPATHY, UNSPECIFIED TYPE (MULTI): ICD-10-CM

## 2024-12-31 DIAGNOSIS — H53.2 DIPLOPIA: ICD-10-CM

## 2024-12-31 DIAGNOSIS — M25.561 CHRONIC PAIN OF RIGHT KNEE: ICD-10-CM

## 2024-12-31 DIAGNOSIS — Z95.828 S/P ASCENDING AORTIC REPLACEMENT: ICD-10-CM

## 2024-12-31 DIAGNOSIS — I48.0 PAROXYSMAL ATRIAL FIBRILLATION (MULTI): Primary | ICD-10-CM

## 2024-12-31 DIAGNOSIS — I50.20 HFREF (HEART FAILURE WITH REDUCED EJECTION FRACTION): ICD-10-CM

## 2024-12-31 DIAGNOSIS — I63.9 ISCHEMIC STROKE (MULTI): ICD-10-CM

## 2024-12-31 DIAGNOSIS — I50.22 CHRONIC SYSTOLIC CONGESTIVE HEART FAILURE: ICD-10-CM

## 2024-12-31 DIAGNOSIS — G89.29 CHRONIC RIGHT SHOULDER PAIN: ICD-10-CM

## 2024-12-31 DIAGNOSIS — G89.29 CHRONIC PAIN OF RIGHT KNEE: ICD-10-CM

## 2024-12-31 DIAGNOSIS — I50.21 ACUTE SYSTOLIC HEART FAILURE: ICD-10-CM

## 2024-12-31 DIAGNOSIS — I42.8 NON-ISCHEMIC CARDIOMYOPATHY (MULTI): ICD-10-CM

## 2024-12-31 DIAGNOSIS — I48.0 PAROXYSMAL ATRIAL FIBRILLATION (MULTI): ICD-10-CM

## 2024-12-31 DIAGNOSIS — R26.89 BALANCE DISORDER: ICD-10-CM

## 2024-12-31 DIAGNOSIS — M25.511 CHRONIC RIGHT SHOULDER PAIN: ICD-10-CM

## 2024-12-31 DIAGNOSIS — Z95.2 S/P AORTIC VALVE REPLACEMENT AND AORTOPLASTY: ICD-10-CM

## 2024-12-31 DIAGNOSIS — I10 BENIGN HYPERTENSION: Primary | ICD-10-CM

## 2024-12-31 PROCEDURE — 1036F TOBACCO NON-USER: CPT | Performed by: FAMILY MEDICINE

## 2024-12-31 PROCEDURE — 1111F DSCHRG MED/CURRENT MED MERGE: CPT | Performed by: FAMILY MEDICINE

## 2024-12-31 PROCEDURE — 3008F BODY MASS INDEX DOCD: CPT | Performed by: FAMILY MEDICINE

## 2024-12-31 PROCEDURE — 73562 X-RAY EXAM OF KNEE 3: CPT | Mod: RIGHT SIDE | Performed by: RADIOLOGY

## 2024-12-31 PROCEDURE — 99214 OFFICE O/P EST MOD 30 MIN: CPT | Performed by: FAMILY MEDICINE

## 2024-12-31 PROCEDURE — 73030 X-RAY EXAM OF SHOULDER: CPT | Mod: RIGHT SIDE | Performed by: RADIOLOGY

## 2024-12-31 PROCEDURE — 1159F MED LIST DOCD IN RCRD: CPT | Performed by: FAMILY MEDICINE

## 2024-12-31 PROCEDURE — 1125F AMNT PAIN NOTED PAIN PRSNT: CPT | Performed by: FAMILY MEDICINE

## 2024-12-31 PROCEDURE — 73030 X-RAY EXAM OF SHOULDER: CPT | Mod: RT

## 2024-12-31 PROCEDURE — RXMED WILLOW AMBULATORY MEDICATION CHARGE

## 2024-12-31 PROCEDURE — 73562 X-RAY EXAM OF KNEE 3: CPT | Mod: RT

## 2024-12-31 PROCEDURE — 3074F SYST BP LT 130 MM HG: CPT | Performed by: FAMILY MEDICINE

## 2024-12-31 PROCEDURE — 1160F RVW MEDS BY RX/DR IN RCRD: CPT | Performed by: FAMILY MEDICINE

## 2024-12-31 PROCEDURE — 3078F DIAST BP <80 MM HG: CPT | Performed by: FAMILY MEDICINE

## 2024-12-31 PROCEDURE — 1124F ACP DISCUSS-NO DSCNMKR DOCD: CPT | Performed by: FAMILY MEDICINE

## 2024-12-31 RX ORDER — ACETAMINOPHEN 500 MG
TABLET ORAL
Qty: 1 KIT | Refills: 0 | Status: SHIPPED | OUTPATIENT
Start: 2024-12-31

## 2024-12-31 ASSESSMENT — PATIENT HEALTH QUESTIONNAIRE - PHQ9
2. FEELING DOWN, DEPRESSED OR HOPELESS: NOT AT ALL
1. LITTLE INTEREST OR PLEASURE IN DOING THINGS: NOT AT ALL
SUM OF ALL RESPONSES TO PHQ9 QUESTIONS 1 AND 2: 0

## 2024-12-31 ASSESSMENT — PAIN SCALES - GENERAL: PAINLEVEL_OUTOF10: 8

## 2024-12-31 NOTE — ASSESSMENT & PLAN NOTE
- Will obtain new x-rays with plans for supportive care including rest/modified activities ice/heat and Tylenol as needed for pain

## 2024-12-31 NOTE — PATIENT INSTRUCTIONS
Problem List Items Addressed This Visit             ICD-10-CM    Benign hypertension - Primary I10     - Blood pressure stable in office today         Relevant Orders    Comprehensive metabolic panel    Paroxysmal atrial fibrillation (Multi) I48.0     - Will continue on Eliquis secondary to history; clinical pharmacy referral given to make sure we can optimize ongoing medication access         Relevant Orders    Comprehensive metabolic panel    Referral to Clinical Pharmacy    Cardiomyopathy I42.9     - Will continue with routine cardiology follow-up as scheduled  -Please remain compliant on current medication regimen         Relevant Orders    Comprehensive metabolic panel    Referral to Clinical Pharmacy    Chronic systolic congestive heart failure I50.22     - Will diligently monitor weight and vitals with routine cardiology follow-up as scheduled         Relevant Orders    Comprehensive metabolic panel    S/P aortic valve replacement and aortoplasty Z95.2     - Procedural notes reviewed         Relevant Orders    Comprehensive metabolic panel    Referral to Clinical Pharmacy    Ischemic stroke (Multi) I63.9     - Hospital documentation reviewed and discussed  -Will continue with rehabilitation efforts with physical and Occupational Therapy referrals given  -Please continue with routine neurology follow-up         Relevant Orders    Comprehensive metabolic panel    Referral to Physical Therapy    Referral to Occupational Therapy    Referral to Clinical Pharmacy    Referral to Ophthalmology    Chronic pain of right knee M25.561, G89.29     - Will obtain new x-rays with plans for supportive care including rest/modified activities ice/heat and Tylenol as needed for pain         Relevant Orders    XR knee right 4+ views    Referral to Physical Therapy    Chronic right shoulder pain M25.511, G89.29    Relevant Orders    XR shoulder right 2+ views    Referral to Physical Therapy     Other Visit Diagnoses         Codes     Balance disorder     R26.89    Relevant Orders    Referral to Physical Therapy    Diplopia     H53.2    Relevant Orders    Referral to Ophthalmology            Counseling:       Medication education:         Education:  The patient is counseled regarding potential side-effects of all new medications        Understanding:  Patient expressed understanding        Adherence:  No barriers to adherence identified    ** Please excuse any errors in grammar or translation related to this dictation. Voice recognition software was utilized to prepare this document. **

## 2024-12-31 NOTE — ASSESSMENT & PLAN NOTE
David continues on anticoagulation with Eliquis. No dosage adjustment required for age, weight, and renal function.

## 2024-12-31 NOTE — ASSESSMENT & PLAN NOTE
- Hospital documentation reviewed and discussed  -Will continue with rehabilitation efforts with physical and Occupational Therapy referrals given  -Please continue with routine neurology follow-up

## 2024-12-31 NOTE — PROGRESS NOTES
Pharmacist Clinic: Cardiology Management    David Chatman is a 69 y.o. male was referred to Clinical Pharmacy Team for heart failure management.     Referring Provider: Yomi Buck MD    THIS IS A FOLLOW UP PATIENT APPOINTMENT. AT LAST VISIT ON 11/27/2024 WITH PHARMACIST (Cristy See).    Appointment was completed by the patient who was reached at .    REVIEW OF PAST APPNT (IF APPLICABLE):   Today's appointment was initial visit to establish care with Clinical Pharmacy. Overall, David is doing well. Does report a daily cough that occurs for a moment before it resolves.   He reports he was taking torsemide 20 mg 2 tablets every day instead of as needed. Will have patient start taking PRN for weight gain, edema, sob as prescribed at 10/14 cardiology appointment. He also reported he has not increased his metoprolol dosage yet, but will start today (metoprolol succinate 150 mg daily).   David has not started Jardiance or Entresto yet due to cost. Reports he has still been taking hydralazine in the interim, documentation to discontinue at 10/14 cardiology visit. As Entresto has not been started, will have patient continue hydralazine until Entresto is initiated (pending  PAP approval). Will also have him obtain safety labs 1 week after starting Jardiance and Entresto.  Will follow up in 1 month.    No Known Allergies    Past Medical History:   Diagnosis Date    Gout     Hyperlipidemia     Hypertension     SHOAIB (obstructive sleep apnea)     Paroxysmal atrial fibrillation (Multi) 08/24/2023       Current Outpatient Medications on File Prior to Visit   Medication Sig Dispense Refill    apixaban (Eliquis) 5 mg tablet Take 1 tablet (5 mg) by mouth 2 times a day. 60 tablet 11    atorvastatin (Lipitor) 80 mg tablet Take 1 tablet (80 mg) by mouth once daily at bedtime. 90 tablet 3    empagliflozin (Jardiance) 10 mg Take 1 tablet (10 mg) by mouth once daily. (Patient not taking: Reported on 12/31/2024)  "90 tablet 3    eplerenone (Inspra) 25 mg tablet Take 1 tablet (25 mg) by mouth once daily. 90 tablet 3    metoprolol succinate XL (Toprol-XL) 100 mg 24 hr tablet Take 1.5 tablets (150 mg) by mouth once daily. Do not crush or chew. 135 tablet 3    sacubitriL-valsartan (Entresto) 24-26 mg tablet Take 1 tablet by mouth 2 times a day. (Patient not taking: Reported on 12/31/2024) 180 tablet 3    torsemide (Demadex) 20 mg tablet Take 2 tablets (40 mg) by mouth once daily. As needed daily for peripheral edema.      [DISCONTINUED] apixaban (Eliquis) 5 mg tablet Take 1 tablet (5 mg) by mouth 2 times a day. 60 tablet 0     No current facility-administered medications on file prior to visit.         RELEVANT LAB RESULTS:  Lab Results   Component Value Date    BILITOT 0.6 12/06/2024    CALCIUM 9.0 12/20/2024    CO2 29 12/20/2024     12/20/2024    CREATININE 1.23 12/20/2024    GLUCOSE 90 12/20/2024    ALKPHOS 155 (H) 12/06/2024    K 3.4 (L) 12/20/2024    PROT 7.0 12/06/2024     12/20/2024    AST 17 12/06/2024    ALT 21 12/06/2024    BUN 24 (H) 12/20/2024    ANIONGAP 10 12/20/2024    MG 1.81 12/06/2024    PHOS 4.1 10/10/2024    ALBUMIN 3.7 12/06/2024    LIPASE 25 09/10/2024     Lab Results   Component Value Date    TRIG 82 12/06/2024    CHOL 118 12/06/2024    LDLCALC 69 12/06/2024    HDL 32.6 12/06/2024     No results found for: \"BMCBC\", \"CBCDIF\"     PHARMACEUTICAL ASSESSMENT:    MEDICATION RECONCILIATION    Drug Interactions? No clinically significant drug interactions requiring change in therapy found at the time of this visit.     Medication Documentation Review Audit       Reviewed by Amandeep Vaca DO (Physician) on 12/31/24 at 0942      Medication Order Taking? Sig Documenting Provider Last Dose Status     Discontinued 12/30/24 1016   apixaban (Eliquis) 5 mg tablet [id number] Yes Take 1 tablet (5 mg) by mouth 2 times a day. Yomi Buck MD  Active   atorvastatin (Lipitor) 80 mg tablet [id number] Yes " Take 1 tablet (80 mg) by mouth once daily at bedtime. Yomi Buck MD 12/7/2024 Active   empagliflozin (Jardiance) 10 mg [id number] No Take 1 tablet (10 mg) by mouth once daily.   Patient not taking: Reported on 12/31/2024    Yomi Buck MD 12/8/2024 Active   eplerenone (Inspra) 25 mg tablet [id number] Yes Take 1 tablet (25 mg) by mouth once daily. Yomi Buck MD 12/8/2024 Active   metoprolol succinate XL (Toprol-XL) 100 mg 24 hr tablet [id number] Yes Take 1.5 tablets (150 mg) by mouth once daily. Do not crush or chew. Yomi Buck MD 12/8/2024 Active   sacubitriL-valsartan (Entresto) 24-26 mg tablet [id number] No Take 1 tablet by mouth 2 times a day.   Patient not taking: Reported on 12/31/2024    Yomi Buck MD 12/8/2024 Active   torsemide (Demadex) 20 mg tablet [id number] Yes Take 2 tablets (40 mg) by mouth once daily. As needed daily for peripheral edema. Francisco Angeles MD 12/8/2024 Active                    DISEASE MANAGEMENT ASSESSMENT:     ANTICOAGULATION ASSESSMENT    The ASCVD Risk score (Don WHITNEY, et al., 2019) failed to calculate for the following reasons:    Risk score cannot be calculated because patient has a medical history suggesting prior/existing ASCVD    DIAGNOSIS: treatment of nonvalvular atrial fibrilliation stroke and systemic embolism;  likely embolic event with a right posterior cerebral artery; acute stroke. Discussed indication with referring provider who would prefer to continue Eliquis at this time, as neurology was supportive of DOAC vs DAPT during inpatient admission  - Patient is projected to be on anticoagulation long term  - [id number] Score: [5] (only included if diagnosis is atrial fibrillation)   Age: [<65 (0)] [65-74 (+1)] [> 75 (+2)]: 1  Sex: [Male/Female (+1)]: 0  CHF history: [No/Yes(+1)]: 1  Hypertension history: [No/Yes(+1)]: 1  Stroke/TIA/thromboembolism history: [No/Yes(+2)]: 2  Vascular disease history (prior MI, peripheral  artery disease, aortic plaque): [No/Yes(+1)]: 0  Diabetes history: [No/Yes(+1)]: 0    CURRENT PHARMACOTHERAPY:    Eliquis 5 mg BID  68 y/o  98.9 kg  Scr 1.23 mg/dL (12/20/2024)    RELEVANT PAST MEDICAL HISTORY:   Ischemic stroke, PAF, HFrEF, HLD, HTN, aortic valve replacement (bioprosthetic)    Affordability/Accessibility: [facility] active through 12/4/2025 - will send prescription for Eliquis addition  Adherence/Organization: confirms taking Eliquis twice daily  Adverse Reactions: no abnormal bruising or bleeding  Recent Hospitalizations: Yes, admitted 12/6-12/8 for dysphasia, found to have an acute stroke and initiated on aspirin/Plavix. Discharged to acute rehab through 12/21/2024. Converted at acute rehab from DAPT to Eliquis due to likely embolic event.  Recent Falls/Trauma: none  Changes in Tobacco or Alcohol Intake:   Tobacco: none, does not use  Alcohol: none, does not use    CHF ASSESSMENT     Symptom/Staging:  -Most recent ejection fraction: 22%  -NYHA Stage: II    Results for orders placed during the hospital encounter of 09/11/24    Transthoracic Echo (TTE) Limited    [facility], [address] [place] [address]  Tel [phone] and Fax [phone]    TRANSTHORACIC ECHOCARDIOGRAM REPORT      Patient Name:      NAN Norman Physician:    [id number] Clari Geronimo MD  Study Date:        9/25/2024            Ordering Provider:    [id number] PRASAD PRESSLEY  MRN/PID:           [id number]             Fellow:  Accession#:        [id number]         Nurse:  Date of Birth/Age: 1955 / 69 years Sonographer:          Ron Tariq RDCS, RVT  Gender:            M                    Additional Staff:  Height:            185.42 cm            Admit Date:           9/11/2024  Weight:            113.85 kg            Admission Status:     Inpatient -  Routine  BSA / BMI:         2.37 m2 / 33.12      Encounter#:           [id number]  kg/m2  Blood Pressure:    113/65 mmHg           Department Location:  [facility]    Study Type:    TRANSTHORACIC ECHO (TTE) LIMITED  Diagnosis/ICD: 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    Study Detail: The following Echo studies were performed: 2D, Doppler and color  flow. Definity used as a contrast agent for endocardial border  definition. Total contrast used for this procedure was 4 mL via IV  push.      PHYSICIAN INTERPRETATION:  Left Ventricle: Left ventricular ejection fraction is severely decreased, calculated by Vasquez's biplane at 22%. There is global hypokinesis of the left ventricle with minor regional variations. The left ventricular cavity size is severely dilated. Abnormal (paradoxical) septal motion consistent with post-operative status. Spectral Doppler shows a restrictive pattern of left ventricular diastolic filling. There is no definite left ventricular thrombus visualized.  Left Atrium: The left atrium is enlarged.  Right Ventricle: The right ventricle is upper limits of normal in size. There is reduced right ventricular systolic function.  Right Atrium: The right atrium is mild to moderately dilated.  Aortic Valve: There is a prosthetic aortic valve present. There is an Chiu bioprosthetic aortic valve, with a 29 mm reported size. There is no evidence of aortic valve regurgitation. S/p 29mm Konect Resilia bioprosthetic valved conduit. AVR gradients were not assessed by spectral Doppler though no obvious AI by color Doppler.  Mitral Valve: The mitral valve is normal in structure. There is mild mitral valve regurgitation.  Tricuspid Valve: The tricuspid valve is structurally normal. There is trace to mild tricuspid regurgitation. The Doppler estimated RVSP is within  normal limits at 25.8 mmHg.  Pulmonic Valve: The pulmonic valve is not well visualized. The pulmonic valve regurgitation was not well visualized.  Pericardium: Trivial to small pericardial effusion.  Aorta: The aortic root is abnormal. S/p 32mm Gelweave ascending aortic conduit and hemiarch.  In comparison to the previous echocardiogram(s): Compared with the prior exam, preop echo done on 9/20/2024 [facility], the LV was already severely dilated with moderately reduced function with a severely dilated aorta with moderate AI and the patient is now s/p acending aortic and arch replacement and Konect AVR. The LV remains severely dilated with a reducetion in LV systolic function , now severely reduced. The AVR gradients were not assessed today but there is no AI.    CONCLUSIONS:  1. Poorly visualized anatomical structures due to suboptimal image quality.  2. Left ventricular ejection fraction is severely decreased, calculated by Vasquez's biplane at 22%.  3. There is global hypokinesis of the left ventricle with minor regional variations.  4. Spectral Doppler shows a restrictive pattern of left ventricular diastolic filling.  5. Left ventricular cavity size is severely dilated.  6. Abnormal septal motion consistent with post-operative status.  7. No left ventricular thrombus visualized.  8. There is reduced right ventricular systolic function.  9. The left atrium is enlarged.  10. The right atrium is mild to moderately dilated.  11. Right ventricular systolic pressure is within normal limits.  12. S/p 29mm Konect Resilia bioprosthetic valved conduit. AVR gradients were not assessed by spectral Doppler though no obvious AI by color Doppler  13. S/p 32mm Gelweave ascending aortic conduit and hemiarch.  14. Compared with the prior exam, preop echo done on 9/20/2024 [facility], the LV was already severely dilated with moderately reduced function with a severely dilated aorta with moderate AI and  the patient is now s/p acending aortic and arch replacement and Konect AVR. The LV remains severely dilated with a reducetion in LV systolic function , now severely reduced. The AVR gradients were not assessed today but there is no AI.    QUANTITATIVE DATA SUMMARY:    2D MEASUREMENTS:           Normal Ranges:  LVEDV Index:     114 ml/m2      RA VOLUME BY A/L METHOD:          Normal Ranges:  RA Area A4C:             24.8 cm2      LV SYSTOLIC FUNCTION BY 2D PLANIMETRY (MOD):  Normal Ranges:  EF-A4C View:    24 % (>=55%)  EF-A2C View:    19 %  EF-Biplane:     22 %  LV EF Reported: 22 %      LV DIASTOLIC FUNCTION:            Normal Ranges:  MV Peak E:             1.14 m/s   (0.7-1.2 m/s)  MV e'                  0.061 m/s  (>8.0)  MV lateral e'          0.07 m/s  MV medial e'           0.05 m/s  E/e' Ratio:            18.70      (<8.0)  PulmV Sys Alfredito:         49.90 cm/s  PulmV Gray Alfredito:        54.10 cm/s  PulmV S/D Alfredito:         0.90      RIGHT VENTRICLE:  TAPSE: 10.4 mm  RV s'  0.04 m/s      TRICUSPID VALVE/RVSP:          Normal Ranges:  Peak TR Velocity:     2.39 m/s  RV Syst Pressure:     26 mmHg  (< 30mmHg)      Pulmonary Veins:  PulmV Gray Alfredito: 54.10 cm/s  PulmV S/D Alfredito:  0.90  PulmV Sys Alfredito:  49.90 cm/s      [id number] Clari Geronimo MD  Electronically signed on 9/25/2024 at 7:18:07 PM        ** Final **      Guideline-Directed Medical Therapy:  -ARNI: Yes, describe: Entresto 24-26 mg BID  -Beta Blocker: Yes, describe: Metoprolol succinate 100 mg 1.5 tablets (150 mg ) every day   -MRA: Yes, describe: Eplerenone 25 mg every day   -SGLT2i: Yes, describe: Jardiance 10 mg every day     Secondary Prevention:  -The ASCVD Risk score (Don WHITNEY, et al., 2019) failed to calculate for the following reasons:    Risk score cannot be calculated because patient has a medical history suggesting prior/existing ASCVD   -Aspirin 81mg? yes  -Statin?: Yes, describe: Atorvastatin 80 mg every day   -HTN?: Yes, describe: 108/76 as of  12/21/2024    CURRENT PHARMACOTHERAPY:   Jardiance 10 mg every day - patient not currently taking; did not receive from pharmacy  Eplerenone 25 mg every day - patient confirms taking as prescribed  Metoprolol succinate 100 mg 1.5 tablets (150 mg) every day - patient confirms taking as prescribed  Entresto 24-26 mg BID - patient not currently taking; did not receive from pharmacy  Torsemide 20 mg 2 tablets daily PRN - patient confirms taking as prescribed    Affordability:  PAP approved through 12/4/2025  Adherence/Compliance: See above  Adverse Effects: none per patient    Monitoring Weights at Home: Yes, weighs self daily and keeps a log  Home Weight Recordings: 220 lbs (no recent fluctuations)    Past In Office Weight Readings:   Wt Readings from Last 6 Encounters:   12/31/24 98.9 kg (218 lb)   12/20/24 92.9 kg (204 lb 14.4 oz)   12/08/24 98.3 kg (216 lb 12.8 oz)   11/06/24 99.3 kg (219 lb)   10/26/24 99.8 kg (220 lb)   10/23/24 101 kg (222 lb 9.6 oz)       Monitoring Blood Pressure at Home: No; does not have blood pressure cuff. Goes to cardiac rehab Mon/Weds/Fri  Home BP Recordings: None    Past In Office BP Readings:   BP Readings from Last 6 Encounters:   12/31/24 126/68   12/21/24 108/76   12/08/24 118/84   11/06/24 112/66   11/05/24 110/72   10/26/24 128/84       HEALTH MANAGEMENT    Maintaining fluid restriction (<2 L/day): N/A  Edema/swelling: No  Shortness of breath: No  Trouble sleeping/lying down: No  Dry/hacking cough: No   Recent Hospitalizations: Yes, describe: admitted 12/6-12/8 for dysphasia, found to have an acute stroke and initiated on aspirin/Plavix. Discharged to acute rehab through 12/21/2024. Converted at acute rehab from DAPT to Eliquis due to likely embolic event.     EDUCATION/COUNSELING:   - Counseled patient on MOA, expectations, duration of therapy, contraindications, administration, and monitoring parameters  - Counseled patient on lifestyle modifications that can decrease your risk  "of having complications (smoking cessation, losing weight, daily weights, vaccines)  - Counseled patient on fluid intake and weight management. Recommended to not consume more than 2 liters of fliuids per day. If they have gained more than 2-3 pounds within a 24 hours period (or 5 pounds in a week), contact their cardiologist  - Counseled patient of side effects that are indicative of bleeding such as dark tarry stool, unexplainable bruising, or vomiting up a coffee ground like substance  - Answered all patient questions and concerns     Prescription was written for \"Blood Pressure Monitor\"   Prescription was faxed to [facility] @ [phone]  Prescription has patient's date of birth AND contact information   Picture of patient's insurance information was included  Prescription has diagnosis of HTN documented   Prescription has which size cuff the patient would need   Standard: 14 in  Large: 16 in  XL: 21 in     These are digital, batter operated blood pressure cuffs. Once filled, they prefer for patients to go to their local [facility] to  - but they are able to ship if the patient does not have transportation.     Contact Information:   [facility]   Phone: [phone]   Fax: [phone]   Toll Free: [phone]     DISCUSSION/NOTES:   Today's appointment was 1 month follow up. Of note, patient was started on Eliquis during recent hospital admission. Per referring provider, okay to add to Clinical Pharmacy referral. Will send prescription to [facility] to have this added to the patient's  PAP benefit. Patient reports no abnormal bruising or bleeding at this time.  David reports he has not been taking Entresto or Jardiance at home as he did not receive a delivery from the pharmacy. Provided him [facility]'s phone number for his reference, I will also contact the pharmacy to ensure delivery of medications.  He is not currently monitoring his blood " pressure, will send BP cuff for patient to begin home monitoring. Discussed potential for GDMT titration at subsequent appointments.   Will follow up in 2 months.     ASSESSMENT:    Assessment/Plan   Problem List Items Addressed This Visit       Paroxysmal atrial fibrillation (Multi) - Primary     David continues on anticoagulation with Eliquis. No dosage adjustment required for age, weight, and renal function.         HFrEF (heart failure with reduced ejection fraction)     David is prescribed 4/4 GDMT. Will defer titration due to lack of home BP readings. Will follow up in 2 months to discuss potential titration at that time if BP allows.         Relevant Orders    Referral to Clinical Pharmacy       RECOMMENDATIONS/PLAN:    Continue:  Jardiance 10 mg every day   Eplerenone 25 mg every day   Metoprolol succinate 100 mg 1.5 tablets (150 mg) every day   Entresto 24-26 mg BID   Torsemide 20 mg 2 tablets daily PRN   Follow up: 2 months      Last Appnt with Referring Provider: 10/14/2024  Next Appnt with Referring Provider: 1/27/2025  Clinical Pharmacist follow up: 2/27/2025  VAF/Application Expiration: Yes    Date: 12/4/2025  Type of Encounter: Seb See PharmD    Verbal consent to manage patient's drug therapy was obtained from the patient . They were informed they may decline to participate or withdraw from participation in pharmacy services at any time.    Continue all meds under the continuation of care with the referring provider and clinical pharmacy team.

## 2024-12-31 NOTE — Clinical Note
David Gonsalez Dr. reports he has not been taking Entresto or Jardiance at home as he did not receive a delivery from the pharmacy. Provided him  [facility]'s phone number for his reference, I will also contact the pharmacy to ensure delivery of medications. Will also send Eliquis prescription for  PAP assistance. I have also sent a prescription for a BP monitor, he is agreeable to home monitoring in order to discuss titration of GDMT at future appointments. No changes today, will follow up in 2 months.

## 2024-12-31 NOTE — ASSESSMENT & PLAN NOTE
David is prescribed 4/4 GDMT. Will defer titration due to lack of home BP readings. Will follow up in 2 months to discuss potential titration at that time if BP allows.

## 2024-12-31 NOTE — PROGRESS NOTES
Outpatient Visit Note    Chief Complaint   Patient presents with    Hospital Follow-up         HPI:  David Chatman is a 69 y.o. male who presents to the office for hospital follow-up.  He was last seen in the office in October for hospital follow-up.    Past medical history significant for hypertension, hyperlipidemia, gout, chronic sinus issues followed by ENT, SHOAIB on CPAP, osteoarthritis /Osgood Mejias of left knee and prediabetes.    He was last seen in the office in April 2024 for annual Medicare Wellness Visit.            He was previously established with [facility] with last visit on 04/27/2023 for 6 month follow-up.    In review from last encounter, patient presented to the emergency department on 7/30/2024 secondary to complaints of shortness of breath.  Stated that symptoms had been going on for approximately 1 month to which he was having notable trouble breathing at night creating sleep disruption.  Stated that he had been trying to lose weight to help with symptoms that he felt tightness in his chest when he became short of breath.  EKG was performed showing normal sinus rhythm with left bundle branch block.  Blood work showed stable but intermediate troponins with significantly elevated BNP.  Chest x-ray revealed pulmonary vascular congestion with small effusion consistent with heart failure.  He was given Lasix with admission recommended though patient declined preferring to follow-up with cardiology as outpatient.  He did have subsequent outpatient cardiology follow-up with Dr. Carter on 8/12/2024.  At that time, patient was continued on antihypertensive regimen with plans for pharmacologic stress test and echocardiogram.  GI consultation was additionally noted secondary to anemia.  Dietary modification was advocated.    He subsequently returned to the emergency department on 9/10/2024 secondary to chest pain.  Was noted to have thoracic aneurysm during echocardiogram  with cardiology.  Aneurysm measured at 6.2% with chest discomfort.  Cardiology contacted thoracic surgery and recommended CT angio at hospital to ensure no dissection.  Plan was separately then set for cardiac catheterization.  Blood work at emergency department was unremarkable with troponin within appropriate range.  CT angio without evidence of aortic dissection though prominent aortic aneurysm is appreciated.  Patient was admitted for cardiac surgery evaluation and cardiac catheterization.  He was ultimately admitted with diagnosis of CHF to which acute decline in EF and large thoracic aortic aneurysm was appreciated.  Cardiac cath was performed which did not show any significant coronary lesions but CT surgery evaluated and felt thoracic aneurysm was significant and required urgent surgery.  He was ultimately transferred to [facility].  Patient ultimately underwent thoracic surgery on 9/23/2024.  Patient was stabilized with medication regimen adjusted.  He was ultimately discharged on regimen of amiodarone, atorvastatin, hydralazine, eplerenone, metoprolol.    Patient presented to the emergency department on 12/6/2024 secondary to complaints of double vision and difficulty speaking.  Stated to have felt normal when going to bed though symptoms were present when waking up.  He was having trouble walking secondary to vision issues.  Denied any similar symptomatic complaints.  Brain attack was activated with patient sent for CEA CT scan.  Patient was revealed to have an acute/subacute CVA.  Case was reviewed with neurology who recommended initiation of Plavix to patient's baseline aspirin.  Blood work was unremarkable and with patient admitted to medicine service for further evaluation.  He continued to have noted vision issues.  It was confirmed that patient did undergo cardiac surgery in September with bioprosthetic aortic valve placed.  Symptoms were thought to have been caused by an embolic stroke though  this was ruled out through further evaluation with neurology.  Discussion was had regarding transition to Eliquis though this was not done secondary to financial reasons.  He was continued on dual antiplatelet platelet therapy and discharged on 12/8/2024 to acute rehab.  During stay patient was successfully transitioned to Eliquis with follow-up as coordinated with cardiology and neurology.  Home health care was arranged with patient continued on discharge regimen including max dose atorvastatin, Jardiance, Entresto, metoprolol Inspra, and torsemide.  He was discharged home on 12/21/2024.    He reports ongoing visual issues secondary to the malalignment of left eye.  This has continued to create balance difficulties.  Additionally noted exacerbated right knee and right shoulder pain after completing physical therapy exercises at rehabilitation facility.  Did have assessment for home therapy to which she was encouraged to continue with in person PT/OT.  Has not been contacted to set this up.  Patient currently has cardiology follow-up scheduled in January as well as scheduled neurology follow-up.  Is set to see ophthalmology, though appointment is not till March.    Has been compliant with medications denying any adverse side effects.  Does express concerns on ongoing affordability of Eliquis.    Advanced directives: None on file    Current Medications  Current Outpatient Medications   Medication Instructions    apixaban (ELIQUIS) 5 mg, oral, 2 times daily    atorvastatin (LIPITOR) 80 mg, oral, Nightly    empagliflozin (JARDIANCE) 10 mg, oral, Daily    eplerenone (INSPRA) 25 mg, oral, Daily    metoprolol succinate XL (TOPROL-XL) 150 mg, oral, Daily, Do not crush or chew.    sacubitriL-valsartan (Entresto) 24-26 mg tablet 1 tablet, oral, 2 times daily    torsemide (DEMADEX) 40 mg, oral, Daily, As needed daily for peripheral edema.        Allergies  No Known Allergies     Past Medical History:   Diagnosis Date    Gout      Hyperlipidemia     Hypertension     SHOAIB (obstructive sleep apnea)     Paroxysmal atrial fibrillation (Multi) 08/24/2023      Past Surgical History:   Procedure Laterality Date    CARDIAC CATHETERIZATION N/A 9/11/2024    Procedure: Left Heart Cath;  Surgeon: Zacarias Jarquin DO;  Location: [facility] Cardiac Cath Lab;  Service: Cardiovascular;  Laterality: N/A;    SINUS SURGERY       No family history on file.  Social History     Tobacco Use    Smoking status: Never    Smokeless tobacco: Never   Vaping Use    Vaping status: Never Used   Substance Use Topics    Alcohol use: Not Currently     Alcohol/week: 2.0 standard drinks of alcohol     Types: 2 Cans of beer per week     Comment: once a week    Drug use: Never       ROS  All pertinent positive symptoms are included in the history of present illness.  All other systems have been reviewed and are negative and noncontributory to this patient's current ailments.      VITAL SIGNS  Vitals:    12/31/24 0938   BP: 126/68   Pulse: 65   Temp: 35.3 °C (95.5 °F)   SpO2: 95%       PHYSICAL EXAM  GENERAL APPEARANCE: alert and oriented, Pleasant and cooperative, No Acute Distress  HEENT: PERRLA, oral deviation of left eye, MMM  HEART: RRR, normal S1S2, no murmurs, click or rubs  LUNGS: clear to auscultation bilaterally, no wheezes/rhonchi/rales  EXTREMITIES: no edema  SKIN: normal, no rash, unremarkable  NEUROLOGIC EXAM: non-focal exam  MUSCULOSKELETAL: Mildly reduced to right glenohumeral and right knee range of motion secondary to pain, palpable right TTP most prominent over patellar tendon  PSYCH: affect is normal, eye contact is good      Assessment/Plan   Problem List Items Addressed This Visit             ICD-10-CM    Benign hypertension - Primary I10     - Blood pressure stable in office today         Relevant Orders    Comprehensive metabolic panel    Paroxysmal atrial fibrillation (Multi) I48.0     - Will continue on Eliquis secondary to history; clinical pharmacy referral  given to make sure we can optimize ongoing medication access         Relevant Orders    Comprehensive metabolic panel    Referral to Clinical Pharmacy    Cardiomyopathy I42.9     - Will continue with routine cardiology follow-up as scheduled  -Please remain compliant on current medication regimen         Relevant Orders    Comprehensive metabolic panel    Referral to Clinical Pharmacy    Chronic systolic congestive heart failure I50.22     - Will diligently monitor weight and vitals with routine cardiology follow-up as scheduled         Relevant Orders    Comprehensive metabolic panel    S/P aortic valve replacement and aortoplasty Z95.2     - Procedural notes reviewed         Relevant Orders    Comprehensive metabolic panel    Referral to Clinical Pharmacy    Ischemic stroke (Multi) I63.9     - Hospital documentation reviewed and discussed  -Will continue with rehabilitation efforts with physical and Occupational Therapy referrals given  -Please continue with routine neurology follow-up         Relevant Orders    Comprehensive metabolic panel    Referral to Physical Therapy    Referral to Occupational Therapy    Referral to Clinical Pharmacy    Referral to Ophthalmology    Chronic pain of right knee M25.561, G89.29     - Will obtain new x-rays with plans for supportive care including rest/modified activities ice/heat and Tylenol as needed for pain         Relevant Orders    XR knee right 4+ views    Referral to Physical Therapy    Chronic right shoulder pain M25.511, G89.29    Relevant Orders    XR shoulder right 2+ views    Referral to Physical Therapy     Other Visit Diagnoses         Codes    Balance disorder     R26.89    Relevant Orders    Referral to Physical Therapy    Diplopia     H53.2    Relevant Orders    Referral to Ophthalmology            Counseling:       Medication education:         Education:  The patient is counseled regarding potential side-effects of all new medications        Understanding:   Patient expressed understanding        Adherence:  No barriers to adherence identified    ** Please excuse any errors in grammar or translation related to this dictation. Voice recognition software was utilized to prepare this document. **

## 2024-12-31 NOTE — ASSESSMENT & PLAN NOTE
- Will continue on Eliquis secondary to history; clinical pharmacy referral given to make sure we can optimize ongoing medication access

## 2024-12-31 NOTE — ASSESSMENT & PLAN NOTE
- Will continue with routine cardiology follow-up as scheduled  -Please remain compliant on current medication regimen

## 2025-01-02 ENCOUNTER — TELEPHONE (OUTPATIENT)
Dept: CARDIOLOGY | Facility: HOSPITAL | Age: 70
End: 2025-01-02
Payer: MEDICARE

## 2025-01-03 ENCOUNTER — APPOINTMENT (OUTPATIENT)
Dept: CARDIAC REHAB | Facility: CLINIC | Age: 70
End: 2025-01-03
Payer: MEDICARE

## 2025-01-03 ENCOUNTER — TELEPHONE (OUTPATIENT)
Dept: OPHTHALMOLOGY | Facility: CLINIC | Age: 70
End: 2025-01-03
Payer: MEDICARE

## 2025-01-06 ENCOUNTER — PHARMACY VISIT (OUTPATIENT)
Dept: PHARMACY | Facility: CLINIC | Age: 70
End: 2025-01-06
Payer: COMMERCIAL

## 2025-01-06 ENCOUNTER — APPOINTMENT (OUTPATIENT)
Dept: CARDIAC REHAB | Facility: CLINIC | Age: 70
End: 2025-01-06
Payer: MEDICARE

## 2025-01-07 ENCOUNTER — APPOINTMENT (OUTPATIENT)
Dept: CARDIAC REHAB | Facility: CLINIC | Age: 70
End: 2025-01-07
Payer: MEDICARE

## 2025-01-07 ENCOUNTER — PATIENT OUTREACH (OUTPATIENT)
Dept: PRIMARY CARE | Facility: CLINIC | Age: 70
End: 2025-01-07
Payer: MEDICARE

## 2025-01-07 NOTE — PROGRESS NOTES
Unable to reach patient for call back after recent hospitalization.   LVM with call back number for patient to call if needed   If no voicemail available call attempts x 2 were made to contact the patient to assist with any questions or concerns patient may have.    Pt stated he did not want to speak to this nurse    Mulugeta Stack LPN

## 2025-01-08 ENCOUNTER — APPOINTMENT (OUTPATIENT)
Dept: CARDIOLOGY | Facility: CLINIC | Age: 70
End: 2025-01-08
Payer: MEDICARE

## 2025-01-08 ENCOUNTER — EVALUATION (OUTPATIENT)
Dept: PHYSICAL THERAPY | Facility: CLINIC | Age: 70
End: 2025-01-08
Payer: MEDICARE

## 2025-01-08 ENCOUNTER — TELEPHONE (OUTPATIENT)
Dept: CARDIOLOGY | Facility: HOSPITAL | Age: 70
End: 2025-01-08
Payer: MEDICARE

## 2025-01-08 DIAGNOSIS — G89.29 CHRONIC RIGHT SHOULDER PAIN: ICD-10-CM

## 2025-01-08 DIAGNOSIS — M25.511 CHRONIC RIGHT SHOULDER PAIN: ICD-10-CM

## 2025-01-08 DIAGNOSIS — I63.9 ISCHEMIC STROKE (MULTI): ICD-10-CM

## 2025-01-08 DIAGNOSIS — G89.29 CHRONIC PAIN OF RIGHT KNEE: ICD-10-CM

## 2025-01-08 DIAGNOSIS — M25.561 CHRONIC PAIN OF RIGHT KNEE: ICD-10-CM

## 2025-01-08 DIAGNOSIS — R26.89 BALANCE DISORDER: ICD-10-CM

## 2025-01-08 PROCEDURE — 97163 PT EVAL HIGH COMPLEX 45 MIN: CPT | Mod: GP

## 2025-01-08 PROCEDURE — 97112 NEUROMUSCULAR REEDUCATION: CPT | Mod: GP

## 2025-01-08 ASSESSMENT — ENCOUNTER SYMPTOMS
PAIN SCALE: 4
PAIN SCALE AT LOWEST: 3
PAIN SCALE AT HIGHEST: 6

## 2025-01-08 ASSESSMENT — ACTIVITIES OF DAILY LIVING (ADL): POOR_BALANCE: 1

## 2025-01-08 ASSESSMENT — PAIN SCALES - GENERAL: PAINLEVEL_OUTOF10: 6

## 2025-01-08 ASSESSMENT — PAIN - FUNCTIONAL ASSESSMENT: PAIN_FUNCTIONAL_ASSESSMENT: 0-10

## 2025-01-08 NOTE — PROGRESS NOTES
Physical Therapy Evaluation and Treatment     Patient Name: David Chatman  MRN: [id number]  Encounter date: 2025  Time Calculation  Start Time: 1705  Stop Time: 1750  Time Calculation (min): 45 min  PT Evaluation Time Entry  PT Evaluation (Complex) Time Entry: 35  PT Therapeutic Procedures Time Entry  Neuromuscular Re-Education Time Entry: 8    Visit # 1 of 20  Visits/Dates Authorized: [place]    Current Problem:   Problem List Items Addressed This Visit             ICD-10-CM    Ischemic stroke (Multi) I63.9    Relevant Orders    Follow Up In Physical Therapy    Chronic pain of right knee M25.561, G89.29    Relevant Orders    Follow Up In Physical Therapy    Chronic right shoulder pain M25.511, G89.29    Relevant Orders    Follow Up In Physical Therapy     Other Visit Diagnoses         Codes    Balance disorder     R26.89    Relevant Orders    Follow Up In Physical Therapy          Precautions:   Stroke, R shoulder OA, R knee OA,        Subjective Evaluation    History of Present Illness  Date of onset: 2024  Mechanism of injury: 68 yo male presents with c/o balance deficits and gait deficits after suffering a stroke on 24. Pt has R sided weakness and ongoing visual issues secondary to the malalignment of left eye. He states he struggles the most with his balance and vision. Pt states he has double vision and currently squints his eye to help his visual field. Pt states his limited vision does impair his balance and causes him to run into walls and doors at times. He has a follow up scheduled with an eye doctor in February. He also has a history of chronic R knee and R shoulder pain that has been exacerbated since his stroke.     Quality of life: good    Pain  Current pain ratin  At best pain rating: 3  At worst pain ratin  Location: R knee, R shoulder  Quality: dull ache and sharp  Relieving factors: change in position  Aggravating factors: movement and overhead activity (walking,  stairs)    Patient Goals  Patient goals for therapy: improved balance  Patient goal: walk better, improve my balance and help my vision       Pain Assessment: 0-10  0-10 (Numeric) Pain Score: 6  Pain Location: Shoulder  Pain Orientation: Right    Objective     Strength/Myotome Testing     Left Shoulder     Planes of Motion   Flexion: 4+   Extension: 4+   Abduction: 4+   Adduction: 4+   External rotation at 0°: 4+   Internal rotation at 0°: 4+     Isolated Muscles   Upper trapezius: 5     Right Shoulder     Planes of Motion   Flexion: 3+   Extension: 4-   Abduction: 3+   Adduction: 4-   External rotation at 0°: 3+   Internal rotation at 0°: 4-     Isolated Muscles   Upper trapezius: 5     Left Elbow   Flexion: 5  Extension: 5    Right Elbow   Flexion: 5  Extension: 5    Left Wrist/Hand   Wrist extension: 4+  Wrist flexion: 4+    Right Wrist/Hand   Wrist extension: 5  Wrist flexion: 5    Left Hip   Planes of Motion   Extension: 4  Abduction: 4  Adduction: 4  External rotation: 4+  Internal rotation: 4+    Right Hip   Planes of Motion   Flexion: 4-  Extension: 4-  Abduction: 4-  Adduction: 4-  External rotation: 4-  Internal rotation: 4-    Left Knee   Flexion: 4+  Extension: 4+    Right Knee   Flexion: 4-  Extension: 4-    Left Ankle/Foot   Dorsiflexion: 4+  Plantar flexion: 4+    Right Ankle/Foot   Dorsiflexion: 4  Plantar flexion: 4    Ambulation     Observational Gait   Decreased walking speed and stride length.   Base of support: increased    Additional Observational Gait Details  Lists to the right at times, wide MARTHA,     Functional Assessment   Squat   Pain and trunk lean left.     Single Leg Stance   Left: 9 seconds  Right: 7 seconds           Balance:   Romberg: Firm Eyes Open 30 sec, Eyes Closed 30 sec , Foam Eyes Open 30 sec, Eyes Closed 21 sec   Tandem: Firm Eyes Open 30 sec, Eyes Closed 17 sec   Single leg: Firm Eyes Open 9 secLLE, 7 seconds RLE  Timed Up and Go Test  How many seconds did it take to  complete the 5 tasks?: 11 seconds  TUG Comment: no device  Other Measures  30 Second Sit to Stand: 8  Dynamic Gait Index (DGI): 13/24, indicates high fall risk  Other Outcome Measures: Stroke Impact Scale: 66/80; 83% function    Treatments:       Neuromuscular Re-Ed 48592:   Exercises  - Romberg Stance with Eyes Closed  - 1 x daily - 1 sets - 3 reps - 15 hold  - Standing Tandem Balance with Unilateral Counter Support  - 1 x daily - 1 sets - 3 reps - 30 hold  - Walking Forward with Slow Head Nods  - 1 x daily - 1 sets - 8 reps  - Walking Forward with Slow Head Rotation  - 1 x daily - 1 sets - 8 reps    HEP / Access Codes:   Access Code: [email or web address]  URL: [email or web address]  Date: 01/08/2025  Prepared by: Yonas Maurice    Exercises  - Romberg Stance with Eyes Closed  - 1 x daily - 1 sets - 3 reps - 15 hold  - Standing Tandem Balance with Unilateral Counter Support  - 1 x daily - 1 sets - 3 reps - 30 hold  - Walking Forward with Slow Head Nods  - 1 x daily - 1 sets - 8 reps  - Walking Forward with Slow Head Rotation  - 1 x daily - 1 sets - 8 reps      Assessment & Plan     Assessment  Impairments: abnormal coordination, abnormal gait, activity intolerance, impaired balance, impaired physical strength, lacks appropriate home exercise program, pain with function and safety issue  Assessment details: Patient presents with R sided weakness and unsteady gait after suffering a stroke. Key impairments include: decreased ROM and strength, fair balance and compensatory gait patterning, LE coordination deficits and moderate fall risk. Patient would benefit from skilled physical therapy services to address these impairments and restore normal ROM and strength to reduce pain and improving activity participation.    Prognosis: good    Goals  walk better, improve my balance and help my vision    Plan  Therapy options: will be seen for skilled physical therapy services  Planned therapy interventions: stretching, strengthening,  motor coordination training, balance/weight-bearing training, neuromuscular re-education, flexibility, functional ROM exercises, gait training, home exercise program, transfer training, therapeutic activities and fine motor coordination training  Frequency: 2x week  Duration in visits: 20  Duration in weeks: 12  Treatment plan discussed with: patient  Plan details: 2x week for 20 visits.        High complexity due to patient's clinical presentation being unstable, with evolving and unpredictable characteristics, with comorbidities/complexities to include open heart surgery 9/2024, HTN, and R eye vision deficits, all of which may negatively impact rehab tolerance and progression.     Post-Treatment Symptoms:  Response to Interventions: No change in pain    Goals:   Active       PT Problem       PT Goal 1       Start:  01/09/25    Expected End:  04/09/25       Patient to safely get on/off chair demonstrating proper transfer techniques independently to improve safe   functional mobility.     Patient will improve Dynamic Gait Index to 22/24 to reduce fall risk at home.    Patient will improve Timed Up and Go Score to <12 seconds to allow patient to walk household distances   safely and reduce fall risk at home.      Patient will improve 30 second sit to stand test to >9 reps to allow patient the ability to transfer to/from   a chair safely indepedently.    Patient to exhibit controlled lowering to  objects from the floor without compensations with distant   supervision five out of ten trials.    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

## 2025-01-09 SDOH — ECONOMIC STABILITY: GENERAL: QUALITY OF LIFE: GOOD

## 2025-01-09 ASSESSMENT — ENCOUNTER SYMPTOMS
QUALITY: SHARP
EXACERBATED BY: OVERHEAD ACTIVITY
ALLEVIATING FACTORS: CHANGE IN POSITION
EXACERBATED BY: MOVEMENT
QUALITY: DULL ACHE

## 2025-01-10 ENCOUNTER — TELEPHONE (OUTPATIENT)
Dept: OPHTHALMOLOGY | Facility: CLINIC | Age: 70
End: 2025-01-10

## 2025-01-10 ENCOUNTER — APPOINTMENT (OUTPATIENT)
Dept: CARDIAC REHAB | Facility: CLINIC | Age: 70
End: 2025-01-10
Payer: MEDICARE

## 2025-01-10 ENCOUNTER — TREATMENT (OUTPATIENT)
Dept: PHYSICAL THERAPY | Facility: CLINIC | Age: 70
End: 2025-01-10
Payer: MEDICARE

## 2025-01-10 DIAGNOSIS — M25.511 CHRONIC RIGHT SHOULDER PAIN: ICD-10-CM

## 2025-01-10 DIAGNOSIS — R26.89 BALANCE DISORDER: ICD-10-CM

## 2025-01-10 DIAGNOSIS — I63.9 ISCHEMIC STROKE (MULTI): ICD-10-CM

## 2025-01-10 DIAGNOSIS — G89.29 CHRONIC RIGHT SHOULDER PAIN: ICD-10-CM

## 2025-01-10 DIAGNOSIS — M25.561 CHRONIC PAIN OF RIGHT KNEE: ICD-10-CM

## 2025-01-10 DIAGNOSIS — G89.29 CHRONIC PAIN OF RIGHT KNEE: ICD-10-CM

## 2025-01-10 PROCEDURE — 97110 THERAPEUTIC EXERCISES: CPT | Mod: GP,CQ

## 2025-01-10 PROCEDURE — 97112 NEUROMUSCULAR REEDUCATION: CPT | Mod: GP,CQ

## 2025-01-10 ASSESSMENT — PAIN - FUNCTIONAL ASSESSMENT: PAIN_FUNCTIONAL_ASSESSMENT: 0-10

## 2025-01-10 NOTE — PROGRESS NOTES
Physical Therapy Treatment    Patient Name: David Chatman  MRN: [id number]  Encounter date: 1/10/2025    Time Calculation  Start Time: 0950  Stop Time: 1040  Time Calculation (min): 50 min  PT Therapeutic Procedures Time Entry  Neuromuscular Re-Education Time Entry: 30  Therapeutic Exercise Time Entry: 18    Visit # 2 of 20  Visits/Dates Authorized: MEDICARE PART A AND B    Current Problem:   Problem List Items Addressed This Visit             ICD-10-CM    Ischemic stroke (Multi) I63.9    Chronic pain of right knee M25.561, G89.29    Chronic right shoulder pain M25.511, G89.29     Other Visit Diagnoses         Codes    Balance disorder     R26.89            Precautions:    stroke, R shoulder OA, R knee OA       Subjective   General:    Patient states he is dealing with R shoulder and knee pain today, usual. Patient reports he occasionally has relief with his double vision, but only last for a short while.     Pre-Treatment Symptoms:   Pain Assessment: 0-10  Pain Location: Shoulder  Pain Orientation: Right    Objective   Findings:   Vitals:             Treatments:      Therapeutic Exercise 25627:   Nustep L4 6 min  seat 12 arms 10   TB SS mint green 2 laps  TB monster f/b mint green 2 laps  Heel raise DL x15 , SL x15  HEP updated and reviewed     Neuromuscular Re-Ed 95544:   DLS foam/firm NBOS with pencil pushup   Tandem stance firm  Tandem stance firm with saccades- up/down , R/L   Gait with 180/360 turns   Gait with convergence (popsicle stick)   BITS visual pursuits- ABC sequencing , 3 trials , rotation speed 3-5 , (both eyes open)  Rocker board AP/Lateral rocks and balance 1 min ea    DNP:  Romberg Stance with Eyes Closed  Standing Tandem Balance with Unilateral Counter Support    Walking Forward with Slow Head Nods    Walking Forward with Slow Head Rotation      Therapeutic Activity 11545:  DNP    Gait Training 05848:   DNP    Manual Therapy 58664:   DNP    Modalities:   DNP    HEP / Access Codes:   Access Code:  [email or web address]  URL: [email or web address]  Date: 01/08/2025  Prepared by: Yonas Maurice  - Romberg Stance with Eyes Closed  - 1 x daily - 1 sets - 3 reps - 15 hold  - Standing Tandem Balance with Unilateral Counter Support  - 1 x daily - 1 sets - 3 reps - 30 hold  - Walking Forward with Slow Head Nods  - 1 x daily - 1 sets - 8 reps  - Walking Forward with Slow Head Rotation  - 1 x daily - 1 sets - 8 reps    Access Code: [email or web address]  URL: [email or web address]  Date: 01/10/2025  Prepared by: Amandeep Alcala  - Side Stepping with Resistance at Ankles  - 1-2 x daily - 7 x weekly - 15-20 ft x 4  distance   - Forward and Backward Monster walks  - 1-2 x daily - 7 x weekly - 15-20 ft x 4  distance   - Standing Single Leg Heel Raise  - 1-2 x daily - 7 x weekly - 2 sets - 10-15 reps    Assessment:    Patient did well with all exercises today, focused on LE strengthening, balance and visual training. Patient has difficulty with double vision at increased distances, especially with with focusing vision. Patient issued additional HEP at end of session to improve LE strength.     Post-Treatment Symptoms:   Response to Interventions: No change in pain    Plan:    Therapy options: will be seen for skilled physical therapy services  Planned therapy interventions: stretching, strengthening, motor coordination training, balance/weight-bearing training, neuromuscular re-education, flexibility, functional ROM exercises, gait training, home exercise program, transfer training, therapeutic activities and fine motor coordination training  Frequency: 2x week  Duration in visits: 20  Duration in weeks: 12  Treatment plan discussed with: patient  Plan details: 2x week for 20 visits.     Goals:   Active       PT Problem       PT Goal 1       Start:  01/09/25    Expected End:  04/09/25       Patient to safely get on/off chair demonstrating proper transfer techniques independently to improve safe   functional mobility.     Patient will  improve Dynamic Gait Index to 22/24 to reduce fall risk at home.    Patient will improve Timed Up and Go Score to <12 seconds to allow patient to walk household distances   safely and reduce fall risk at home.      Patient will improve 30 second sit to stand test to >9 reps to allow patient the ability to transfer to/from   a chair safely indepedently.    Patient to exhibit controlled lowering to  objects from the floor without compensations with distant   supervision five out of ten trials.    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

## 2025-01-10 NOTE — TELEPHONE ENCOUNTER
MIKI NARANJO REVIEWED RECORDS FROM DR. JI, PATIENT IS SCHEDULED TO SEE DR. HADLEY, DR. NARANJO FEELS THAT IS BEST. PT NOTIFIED AND WILL KEEP APPT WITH DR. HADLEY. DR. JI AWARE

## 2025-01-13 ENCOUNTER — APPOINTMENT (OUTPATIENT)
Dept: CARDIAC REHAB | Facility: CLINIC | Age: 70
End: 2025-01-13
Payer: MEDICARE

## 2025-01-14 ENCOUNTER — TREATMENT (OUTPATIENT)
Dept: PHYSICAL THERAPY | Facility: CLINIC | Age: 70
End: 2025-01-14
Payer: MEDICARE

## 2025-01-14 ENCOUNTER — APPOINTMENT (OUTPATIENT)
Dept: CARDIAC REHAB | Facility: CLINIC | Age: 70
End: 2025-01-14
Payer: MEDICARE

## 2025-01-14 DIAGNOSIS — G89.29 CHRONIC RIGHT SHOULDER PAIN: ICD-10-CM

## 2025-01-14 DIAGNOSIS — R26.89 BALANCE DISORDER: ICD-10-CM

## 2025-01-14 DIAGNOSIS — I63.9 ISCHEMIC STROKE (MULTI): ICD-10-CM

## 2025-01-14 DIAGNOSIS — M25.511 CHRONIC RIGHT SHOULDER PAIN: ICD-10-CM

## 2025-01-14 DIAGNOSIS — M25.561 CHRONIC PAIN OF RIGHT KNEE: ICD-10-CM

## 2025-01-14 DIAGNOSIS — G89.29 CHRONIC PAIN OF RIGHT KNEE: ICD-10-CM

## 2025-01-14 PROCEDURE — 97112 NEUROMUSCULAR REEDUCATION: CPT | Mod: GP,CQ

## 2025-01-14 PROCEDURE — 97110 THERAPEUTIC EXERCISES: CPT | Mod: GP,CQ

## 2025-01-14 ASSESSMENT — PAIN SCALES - GENERAL: PAINLEVEL_OUTOF10: 4

## 2025-01-14 ASSESSMENT — PAIN - FUNCTIONAL ASSESSMENT: PAIN_FUNCTIONAL_ASSESSMENT: 0-10

## 2025-01-14 NOTE — PROGRESS NOTES
Physical Therapy Treatment    Patient Name: David Chatman  MRN: [id number]  Encounter date: 1/14/2025 PT Received On: 01/14/25  Time Calculation  Start Time: 1130  Stop Time: 1215  Time Calculation (min): 45 min  PT Therapeutic Procedures Time Entry  Neuromuscular Re-Education Time Entry: 20  Therapeutic Exercise Time Entry: 25    Visit # 3 of 20  Visits/Dates Authorized: MEDICARE PART A AND B    Current Problem:   Problem List Items Addressed This Visit             ICD-10-CM    Ischemic stroke (Multi) I63.9    Chronic pain of right knee M25.561, G89.29    Chronic right shoulder pain M25.511, G89.29     Other Visit Diagnoses         Codes    Balance disorder     R26.89              Precautions:    stroke, R shoulder OA, R knee OA       Subjective   General:    Patient states he is dealing with R shoulder and knee pain today, usual. Left knee pain is about a three      Pre-Treatment Symptoms:   Pain Assessment: 0-10  0-10 (Numeric) Pain Score: 4  Pain Location: Shoulder  Pain Orientation: Right    Objective   Findings:   Vitals:             Treatments:      Therapeutic Exercise 74082:   Nustep L4 6 min  seat 12 arms 10   TB SS mint green 2 laps  TB monster f/b mint green 2 laps  Heel raise DL x15 , SL x15  STS 10x no ue  Ham curl 55# 2x10  Leg press  Retractions for posture orange 20x very difficult  ER isomtric manual seated painful and poor controlo    Neuromuscular Re-Ed 83804:   DLS foam/firm NBOS with pencil pushup   Tandem walk  Hurdles 12 inches fwd  Tandem stance firm with saccades- up/down , R/L   Gait with 180/360 turns   Gait with convergence (popsicle stick)   BITS visual pursuits- ABC sequencing , 3 trials , rotation speed 3-5 , (both eyes open)  Rocker board AP/Lateral rocks and balance 1 min ea    DNP:  Romberg Stance with Eyes Closed  Standing Tandem Balance with Unilateral Counter Support    Walking Forward with Slow Head Nods    Walking Forward with Slow Head Rotation          HEP / Access Codes:    Access Code: [email or web address]  URL: [email or web address]  Date: 01/08/2025  Prepared by: Yonas Maurice  - Romberg Stance with Eyes Closed  - 1 x daily - 1 sets - 3 reps - 15 hold  - Standing Tandem Balance with Unilateral Counter Support  - 1 x daily - 1 sets - 3 reps - 30 hold  - Walking Forward with Slow Head Nods  - 1 x daily - 1 sets - 8 reps  - Walking Forward with Slow Head Rotation  - 1 x daily - 1 sets - 8 reps    Access Code: [email or web address]  URL: [email or web address]  Date: 01/10/2025  Prepared by: Amandeep Alcala  - Side Stepping with Resistance at Ankles  - 1-2 x daily - 7 x weekly - 15-20 ft x 4  distance   - Forward and Backward Monster walks  - 1-2 x daily - 7 x weekly - 15-20 ft x 4  distance   - Standing Single Leg Heel Raise  - 1-2 x daily - 7 x weekly - 2 sets - 10-15 reps    Assessment:  Able to do 10 STS without ue. Patient gives good effort and is anxious to improve quickly   Patient did well with all exercises today, focused on LE strengthening, balance and visual training. HEP is going well.No issues with updates    Post-Treatment Symptoms:    same    Plan:    Therapy options: will be seen for skilled physical therapy services  Planned therapy interventions: stretching, strengthening, motor coordination training, balance/weight-bearing training, neuromuscular re-education, flexibility, functional ROM exercises, gait training, home exercise program, transfer training, therapeutic activities and fine motor coordination training  Frequency: 2x week  Duration in visits: 20  Duration in weeks: 12  Treatment plan discussed with: patient  Plan details: 2x week for 20 visits.     Goals:   Active       PT Problem       PT Goal 1       Start:  01/09/25    Expected End:  04/09/25       Patient to safely get on/off chair demonstrating proper transfer techniques independently to improve safe   functional mobility.     Patient will improve Dynamic Gait Index to 22/24 to reduce fall risk at  home.    Patient will improve Timed Up and Go Score to <12 seconds to allow patient to walk household distances   safely and reduce fall risk at home.      Patient will improve 30 second sit to stand test to >9 reps to allow patient the ability to transfer to/from   a chair safely indepedently.    Patient to exhibit controlled lowering to  objects from the floor without compensations with distant   supervision five out of ten trials.    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

## 2025-01-15 NOTE — PROGRESS NOTES
"Physical Therapy Treatment    Patient Name: David Chatman  MRN: [id number]  Encounter date: 1/16/2025         Visit # 4 of 20  Visits/Dates Authorized: MEDICARE PART A AND B    Current Problem:   Problem List Items Addressed This Visit             ICD-10-CM    Ischemic stroke (Multi) I63.9    Chronic pain of right knee M25.561, G89.29    Chronic right shoulder pain M25.511, G89.29     Other Visit Diagnoses         Codes    Balance disorder     R26.89                Precautions:    stroke, R shoulder OA, R knee OA       Subjective   General:    Pt reports good tolerance to last session and excellent compliance with HEP, including walking around the house (6 laps around the house).  Pt reports continues to experience visual issues, double vision only when looking to right.    Pre-Treatment Symptoms:   Pain Assessment: 0-10  0-10 (Numeric) Pain Score: 5 - Moderate pain    Objective   Findings:   pt presents with subtle decreased hip and knee flexion during ambulation.     Vitals:             Treatments:      Therapeutic Exercise 32127:   Nustep L5 6 min  seat 12 arms 10   STS 10x no ue  Ham curl 55# 2 x 10  DL  Leg press DL 40#-47.5#  x 10 each  Retractions for posture orange 20x very difficult  ER isomtric manual seated painful and poor controlo  Resisted ambulation Front and Retro 10#  x 4 reps each.    Deferred:  TB SS mint green 2 laps  TB monster f/b mint green 2 laps  Heel raise DL x15 , SL x15      Neuromuscular Re-Ed 56219:   DLS foam/firm NBOS with pencil pushup   Tandem walk  Hurdles 12 inches step to and reciprocal x 2 laps each.   Tandem stance firm with saccades- up/down , R/L   Gait with 180/360 turns   Gait with convergence (popsicle stick)   BITS visual pursuits- ABC sequencing , 3 trials , rotation speed 4-5 , (both eyes open)  trial one (rotation speed 5)=53\";   trial two=(rotation speed 6) = 49\"; trial three (speed 4) =42 seconds  Rocker board AP/Lateral rocks and balance 1 min ea    DNP:  Romberg " Stance with Eyes Closed  Standing Tandem Balance with Unilateral Counter Support    Walking Forward with Slow Head Nods    Walking Forward with Slow Head Rotation          HEP / Access Codes:   Access Code: [email or web address]  URL: [email or web address]  Date: 01/08/2025  Prepared by: Yonas Maurice  - Romberg Stance with Eyes Closed  - 1 x daily - 1 sets - 3 reps - 15 hold  - Standing Tandem Balance with Unilateral Counter Support  - 1 x daily - 1 sets - 3 reps - 30 hold  - Walking Forward with Slow Head Nods  - 1 x daily - 1 sets - 8 reps  - Walking Forward with Slow Head Rotation  - 1 x daily - 1 sets - 8 reps    Access Code: [email or web address]  URL: [email or web address]  Date: 01/10/2025  Prepared by: Amandeep Alcala  - Side Stepping with Resistance at Ankles  - 1-2 x daily - 7 x weekly - 15-20 ft x 4  distance   - Forward and Backward Monster walks  - 1-2 x daily - 7 x weekly - 15-20 ft x 4  distance   - Standing Single Leg Heel Raise  - 1-2 x daily - 7 x weekly - 2 sets - 10-15 reps    Assessment:  Patient tolerated treatment well. Patient appears to be working hard in clinic and motivated to decrease pain and restore all functional deficits. Verbal and/or tactile cues given for proper form, and avoidance of substitution patterns with all exercises. All infection control policies followed during this visit. No observed antalgia with exercise performance, pt challenged with static and dynamic balance activities.     Post-Treatment Symptoms:    No change.     Plan:    Therapy options: will be seen for skilled physical therapy services  Planned therapy interventions: stretching, strengthening, motor coordination training, balance/weight-bearing training, neuromuscular re-education, flexibility, functional ROM exercises, gait training, home exercise program, transfer training, therapeutic activities and fine motor coordination training  Frequency: 2x week  Duration in visits: 20  Duration in weeks: 12  Treatment plan  discussed with: patient  Plan details: 2x week for 20 visits.     Goals:   Active       PT Problem       PT Goal 1       Start:  01/09/25    Expected End:  04/09/25       Patient to safely get on/off chair demonstrating proper transfer techniques independently to improve safe   functional mobility.     Patient will improve Dynamic Gait Index to 22/24 to reduce fall risk at home.    Patient will improve Timed Up and Go Score to <12 seconds to allow patient to walk household distances   safely and reduce fall risk at home.      Patient will improve 30 second sit to stand test to >9 reps to allow patient the ability to transfer to/from   a chair safely indepedently.    Patient to exhibit controlled lowering to  objects from the floor without compensations with distant   supervision five out of ten trials.    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

## 2025-01-16 ENCOUNTER — TREATMENT (OUTPATIENT)
Dept: PHYSICAL THERAPY | Facility: CLINIC | Age: 70
End: 2025-01-16
Payer: MEDICARE

## 2025-01-16 DIAGNOSIS — G89.29 CHRONIC PAIN OF RIGHT KNEE: ICD-10-CM

## 2025-01-16 DIAGNOSIS — R26.89 BALANCE DISORDER: ICD-10-CM

## 2025-01-16 DIAGNOSIS — G89.29 CHRONIC RIGHT SHOULDER PAIN: ICD-10-CM

## 2025-01-16 DIAGNOSIS — I63.9 ISCHEMIC STROKE (MULTI): ICD-10-CM

## 2025-01-16 DIAGNOSIS — M25.511 CHRONIC RIGHT SHOULDER PAIN: ICD-10-CM

## 2025-01-16 DIAGNOSIS — M25.561 CHRONIC PAIN OF RIGHT KNEE: ICD-10-CM

## 2025-01-16 PROCEDURE — 97112 NEUROMUSCULAR REEDUCATION: CPT | Mod: GP,CQ

## 2025-01-16 PROCEDURE — 97110 THERAPEUTIC EXERCISES: CPT | Mod: GP,CQ

## 2025-01-16 ASSESSMENT — PAIN - FUNCTIONAL ASSESSMENT: PAIN_FUNCTIONAL_ASSESSMENT: 0-10

## 2025-01-16 ASSESSMENT — PAIN SCALES - GENERAL: PAINLEVEL_OUTOF10: 5 - MODERATE PAIN

## 2025-01-17 ENCOUNTER — APPOINTMENT (OUTPATIENT)
Dept: CARDIAC REHAB | Facility: CLINIC | Age: 70
End: 2025-01-17
Payer: MEDICARE

## 2025-01-17 ENCOUNTER — DOCUMENTATION (OUTPATIENT)
Dept: CARDIAC REHAB | Facility: CLINIC | Age: 70
End: 2025-01-17
Payer: MEDICARE

## 2025-01-17 NOTE — PROGRESS NOTES
Cardiac Rehabilitation 90 day progress report    Name: David Chatman  Medical Record Number: [id number]  YOB: 1955  Age: 69 y.o.    Today’s Date: 1/17/2025  Primary Care Physician: Amandeep Vaca DO  Referring Physician: Yomi Bukc MD  Program Location: [address]  Primary Diagnosis:   1. S/P aortic valve replacement and aortoplasty  Referral to Cardiac Rehab         Onset/Date of Diagnosis: 9/23/24  Session #:  8  AACVPR Risk Stratification: High  Falls Risk: Low  Psychosocial Assessment   Pre PHQ-9: 0  Sent PH-Q 9 to MD if score > 20: No; score < 20  Pt reported/currently experiencing stress: No  Patient uses stress management skills: No   History of: no history of anxiety or depression  Currently seeing a mental health provider: No  Social Support: Yes, Whom:Spouse and family  Quality of Life Survey: SF-36   SF-36 Pre Post   Physical Component Score     Mental Component Score       Learning Assessment:  Learning assessment/barriers: None  Preferred learning method: Visual  Barriers: None  Comments:  Stages of Change: Action    Psychosocial Plan  Goal Status: In Progress  Psychosocial Goals: Maintain or lower PH-Q 9 score by discharge  Psychosocial Interventions/Education:   11/6/24 initial PHQ-9 survey reviewed w/ pt at eval/MS        Nutrition Assessment:    Hyperlipidemia: Yes     Lipids:   Lab Results   Component Value Date    CHOL 118 12/06/2024    HDL 32.6 12/06/2024    TRIG 82 12/06/2024       Current Dietary Guidelines:  no special , watches sodium  Barriers to dietary change: no    Diet Habit Survey: Picture Your Plate  Pre: 48  Post: To be done at discharge.    Diabetes Assessment    Lab Results   Component Value Date    HGBA1C 6.1 (H) 12/06/2024       History of Diabetes: No    Weight Management    Height: 182.9 cm (6')  Weight: 99.3 kg (219 lb)  BMI (Calculated): 29.7    Nutrition Plan  Goal Status: In Progress  Nutrition Goals: Adapt a  low-sodium, DASH diet prior to discharge and Learn how to read and interpret nutrition labels prior to discharge  Nutrition Interventions/Education:   11/12/2024  Reviewed PYP with patient. PYP score 48. Encouraged pt to add 1 fruit and vegetable per day. Also discussed decreasing processed foods and sugar in coffee. Pt has already decreased the processed meats in his diet and his wife is trying to make lower sodium soups. He struggles to eat when he is by himself. Usually uses more processed foods when having to cook for himself. Pt has been buying a high protein soup. Encouraged him to take a picture of the label and bring in it so we can look at it together. Encouraged to further read PYP recommendations at home and follow up with questions as needed. Suggested pt bring wife to nutrition education lectures./Signature Payal Rankin RDN, LD  1/15/2024 Education on sodium intake was done. Discussed with patient the risks of excess levels of sodium and how to decrease dietary intake with provided list of substitutions. Handouts were given for home reference. /Signature Zeina Seo RN          Exercise Assessment    Home exercise:  No  Mode: NA  Frequency: NA  Duration: NA    Exercise Prescription     Exercise Prescription based on: Duke Activity Status Index (DASI)    DASI Score: 34.7   MET Score: 7   Frequency:  3 days/week   Mode: Treadmill, NuStep, and Recumbent Cycle   Duration: 33 total aerobic minutes   Intensity: RPE 12-14  Target HR:  To be calculated after 6 attended sessions.  MET Level: 3.7  Patient wears supplemental O2: No     Modality Workload METs Duration (minutes)   1 Pre-Exercise   4:00   2 Treadmill 2.4 mph @ 0.5% 3.0 11 :00   3 NuStep Load 5 @ 80 bennett  2.9 11 :00   4 Recumbent Bike Load 5 @ 60 rpm  3.7 11 :00   5 Schwinn Airdyne Load 1.3  2.9 11 :00   6 Post-Exercise   4:00     Resistance Training: No   Home Exercise Prescription given: To be given prior to discharge from  program.    Exercise Plan  Goal Status: In Progress  Exercise Goals: Increase exercise MET level by 5-10% each week, Increase total exercise duration to 30-45 minutes, and Establish a home exercise program before discharge  Exercise Interventions/Education:           Other Core Components/Risk Factor Assessment:    Medication adherence  Current Medications:   Medication Documentation Review Audit       Reviewed by Amandeep Vaca DO (Physician) on 12/31/24 at 0942      Medication Order Taking? Sig Documenting Provider Last Dose Status     Discontinued 12/30/24 1016   apixaban (Eliquis) 5 mg tablet [id number] Yes Take 1 tablet (5 mg) by mouth 2 times a day. Yomi Buck MD  Active   atorvastatin (Lipitor) 80 mg tablet [id number] Yes Take 1 tablet (80 mg) by mouth once daily at bedtime. Yomi Buck MD 12/7/2024 Active   empagliflozin (Jardiance) 10 mg [id number] No Take 1 tablet (10 mg) by mouth once daily.   Patient not taking: Reported on 12/31/2024    Yomi Buck MD 12/8/2024 Active   eplerenone (Inspra) 25 mg tablet [id number] Yes Take 1 tablet (25 mg) by mouth once daily. Yomi Buck MD 12/8/2024 Active   metoprolol succinate XL (Toprol-XL) 100 mg 24 hr tablet [id number] Yes Take 1.5 tablets (150 mg) by mouth once daily. Do not crush or chew. Yomi Buck MD 12/8/2024 Active   sacubitriL-valsartan (Entresto) 24-26 mg tablet [id number] No Take 1 tablet by mouth 2 times a day.   Patient not taking: Reported on 12/31/2024    Yomi Buck MD 12/8/2024 Active   torsemide (Demadex) 20 mg tablet [id number] Yes Take 2 tablets (40 mg) by mouth once daily. As needed daily for peripheral edema. Francisco Angeles MD 12/8/2024 Active                                 Medication compliance:  Not taking entresto and jardiance due to cost   Uses pill box/organizer: No    Carries medication list: No     Blood Pressure Management  History of Hypertension: Yes   Medication Changes:  No   Resting BP:  Visit Vitals  /66        Heart Failure Management  Hx of Heart Failure: current diagnosis  Type (selection): HFrEF  Most recent EF: 22  Onset of heart failure diagnosis: 9/23/24  Last heart failure hospitalization: 9/23/24  Number of HF admissions per year: 1  Symptoms: Fatigue with exertion  Is there a family Hx of HF: No   Does patient obtain daily weight Yes       Heart Failure Reassessment: In Progress  Heart Failure Goals: Able to verbalize signs and symptoms of fluid retention and when to contact MD, Adapt a low sodium diet and verbalize guidelines, and Obtain daily weight and verbalize proper method of obtaining weights  Smoking/Tobacco Assessment  Social History     Tobacco Use   Smoking Status Never   Smokeless Tobacco Never       Other Core Component Plan  Goal Status:In progress  Other Core Component Goals: Medication compliance, Verbalize medication usage and drug actions by discharge, and Achieve resting BP of < 130/80 by discharge  Other Core Component Interventions/Education:           Individual Patient Goals:    Decrease fluid in lungs by discharge  Increase heart function on next echo    Goal Status: In Progress    Staff Comments:  Mr. Chatman has not attended an exercise session since his last progress report due to a stroke.  He is currently receiving PT.  He will contact us when cleared to return to cardiac rehab.      Rehab Staff Signature: Lubna Ewing, MS, CCRP

## 2025-01-17 NOTE — PROGRESS NOTES
"Physical Therapy Treatment    Patient Name: David Chatman  MRN: [id number]  Encounter date: 1/20/2025    Time Calculation  Start Time: 1100  Stop Time: 1146  Time Calculation (min): 46 min  PT Therapeutic Procedures Time Entry  Neuromuscular Re-Education Time Entry: 20  Therapeutic Exercise Time Entry: 26    Visit # 5 of 20  Visits/Dates Authorized: MEDICARE PART A AND B    Current Problem:   Problem List Items Addressed This Visit             ICD-10-CM    Ischemic stroke (Multi) I63.9    Chronic pain of right knee M25.561, G89.29    Chronic right shoulder pain M25.511, G89.29     Other Visit Diagnoses         Codes    Balance disorder     R26.89                  Precautions:    stroke, R shoulder OA, R knee OA       Subjective   General:    Pt reports good tolerance to last session and excellent compliance with HEP;  pt reports continues to be consistent with walking laps around the house.  Pt reports still experiencing visual issues with turning head to the Right, though \"it adjusts\" more quickly.   Pt reports continued pain in R shoulder (OA).    Pre-Treatment Symptoms:   Pain Assessment: 0-10  0-10 (Numeric) Pain Score: 6  Pain Location: Shoulder  Pain Orientation: Right    Objective   Findings:   pt presents with subtle decreased hip and knee flexion during ambulation, along with intermittent decreased R ankle DF at heel strike  Vitals:             Treatments:      Therapeutic Exercise 16520:   Nustep L5 6 min  seat 12 arms 10    OR  scifit bike s=12  R =4  x 6'    Ham curl 55# 2 x 15  DL  Leg press DL 50#  2 x 15 each  STS 10x no ue  TB SS mint blue 2 laps  TB monster f/b mint blue 2 laps  Tb row, orange, 2 x 10 (not past neutral)  Tb isometric ER walkout, pink, x 10 reps R and L.     Deferred:  Resisted ambulation Front and Retro 10#  x 4 reps each.   Heel raise DL x15 , SL x15      Neuromuscular Re-Ed 14456:   BITS visual pursuits- ABC sequencing , 3 trials , rotation speed 4-5 , (both eyes open)  trial one " "(rotation speed 5)=58\";   trial two=(rotation speed 6) = 52\"; trial three (speed 4) =45 seconds  Tandem walk, unsupported, though close intermittent CGA, 4 x 30'  Tandem stance firm with saccades- up/down , R/L   Gat with 180/360 turns   Large rocker board AP and SS x 20 reps each f/b 1' static balance each.   DLS foam/firm NBOS with pencil pushup   Hurdles 12 inches step to and reciprocal x 2 laps each.   Gait with convergence (popsicle stick)       DNP:  Romberg Stance with Eyes Closed  Standing Tandem Balance with Unilateral Counter Support    Walking Forward with Slow Head Nods    Walking Forward with Slow Head Rotation          HEP / Access Codes:   Access Code: [email or web address]  URL: [email or web address]  Date: 01/08/2025  Prepared by: Yonas Maurice  - Romberg Stance with Eyes Closed  - 1 x daily - 1 sets - 3 reps - 15 hold  - Standing Tandem Balance with Unilateral Counter Support  - 1 x daily - 1 sets - 3 reps - 30 hold  - Walking Forward with Slow Head Nods  - 1 x daily - 1 sets - 8 reps  - Walking Forward with Slow Head Rotation  - 1 x daily - 1 sets - 8 reps    Access Code: [email or web address]  URL: [email or web address]  Date: 01/10/2025  Prepared by: Amandeep Alcala  - Side Stepping with Resistance at Ankles  - 1-2 x daily - 7 x weekly - 15-20 ft x 4  distance   - Forward and Backward Monster walks  - 1-2 x daily - 7 x weekly - 15-20 ft x 4  distance   - Standing Single Leg Heel Raise  - 1-2 x daily - 7 x weekly - 2 sets - 10-15 reps    Access Code: [email or web address]  URL: [email or web address]  Date: 01/20/2025  Prepared by: Steve Carmona    Exercises  - Standing Shoulder Row with Anchored Resistance  - 1-2 x daily - 6 x weekly - 2 sets - 10 reps  - Shoulder External Rotation with Anchored Resistance  - 1-2 x daily - 6 x weekly - 10 reps  Assessment:  Patient tolerated treatment well. Patient appears to be working hard in clinic and motivated to decrease pain and restore all functional deficits. Verbal " and/or tactile cues given for proper form, and avoidance of substitution patterns with all exercises. All infection control policies followed during this visit. No observed antalgia with exercise performance, pt able to progress to performing some TB work for scapular and shoulder (as well as static standing balance with shoulder row and dynamic balance with isometric ER walkouts)    Post-Treatment Symptoms:    No change.     Plan: assess tolerance to progression of shoulder/scapular exercises.    Therapy options: will be seen for skilled physical therapy services  Planned therapy interventions: stretching, strengthening, motor coordination training, balance/weight-bearing training, neuromuscular re-education, flexibility, functional ROM exercises, gait training, home exercise program, transfer training, therapeutic activities and fine motor coordination training  Frequency: 2x week  Duration in visits: 20  Duration in weeks: 12  Treatment plan discussed with: patient  Plan details: 2x week for 20 visits.     Goals:   Active       PT Problem       PT Goal 1       Start:  01/09/25    Expected End:  04/09/25       Patient to safely get on/off chair demonstrating proper transfer techniques independently to improve safe   functional mobility.     Patient will improve Dynamic Gait Index to 22/24 to reduce fall risk at home.    Patient will improve Timed Up and Go Score to <12 seconds to allow patient to walk household distances   safely and reduce fall risk at home.      Patient will improve 30 second sit to stand test to >9 reps to allow patient the ability to transfer to/from   a chair safely indepedently.    Patient to exhibit controlled lowering to  objects from the floor without compensations with distant   supervision five out of ten trials.    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

## 2025-01-20 ENCOUNTER — TREATMENT (OUTPATIENT)
Dept: PHYSICAL THERAPY | Facility: CLINIC | Age: 70
End: 2025-01-20
Payer: MEDICARE

## 2025-01-20 ENCOUNTER — APPOINTMENT (OUTPATIENT)
Dept: CARDIAC REHAB | Facility: CLINIC | Age: 70
End: 2025-01-20
Payer: MEDICARE

## 2025-01-20 DIAGNOSIS — I63.9 ISCHEMIC STROKE (MULTI): ICD-10-CM

## 2025-01-20 DIAGNOSIS — G89.29 CHRONIC PAIN OF RIGHT KNEE: ICD-10-CM

## 2025-01-20 DIAGNOSIS — R26.89 BALANCE DISORDER: ICD-10-CM

## 2025-01-20 DIAGNOSIS — M25.511 CHRONIC RIGHT SHOULDER PAIN: ICD-10-CM

## 2025-01-20 DIAGNOSIS — M25.561 CHRONIC PAIN OF RIGHT KNEE: ICD-10-CM

## 2025-01-20 DIAGNOSIS — G89.29 CHRONIC RIGHT SHOULDER PAIN: ICD-10-CM

## 2025-01-20 PROCEDURE — 97112 NEUROMUSCULAR REEDUCATION: CPT | Mod: GP,CQ

## 2025-01-20 PROCEDURE — 97110 THERAPEUTIC EXERCISES: CPT | Mod: GP,CQ

## 2025-01-20 ASSESSMENT — PAIN SCALES - GENERAL: PAINLEVEL_OUTOF10: 6

## 2025-01-20 ASSESSMENT — PAIN - FUNCTIONAL ASSESSMENT: PAIN_FUNCTIONAL_ASSESSMENT: 0-10

## 2025-01-21 ENCOUNTER — APPOINTMENT (OUTPATIENT)
Dept: CARDIAC REHAB | Facility: CLINIC | Age: 70
End: 2025-01-21
Payer: MEDICARE

## 2025-01-23 ENCOUNTER — EVALUATION (OUTPATIENT)
Dept: OCCUPATIONAL THERAPY | Facility: CLINIC | Age: 70
End: 2025-01-23
Payer: MEDICARE

## 2025-01-23 ENCOUNTER — TREATMENT (OUTPATIENT)
Dept: PHYSICAL THERAPY | Facility: CLINIC | Age: 70
End: 2025-01-23
Payer: MEDICARE

## 2025-01-23 DIAGNOSIS — R26.89 BALANCE DISORDER: ICD-10-CM

## 2025-01-23 DIAGNOSIS — M25.561 CHRONIC PAIN OF RIGHT KNEE: ICD-10-CM

## 2025-01-23 DIAGNOSIS — M25.511 CHRONIC RIGHT SHOULDER PAIN: ICD-10-CM

## 2025-01-23 DIAGNOSIS — G89.29 CHRONIC RIGHT SHOULDER PAIN: Primary | ICD-10-CM

## 2025-01-23 DIAGNOSIS — G89.29 CHRONIC PAIN OF RIGHT KNEE: ICD-10-CM

## 2025-01-23 DIAGNOSIS — M25.511 CHRONIC RIGHT SHOULDER PAIN: Primary | ICD-10-CM

## 2025-01-23 DIAGNOSIS — I63.9 ISCHEMIC STROKE (MULTI): ICD-10-CM

## 2025-01-23 DIAGNOSIS — G89.29 CHRONIC RIGHT SHOULDER PAIN: ICD-10-CM

## 2025-01-23 PROCEDURE — 97112 NEUROMUSCULAR REEDUCATION: CPT | Mod: GP,CQ

## 2025-01-23 PROCEDURE — 97110 THERAPEUTIC EXERCISES: CPT | Mod: GO | Performed by: OCCUPATIONAL THERAPIST

## 2025-01-23 PROCEDURE — 97110 THERAPEUTIC EXERCISES: CPT | Mod: GP,CQ

## 2025-01-23 PROCEDURE — 97165 OT EVAL LOW COMPLEX 30 MIN: CPT | Mod: GO | Performed by: OCCUPATIONAL THERAPIST

## 2025-01-23 ASSESSMENT — ENCOUNTER SYMPTOMS
PAIN SCALE AT LOWEST: 4
PAIN SCALE: 4
PAIN SCALE AT HIGHEST: 9
QUALITY: SHARP
PAIN LOCATION: R SHOULDER

## 2025-01-23 ASSESSMENT — PAIN - FUNCTIONAL ASSESSMENT: PAIN_FUNCTIONAL_ASSESSMENT: 0-10

## 2025-01-23 ASSESSMENT — PAIN SCALES - GENERAL: PAINLEVEL_OUTOF10: 5 - MODERATE PAIN

## 2025-01-23 NOTE — PROGRESS NOTES
"Physical Therapy Treatment    Patient Name: David Chatman  MRN: [id number]  Encounter date: 1/23/2025 PT Received On: 01/23/25  Time Calculation  Start Time: 1154  Stop Time: 1232  Time Calculation (min): 38 min  PT Therapeutic Procedures Time Entry  Neuromuscular Re-Education Time Entry: 20  Therapeutic Exercise Time Entry: 18    Visit # 6 of 20  Visits/Dates Authorized: MEDICARE PART A AND B    Current Problem:   Problem List Items Addressed This Visit             ICD-10-CM    Ischemic stroke (Multi) I63.9    Chronic pain of right knee M25.561, G89.29    Chronic right shoulder pain M25.511, G89.29     Other Visit Diagnoses         Codes    Balance disorder     R26.89                    Precautions:    stroke, R shoulder OA, R knee OA       Subjective Cont to do better each day. Not falling and feeling stronger     Pre-Treatment Symptoms:   Pain Assessment: 0-10  0-10 (Numeric) Pain Score: 5 - Moderate pain  Pain Location: Shoulder  Pain Orientation: Right    Objective   Findings:   pt presents with subtle decreased hip and knee flexion during ambulation, along with intermittent decreased R ankle DF at heel strike  Vitals:             Treatments:      Therapeutic Exercise 51045:   Nustep L5 6 min  seat 12 arms 10    OR  scifit bike s=12  R =4  x 6'    Ham curl 55# 2 x 15  DL  Leg press DL 50#  2 x 15 each  STS 10x no ue  TB SS mint blue 2 laps  TB monster f/b mint blue 2 laps  Tb row, orange, 2 x 10 (not past neutral)  Tb isometric ER walkout, pink, x 10 reps R and L.   Leg curl 55# 3x10  Resisted ambulation Front and Retro 15#  x 4 reps each.   Heel raise DL x15 , SL x15      Neuromuscular Re-Ed 83876:   BITS visual pursuits- ABC sequencing , 3 trials , rotation speed 4-5 , (both eyes open)  trial one (rotation speed 5)=58\";   trial two=(rotation speed 6) = 52\"; trial three (speed 4) =45 seconds  Tandem walk, unsupported, though close intermittent CGA, 4 x 30'  Tandem stance firm with saccades- up/down , R/L   Gat " with 180/360 turns   Large rocker board AP and SS x 20 reps each f/b 1' static balance each.   DLS foam/firm NBOS with pencil pushup   Gait with convergence (popsicle stick)   Cone   Cone  off while on incline board    DNP:  Romberg Stance with Eyes Closed  Standing Tandem Balance with Unilateral Counter Support    Walking Forward with Slow Head Nods    Walking Forward with Slow Head Rotation    Hurdles 12 inches step to and reciprocal x 2 laps each.       HEP / Access Codes:   Access Code: [email or web address]  URL: [email or web address]  Date: 01/08/2025  Prepared by: Yonas Maurice  - Romberg Stance with Eyes Closed  - 1 x daily - 1 sets - 3 reps - 15 hold  - Standing Tandem Balance with Unilateral Counter Support  - 1 x daily - 1 sets - 3 reps - 30 hold  - Walking Forward with Slow Head Nods  - 1 x daily - 1 sets - 8 reps  - Walking Forward with Slow Head Rotation  - 1 x daily - 1 sets - 8 reps    Access Code: [email or web address]  URL: [email or web address]  Date: 01/10/2025  Prepared by: Amandeep Alcala  - Side Stepping with Resistance at Ankles  - 1-2 x daily - 7 x weekly - 15-20 ft x 4  distance   - Forward and Backward Monster walks  - 1-2 x daily - 7 x weekly - 15-20 ft x 4  distance   - Standing Single Leg Heel Raise  - 1-2 x daily - 7 x weekly - 2 sets - 10-15 reps    Access Code: [email or web address]  URL: [email or web address]  Date: 01/20/2025  Prepared by: Steve Carmona    Exercises  - Standing Shoulder Row with Anchored Resistance  - 1-2 x daily - 6 x weekly - 2 sets - 10 reps  - Shoulder External Rotation with Anchored Resistance  - 1-2 x daily - 6 x weekly - 10 reps    Assessment:  Patient tolerated treatment well. Cont to do HEP well and is pleased he has had no falls in recent weeks        Post-Treatment Symptoms:    No change.     Plan: assess tolerance to progression of shoulder/scapular exercises.    Therapy options: will be seen for skilled physical therapy services  Planned therapy  interventions: stretching, strengthening, motor coordination training, balance/weight-bearing training, neuromuscular re-education, flexibility, functional ROM exercises, gait training, home exercise program, transfer training, therapeutic activities and fine motor coordination training  Frequency: 2x week  Duration in visits: 20  Duration in weeks: 12  Treatment plan discussed with: patient  Plan details: 2x week for 20 visits.     Goals:   Active       PT Problem       PT Goal 1       Start:  01/09/25    Expected End:  04/09/25       Patient to safely get on/off chair demonstrating proper transfer techniques independently to improve safe   functional mobility.     Patient will improve Dynamic Gait Index to 22/24 to reduce fall risk at home.    Patient will improve Timed Up and Go Score to <12 seconds to allow patient to walk household distances   safely and reduce fall risk at home.      Patient will improve 30 second sit to stand test to >9 reps to allow patient the ability to transfer to/from   a chair safely indepedently.    Patient to exhibit controlled lowering to  objects from the floor without compensations with distant   supervision five out of ten trials.    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

## 2025-01-23 NOTE — PROGRESS NOTES
"Evaluation/Treatment    Patient Name: David Chatman  MRN: [id number]  : 1955  Today's Date: 25    Time Calculation  Start Time: 1245  Stop Time: 1330  Time Calculation (min): 45 min  OT Evaluation Time Entry  OT Evaluation (Low) Time Entry: 15  OT Therapeutic Procedures Time Entry  Therapeutic Exercise Time Entry: 30      Subjective   Current Problem/Diagnosis:  1. Chronic right shoulder pain        2. Ischemic stroke (Multi)  Referral to Occupational Therapy    Follow Up In Occupational Therapy        Subjective Evaluation    History of Present Illness  Date of onset: 2024  Mechanism of injury: Patient is a 69-year-old male who presented to the ED on 24 with new onset of ataxia, diplopia, and dysarthria; stroke workup revealed +CVA. Patient was discharged to acute rehab from hospital from 24-24; while participating in therapy he began to experience R shoulder pain. Patient reports he sustained a R shoulder injury many years ago, but symptoms had been well-controlled prior to rehab. Over last month, R shoulder pain has gotten progressively worse and xray of right shoulder was performed on 24.    Pain  Current pain ratin  At best pain ratin  At worst pain ratin (Pain is activity and positionally dependent; increased to 9/10 with ROM this date)  Location: R shoulder  Quality: sharp (\"Ache\")    Social Support  Lives with: spouse    Hand dominance: left    Diagnostic Tests  X-ray: abnormal (Moderate glenohumeral and acromioclavicular osteoarthritis.    High-riding humeral head suggesting chronic rotator cuff tear.)  CT scan: abnormal (acute/subacute infarction right occipital lobe)    Treatments  Treatments tried: Acute rehab for PT/OT/ST following CVA; current with OP PT for balance and vision training.  Patient Goals  Patient goals for therapy: decreased pain, increased motion, increased strength, independence with ADLs/IADLs and return to sport/leisure " "activities  Patient goal: \"I want to be able to raise my arm without pain.\"       Precautions: N/a       Objective     Prior Functional Status: Fully Independent     Work History:     Occupation and Activities:  Work status: retired  Job title/type of work:  Maintenance     Current functional limitations: Grooming, Bathing, Dressing, Lifting, Holding, Crafts/hobbies, Gardening, Tool handling, and Driving       Objective     Sensation: Denies abnormal     Appearance: N/a    Edema: N/a    Coordination: +Impaired    Range of Motion/Strength:     Upper Extremity ROM    AROM PROM MMT MMT     Right Left Right Left Right Left   SHOULDER Flexion 140 WFL WFL WFL 3/5 WFL    Extension 40 WFL WFL WFL 3+/5 WFL    Abduction 55 WFL WFL WFL 3/5 WFL    Internal Rotation Mid Lumbar WFL WFL WFL 3-/5 WFL    External Rotation 45 WFL WFL WFL 3/5 WFL   Patient straining during all AROM testing in stance; reports pain at 9/10 with shoulder flexion and abduction; 4-5/10 extension, ER, IR    R elbow, FA, wrist WFL at all planes.     Hand Range of Motion   All digits R hand WFL.      Hand Strength   (lbs) Right Left   1     2 NT NT   3     4     5       Pinch Right Left   2-pt NT NT   3-pt NT NT   Lateral NT NT       Outcome Measure: UEFI= 45/80; 44% impaired    Splinting: N/a    Modalities: Therapist educated patient on use of TENs unit for pain relief; provided patient with information to obtain personal unit if desired. Educated patient on use of hot/cold modalities for pain management and pre/post-HEP.     Therapy/Activity: Therapist instructed patient in supine scapular protraction/retraction, CW/CCW stabilization, and ER/IR with 45 degrees of abduction x10 reps x 3 sets each' HEP established.     EDUCATION: See above.       Assessment & Plan     Assessment  Impairments: abnormal or restricted ROM, activity intolerance, impaired physical strength, lacks appropriate home exercise program, pain with function and weight-bearing " "intolerance  Assessment details: Patient is a 89-year-old male with h/o R shoulder injury many years ago; pain exacerbated by recent rehab stent subsequent to CVA in December. Patient with impaired R shoulder ROM, significant weakness, and impaired functional use of RUE. Skilled OT intervention indicated to manage symptoms and increase strength and ROM for improved RUE use and quality of life.     Low complexity evaluation selected due to patient's clinical presentation, medical stability, and condition uncomplicated by existing co-morbidities that may affect patient's rehab tolerance, progression, and potential.   Prognosis: fair    Goals  \"I want to be able to raise my arm without pain.\"    Plan  Planned modality interventions: cryotherapy, TENS and thermotherapy (hydrocollator packs)  Planned therapy interventions: ADL retraining, flexibility, functional ROM exercises, home exercise program, IADL retraining, joint mobilization, manual therapy, neuromuscular re-education, strengthening, stretching and therapeutic activities  Frequency: 1x week  Duration in visits: 8  Treatment plan discussed with: patient  Plan details[place]           Goals:  Active       OT Goals       1. Patient will increase R shoulder AROM abduction to 100 degrees for increased functional use of RUE with ADL's.       Start:  01/23/25            2. Patient will increase R shoulder strength to 4/5 at all planes to increase functional use of RUE with ADL's/IADL's.       Start:  01/23/25            3. Patient will report <3/10 pain with functional use of RUE during ADL's, IADL's, and leisure activities.       Start:  01/23/25            4. Patient will Independently complete HEP.       Start:  01/23/25            5. Patient will increase overall functional ease and independence AEB UEFI score of at least 65/80 for improved quality of life for patient.       Start:  01/23/25               "

## 2025-01-24 ENCOUNTER — APPOINTMENT (OUTPATIENT)
Dept: CARDIAC REHAB | Facility: CLINIC | Age: 70
End: 2025-01-24
Payer: MEDICARE

## 2025-01-26 NOTE — PROGRESS NOTES
Primary Care Physician: Amandeep Vaca DO  Patient's Location: [facility] [address]  Primary Cardiologist: Dr. Jonathon Carter    Date of Visit: 01/27/2025  9:30 AM EST  Location of visit: [facility]   Type of Visit: Established - Last Seen: 10/14/2024     HPI / Summary:   David Chatman is a very pleasant 69 y.o. male presenting for management of Stage C NICM/HFrEF (last EF 23% 12/2024, from 35-40% diagnosis). He otherwise has a history of HTN, HLD, pAF, LBBB and ascending aortic aneursym (6.2cm) with associated moderate AR now s/p AscAoReplacement and #[id number] Bentall + STEVEN clipping with Dr. Perdomo on 09/23/2024 (Con[place]).     Initial CV History:   9/11/2024 - 10/3/2024 (22 days) [facility]  presented with dyspnea to his cardiologist. Echocardiography revealed a left ventricular ejection fraction of 35-40% and an ascending aortic aneurysm measuring 6.2 cm. The patient was referred to the emergency department, where further evaluation confirmed the presence of an ascending aortic aneurysm and heart failure. CMR showed EF 34%. He underwent a cAscAoReplacement and #29AVR Bentall + STEVEN clipping with Dr. Perdomo on 09/23/2024 (Conor). The patient experienced atrial fibrillation and required antiarrhythmic medications. Postoperative echocardiogram showed EF 22%and computed tomography angiography were performed to assess the surgical outcome. The echocardiogram revealed a reduced right ventricular function and no aortic insufficiency. The CT angiography showed no evidence of aneurysm recurrence or other complications.  The patient's creatinine level increased postoperatively from 1.08-1.2 to 1.74.  The patient was prescribed a life vest for arrhythmia monitoring.  The patient was referred to cardiac rehabilitation and physical therapy.  The patient's pre-operative weight was 111 kg (245 lb), and his discharge weight was 114 kg (251 lb).    Interval history:   12/6/2024 - 12/8/2024 ([facility]):  Presented with acute ataxia, diplopia, and dysarthria. MRI revealed ischemic stroke involving the superior avila and inferior midbrain. Initial workup favored embolic etiology, though neurology ultimately deferred initiation of AC. Patient was managed with DAPT (ASA + Plavix) and high-intensity statin therapy. Neurologically stable, transitioned to acute rehab with improved functional status at discharge.    Echo with EF 23%.     12/8/2024 - 12/21/2024 ([facility]):  Admitted to inpatient rehab for intensive PT/OT after ischemic stroke. Persistent diplopia noted without significant improvement. Coordination with cardiology and neurology led to conversion from DAPT to Eliquis per pre-stroke plan. Discharged with stable functional gains (supervised-to-modified independence) and home health follow-up arranged. Med rec completed: AC initiated (Eliquis), DAPT and hydralazine discontinued.    Today:  He is accompanied by: wife who provides additional clinical history     No chest pain. He has fatigue. Storke symptoms improved. Mild ataxia    He denies: dyspnea at rest, LE swelling, syncope, PND, orthopnea, chest pain, palpitation.    ROS: Full 10 pt review of symptoms of negative unless discussed above.     Objective   Medical History:   He has a past medical history of Gout, Hyperlipidemia, Hypertension, SHOAIB (obstructive sleep apnea), and Paroxysmal atrial fibrillation (Multi) (08/24/2023).  Surgical Hx:   He has a past surgical history that includes Sinus surgery and Cardiac catheterization (N/A, 9/11/2024).   Social Hx:   He reports that he has never smoked. He has never used smokeless tobacco. He reports that he does not currently use alcohol after a past usage of about 2.0 standard drinks of alcohol per week. He reports that he does not use drugs.  Family Hx:   His family history is not on file.   Allergies:   No Known Allergies  Exam:       1/27/2025     9:18 AM 12/31/2024     9:38  "AM 12/21/2024    11:00 AM 12/21/2024     6:34 AM 12/20/2024     7:00 PM 12/20/2024     1:00 PM   Vitals   Systolic 131 126 108 103 114 103   Diastolic 90 68 76 66 71 59   BP Location Left arm  Right arm Right arm Right arm Right arm   Heart Rate 75 65 73 74 72 74   Temp  35.3 °C (95.5 °F) 36.3 °C (97.3 °F) 37.1 °C (98.8 °F) 36.5 °C (97.7 °F) 36.6 °C (97.9 °F)   Resp   18 16 20 16   Height 1.854 m (6' 1\") 1.829 m (6')       Weight (lb) 222 218       BMI 29.29 kg/m2 29.57 kg/m2       BSA (m2) 2.28 m2 2.24 m2       Visit Report Report Report         Wt Readings from Last 5 Encounters:   01/27/25 101 kg (222 lb)   12/31/24 98.9 kg (218 lb)   12/20/24 92.9 kg (204 lb 14.4 oz)   12/08/24 98.3 kg (216 lb 12.8 oz)   11/06/24 99.3 kg (219 lb)     GEN: Pleasant, well-appearing, no acute distress.  HEENT: JVP not elevated, no icterus. No HJR  CHEST: No wheeze, good air movement. Sternotomy well healing  CV: Normal rate, regular rhythm. Normal S1, fixed split S2, no m/r/g  ABD: Soft, ND, NT  EXT: Warm, well perfused, No LE edema.   NEURO: Pleasant, Oriented to plan    Labs:   CMP:  Recent Labs     12/20/24  0605 12/17/24  0600 12/09/24  0519 12/08/24  0639 12/06/24  0742 10/10/24  1040    141 141 141 139 136  136   K 3.4* 3.2* 3.7 3.7 3.5 4.3  4.3    104 107 107 103 99  99   CO2 29 30 24 25 25 30  30   ANIONGAP 10 10 14 13 15 11  11   BUN 24* 24* 24* 19 29* 20  20   CREATININE 1.23 1.28 1.07 0.96 1.10 1.16  1.16   EGFR 64 61 76 87 74 69  69   MG  --   --   --   --  1.81 2.18     Recent Labs     12/06/24  0742 10/10/24  1040 10/03/24  0607 09/13/24  0829 09/11/24  0403 09/10/24  2043   ALBUMIN 3.7 3.3* 3.5   < > 4.0 4.2   ALKPHOS 155*  --   --   --  126* 140*   ALT 21  --   --   --  25 27   AST 17  --   --   --  18 19   BILITOT 0.6  --   --   --  0.8 0.6   LIPASE  --   --   --   --   --  25    < > = values in this interval not displayed.   CBC:  Recent Labs     12/17/24  0600 12/09/24  0519 12/08/24  0639 " "12/06/24  0742 10/03/24  0607   WBC 6.3 9.2 7.2 7.6 10.8   HGB 11.7* 12.7* 11.8* 11.4* 10.8*   HCT 37.3* 40.5* 37.6* 35.7* 33.1*    268 264 271 225   MCV 80 81 80 80 85   HEME/ENDO:  Recent Labs     12/06/24  0742 04/10/24  1044 10/20/22  0748 09/07/21  1321   FERRITIN  --  389 369  --    IRONSAT  --   --  39.1 29.3   TSH  --  1.11  --  1.84   HGBA1C 6.1* 5.6 5.9 5.8    CARDIAC:   Recent Labs     12/06/24  0936 12/06/24  0742   TROPHS  --  13   BNP 1,196*  --      Recent Labs     12/06/24  0742 04/10/24  1044 10/20/22  0748   CHOL 118 157 164   LDLCALC 69 93 99   HDL 32.6 53.0 52   TRIG 82 55 63   MICRO: No results for input(s): \"ESR\", \"CRP\", \"PROCAL\" in the last 03725 hours.No results found for the last 90 days.      Notable Studies:   EKG:   Recent Labs     12/06/24  0752 09/25/24  2125 09/24/24  1703   VENTRATE 63 97 81   ATRRATE 63 163 81   QTCFRED 530 415 437   QRSDUR 160 146 144   QTCCALCB 534 449 460       Echocardiogram:   Recent Labs     12/06/24  1220 09/25/24  1619 09/23/24  0625 09/10/24  1330   EF 23 22 33 38   LVIDD 6.74  --   --  7.16   RV 34.8 25.8  --   --    RVFRWALLPKSP 7.72 3.83  --  9.57   TAPSE 1.3 1.0  --  2.1     Transthoracic Echo (TTE) Limited 09/25/2024    1. Poorly visualized anatomical structures due to suboptimal image quality.   2. Left ventricular ejection fraction is severely decreased, calculated by Vasquez's biplane at 22%.   3. There is global hypokinesis of the left ventricle with minor regional variations.   4. Spectral Doppler shows a restrictive pattern of left ventricular diastolic filling.   5. Left ventricular cavity size is severely dilated.   6. Abnormal septal motion consistent with post-operative status.   7. No left ventricular thrombus visualized.   8. There is reduced right ventricular systolic function.   9. The left atrium is enlarged.  10. The right atrium is mild to moderately dilated.  11. Right ventricular systolic pressure is within normal limits.  12. " S/p 29mm Konect Resilia bioprosthetic valved conduit. AVR gradients were not assessed by spectral Doppler though no obvious AI by color Doppler  13. S/p 32mm Gelweave ascending aortic conduit and hemiarch.  14. Compared with the prior exam, preop echo done on 9/20/2024 [facility], the LV was already severely dilated with moderately reduced function with a severely dilated aorta with moderate AI and the patient is now s/p acending aortic and arch replacement and Konect AVR. The LV remains severely dilated with a reducetion in LV systolic function , now severely reduced. The AVR gradients were not assessed today but there is no AI.    Transthoracic Echo (TTE) Complete With 3D And Strain 09/10/2024  Left Ventricle: The left ventricular systolic function is moderately decreased, with a visually estimated ejection fraction of 35-40%. There is global hypokinesis of the left ventricle with minor regional variations. The left ventricular cavity size is severely dilated. Left Ventricular Global Longitudinal Strain - -10.5 %. Spectral Doppler shows a normal pattern of left ventricular diastolic filling.  Left Atrium: The left atrium is moderately dilated.  Right Ventricle: The right ventricle is normal in size. There is normal right ventricular global systolic function.  Right Atrium: The right atrium is moderately dilated.  Aortic Valve: The aortic valve was not well visualized. There is no aortic valve thickening. There is no evidence of aortic valve stenosis.  The aortic valve dimensionless index is 1.03. There is moderate aortic valve regurgitation. The peak instantaneous gradient of the aortic valve is 16.5 mmHg. The mean gradient of the aortic valve is 8.0 mmHg. Likely tricuspid aortic valve but images slightly suboptimal.  Mitral Valve: The mitral valve is abnormal. There is moderate mitral valve regurgitation which is centrally directed.  Tricuspid Valve: The tricuspid valve is structurally normal. There  is trace tricuspid regurgitation. The right ventricular systolic pressure is unable to be estimated.  Pulmonic Valve: The pulmonic valve is structurally normal. There is physiologic pulmonic valve regurgitation.  Pericardium: There is a trivial pericardial effusion.  Aorta: The aortic root is normal. There is an aneurysm involving the ascending aorta. Severe ascending aortic aneurysm of 6.2 cm at the largest diameter.  Systemic Veins: The inferior vena cava appears to be of normal size, IVC inspiratory collapse greater than 50%.      CONCLUSIONS:  1. The left ventricular systolic function is moderately decreased, with a visually estimated ejection fraction of 35-40%.  2. There is global hypokinesis of the left ventricle with minor regional variations.  3. Left ventricular cavity size is severely dilated.  4. There is normal right ventricular global systolic function.  5. The left atrium is moderately dilated.  6. The right atrium is moderately dilated.  7. Moderate mitral valve regurgitation.  8. Trace tricuspid regurgitation is visualized.  9. Aortic valve stenosis is not present.  10. Likely tricuspid aortic valve but images slightly suboptimal.  11. Moderate aortic valve regurgitation.  12. Aneurysm of the ascending aorta.      Coronary Angiography:   Left Heart Cath 09/11/2024  In the setting of large 6.2 cm ascending aorta dilation his coronary arteries were difficult to cannulate and image. For LAD imaging a JL 4.5, JL 5, JL 6, catheters were unsuccessful due to catheter prolapse. A 6 Portuguese AL 3 catheter provided adequate nonselective imaging of the left coronary system. The right coronary artery originated just above the right coronary cusp and anterior orientation based on the prior CT scan of the chest. I was unsuccessful at selectively imaging the right coronary artery and had only 1 brief fluoroscopic image of flow in the right coronary artery using an AL 3 catheter. AL 2 and Aaron right 3 catheters  were also unsuccessful at engaging the right coronary ostium.      1. The left main is a large vessel that bifurcates into the LAD and circumflex and had no obstructive disease.      2. The LAD was a large vessel that extended to the apex and wraps around the apex to supply the distal inferoapical wall. The LAD had no obstructive disease.      3. The circumflex was a large codominant vessel with no obstructive disease in the circumflex or its marginal branches.      4. The right coronary artery was unsuccessfully imaged nonselective imaging briefly showed evidence of vessel patency.    Let ventriculogram deferred to limit IV contrast load. Echocardiography shoed significant LV and LA dilation with estimated moderately decreased LV systolic function with LVEF 35-40% and modearate aortic valve regurgitation.          Impression:      #1 large 6.2 cm ascending aorta dilation.      2. Inability to selectively or nonselectively engage the right coronary artery, and patient would benefit from CCTA imaging to confirm coronary anatomy.      3. No obstructive disease in the left main, LAD, or circumflex vessels.      Description of the Procedure:  After informed consent was obtained for the cardiac catheterization procedure the patient was brought to the cardiac catheterization lab and prepped and draped in a sterile fashion with the remainder procedure performed in sterile technique. 10 cc of 1% lidocaine use of the right groin for local anesthesia and 3 mg of Versed and 75 mcg of fentanyl were used for sedation during the procedure.      Right Heart Cath: No results found for this or any previous visit from the past 1800 days.    Cardiac Scoring: No results found for this or any previous visit from the past 1800 days.    Cardiac MRI:   MR cardiac morphology and function w and wo IV contrast 09/12/2024  CARDIAC CHAMBERS  Normal atrioventricular and ventriculoarterial concordance    LEFT ATRIUM  Severely dilated (HANNAH-93.2  ml/m2).    RIGHT ATRIUM  Unable to determine due to images    INTERATRIAL SEPTUM  Intact.    LEFT VENTRICLE:  1. Dilated LV size and reduced systolic function. Quantitative LVEF 34%.  2. There is global hypokinesis of the left ventricle with minor  regional variation.  3. Normal LV wall thickness and normal LV indexed mass.  4. Normal native T2 values (<55ms)/ and normal T2 STIR imaging.  5. Normal ECV 29%  6. No evidence of LV thrombus.  7. Small focal transmural (%) LGE/scar involving the apical  lateral wall. Finding may represent prior embolic event versus prior  myocarditis. Nonspecific replacement fibrosis noted at the level of  the basal mid septum.    MEASUREMENTS  ----------------------------------------------------------------------  ------------------ VOLUMETRIC ANALYSIS  ----------------------------------------------  .--------------------------------------------------------.                   LV     Reference  RV    Reference   +------+----------+-------+-----------+------+-----------+   EDV   ml          426              206           ml/mï¿½       182               88     ESV   ml          257              105           ml/mï¿½       110               45     CO    L/min     10.66             6.35           L/min/mï¿½   4.55             2.72     MASS  g           253                            g/mï¿½        108                      SV    ml          169              101           ml/mï¿½        72               43     EF    %            40               49    '------+----------+-------+-----------+------+-----------'    CARDIAC OUTPUT HR:  63 bpm  LV DIMENSIONS  ----------------------------------------------  WALL THICKNESS - ANTEROSEPTAL:  0.93 cm  WALL THICKNESS - INFEROLATERAL:  0.8 cm  LV RIC:  7.63 cm  LV ESD:  6.86 cm    LA DIMENSIONS (LV SYSTOLE)  ----------------------------------------------  DIAMETER:   6.41 cm  AREA - 2 CHAMBER:  46.33 cmï¿½  LENGTH - 2 CHAMBER:  8.29 cm  AREA - 4 CHAMBER:  45.94 cmï¿½  LENGTH - 4 CHAMBER:  9.38 cm  VOLUME:  218 ml  VOLUME NORMALIZED:  93.2 ml/mï¿½    AORTIC ROOT DIMENSIONS  ----------------------------------------------  ANNULUS:  3.53 cm  SINUS OF VALSALVA:  4.41 cm  SINOTUBULAR JUNCTION:  5.79 cm    EXTRACELLULAR VOLUME MEASUREMENT  ----------------------------------------------  PRE-CONTRAST T1 MYOCARDIUM:  985.3 msec  PRE-CONTRAST T1 LV CAVITY:  1675.5 msec  POST-CONTRAST T1 MYOCARDIUM:  464.31 msec  POST-CONTRAST T1 LV CAVITY:  326.09 msec  HEMATOCRIT:  37.6 %  ECV:  29 %    FINDINGS  ----------------------------------------------  LV SCAR SIZE (17 SEGMENT):  9 %      RIGHT VENTRICLE  The right ventricle appears normal in size(EDVi-88 ml/m2), shape, and  has normal qualitative systolic function. Quantitative RVEF49 % no  segmental wall motion abnormalities. No abnormal delayed enhancement  in the myocardium.    INTERVENTRICULAR SEPTUM  Intact.    AORTIC VALVE  The aortic valve is trileaflet  There is  moderate aortic regurgitation.  Flow quantification through the ascending aorta:  Forward volume =164 cc/beat  Reverse volume = 59 cc/beat  Net forward volume = 105 cc/beat  Aortic regurgitant fraction = 36 %    MITRAL VALVE  There is  mild mitral regurgitation.  Integrating LV volumetric and aortic flow quantification data reveals:  Quantitative mitral regurgitant volume = 5 cc/beat  Quantitative mitral regurgitant fraction =  %    TRICUSPID VALVE  There is qualitative trivial tricuspid regurgitation.    PULMONARY VALVE:  Not assessed.    PERICARDIUM:  The pericardium is normal. There is small pericardial effussion.    THORACIC AORTA  Aneurysmal dilatation of the ascending aorta measuring at 6.3 cm.  The aortic root is aneurysmal measures 4.5 x 4.5 x 4.2 cm.  The thoracic aorta appears normal in course, caliber, and contour.  There is no evidence for acute aortic pathology.  The arch vessel  branching pattern is  normal.   All the arch branch vessels appear  widely patent in their proximal portions.    AORTIC ROOT DIMENSIONS:  Aortic root(sinus of valsalva): 4.41 cm  Annulus: 3.53 cm  Sinotubular junction:5.79 cm    PULMONARY ARTERIES  The central pulmonary arteries appear  normal (MPA-3.1 cm, RPA- 2.5  cm, LPA-2.3 cm).    SYSTEMIC AND PULMONARY VEINS  Normal systemic venous and pulmonary venous return.  The SVC is of normal caliber. IVC appears normal  Normal pulmonary venous anatomy.    CHEST  The chest wall is normal.  Limited imaging through the lungs reveals no gross abnormalities.  Small bilateral pleural effusion.    UPPER ABDOMEN  Limited imaging through the upper abdomen reveals no abnormalities of  the visualized organs.    Impression  1. Dilated LV (EDVi-182 ml/m2) with reduced systolic function.  Quantitative LVEF 34 %.  2. Normal RV size (EDVi-88 ml/m2)and systolic function. Quantitative  RVEF49%.  3. Aneurysmal dilatation of the ascending aorta measuring up 6.4 cm.  Dilated aortic root measuring 4.5 x 1.5 x 4.2 cm.  4. Moderate aortic regurgitation. Aortic regurgitant fraction is 36%.  5. Small focal transmural (%) LGE/scar involving the apical  lateral wall. Finding may represent prior embolic event versus prior  myocarditis.  6. Nonspecific replacement fibrosis noted at the level of the basal  mid septum.  7. Small bilateral pleural effusion.  8. Small circumferential pericardial effusion.      Nuclear:No results found for this or any previous visit from the past 1800 days.    Metabolic Stress: No results found for this or any previous visit from the past 1800 days.      Current Outpatient Medications   Medication Instructions    atorvastatin (LIPITOR) 80 mg, oral, Nightly    blood pressure monitor kit Use as directed to monitor blood pressure once daily    Eliquis 5 mg, oral, 2 times daily    eplerenone (INSPRA) 25 mg, oral, Daily    hydrALAZINE (APRESOLINE) 25  "mg, 2 times daily    Jardiance 10 mg, oral, Daily    metoprolol succinate XL (TOPROL-XL) 150 mg, oral, Daily, Do not crush or chew.    sacubitriL-valsartan (Entresto) 24-26 mg tablet 1 tablet, oral, 2 times daily    torsemide (DEMADEX) 40 mg, oral, Daily, As needed daily for peripheral edema.      Notable Therapies: *GDMT*   Class  Agent SAFETY    *ARNI* / ACE / ARB  Entresto 24-26mg BID -> 49-51mg twice daily Last BP: 131/90, Last BUN/Cr (GFR): 12/20/2024: 24/1.23 (64)    *Beta-Blocker*  metoprolol succinate XL 150mg daily -> 200mg daily Last HR: 75    *MRA*  eplerenone 25mg daily -> 50mg daily Last K: 12/20/2024: 3.4     *SGLT2*  Jardiance 10mg daily Last A1c 12/6/2024: 6.1     Diuretic  torsemide 40mg daily PRN Last BNP: 12/6/2024: 1,196    Hydralazine/ISDN  Hydralazine (stop)     Digoxin  Last Digoxin level: No results found for requested labs within last 3650 days.    Anticoagulation  Eliquis 5mg BID Last Hgb: 12/17/2024: 11.7    Anti-arrhythmic  Last QTc: 12/6/2024: 534    Antiplatelet  Aspirin 81mg daily Last Platelet: 12/17/2024: 206    Lipid-Lowering  atorvastatin 20mg daily Last Tchol 12/6/2024: 118 / LDL No results found for requested labs within last 3650 days. or 12/6/2024: 69 / HDL 12/6/2024: 32.6 / TRIG 12/6/2024: 82    Other        Device(s)  Consideration for CRT-D  EF: 12/6/2024: 23%, QRS: 12/6/2024: 160ms    Cardiac Rehab,   if EF <35/MI/OHS  Referred post O[facility]      Problems Addressed:   # HFrEF / Chronic Systolic Heart Failure, last EF 12/6/2024: 23%, dx'd 09/2024 with EF 35-40%,   # Non-ischemic Cardiomyopathy, ACC/AHA Stage C, NYHA II, Warm, Euvolemic. Possibly 2/2 long standing moderate AR vs uncontrolled long standing HTN(patient on 5 different anti hypertensive agents at home) vs electrical dyssynchrony EKG noted with LBBB vs prior MI due to coronary embolism per findings on cMRI of \"small focal transmural scar in the apical lateral wall.\" Though LHC with no significant CAD  # Ascending " Aortic Aneurysm with moderate AI now s/p ascending aortic replacement w 29mm Konect: Recovering well. Referred to cardiac rehab.   # LBBB (QRS 146ms): pre-existing AVR. Consideration for CRT-D if EF <35%.   # Paroxysmal Atrial Fibrillation s/p STEVEN clipping: not on A/C in the post-operative setting.   # Hypertension: Last BP: 131/90.  # Hyperlipidemia: Last Tchol 12/6/2024: 118 / LDL 69 / HDL 32.6 / TRIG 82. Continue statin  # Obesity: Last BMI: 29.3. Diet exercise, cardiac rehab  # CKD: Last BUN/Cr (GFR): 12/20/2024: 24/1.23 (64)  - GDMT as above  - Labwork: 1 week  - EP for CRT-D consideration  -- Cardiac Rehab  - 3 month followup    Patient Instructions   If you have any questions or need cardiac medication refills, please call the Heart Failure office at [phone], option [phone].  You may also contact the  Heart Failure Nursing team via email at [email or web address] (Please include your name and date of birth). To reach Dr. Buck's office please call [phone] / Fax: [phone]. To schedule an office appointment call [phone].     If I placed a referral today for a specialist/procedure, please call the general scheduling line at [phone]. You may also schedule appointments on the [facility] lizbeth. For radiology scheduling (Cardiac calcium score, CTA with HeartFlow, Cardiac MRI, NM cardiac stress test, PET viability study) the phone number is [phone].     - Continue current medications with the exception of:    -- increase Entresto 24-26mg BID -> 49-51mg twice daily   -- increase metoprolol succinate XL 150mg daily -> 200mg daily   -- increase eplerenone 25mg daily -> 50mg daily   -- stop Hydralazine   - Labwork: 1 week  - Imaging/Procedures: none  - Referrals:   -- Cardiac Rehab  -- see Electrophysiology to discuss a CRT (cardiac resynchronization therapy/defibrillator)  - Followup: 3 months       Orders:  No orders of the defined types were placed in this encounter.     Followup  Appts:  Future Appointments   Date Time Provider Department Center   1/28/2025 12:15 PM Leelee Benoit Shay, PTA [id number] [facility]   1/30/2025  9:45 AM Lawrence Maurice, PT [id number] [facility]   2/11/2025  2:00 PM Amandeep Gonzalez MD PhD [id number] [facility]   2/21/2025  9:45 AM Bernardo Ramírez MD [id number] [place] /    2/27/2025 11:40 AM Cristy See, PharmD [id number] [facility]       Time Spent: I spent 40 minutes reviewing medical testing, obtaining medical history and counselling and educating on diagnosis and documenting clinical encounter. The encounter involved a comprehensive review of extensive data, including prior medical records, imaging studies, laboratory results, and consultations, followed by in-depth counseling regarding the patient's complex cardiac condition and comorbidities. We discussed treatment options, risks and benefits, and recommendations for ongoing care and careful monitoring of adverse effects from medications.     ____________________________________________________________  Yomi Buck MD  Section of Advanced Heart Failure and Cardiac Transplantation  Division of Cardiovascular Medicine  [facility]  [facility]

## 2025-01-27 ENCOUNTER — OFFICE VISIT (OUTPATIENT)
Dept: CARDIOLOGY | Facility: CLINIC | Age: 70
End: 2025-01-27
Payer: MEDICARE

## 2025-01-27 ENCOUNTER — APPOINTMENT (OUTPATIENT)
Dept: CARDIAC REHAB | Facility: CLINIC | Age: 70
End: 2025-01-27
Payer: MEDICARE

## 2025-01-27 VITALS
WEIGHT: 222 LBS | HEART RATE: 75 BPM | HEIGHT: 73 IN | SYSTOLIC BLOOD PRESSURE: 131 MMHG | DIASTOLIC BLOOD PRESSURE: 90 MMHG | BODY MASS INDEX: 29.42 KG/M2 | OXYGEN SATURATION: 96 %

## 2025-01-27 DIAGNOSIS — Z95.3 S/P AORTIC VALVE REPLACEMENT WITH PORCINE VALVE: ICD-10-CM

## 2025-01-27 DIAGNOSIS — I44.7 LEFT BUNDLE BRANCH BLOCK: ICD-10-CM

## 2025-01-27 DIAGNOSIS — I50.20 HFREF (HEART FAILURE WITH REDUCED EJECTION FRACTION): ICD-10-CM

## 2025-01-27 DIAGNOSIS — I50.21 ACUTE SYSTOLIC HEART FAILURE: ICD-10-CM

## 2025-01-27 DIAGNOSIS — I42.8 NON-ISCHEMIC CARDIOMYOPATHY (MULTI): ICD-10-CM

## 2025-01-27 PROCEDURE — 3008F BODY MASS INDEX DOCD: CPT | Performed by: STUDENT IN AN ORGANIZED HEALTH CARE EDUCATION/TRAINING PROGRAM

## 2025-01-27 PROCEDURE — 99215 OFFICE O/P EST HI 40 MIN: CPT | Performed by: STUDENT IN AN ORGANIZED HEALTH CARE EDUCATION/TRAINING PROGRAM

## 2025-01-27 PROCEDURE — 1159F MED LIST DOCD IN RCRD: CPT | Performed by: STUDENT IN AN ORGANIZED HEALTH CARE EDUCATION/TRAINING PROGRAM

## 2025-01-27 PROCEDURE — RXMED WILLOW AMBULATORY MEDICATION CHARGE

## 2025-01-27 PROCEDURE — G2211 COMPLEX E/M VISIT ADD ON: HCPCS | Performed by: STUDENT IN AN ORGANIZED HEALTH CARE EDUCATION/TRAINING PROGRAM

## 2025-01-27 PROCEDURE — 1036F TOBACCO NON-USER: CPT | Performed by: STUDENT IN AN ORGANIZED HEALTH CARE EDUCATION/TRAINING PROGRAM

## 2025-01-27 PROCEDURE — 3080F DIAST BP >= 90 MM HG: CPT | Performed by: STUDENT IN AN ORGANIZED HEALTH CARE EDUCATION/TRAINING PROGRAM

## 2025-01-27 PROCEDURE — 3075F SYST BP GE 130 - 139MM HG: CPT | Performed by: STUDENT IN AN ORGANIZED HEALTH CARE EDUCATION/TRAINING PROGRAM

## 2025-01-27 RX ORDER — EPLERENONE 50 MG/1
50 TABLET, FILM COATED ORAL DAILY
Qty: 90 TABLET | Refills: 3 | Status: SHIPPED | OUTPATIENT
Start: 2025-01-27 | End: 2026-01-27

## 2025-01-27 RX ORDER — SACUBITRIL AND VALSARTAN 49; 51 MG/1; MG/1
1 TABLET, FILM COATED ORAL 2 TIMES DAILY
Qty: 180 TABLET | Refills: 3 | Status: SHIPPED | OUTPATIENT
Start: 2025-01-27 | End: 2025-01-28 | Stop reason: SDUPTHER

## 2025-01-27 RX ORDER — HYDRALAZINE HYDROCHLORIDE 25 MG/1
25 TABLET, FILM COATED ORAL 2 TIMES DAILY
COMMUNITY
End: 2025-01-27 | Stop reason: ALTCHOICE

## 2025-01-27 RX ORDER — METOPROLOL SUCCINATE 200 MG/1
200 TABLET, EXTENDED RELEASE ORAL DAILY
Qty: 90 TABLET | Refills: 3 | Status: SHIPPED | OUTPATIENT
Start: 2025-01-27 | End: 2026-01-27

## 2025-01-27 ASSESSMENT — ENCOUNTER SYMPTOMS: DEPRESSION: 0

## 2025-01-27 NOTE — PATIENT INSTRUCTIONS
If you have any questions or need cardiac medication refills, please call the Heart Failure office at [phone], option [phone].  You may also contact the  Heart Failure Nursing team via email at [email or web address] (Please include your name and date of birth). To reach Dr. Buck's office please call [phone] / Fax: [phone]. To schedule an office appointment call [phone].     If I placed a referral today for a specialist/procedure, please call the general scheduling line at [phone]. You may also schedule appointments on the [employer] lizbeth. For radiology scheduling (Cardiac calcium score, CTA with HeartFlow, Cardiac MRI, NM cardiac stress test, PET viability study) the phone number is [phone].     - Continue current medications with the exception of:    -- increase Entresto 24-26mg BID -> 49-51mg twice daily (you can take two of the 24-26mg tablets until you run out)   -- increase metoprolol succinate XL 150mg daily -> 200mg daily   -- increase eplerenone 25mg daily -> 50mg daily   -- stop Hydralazine   - Labwork: 1 week  - Imaging/Procedures: none  - Referrals:   -- Cardiac Rehab  -- see Electrophysiology to discuss a CRT (cardiac resynchronization therapy/defibrillator)  - Followup: 3 months

## 2025-01-28 ENCOUNTER — APPOINTMENT (OUTPATIENT)
Dept: CARDIAC REHAB | Facility: CLINIC | Age: 70
End: 2025-01-28
Payer: MEDICARE

## 2025-01-28 ENCOUNTER — TREATMENT (OUTPATIENT)
Dept: PHYSICAL THERAPY | Facility: CLINIC | Age: 70
End: 2025-01-28
Payer: MEDICARE

## 2025-01-28 DIAGNOSIS — G89.29 CHRONIC PAIN OF RIGHT KNEE: ICD-10-CM

## 2025-01-28 DIAGNOSIS — G89.29 CHRONIC RIGHT SHOULDER PAIN: ICD-10-CM

## 2025-01-28 DIAGNOSIS — M25.561 CHRONIC PAIN OF RIGHT KNEE: ICD-10-CM

## 2025-01-28 DIAGNOSIS — M25.511 CHRONIC RIGHT SHOULDER PAIN: ICD-10-CM

## 2025-01-28 DIAGNOSIS — R26.89 BALANCE DISORDER: ICD-10-CM

## 2025-01-28 DIAGNOSIS — I63.9 ISCHEMIC STROKE (MULTI): ICD-10-CM

## 2025-01-28 PROCEDURE — 97112 NEUROMUSCULAR REEDUCATION: CPT | Mod: GP,CQ

## 2025-01-28 PROCEDURE — 97110 THERAPEUTIC EXERCISES: CPT | Mod: GP,CQ

## 2025-01-28 PROCEDURE — RXMED WILLOW AMBULATORY MEDICATION CHARGE

## 2025-01-28 ASSESSMENT — PAIN SCALES - GENERAL: PAINLEVEL_OUTOF10: 5 - MODERATE PAIN

## 2025-01-28 ASSESSMENT — PAIN - FUNCTIONAL ASSESSMENT: PAIN_FUNCTIONAL_ASSESSMENT: 0-10

## 2025-01-28 NOTE — PROGRESS NOTES
Physical Therapy Treatment    Patient Name: David Chatman  MRN: [id number]  Encounter date: 1/28/2025 PT Received On: 01/28/25  Time Calculation  Start Time: 1215  Stop Time: 1245  Time Calculation (min): 30 min  PT Therapeutic Procedures Time Entry  Neuromuscular Re-Education Time Entry: 15  Therapeutic Exercise Time Entry: 15    Visit # 7 of 20  Visits/Dates Authorized: MEDICARE PART A AND B    Current Problem:   Problem List Items Addressed This Visit             ICD-10-CM    Ischemic stroke (Multi) I63.9    Chronic pain of right knee M25.561, G89.29    Chronic right shoulder pain M25.511, G89.29     Other Visit Diagnoses         Codes    Balance disorder     R26.89                      Precautions:    stroke, R shoulder OA, R knee OA       Subjective Cont to do better each day. Not falling and feeling stronger. Feeling 75% better with double vision.  Improved motion with head.  Shoulder is biggest complaint.   Saw OT the other day but felt a little worse. May see surgeon for shoulder and will see eye doc because he wants to drive      Pre-Treatment Symptoms:   Pain Assessment: 0-10  0-10 (Numeric) Pain Score: 5 - Moderate pain  Pain Location: Shoulder  Pain Orientation: Right    Objective   Findings:   pt presents with subtle decreased hip and knee flexion during ambulation, along with intermittent decreased R ankle DF at heel strike  Vitals:             Treatments:      Therapeutic Exercise 49550:   Nustep L5 6 min  seat 12 arms 10    OR  scifit bike s=12  R =4  x 6'    Ham curl 55# 2 x 15  DL  Leg press DL 50#  2 x 15 each  STS 10x no ue  TB SS mint blue 2 laps  TB monster f/b mint blue 2 laps  Tb row, orange, 2 x 10 (not past neutral)  Tb isometric ER walkout, pink, x 10 reps R and L.   Leg curl 65# 3x10  Resisted ambulation Front and Retro 15#  x 4 reps each. DNP  Heel raise DL x15 , SL x15      Neuromuscular Re-Ed 68479:   BITS visual pursuits- ABC sequencing , 3 trials , rotation speed 4-5 , (both eyes  "open)  trial one (rotation speed 5)=58\";   trial two=(rotation speed 6) = 52\"; trial three (speed 4) =45 seconds  also tried some in tandem stance  Tandem walk, unsupported, though close intermittent CGA, 4 x 30'  Tandem stance firm with saccades- up/down , R/L   Gat with 180/360 turns   Large rocker board AP and SS x 20 reps each f/b 1' static balance each.   DLS foam/firm NBOS with pencil pushup   Gait with convergence (popsicle stick)   SLS with band and KB bounce  SLS reach fwd      DNP:  Romberg Stance with Eyes Closed  Standing Tandem Balance with Unilateral Counter Support    Walking Forward with Slow Head Nods    Walking Forward with Slow Head Rotation    Hurdles 12 inches step to and reciprocal x 2 laps each.       HEP / Access Codes:   Access Code: [email or web address]  URL: [email or web address]  Date: 01/08/2025  Prepared by: Yonas Maurice  - Romberg Stance with Eyes Closed  - 1 x daily - 1 sets - 3 reps - 15 hold  - Standing Tandem Balance with Unilateral Counter Support  - 1 x daily - 1 sets - 3 reps - 30 hold  - Walking Forward with Slow Head Nods  - 1 x daily - 1 sets - 8 reps  - Walking Forward with Slow Head Rotation  - 1 x daily - 1 sets - 8 reps    Access Code: [email or web address]  URL: [email or web address]  Date: 01/10/2025  Prepared by: Amandeep Alcala  - Side Stepping with Resistance at Ankles  - 1-2 x daily - 7 x weekly - 15-20 ft x 4  distance   - Forward and Backward Monster walks  - 1-2 x daily - 7 x weekly - 15-20 ft x 4  distance   - Standing Single Leg Heel Raise  - 1-2 x daily - 7 x weekly - 2 sets - 10-15 reps    Access Code: [email or web address]  URL: [email or web address]  Date: 01/20/2025  Prepared by: Steve Carmona    Exercises  - Standing Shoulder Row with Anchored Resistance  - 1-2 x daily - 6 x weekly - 2 sets - 10 reps  - Shoulder External Rotation with Anchored Resistance  - 1-2 x daily - 6 x weekly - 10 reps    Assessment:  Right shoulder pain is biggest complaint at this point.  " Overall doing much better. Trying to work on higher level balance activity.        Post-Treatment Symptoms:    No change.     Plan: assess tolerance to progression of shoulder/scapular exercises.    Therapy options: will be seen for skilled physical therapy services  Planned therapy interventions: stretching, strengthening, motor coordination training, balance/weight-bearing training, neuromuscular re-education, flexibility, functional ROM exercises, gait training, home exercise program, transfer training, therapeutic activities and fine motor coordination training  Frequency: 2x week  Duration in visits: 20  Duration in weeks: 12  Treatment plan discussed with: patient  Plan details: 2x week for 20 visits.     Goals:   Active       PT Problem       PT Goal 1       Start:  01/09/25    Expected End:  04/09/25       Patient to safely get on/off chair demonstrating proper transfer techniques independently to improve safe   functional mobility.     Patient will improve Dynamic Gait Index to 22/24 to reduce fall risk at home.    Patient will improve Timed Up and Go Score to <12 seconds to allow patient to walk household distances   safely and reduce fall risk at home.      Patient will improve 30 second sit to stand test to >9 reps to allow patient the ability to transfer to/from   a chair safely indepedently.    Patient to exhibit controlled lowering to  objects from the floor without compensations with distant   supervision five out of ten trials.    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

## 2025-01-29 ENCOUNTER — PHARMACY VISIT (OUTPATIENT)
Dept: PHARMACY | Facility: CLINIC | Age: 70
End: 2025-01-29
Payer: COMMERCIAL

## 2025-01-30 ENCOUNTER — TREATMENT (OUTPATIENT)
Dept: PHYSICAL THERAPY | Facility: CLINIC | Age: 70
End: 2025-01-30
Payer: MEDICARE

## 2025-01-30 DIAGNOSIS — G89.29 CHRONIC PAIN OF RIGHT KNEE: ICD-10-CM

## 2025-01-30 DIAGNOSIS — R26.89 BALANCE DISORDER: ICD-10-CM

## 2025-01-30 DIAGNOSIS — M25.561 CHRONIC PAIN OF RIGHT KNEE: ICD-10-CM

## 2025-01-30 DIAGNOSIS — M25.511 CHRONIC RIGHT SHOULDER PAIN: ICD-10-CM

## 2025-01-30 DIAGNOSIS — I63.9 ISCHEMIC STROKE (MULTI): ICD-10-CM

## 2025-01-30 DIAGNOSIS — G89.29 CHRONIC RIGHT SHOULDER PAIN: ICD-10-CM

## 2025-01-30 PROCEDURE — 97110 THERAPEUTIC EXERCISES: CPT | Mod: GP

## 2025-01-30 PROCEDURE — 97112 NEUROMUSCULAR REEDUCATION: CPT | Mod: GP

## 2025-01-30 ASSESSMENT — PAIN - FUNCTIONAL ASSESSMENT: PAIN_FUNCTIONAL_ASSESSMENT: 0-10

## 2025-01-30 ASSESSMENT — PAIN SCALES - GENERAL: PAINLEVEL_OUTOF10: 5 - MODERATE PAIN

## 2025-01-30 NOTE — PROGRESS NOTES
Physical Therapy Treatment    Patient Name: David Chatman  MRN: [id number]  Encounter date: 1/30/2025    Time Calculation  Start Time: 0945  Stop Time: 1030  Time Calculation (min): 45 min  PT Therapeutic Procedures Time Entry  Neuromuscular Re-Education Time Entry: 20  Therapeutic Exercise Time Entry: 20    Visit # 8 of 20  Visits/Dates Authorized: MEDICARE PART A AND B    Current Problem:   Problem List Items Addressed This Visit             ICD-10-CM    Ischemic stroke (Multi) I63.9    Chronic pain of right knee M25.561, G89.29    Chronic right shoulder pain M25.511, G89.29     Other Visit Diagnoses         Codes    Balance disorder     R26.89              Precautions:    stroke, R shoulder OA, R knee OA       Subjective Pt states he wants to go down to 1x week for PT. Balance is biggest challenge.     Pre-Treatment Symptoms:   Pain Assessment: 0-10  0-10 (Numeric) Pain Score: 5 - Moderate pain  Pain Location: Shoulder  Pain Orientation: Right    Objective   Findings:        Mild loss of balance with quick turns       Treatments:      Therapeutic Exercise 60183:   Elliptimill x 5 min  Shuttle press dbl # 2x10  L/R 50# 2x10  Knee ext dbl 10-15# 2x10  L/R 10# x 10  TB SS green 4 laps  Leg curl 65# 3x10  Resisted ambulation fwd/rev/lateral L/R 12.5# x 3 each  Heel raise DL x15 , SL x8 L/R    Neuromuscular Re-Ed 68984:   Tandem walk, unsupported, though close intermittent CGA, 4 x 30'  Tandem stance firm with saccades- up/down , R/L   Gat with 180/360 turns around obstacle  DLS foam/firm NBOS with pencil pushup   SLS L/R  Walk around cones/ quick change direction  Tandem stance w/ head turns L/R  Saccades L/R, up/down  VOR L/R, up/down  Narrow MARTHA w/ rebounder toss 3# x20  Narrow MARTHA w/ ball toss L/R UE 1# x 20    DNP:  Romberg Stance with Eyes Closed  Standing Tandem Balance with Unilateral Counter Support    Walking Forward with Slow Head Nods    Walking Forward with Slow Head Rotation    Hurdles 12 inches  step to and reciprocal x 2 laps each.     HEP / Access Codes:   Access Code: [email or web address]  URL: [email or web address]  Date: 01/08/2025  Prepared by: Yonas Maurice  - Romberg Stance with Eyes Closed  - 1 x daily - 1 sets - 3 reps - 15 hold  - Standing Tandem Balance with Unilateral Counter Support  - 1 x daily - 1 sets - 3 reps - 30 hold  - Walking Forward with Slow Head Nods  - 1 x daily - 1 sets - 8 reps  - Walking Forward with Slow Head Rotation  - 1 x daily - 1 sets - 8 reps    Access Code: [email or web address]  URL: [email or web address]  Date: 01/10/2025  Prepared by: Amandeep Alcala  - Side Stepping with Resistance at Ankles  - 1-2 x daily - 7 x weekly - 15-20 ft x 4  distance   - Forward and Backward Monster walks  - 1-2 x daily - 7 x weekly - 15-20 ft x 4  distance   - Standing Single Leg Heel Raise  - 1-2 x daily - 7 x weekly - 2 sets - 10-15 reps    Access Code: [email or web address]  URL: [email or web address]  Date: 01/20/2025  Prepared by: Steve Carmona    Exercises  - Standing Shoulder Row with Anchored Resistance  - 1-2 x daily - 6 x weekly - 2 sets - 10 reps  - Shoulder External Rotation with Anchored Resistance  - 1-2 x daily - 6 x weekly - 10 reps    Assessment:  Pt had most difficulty with quick change in directions and SLS. Still has LLE weakness compared to R as well limiting function.       Post-Treatment Symptoms:   Response to Interventions: No change in painNo change.     Plan: assess tolerance to progression of shoulder/scapular exercises.    Therapy options: will be seen for skilled physical therapy services  Planned therapy interventions: stretching, strengthening, motor coordination training, balance/weight-bearing training, neuromuscular re-education, flexibility, functional ROM exercises, gait training, home exercise program, transfer training, therapeutic activities and fine motor coordination training  Frequency: 2x week  Duration in visits: 20  Duration in weeks: 12  Treatment plan  discussed with: patient  Plan details: 2x week for 20 visits.     Goals:   Active       PT Problem       PT Goal 1       Start:  01/09/25    Expected End:  04/09/25       Patient to safely get on/off chair demonstrating proper transfer techniques independently to improve safe   functional mobility.     Patient will improve Dynamic Gait Index to 22/24 to reduce fall risk at home.    Patient will improve Timed Up and Go Score to <12 seconds to allow patient to walk household distances   safely and reduce fall risk at home.      Patient will improve 30 second sit to stand test to >9 reps to allow patient the ability to transfer to/from   a chair safely indepedently.    Patient to exhibit controlled lowering to  objects from the floor without compensations with distant   supervision five out of ten trials.    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

## 2025-01-31 ENCOUNTER — PHARMACY VISIT (OUTPATIENT)
Dept: PHARMACY | Facility: CLINIC | Age: 70
End: 2025-01-31
Payer: COMMERCIAL

## 2025-01-31 ENCOUNTER — APPOINTMENT (OUTPATIENT)
Dept: CARDIAC REHAB | Facility: CLINIC | Age: 70
End: 2025-01-31
Payer: MEDICARE

## 2025-01-31 LAB
ALBUMIN SERPL-MCNC: 4.2 G/DL (ref 3.6–5.1)
ALP SERPL-CCNC: 143 U/L (ref 35–144)
ALT SERPL-CCNC: 20 U/L (ref 9–46)
ANION GAP SERPL CALCULATED.4IONS-SCNC: 13 MMOL/L (CALC) (ref 7–17)
AST SERPL-CCNC: 16 U/L (ref 10–35)
BILIRUB SERPL-MCNC: 0.8 MG/DL (ref 0.2–1.2)
BNP SERPL-MCNC: NORMAL PG/ML
BUN SERPL-MCNC: 30 MG/DL (ref 7–25)
CALCIUM SERPL-MCNC: 9.1 MG/DL (ref 8.6–10.3)
CHLORIDE SERPL-SCNC: 108 MMOL/L (ref 98–110)
CO2 SERPL-SCNC: 22 MMOL/L (ref 20–32)
CREAT SERPL-MCNC: 1.26 MG/DL (ref 0.7–1.35)
EGFRCR SERPLBLD CKD-EPI 2021: 63 ML/MIN/1.73M2
ERYTHROCYTE [DISTWIDTH] IN BLOOD BY AUTOMATED COUNT: 17.6 % (ref 11–15)
GLUCOSE SERPL-MCNC: 146 MG/DL (ref 65–139)
HCT VFR BLD AUTO: 43.3 % (ref 38.5–50)
HGB BLD-MCNC: 13.7 G/DL (ref 13.2–17.1)
MCH RBC QN AUTO: 27.2 PG (ref 27–33)
MCHC RBC AUTO-ENTMCNC: 31.6 G/DL (ref 32–36)
MCV RBC AUTO: 86.1 FL (ref 80–100)
PLATELET # BLD AUTO: 209 THOUSAND/UL (ref 140–400)
PMV BLD REES-ECKER: 10.6 FL (ref 7.5–12.5)
POTASSIUM SERPL-SCNC: 4 MMOL/L (ref 3.5–5.3)
PROT SERPL-MCNC: 7.2 G/DL (ref 6.1–8.1)
RBC # BLD AUTO: 5.03 MILLION/UL (ref 4.2–5.8)
SODIUM SERPL-SCNC: 143 MMOL/L (ref 135–146)
WBC # BLD AUTO: 7.1 THOUSAND/UL (ref 3.8–10.8)

## 2025-02-03 ENCOUNTER — TELEPHONE (OUTPATIENT)
Dept: CARDIOLOGY | Facility: HOSPITAL | Age: 70
End: 2025-02-03

## 2025-02-03 ENCOUNTER — TREATMENT (OUTPATIENT)
Dept: PHYSICAL THERAPY | Facility: CLINIC | Age: 70
End: 2025-02-03
Payer: MEDICARE

## 2025-02-03 DIAGNOSIS — I63.9 ISCHEMIC STROKE (MULTI): ICD-10-CM

## 2025-02-03 DIAGNOSIS — M25.561 CHRONIC PAIN OF RIGHT KNEE: ICD-10-CM

## 2025-02-03 DIAGNOSIS — G89.29 CHRONIC RIGHT SHOULDER PAIN: ICD-10-CM

## 2025-02-03 DIAGNOSIS — G89.29 CHRONIC PAIN OF RIGHT KNEE: ICD-10-CM

## 2025-02-03 DIAGNOSIS — M25.511 CHRONIC RIGHT SHOULDER PAIN: ICD-10-CM

## 2025-02-03 DIAGNOSIS — R26.89 BALANCE DISORDER: ICD-10-CM

## 2025-02-03 LAB
ALBUMIN SERPL-MCNC: 4.2 G/DL (ref 3.6–5.1)
ALP SERPL-CCNC: 143 U/L (ref 35–144)
ALT SERPL-CCNC: 20 U/L (ref 9–46)
ANION GAP SERPL CALCULATED.4IONS-SCNC: 13 MMOL/L (CALC) (ref 7–17)
AST SERPL-CCNC: 16 U/L (ref 10–35)
BILIRUB SERPL-MCNC: 0.8 MG/DL (ref 0.2–1.2)
BNP SERPL-MCNC: 364 PG/ML
BUN SERPL-MCNC: 30 MG/DL (ref 7–25)
CALCIUM SERPL-MCNC: 9.1 MG/DL (ref 8.6–10.3)
CHLORIDE SERPL-SCNC: 108 MMOL/L (ref 98–110)
CO2 SERPL-SCNC: 22 MMOL/L (ref 20–32)
CREAT SERPL-MCNC: 1.26 MG/DL (ref 0.7–1.35)
EGFRCR SERPLBLD CKD-EPI 2021: 63 ML/MIN/1.73M2
ERYTHROCYTE [DISTWIDTH] IN BLOOD BY AUTOMATED COUNT: 17.6 % (ref 11–15)
GLUCOSE SERPL-MCNC: 146 MG/DL (ref 65–139)
HCT VFR BLD AUTO: 43.3 % (ref 38.5–50)
HGB BLD-MCNC: 13.7 G/DL (ref 13.2–17.1)
MCH RBC QN AUTO: 27.2 PG (ref 27–33)
MCHC RBC AUTO-ENTMCNC: 31.6 G/DL (ref 32–36)
MCV RBC AUTO: 86.1 FL (ref 80–100)
PLATELET # BLD AUTO: 209 THOUSAND/UL (ref 140–400)
PMV BLD REES-ECKER: 10.6 FL (ref 7.5–12.5)
POTASSIUM SERPL-SCNC: 4 MMOL/L (ref 3.5–5.3)
PROT SERPL-MCNC: 7.2 G/DL (ref 6.1–8.1)
RBC # BLD AUTO: 5.03 MILLION/UL (ref 4.2–5.8)
SODIUM SERPL-SCNC: 143 MMOL/L (ref 135–146)
WBC # BLD AUTO: 7.1 THOUSAND/UL (ref 3.8–10.8)

## 2025-02-03 PROCEDURE — 97110 THERAPEUTIC EXERCISES: CPT | Mod: GP

## 2025-02-03 PROCEDURE — 97112 NEUROMUSCULAR REEDUCATION: CPT | Mod: GP

## 2025-02-03 ASSESSMENT — PAIN - FUNCTIONAL ASSESSMENT: PAIN_FUNCTIONAL_ASSESSMENT: 0-10

## 2025-02-03 ASSESSMENT — PAIN SCALES - GENERAL: PAINLEVEL_OUTOF10: 4

## 2025-02-03 NOTE — PROGRESS NOTES
Physical Therapy Treatment    Patient Name: David Chatman  MRN: [id number]  Encounter date: 2/3/2025    Time Calculation  Start Time: 0943  Stop Time: 1025  Time Calculation (min): 42 min  PT Therapeutic Procedures Time Entry  Neuromuscular Re-Education Time Entry: 15  Therapeutic Exercise Time Entry: 25    Visit # 9 of 20  Visits/Dates Authorized: MEDICARE PART A AND B    Current Problem:   Problem List Items Addressed This Visit             ICD-10-CM    Ischemic stroke (Multi) I63.9    Chronic pain of right knee M25.561, G89.29    Chronic right shoulder pain M25.511, G89.29     Other Visit Diagnoses         Codes    Balance disorder     R26.89              Precautions:    stroke, R shoulder OA, R knee OA       Subjective Pt states his balance and vision continue to improve.      Pre-Treatment Symptoms:   Pain Assessment: 0-10  0-10 (Numeric) Pain Score: 4  Pain Location: Shoulder  Pain Orientation: Right    Objective   Findings:        Mild loss of balance with quick turns       Treatments:      Therapeutic Exercise 23929:   Elliptimill x 5 min  Shuttle press dbl # 2x10  L/R 50# 2x10  Leg curl 65# 3x10  Resisted ambulation lateral L/R 15# x 3 each  Wall lean ankle DF 2x15  Leg press heel raise 20# L/R 2x8 each  Step ups on bosu 2x10 L/R    Neuromuscular Re-Ed 52275:   AP rockerboard with saccades- up/down , R/L   AP rockerboard SLS L/R  Walk around cones/ quick change direction  Tandem stance w/ head turns L/R  Saccades L/R, up/down  VOR L/R, up/down  Walking with head turns L/R, up/down, carrying ball on cone w/ cognitive task 120 feet x 2  Toe taps 7 inch x 20    DNP:  Romberg Stance with Eyes Closed  Standing Tandem Balance with Unilateral Counter Support    Walking Forward with Slow Head Nods    Walking Forward with Slow Head Rotation    Hurdles 12 inches step to and reciprocal x 2 laps each.     HEP / Access Codes:   Access Code: [email or web address]  URL: [email or web address]  Date: 01/08/2025  Prepared  by: Yonas Maurice  - Romberg Stance with Eyes Closed  - 1 x daily - 1 sets - 3 reps - 15 hold  - Standing Tandem Balance with Unilateral Counter Support  - 1 x daily - 1 sets - 3 reps - 30 hold  - Walking Forward with Slow Head Nods  - 1 x daily - 1 sets - 8 reps  - Walking Forward with Slow Head Rotation  - 1 x daily - 1 sets - 8 reps    Access Code: [email or web address]  URL: [email or web address]  Date: 01/10/2025  Prepared by: Amandeep Alcala  - Side Stepping with Resistance at Ankles  - 1-2 x daily - 7 x weekly - 15-20 ft x 4  distance   - Forward and Backward Monster walks  - 1-2 x daily - 7 x weekly - 15-20 ft x 4  distance   - Standing Single Leg Heel Raise  - 1-2 x daily - 7 x weekly - 2 sets - 10-15 reps    Access Code: [email or web address]  URL: [email or web address]  Date: 01/20/2025  Prepared by: Steve Carmona    Exercises  - Standing Shoulder Row with Anchored Resistance  - 1-2 x daily - 6 x weekly - 2 sets - 10 reps  - Shoulder External Rotation with Anchored Resistance  - 1-2 x daily - 6 x weekly - 10 reps    Assessment:  Pt did better with quick change of directions and static balance tasks this visit. Pt still weaker on LLE then right.     Post-Treatment Symptoms:   Response to Interventions: No change in painNo change.     Plan: assess tolerance to progression of shoulder/scapular exercises.    Therapy options: will be seen for skilled physical therapy services  Planned therapy interventions: stretching, strengthening, motor coordination training, balance/weight-bearing training, neuromuscular re-education, flexibility, functional ROM exercises, gait training, home exercise program, transfer training, therapeutic activities and fine motor coordination training  Frequency: 2x week  Duration in visits: 20  Duration in weeks: 12  Treatment plan discussed with: patient  Plan details: 2x week for 20 visits.     Goals:   Active       PT Problem       PT Goal 1       Start:  01/09/25    Expected End:  04/09/25        Patient to safely get on/off chair demonstrating proper transfer techniques independently to improve safe   functional mobility.     Patient will improve Dynamic Gait Index to 22/24 to reduce fall risk at home.    Patient will improve Timed Up and Go Score to <12 seconds to allow patient to walk household distances   safely and reduce fall risk at home.      Patient will improve 30 second sit to stand test to >9 reps to allow patient the ability to transfer to/from   a chair safely indepedently.    Patient to exhibit controlled lowering to  objects from the floor without compensations with distant   supervision five out of ten trials.    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

## 2025-02-10 NOTE — PROGRESS NOTES
Assessment and Plan    12/06/2024 MRI brain without contrast & MRA head & neck, which I personally reviewed, shows left dorsal medial avila diffusion and FLAIR changes consistent with acute to subacute ischemia. There are other scattered bihemispheric white matter FLAIR lesions.  Prior head and neck imaging.    This 69 year-old man with a history of stroke 12/5/2024, paroxysmal atrial fibrillation, NICM/HFrEF, LBBB, ascendine aortic aneurysm status post AVR, HTN, HLD presents for evaluation of diplopia.    Findings are consistent with left internuclear ophthalmoplegia consistent with the left dorsal pontine stroke seen on MRI. He symptomatically is better although examination remains abnormal. I see no need for optical intervention such as prism. I recommend continued cardiovascular risk reduction.    The intraocular pressure is borderline high and should be monitored with primary eye care.    Follow up as needed. (Dilated 2/11/2025)

## 2025-02-11 ENCOUNTER — APPOINTMENT (OUTPATIENT)
Dept: OPHTHALMOLOGY | Facility: CLINIC | Age: 70
End: 2025-02-11
Payer: MEDICARE

## 2025-02-11 DIAGNOSIS — H53.2 DIPLOPIA: ICD-10-CM

## 2025-02-11 DIAGNOSIS — H51.22 INTERNUCLEAR OPHTHALMOPLEGIA OF LEFT EYE: Primary | ICD-10-CM

## 2025-02-11 PROCEDURE — 92060 SENSORIMOTOR EXAMINATION: CPT | Performed by: PSYCHIATRY & NEUROLOGY

## 2025-02-11 PROCEDURE — 99205 OFFICE O/P NEW HI 60 MIN: CPT | Performed by: PSYCHIATRY & NEUROLOGY

## 2025-02-11 ASSESSMENT — CONF VISUAL FIELD
OS_INFERIOR_NASAL_RESTRICTION: 0
OD_SUPERIOR_NASAL_RESTRICTION: 0
OD_SUPERIOR_TEMPORAL_RESTRICTION: 0
OS_SUPERIOR_NASAL_RESTRICTION: 0
METHOD: COUNTING FINGERS
OS_NORMAL: 1
OD_NORMAL: 1
OD_INFERIOR_TEMPORAL_RESTRICTION: 0
OD_INFERIOR_NASAL_RESTRICTION: 0
OS_INFERIOR_TEMPORAL_RESTRICTION: 0
OS_SUPERIOR_TEMPORAL_RESTRICTION: 0

## 2025-02-11 ASSESSMENT — TONOMETRY
OS_IOP_MMHG: 23
OS_IOP_MMHG: 24
IOP_METHOD: TONOPEN
IOP_METHOD: TONOPEN
OD_IOP_MMHG: 20

## 2025-02-11 ASSESSMENT — VISUAL ACUITY
OD_SC: 20/25
OD_PH_SC: 20/20
OS_SC: 20/25
OD_SC+: -2
OS_PH_SC+: -2
OS_PH_SC: 20/20
METHOD: SNELLEN - LINEAR
OD_PH_SC+: -2

## 2025-02-11 ASSESSMENT — ENCOUNTER SYMPTOMS
CARDIOVASCULAR NEGATIVE: 1
PSYCHIATRIC NEGATIVE: 0
ENDOCRINE NEGATIVE: 0
ALLERGIC/IMMUNOLOGIC NEGATIVE: 0
RESPIRATORY NEGATIVE: 0
CONSTITUTIONAL NEGATIVE: 0
NEUROLOGICAL NEGATIVE: 1
HEMATOLOGIC/LYMPHATIC NEGATIVE: 0
EYES NEGATIVE: 1
GASTROINTESTINAL NEGATIVE: 0
MUSCULOSKELETAL NEGATIVE: 0

## 2025-02-11 ASSESSMENT — CUP TO DISC RATIO
OD_RATIO: 0.35
OS_RATIO: 0.35

## 2025-02-11 ASSESSMENT — SLIT LAMP EXAM - LIDS
COMMENTS: NORMAL
COMMENTS: NORMAL

## 2025-02-11 ASSESSMENT — EXTERNAL EXAM - RIGHT EYE: OD_EXAM: NORMAL

## 2025-02-11 ASSESSMENT — EXTERNAL EXAM - LEFT EYE: OS_EXAM: NORMAL

## 2025-02-11 NOTE — LETTER
February 11, 2025     Yomi Buck MD  [id number]  [facility] [address]    Patient: David Chatman   YOB: 1955   Date of Visit: 2/11/2025     Dear Dr. Yomi Buck MD:    I am writing to share my findings regarding our shared patient David Chatman from his visit with me on 2/11/2025.    HPI    This 69 year-old man with a history of stroke 12/5/2024, paroxysmal atrial fibrillation, NICM/HFrEF, LBBB, ascendine aortic aneurysm status post AVR, HTN, HLD presents for evaluation of diplopia.    The diplopia began 12/5/2024. The offset was mostly horizontal, but some vertical offset. He could not identify a direction of gaze dependence. The diplopia lasted about a month and resolved.  Last edited by Amandeep Gonzalez MD PhD on 2/11/2025  2:20 PM.        Diagnoses    Diagnoses and all orders for this visit:  Internuclear ophthalmoplegia of left eye (Primary)  Diplopia    Assessment and Plan    12/06/2024 MRI brain without contrast & MRA head & neck, which I personally reviewed, shows left dorsal medial avila diffusion and FLAIR changes consistent with acute to subacute ischemia. There are other scattered bihemispheric white matter FLAIR lesions.  Prior head and neck imaging.    This 69 year-old man with a history of stroke 12/5/2024, paroxysmal atrial fibrillation, NICM/HFrEF, LBBB, ascendine aortic aneurysm status post AVR, HTN, HLD presents for evaluation of diplopia.    Findings are consistent with left internuclear ophthalmoplegia consistent with the left dorsal pontine stroke seen on MRI. He symptomatically is better although examination remains abnormal. I see no need for optical intervention such as prism. I recommend continued cardiovascular risk reduction.    The intraocular pressure is borderline high and should be monitored with primary eye care.    Follow up as needed. (Dilated 2/11/2025)      Below you will find my full examination. I appreciate the opportunity to see David  Compa today and to share in his care with you. Please contact me if you have questions for me regarding this visit or if I can be of assistance to another of your patients with neuro-ophthalmological problems.    Sincerely,    Amandeep Gonzalez MD PhD    CC:   No Recipients      Base Eye Exam       Visual Acuity (Snellen - Linear)         Right Left    Dist sc 20/25 -2 20/25    Dist ph sc 20/20 -2 20/20 -2              Tonometry (Tonopen, 1:43 PM)         Right Left    Pressure 20 23              Tonometry #2 (Tonopen, 1:44 PM)         Right Left    Pressure  24              Pupils         Dark Light Shape React APD    Right 5 3 Round Brisk None    Left 5 3 Round Brisk None              Visual Fields (Counting fingers)         Left Right     Full Full              Extraocular Movement         Right Left     Full Full   Internuclear ophthalmoplegia present on the left with left adduction lag and right abducting nystagmus.             Neuro/Psych       Oriented x3: Yes    Mood/Affect: Normal              Dilation       Both eyes: 1% Mydriacyl & 2.5% Daktoa  @ 2:20 PM                  Additional Tests       Color         Right Left    [place] 11/11 11/11              Stereo       Fly: +    Animals: 2/3    Circles: 0/9                  Strabismus Exam       Method: [place] distance with glasses      Distance Near Near +3.00DS Near Bifocals                      - - - - - -  Ortho  - - - - - -                      X(T) 6 - -  - -  Ortho  - -  - -  Ortho                      - - - - - -  Ortho  - - - - - -                       Slit Lamp and Fundus Exam       External Exam         Right Left    External Normal Normal              Slit Lamp Exam         Right Left    Lids/Lashes Normal Normal    Conjunctiva/Sclera White and quiet White and quiet    Cornea Clear Clear    Anterior Chamber Deep and quiet Deep and quiet    Iris Round and reactive Round and reactive    Lens 1+ Nuclear sclerosis 1+ Nuclear sclerosis     Anterior Vitreous Normal Normal              Fundus Exam         Right Left    Disc Normal Normal    C/D Ratio 0.35 0.35    Macula Normal Normal    Vessels Normal Normal    Periphery Normal Normal

## 2025-02-12 NOTE — PROGRESS NOTES
"Physical Therapy Treatment    Patient Name: David Chatman  MRN: [id number]  Encounter date: 2/13/2025    Time Calculation  Start Time: 1445  Stop Time: 1529  Time Calculation (min): 44 min  PT Therapeutic Procedures Time Entry  Neuromuscular Re-Education Time Entry: 20  Therapeutic Exercise Time Entry: 24    Visit # 10 of 20  Visits/Dates Authorized: MEDICARE PART A AND B    Current Problem:   Problem List Items Addressed This Visit             ICD-10-CM    Ischemic stroke (Multi) I63.9    Chronic pain of right knee M25.561, G89.29    Chronic right shoulder pain M25.511, G89.29     Other Visit Diagnoses         Codes    Balance disorder     R26.89                Precautions:    stroke, R shoulder OA, R knee OA       Subjective pt reports transient muscle fatigue after last session;  pt reports no falls have occurred since last session.  Pt reports intermittent R shoulder pain continues (reports a 15 year history).  Pt reports vision is improving with turning head to the right, still experiences very brief blurriness when turning to the right, lasting 1-2 seconds.  Pt reports \"my balance is still not where I want it to be\".  Pt reports seeing eye doctor yesterday, was cleared for driving.     Pre-Treatment Symptoms:   Pain Assessment: 0-10  0-10 (Numeric) Pain Score: 4  Pain Location: Shoulder  Pain Orientation: Right    Objective   Findings:     Intermittent and subtle wider MARTHA during ambulation, though pt can correct with focus.           Treatments:      Therapeutic Exercise 38197:   Elliptimill x 5 min  Leg curl 65# 3x10  Shuttle press dbl 100# 3x10  L/R 50# 2x10  Leg press heel raise 20# L/R 2 x 10 each  Wall lean ankle DF 2x15    DNP  Resisted ambulation lateral L/R 15# x 3 each    Neuromuscular Re-Ed 14806:   Step ups on bosu 1x10 L/R  (No UE support)  AP rockerboard with saccades- up/down , R/L x12   AP rockerboard SLS L/R  x 20\"  -Tandem stance w/ head turns L/R to the cones, then figure 8 quick change " direction around  -Saccades L/R, up/down  -VOR L/R, up/down  -Walking with head turns L/R, up/down, carrying ball on cone w/ cognitive task 120 feet x 2  -walking carrying ball on cone with several R and L hand turns (90 and 180 degree turns) x approximately 200 feet.       DNP:  Toe taps 7 inch x 20  Romberg Stance with Eyes Closed  Standing Tandem Balance with Unilateral Counter Support    Walking Forward with Slow Head Nods    Walking Forward with Slow Head Rotation    Hurdles 12 inches step to and reciprocal x 2 laps each.     HEP / Access Codes:   Access Code: [email or web address]  URL: [email or web address]  Date: 01/08/2025  Prepared by: Yonas Maurice  - Romberg Stance with Eyes Closed  - 1 x daily - 1 sets - 3 reps - 15 hold  - Standing Tandem Balance with Unilateral Counter Support  - 1 x daily - 1 sets - 3 reps - 30 hold  - Walking Forward with Slow Head Nods  - 1 x daily - 1 sets - 8 reps  - Walking Forward with Slow Head Rotation  - 1 x daily - 1 sets - 8 reps    Access Code: [email or web address]  URL: [email or web address]  Date: 01/10/2025  Prepared by: Amandeep Alcala  - Side Stepping with Resistance at Ankles  - 1-2 x daily - 7 x weekly - 15-20 ft x 4  distance   - Forward and Backward Monster walks  - 1-2 x daily - 7 x weekly - 15-20 ft x 4  distance   - Standing Single Leg Heel Raise  - 1-2 x daily - 7 x weekly - 2 sets - 10-15 reps    Access Code: [email or web address]  URL: [email or web address]  Date: 01/20/2025  Prepared by: Steve Carmona    Exercises  - Standing Shoulder Row with Anchored Resistance  - 1-2 x daily - 6 x weekly - 2 sets - 10 reps  - Shoulder External Rotation with Anchored Resistance  - 1-2 x daily - 6 x weekly - 10 reps    Assessment: Patient tolerated treatment well. Patient appears to be working hard in clinic and motivated to decrease pain and restore all functional deficits. Verbal and/or tactile cues given for proper form, and avoidance of substitution patterns with all exercises. All  infection control policies followed during this visit.  Pt continues to demonstrate improving dynamic balance in clinic, though was provided intermittent CGA t/o performance of higher level activities.      Post-Treatment Symptoms:    Fatigue;  no change reported with R shoulder pain.    Plan: assess tolerance to progression of shoulder/scapular exercises.    Therapy options: will be seen for skilled physical therapy services  Planned therapy interventions: stretching, strengthening, motor coordination training, balance/weight-bearing training, neuromuscular re-education, flexibility, functional ROM exercises, gait training, home exercise program, transfer training, therapeutic activities and fine motor coordination training  Frequency: 2x week  Duration in visits: 20  Duration in weeks: 12  Treatment plan discussed with: patient  Plan details: 2x week for 20 visits.     Goals:   Active       PT Problem       PT Goal 1       Start:  01/09/25    Expected End:  04/09/25       Patient to safely get on/off chair demonstrating proper transfer techniques independently to improve safe   functional mobility.     Patient will improve Dynamic Gait Index to 22/24 to reduce fall risk at home.    Patient will improve Timed Up and Go Score to <12 seconds to allow patient to walk household distances   safely and reduce fall risk at home.      Patient will improve 30 second sit to stand test to >9 reps to allow patient the ability to transfer to/from   a chair safely indepedently.    Patient to exhibit controlled lowering to  objects from the floor without compensations with distant   supervision five out of ten trials.    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

## 2025-02-13 ENCOUNTER — TREATMENT (OUTPATIENT)
Dept: PHYSICAL THERAPY | Facility: CLINIC | Age: 70
End: 2025-02-13
Payer: MEDICARE

## 2025-02-13 DIAGNOSIS — I63.9 ISCHEMIC STROKE (MULTI): ICD-10-CM

## 2025-02-13 DIAGNOSIS — R26.89 BALANCE DISORDER: ICD-10-CM

## 2025-02-13 DIAGNOSIS — M25.561 CHRONIC PAIN OF RIGHT KNEE: ICD-10-CM

## 2025-02-13 DIAGNOSIS — G89.29 CHRONIC PAIN OF RIGHT KNEE: ICD-10-CM

## 2025-02-13 DIAGNOSIS — G89.29 CHRONIC RIGHT SHOULDER PAIN: ICD-10-CM

## 2025-02-13 DIAGNOSIS — M25.511 CHRONIC RIGHT SHOULDER PAIN: ICD-10-CM

## 2025-02-13 PROCEDURE — 97110 THERAPEUTIC EXERCISES: CPT | Mod: GP,CQ

## 2025-02-13 PROCEDURE — 97112 NEUROMUSCULAR REEDUCATION: CPT | Mod: GP,CQ

## 2025-02-13 ASSESSMENT — PAIN SCALES - GENERAL: PAINLEVEL_OUTOF10: 4

## 2025-02-13 ASSESSMENT — PAIN - FUNCTIONAL ASSESSMENT: PAIN_FUNCTIONAL_ASSESSMENT: 0-10

## 2025-02-18 ENCOUNTER — APPOINTMENT (OUTPATIENT)
Dept: OTOLARYNGOLOGY | Facility: CLINIC | Age: 70
End: 2025-02-18
Payer: MEDICARE

## 2025-02-19 NOTE — PROGRESS NOTES
"Physical Therapy Treatment    Patient Name: David Chatman  MRN: [id number]  Encounter date: 2/20/2025    Time Calculation  Start Time: 1257  Stop Time: 1345  Time Calculation (min): 48 min  PT Therapeutic Procedures Time Entry  Neuromuscular Re-Education Time Entry: 12  Therapeutic Exercise Time Entry: 36    Visit # 11 of 20  Visits/Dates Authorized: MEDICARE PART A AND B    Current Problem:   Problem List Items Addressed This Visit             ICD-10-CM    Ischemic stroke (Multi) I63.9    Chronic pain of right knee M25.561, G89.29    Chronic right shoulder pain M25.511, G89.29     Other Visit Diagnoses         Codes    Balance disorder     R26.89                  Precautions:    stroke, R shoulder OA, R knee OA       Subjective Pt reports transient muscle fatigue after last session. Pt reports good compliance with HEP.  Pt reports no falls or LOB since last session.     Pre-Treatment Symptoms:   Pain Assessment: 0-10  0-10 (Numeric) Pain Score: 4  Pain Location: Knee  Pain Orientation: Right    Objective   Findings:     Improved weight shift and stance time to R LE,            Treatments:      Therapeutic Exercise 14160:   Elliptimill x 5 min  Leg curl 65# 3x10  Shuttle press dbl 100# 1x15; 112.5#  2 x 15  L/R 50# 1 x 15  Sit to stand from adjustable plinth, with 8# corball  2 x 10  Suit case carry 18#  R and L 2 x 20\"    Wall lean ankle DF 2x15  Resisted ambulation lateral L/R 15# x 4 each    Neuromuscular Re-Ed 75265:   Step ups on bosu 1x10 L/R  (No UE support)  4 way resisted MIP with black rogue band x 10 reps each        DNP:  AP rockerboard with saccades- up/down , R/L x12   AP rockerboard SLS L/R  x 20\"  -Tandem stance w/ head turns L/R to the cones, then figure 8 quick change direction around  -Saccades L/R, up/down  -VOR L/R, up/down  -Walking with head turns L/R, up/down, carrying ball on cone w/ cognitive task 120 feet x 2  -walking carrying ball on cone with several R and L hand turns (90 and 180 " degree turns) x approximately 200 feet.   Toe taps 7 inch x 20  Romberg Stance with Eyes Closed  Standing Tandem Balance with Unilateral Counter Support    Walking Forward with Slow Head Nods    Walking Forward with Slow Head Rotation    Hurdles 12 inches step to and reciprocal x 2 laps each.     HEP / Access Codes:   Access Code: [email or web address]  URL: [email or web address]  Date: 01/08/2025  Prepared by: Yonas Maurice  - Romberg Stance with Eyes Closed  - 1 x daily - 1 sets - 3 reps - 15 hold  - Standing Tandem Balance with Unilateral Counter Support  - 1 x daily - 1 sets - 3 reps - 30 hold  - Walking Forward with Slow Head Nods  - 1 x daily - 1 sets - 8 reps  - Walking Forward with Slow Head Rotation  - 1 x daily - 1 sets - 8 reps    Access Code: [email or web address]  URL: [email or web address]  Date: 01/10/2025  Prepared by: Amandeep Alcala  - Side Stepping with Resistance at Ankles  - 1-2 x daily - 7 x weekly - 15-20 ft x 4  distance   - Forward and Backward Monster walks  - 1-2 x daily - 7 x weekly - 15-20 ft x 4  distance   - Standing Single Leg Heel Raise  - 1-2 x daily - 7 x weekly - 2 sets - 10-15 reps    Access Code: [email or web address]  URL: [email or web address]  Date: 01/20/2025  Prepared by: Steve Carmona    Exercises  - Standing Shoulder Row with Anchored Resistance  - 1-2 x daily - 6 x weekly - 2 sets - 10 reps  - Shoulder External Rotation with Anchored Resistance  - 1-2 x daily - 6 x weekly - 10 reps    Assessment: Patient tolerated treatment well. Patient appears to be working hard in clinic and motivated to decrease pain and restore all functional deficits. Verbal and/or tactile cues given for proper form, and avoidance of substitution patterns with all exercises. All infection control policies followed during this visit.  Progressed functional strength exercises (resisted sit to stand) to further restore functional activities and prepare for return to outdoor yard work.  Pt provided intermittent CGA  with higher level dynamic stability (I.e BOSU step ups) exercises.  Pt continues to demonstrate less frequent episodes of L foot flat landing during ambulation.     Post-Treatment Symptoms:    Fatigue,especially of glute medius musculature (pt pointed to).      Plan: assess tolerance to progression of shoulder/scapular exercises.    Therapy options: will be seen for skilled physical therapy services  Planned therapy interventions: stretching, strengthening, motor coordination training, balance/weight-bearing training, neuromuscular re-education, flexibility, functional ROM exercises, gait training, home exercise program, transfer training, therapeutic activities and fine motor coordination training  Frequency: 2x week  Duration in visits: 20  Duration in weeks: 12  Treatment plan discussed with: patient  Plan details: 2x week for 20 visits.     Goals:   Active       PT Problem       PT Goal 1       Start:  01/09/25    Expected End:  04/09/25       Patient to safely get on/off chair demonstrating proper transfer techniques independently to improve safe   functional mobility.     Patient will improve Dynamic Gait Index to 22/24 to reduce fall risk at home.    Patient will improve Timed Up and Go Score to <12 seconds to allow patient to walk household distances   safely and reduce fall risk at home.      Patient will improve 30 second sit to stand test to >9 reps to allow patient the ability to transfer to/from   a chair safely indepedently.    Patient to exhibit controlled lowering to  objects from the floor without compensations with distant   supervision five out of ten trials.    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

## 2025-02-20 ENCOUNTER — TREATMENT (OUTPATIENT)
Dept: PHYSICAL THERAPY | Facility: CLINIC | Age: 70
End: 2025-02-20
Payer: MEDICARE

## 2025-02-20 DIAGNOSIS — M25.561 CHRONIC PAIN OF RIGHT KNEE: ICD-10-CM

## 2025-02-20 DIAGNOSIS — G89.29 CHRONIC RIGHT SHOULDER PAIN: ICD-10-CM

## 2025-02-20 DIAGNOSIS — R26.89 BALANCE DISORDER: ICD-10-CM

## 2025-02-20 DIAGNOSIS — G89.29 CHRONIC PAIN OF RIGHT KNEE: ICD-10-CM

## 2025-02-20 DIAGNOSIS — I63.9 ISCHEMIC STROKE (MULTI): ICD-10-CM

## 2025-02-20 DIAGNOSIS — M25.511 CHRONIC RIGHT SHOULDER PAIN: ICD-10-CM

## 2025-02-20 PROCEDURE — 97112 NEUROMUSCULAR REEDUCATION: CPT | Mod: GP,CQ

## 2025-02-20 PROCEDURE — 97110 THERAPEUTIC EXERCISES: CPT | Mod: GP,CQ

## 2025-02-20 ASSESSMENT — PAIN - FUNCTIONAL ASSESSMENT: PAIN_FUNCTIONAL_ASSESSMENT: 0-10

## 2025-02-20 ASSESSMENT — PAIN SCALES - GENERAL: PAINLEVEL_OUTOF10: 4

## 2025-02-21 ENCOUNTER — APPOINTMENT (OUTPATIENT)
Dept: OTOLARYNGOLOGY | Facility: CLINIC | Age: 70
End: 2025-02-21
Payer: MEDICARE

## 2025-02-21 VITALS — WEIGHT: 220 LBS | BODY MASS INDEX: 29.16 KG/M2 | HEIGHT: 73 IN

## 2025-02-21 DIAGNOSIS — H60.543 ECZEMA OF BOTH EXTERNAL EARS: ICD-10-CM

## 2025-02-21 DIAGNOSIS — J30.9 ALLERGIC RHINITIS, UNSPECIFIED SEASONALITY, UNSPECIFIED TRIGGER: ICD-10-CM

## 2025-02-21 DIAGNOSIS — G47.33 OBSTRUCTIVE SLEEP APNEA OF ADULT: Primary | ICD-10-CM

## 2025-02-21 DIAGNOSIS — J34.89 NASAL LESION: ICD-10-CM

## 2025-02-21 DIAGNOSIS — H90.3 SENSORINEURAL HEARING LOSS (SNHL) OF BOTH EARS: ICD-10-CM

## 2025-02-21 PROCEDURE — 1159F MED LIST DOCD IN RCRD: CPT | Performed by: OTOLARYNGOLOGY

## 2025-02-21 PROCEDURE — 1036F TOBACCO NON-USER: CPT | Performed by: OTOLARYNGOLOGY

## 2025-02-21 PROCEDURE — 99214 OFFICE O/P EST MOD 30 MIN: CPT | Performed by: OTOLARYNGOLOGY

## 2025-02-21 PROCEDURE — 3008F BODY MASS INDEX DOCD: CPT | Performed by: OTOLARYNGOLOGY

## 2025-02-21 NOTE — PROGRESS NOTES
HPI  Since I last saw him he had open heart surgery with bypass and aortic aneurysm repair.  In December he had a CVA.  He has lost about 50 pounds of weight.  He is no longer snoring and is sleeping well.  He has rehabbed very well.  He is no longer using CPAP as he feels he does not need to.    He returns in follow-up status post septoplasty and turbinate surgery and excision of right nasal vestibule lesion.  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  He is breathing well and happy with his nasal airway.  In the morning when he wakes up he has some congestion.  He complains of dry itchy ears  He has additional questions regarding tinnitus and  related noise exposure with hearing loss. 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  An audiogram today shows bilateral mid to high-frequency moderate to severe sensorineural hearing loss with excellent word recognition and normal tympanometry.  He is very bothered by tinnitus.    David Chatman is a 69 y.o. male returns status post septoplasty and turbinate surgery September 27, 2023.  His postoperative course was uneventful    He returns in follow-up for nasal obstruction and obstructive sleep apnea. He is using CPAP every night all night with an improvement in his blood pressure, nocturnal awakening and how he feels. He remains bothered by difficulty breathing through the nose during the day. The CPAP overcomes the obstruction at night. The lesion in the right nasal vestibule has gotten larger.   His sleep study showed an apnea-hypopnea index of 77.   History: This is a 67-year-old white male who is here for evaluation of nasal obstruction and snoring and probable sleep apnea. He reports a sleep study 10 years ago that was normal. He has gained significant weight in the meantime. He has bothered by difficulty breathing through the nose both day and night. He uses Flonase on a regular basis. He has loud  "snoring and witnessed apnea. He does have some postnasal drip          Past Medical History:   Diagnosis Date    Gout     Hyperlipidemia     Hypertension     SHOAIB (obstructive sleep apnea)     Paroxysmal atrial fibrillation (Multi) 08/24/2023            Medications:     Current Outpatient Medications:     apixaban (Eliquis) 5 mg tablet, Take 1 tablet (5 mg) by mouth 2 times a day., Disp: 180 tablet, Rfl: 3    atorvastatin (Lipitor) 80 mg tablet, Take 1 tablet (80 mg) by mouth once daily at bedtime., Disp: 90 tablet, Rfl: 3    blood pressure monitor kit, Use as directed to monitor blood pressure once daily, Disp: 1 kit, Rfl: 0    empagliflozin (Jardiance) 10 mg, Take 1 tablet (10 mg) by mouth once daily., Disp: 90 tablet, Rfl: 3    eplerenone (Inspra) 50 mg tablet, Take 1 tablet (50 mg) by mouth once daily., Disp: 90 tablet, Rfl: 3    metoprolol succinate XL (Toprol-XL) 200 mg 24 hr tablet, Take 1 tablet (200 mg) by mouth once daily. Do not crush or chew., Disp: 90 tablet, Rfl: 3    sacubitriL-valsartan (Entresto) 49-51 mg tablet, Take 1 tablet by mouth 2 times a day., Disp: 180 tablet, Rfl: 3    torsemide (Demadex) 20 mg tablet, Take 2 tablets (40 mg) by mouth once daily. As needed daily for peripheral edema., Disp: , Rfl:      Allergies:  No Known Allergies     Physical Exam:  Last Recorded Vitals  Height 1.854 m (6' 1\"), weight 99.8 kg (220 lb).  []General appearance: Well-developed, well-nourished in no acute distress, conversant with normal voice quality    Head/face: No erythema or edema or facial tenderness, and normal facial nerve function bilaterally    External ear: Clear external auditory canals with normal pinnae bilateral eczema  Tube status: N/A  Middle ear: Tympanic membranes intact and mobile, middle ears normal.  Tympanic membrane perforation: N/A  Mastoid bowl: N/A  Hearing: Normal conversational awareness at normal speech thresholds    Nose visualized using: Anterior rhinoscopy  Nasal dorsum: " Nontraumatic midline appearance  Septum: Midline, nonobstructing  Inferior turbinates: Normal, pink  Secretions: Dry    Oral cavity and oropharynx: Normal  Teeth: Good condition  Floor of mouth: without lesions  Palate: Normal hard palate, soft palate and uvula  Oropharynx: Clear, no lesions present  Buccal mucosa: Normal without masses or lesions  Lips: Normal    Nasopharynx: Inadequate mirror exam secondary to gag/anatomy  Larynx and hypopharynx: mirror examination.   The patient had a normal epiglottis, AE folds, arytenoids, false vocal cords, true vocal cords, and normal vocal cord mobility bilaterally.  No significant mucosal masses or lesions noted  Neck:  Salivary glands: Normal bilateral parotid and submandibular glands by inspection and palpation.  Non-thyroid masses: No palpable masses or significant lymphadenopathy  Trachea: Midline  Thyroid: No thyromegaly or palpable nodules  Temporomandibular joint: Nontender  Cervical range of motion: Normal    Neurologic exam: Alert and oriented x3, appropriate affect.  Cranial nerves II-XII normal bilaterally  Extraocular movement: Extraocular movement intact, normal gaze alignment  Assessment/Plan   Encounter Diagnoses   Name Primary?    Obstructive sleep apnea of adult Yes    Nasal lesion     Sensorineural hearing loss (SNHL) of both ears     Allergic rhinitis, unspecified seasonality, unspecified trigger     Eczema of both external ears              He is doing well postoperatively status post septoplasty and turbinate surgery.  No recurrence of nasal vestibule lesion on the right.  It appears that his sleep apnea is greatly improved with his 50 pound weight loss.  I discussed the importance of knowing if he has any residual sleep apnea and have recommended a home sleep study.  He would like to wait a little more time on this.  Recheck in 6 months  Bernardo Ramírez MD

## 2025-02-24 ENCOUNTER — TREATMENT (OUTPATIENT)
Dept: PHYSICAL THERAPY | Facility: CLINIC | Age: 70
End: 2025-02-24
Payer: MEDICARE

## 2025-02-24 DIAGNOSIS — M25.561 CHRONIC PAIN OF RIGHT KNEE: ICD-10-CM

## 2025-02-24 DIAGNOSIS — G89.29 CHRONIC PAIN OF RIGHT KNEE: ICD-10-CM

## 2025-02-24 DIAGNOSIS — R26.89 BALANCE DISORDER: ICD-10-CM

## 2025-02-24 DIAGNOSIS — G89.29 CHRONIC RIGHT SHOULDER PAIN: ICD-10-CM

## 2025-02-24 DIAGNOSIS — M25.511 CHRONIC RIGHT SHOULDER PAIN: ICD-10-CM

## 2025-02-24 DIAGNOSIS — I63.9 ISCHEMIC STROKE (MULTI): ICD-10-CM

## 2025-02-24 PROCEDURE — 97110 THERAPEUTIC EXERCISES: CPT | Mod: GP,CQ

## 2025-02-24 PROCEDURE — 97112 NEUROMUSCULAR REEDUCATION: CPT | Mod: GP,CQ

## 2025-02-24 ASSESSMENT — PAIN - FUNCTIONAL ASSESSMENT: PAIN_FUNCTIONAL_ASSESSMENT: 0-10

## 2025-02-24 ASSESSMENT — PAIN SCALES - GENERAL: PAINLEVEL_OUTOF10: 2

## 2025-02-24 NOTE — PROGRESS NOTES
"Physical Therapy Treatment  Discharge Summary    Patient Name: David Chatman  MRN: [id number]  Encounter date: 2/24/2025    Time Calculation  Start Time: 1416  Stop Time: 1458  Time Calculation (min): 42 min  PT Therapeutic Procedures Time Entry  Neuromuscular Re-Education Time Entry: 21  Therapeutic Exercise Time Entry: 21    Visit # 12 of 20  Visits/Dates Authorized: MEDICARE PART A AND B    Current Problem:   Problem List Items Addressed This Visit             ICD-10-CM    Ischemic stroke (Multi) I63.9    Chronic pain of right knee M25.561, G89.29    Chronic right shoulder pain M25.511, G89.29     Other Visit Diagnoses         Codes    Balance disorder     R26.89            Precautions:    stroke, R shoulder OA, R knee OA       Subjective   General:   Patient states he is feeling good today, minimal discomfort in R shoulder and knee. Patient reports he is ready to be discharged from therapy. Patient has noticed many improvements since starting PT.    Pre-Treatment Symptoms:   Pain Assessment: 0-10  0-10 (Numeric) Pain Score: 2    Objective   Findings:     Improved weight shift and stance time to R LE     Timed Up and Go Test  How many seconds did it take to complete the 5 tasks?: 10.2 seconds  TUG Comment: no device  Other Measures  30 Second Sit to Stand: 10  Dynamic Gait Index (DGI): 23/24    Treatments:      Therapeutic Exercise 43439:   Elliptimill x 5 min  Shuttle press dbl 100# 1x15; 112.5#  1 x 15  Shuttle press L/R 50# 1 x 15  Leg curl 65# 3x10  30 sec STS test  Resisted ambulation lateral L/R 15# x 3 each  Wall lean ankle DF 2x15    Neuromuscular Re-Ed 49321:   TUG  DGI test   AP rockerboard SLS L/R  2 x 20\"  Step ups on bosu 1x10 L/R  (No UE support)  4 way resisted MIP (march) with black rogue band x 10 reps each    HEP / Access Codes:   URL: [email or web address]  Access Code: [email or web address]  Date: 01/08/2025  - Romberg Stance with Eyes Closed  - 1 x daily - 1 sets - 3 reps - 15 hold  - Standing " Tandem Balance with Unilateral Counter Support  - 1 x daily - 1 sets - 3 reps - 30 hold  - Walking Forward with Slow Head Nods  - 1 x daily - 1 sets - 8 reps  - Walking Forward with Slow Head Rotation  - 1 x daily - 1 sets - 8 reps    Access Code: [email or web address]  Date: 01/10/2025  - Side Stepping with Resistance at Ankles  - 1-2 x daily - 7 x weekly - 15-20 ft x 4  distance   - Forward and Backward Monster walks  - 1-2 x daily - 7 x weekly - 15-20 ft x 4  distance   - Standing Single Leg Heel Raise  - 1-2 x daily - 7 x weekly - 2 sets - 10-15 reps    Access Code: [email or web address]  Date: 01/20/2025  - Standing Shoulder Row with Anchored Resistance  - 1-2 x daily - 6 x weekly - 2 sets - 10 reps  - Shoulder External Rotation with Anchored Resistance  - 1-2 x daily - 6 x weekly - 10 reps    Assessment:   Treatment consisted of LE strengthening and balance training, no complaints throughout. Patient had improvements with all outcome measures this visit. Patient has benefited from skilled physical therapy by improving R UE/LE function, strength, vision impairments, and his gait. Patient will be able to continue progressing himself with [facility] and possible gym membership.     Post-Treatment Symptoms:   Response to Interventions: Decrease in pain    Plan:   Discharge. Goals met    Goals:   Resolved       PT Problem       PT Goal 1 (Met)       Start:  01/09/25    Expected End:  04/09/25    Resolved:  02/26/25    Patient to safely get on/off chair demonstrating proper transfer techniques independently to improve safe   functional mobility.     Patient will improve Dynamic Gait Index to 22/24 to reduce fall risk at home.    Patient will improve Timed Up and Go Score to <12 seconds to allow patient to walk household distances   safely and reduce fall risk at home.      Patient will improve 30 second sit to stand test to >9 reps to allow patient the ability to transfer to/from   a chair safely indepedently.    Patient to exhibit controlled  lowering to  objects from the floor without compensations with distant   supervision five out of ten trials.    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

## 2025-02-26 ENCOUNTER — OFFICE VISIT (OUTPATIENT)
Dept: CARDIOLOGY | Facility: CLINIC | Age: 70
End: 2025-02-26
Payer: MEDICARE

## 2025-02-26 VITALS
WEIGHT: 229 LBS | HEIGHT: 73 IN | DIASTOLIC BLOOD PRESSURE: 76 MMHG | SYSTOLIC BLOOD PRESSURE: 122 MMHG | HEART RATE: 54 BPM | OXYGEN SATURATION: 95 % | BODY MASS INDEX: 30.35 KG/M2

## 2025-02-26 DIAGNOSIS — Z01.818 PRE-OP EVALUATION: Primary | ICD-10-CM

## 2025-02-26 DIAGNOSIS — I48.0 PAROXYSMAL ATRIAL FIBRILLATION (MULTI): ICD-10-CM

## 2025-02-26 LAB
ANION GAP SERPL CALCULATED.4IONS-SCNC: 11 MMOL/L (CALC) (ref 7–17)
ATRIAL RATE: 57 BPM
BUN SERPL-MCNC: 23 MG/DL (ref 7–25)
BUN/CREAT SERPL: ABNORMAL (CALC) (ref 6–22)
CALCIUM SERPL-MCNC: 9.5 MG/DL (ref 8.6–10.3)
CHLORIDE SERPL-SCNC: 107 MMOL/L (ref 98–110)
CO2 SERPL-SCNC: 25 MMOL/L (ref 20–32)
CREAT SERPL-MCNC: 1.06 MG/DL (ref 0.7–1.35)
EGFRCR SERPLBLD CKD-EPI 2021: 77 ML/MIN/1.73M2
ERYTHROCYTE [DISTWIDTH] IN BLOOD BY AUTOMATED COUNT: 15.8 % (ref 11–15)
GLUCOSE SERPL-MCNC: 102 MG/DL (ref 65–99)
HCT VFR BLD AUTO: 43 % (ref 38.5–50)
HGB BLD-MCNC: 14 G/DL (ref 13.2–17.1)
MCH RBC QN AUTO: 27.5 PG (ref 27–33)
MCHC RBC AUTO-ENTMCNC: 32.6 G/DL (ref 32–36)
MCV RBC AUTO: 84.5 FL (ref 80–100)
P AXIS: 35 DEGREES
P OFFSET: 140 MS
P ONSET: 89 MS
PLATELET # BLD AUTO: 160 THOUSAND/UL (ref 140–400)
PMV BLD REES-ECKER: 11.5 FL (ref 7.5–12.5)
POTASSIUM SERPL-SCNC: 4.5 MMOL/L (ref 3.5–5.3)
PR INTERVAL: 238 MS
Q ONSET: 208 MS
QRS COUNT: 10 BEATS
QRS DURATION: 154 MS
QT INTERVAL: 478 MS
QTC CALCULATION(BAZETT): 465 MS
QTC FREDERICIA: 470 MS
R AXIS: -26 DEGREES
RBC # BLD AUTO: 5.09 MILLION/UL (ref 4.2–5.8)
SODIUM SERPL-SCNC: 143 MMOL/L (ref 135–146)
T AXIS: 237 DEGREES
T OFFSET: 447 MS
VENTRICULAR RATE: 57 BPM
WBC # BLD AUTO: 6.1 THOUSAND/UL (ref 3.8–10.8)

## 2025-02-26 PROCEDURE — 1160F RVW MEDS BY RX/DR IN RCRD: CPT | Performed by: INTERNAL MEDICINE

## 2025-02-26 PROCEDURE — 3074F SYST BP LT 130 MM HG: CPT | Performed by: INTERNAL MEDICINE

## 2025-02-26 PROCEDURE — 1036F TOBACCO NON-USER: CPT | Performed by: INTERNAL MEDICINE

## 2025-02-26 PROCEDURE — 99215 OFFICE O/P EST HI 40 MIN: CPT | Performed by: INTERNAL MEDICINE

## 2025-02-26 PROCEDURE — 3078F DIAST BP <80 MM HG: CPT | Performed by: INTERNAL MEDICINE

## 2025-02-26 PROCEDURE — 93005 ELECTROCARDIOGRAM TRACING: CPT | Performed by: INTERNAL MEDICINE

## 2025-02-26 PROCEDURE — G2211 COMPLEX E/M VISIT ADD ON: HCPCS | Performed by: INTERNAL MEDICINE

## 2025-02-26 PROCEDURE — 3008F BODY MASS INDEX DOCD: CPT | Performed by: INTERNAL MEDICINE

## 2025-02-26 PROCEDURE — 1159F MED LIST DOCD IN RCRD: CPT | Performed by: INTERNAL MEDICINE

## 2025-02-26 ASSESSMENT — ENCOUNTER SYMPTOMS: DEPRESSION: 0

## 2025-02-26 NOTE — H&P (VIEW-ONLY)
I had the pleasure seeing David Chatman     No chief complaint on file.     OUTPATIENT CONSULTATION: Cardiac Electrophysiology  DOS: February 26, 2025  REASON:  HFrEF  REFERRING:  Yomi Buck MD    Current Outpatient Medications   Medication Instructions    atorvastatin (LIPITOR) 80 mg, oral, Nightly    blood pressure monitor kit Use as directed to monitor blood pressure once daily    Eliquis 5 mg, oral, 2 times daily    eplerenone (INSPRA) 50 mg, oral, Daily    Jardiance 10 mg, oral, Daily    metoprolol succinate XL (TOPROL-XL) 200 mg, oral, Daily, Do not crush or chew.    sacubitriL-valsartan (Entresto) 49-51 mg tablet 1 tablet, oral, 2 times daily    torsemide (DEMADEX) 40 mg, oral, Daily, As needed daily for peripheral edema.      David Chatman is a 69 y.o. with:     Pmh includes HTN, HL, Obesity, Chronic Renal Insufficiency, Ischemic Stroke, and Anemia.    CARDIAC HX   Mod AR and Asc Ao Aneurysm - s/p Aortic Valve and Asc Aorta replacement (#29AVR Bentall + STEVEN clipping) with Dr. Perdomo (09/23/2024)  AF  LBBB   NICM/HFrEF - index dx (9/2024) EF 35-40%       CARDIAC TESTING:    -ECHO/ TTE:  (12/6/24) LV impaired relaxation pattern, Mild-mod dilataed, post-op status  -ECHO: limited (9/25/24) LVEF 22%  -ECHO: (9/10/24) LVEF 35-40%    -cMRI: (9/2024) EF 34%    Objective   Physical Exam  Constitutional: alert and in no acute distress.   Eyes: no erythema, swelling or discharge from the eye .   Neck: neck is supple, symmetric, trachea midline, no masses .   Pulmonary: no increased work of breathing or signs of respiratory distress  and lungs clear to auscultation.    Cardiovascular:  regular rhythm, normal S1 and S2, no murmurs , pedal pulses 2+ bilaterally  and no edema .   Abdomen: abdomen non-tender, no masses .   Skin: skin warm and dry, normal skin turgor .   Psychiatric judgment and insight is normal  and oriented to person, place and time .             Assessment/Plan   Chronic Systolic Heart  Failure, last EF 12/6/2024: 23%, dx'd 09/2024 with EF 35-40%,  No improvement after GDMT.    LBBB Qrs width 160 ms.       PLAN: The [place] SDM tool was used for shared decision making during this clinic visit. The potential benefits and risks of the procedure were reviewed with the patient based on the best available evidence. Decisions that were made took into account the patient's health goals, preferences, and values. All the patient's questions were addressed . Risk of sudden cardiac death and resynchronization explained. We are proceeding with the procedure on April 4, 2025. CRT-D

## 2025-02-26 NOTE — PROGRESS NOTES
Pharmacist Clinic: Cardiology Management    David Chatman is a 69 y.o. male was referred to Clinical Pharmacy Team for heart failure management.     Referring Provider: Yomi Buck MD    THIS IS A FOLLOW UP PATIENT APPOINTMENT. AT LAST VISIT ON 12/31/2024 WITH PHARMACIST (Cristy See).    Appointment was completed by the patient who was reached at .    REVIEW OF PAST APPNT (IF APPLICABLE):   Today's appointment was 1 month follow up. Of note, patient was started on Eliquis during recent hospital admission. Per referring provider, okay to add to Clinical Pharmacy referral. Will send prescription to [facility] to have this added to the patient's  PAP benefit. Patient reports no abnormal bruising or bleeding at this time.  David reports he has not been taking Entresto or Jardiance at home as he did not receive a delivery from the pharmacy. Provided him [facility]'s phone number for his reference, I will also contact the pharmacy to ensure delivery of medications.  He is not currently monitoring his blood pressure, will send BP cuff for patient to begin home monitoring. Discussed potential for GDMT titration at subsequent appointments.   Will follow up in 2 months.     No Known Allergies    Past Medical History:   Diagnosis Date    Gout     Hyperlipidemia     Hypertension     SHOAIB (obstructive sleep apnea)     Paroxysmal atrial fibrillation (Multi) 08/24/2023       Current Outpatient Medications on File Prior to Visit   Medication Sig Dispense Refill    apixaban (Eliquis) 5 mg tablet Take 1 tablet (5 mg) by mouth 2 times a day. 180 tablet 3    atorvastatin (Lipitor) 80 mg tablet Take 1 tablet (80 mg) by mouth once daily at bedtime. 90 tablet 3    blood pressure monitor kit Use as directed to monitor blood pressure once daily 1 kit 0    empagliflozin (Jardiance) 10 mg Take 1 tablet (10 mg) by mouth once daily. 90 tablet 3    eplerenone (Inspra) 50 mg tablet Take 1 tablet (50 mg) by mouth once  "daily. 90 tablet 3    metoprolol succinate XL (Toprol-XL) 200 mg 24 hr tablet Take 1 tablet (200 mg) by mouth once daily. Do not crush or chew. 90 tablet 3    sacubitriL-valsartan (Entresto) 49-51 mg tablet Take 1 tablet by mouth 2 times a day. 180 tablet 3    torsemide (Demadex) 20 mg tablet Take 2 tablets (40 mg) by mouth once daily. As needed daily for peripheral edema.       No current facility-administered medications on file prior to visit.         RELEVANT LAB RESULTS:  Lab Results   Component Value Date    BILITOT 0.8 01/30/2025    CALCIUM 9.5 02/26/2025    CO2 25 02/26/2025     02/26/2025    CREATININE 1.06 02/26/2025    GLUCOSE 102 (H) 02/26/2025    ALKPHOS 143 01/30/2025    K 4.5 02/26/2025    PROT 7.2 01/30/2025     02/26/2025    AST 16 01/30/2025    ALT 20 01/30/2025    BUN 23 02/26/2025    ANIONGAP 11 02/26/2025    MG 1.81 12/06/2024    PHOS 4.1 10/10/2024    ALBUMIN 4.2 01/30/2025    LIPASE 25 09/10/2024     Lab Results   Component Value Date    TRIG 82 12/06/2024    CHOL 118 12/06/2024    LDLCALC 69 12/06/2024    HDL 32.6 12/06/2024     No results found for: \"BMCBC\", \"CBCDIF\"     PHARMACEUTICAL ASSESSMENT:    MEDICATION RECONCILIATION    Drug Interactions? No clinically significant drug interactions requiring change in therapy found at the time of this visit.     Medication Documentation Review Audit       Reviewed by Jairo Baker MD (Physician) on 02/26/25 at 0914      Medication Order Taking? Sig Documenting Provider Last Dose Status   apixaban (Eliquis) 5 mg tablet [id number] Yes Take 1 tablet (5 mg) by mouth 2 times a day. Yomi Buck MD  Active   atorvastatin (Lipitor) 80 mg tablet [id number] Yes Take 1 tablet (80 mg) by mouth once daily at bedtime. Yomi Buck MD 12/7/2024 Active   blood pressure monitor kit [id number] Yes Use as directed to monitor blood pressure once daily Yomi Buck MD  Active   empagliflozin (Jardiance) 10 mg [id number] Yes Take 1 " tablet (10 mg) by mouth once daily. Yomi Buck MD 12/8/2024 Active   eplerenone (Inspra) 50 mg tablet [id number] Yes Take 1 tablet (50 mg) by mouth once daily. Yomi Buck MD  Active   metoprolol succinate XL (Toprol-XL) 200 mg 24 hr tablet [id number] Yes Take 1 tablet (200 mg) by mouth once daily. Do not crush or chew. Yomi Buck MD  Active   sacubitriL-valsartan (Entresto) 49-51 mg tablet [id number] Yes Take 1 tablet by mouth 2 times a day. Yomi Buck MD  Active   torsemide (Demadex) 20 mg tablet [id number] Yes Take 2 tablets (40 mg) by mouth once daily. As needed daily for peripheral edema. Francisco Angeles MD 12/8/2024 Active                    DISEASE MANAGEMENT ASSESSMENT:     ANTICOAGULATION ASSESSMENT    The ASCVD Risk score (Don WHITNEY, et al., 2019) failed to calculate for the following reasons:    Risk score cannot be calculated because patient has a medical history suggesting prior/existing ASCVD    DIAGNOSIS: treatment of nonvalvular atrial fibrilliation stroke and systemic embolism;  likely embolic event with a right posterior cerebral artery; acute stroke. Discussed indication with referring provider who would prefer to continue Eliquis at this time, as neurology was supportive of DOAC vs DAPT during inpatient admission  - Patient is projected to be on anticoagulation long term  - [id number] Score: [5] (only included if diagnosis is atrial fibrillation)   Age: [<65 (0)] [65-74 (+1)] [> 75 (+2)]: 1  Sex: [Male/Female (+1)]: 0  CHF history: [No/Yes(+1)]: 1  Hypertension history: [No/Yes(+1)]: 1  Stroke/TIA/thromboembolism history: [No/Yes(+2)]: 2  Vascular disease history (prior MI, peripheral artery disease, aortic plaque): [No/Yes(+1)]: 0  Diabetes history: [No/Yes(+1)]: 0    CURRENT PHARMACOTHERAPY:    Eliquis 5 mg BID  70 y/o  104 kg  Scr 1.26 mg/dL (1/30/2025)    RELEVANT PAST MEDICAL HISTORY:   Ischemic stroke, PAF, HFrEF, HLD, HTN, aortic valve replacement  (bioprosthetic)    Affordability/Accessibility:  PAP active through 12/4/2025   Adherence/Organization: confirms taking Eliquis twice daily (12 hours apart)  Adverse Reactions: no abnormal bruising or bleeding;no dark/tarry stools  Recent Hospitalizations:none  Recent Falls/Trauma: none  Changes in Tobacco or Alcohol Intake:   Tobacco: none, does not use  Alcohol: none, does not use    CHF ASSESSMENT     Symptom/Staging:  -Most recent ejection fraction: 22%  -NYHA Stage: II    Results for orders placed during the hospital encounter of 09/11/24    Transthoracic Echo (TTE) Limited    [facility], [address] [place] [address]  Tel [phone] and Fax [phone]    TRANSTHORACIC ECHOCARDIOGRAM REPORT      Patient Name:      NAN Norman Physician:    [id number] Clari Geronimo MD  Study Date:        9/25/2024            Ordering Provider:    [id number] PRASAD PRESSLEY  MRN/PID:           [id number]             Fellow:  Accession#:        [id number]         Nurse:  Date of Birth/Age: 1955 / 69 years Sonographer:          Ron Tariq RDCS, BAILEY  Gender:            M                    Additional Staff:  Height:            185.42 cm            Admit Date:           9/11/2024  Weight:            113.85 kg            Admission Status:     Inpatient -  Routine  BSA / BMI:         2.37 m2 / 33.12      Encounter#:           [id number]  kg/m2  Blood Pressure:    113/65 mmHg          Department Location:  [facility]    Study Type:    TRANSTHORACIC ECHO (TTE) LIMITED  Diagnosis/ICD: 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    Study Detail: The following Echo studies were performed: 2D, Doppler and  color  flow. Definity used as a contrast agent for endocardial border  definition. Total contrast used for this procedure was 4 mL via IV  push.      PHYSICIAN INTERPRETATION:  Left Ventricle: Left ventricular ejection fraction is severely decreased, calculated by Vasquez's biplane at 22%. There is global hypokinesis of the left ventricle with minor regional variations. The left ventricular cavity size is severely dilated. Abnormal (paradoxical) septal motion consistent with post-operative status. Spectral Doppler shows a restrictive pattern of left ventricular diastolic filling. There is no definite left ventricular thrombus visualized.  Left Atrium: The left atrium is enlarged.  Right Ventricle: The right ventricle is upper limits of normal in size. There is reduced right ventricular systolic function.  Right Atrium: The right atrium is mild to moderately dilated.  Aortic Valve: There is a prosthetic aortic valve present. There is an Chiu bioprosthetic aortic valve, with a 29 mm reported size. There is no evidence of aortic valve regurgitation. S/p 29mm Konect Resilia bioprosthetic valved conduit. AVR gradients were not assessed by spectral Doppler though no obvious AI by color Doppler.  Mitral Valve: The mitral valve is normal in structure. There is mild mitral valve regurgitation.  Tricuspid Valve: The tricuspid valve is structurally normal. There is trace to mild tricuspid regurgitation. The Doppler estimated RVSP is within normal limits at 25.8 mmHg.  Pulmonic Valve: The pulmonic valve is not well visualized. The pulmonic valve regurgitation was not well visualized.  Pericardium: Trivial to small pericardial effusion.  Aorta: The aortic root is abnormal. S/p 32mm Gelweave ascending aortic conduit and hemiarch.  In comparison to the previous echocardiogram(s): Compared with the prior exam, preop echo done on 9/20/2024 [facility], the LV was already severely dilated with moderately reduced  function with a severely dilated aorta with moderate AI and the patient is now s/p acending aortic and arch replacement and Konect AVR. The LV remains severely dilated with a reducetion in LV systolic function , now severely reduced. The AVR gradients were not assessed today but there is no AI.    CONCLUSIONS:  1. Poorly visualized anatomical structures due to suboptimal image quality.  2. Left ventricular ejection fraction is severely decreased, calculated by Vasquez's biplane at 22%.  3. There is global hypokinesis of the left ventricle with minor regional variations.  4. Spectral Doppler shows a restrictive pattern of left ventricular diastolic filling.  5. Left ventricular cavity size is severely dilated.  6. Abnormal septal motion consistent with post-operative status.  7. No left ventricular thrombus visualized.  8. There is reduced right ventricular systolic function.  9. The left atrium is enlarged.  10. The right atrium is mild to moderately dilated.  11. Right ventricular systolic pressure is within normal limits.  12. S/p 29mm Konect Resilia bioprosthetic valved conduit. AVR gradients were not assessed by spectral Doppler though no obvious AI by color Doppler  13. S/p 32mm Gelweave ascending aortic conduit and hemiarch.  14. Compared with the prior exam, preop echo done on 9/20/2024 [facility], the LV was already severely dilated with moderately reduced function with a severely dilated aorta with moderate AI and the patient is now s/p acending aortic and arch replacement and Konect AVR. The LV remains severely dilated with a reducetion in LV systolic function , now severely reduced. The AVR gradients were not assessed today but there is no AI.    QUANTITATIVE DATA SUMMARY:    2D MEASUREMENTS:           Normal Ranges:  LVEDV Index:     114 ml/m2      RA VOLUME BY A/L METHOD:          Normal Ranges:  RA Area A4C:             24.8 cm2      LV SYSTOLIC FUNCTION BY 2D PLANIMETRY (MOD):  Normal  Ranges:  EF-A4C View:    24 % (>=55%)  EF-A2C View:    19 %  EF-Biplane:     22 %  LV EF Reported: 22 %      LV DIASTOLIC FUNCTION:            Normal Ranges:  MV Peak E:             1.14 m/s   (0.7-1.2 m/s)  MV e'                  0.061 m/s  (>8.0)  MV lateral e'          0.07 m/s  MV medial e'           0.05 m/s  E/e' Ratio:            18.70      (<8.0)  PulmV Sys Alfredito:         49.90 cm/s  PulmV Gray Alfredito:        54.10 cm/s  PulmV S/D Alfredito:         0.90      RIGHT VENTRICLE:  TAPSE: 10.4 mm  RV s'  0.04 m/s      TRICUSPID VALVE/RVSP:          Normal Ranges:  Peak TR Velocity:     2.39 m/s  RV Syst Pressure:     26 mmHg  (< 30mmHg)      Pulmonary Veins:  PulmV Gray Alfredito: 54.10 cm/s  PulmV S/D Alfredito:  0.90  PulmV Sys Alfredito:  49.90 cm/s      [id number] Clari Geronimo MD  Electronically signed on 9/25/2024 at 7:18:07 PM        ** Final **      Guideline-Directed Medical Therapy:  -ARNI: Yes, describe: Entresto 49-51 mg BID  -Beta Blocker: Yes, describe: Metoprolol succinate 100 mg 2 tablets (200 mg) every day   -MRA: Yes, describe: Eplerenone 25 mg every day   -SGLT2i: Yes, describe: Jardiance 10 mg every day     Secondary Prevention:  -The ASCVD Risk score (Don WHITNEY, et al., 2019) failed to calculate for the following reasons:    Risk score cannot be calculated because patient has a medical history suggesting prior/existing ASCVD   -Aspirin 81mg? yes  -Statin?: Yes, describe: Atorvastatin 80 mg every day   -HTN?: Yes, describe: 122/76 as of 2/26/2025    CURRENT PHARMACOTHERAPY:   Jardiance 10 mg every day -confirms taking as prescribed  Eplerenone 50 mg every day - increased at 1/27 cardiology apt; confirms taking as prescribed  Metoprolol succinate 100 mg 2 tablets (200 mg) every day - increased at 1/27 cardiology apt; confirms taking as prescribed  Entresto 49-51 mg BID - increased at 1/27 cardiology apt; confirms taking as prescribed  Torsemide 20 mg 2 tablets daily PRN - patient confirms taking as prescribed    Affordability:   PAP approved through 12/4/2025  Adherence/Compliance: See above  Adverse Effects: has noted a very slight headache that occurs about once per month. Does not need OTC medication for this, reports not too bothersome for him    Monitoring Weights at Home: Yes, weighs self daily and keeps a log  Home Weight Recordings: 225 lbs (notices he will fluctuate to 228 lbs if he does not torsemide, but does not have weight gain of >3lbs/day or >5lbs/week)    Past In Office Weight Readings:   Wt Readings from Last 6 Encounters:   02/26/25 104 kg (229 lb)   02/21/25 99.8 kg (220 lb)   01/27/25 101 kg (222 lb)   12/31/24 98.9 kg (218 lb)   12/20/24 92.9 kg (204 lb 14.4 oz)   12/08/24 98.3 kg (216 lb 12.8 oz)       Monitoring Blood Pressure at Home: No; does not have blood pressure cuff. Rx send at last appointment  Home BP Recordings: None    Past In Office BP Readings:   BP Readings from Last 6 Encounters:   02/26/25 122/76   01/27/25 131/90   12/31/24 126/68   12/21/24 108/76   12/08/24 118/84   11/06/24 112/66       HEALTH MANAGEMENT    Maintaining fluid restriction (<2 L/day): N/A  Edema/swelling: No  Shortness of breath: No  Trouble sleeping/lying down: No  Dry/hacking cough: No   Recent Hospitalizations: No    EDUCATION/COUNSELING:   - Counseled patient on MOA, expectations, duration of therapy, contraindications, administration, and monitoring parameters  - Counseled patient on lifestyle modifications that can decrease your risk of having complications (smoking cessation, losing weight, daily weights, vaccines)  - Counseled patient on fluid intake and weight management. Recommended to not consume more than 2 liters of fliuids per day. If they have gained more than 2-3 pounds within a 24 hours period (or 5 pounds in a week), contact their cardiologist  - Counseled patient of side effects that are indicative of bleeding such as dark tarry stool, unexplainable bruising, or vomiting up a coffee ground like substance  -  Discussed calling provider for any falls due to increased risk of bleeding with anticoagulation.  - Answered all patient questions and concerns       DISCUSSION/NOTES:   Today's appointment was 2 month follow up regarding anticoagulation and heart failure pharmacotherapy.   David reports no abnormal bruising or bleeding with Eliquis. No recent hospitalizations or falls. No concerns regarding adherence.  No symptoms of worsening heart failure noted at today's appointment. David reports he did not receive the BP monitor after his last pharmacy appointment. Gave patient the phone number for [facility] to call regarding this prescription. Also advised to call me with any issues filling this prescription. He is agreeable to monitoring his BP and discussing increase of Entresto at next appointment if BP allows.  Will follow up in 3 months, patient prefers to follow up after his BIV ICD implant procedure with Dr. Baker and his follow up with Dr. Buck.     ASSESSMENT:    Assessment/Plan   Problem List Items Addressed This Visit       Paroxysmal atrial fibrillation (Multi) - Primary     David continues on anticoagulation with Eliquis. No dosage adjustment needed for age, weight, and renal function.         Relevant Orders    Referral to Clinical Pharmacy    HFrEF (heart failure with reduced ejection fraction)     David is prescribed 4/4 GDMT. Will defer titration of Entresto due to lack of home BP readings. May increase at next appointment if BP allows.         Relevant Orders    Referral to Clinical Pharmacy       RECOMMENDATIONS/PLAN:    Continue:  Eliquis 5 mg BID  Jardiance 10 mg every day   Eplerenone 50 mg every day   Metoprolol succinate 200 mg every day   Entresto 49-51 mg BID  Torsemide 40 mg daily PRN      Last Appnt with Referring Provider: 1/27/2025  Next Appnt with Referring Provider: 4/28/2025  Clinical Pharmacist follow up: 5/12/2025  VAF/Application Expiration: Yes    Date: 12/4/2025  Type  of Encounter: Seb See PharmD    Verbal consent to manage patient's drug therapy was obtained from the patient . They were informed they may decline to participate or withdraw from participation in pharmacy services at any time.    Continue all meds under the continuation of care with the referring provider and clinical pharmacy team.

## 2025-02-27 ENCOUNTER — APPOINTMENT (OUTPATIENT)
Dept: PHARMACY | Facility: HOSPITAL | Age: 70
End: 2025-02-27
Payer: MEDICARE

## 2025-02-27 DIAGNOSIS — I50.20 HFREF (HEART FAILURE WITH REDUCED EJECTION FRACTION): ICD-10-CM

## 2025-02-27 DIAGNOSIS — I48.0 PAROXYSMAL ATRIAL FIBRILLATION (MULTI): Primary | ICD-10-CM

## 2025-02-27 NOTE — ASSESSMENT & PLAN NOTE
David continues on anticoagulation with Eliquis. No dosage adjustment needed for age, weight, and renal function.

## 2025-02-27 NOTE — Clinical Note
David Gonsalez Dr. reports no abnormal bruising or bleeding with Eliquis. No recent hospitalizations or falls. No concerns regarding adherence. No symptoms of worsening heart failure noted at today's appointment. David reports he did not receive the BP monitor after his last pharmacy appointment. Gave patient the phone number for [employer] [facility] [facility] to call regarding this prescription. Also advised to call me with any issues filling this.  Will defer titration of Entresto due to lack of BP readings. Will follow up in 3 months per patient preference.

## 2025-02-27 NOTE — ASSESSMENT & PLAN NOTE
David is prescribed 4/4 GDMT. Will defer titration of Entresto due to lack of home BP readings. May increase at next appointment if BP allows.

## 2025-03-07 ENCOUNTER — DOCUMENTATION (OUTPATIENT)
Dept: CARDIAC REHAB | Facility: CLINIC | Age: 70
End: 2025-03-07
Payer: MEDICARE

## 2025-03-07 NOTE — PROGRESS NOTES
Cardiac Rehabilitation 120 day progress report    Name: David Chatman  Medical Record Number: [id number]  YOB: 1955  Age: 69 y.o.    Today’s Date: 1/17/2025  Primary Care Physician: Amandeep Vaca DO  Referring Physician: Yomi Buck MD  Program Location: [address]  Primary Diagnosis:   1. S/P aortic valve replacement and aortoplasty  Referral to Cardiac Rehab         Onset/Date of Diagnosis: 9/23/24  Session #:  8  AACVPR Risk Stratification: High  Falls Risk: Low  Psychosocial Assessment   Pre PHQ-9: 0  Sent PH-Q 9 to MD if score > 20: No; score < 20  Pt reported/currently experiencing stress: No  Patient uses stress management skills: No   History of: no history of anxiety or depression  Currently seeing a mental health provider: No  Social Support: Yes, Whom:Spouse and family  Quality of Life Survey: SF-36   SF-36 Pre Post   Physical Component Score     Mental Component Score       Learning Assessment:  Learning assessment/barriers: None  Preferred learning method: Visual  Barriers: None  Comments:  Stages of Change: Action    Psychosocial Plan  Goal Status: In Progress  Psychosocial Goals: Maintain or lower PH-Q 9 score by discharge  Psychosocial Interventions/Education:   11/6/24 initial PHQ-9 survey reviewed w/ pt at eval/MS        Nutrition Assessment:    Hyperlipidemia: Yes     Lipids:   Lab Results   Component Value Date    CHOL 118 12/06/2024    HDL 32.6 12/06/2024    TRIG 82 12/06/2024       Current Dietary Guidelines: no special, watches sodium  Barriers to dietary change: no    Diet Habit Survey: Picture Your Plate  Pre: 48  Post: To be done at discharge.    Diabetes Assessment    Lab Results   Component Value Date    HGBA1C 6.1 (H) 12/06/2024       History of Diabetes: No    Weight Management    Height: 182.9 cm (6')  Weight: 99.3 kg (219 lb)  BMI (Calculated): 29.7    Nutrition Plan  Goal Status: In Progress  Nutrition Goals: Adapt a  low-sodium, DASH diet prior to discharge and Learn how to read and interpret nutrition labels prior to discharge  Nutrition Interventions/Education:   11/12/2024  Reviewed PYP with patient. PYP score 48. Encouraged pt to add 1 fruit and vegetable per day. Also discussed decreasing processed foods and sugar in coffee. Pt has already decreased the processed meats in his diet and his wife is trying to make lower sodium soups. He struggles to eat when he is by himself. Usually uses more processed foods when having to cook for himself. Pt has been buying a high protein soup. Encouraged him to take a picture of the label and bring in it so we can look at it together. Encouraged to further read PYP recommendations at home and follow up with questions as needed. Suggested pt bring wife to nutrition education lectures./Signature Payal Rankin RDN, LD  1/15/2024 Education on sodium intake was done. Discussed with patient the risks of excess levels of sodium and how to decrease dietary intake with provided list of substitutions. Handouts were given for home reference. /Signature Zeina Seo RN          Exercise Assessment    Home exercise:  No  Mode: NA  Frequency: NA  Duration: NA    Exercise Prescription     Exercise Prescription based on: Duke Activity Status Index (DASI)    DASI Score: 34.7   MET Score: 7   Frequency:  3 days/week   Mode: Treadmill, NuStep, and Recumbent Cycle   Duration: 33 total aerobic minutes   Intensity: RPE 12-14  Target HR:  To be calculated after 6 attended sessions.  MET Level: 3.7  Patient wears supplemental O2: No     Modality Workload METs Duration (minutes)   1 Pre-Exercise   4:00   2 Treadmill 2.4 mph @ 0.5% 3.0 11 :00   3 NuStep Load 5 @ 80 bennett  2.9 11 :00   4 Recumbent Bike Load 5 @ 60 rpm  3.7 11 :00   5 Schwinn Airdyne Load 1.3  2.9 11 :00   6 Post-Exercise   4:00     Resistance Training: No   Home Exercise Prescription given: To be given prior to discharge from  program.    Exercise Plan  Goal Status: In Progress  Exercise Goals: Increase exercise MET level by 5-10% each week, Increase total exercise duration to 30-45 minutes, and Establish a home exercise program before discharge  Exercise Interventions/Education:           Other Core Components/Risk Factor Assessment:    Medication adherence  Current Medications:   Medication Documentation Review Audit       Reviewed by Amandeep Vaca DO (Physician) on 12/31/24 at 0942      Medication Order Taking? Sig Documenting Provider Last Dose Status     Discontinued 12/30/24 1016   apixaban (Eliquis) 5 mg tablet [id number] Yes Take 1 tablet (5 mg) by mouth 2 times a day. Yomi Buck MD  Active   atorvastatin (Lipitor) 80 mg tablet [id number] Yes Take 1 tablet (80 mg) by mouth once daily at bedtime. Yomi Buck MD 12/7/2024 Active   empagliflozin (Jardiance) 10 mg [id number] No Take 1 tablet (10 mg) by mouth once daily.   Patient not taking: Reported on 12/31/2024    Yomi Buck MD 12/8/2024 Active   eplerenone (Inspra) 25 mg tablet [id number] Yes Take 1 tablet (25 mg) by mouth once daily. Yomi Buck MD 12/8/2024 Active   metoprolol succinate XL (Toprol-XL) 100 mg 24 hr tablet [id number] Yes Take 1.5 tablets (150 mg) by mouth once daily. Do not crush or chew. Yomi Buck MD 12/8/2024 Active   sacubitriL-valsartan (Entresto) 24-26 mg tablet [id number] No Take 1 tablet by mouth 2 times a day.   Patient not taking: Reported on 12/31/2024    Yomi Buck MD 12/8/2024 Active   torsemide (Demadex) 20 mg tablet [id number] Yes Take 2 tablets (40 mg) by mouth once daily. As needed daily for peripheral edema. Francisco Angeles MD 12/8/2024 Active                                 Medication compliance: Not taking entresto and jardiance due to cost   Uses pill box/organizer: No    Carries medication list: No     Blood Pressure Management  History of Hypertension: Yes   Medication Changes:  No   Resting BP:  Visit Vitals  /66        Heart Failure Management  Hx of Heart Failure: current diagnosis  Type (selection): HFrEF  Most recent EF: 22  Onset of heart failure diagnosis: 9/23/24  Last heart failure hospitalization: 9/23/24  Number of HF admissions per year: 1  Symptoms: Fatigue with exertion  Is there a family Hx of HF: No   Does patient obtain daily weight Yes       Heart Failure Reassessment: In Progress  Heart Failure Goals: Able to verbalize signs and symptoms of fluid retention and when to contact MD, Adapt a low sodium diet and verbalize guidelines, and Obtain daily weight and verbalize proper method of obtaining weights  Smoking/Tobacco Assessment  Social History     Tobacco Use   Smoking Status Never   Smokeless Tobacco Never       Other Core Component Plan  Goal Status:In progress  Other Core Component Goals: Medication compliance, Verbalize medication usage and drug actions by discharge, and Achieve resting BP of < 130/80 by discharge  Other Core Component Interventions/Education:           Individual Patient Goals:    Decrease fluid in lungs by discharge  Increase heart function on next echo    Goal Status: In Progress    Staff Comments:  Mr. Chatman has not attended an exercise session since his last progress report due to a stroke.  He is currently receiving PT.  He is also having an ICD placed on 3/18.  He will contact us when cleared to return to cardiac rehab.      Rehab Staff Signature: Lubna Ewing MS, CCRP

## 2025-03-11 ENCOUNTER — APPOINTMENT (OUTPATIENT)
Dept: OPHTHALMOLOGY | Facility: CLINIC | Age: 70
End: 2025-03-11
Payer: MEDICARE

## 2025-03-17 DIAGNOSIS — Z95.810 PRESENCE OF AUTOMATIC CARDIOVERTER/DEFIBRILLATOR (AICD): ICD-10-CM

## 2025-03-17 DIAGNOSIS — I48.0 PAROXYSMAL ATRIAL FIBRILLATION (MULTI): Primary | ICD-10-CM

## 2025-03-18 ENCOUNTER — APPOINTMENT (OUTPATIENT)
Dept: RADIOLOGY | Facility: HOSPITAL | Age: 70
End: 2025-03-18
Payer: MEDICARE

## 2025-03-18 ENCOUNTER — HOSPITAL ENCOUNTER (OUTPATIENT)
Facility: HOSPITAL | Age: 70
Discharge: HOME | End: 2025-03-19
Attending: INTERNAL MEDICINE | Admitting: INTERNAL MEDICINE
Payer: MEDICARE

## 2025-03-18 DIAGNOSIS — I45.10 RIGHT BUNDLE BRANCH BLOCK (RBBB): ICD-10-CM

## 2025-03-18 DIAGNOSIS — I48.0 PAROXYSMAL ATRIAL FIBRILLATION (MULTI): ICD-10-CM

## 2025-03-18 DIAGNOSIS — I50.20 HFREF (HEART FAILURE WITH REDUCED EJECTION FRACTION): ICD-10-CM

## 2025-03-18 DIAGNOSIS — I50.42 CHRONIC COMBINED SYSTOLIC (CONGESTIVE) AND DIASTOLIC (CONGESTIVE) HEART FAILURE: ICD-10-CM

## 2025-03-18 DIAGNOSIS — I42.8 NON-ISCHEMIC CARDIOMYOPATHY (MULTI): ICD-10-CM

## 2025-03-18 DIAGNOSIS — I50.22 CHRONIC SYSTOLIC HEART FAILURE: ICD-10-CM

## 2025-03-18 DIAGNOSIS — I44.7 LEFT BUNDLE BRANCH BLOCK (LBBB): ICD-10-CM

## 2025-03-18 DIAGNOSIS — I42.9 CARDIOMYOPATHY: Primary | ICD-10-CM

## 2025-03-18 DIAGNOSIS — I50.21 ACUTE SYSTOLIC HEART FAILURE: ICD-10-CM

## 2025-03-18 DIAGNOSIS — Z95.828 S/P ASCENDING AORTIC REPLACEMENT: ICD-10-CM

## 2025-03-18 PROCEDURE — 33225 L VENTRIC PACING LEAD ADD-ON: CPT | Performed by: INTERNAL MEDICINE

## 2025-03-18 PROCEDURE — 7100000011 HC EXTENDED STAY RECOVERY HOURLY - NURSING UNIT

## 2025-03-18 PROCEDURE — C1898 LEAD, PMKR, OTHER THAN TRANS: HCPCS | Performed by: INTERNAL MEDICINE

## 2025-03-18 PROCEDURE — 99153 MOD SED SAME PHYS/QHP EA: CPT | Performed by: INTERNAL MEDICINE

## 2025-03-18 PROCEDURE — 71045 X-RAY EXAM CHEST 1 VIEW: CPT

## 2025-03-18 PROCEDURE — C1892 INTRO/SHEATH,FIXED,PEEL-AWAY: HCPCS | Performed by: INTERNAL MEDICINE

## 2025-03-18 PROCEDURE — 2750000001 HC OR 275 NO HCPCS: Performed by: INTERNAL MEDICINE

## 2025-03-18 PROCEDURE — C1769 GUIDE WIRE: HCPCS | Performed by: INTERNAL MEDICINE

## 2025-03-18 PROCEDURE — 99152 MOD SED SAME PHYS/QHP 5/>YRS: CPT | Performed by: INTERNAL MEDICINE

## 2025-03-18 PROCEDURE — 99152 MOD SED SAME PHYS/QHP 5/>YRS: CPT

## 2025-03-18 PROCEDURE — 2780000003 HC OR 278 NO HCPCS: Performed by: INTERNAL MEDICINE

## 2025-03-18 PROCEDURE — C1900 LEAD, CORONARY VENOUS: HCPCS | Performed by: INTERNAL MEDICINE

## 2025-03-18 PROCEDURE — 33249 INSJ/RPLCMT DEFIB W/LEAD(S): CPT | Performed by: INTERNAL MEDICINE

## 2025-03-18 PROCEDURE — C1887 CATHETER, GUIDING: HCPCS | Performed by: INTERNAL MEDICINE

## 2025-03-18 PROCEDURE — C1882 AICD, OTHER THAN SING/DUAL: HCPCS | Performed by: INTERNAL MEDICINE

## 2025-03-18 PROCEDURE — 99153 MOD SED SAME PHYS/QHP EA: CPT | Mod: MUE | Performed by: INTERNAL MEDICINE

## 2025-03-18 PROCEDURE — 2500000001 HC RX 250 WO HCPCS SELF ADMINISTERED DRUGS (ALT 637 FOR MEDICARE OP): Performed by: STUDENT IN AN ORGANIZED HEALTH CARE EDUCATION/TRAINING PROGRAM

## 2025-03-18 PROCEDURE — C1777 LEAD, AICD, ENDO SINGLE COIL: HCPCS | Performed by: INTERNAL MEDICINE

## 2025-03-18 PROCEDURE — C1781 MESH (IMPLANTABLE): HCPCS | Performed by: INTERNAL MEDICINE

## 2025-03-18 PROCEDURE — 99153 MOD SED SAME PHYS/QHP EA: CPT

## 2025-03-18 PROCEDURE — 2500000004 HC RX 250 GENERAL PHARMACY W/ HCPCS (ALT 636 FOR OP/ED): Performed by: INTERNAL MEDICINE

## 2025-03-18 PROCEDURE — 2720000007 HC OR 272 NO HCPCS: Performed by: INTERNAL MEDICINE

## 2025-03-18 PROCEDURE — C1727 CATH, BAL TIS DIS, NON-VAS: HCPCS | Performed by: INTERNAL MEDICINE

## 2025-03-18 DEVICE — LEAD 5076-52 CAPSUREFIX NOVUS US EN
Type: IMPLANTABLE DEVICE | Site: HEART | Status: FUNCTIONAL
Brand: CAPSUREFIX® NOVUS

## 2025-03-18 DEVICE — LEAD 429888 MRI CANT US
Type: IMPLANTABLE DEVICE | Site: HEART | Status: FUNCTIONAL
Brand: ATTAIN PERFORMA™ MRI SURESCAN™

## 2025-03-18 DEVICE — CRTD DTPA2QQ COBALT XT HF QUAD MRI DF4
Type: IMPLANTABLE DEVICE | Site: CHEST  WALL | Status: FUNCTIONAL
Brand: COBALT™ XT HF QUAD CRT-D MRI SURESCAN™

## 2025-03-18 DEVICE — LEAD 6935M62 DF4 ACTIVE SC US EN
Type: IMPLANTABLE DEVICE | Site: HEART | Status: FUNCTIONAL
Brand: SPRINT QUATTRO SECURE S®

## 2025-03-18 RX ORDER — CEFADROXIL 500 MG/1
500 CAPSULE ORAL 2 TIMES DAILY
Status: DISCONTINUED | OUTPATIENT
Start: 2025-03-18 | End: 2025-03-19 | Stop reason: HOSPADM

## 2025-03-18 RX ORDER — EPLERENONE 25 MG/1
50 TABLET ORAL DAILY
Status: DISCONTINUED | OUTPATIENT
Start: 2025-03-19 | End: 2025-03-19 | Stop reason: HOSPADM

## 2025-03-18 RX ORDER — BUPIVACAINE HYDROCHLORIDE 5 MG/ML
INJECTION, SOLUTION EPIDURAL; INTRACAUDAL; PERINEURAL AS NEEDED
Status: DISCONTINUED | OUTPATIENT
Start: 2025-03-18 | End: 2025-03-18 | Stop reason: HOSPADM

## 2025-03-18 RX ORDER — ATORVASTATIN CALCIUM 80 MG/1
80 TABLET, FILM COATED ORAL NIGHTLY
Status: DISCONTINUED | OUTPATIENT
Start: 2025-03-18 | End: 2025-03-19 | Stop reason: HOSPADM

## 2025-03-18 RX ORDER — FENTANYL CITRATE 50 UG/ML
INJECTION, SOLUTION INTRAMUSCULAR; INTRAVENOUS AS NEEDED
Status: DISCONTINUED | OUTPATIENT
Start: 2025-03-18 | End: 2025-03-18 | Stop reason: HOSPADM

## 2025-03-18 RX ORDER — ACETAMINOPHEN 650 MG/1
650 SUPPOSITORY RECTAL EVERY 4 HOURS PRN
Status: DISCONTINUED | OUTPATIENT
Start: 2025-03-18 | End: 2025-03-19 | Stop reason: HOSPADM

## 2025-03-18 RX ORDER — CEFAZOLIN SODIUM 2 G/50ML
SOLUTION INTRAVENOUS CONTINUOUS PRN
Status: COMPLETED | OUTPATIENT
Start: 2025-03-18 | End: 2025-03-18

## 2025-03-18 RX ORDER — DIPHENHYDRAMINE HYDROCHLORIDE 50 MG/ML
INJECTION, SOLUTION INTRAMUSCULAR; INTRAVENOUS AS NEEDED
Status: DISCONTINUED | OUTPATIENT
Start: 2025-03-18 | End: 2025-03-18 | Stop reason: HOSPADM

## 2025-03-18 RX ORDER — VANCOMYCIN HYDROCHLORIDE 500 MG/10ML
INJECTION, POWDER, LYOPHILIZED, FOR SOLUTION INTRAVENOUS AS NEEDED
Status: DISCONTINUED | OUTPATIENT
Start: 2025-03-18 | End: 2025-03-18 | Stop reason: HOSPADM

## 2025-03-18 RX ORDER — TORSEMIDE 20 MG/1
40 TABLET ORAL DAILY
Status: DISCONTINUED | OUTPATIENT
Start: 2025-03-19 | End: 2025-03-19 | Stop reason: HOSPADM

## 2025-03-18 RX ORDER — MIDAZOLAM HYDROCHLORIDE 1 MG/ML
INJECTION, SOLUTION INTRAMUSCULAR; INTRAVENOUS AS NEEDED
Status: DISCONTINUED | OUTPATIENT
Start: 2025-03-18 | End: 2025-03-18 | Stop reason: HOSPADM

## 2025-03-18 RX ORDER — METOPROLOL SUCCINATE 200 MG/1
200 TABLET, EXTENDED RELEASE ORAL DAILY
Status: DISCONTINUED | OUTPATIENT
Start: 2025-03-18 | End: 2025-03-19 | Stop reason: HOSPADM

## 2025-03-18 RX ORDER — ACETAMINOPHEN 160 MG/5ML
650 SOLUTION ORAL EVERY 4 HOURS PRN
Status: DISCONTINUED | OUTPATIENT
Start: 2025-03-18 | End: 2025-03-19 | Stop reason: HOSPADM

## 2025-03-18 RX ORDER — VANCOMYCIN HYDROCHLORIDE 1 G/20ML
INJECTION, POWDER, LYOPHILIZED, FOR SOLUTION INTRAVENOUS AS NEEDED
Status: DISCONTINUED | OUTPATIENT
Start: 2025-03-18 | End: 2025-03-18 | Stop reason: HOSPADM

## 2025-03-18 RX ORDER — TALC
3 POWDER (GRAM) TOPICAL NIGHTLY PRN
Status: DISCONTINUED | OUTPATIENT
Start: 2025-03-18 | End: 2025-03-19 | Stop reason: HOSPADM

## 2025-03-18 RX ORDER — ACETAMINOPHEN 325 MG/1
650 TABLET ORAL EVERY 4 HOURS PRN
Status: DISCONTINUED | OUTPATIENT
Start: 2025-03-18 | End: 2025-03-19 | Stop reason: HOSPADM

## 2025-03-18 RX ORDER — CEFAZOLIN 1 G/1
INJECTION, POWDER, FOR SOLUTION INTRAVENOUS AS NEEDED
Status: DISCONTINUED | OUTPATIENT
Start: 2025-03-18 | End: 2025-03-18 | Stop reason: HOSPADM

## 2025-03-18 RX ADMIN — ATORVASTATIN CALCIUM 80 MG: 80 TABLET, FILM COATED ORAL at 20:48

## 2025-03-18 RX ADMIN — CEFADROXIL 500 MG: 500 CAPSULE ORAL at 20:48

## 2025-03-18 RX ADMIN — ACETAMINOPHEN 650 MG: 325 TABLET ORAL at 20:48

## 2025-03-18 RX ADMIN — SACUBITRIL AND VALSARTAN 1 TABLET: 49; 51 TABLET, FILM COATED ORAL at 20:48

## 2025-03-18 RX ADMIN — METOPROLOL SUCCINATE 200 MG: 200 TABLET, EXTENDED RELEASE ORAL at 17:15

## 2025-03-18 SDOH — ECONOMIC STABILITY: INCOME INSECURITY: IN THE PAST 12 MONTHS HAS THE ELECTRIC, GAS, OIL, OR WATER COMPANY THREATENED TO SHUT OFF SERVICES IN YOUR HOME?: NO

## 2025-03-18 SDOH — SOCIAL STABILITY: SOCIAL INSECURITY: WITHIN THE LAST YEAR, HAVE YOU BEEN AFRAID OF YOUR PARTNER OR EX-PARTNER?: NO

## 2025-03-18 SDOH — ECONOMIC STABILITY: HOUSING INSECURITY: IN THE LAST 12 MONTHS, WAS THERE A TIME WHEN YOU WERE NOT ABLE TO PAY THE MORTGAGE OR RENT ON TIME?: NO

## 2025-03-18 SDOH — SOCIAL STABILITY: SOCIAL NETWORK: HOW OFTEN DO YOU ATTEND CHURCH OR RELIGIOUS SERVICES?: NEVER

## 2025-03-18 SDOH — SOCIAL STABILITY: SOCIAL NETWORK: IN A TYPICAL WEEK, HOW MANY TIMES DO YOU TALK ON THE PHONE WITH FAMILY, FRIENDS, OR NEIGHBORS?: THREE TIMES A WEEK

## 2025-03-18 SDOH — ECONOMIC STABILITY: HOUSING INSECURITY: IN THE PAST 12 MONTHS, HOW MANY TIMES HAVE YOU MOVED WHERE YOU WERE LIVING?: 0

## 2025-03-18 SDOH — SOCIAL STABILITY: SOCIAL INSECURITY: WITHIN THE LAST YEAR, HAVE YOU BEEN HUMILIATED OR EMOTIONALLY ABUSED IN OTHER WAYS BY YOUR PARTNER OR EX-PARTNER?: NO

## 2025-03-18 SDOH — ECONOMIC STABILITY: FOOD INSECURITY: WITHIN THE PAST 12 MONTHS, THE FOOD YOU BOUGHT JUST DIDN'T LAST AND YOU DIDN'T HAVE MONEY TO GET MORE.: NEVER TRUE

## 2025-03-18 SDOH — SOCIAL STABILITY: SOCIAL INSECURITY: HAVE YOU HAD THOUGHTS OF HARMING ANYONE ELSE?: NO

## 2025-03-18 SDOH — SOCIAL STABILITY: SOCIAL INSECURITY: DO YOU FEEL ANYONE HAS EXPLOITED OR TAKEN ADVANTAGE OF YOU FINANCIALLY OR OF YOUR PERSONAL PROPERTY?: NO

## 2025-03-18 SDOH — SOCIAL STABILITY: SOCIAL INSECURITY: HAVE YOU HAD ANY THOUGHTS OF HARMING ANYONE ELSE?: NO

## 2025-03-18 SDOH — SOCIAL STABILITY: SOCIAL INSECURITY: ARE YOU MARRIED, WIDOWED, DIVORCED, SEPARATED, NEVER MARRIED, OR LIVING WITH A PARTNER?: MARRIED

## 2025-03-18 SDOH — SOCIAL STABILITY: SOCIAL INSECURITY: ARE THERE ANY APPARENT SIGNS OF INJURIES/BEHAVIORS THAT COULD BE RELATED TO ABUSE/NEGLECT?: NO

## 2025-03-18 SDOH — HEALTH STABILITY: PHYSICAL HEALTH: ON AVERAGE, HOW MANY DAYS PER WEEK DO YOU ENGAGE IN MODERATE TO STRENUOUS EXERCISE (LIKE A BRISK WALK)?: 3 DAYS

## 2025-03-18 SDOH — ECONOMIC STABILITY: FOOD INSECURITY: WITHIN THE PAST 12 MONTHS, YOU WORRIED THAT YOUR FOOD WOULD RUN OUT BEFORE YOU GOT THE MONEY TO BUY MORE.: NEVER TRUE

## 2025-03-18 SDOH — SOCIAL STABILITY: SOCIAL INSECURITY: DO YOU FEEL UNSAFE GOING BACK TO THE PLACE WHERE YOU ARE LIVING?: NO

## 2025-03-18 SDOH — SOCIAL STABILITY: SOCIAL INSECURITY: ARE YOU OR HAVE YOU BEEN THREATENED OR ABUSED PHYSICALLY, EMOTIONALLY, OR SEXUALLY BY ANYONE?: NO

## 2025-03-18 SDOH — ECONOMIC STABILITY: HOUSING INSECURITY: AT ANY TIME IN THE PAST 12 MONTHS, WERE YOU HOMELESS OR LIVING IN A SHELTER (INCLUDING NOW)?: NO

## 2025-03-18 SDOH — HEALTH STABILITY: PHYSICAL HEALTH: ON AVERAGE, HOW MANY MINUTES DO YOU ENGAGE IN EXERCISE AT THIS LEVEL?: 30 MIN

## 2025-03-18 SDOH — SOCIAL STABILITY: SOCIAL INSECURITY: DOES ANYONE TRY TO KEEP YOU FROM HAVING/CONTACTING OTHER FRIENDS OR DOING THINGS OUTSIDE YOUR HOME?: NO

## 2025-03-18 SDOH — SOCIAL STABILITY: SOCIAL NETWORK: HOW OFTEN DO YOU GET TOGETHER WITH FRIENDS OR RELATIVES?: THREE TIMES A WEEK

## 2025-03-18 SDOH — ECONOMIC STABILITY: FOOD INSECURITY: HOW HARD IS IT FOR YOU TO PAY FOR THE VERY BASICS LIKE FOOD, HOUSING, MEDICAL CARE, AND HEATING?: NOT HARD AT ALL

## 2025-03-18 SDOH — SOCIAL STABILITY: SOCIAL INSECURITY: WERE YOU ABLE TO COMPLETE ALL THE BEHAVIORAL HEALTH SCREENINGS?: YES

## 2025-03-18 SDOH — SOCIAL STABILITY: SOCIAL INSECURITY: HAS ANYONE EVER THREATENED TO HURT YOUR FAMILY OR YOUR PETS?: NO

## 2025-03-18 SDOH — SOCIAL STABILITY: SOCIAL INSECURITY: ABUSE: ADULT

## 2025-03-18 SDOH — SOCIAL STABILITY: SOCIAL NETWORK: HOW OFTEN DO YOU ATTEND MEETINGS OF THE CLUBS OR ORGANIZATIONS YOU BELONG TO?: NEVER

## 2025-03-18 SDOH — ECONOMIC STABILITY: TRANSPORTATION INSECURITY: IN THE PAST 12 MONTHS, HAS LACK OF TRANSPORTATION KEPT YOU FROM MEDICAL APPOINTMENTS OR FROM GETTING MEDICATIONS?: NO

## 2025-03-18 ASSESSMENT — LIFESTYLE VARIABLES
AUDIT-C TOTAL SCORE: 0
HOW MANY STANDARD DRINKS CONTAINING ALCOHOL DO YOU HAVE ON A TYPICAL DAY: PATIENT DOES NOT DRINK
HOW OFTEN DO YOU HAVE 6 OR MORE DRINKS ON ONE OCCASION: NEVER
AUDIT-C TOTAL SCORE: 0
SKIP TO QUESTIONS 9-10: 1
HOW OFTEN DO YOU HAVE A DRINK CONTAINING ALCOHOL: NEVER

## 2025-03-18 ASSESSMENT — COGNITIVE AND FUNCTIONAL STATUS - GENERAL
DAILY ACTIVITIY SCORE: 24
PATIENT BASELINE BEDBOUND: NO
MOBILITY SCORE: 24
MOBILITY SCORE: 24
DAILY ACTIVITIY SCORE: 24

## 2025-03-18 ASSESSMENT — PAIN DESCRIPTION - ORIENTATION: ORIENTATION: LEFT;UPPER

## 2025-03-18 ASSESSMENT — PAIN - FUNCTIONAL ASSESSMENT
PAIN_FUNCTIONAL_ASSESSMENT: 0-10

## 2025-03-18 ASSESSMENT — COLUMBIA-SUICIDE SEVERITY RATING SCALE - C-SSRS
2. HAVE YOU ACTUALLY HAD ANY THOUGHTS OF KILLING YOURSELF?: NO
6. HAVE YOU EVER DONE ANYTHING, STARTED TO DO ANYTHING, OR PREPARED TO DO ANYTHING TO END YOUR LIFE?: NO
1. IN THE PAST MONTH, HAVE YOU WISHED YOU WERE DEAD OR WISHED YOU COULD GO TO SLEEP AND NOT WAKE UP?: NO
2. HAVE YOU ACTUALLY HAD ANY THOUGHTS OF KILLING YOURSELF?: NO
1. IN THE PAST MONTH, HAVE YOU WISHED YOU WERE DEAD OR WISHED YOU COULD GO TO SLEEP AND NOT WAKE UP?: NO

## 2025-03-18 ASSESSMENT — ACTIVITIES OF DAILY LIVING (ADL)
LACK_OF_TRANSPORTATION: NO
GROOMING: INDEPENDENT
HEARING - LEFT EAR: FUNCTIONAL
BATHING: INDEPENDENT
LACK_OF_TRANSPORTATION: NO
TOILETING: INDEPENDENT
ADEQUATE_TO_COMPLETE_ADL: YES
FEEDING YOURSELF: INDEPENDENT
HEARING - RIGHT EAR: FUNCTIONAL
DRESSING YOURSELF: INDEPENDENT
JUDGMENT_ADEQUATE_SAFELY_COMPLETE_DAILY_ACTIVITIES: YES
PATIENT'S MEMORY ADEQUATE TO SAFELY COMPLETE DAILY ACTIVITIES?: YES
WALKS IN HOME: INDEPENDENT

## 2025-03-18 ASSESSMENT — PAIN SCALES - GENERAL
PAINLEVEL_OUTOF10: 0 - NO PAIN
PAINLEVEL_OUTOF10: 5 - MODERATE PAIN
PAINLEVEL_OUTOF10: 3

## 2025-03-18 ASSESSMENT — PAIN DESCRIPTION - LOCATION: LOCATION: CHEST

## 2025-03-18 NOTE — INTERVAL H&P NOTE
H&P reviewed. The patient was examined and there are no changes to the H&P.  -no anginal or ADHF symptoms  -primary prevention CRT-D

## 2025-03-18 NOTE — NURSING NOTE
Pt refuses vitals after 7 pm ccheck. Doesn;t want to be disturbed until after 0600. Advised I will be in Q4 to check his surgical site. He ageed to the surgical site checks. Notifed MD.

## 2025-03-18 NOTE — Clinical Note
The DIFIBULATOR, CRTD COBALT XT HF QUAD MRI IS4 DF4 - [id number] device was inserted. The leads were placed into the connector and visually verified to be in correct position.

## 2025-03-19 ENCOUNTER — APPOINTMENT (OUTPATIENT)
Dept: RADIOLOGY | Facility: HOSPITAL | Age: 70
End: 2025-03-19
Payer: MEDICARE

## 2025-03-19 ENCOUNTER — PHARMACY VISIT (OUTPATIENT)
Dept: PHARMACY | Facility: CLINIC | Age: 70
End: 2025-03-19
Payer: COMMERCIAL

## 2025-03-19 ENCOUNTER — APPOINTMENT (OUTPATIENT)
Dept: CARDIOLOGY | Facility: HOSPITAL | Age: 70
End: 2025-03-19
Payer: MEDICARE

## 2025-03-19 VITALS
HEART RATE: 60 BPM | WEIGHT: 233.91 LBS | SYSTOLIC BLOOD PRESSURE: 145 MMHG | DIASTOLIC BLOOD PRESSURE: 91 MMHG | BODY MASS INDEX: 31 KG/M2 | RESPIRATION RATE: 18 BRPM | HEIGHT: 73 IN | TEMPERATURE: 97.7 F | OXYGEN SATURATION: 94 %

## 2025-03-19 PROCEDURE — 7100000011 HC EXTENDED STAY RECOVERY HOURLY - NURSING UNIT

## 2025-03-19 PROCEDURE — RXMED WILLOW AMBULATORY MEDICATION CHARGE

## 2025-03-19 PROCEDURE — 2500000001 HC RX 250 WO HCPCS SELF ADMINISTERED DRUGS (ALT 637 FOR MEDICARE OP)

## 2025-03-19 PROCEDURE — 71046 X-RAY EXAM CHEST 2 VIEWS: CPT

## 2025-03-19 PROCEDURE — 93005 ELECTROCARDIOGRAM TRACING: CPT

## 2025-03-19 PROCEDURE — 2500000001 HC RX 250 WO HCPCS SELF ADMINISTERED DRUGS (ALT 637 FOR MEDICARE OP): Performed by: STUDENT IN AN ORGANIZED HEALTH CARE EDUCATION/TRAINING PROGRAM

## 2025-03-19 RX ORDER — CEFADROXIL 500 MG/1
500 CAPSULE ORAL 2 TIMES DAILY
Qty: 13 CAPSULE | Refills: 0 | Status: SHIPPED | OUTPATIENT
Start: 2025-03-19 | End: 2025-03-26

## 2025-03-19 RX ORDER — OXYCODONE HYDROCHLORIDE 5 MG/1
10 TABLET ORAL ONCE
Status: COMPLETED | OUTPATIENT
Start: 2025-03-19 | End: 2025-03-19

## 2025-03-19 RX ADMIN — CEFADROXIL 500 MG: 500 CAPSULE ORAL at 08:50

## 2025-03-19 RX ADMIN — EMPAGLIFLOZIN 10 MG: 10 TABLET, FILM COATED ORAL at 08:50

## 2025-03-19 RX ADMIN — OXYCODONE 10 MG: 5 TABLET ORAL at 01:11

## 2025-03-19 RX ADMIN — TORSEMIDE 40 MG: 20 TABLET ORAL at 08:50

## 2025-03-19 RX ADMIN — SACUBITRIL AND VALSARTAN 1 TABLET: 49; 51 TABLET, FILM COATED ORAL at 08:50

## 2025-03-19 RX ADMIN — EPLERENONE 50 MG: 25 TABLET, FILM COATED ORAL at 08:49

## 2025-03-19 RX ADMIN — METOPROLOL SUCCINATE 200 MG: 200 TABLET, EXTENDED RELEASE ORAL at 08:51

## 2025-03-19 ASSESSMENT — PAIN - FUNCTIONAL ASSESSMENT
PAIN_FUNCTIONAL_ASSESSMENT: 0-10
PAIN_FUNCTIONAL_ASSESSMENT: 0-10

## 2025-03-19 ASSESSMENT — COGNITIVE AND FUNCTIONAL STATUS - GENERAL
MOBILITY SCORE: 24
DAILY ACTIVITIY SCORE: 24

## 2025-03-19 ASSESSMENT — PAIN SCALES - GENERAL
PAINLEVEL_OUTOF10: 0 - NO PAIN
PAINLEVEL_OUTOF10: 5 - MODERATE PAIN

## 2025-03-19 ASSESSMENT — PAIN DESCRIPTION - LOCATION: LOCATION: CHEST

## 2025-03-19 ASSESSMENT — PAIN DESCRIPTION - ORIENTATION: ORIENTATION: LEFT

## 2025-03-19 NOTE — DISCHARGE INSTRUCTIONS
Discharge Instructions for your Cardiac Device   CARDIAC DEVICE CLINIC  [phone]              Incision:   1.  Keep your incision clean and dry for 1 week.  2. May shower 7 days after the procedure. Do not submerge the incision in a tub, pool, hot tub, or lake for 4 weeks.  3. Your incision should look better each day. If you notice unusual swelling, redness, drainage or fever greater than 100 degrees, please call the Device Clinic or your Doctor's office.  4. Avoid using deodorants, powders, creams, lotions, etc on your incision for 4 weeks.   5. There are no stitches to be removed.  If you received a “glue” closure this may appear purple-gray and does not get removed but wears away slowly on its own.  Steri-strips (small white bandages) may be removed in one week or they may fall off on their own earlier.  Pain:  1. It is normal for the area around the incision to be tender for a few weeks following surgery. Pain relievers such as Tylenol or Motrin (whichever you can take) are usually sufficient for pain relief. If the pain gets worse instead of better than please call the Device Clinic or your Doctor.  Activity:  1. If you have a new device and new leads placed than avoid raising your arm above shoulder level for 4 weeks. Do no  anything weighing more than 15 pounds.   2. Avoid exercising with the arm on the side of your pacemaker.  So no golf, swimming, tennis, bowling etc for 4 weeks.      3. Driving: If you were driving prior to your procedure, you may resume driving in 1 week. If you experienced a loss of consciousness prior to your procedure, you should verify with your Doctor when you are able to resume driving.  ID CARD:  1. It is important to carry your device ID card with you at all times.   2. Inform doctors and health care providers that you have a pacemaker or defibrillator.  Electromagnetic Interference:  1. Microwave ovens are safe to use.  2. Cellular phones should be held to the  opposite ear from your device. Do not carry your phone in your shirt pocket. Some i-phones that self-charge can interfere with your device so be sure to keep it away from your pacemaker/defibrillator.   3. Read the Patient Booklet for more information. You may call either the Device Clinic [phone]  or the patient services of the device  with questions about specific electrical appliances and interference problems.    IT IS YOUR RESPONSIBILITY TO MAKE AND KEEP APPOINTMENTS.   Please refer to your Device clinic handout.

## 2025-03-19 NOTE — NURSING NOTE
Pt IV taken out. Tele was removed. Reviewed After Summary with pt and answered any questions. Pt was discharged home.

## 2025-03-19 NOTE — CARE PLAN
The patient's goals for the shift include rest    The clinical goals for the shift include Pt will have care clustered with minimal interruptions to promote sleep

## 2025-03-19 NOTE — DISCHARGE SUMMARY
Discharge Diagnosis  Chronic combined systolic (congestive) and diastolic (congestive) heart failure    Issues Requiring Follow-Up  -routine CIED follow-up as scheduled    Test Results Pending At Discharge  Pending Labs       No current pending labs.            Hospital Course       69M hx mod AR and Asc Ao Aneurysm - s/p Aortic Valve and Asc Aorta replacement (#29AVR Bentall + STEVEN clipping) with Dr. Perdomo (09/23/2024), pAF, alternating LBBB and RBBB, nICM/HFrEF - index dx (9/2024) EF 25%  admitted after routine elective CRT-D    CIED implant was without complication. Post-implant evaluation showed normal wound appearance, CXR showed appropriate lead position, and device interrogation showed appropriate device parameters.       Plan:  -cefadroxil 500mg bid x7d  -activity restrictions added to discharge instructions  -restart apixaban 3/22  -device clinic wound check and device check to be scheduled by device clinic in 4 weeks       Pertinent Physical Exam At Time of Discharge  Physical Exam    Constitutional: well appearing, no distress  Eyes: no conjunctival injection  ENT: moist mucous membranes, no apparent injury  Respiratory/Thorax: normal work of breathing  Cardiovascular: normal rate, regular rhythm, extremities warm, JVP not elevated  Extremities: warm, intact     CIED incision: wound edges well approximated. No evidence of pocket hematoma or purulent discharge or dehiscence.     Home Medications     Medication List      START taking these medications     cefadroxil 500 mg capsule; Commonly known as: Duricef; Take 1 capsule   (500 mg) by mouth 2 times a day for 13 doses.     CHANGE how you take these medications     apixaban 5 mg tablet; Commonly known as: Eliquis; Take 1 tablet (5 mg)   by mouth 2 times a day. Do not fill before March 22, 2025.; Start taking   on: March 22, 2025; What changed: These instructions start on March 22, 2025. If you are unsure what to do until then, ask your doctor or other    care provider.     CONTINUE taking these medications     atorvastatin 80 mg tablet; Commonly known as: Lipitor; Take 1 tablet (80   mg) by mouth once daily at bedtime.   blood pressure monitor kit; Use as directed to monitor blood pressure   once daily   Entresto 49-51 mg tablet; Generic drug: sacubitriL-valsartan; Take 1   tablet by mouth 2 times a day.   eplerenone 50 mg tablet; Commonly known as: Inspra; Take 1 tablet (50   mg) by mouth once daily.   Jardiance 10 mg; Generic drug: empagliflozin; Take 1 tablet (10 mg) by   mouth once daily.   metoprolol succinate  mg 24 hr tablet; Commonly known as:   Toprol-XL; Take 1 tablet (200 mg) by mouth once daily. Do not crush or   chew.   torsemide 20 mg tablet; Commonly known as: Demadex; Take 2 tablets (40   mg) by mouth once daily. As needed daily for peripheral edema.       Outpatient Follow-Up  Future Appointments   Date Time Provider Department Center   4/23/2025  1:00 PM [facility] [id number] [facility]   4/28/2025 10:00 AM Yomi Buck MD [id number] [facility]   5/12/2025 10:40 AM Cristy See PharmD [id number] Academic       Gloria Clayton MD

## 2025-03-21 LAB
ATRIAL RATE: 63 BPM
ATRIAL RATE: 64 BPM
P AXIS: 14 DEGREES
P AXIS: 24 DEGREES
P OFFSET: 164 MS
P OFFSET: 164 MS
P ONSET: 100 MS
P ONSET: 96 MS
PR INTERVAL: 208 MS
PR INTERVAL: 220 MS
Q ONSET: 200 MS
Q ONSET: 210 MS
QRS COUNT: 10 BEATS
QRS COUNT: 11 BEATS
QRS DURATION: 114 MS
QRS DURATION: 144 MS
QT INTERVAL: 462 MS
QT INTERVAL: 476 MS
QTC CALCULATION(BAZETT): 472 MS
QTC CALCULATION(BAZETT): 491 MS
QTC FREDERICIA: 469 MS
QTC FREDERICIA: 486 MS
R AXIS: 119 DEGREES
R AXIS: 125 DEGREES
T AXIS: -67 DEGREES
T AXIS: -72 DEGREES
T OFFSET: 438 MS
T OFFSET: 441 MS
VENTRICULAR RATE: 63 BPM
VENTRICULAR RATE: 64 BPM

## 2025-03-26 DIAGNOSIS — I48.0 PAROXYSMAL ATRIAL FIBRILLATION (MULTI): ICD-10-CM

## 2025-03-26 DIAGNOSIS — Z01.818 PRE-OP EVALUATION: ICD-10-CM

## 2025-03-30 PROCEDURE — RXMED WILLOW AMBULATORY MEDICATION CHARGE

## 2025-04-02 ENCOUNTER — PHARMACY VISIT (OUTPATIENT)
Dept: PHARMACY | Facility: CLINIC | Age: 70
End: 2025-04-02
Payer: COMMERCIAL

## 2025-04-21 DIAGNOSIS — I42.8 NON-ISCHEMIC CARDIOMYOPATHY (MULTI): ICD-10-CM

## 2025-04-21 DIAGNOSIS — I50.21 ACUTE SYSTOLIC HEART FAILURE: ICD-10-CM

## 2025-04-21 DIAGNOSIS — I48.0 PAROXYSMAL ATRIAL FIBRILLATION (MULTI): ICD-10-CM

## 2025-04-21 DIAGNOSIS — Z95.828 S/P ASCENDING AORTIC REPLACEMENT: ICD-10-CM

## 2025-04-21 PROCEDURE — RXMED WILLOW AMBULATORY MEDICATION CHARGE

## 2025-04-22 ENCOUNTER — PHARMACY VISIT (OUTPATIENT)
Dept: PHARMACY | Facility: CLINIC | Age: 70
End: 2025-04-22
Payer: COMMERCIAL

## 2025-04-22 ENCOUNTER — TELEPHONE (OUTPATIENT)
Dept: CARDIOLOGY | Facility: HOSPITAL | Age: 70
End: 2025-04-22
Payer: MEDICARE

## 2025-04-22 DIAGNOSIS — I50.20 HFREF (HEART FAILURE WITH REDUCED EJECTION FRACTION): ICD-10-CM

## 2025-04-22 PROCEDURE — RXMED WILLOW AMBULATORY MEDICATION CHARGE

## 2025-04-23 ENCOUNTER — APPOINTMENT (OUTPATIENT)
Dept: CARDIOLOGY | Facility: CLINIC | Age: 70
End: 2025-04-23
Payer: MEDICARE

## 2025-04-23 ENCOUNTER — PHARMACY VISIT (OUTPATIENT)
Dept: PHARMACY | Facility: CLINIC | Age: 70
End: 2025-04-23
Payer: COMMERCIAL

## 2025-04-23 DIAGNOSIS — I48.0 PAROXYSMAL ATRIAL FIBRILLATION (MULTI): ICD-10-CM

## 2025-04-23 DIAGNOSIS — Z95.810 PRESENCE OF AUTOMATIC CARDIOVERTER/DEFIBRILLATOR (AICD): ICD-10-CM

## 2025-04-23 PROCEDURE — 93284 PRGRMG EVAL IMPLANTABLE DFB: CPT | Performed by: INTERNAL MEDICINE

## 2025-04-28 ENCOUNTER — OFFICE VISIT (OUTPATIENT)
Dept: CARDIOLOGY | Facility: CLINIC | Age: 70
End: 2025-04-28
Payer: MEDICARE

## 2025-04-28 VITALS
HEART RATE: 64 BPM | WEIGHT: 231 LBS | BODY MASS INDEX: 30.62 KG/M2 | OXYGEN SATURATION: 97 % | DIASTOLIC BLOOD PRESSURE: 92 MMHG | SYSTOLIC BLOOD PRESSURE: 153 MMHG | HEIGHT: 73 IN

## 2025-04-28 DIAGNOSIS — I42.8 NON-ISCHEMIC CARDIOMYOPATHY (MULTI): ICD-10-CM

## 2025-04-28 DIAGNOSIS — Z95.810 CARDIAC RESYNCHRONIZATION THERAPY DEFIBRILLATOR (CRT-D) IN PLACE: ICD-10-CM

## 2025-04-28 DIAGNOSIS — E78.2 MIXED HYPERLIPIDEMIA: ICD-10-CM

## 2025-04-28 DIAGNOSIS — I50.20 HFREF (HEART FAILURE WITH REDUCED EJECTION FRACTION): Primary | ICD-10-CM

## 2025-04-28 DIAGNOSIS — I44.7 LEFT BUNDLE BRANCH BLOCK: ICD-10-CM

## 2025-04-28 DIAGNOSIS — Z95.3 S/P AORTIC VALVE REPLACEMENT WITH PORCINE VALVE: ICD-10-CM

## 2025-04-28 PROCEDURE — 3077F SYST BP >= 140 MM HG: CPT | Performed by: STUDENT IN AN ORGANIZED HEALTH CARE EDUCATION/TRAINING PROGRAM

## 2025-04-28 PROCEDURE — 99214 OFFICE O/P EST MOD 30 MIN: CPT | Performed by: STUDENT IN AN ORGANIZED HEALTH CARE EDUCATION/TRAINING PROGRAM

## 2025-04-28 PROCEDURE — 1159F MED LIST DOCD IN RCRD: CPT | Performed by: STUDENT IN AN ORGANIZED HEALTH CARE EDUCATION/TRAINING PROGRAM

## 2025-04-28 PROCEDURE — 3008F BODY MASS INDEX DOCD: CPT | Performed by: STUDENT IN AN ORGANIZED HEALTH CARE EDUCATION/TRAINING PROGRAM

## 2025-04-28 PROCEDURE — 3080F DIAST BP >= 90 MM HG: CPT | Performed by: STUDENT IN AN ORGANIZED HEALTH CARE EDUCATION/TRAINING PROGRAM

## 2025-04-28 PROCEDURE — G2211 COMPLEX E/M VISIT ADD ON: HCPCS | Performed by: STUDENT IN AN ORGANIZED HEALTH CARE EDUCATION/TRAINING PROGRAM

## 2025-04-28 PROCEDURE — 1036F TOBACCO NON-USER: CPT | Performed by: STUDENT IN AN ORGANIZED HEALTH CARE EDUCATION/TRAINING PROGRAM

## 2025-04-28 RX ORDER — SACUBITRIL AND VALSARTAN 97; 103 MG/1; MG/1
1 TABLET, FILM COATED ORAL 2 TIMES DAILY
Qty: 180 TABLET | Refills: 3 | Status: SHIPPED | OUTPATIENT
Start: 2025-04-28 | End: 2026-04-28

## 2025-04-28 NOTE — PROGRESS NOTES
Primary Care Physician: Amandeep Vaca DO  Patient's Location: [facility] [id number]  Primary Cardiologist: Dr. Jonathon Carter    Date of Visit: 04/28/2025 10:00 AM EDT  Location of visit: [facility]   Type of Visit: Established - First Seen: 10/14/2024   Last visit: 1/27/2025    HPI / Summary:   David Chatman is a very pleasant 69 y.o. male presenting for management of Stage C NICM/HFrEF (last EF 23% 12/2024, from 35-40% diagnosis). He otherwise has a history of HTN, HLD, pAF, LBBB and ascending aortic aneursym (6.2cm) with associated moderate AR now s/p AscAoReplacement and #29AVR Bentall + STEVEN clipping with Dr. Perdomo on 09/23/2024 ([facility]) and s/p CRT-D 03/2025     Initial CV History:   9/11/2024 - 10/3/2024 (22 days) [facility]  presented with dyspnea to his cardiologist. Echocardiography revealed a left ventricular ejection fraction of 35-40% and an ascending aortic aneurysm measuring 6.2 cm. The patient was referred to the emergency department, where further evaluation confirmed the presence of an ascending aortic aneurysm and heart failure. CMR showed EF 34%. He underwent a cAscAoReplacement and #29AVR Bentall + STEVEN clipping with Dr. Perdomo on 09/23/2024 ([place]). The patient experienced atrial fibrillation and required antiarrhythmic medications. Postoperative echocardiogram showed EF 22%and computed tomography angiography were performed to assess the surgical outcome. The echocardiogram revealed a reduced right ventricular function and no aortic insufficiency. The CT angiography showed no evidence of aneurysm recurrence or other complications.  The patient's creatinine level increased postoperatively from 1.08-1.2 to 1.74.  The patient was prescribed a life vest for arrhythmia monitoring.  The patient was referred to cardiac rehabilitation and physical therapy.  The patient's pre-operative weight was 111 kg (245 lb), and his discharge weight was 114 kg (251 lb).  12/6/2024 - 12/8/2024  [place] [place]  Presented with acute ataxia, diplopia, and dysarthria. MRI revealed ischemic stroke involving the superior avila and inferior midbrain. Initial workup favored embolic etiology, though neurology ultimately deferred initiation of AC. Patient was managed with DAPT (ASA + Plavix) and high-intensity statin therapy. Neurologically stable, transitioned to acute rehab with improved functional status at discharge.    Echo with EF 23%.     12/8/2024 - 12/21/2024 ([facility]):  Admitted to inpatient rehab for intensive PT/OT after ischemic stroke. Persistent diplopia noted without significant improvement. Coordination with cardiology and neurology led to conversion from DAPT to Eliquis per pre-stroke plan. Discharged with stable functional gains (supervised-to-modified independence) and home health follow-up arranged. Med rec completed: AC initiated (Eliquis), DAPT and hydralazine discontinued.    Last Visit:  1/27/2025  - Continue current medications with the exception of:    -- increase Entresto 24-26mg BID -> 49-51mg twice daily   -- increase metoprolol succinate XL 150mg daily -> 200mg daily   -- increase eplerenone 25mg daily -> 50mg daily   -- stop Hydralazine   - Labwork: 1 week  - Imaging/Procedures: none  - Referrals:   -- Cardiac Rehab  -- see Electrophysiology to discuss a CRT (cardiac resynchronization therapy/defibrillator)  - Followup: 3 months    Interval history:   - s/p CRT-D 3/18/2025    Today:  He is accompanied by: wife who provides additional clinical history     No chest pain. He has fatigue. Stroke symptoms improved.    He feels great overall since the installation of his cardiac resynchronization therapy device. There is some soreness and tenderness at the site, especially when showering, but the scab has worn off and the area is slightly itchy. He is waiting for clearance to return to more strenuous activities such as digging ditches and cutting  trees.    He struggles to control his blood pressure despite being on multiple medications. He was on a lot of blood pressure medications before his surgery. He is currently taking Entresto, which is not at the maximum dose, and metoprolol, which was increased from 150 mg to 200 mg. He is concerned about his diet affecting his blood pressure control.    He recalls participating in physical therapy after his stroke and heart surgery but is unsure about completing cardiac rehab. He stays active by cutting grass and walking around his property. He mentions a significant weight loss from 270 pounds to 204 pounds during rehab, but his weight has since increased to 230 pounds, which he is comfortable with. No swelling and reports sleeping well.    He is retired but stays active around the house. He is eager to receive clearance to resume more physical activities. He mentions that he feels the best he has since his open-heart surgery, with the scar starting to fade.    He denies: dyspnea at rest, LE swelling, syncope, PND, orthopnea, chest pain, palpitation.    Full 10 pt review of symptoms of negative unless discussed above.     Objective   Medical History:   He has a past medical history of Arthritis, CHF (congestive heart failure), Gout, Hyperlipidemia, Hypertension, SHOAIB (obstructive sleep apnea), Paroxysmal atrial fibrillation (Multi) (08/24/2023), and Stroke (Multi).  Surgical Hx:   He has a past surgical history that includes Sinus surgery; Cardiac catheterization (N/A, 9/11/2024); and Cardiac electrophysiology procedure (Left, 3/18/2025).   Social Hx:   He reports that he has never smoked. He has never used smokeless tobacco. He reports that he does not currently use alcohol after a past usage of about 2.0 standard drinks of alcohol per week. He reports that he does not use drugs.  Family Hx:   His family history is not on file.   Allergies:   No Known Allergies  Exam:       4/28/2025     9:53 AM 3/19/2025     7:32  "AM 3/19/2025     6:11 AM 3/19/2025     1:11 AM 3/18/2025     5:56 PM 3/18/2025     4:32 PM   Vitals   Systolic 153 145 130 140 152 152   Diastolic 92 91 86 83 92 92   BP Location Left arm        Heart Rate 64 60 62 63 60 60   Temp  36.5 °C (97.7 °F) 36.4 °C (97.5 °F) 36.5 °C (97.7 °F) 36.4 °C (97.5 °F) 36.4 °C (97.5 °F)   Resp  18 18 18 18 18   Height 1.854 m (6' 1\")    1.854 m (6' 1\")    Weight (lb) 231    233.91    BMI 30.48 kg/m2    30.86 kg/m2    BSA (m2) 2.33 m2    2.34 m2    Visit Report Report          Wt Readings from Last 5 Encounters:   04/28/25 105 kg (231 lb)   03/18/25 106 kg (233 lb 14.5 oz)   02/26/25 104 kg (229 lb)   02/21/25 99.8 kg (220 lb)   01/27/25 101 kg (222 lb)     GEN: Pleasant, well-appearing, no acute distress.  HEENT: JVP not elevated, no icterus. No HJR  CHEST: No wheeze, good air movement. Sternotomy well healing. CRT  CV: Normal rate, regular rhythm. Normal S1, fixed split S2, no m/r/g  ABD: Soft, ND, NT  EXT: Warm, well perfused, No LE edema.   NEURO: Pleasant, Oriented to plan    Labs:   CMP:  Recent Labs     02/26/25  0915 01/30/25  1056 12/20/24  0605 12/17/24  0600 12/09/24  0519 12/08/24  0639 12/06/24  0742 10/10/24  1040    143 140 141 141   < > 139 136  136   K 4.5 4.0 3.4* 3.2* 3.7   < > 3.5 4.3  4.3    108 104 104 107   < > 103 99  99   CO2 25 22 29 30 24   < > 25 30  30   ANIONGAP 11 13 10 10 14   < > 15 11  11   BUN 23 30* 24* 24* 24*   < > 29* 20  20   CREATININE 1.06 1.26 1.23 1.28 1.07   < > 1.10 1.16  1.16   EGFR 77 63 64 61 76   < > 74 69  69   MG  --   --   --   --   --   --  1.81 2.18    < > = values in this interval not displayed.     Recent Labs     01/30/25  1056 12/06/24  0742 10/10/24  1040 09/13/24  0829 09/11/24  0403 09/10/24  2043   ALBUMIN 4.2 3.7 3.3*   < > 4.0 4.2   ALKPHOS 143 155*  --   --  126* 140*   ALT 20 21  --   --  25 27   AST 16 17  --   --  18 19   BILITOT 0.8 0.6  --   --  0.8 0.6   LIPASE  --   --   --   --   --  25    < " "> = values in this interval not displayed.   CBC:  Recent Labs     02/26/25  0915 01/30/25  1056 12/17/24  0600 12/09/24  0519 12/08/24  0639   WBC 6.1 7.1 6.3 9.2 7.2   HGB 14.0 13.7 11.7* 12.7* 11.8*   HCT 43.0 43.3 37.3* 40.5* 37.6*    209 206 268 264   MCV 84.5 86.1 80 81 80   HEME/ENDO:  Recent Labs     12/06/24  0742 04/10/24  1044 10/20/22  0748 09/07/21  1321   FERRITIN  --  389 369  --    IRONSAT  --   --  39.1 29.3   TSH  --  1.11  --  1.84   HGBA1C 6.1* 5.6 5.9 5.8    CARDIAC:   Recent Labs     01/30/25  1056 12/06/24  0936 12/06/24  0742   TROPHS  --   --  13   * 1,196*  --      Recent Labs     12/06/24  0742 04/10/24  1044 10/20/22  0748   CHOL 118 157 164   LDLCALC 69 93 99   HDL 32.6 53.0 52   TRIG 82 55 63   MICRO: No results for input(s): \"ESR\", \"CRP\", \"PROCAL\" in the last 57592 hours.No results found for the last 90 days.      Notable Studies:   EKG:   Recent Labs     03/19/25  1013 03/19/25  0901 02/26/25  0840   VENTRATE 63 64 57   ATRRATE 63 64 57   QTCFRED 469 486 470   QRSDUR 114 144 154   QTCCALCB 472 491 465       Echocardiogram:   Recent Labs     12/06/24  1220 09/25/24  1619 09/23/24  0625 09/10/24  1330   EF 23 22 33 38   LVIDD 6.74  --   --  7.16   RV 34.8 25.8  --   --    RVFRWALLPKSP 7.72 3.83  --  9.57   TAPSE 1.3 1.0  --  2.1     Transthoracic Echo (TTE) Limited 09/25/2024    1. Poorly visualized anatomical structures due to suboptimal image quality.   2. Left ventricular ejection fraction is severely decreased, calculated by Vasquez's biplane at 22%.   3. There is global hypokinesis of the left ventricle with minor regional variations.   4. Spectral Doppler shows a restrictive pattern of left ventricular diastolic filling.   5. Left ventricular cavity size is severely dilated.   6. Abnormal septal motion consistent with post-operative status.   7. No left ventricular thrombus visualized.   8. There is reduced right ventricular systolic function.   9. The left atrium is " enlarged.  10. The right atrium is mild to moderately dilated.  11. Right ventricular systolic pressure is within normal limits.  12. S/p 29mm Konect Resilia bioprosthetic valved conduit. AVR gradients were not assessed by spectral Doppler though no obvious AI by color Doppler  13. S/p 32mm Gelweave ascending aortic conduit and hemiarch.  14. Compared with the prior exam, preop echo done on 9/20/2024 [facility], the LV was already severely dilated with moderately reduced function with a severely dilated aorta with moderate AI and the patient is now s/p acending aortic and arch replacement and Konect AVR. The LV remains severely dilated with a reducetion in LV systolic function , now severely reduced. The AVR gradients were not assessed today but there is no AI.    Transthoracic Echo (TTE) Complete With 3D And Strain 09/10/2024  Left Ventricle: The left ventricular systolic function is moderately decreased, with a visually estimated ejection fraction of 35-40%. There is global hypokinesis of the left ventricle with minor regional variations. The left ventricular cavity size is severely dilated. Left Ventricular Global Longitudinal Strain - -10.5 %. Spectral Doppler shows a normal pattern of left ventricular diastolic filling.  Left Atrium: The left atrium is moderately dilated.  Right Ventricle: The right ventricle is normal in size. There is normal right ventricular global systolic function.  Right Atrium: The right atrium is moderately dilated.  Aortic Valve: The aortic valve was not well visualized. There is no aortic valve thickening. There is no evidence of aortic valve stenosis.  The aortic valve dimensionless index is 1.03. There is moderate aortic valve regurgitation. The peak instantaneous gradient of the aortic valve is 16.5 mmHg. The mean gradient of the aortic valve is 8.0 mmHg. Likely tricuspid aortic valve but images slightly suboptimal.  Mitral Valve: The mitral valve is abnormal. There is  moderate mitral valve regurgitation which is centrally directed.  Tricuspid Valve: The tricuspid valve is structurally normal. There is trace tricuspid regurgitation. The right ventricular systolic pressure is unable to be estimated.  Pulmonic Valve: The pulmonic valve is structurally normal. There is physiologic pulmonic valve regurgitation.  Pericardium: There is a trivial pericardial effusion.  Aorta: The aortic root is normal. There is an aneurysm involving the ascending aorta. Severe ascending aortic aneurysm of 6.2 cm at the largest diameter.  Systemic Veins: The inferior vena cava appears to be of normal size, IVC inspiratory collapse greater than 50%.      CONCLUSIONS:  1. The left ventricular systolic function is moderately decreased, with a visually estimated ejection fraction of 35-40%.  2. There is global hypokinesis of the left ventricle with minor regional variations.  3. Left ventricular cavity size is severely dilated.  4. There is normal right ventricular global systolic function.  5. The left atrium is moderately dilated.  6. The right atrium is moderately dilated.  7. Moderate mitral valve regurgitation.  8. Trace tricuspid regurgitation is visualized.  9. Aortic valve stenosis is not present.  10. Likely tricuspid aortic valve but images slightly suboptimal.  11. Moderate aortic valve regurgitation.  12. Aneurysm of the ascending aorta.      Coronary Angiography:   Left Heart Cath 09/11/2024  In the setting of large 6.2 cm ascending aorta dilation his coronary arteries were difficult to cannulate and image. For LAD imaging a JL 4.5, JL 5, JL 6, catheters were unsuccessful due to catheter prolapse. A 6 Spanish AL 3 catheter provided adequate nonselective imaging of the left coronary system. The right coronary artery originated just above the right coronary cusp and anterior orientation based on the prior CT scan of the chest. I was unsuccessful at selectively imaging the right coronary artery and  had only 1 brief fluoroscopic image of flow in the right coronary artery using an AL 3 catheter. AL 2 and Aaron right 3 catheters were also unsuccessful at engaging the right coronary ostium.      1. The left main is a large vessel that bifurcates into the LAD and circumflex and had no obstructive disease.      2. The LAD was a large vessel that extended to the apex and wraps around the apex to supply the distal inferoapical wall. The LAD had no obstructive disease.      3. The circumflex was a large codominant vessel with no obstructive disease in the circumflex or its marginal branches.      4. The right coronary artery was unsuccessfully imaged nonselective imaging briefly showed evidence of vessel patency.    Let ventriculogram deferred to limit IV contrast load. Echocardiography shoed significant LV and LA dilation with estimated moderately decreased LV systolic function with LVEF 35-40% and modearate aortic valve regurgitation.          Impression:      #1 large 6.2 cm ascending aorta dilation.      2. Inability to selectively or nonselectively engage the right coronary artery, and patient would benefit from CCTA imaging to confirm coronary anatomy.      3. No obstructive disease in the left main, LAD, or circumflex vessels.      Description of the Procedure:  After informed consent was obtained for the cardiac catheterization procedure the patient was brought to the cardiac catheterization lab and prepped and draped in a sterile fashion with the remainder procedure performed in sterile technique. 10 cc of 1% lidocaine use of the right groin for local anesthesia and 3 mg of Versed and 75 mcg of fentanyl were used for sedation during the procedure.      Right Heart Cath: No results found for this or any previous visit from the past 1800 days.    Cardiac Scoring: No results found for this or any previous visit from the past 1800 days.    Cardiac MRI:   MR cardiac morphology and function w and wo IV contrast  09/12/2024  CARDIAC CHAMBERS  Normal atrioventricular and ventriculoarterial concordance    LEFT ATRIUM  Severely dilated (HANNAH-93.2 ml/m2).    RIGHT ATRIUM  Unable to determine due to images    INTERATRIAL SEPTUM  Intact.    LEFT VENTRICLE:  1. Dilated LV size and reduced systolic function. Quantitative LVEF 34%.  2. There is global hypokinesis of the left ventricle with minor  regional variation.  3. Normal LV wall thickness and normal LV indexed mass.  4. Normal native T2 values (<55ms)/ and normal T2 STIR imaging.  5. Normal ECV 29%  6. No evidence of LV thrombus.  7. Small focal transmural (%) LGE/scar involving the apical  lateral wall. Finding may represent prior embolic event versus prior  myocarditis. Nonspecific replacement fibrosis noted at the level of  the basal mid septum.    MEASUREMENTS  ----------------------------------------------------------------------  ------------------ VOLUMETRIC ANALYSIS  ----------------------------------------------  .--------------------------------------------------------.                   LV     Reference  RV    Reference   +------+----------+-------+-----------+------+-----------+   EDV   ml          426              206           ml/mï¿½       182               88     ESV   ml          257              105           ml/mï¿½       110               45     CO    L/min     10.66             6.35           L/min/mï¿½   4.55             2.72     MASS  g           253                            g/mï¿½        108                      SV    ml          169              101           ml/mï¿½        72               43     EF    %            40               49    '------+----------+-------+-----------+------+-----------'    CARDIAC OUTPUT HR:  63 bpm  LV DIMENSIONS  ----------------------------------------------  WALL THICKNESS - ANTEROSEPTAL:  0.93 cm  WALL THICKNESS - INFEROLATERAL:   0.8 cm  LV RIC:  7.63 cm  LV ESD:  6.86 cm    LA DIMENSIONS (LV SYSTOLE)  ----------------------------------------------  DIAMETER:  6.41 cm  AREA - 2 CHAMBER:  46.33 cmï¿½  LENGTH - 2 CHAMBER:  8.29 cm  AREA - 4 CHAMBER:  45.94 cmï¿½  LENGTH - 4 CHAMBER:  9.38 cm  VOLUME:  218 ml  VOLUME NORMALIZED:  93.2 ml/mï¿½    AORTIC ROOT DIMENSIONS  ----------------------------------------------  ANNULUS:  3.53 cm  SINUS OF VALSALVA:  4.41 cm  SINOTUBULAR JUNCTION:  5.79 cm    EXTRACELLULAR VOLUME MEASUREMENT  ----------------------------------------------  PRE-CONTRAST T1 MYOCARDIUM:  985.3 msec  PRE-CONTRAST T1 LV CAVITY:  1675.5 msec  POST-CONTRAST T1 MYOCARDIUM:  464.31 msec  POST-CONTRAST T1 LV CAVITY:  326.09 msec  HEMATOCRIT:  37.6 %  ECV:  29 %    FINDINGS  ----------------------------------------------  LV SCAR SIZE (17 SEGMENT):  9 %      RIGHT VENTRICLE  The right ventricle appears normal in size(EDVi-88 ml/m2), shape, and  has normal qualitative systolic function. Quantitative RVEF49 % no  segmental wall motion abnormalities. No abnormal delayed enhancement  in the myocardium.    INTERVENTRICULAR SEPTUM  Intact.    AORTIC VALVE  The aortic valve is trileaflet  There is  moderate aortic regurgitation.  Flow quantification through the ascending aorta:  Forward volume =164 cc/beat  Reverse volume = 59 cc/beat  Net forward volume = 105 cc/beat  Aortic regurgitant fraction = 36 %    MITRAL VALVE  There is  mild mitral regurgitation.  Integrating LV volumetric and aortic flow quantification data reveals:  Quantitative mitral regurgitant volume = 5 cc/beat  Quantitative mitral regurgitant fraction =  %    TRICUSPID VALVE  There is qualitative trivial tricuspid regurgitation.    PULMONARY VALVE:  Not assessed.    PERICARDIUM:  The pericardium is normal. There is small pericardial effussion.    THORACIC AORTA  Aneurysmal dilatation of the ascending aorta measuring at 6.3 cm.  The aortic root is aneurysmal measures 4.5 x  4.5 x 4.2 cm.  The thoracic aorta appears normal in course, caliber, and contour.  There is no evidence for acute aortic pathology. The arch vessel  branching pattern is  normal.   All the arch branch vessels appear  widely patent in their proximal portions.    AORTIC ROOT DIMENSIONS:  Aortic root(sinus of valsalva): 4.41 cm  Annulus: 3.53 cm  Sinotubular junction:5.79 cm    PULMONARY ARTERIES  The central pulmonary arteries appear  normal (MPA-3.1 cm, RPA- 2.5  cm, LPA-2.3 cm).    SYSTEMIC AND PULMONARY VEINS  Normal systemic venous and pulmonary venous return.  The SVC is of normal caliber. IVC appears normal  Normal pulmonary venous anatomy.    CHEST  The chest wall is normal.  Limited imaging through the lungs reveals no gross abnormalities.  Small bilateral pleural effusion.    UPPER ABDOMEN  Limited imaging through the upper abdomen reveals no abnormalities of  the visualized organs.    Impression  1. Dilated LV (EDVi-182 ml/m2) with reduced systolic function.  Quantitative LVEF 34 %.  2. Normal RV size (EDVi-88 ml/m2)and systolic function. Quantitative  RVEF49%.  3. Aneurysmal dilatation of the ascending aorta measuring up 6.4 cm.  Dilated aortic root measuring 4.5 x 1.5 x 4.2 cm.  4. Moderate aortic regurgitation. Aortic regurgitant fraction is 36%.  5. Small focal transmural (%) LGE/scar involving the apical  lateral wall. Finding may represent prior embolic event versus prior  myocarditis.  6. Nonspecific replacement fibrosis noted at the level of the basal  mid septum.  7. Small bilateral pleural effusion.  8. Small circumferential pericardial effusion.      Nuclear:No results found for this or any previous visit from the past 1800 days.    Metabolic Stress: No results found for this or any previous visit from the past 1800 days.      Current Outpatient Medications   Medication Instructions    apixaban (ELIQUIS) 5 mg, oral, 2 times daily    atorvastatin (LIPITOR) 80 mg, oral, Nightly    blood  pressure monitor kit Use as directed to monitor blood pressure once daily    Eliquis 5 mg, oral, 2 times daily    empagliflozin (JARDIANCE) 10 mg, oral, Daily    eplerenone (INSPRA) 50 mg, oral, Daily    metoprolol succinate XL (TOPROL-XL) 200 mg, oral, Daily, Do not crush or chew.    sacubitriL-valsartan (Entresto) 49-51 mg tablet 1 tablet, oral, 2 times daily    torsemide (DEMADEX) 40 mg, oral, Daily, As needed daily for peripheral edema.      Notable Therapies: *GDMT*   Class  Agent SAFETY    *ARNI* / ACE / ARB  Entresto 49-51mg twice daily Last BP: (!) 153/92, Last BUN/Cr (GFR): 2/26/2025: 23/1.06 (77)    *Beta-Blocker*  metoprolol succinate XL 200mg daily Last HR: 64    *MRA*  eplerenone 50mg daily Last K: 2/26/2025: 4.5     *SGLT2*  Jardiance 10mg daily Last A1c 12/6/2024: 6.1     Diuretic  torsemide 40mg daily PRN Last BNP: 1/30/2025: 364    Hydralazine/ISDN  Hydralazine (stop)     Digoxin  Last Digoxin level: No results found for requested labs within last 3650 days.    Anticoagulation  Eliquis 5mg BID Last Hgb: 2/26/2025: 14.0    Anti-arrhythmic  Last QTc: 3/19/2025: 472    Antiplatelet  Aspirin 81mg daily Last Platelet: 2/26/2025: 160    Lipid-Lowering  atorvastatin 20mg daily Last Tchol 12/6/2024: 118 / LDL No results found for requested labs within last 3650 days. or 12/6/2024: 69 / HDL 12/6/2024: 32.6 / TRIG 12/6/2024: 82    Other        Device(s)  CRT-D (cakulev 3/18/2025) EF: 12/6/2024: 23%, QRS: 3/19/2025: 114ms    Cardiac Rehab,   if EF <35/MI/[facility]  Referred post [facility]      Problems Addressed:   # HFrEF / Chronic Systolic Heart Failure, last EF 12/6/2024: 23%, dx'd 09/2024 with EF 35-40%,  now s/p CRT-D 03/2025  # Non-ischemic Cardiomyopathy, ACC/AHA Stage C, NYHA II, Warm, Euvolemic. Possibly 2/2 long standing moderate AR vs uncontrolled long standing HTN(patient on 5 different anti hypertensive agents at home) vs electrical dyssynchrony EKG noted with LBBB vs prior MI due to coronary embolism per  "findings on cMRI of \"small focal transmural scar in the apical lateral wall.\" Though [facility] with no significant CAD  # Ascending Aortic Aneurysm with moderate AI now s/p ascending aortic replacement w 29mm Konect: Recovering well. Referred to cardiac rehab.   # LBBB (QRS 146ms): pre-existing AVR. Now s/p CRT.   # Paroxysmal Atrial Fibrillation s/p STEVEN clipping: on apixaban given stroke post AVR.   # Hypertension: Last BP: (!) 153/92.  # Hyperlipidemia: Last Tchol 12/6/2024: 118 / LDL 69 / HDL 32.6 / TRIG 82. Continue statin  # Obesity: Last BMI: 30.48. Diet exercise, cardiac rehab  # CKD: Last BUN/Cr (GFR): 2/26/2025: 23/1.06 (77)  - GDMT as above  - Labwork: 3 months  -- Cardiac Rehab  -- CRT clinic referral  - 3 month followup with echocardiogram and labs.     Patient Instructions   If you have any questions or need cardiac medication refills, please call the Heart Failure office at [phone], option 6.  You may also contact the  Heart Failure Nursing team via email at [email or web address] (Please include your name and date of birth). To reach Dr. Buck's office please call [phone] / Fax: [phone]. To schedule an office appointment call [phone].     If I placed a referral today for a specialist/procedure, please call the general scheduling line at [phone]. You may also schedule appointments on the [employer] lizbeth. For radiology scheduling (Cardiac calcium score, CTA with HeartFlow, Cardiac MRI, NM cardiac stress test, PET viability study) the phone number is [phone].     - Continue current medications with the exception of:    -- increase Entresto from 49-51mg twice daily to 97-103mg twice daily  - Labwork: with echocardiogram  - Imaging/Procedures: echocardiogram in 3 months  - Referrals:   -- Cardiac Rehab  -- CRT clinic referral  - Followup: 3 months       Orders:  No orders of the defined types were placed in this encounter.     Followup Appts:  Future Appointments "   Date Time Provider Department Center   5/12/2025 10:40 AM Cristy See, PharmD [id number]   7/9/2025  1:30 PM [facility] [id number]       Time Spent: I spent 34 minutes reviewing medical testing, obtaining medical history and counselling and educating on diagnosis and documenting clinical encounter. The encounter involved a comprehensive review of extensive data, including prior medical records, imaging studies, laboratory results, and consultations, followed by in-depth counseling regarding the patient's complex cardiac condition and comorbidities. We discussed treatment options, risks and benefits, and recommendations for ongoing care and careful monitoring of adverse effects from medications.     ____________________________________________________________  Yomi Buck MD  Section of Advanced Heart Failure and Cardiac Transplantation  Division of Cardiovascular Medicine  [facility] [place]  [facility]

## 2025-04-28 NOTE — PATIENT INSTRUCTIONS
If you have any questions or need cardiac medication refills, please call the Heart Failure office at [phone], option 6.  You may also contact the  Heart Failure Nursing team via email at [email or web address] (Please include your name and date of birth). To reach Dr. Buck's office please call [phone] / Fax: [phone]. To schedule an office appointment call [phone].     If I placed a referral today for a specialist/procedure, please call the general scheduling line at [phone]. You may also schedule appointments on the [employer] lizbeth. For radiology scheduling (Cardiac calcium score, CTA with HeartFlow, Cardiac MRI, NM cardiac stress test, PET viability study) the phone number is [phone].     - Continue current medications with the exception of:    -- increase Entresto from 49-51mg twice daily to 97-103mg twice daily  - Labwork: with echocardiogram  - Imaging/Procedures: echocardiogram in 3 months  - Referrals:   -- Cardiac Rehab  -- CRT clinic referral  - Followup: 3 months

## 2025-04-29 ENCOUNTER — APPOINTMENT (OUTPATIENT)
Dept: OPHTHALMOLOGY | Facility: CLINIC | Age: 70
End: 2025-04-29
Payer: MEDICARE

## 2025-04-30 PROCEDURE — RXMED WILLOW AMBULATORY MEDICATION CHARGE

## 2025-05-01 ENCOUNTER — PHARMACY VISIT (OUTPATIENT)
Dept: PHARMACY | Facility: CLINIC | Age: 70
End: 2025-05-01
Payer: COMMERCIAL

## 2025-05-02 ENCOUNTER — DOCUMENTATION (OUTPATIENT)
Dept: CARDIAC REHAB | Facility: CLINIC | Age: 70
End: 2025-05-02
Payer: MEDICARE

## 2025-05-02 NOTE — PROGRESS NOTES
Cardiac Rehabilitation 150 day progress report    Name: David Chatman  Medical Record Number: [id number]  YOB: 1955  Age: 69 y.o.    Today’s Date: 1/17/2025  Primary Care Physician: Amandeep Vaca DO  Referring Physician: Yomi Buck MD  Program Location: [address]  Primary Diagnosis:   1. S/P aortic valve replacement and aortoplasty  Referral to Cardiac Rehab         Onset/Date of Diagnosis: 9/23/24  Session #:  8  AACVPR Risk Stratification: High  Falls Risk: Low  Psychosocial Assessment   Pre PHQ-9: 0  Sent PH-Q 9 to MD if score > 20: No; score < 20  Pt reported/currently experiencing stress: No  Patient uses stress management skills: No   History of: no history of anxiety or depression  Currently seeing a mental health provider: No  Social Support: Yes, Whom:Spouse and family  Quality of Life Survey: SF-36   SF-36 Pre Post   Physical Component Score     Mental Component Score       Learning Assessment:  Learning assessment/barriers: None  Preferred learning method: Visual  Barriers: None  Comments:  Stages of Change: Action    Psychosocial Plan  Goal Status: In Progress  Psychosocial Goals: Maintain or lower PH-Q 9 score by discharge  Psychosocial Interventions/Education:   11/6/24 initial PHQ-9 survey reviewed w/ pt at eval/MS        Nutrition Assessment:    Hyperlipidemia: Yes     Lipids:   Lab Results   Component Value Date    CHOL 118 12/06/2024    HDL 32.6 12/06/2024    TRIG 82 12/06/2024       Current Dietary Guidelines: no special, watches sodium  Barriers to dietary change: no    Diet Habit Survey: Picture Your Plate  Pre: 48  Post: To be done at discharge.    Diabetes Assessment    Lab Results   Component Value Date    HGBA1C 6.1 (H) 12/06/2024       History of Diabetes: No    Weight Management    Height: 182.9 cm (6')  Weight: 99.3 kg (219 lb)  BMI (Calculated): 29.7    Nutrition Plan  Goal Status: In Progress  Nutrition Goals: Adapt a  low-sodium, DASH diet prior to discharge and Learn how to read and interpret nutrition labels prior to discharge  Nutrition Interventions/Education:   11/12/2024  Reviewed PYP with patient. PYP score 48. Encouraged pt to add 1 fruit and vegetable per day. Also discussed decreasing processed foods and sugar in coffee. Pt has already decreased the processed meats in his diet and his wife is trying to make lower sodium soups. He struggles to eat when he is by himself. Usually uses more processed foods when having to cook for himself. Pt has been buying a high protein soup. Encouraged him to take a picture of the label and bring in it so we can look at it together. Encouraged to further read PYP recommendations at home and follow up with questions as needed. Suggested pt bring wife to nutrition education lectures./Signature Payal Rankin RDN, LD  1/15/2024 Education on sodium intake was done. Discussed with patient the risks of excess levels of sodium and how to decrease dietary intake with provided list of substitutions. Handouts were given for home reference. /Signature Zeina Seo RN          Exercise Assessment    Home exercise:  No  Mode: NA  Frequency: NA  Duration: NA    Exercise Prescription     Exercise Prescription based on: Duke Activity Status Index (DASI)    DASI Score: 34.7   MET Score: 7   Frequency:  3 days/week   Mode: Treadmill, NuStep, and Recumbent Cycle   Duration: 33 total aerobic minutes   Intensity: RPE 12-14  Target HR:  To be calculated after 6 attended sessions.  MET Level: 3.7  Patient wears supplemental O2: No     Modality Workload METs Duration (minutes)   1 Pre-Exercise   4:00   2 Treadmill 2.4 mph @ 0.5% 3.0 11 :00   3 NuStep Load 5 @ 80 bennett  2.9 11 :00   4 Recumbent Bike Load 5 @ 60 rpm  3.7 11 :00   5 Schwinn Airdyne Load 1.3  2.9 11 :00   6 Post-Exercise   4:00     Resistance Training: No   Home Exercise Prescription given: To be given prior to discharge from  program.    Exercise Plan  Goal Status: In Progress  Exercise Goals: Increase exercise MET level by 5-10% each week, Increase total exercise duration to 30-45 minutes, and Establish a home exercise program before discharge  Exercise Interventions/Education:           Other Core Components/Risk Factor Assessment:    Medication adherence  Current Medications:   Medication Documentation Review Audit       Reviewed by Amandeep Vaca DO (Physician) on 12/31/24 at 0942      Medication Order Taking? Sig Documenting Provider Last Dose Status     Discontinued 12/30/24 1016   apixaban (Eliquis) 5 mg tablet [id number] Yes Take 1 tablet (5 mg) by mouth 2 times a day. Yomi Buck MD  Active   atorvastatin (Lipitor) 80 mg tablet [id number] Yes Take 1 tablet (80 mg) by mouth once daily at bedtime. Yomi Buck MD 12/7/2024 Active   empagliflozin (Jardiance) 10 mg [id number] No Take 1 tablet (10 mg) by mouth once daily.   Patient not taking: Reported on 12/31/2024    Yomi Buck MD 12/8/2024 Active   eplerenone (Inspra) 25 mg tablet [id number] Yes Take 1 tablet (25 mg) by mouth once daily. Yomi Buck MD 12/8/2024 Active   metoprolol succinate XL (Toprol-XL) 100 mg 24 hr tablet [id number] Yes Take 1.5 tablets (150 mg) by mouth once daily. Do not crush or chew. Yomi Buck MD 12/8/2024 Active   sacubitriL-valsartan (Entresto) 24-26 mg tablet [id number] No Take 1 tablet by mouth 2 times a day.   Patient not taking: Reported on 12/31/2024    Yomi Buck MD 12/8/2024 Active   torsemide (Demadex) 20 mg tablet [id number] Yes Take 2 tablets (40 mg) by mouth once daily. As needed daily for peripheral edema. Francisco Angeles MD 12/8/2024 Active                                 Medication compliance: Not taking entresto and jardiance due to cost   Uses pill box/organizer: No    Carries medication list: No     Blood Pressure Management  History of Hypertension: Yes   Medication Changes:  No   Resting BP:  Visit Vitals  /66        Heart Failure Management  Hx of Heart Failure: current diagnosis  Type (selection): HFrEF  Most recent EF: 22  Onset of heart failure diagnosis: 9/23/24  Last heart failure hospitalization: 9/23/24  Number of HF admissions per year: 1  Symptoms: Fatigue with exertion  Is there a family Hx of HF: No   Does patient obtain daily weight Yes       Heart Failure Reassessment: In Progress  Heart Failure Goals: Able to verbalize signs and symptoms of fluid retention and when to contact MD, Adapt a low sodium diet and verbalize guidelines, and Obtain daily weight and verbalize proper method of obtaining weights  Smoking/Tobacco Assessment  Social History     Tobacco Use   Smoking Status Never   Smokeless Tobacco Never       Other Core Component Plan  Goal Status:In progress  Other Core Component Goals: Medication compliance, Verbalize medication usage and drug actions by discharge, and Achieve resting BP of < 130/80 by discharge  Other Core Component Interventions/Education:           Individual Patient Goals:    Decrease fluid in lungs by discharge  Increase heart function on next echo    Goal Status: In Progress    Staff Comments:  Mr Chatman has been cleared to return to cardiac rehab. Will adjust exercise rx on return.

## 2025-05-06 ENCOUNTER — CLINICAL SUPPORT (OUTPATIENT)
Dept: CARDIAC REHAB | Facility: CLINIC | Age: 70
End: 2025-05-06
Payer: MEDICARE

## 2025-05-06 DIAGNOSIS — I42.8 NON-ISCHEMIC CARDIOMYOPATHY (MULTI): ICD-10-CM

## 2025-05-06 DIAGNOSIS — Z95.3 S/P AORTIC VALVE REPLACEMENT WITH PORCINE VALVE: ICD-10-CM

## 2025-05-06 DIAGNOSIS — Z95.2 S/P AORTIC VALVE REPLACEMENT AND AORTOPLASTY: ICD-10-CM

## 2025-05-06 DIAGNOSIS — I44.7 LEFT BUNDLE BRANCH BLOCK: ICD-10-CM

## 2025-05-06 DIAGNOSIS — E78.2 MIXED HYPERLIPIDEMIA: ICD-10-CM

## 2025-05-06 DIAGNOSIS — I50.20 HFREF (HEART FAILURE WITH REDUCED EJECTION FRACTION): ICD-10-CM

## 2025-05-06 DIAGNOSIS — Z95.810 CARDIAC RESYNCHRONIZATION THERAPY DEFIBRILLATOR (CRT-D) IN PLACE: ICD-10-CM

## 2025-05-06 PROCEDURE — 93798 PHYS/QHP OP CAR RHAB W/ECG: CPT | Performed by: INTERNAL MEDICINE

## 2025-05-09 ENCOUNTER — CLINICAL SUPPORT (OUTPATIENT)
Dept: CARDIAC REHAB | Facility: CLINIC | Age: 70
End: 2025-05-09
Payer: MEDICARE

## 2025-05-09 DIAGNOSIS — Z95.2 S/P AORTIC VALVE REPLACEMENT AND AORTOPLASTY: ICD-10-CM

## 2025-05-09 PROCEDURE — 93798 PHYS/QHP OP CAR RHAB W/ECG: CPT | Performed by: INTERNAL MEDICINE

## 2025-05-09 NOTE — PROGRESS NOTES
Cardiac Rehab Education  David Chatman   [id number]    5/9/2025  Blood pressure education was done. How to read measurement numbers, ways to decrease, and risks of not managing blood pressure were discussed. Handouts were given for home reference and patient was encouraged to return with any questions.            Signature Zeina Seo RN

## 2025-05-12 ENCOUNTER — APPOINTMENT (OUTPATIENT)
Dept: PHARMACY | Facility: HOSPITAL | Age: 70
End: 2025-05-12
Payer: MEDICARE

## 2025-05-12 DIAGNOSIS — I50.20 HFREF (HEART FAILURE WITH REDUCED EJECTION FRACTION): ICD-10-CM

## 2025-05-12 DIAGNOSIS — I48.0 PAROXYSMAL ATRIAL FIBRILLATION (MULTI): ICD-10-CM

## 2025-05-12 NOTE — ASSESSMENT & PLAN NOTE
David is prescribed 4/4 GDMT at optimized doses. Will continue current regimen and follow up in 3 months.

## 2025-05-12 NOTE — Clinical Note
David Gonsalez Dr. reports compliance to Eliquis, denies any abnormal bruising or bleedings. No recent falls/injuries. No symptoms of worsening heart failure reported at today's appointment. Discussed monitoring home BP readings which the patient is agreeable to, as well as BP goal. No changes today, will follow up in 3 months.

## 2025-05-12 NOTE — PROGRESS NOTES
Pharmacist Clinic: Cardiology Management    David Chatman is a 69 y.o. male was referred to Clinical Pharmacy Team for heart failure management.     Referring Provider: Yomi Buck MD    THIS IS A FOLLOW UP PATIENT APPOINTMENT. AT LAST VISIT ON 2/27/2025 WITH PHARMACIST (Cristy See).    Appointment was completed by the patient who was reached at .    REVIEW OF PAST APPNT (IF APPLICABLE):   Today's appointment was 2 month follow up regarding anticoagulation and heart failure pharmacotherapy.   David reports no abnormal bruising or bleeding with Eliquis. No recent hospitalizations or falls. No concerns regarding adherence.  No symptoms of worsening heart failure noted at today's appointment. David reports he did not receive the BP monitor after his last pharmacy appointment. Gave patient the phone number for Frolik Dubberly to call regarding this prescription. Also advised to call me with any issues filling this prescription. He is agreeable to monitoring his BP and discussing increase of Entresto at next appointment if BP allows.  Will follow up in 3 months, patient prefers to follow up after his BIV ICD implant procedure with Dr. Baker and his follow up with Dr. Buck.     No Known Allergies    Past Medical History:   Diagnosis Date    Arthritis     CHF (congestive heart failure)     Gout     Hyperlipidemia     Hypertension     SHOAIB (obstructive sleep apnea)     Paroxysmal atrial fibrillation (Multi) 08/24/2023    Stroke (Multi)        Current Outpatient Medications on File Prior to Visit   Medication Sig Dispense Refill    apixaban (Eliquis) 5 mg tablet Take 1 tablet (5 mg) by mouth 2 times a day. 180 tablet 3    atorvastatin (Lipitor) 80 mg tablet Take 1 tablet (80 mg) by mouth once daily at bedtime. 90 tablet 3    blood pressure monitor kit Use as directed to monitor blood pressure once daily 1 kit 0    empagliflozin (Jardiance) 10 mg tablet Take 1 tablet (10 mg) by mouth once  "daily. 90 tablet 3    eplerenone (Inspra) 50 mg tablet Take 1 tablet (50 mg) by mouth once daily. 90 tablet 3    metoprolol succinate XL (Toprol-XL) 200 mg 24 hr tablet Take 1 tablet (200 mg) by mouth once daily. Do not crush or chew. 90 tablet 3    sacubitriL-valsartan (Entresto)  mg tablet Take 1 tablet by mouth 2 times a day. 180 tablet 3    torsemide (Demadex) 20 mg tablet Take 2 tablets (40 mg) by mouth once daily. As needed daily for peripheral edema.       No current facility-administered medications on file prior to visit.         RELEVANT LAB RESULTS:  Lab Results   Component Value Date    BILITOT 0.8 01/30/2025    CALCIUM 9.5 02/26/2025    CO2 25 02/26/2025     02/26/2025    CREATININE 1.06 02/26/2025    GLUCOSE 102 (H) 02/26/2025    ALKPHOS 143 01/30/2025    K 4.5 02/26/2025    PROT 7.2 01/30/2025     02/26/2025    AST 16 01/30/2025    ALT 20 01/30/2025    BUN 23 02/26/2025    ANIONGAP 11 02/26/2025    MG 1.81 12/06/2024    PHOS 4.1 10/10/2024    ALBUMIN 4.2 01/30/2025    LIPASE 25 09/10/2024     Lab Results   Component Value Date    TRIG 82 12/06/2024    CHOL 118 12/06/2024    LDLCALC 69 12/06/2024    HDL 32.6 12/06/2024     No results found for: \"BMCBC\", \"CBCDIF\"     PHARMACEUTICAL ASSESSMENT:    MEDICATION RECONCILIATION    Drug Interactions? No clinically significant drug interactions requiring change in therapy found at the time of this visit.     Medication Documentation Review Audit       Reviewed by Yue Gunter (Technician) on 04/28/25 at 0957      Medication Order Taking? Sig Documenting Provider Last Dose Status   apixaban (Eliquis) 5 mg tablet [id number]  Take 1 tablet (5 mg) by mouth 2 times a day. Do not fill before March 22, 2025. Gloria Clayton MD  Active   apixaban (Eliquis) 5 mg tablet [id number] Yes Take 1 tablet (5 mg) by mouth 2 times a day. Yomi Buck MD  Active   atorvastatin (Lipitor) 80 mg tablet [id number] Yes Take 1 tablet (80 mg) by mouth once daily at " bedtime. Yomi Buck MD 12/7/2024 Active   blood pressure monitor kit [id number]  Use as directed to monitor blood pressure once daily Yomi Bcuk MD  Active   Discontinued 04/22/25 1116   empagliflozin (Jardiance) 10 mg tablet [id number] Yes Take 1 tablet (10 mg) by mouth once daily. Yomi Buck MD  Active   eplerenone (Inspra) 50 mg tablet [id number] Yes Take 1 tablet (50 mg) by mouth once daily. Yomi Buck MD  Active   metoprolol succinate XL (Toprol-XL) 200 mg 24 hr tablet [id number] Yes Take 1 tablet (200 mg) by mouth once daily. Do not crush or chew. Yomi Buck MD  Active   sacubitriL-valsartan (Entresto) 49-51 mg tablet [id number] Yes Take 1 tablet by mouth 2 times a day. Yomi Buck MD  Active   torsemide (Demadex) 20 mg tablet [id number] Yes Take 2 tablets (40 mg) by mouth once daily. As needed daily for peripheral edema. Francisco Angeles MD 12/8/2024 Active                    DISEASE MANAGEMENT ASSESSMENT:     ANTICOAGULATION ASSESSMENT    The ASCVD Risk score (Don WHITNEY, et al., 2019) failed to calculate for the following reasons:    Risk score cannot be calculated because patient has a medical history suggesting prior/existing ASCVD    DIAGNOSIS: treatment of nonvalvular atrial fibrilliation stroke and systemic embolism;  likely embolic event with a right posterior cerebral artery; acute stroke. Discussed indication with referring provider who would prefer to continue Eliquis at this time, as neurology was supportive of DOAC vs DAPT during inpatient admission  - Patient is projected to be on anticoagulation long term  - [id number] Score: [5] (only included if diagnosis is atrial fibrillation)   Age: [<65 (0)] [65-74 (+1)] [> 75 (+2)]: 1  Sex: [Male/Female (+1)]: 0  CHF history: [No/Yes(+1)]: 1  Hypertension history: [No/Yes(+1)]: 1  Stroke/TIA/thromboembolism history: [No/Yes(+2)]: 2  Vascular disease history (prior MI, peripheral artery disease,  aortic plaque): [No/Yes(+1)]: 0  Diabetes history: [No/Yes(+1)]: 0    CURRENT PHARMACOTHERAPY:    Eliquis 5 mg BID  68 y/o  105 kg  Scr 1.06 mg/dL (2/26/2025)    RELEVANT PAST MEDICAL HISTORY:   Ischemic stroke, PAF, HFrEF, HLD, HTN, aortic valve replacement (bioprosthetic)    Affordability/Accessibility: [facility] active through 12/4/2025   Adherence/Organization: confirms taking Eliquis twice daily (12 hours apart)  Adverse Reactions: no abnormal bruising or bleeding. No dark/tarry stools, denies epistaxis  Recent Hospitalizations: 3/18-3/19 for ICD BIV Implant  Recent Falls/Trauma: none  Changes in Tobacco or Alcohol Intake:   Tobacco: none, does not use  Alcohol: none, does not use    CHF ASSESSMENT     Symptom/Staging:  -Most recent ejection fraction: 22%  -NYHA Stage: II    Results for orders placed during the hospital encounter of 09/11/24    Transthoracic Echo (TTE) Limited    [facility], [address] [place] [address]  Tel [phone] and Fax [phone]    TRANSTHORACIC ECHOCARDIOGRAM REPORT      Patient Name:      NAN Norman Physician:    [id number] Clari Geronimo MD  Study Date:        9/25/2024            Ordering Provider:    [id number] PRASAD PRESSLEY  MRN/PID:           [id number]             Fellow:  Accession#:        [id number]         Nurse:  Date of Birth/Age: 1955 / 69 years Sonographer:          Ron Tariq RDCS, RVT  Gender:            M                    Additional Staff:  Height:            185.42 cm            Admit Date:           9/11/2024  Weight:            113.85 kg            Admission Status:     Inpatient -  Routine  BSA / BMI:         2.37 m2 / 33.12      Encounter#:           [id number]  kg/m2  Blood Pressure:    113/65 mmHg          Department Location:  [facility]    Study Type:    TRANSTHORACIC ECHO (TTE) LIMITED  Diagnosis/ICD: 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    Study Detail: The following Echo studies were performed: 2D, Doppler and color  flow. Definity used as a contrast agent for endocardial border  definition. Total contrast used for this procedure was 4 mL via IV  push.      PHYSICIAN INTERPRETATION:  Left Ventricle: Left ventricular ejection fraction is severely decreased, calculated by Vasquez's biplane at 22%. There is global hypokinesis of the left ventricle with minor regional variations. The left ventricular cavity size is severely dilated. Abnormal (paradoxical) septal motion consistent with post-operative status. Spectral Doppler shows a restrictive pattern of left ventricular diastolic filling. There is no definite left ventricular thrombus visualized.  Left Atrium: The left atrium is enlarged.  Right Ventricle: The right ventricle is upper limits of normal in size. There is reduced right ventricular systolic function.  Right Atrium: The right atrium is mild to moderately dilated.  Aortic Valve: There is a prosthetic aortic valve present. There is an Chiu bioprosthetic aortic valve, with a 29 mm reported size. There is no evidence of aortic valve regurgitation. S/p 29mm Konect Resilia bioprosthetic valved conduit. AVR gradients were not assessed by spectral Doppler though no obvious AI by color Doppler.  Mitral Valve: The mitral valve is normal in structure. There is mild mitral valve regurgitation.  Tricuspid Valve: The tricuspid valve is structurally normal. There is trace to mild tricuspid regurgitation. The Doppler estimated RVSP is within normal limits at 25.8 mmHg.  Pulmonic Valve: The pulmonic valve is not well visualized. The pulmonic valve regurgitation was not well visualized.  Pericardium: Trivial to small pericardial  effusion.  Aorta: The aortic root is abnormal. S/p 32mm Gelweave ascending aortic conduit and hemiarch.  In comparison to the previous echocardiogram(s): Compared with the prior exam, preop echo done on 9/20/2024 [facility], the LV was already severely dilated with moderately reduced function with a severely dilated aorta with moderate AI and the patient is now s/p acending aortic and arch replacement and Konect AVR. The LV remains severely dilated with a reducetion in LV systolic function , now severely reduced. The AVR gradients were not assessed today but there is no AI.    CONCLUSIONS:  1. Poorly visualized anatomical structures due to suboptimal image quality.  2. Left ventricular ejection fraction is severely decreased, calculated by Vasquez's biplane at 22%.  3. There is global hypokinesis of the left ventricle with minor regional variations.  4. Spectral Doppler shows a restrictive pattern of left ventricular diastolic filling.  5. Left ventricular cavity size is severely dilated.  6. Abnormal septal motion consistent with post-operative status.  7. No left ventricular thrombus visualized.  8. There is reduced right ventricular systolic function.  9. The left atrium is enlarged.  10. The right atrium is mild to moderately dilated.  11. Right ventricular systolic pressure is within normal limits.  12. S/p 29mm Konect Resilia bioprosthetic valved conduit. AVR gradients were not assessed by spectral Doppler though no obvious AI by color Doppler  13. S/p 32mm Gelweave ascending aortic conduit and hemiarch.  14. Compared with the prior exam, preop echo done on 9/20/2024 [facility], the LV was already severely dilated with moderately reduced function with a severely dilated aorta with moderate AI and the patient is now s/p acending aortic and arch replacement and Konect AVR. The LV remains severely dilated with a reducetion in LV systolic function , now severely reduced. The AVR gradients  were not assessed today but there is no AI.    QUANTITATIVE DATA SUMMARY:    2D MEASUREMENTS:           Normal Ranges:  LVEDV Index:     114 ml/m2      RA VOLUME BY A/L METHOD:          Normal Ranges:  RA Area A4C:             24.8 cm2      LV SYSTOLIC FUNCTION BY 2D PLANIMETRY (MOD):  Normal Ranges:  EF-A4C View:    24 % (>=55%)  EF-A2C View:    19 %  EF-Biplane:     22 %  LV EF Reported: 22 %      LV DIASTOLIC FUNCTION:            Normal Ranges:  MV Peak E:             1.14 m/s   (0.7-1.2 m/s)  MV e'                  0.061 m/s  (>8.0)  MV lateral e'          0.07 m/s  MV medial e'           0.05 m/s  E/e' Ratio:            18.70      (<8.0)  PulmV Sys Alfredito:         49.90 cm/s  PulmV Gray Alfredito:        54.10 cm/s  PulmV S/D Alfredito:         0.90      RIGHT VENTRICLE:  TAPSE: 10.4 mm  RV s'  0.04 m/s      TRICUSPID VALVE/RVSP:          Normal Ranges:  Peak TR Velocity:     2.39 m/s  RV Syst Pressure:     26 mmHg  (< 30mmHg)      Pulmonary Veins:  PulmV Gray Alfredito: 54.10 cm/s  PulmV S/D Alfredito:  0.90  PulmV Sys Alfredito:  49.90 cm/s      [id number] Clari Geronimo MD  Electronically signed on 9/25/2024 at 7:18:07 PM        ** Final **      Guideline-Directed Medical Therapy:  -ARNI: Yes, describe: Entresto  mg BID  -Beta Blocker: Yes, describe: Metoprolol succinate 100 mg 2 tablets (200 mg) every day   -MRA: Yes, describe: Eplerenone 25 mg every day   -SGLT2i: Yes, describe: Jardiance 10 mg every day     Secondary Prevention:  -The ASCVD Risk score (Don DK, et al., 2019) failed to calculate for the following reasons:    Risk score cannot be calculated because patient has a medical history suggesting prior/existing ASCVD   -Aspirin 81mg? yes  -Statin?: Yes, describe: Atorvastatin 80 mg every day   -HTN?: Yes, describe: 153/92 as of 4/28/2025    CURRENT PHARMACOTHERAPY:   Jardiance 10 mg every day   Eplerenone 50 mg every day   Metoprolol succinate 100 mg 2 tablets (200 mg) every day   Entresto  mg BID   Torsemide 20 mg 2 tablets  daily PRN     Affordability:  PAP approved through 12/4/2025  Adherence/Compliance: no concerns  Adverse Effects: none; will have some lightheadedness once in awhile but this is not common or bothersome per patient    Monitoring Weights at Home: Yes, weighs self daily and keeps a log  Home Weight Recordings: 229 lbs (no acute fluctuations; typically between 228-232 lbs)    Past In Office Weight Readings:   Wt Readings from Last 6 Encounters:   04/28/25 105 kg (231 lb)   03/18/25 106 kg (233 lb 14.5 oz)   02/26/25 104 kg (229 lb)   02/21/25 99.8 kg (220 lb)   01/27/25 101 kg (222 lb)   12/31/24 98.9 kg (218 lb)       Monitoring Blood Pressure at Home: No, confirms he has a BP monitor at home with arm cuff  Home BP Recordings: None; reports BP of 130/80 at recent appointment     Past In Office BP Readings:   BP Readings from Last 6 Encounters:   04/28/25 (!) 153/92   03/19/25 (!) 145/91   02/26/25 122/76   01/27/25 131/90   12/31/24 126/68   12/21/24 108/76       HEALTH MANAGEMENT    Maintaining fluid restriction (<2 L/day): N/A  Edema/swelling: No  Shortness of breath: No  Trouble sleeping/lying down: No  Dry/hacking cough: No   Recent Hospitalizations: Yes, describe: 3/18-3/19 for ICD BIV Implant    EDUCATION/COUNSELING:   - Counseled patient on MOA, expectations, duration of therapy, contraindications, administration, and monitoring parameters  - Counseled patient on lifestyle modifications that can decrease your risk of having complications (smoking cessation, losing weight, daily weights, vaccines)  - Counseled patient on fluid intake and weight management. Recommended to not consume more than 2 liters of fliuids per day. If they have gained more than 2-3 pounds within a 24 hours period (or 5 pounds in a week), contact their cardiologist  - Counseled patient of side effects that are indicative of bleeding such as dark tarry stool, unexplainable bruising, or vomiting up a coffee ground like substance  -  Discussed calling provider for any falls due to increased risk of bleeding with anticoagulation.  - Answered all patient questions and concerns       DISCUSSION/NOTES:   Today's appointment was 3 month follow up regarding anticoagulation and heart failure pharmacotherapy.   David reports compliance to Eliquis, denies any abnormal bruising or bleedings. No recent falls/injuries.   No symptoms of worsening heart failure reported at today's appointment.   Discussed monitoring home BP readings which the patient is agreeable to, as well as BP goal of <130/80.  No changes today, will follow up in 3 months.    ASSESSMENT:    Assessment/Plan   Problem List Items Addressed This Visit       Paroxysmal atrial fibrillation (Multi)    David continues on anticoagulation with Eliquis. No dosage adjustment required for age, weight, and renal function.         Relevant Orders    Referral to Clinical Pharmacy    HFrEF (heart failure with reduced ejection fraction)    David is prescribed 4/4 GDMT at optimized doses. Will continue current regimen and follow up in 3 months.         Relevant Orders    Referral to Clinical Pharmacy       RECOMMENDATIONS/PLAN:    Continue:  Jardiance 10 mg every day   Eplerenone 50 mg every day   Metoprolol succinate 100 mg 2 tablets (200 mg) every day   Entresto  mg BID   Torsemide 20 mg 2 tablets daily PRN   Follow up: 3 months      Last Appnt with Referring Provider: 4/28/2025  Next Appnt with Referring Provider: 8/4/2025  Clinical Pharmacist follow up: 8/18/2025  VAF/Application Expiration: Yes    Date: 12/4/2025  Type of Encounter: Seb See PharmD    Verbal consent to manage patient's drug therapy was obtained from the patient . They were informed they may decline to participate or withdraw from participation in pharmacy services at any time.    Continue all meds under the continuation of care with the referring provider and clinical pharmacy team.

## 2025-05-13 ENCOUNTER — CLINICAL SUPPORT (OUTPATIENT)
Dept: CARDIAC REHAB | Facility: CLINIC | Age: 70
End: 2025-05-13
Payer: MEDICARE

## 2025-05-13 DIAGNOSIS — Z95.2 S/P AORTIC VALVE REPLACEMENT AND AORTOPLASTY: ICD-10-CM

## 2025-05-13 PROCEDURE — 93798 PHYS/QHP OP CAR RHAB W/ECG: CPT | Performed by: INTERNAL MEDICINE

## 2025-05-13 NOTE — PROGRESS NOTES
Cardiac Rehab Education  David Chatman   [id number]    5/13/2025  Education on cholesterol was done during today's session. Discussed with patient the different types of cholesterol, risk factors and how to make lifestyle modifications to improve levels. Handout and patient's recent lab results were given for home review.                Signature Zeina Seo RN

## 2025-05-16 ENCOUNTER — CLINICAL SUPPORT (OUTPATIENT)
Dept: CARDIAC REHAB | Facility: CLINIC | Age: 70
End: 2025-05-16
Payer: MEDICARE

## 2025-05-16 DIAGNOSIS — Z95.2 S/P AORTIC VALVE REPLACEMENT AND AORTOPLASTY: ICD-10-CM

## 2025-05-16 PROCEDURE — 93798 PHYS/QHP OP CAR RHAB W/ECG: CPT | Performed by: INTERNAL MEDICINE

## 2025-05-20 ENCOUNTER — CLINICAL SUPPORT (OUTPATIENT)
Dept: CARDIAC REHAB | Facility: CLINIC | Age: 70
End: 2025-05-20
Payer: MEDICARE

## 2025-05-20 DIAGNOSIS — Z95.2 S/P AORTIC VALVE REPLACEMENT AND AORTOPLASTY: ICD-10-CM

## 2025-05-20 PROCEDURE — 93798 PHYS/QHP OP CAR RHAB W/ECG: CPT | Performed by: INTERNAL MEDICINE

## 2025-05-20 NOTE — PROGRESS NOTES
Cardiac Rehab Education  David Chatman   [id number]    5/20/2025  Education on [place]-3 fatty acids was done during today's session. Discussed with patient sources and benefits of omega-3 fatty acids to cholesterol and heart health. Handouts were given for home reference and review.      Signature Zeina Seo RN

## 2025-05-23 ENCOUNTER — CLINICAL SUPPORT (OUTPATIENT)
Dept: CARDIAC REHAB | Facility: CLINIC | Age: 70
End: 2025-05-23
Payer: MEDICARE

## 2025-05-23 DIAGNOSIS — Z95.2 S/P AORTIC VALVE REPLACEMENT AND AORTOPLASTY: ICD-10-CM

## 2025-05-23 PROCEDURE — 93798 PHYS/QHP OP CAR RHAB W/ECG: CPT | Performed by: INTERNAL MEDICINE

## 2025-05-27 ENCOUNTER — DOCUMENTATION (OUTPATIENT)
Dept: CARDIAC REHAB | Facility: CLINIC | Age: 70
End: 2025-05-27
Payer: MEDICARE

## 2025-05-27 ENCOUNTER — CLINICAL SUPPORT (OUTPATIENT)
Dept: CARDIAC REHAB | Facility: CLINIC | Age: 70
End: 2025-05-27
Payer: MEDICARE

## 2025-05-27 DIAGNOSIS — Z95.2 S/P AORTIC VALVE REPLACEMENT AND AORTOPLASTY: ICD-10-CM

## 2025-05-27 PROCEDURE — 93798 PHYS/QHP OP CAR RHAB W/ECG: CPT | Performed by: INTERNAL MEDICINE

## 2025-05-27 NOTE — PROGRESS NOTES
Cardiac Rehabilitation 180 day progress report    Name: David Chatman  Medical Record Number: [id number]  YOB: 1955  Age: 69 y.o.    Today’s Date: 1/17/2025  Primary Care Physician: Amandeep Vaca DO  Referring Physician: Yomi Buck MD  Program Location: [address]  Primary Diagnosis:   1. S/P aortic valve replacement and aortoplasty  Referral to Cardiac Rehab         Onset/Date of Diagnosis: 9/23/24  Session #:  16  AACVPR Risk Stratification: High  Falls Risk: Low  Psychosocial Assessment   Pre PHQ-9: 0  Sent PH-Q 9 to MD if score > 20: No; score < 20  Pt reported/currently experiencing stress: No  Patient uses stress management skills: No   History of: no history of anxiety or depression  Currently seeing a mental health provider: No  Social Support: Yes, Whom:Spouse and family  Learning Assessment:  Learning assessment/barriers: None  Preferred learning method: Visual  Barriers: None  Comments:  Stages of Change: Action    Psychosocial Plan  Goal Status: In Progress  Psychosocial Goals: Maintain or lower PH-Q 9 score by discharge  Psychosocial Interventions/Education:   11/6/24 initial PHQ-9 survey reviewed w/ pt at eval/MS  5/6/25 No psychosocial concerns voiced. Has strong social system in place from family/mk      Nutrition Assessment:    Hyperlipidemia: Yes     Lipids:   Lab Results   Component Value Date    CHOL 118 12/06/2024    HDL 32.6 12/06/2024    TRIG 82 12/06/2024       Current Dietary Guidelines: no special, watches sodium  Barriers to dietary change: no    Diet Habit Survey: Picture Your Plate  Pre: 48  Post: To be done at discharge.    Diabetes Assessment    Lab Results   Component Value Date    HGBA1C 6.1 (H) 12/06/2024       History of Diabetes: No    Weight Management    Height: 182.9 cm (6')  Weight:  230 lbs  BMI (Calculated): 31.1    Nutrition Plan  Goal Status: In Progress  Nutrition Goals: Adapt a low-sodium, DASH diet prior to  discharge and Learn how to read and interpret nutrition labels prior to discharge  Nutrition Interventions/Education:   11/12/2024  Reviewed PYP with patient. PYP score 48. Encouraged pt to add 1 fruit and vegetable per day. Also discussed decreasing processed foods and sugar in coffee. Pt has already decreased the processed meats in his diet and his wife is trying to make lower sodium soups. He struggles to eat when he is by himself. Usually uses more processed foods when having to cook for himself. Pt has been buying a high protein soup. Encouraged him to take a picture of the label and bring in it so we can look at it together. Encouraged to further read PYP recommendations at home and follow up with questions as needed. Suggested pt bring wife to nutrition education lectures./Castro Rankin RDN, LD  1/15/2024 Education on sodium intake was done. Discussed with patient the risks of excess levels of sodium and how to decrease dietary intake with provided list of substitutions. Handouts were given for home reference. /Castro Seo RN    5/13/2025  Education on cholesterol was done during today's session. Discussed with patient the different types of cholesterol, risk factors and how to make lifestyle modifications to improve levels. Handout and patient's recent lab results were given for home review. Castro Seo RN  5/20/2025  Education on [place]-3 fatty acids was done during today's session. Discussed with patient sources and benefits of omega-3 fatty acids to cholesterol and heart health. Handouts were given for home reference and review.  Castro Seo RN          Exercise Assessment    Home exercise:  No  Mode: NA  Frequency: NA  Duration: NA    Exercise Prescription     Exercise Prescription based on: current ex rx    DASI Score: 34.7   MET Score: 7   Frequency:  3 days/week   Mode: Treadmill, NuStep, and Recumbent Cycle   Duration: 33 total aerobic  minutes   Intensity: RPE 12-14  Target HR:  To be calculated after 6 attended sessions.  MET Level: 3.7  Patient wears supplemental O2: No     Modality Workload METs Duration (minutes)   1 Pre-Exercise   4:00   2 Treadmill 2.2 mph @ 1% 3.0 12 :00   3 NuStep Load 5 @ 70 bennett  2.7 12 :00   4 Recumbent Bike Load 5 @ 60 rpm  3.7 12 :00   5 Schwinn Airdyne Load 1.3  2.9 12 :00   6 Post-Exercise   4:00     Resistance Training: No   Home Exercise Prescription given: To be given prior to discharge from program.    Exercise Plan  Goal Status: In Progress  Exercise Goals: Increase exercise MET level by 5-10% each week, Increase total exercise duration to 30-45 minutes, and Establish a home exercise program before discharge  Exercise Interventions/Education:   5/23/25 Pt has been encouraged to add 1-2 days of home exercise./mk        Other Core Components/Risk Factor Assessment:    Medication adherence  Current Medications:   Medication Documentation Review Audit       Reviewed by Amandeep Vaca DO (Physician) on 12/31/24 at 0942      Medication Order Taking? Sig Documenting Provider Last Dose Status     Discontinued 12/30/24 1016   apixaban (Eliquis) 5 mg tablet [id number] Yes Take 1 tablet (5 mg) by mouth 2 times a day. Yomi Buck MD  Active   atorvastatin (Lipitor) 80 mg tablet [id number] Yes Take 1 tablet (80 mg) by mouth once daily at bedtime. Yomi Buck MD 12/7/2024 Active   empagliflozin (Jardiance) 10 mg [id number] No Take 1 tablet (10 mg) by mouth once daily.   Patient not taking: Reported on 12/31/2024    Yomi Buck MD 12/8/2024 Active   eplerenone (Inspra) 25 mg tablet [id number] Yes Take 1 tablet (25 mg) by mouth once daily. Yomi Buck MD 12/8/2024 Active   metoprolol succinate XL (Toprol-XL) 100 mg 24 hr tablet [id number] Yes Take 1.5 tablets (150 mg) by mouth once daily. Do not crush or chew. Yomi Buck MD 12/8/2024 Active   sacubitriL-valsartan (Entresto) 24-26  mg tablet [id number] No Take 1 tablet by mouth 2 times a day.   Patient not taking: Reported on 12/31/2024    Yomi Buck MD 12/8/2024 Active   torsemide (Demadex) 20 mg tablet [id number] Yes Take 2 tablets (40 mg) by mouth once daily. As needed daily for peripheral edema. Francisco Angeles MD 12/8/2024 Active                                 Medication compliance: Not taking entresto and jardiance due to cost   Uses pill box/organizer: No    Carries medication list: No     Blood Pressure Management  History of Hypertension: Yes   Medication Changes: No   Resting BP:  Visit Vitals  BP Systolic blood pressure range 120-140's. Improves on cool down.        Heart Failure Management  Hx of Heart Failure: current diagnosis  Type (selection): HFrEF  Most recent EF: 22  Onset of heart failure diagnosis: 9/23/24  Last heart failure hospitalization: 9/23/24  Number of HF admissions per year: 1  Symptoms: Fatigue with exertion  Is there a family Hx of HF: No   Does patient obtain daily weight Yes       Heart Failure Reassessment: In Progress  Heart Failure Goals: Able to verbalize signs and symptoms of fluid retention and when to contact MD, Adapt a low sodium diet and verbalize guidelines, and Obtain daily weight and verbalize proper method of obtaining weights  Smoking/Tobacco Assessment  Social History     Tobacco Use   Smoking Status Never   Smokeless Tobacco Never       Other Core Component Plan  Goal Status:In progress  Other Core Component Goals: Medication compliance, Verbalize medication usage and drug actions by discharge, and Achieve resting BP of < 130/80 by discharge  Other Core Component Interventions/Education:   5/9/2025  Blood pressure education was done. How to read measurement numbers, ways to decrease, and risks of not managing blood pressure were discussed. Handouts were given for home reference and patient was encouraged to return with any questions.  Signature Zeina Seo RN  5/13/25  Discussed the importance of daily weights and low sodium diet for blood pressure improvement/mk        Individual Patient Goals:    Decrease fluid in lungs by discharge -Met  Increase heart function on next echo-repeat echo 7/28/25    Goal Status: In Progress    Staff Comments:  Mr Chatman has return to rehab after being absent for a few months due to a CVA. He has committed to 2 days a week due to busy lifestyle. Nursing assessment completed, lungs CTA. No cardiac concerns voiced.

## 2025-05-30 ENCOUNTER — CLINICAL SUPPORT (OUTPATIENT)
Dept: CARDIAC REHAB | Facility: CLINIC | Age: 70
End: 2025-05-30
Payer: MEDICARE

## 2025-05-30 DIAGNOSIS — Z95.2 S/P AORTIC VALVE REPLACEMENT AND AORTOPLASTY: ICD-10-CM

## 2025-05-30 PROCEDURE — 93798 PHYS/QHP OP CAR RHAB W/ECG: CPT | Performed by: INTERNAL MEDICINE

## 2025-05-30 NOTE — PROGRESS NOTES
Cardiac Rehab Education  David Chatman   [id number]    5/30/2025  Education done on dining out. Dining out handout given for further review at home. Encouraged to follow up with questions as needed     Signature Zeina Seo RN

## 2025-06-03 ENCOUNTER — CLINICAL SUPPORT (OUTPATIENT)
Dept: CARDIAC REHAB | Facility: CLINIC | Age: 70
End: 2025-06-03
Payer: MEDICARE

## 2025-06-03 DIAGNOSIS — Z95.2 S/P AORTIC VALVE REPLACEMENT AND AORTOPLASTY: ICD-10-CM

## 2025-06-03 PROCEDURE — 93798 PHYS/QHP OP CAR RHAB W/ECG: CPT | Performed by: INTERNAL MEDICINE

## 2025-06-06 ENCOUNTER — CLINICAL SUPPORT (OUTPATIENT)
Dept: CARDIAC REHAB | Facility: CLINIC | Age: 70
End: 2025-06-06
Payer: MEDICARE

## 2025-06-06 DIAGNOSIS — Z95.2 S/P AORTIC VALVE REPLACEMENT AND AORTOPLASTY: ICD-10-CM

## 2025-06-06 PROCEDURE — 93798 PHYS/QHP OP CAR RHAB W/ECG: CPT | Performed by: INTERNAL MEDICINE

## 2025-06-10 ENCOUNTER — CLINICAL SUPPORT (OUTPATIENT)
Dept: CARDIAC REHAB | Facility: CLINIC | Age: 70
End: 2025-06-10
Payer: MEDICARE

## 2025-06-10 DIAGNOSIS — Z95.2 S/P AORTIC VALVE REPLACEMENT AND AORTOPLASTY: ICD-10-CM

## 2025-06-10 PROCEDURE — 93798 PHYS/QHP OP CAR RHAB W/ECG: CPT | Performed by: INTERNAL MEDICINE

## 2025-06-13 ENCOUNTER — APPOINTMENT (OUTPATIENT)
Dept: CARDIAC REHAB | Facility: CLINIC | Age: 70
End: 2025-06-13
Payer: MEDICARE

## 2025-06-17 ENCOUNTER — APPOINTMENT (OUTPATIENT)
Dept: CARDIAC REHAB | Facility: CLINIC | Age: 70
End: 2025-06-17
Payer: MEDICARE

## 2025-06-17 ENCOUNTER — DOCUMENTATION (OUTPATIENT)
Dept: CARDIAC REHAB | Facility: CLINIC | Age: 70
End: 2025-06-17
Payer: MEDICARE

## 2025-06-17 NOTE — PROGRESS NOTES
Cardiac Rehabilitation 210 day progress report    Name: David Chatman  Medical Record Number: [id number]  YOB: 1955  Age: 69 y.o.    Today’s Date: 1/17/2025  Primary Care Physician: Amandeep Vaca DO  Referring Physician: Yomi Buck MD  Program Location: [address]  Primary Diagnosis:   1. S/P aortic valve replacement and aortoplasty  Referral to Cardiac Rehab         Onset/Date of Diagnosis: 9/23/24  Session #:  21  AACVPR Risk Stratification: High  Falls Risk: Low  Psychosocial Assessment   Pre PHQ-9: 0  Sent PH-Q 9 to MD if score > 20: No; score < 20  Pt reported/currently experiencing stress: No  Patient uses stress management skills: No   History of: no history of anxiety or depression  Currently seeing a mental health provider: No  Social Support: Yes, Whom:Spouse and family  Learning Assessment:  Learning assessment/barriers: None  Preferred learning method: Visual  Barriers: None  Comments:  Stages of Change: Action    Psychosocial Plan  Goal Status: In Progress  Psychosocial Goals: Maintain or lower PH-Q 9 score by discharge  Psychosocial Interventions/Education:   11/6/24 initial PHQ-9 survey reviewed w/ pt at eval/MS  5/6/25 No psychosocial concerns voiced. Has strong social system in place from family/mk      Nutrition Assessment:    Hyperlipidemia: Yes     Lipids:   Lab Results   Component Value Date    CHOL 118 12/06/2024    HDL 32.6 12/06/2024    TRIG 82 12/06/2024       Current Dietary Guidelines: no special, watches sodium  Barriers to dietary change: no    Diet Habit Survey: Picture Your Plate  Pre: 48  Post: To be done at discharge.    Diabetes Assessment    Lab Results   Component Value Date    HGBA1C 6.1 (H) 12/06/2024       History of Diabetes: No    Weight Management    Height: 182.9 cm (6')  Weight:  230 lbs  BMI (Calculated): 31.1    Nutrition Plan  Goal Status: In Progress  Nutrition Goals: Adapt a low-sodium, DASH diet prior to  discharge and Learn how to read and interpret nutrition labels prior to discharge  Nutrition Interventions/Education:   11/12/2024  Reviewed PYP with patient. PYP score 48. Encouraged pt to add 1 fruit and vegetable per day. Also discussed decreasing processed foods and sugar in coffee. Pt has already decreased the processed meats in his diet and his wife is trying to make lower sodium soups. He struggles to eat when he is by himself. Usually uses more processed foods when having to cook for himself. Pt has been buying a high protein soup. Encouraged him to take a picture of the label and bring in it so we can look at it together. Encouraged to further read PYP recommendations at home and follow up with questions as needed. Suggested pt bring wife to nutrition education lectures./Castro Rankin RDN, LD  1/15/2024 Education on sodium intake was done. Discussed with patient the risks of excess levels of sodium and how to decrease dietary intake with provided list of substitutions. Handouts were given for home reference. /Castro Seo RN    5/13/2025  Education on cholesterol was done during today's session. Discussed with patient the different types of cholesterol, risk factors and how to make lifestyle modifications to improve levels. Handout and patient's recent lab results were given for home review. Castro Seo RN  5/20/2025  Education on [place]-3 fatty acids was done during today's session. Discussed with patient sources and benefits of omega-3 fatty acids to cholesterol and heart health. Handouts were given for home reference and review.  Castro Seo RN  5/30/2025  Education done on dining out. Dining out handout given for further review at home. Encouraged to follow up with questions as needed   Castro Seo RN  6/6/2025  Label reading education was done with program's RDN during today's visit. Label Reading Highlights handout was given  with information discussed during appointment and to assist in review of label contents at home.    Signature Payal Rankin, RDN, LD        Exercise Assessment    Home exercise:  No  Mode: NA  Frequency: NA  Duration: NA    Exercise Prescription     Exercise Prescription based on: current ex rx    DASI Score: 34.7   MET Score: 7   Frequency:  3 days/week   Mode: Treadmill, NuStep, and Recumbent Cycle   Duration: 42 total aerobic minutes   Intensity: RPE 12-14  Target HR:  To be calculated after 6 attended sessions.  MET Level: 3.8  Patient wears supplemental O2: No     Modality Workload METs Duration (minutes)   1 Pre-Exercise   4:00   2 Treadmill 2.3 mph @ 1% 3.1 14 :00   3 NuStep Load 7 @ 60 bennett  2.5 14 :00   4 Recumbent Bike Load 6 @ 60 rpm  3.8 14 :00   5 Elliptical Load 1  2.5 14 :00   6 Post-Exercise   4:00     Resistance Training: No   Home Exercise Prescription given: To be given prior to discharge from program.    Exercise Plan  Goal Status: In Progress  Exercise Goals: Increase exercise MET level by 5-10% each week, Increase total exercise duration to 30-45 minutes, and Establish a home exercise program before discharge  Exercise Interventions/Education:   5/23/25 Pt has been encouraged to add 1-2 days of home exercise./mk        Other Core Components/Risk Factor Assessment:    Medication adherence  Current Medications:   Medication Documentation Review Audit       Reviewed by Amandeep Vaca DO (Physician) on 12/31/24 at 0942      Medication Order Taking? Sig Documenting Provider Last Dose Status     Discontinued 12/30/24 1016   apixaban (Eliquis) 5 mg tablet [id number] Yes Take 1 tablet (5 mg) by mouth 2 times a day. Yomi Buck MD  Active   atorvastatin (Lipitor) 80 mg tablet [id number] Yes Take 1 tablet (80 mg) by mouth once daily at bedtime. Yomi Buck MD 12/7/2024 Active   empagliflozin (Jardiance) 10 mg [id number] No Take 1 tablet (10 mg) by mouth once daily.   Patient not  taking: Reported on 12/31/2024    Yomi Buck MD 12/8/2024 Active   eplerenone (Inspra) 25 mg tablet [id number] Yes Take 1 tablet (25 mg) by mouth once daily. Yomi Buck MD 12/8/2024 Active   metoprolol succinate XL (Toprol-XL) 100 mg 24 hr tablet [id number] Yes Take 1.5 tablets (150 mg) by mouth once daily. Do not crush or chew. Yomi Buck MD 12/8/2024 Active   sacubitriL-valsartan (Entresto) 24-26 mg tablet [id number] No Take 1 tablet by mouth 2 times a day.   Patient not taking: Reported on 12/31/2024    Yomi Buck MD 12/8/2024 Active   torsemide (Demadex) 20 mg tablet [id number] Yes Take 2 tablets (40 mg) by mouth once daily. As needed daily for peripheral edema. Francisco Angeles MD 12/8/2024 Active                                 Medication compliance: Not taking entresto and jardiance due to cost   Uses pill box/organizer: No    Carries medication list: No     Blood Pressure Management  History of Hypertension: Yes   Medication Changes: No   Resting BP:  Visit Vitals  BP Systolic blood pressure range 120-140's. Improves on cool down.        Heart Failure Management  Hx of Heart Failure: current diagnosis  Type (selection): HFrEF  Most recent EF: 22  Onset of heart failure diagnosis: 9/23/24  Last heart failure hospitalization: 9/23/24  Number of HF admissions per year: 1  Symptoms: Fatigue with exertion  Is there a family Hx of HF: No   Does patient obtain daily weight Yes       Heart Failure Reassessment: In Progress  Heart Failure Goals: Able to verbalize signs and symptoms of fluid retention and when to contact MD, Adapt a low sodium diet and verbalize guidelines, and Obtain daily weight and verbalize proper method of obtaining weights  Smoking/Tobacco Assessment  Social History     Tobacco Use   Smoking Status Never   Smokeless Tobacco Never       Other Core Component Plan  Goal Status:In progress  Other Core Component Goals: Medication compliance, Verbalize  medication usage and drug actions by discharge, and Achieve resting BP of < 130/80 by discharge  Other Core Component Interventions/Education:   5/9/2025  Blood pressure education was done. How to read measurement numbers, ways to decrease, and risks of not managing blood pressure were discussed. Handouts were given for home reference and patient was encouraged to return with any questions.  Signature Zeina Seo, RN  5/13/25 Discussed the importance of daily weights and low sodium diet for blood pressure improvement/mk        Individual Patient Goals:    Decrease fluid in lungs by discharge -Met  Increase heart function on next echo-repeat echo 7/28/25    Goal Status: In Progress    Staff Comments:  Mr. Chatman has been attending his cardiac rehab exercise sessions twice per week as planned.  His progress on his outcomes and goals remains steady.  No cardiac complaints voiced.    Staff Signature:  Lubna Ewing, MS, CCRP

## 2025-06-20 ENCOUNTER — CLINICAL SUPPORT (OUTPATIENT)
Dept: CARDIAC REHAB | Facility: CLINIC | Age: 70
End: 2025-06-20
Payer: MEDICARE

## 2025-06-20 DIAGNOSIS — Z95.2 S/P AORTIC VALVE REPLACEMENT AND AORTOPLASTY: ICD-10-CM

## 2025-06-20 PROCEDURE — 93798 PHYS/QHP OP CAR RHAB W/ECG: CPT | Performed by: INTERNAL MEDICINE

## 2025-06-20 NOTE — PROGRESS NOTES
Cardiac Rehab Education  David Chatman   [id number]    6/20/2025  Exercise prescription and maintenance education was done during today's session. Discussed FITT principle, reviewed perceived exertion scale, and patient's THR was given for home practice with instructions on how to obtain. Handouts were given for personal reference. Home exercise prescription is to be given prior to discharge from program.      Signature Zeina Seo RN

## 2025-06-24 ENCOUNTER — CLINICAL SUPPORT (OUTPATIENT)
Dept: CARDIAC REHAB | Facility: CLINIC | Age: 70
End: 2025-06-24
Payer: MEDICARE

## 2025-06-24 DIAGNOSIS — Z95.2 S/P AORTIC VALVE REPLACEMENT AND AORTOPLASTY: ICD-10-CM

## 2025-06-24 PROCEDURE — 93798 PHYS/QHP OP CAR RHAB W/ECG: CPT | Performed by: INTERNAL MEDICINE

## 2025-06-27 ENCOUNTER — CLINICAL SUPPORT (OUTPATIENT)
Dept: CARDIAC REHAB | Facility: CLINIC | Age: 70
End: 2025-06-27
Payer: MEDICARE

## 2025-06-27 DIAGNOSIS — Z95.2 S/P AORTIC VALVE REPLACEMENT AND AORTOPLASTY: ICD-10-CM

## 2025-06-27 PROCEDURE — 93798 PHYS/QHP OP CAR RHAB W/ECG: CPT | Performed by: INTERNAL MEDICINE

## 2025-06-27 NOTE — PROGRESS NOTES
Cardiac Rehab Education  David Chatman   [id number]    6/27/2025  Effects of stress on the body, risks of unmanaged stress and techniques to minimize anxieties were all discussed in today's session. Handout was given for reference with resources to  [facility] should patient need further assistance with stress management. Patient encouraged to notify staff if stressors increase and additional help is required.      Signature Zeina Seo RN

## 2025-07-01 ENCOUNTER — CLINICAL SUPPORT (OUTPATIENT)
Dept: CARDIAC REHAB | Facility: CLINIC | Age: 70
End: 2025-07-01
Payer: MEDICARE

## 2025-07-01 DIAGNOSIS — Z95.2 S/P AORTIC VALVE REPLACEMENT AND AORTOPLASTY: ICD-10-CM

## 2025-07-01 PROCEDURE — 93798 PHYS/QHP OP CAR RHAB W/ECG: CPT | Performed by: INTERNAL MEDICINE

## 2025-07-02 PROCEDURE — RXMED WILLOW AMBULATORY MEDICATION CHARGE

## 2025-07-03 ENCOUNTER — PHARMACY VISIT (OUTPATIENT)
Dept: PHARMACY | Facility: CLINIC | Age: 70
End: 2025-07-03
Payer: COMMERCIAL

## 2025-07-08 ENCOUNTER — CLINICAL SUPPORT (OUTPATIENT)
Dept: CARDIAC REHAB | Facility: CLINIC | Age: 70
End: 2025-07-08
Payer: MEDICARE

## 2025-07-08 DIAGNOSIS — Z95.2 S/P AORTIC VALVE REPLACEMENT AND AORTOPLASTY: ICD-10-CM

## 2025-07-08 PROCEDURE — 93798 PHYS/QHP OP CAR RHAB W/ECG: CPT | Performed by: INTERNAL MEDICINE

## 2025-07-09 ENCOUNTER — APPOINTMENT (OUTPATIENT)
Dept: CARDIOLOGY | Facility: CLINIC | Age: 70
End: 2025-07-09
Payer: MEDICARE

## 2025-07-09 DIAGNOSIS — I25.5 ISCHEMIC CARDIOMYOPATHY: ICD-10-CM

## 2025-07-09 PROCEDURE — 93284 PRGRMG EVAL IMPLANTABLE DFB: CPT | Performed by: INTERNAL MEDICINE

## 2025-07-11 ENCOUNTER — CLINICAL SUPPORT (OUTPATIENT)
Dept: CARDIAC REHAB | Facility: CLINIC | Age: 70
End: 2025-07-11
Payer: MEDICARE

## 2025-07-11 ENCOUNTER — DOCUMENTATION (OUTPATIENT)
Dept: CARDIAC REHAB | Facility: CLINIC | Age: 70
End: 2025-07-11

## 2025-07-11 DIAGNOSIS — Z95.2 S/P AORTIC VALVE REPLACEMENT AND AORTOPLASTY: ICD-10-CM

## 2025-07-11 PROCEDURE — 93798 PHYS/QHP OP CAR RHAB W/ECG: CPT | Performed by: INTERNAL MEDICINE

## 2025-07-11 NOTE — PROGRESS NOTES
Cardiac Rehabilitation Discharge Report    Name: David Chatman  Medical Record Number: [id number]  YOB: 1955  Age: 69 y.o.    Today’s Date: 1/17/2025  Primary Care Physician: Amandeep Vaca DO  Referring Physician: Yomi Buck MD  Program Location: [address]  Primary Diagnosis:   1. S/P aortic valve replacement and aortoplasty  Referral to Cardiac Rehab         Onset/Date of Diagnosis: 9/23/24  Session #:  27  AACVPR Risk Stratification: High  Falls Risk: Low  Psychosocial Assessment   Pre PHQ-9: 0  Post PHQ-9: 1  Sent PH-Q 9 to MD if score > 20: No; score < 20  Pt reported/currently experiencing stress: No  Patient uses stress management skills: No   History of: no history of anxiety or depression  Currently seeing a mental health provider: No  Social Support: Yes, Whom:Spouse and family  Learning Assessment:  Learning assessment/barriers: None  Preferred learning method: Visual  Barriers: None  Comments:  Stages of Change: Action    Psychosocial Plan  Goal Status: Met  Psychosocial Goals: Maintain or lower PH-Q 9 score by discharge  Psychosocial Interventions/Education:   11/6/24 initial PHQ-9 survey reviewed w/ pt at eval/MS  5/6/25 No psychosocial concerns voiced. Has strong social system in place from family/  6/27/2025  Effects of stress on the body, risks of unmanaged stress and techniques to minimize anxieties were all discussed in today's session. Handout was given for reference with resources to  [facility] should patient need further assistance with stress management. Patient encouraged to notify staff if stressors increase and additional help is required.  Signature Zeina Seo RN  7/11/25 Post PHQ-9 score 1. No depressive symptoms voiced. Has strong social support from family/    Nutrition Assessment:    Hyperlipidemia: Yes     Lipids:   Lab Results   Component Value Date    CHOL 118 12/06/2024    HDL 32.6 12/06/2024     TRIG 82 12/06/2024       Current Dietary Guidelines: no special, watches sodium  Barriers to dietary change: no    Diet Habit Survey: Picture Your Plate  Pre: 48  Post: 48    Diabetes Assessment    Lab Results   Component Value Date    HGBA1C 6.1 (H) 12/06/2024       History of Diabetes: No    Weight Management    Height: 182.9 cm (6')  Weight:  232 lbs  BMI (Calculated): 31.3    Nutrition Plan  Goal Status: In Progress  Nutrition Goals: Adapt a low-sodium, DASH diet prior to discharge and Learn how to read and interpret nutrition labels prior to discharge  Nutrition Interventions/Education:   11/12/2024  Reviewed PYP with patient. PYP score 48. Encouraged pt to add 1 fruit and vegetable per day. Also discussed decreasing processed foods and sugar in coffee. Pt has already decreased the processed meats in his diet and his wife is trying to make lower sodium soups. He struggles to eat when he is by himself. Usually uses more processed foods when having to cook for himself. Pt has been buying a high protein soup. Encouraged him to take a picture of the label and bring in it so we can look at it together. Encouraged to further read PYP recommendations at home and follow up with questions as needed. Suggested pt bring wife to nutrition education lectures./Signature Payal Rankin RDN, LD  1/15/2024 Education on sodium intake was done. Discussed with patient the risks of excess levels of sodium and how to decrease dietary intake with provided list of substitutions. Handouts were given for home reference. /Signature Zeina Seo RN    5/13/2025  Education on cholesterol was done during today's session. Discussed with patient the different types of cholesterol, risk factors and how to make lifestyle modifications to improve levels. Handout and patient's recent lab results were given for home review. Signature Zeina Seo RN  5/20/2025  Education on [place]-3 fatty acids was done during today's session.  "Discussed with patient sources and benefits of omega-3 fatty acids to cholesterol and heart health. Handouts were given for home reference and review.  Castro Seo RN  5/30/2025  Education done on dining out. Dining out handout given for further review at home. Encouraged to follow up with questions as needed   Castro Seo RN  6/6/2025  Label reading education was done with program's RDN during today's visit. Label Reading Highlights handout was given with information discussed during appointment and to assist in review of label contents at home.    Castro Rankin RDN, LD    7/11/25 Post survey similar to initial survey.  Pt stated \"my diet is bad\" Encouraged to label read for sodium and fat. Discussed the effects of diet on blood pressure. Discussed maintaining/losing weight./mk    Exercise Assessment    Home exercise:  Yes  Mode: NA  Frequency: NA  Duration: NA    Exercise Prescription     Exercise Prescription based on: current ex rx    DASI Score: 34.7   MET Score: 7   Frequency:  3 days/week   Mode: Treadmill, NuStep, and Recumbent Cycle   Duration: 42 total aerobic minutes   Intensity: RPE 12-14  Target HR:  To be calculated after 6 attended sessions.  MET Level: 3.8  Patient wears supplemental O2: No     Modality Workload METs Duration (minutes)   1 Pre-Exercise   4:00   2 Treadmill 2.4 mph @ 1.5% 3.2 14 :00   3 NuStep Load 7 @ 60 bennett  2.5 14 :00   4 Recumbent Bike Load 6 @ 60 rpm  3.8 14 :00   5 Elliptical Load 2/Incline 4 2.5 6:00   6 Post-Exercise   4:00     Resistance Training: No   Home Exercise Prescription given: To be given prior to discharge from program.    Exercise Plan  Goal Status: Met  Exercise Goals: Increase exercise MET level by 5-10% each week, Increase total exercise duration to 30-45 minutes, and Establish a home exercise program before discharge  Exercise Interventions/Education:   5/23/25 Pt has been encouraged to add 1-2 days of home " exercise./mk  6/20/2025  Exercise prescription and maintenance education was done during today's session. Discussed FITT principle, reviewed perceived exertion scale, and patient's THR was given for home practice with instructions on how to obtain. Handouts were given for personal reference. Home exercise prescription is to be given prior to discharge from program.  Castro Seo RN   7/11/2025  Cardiovascular changes with exercise were discussed in today's session. Both immediate and long term effects of exercise were covered and the importance of cooling down post workout was reviewed.     Castro Seo RN  7/11/25 Plans to continue with home exercise. Walking weather permitting and using home elliptical. Reviewed home exercise rx. Reviewed the importance of continuing exercise. 150 minutes a week in THR. Verbalized understanding/mk          Other Core Components/Risk Factor Assessment:    Medication adherence  Current Medications:   Medication Documentation Review Audit       Reviewed by Amandeep Vaca DO (Physician) on 12/31/24 at 0942      Medication Order Taking? Sig Documenting Provider Last Dose Status     Discontinued 12/30/24 1016   apixaban (Eliquis) 5 mg tablet [id number] Yes Take 1 tablet (5 mg) by mouth 2 times a day. Yomi Buck MD  Active   atorvastatin (Lipitor) 80 mg tablet [id number] Yes Take 1 tablet (80 mg) by mouth once daily at bedtime. Yomi Buck MD 12/7/2024 Active   empagliflozin (Jardiance) 10 mg [id number] No Take 1 tablet (10 mg) by mouth once daily.   Patient not taking: Reported on 12/31/2024    Yomi Buck MD 12/8/2024 Active   eplerenone (Inspra) 25 mg tablet [id number] Yes Take 1 tablet (25 mg) by mouth once daily. Yomi Buck MD 12/8/2024 Active   metoprolol succinate XL (Toprol-XL) 100 mg 24 hr tablet [id number] Yes Take 1.5 tablets (150 mg) by mouth once daily. Do not crush or chew. Yomi Buck MD 12/8/2024  Active   sacubitriL-valsartan (Entresto) 24-26 mg tablet [id number] No Take 1 tablet by mouth 2 times a day.   Patient not taking: Reported on 12/31/2024    Yomi Buck MD 12/8/2024 Active   torsemide (Demadex) 20 mg tablet [id number] Yes Take 2 tablets (40 mg) by mouth once daily. As needed daily for peripheral edema. Francisco Angeles MD 12/8/2024 Active                                 Medication compliance: Not taking entresto and jardiance due to cost   Uses pill box/organizer: No    Carries medication list: No     Blood Pressure Management  History of Hypertension: Yes   Medication Changes: No   Resting BP:  Visit Vitals  BP Systolic blood pressure range 120-140's. Improves on cool down. Discussed the importance of low sodium diet        Heart Failure Management  Hx of Heart Failure: current diagnosis  Type (selection): HFrEF  Most recent EF: 22  Onset of heart failure diagnosis: 9/23/24  Last heart failure hospitalization: 9/23/24  Number of HF admissions per year: 1  Symptoms: Fatigue with exertion  Is there a family Hx of HF: No   Does patient obtain daily weight Yes       Heart Failure Reassessment: Met  Heart Failure Goals: Able to verbalize signs and symptoms of fluid retention and when to contact MD, Adapt a low sodium diet and verbalize guidelines, and Obtain daily weight and verbalize proper method of obtaining weights  Smoking/Tobacco Assessment  Social History     Tobacco Use   Smoking Status Never   Smokeless Tobacco Never       Other Core Component Plan  Goal Status:Met  Other Core Component Goals: Medication compliance, Verbalize medication usage and drug actions by discharge, and Achieve resting BP of < 130/80 by discharge  Other Core Component Interventions/Education:   5/9/2025  Blood pressure education was done. How to read measurement numbers, ways to decrease, and risks of not managing blood pressure were discussed. Handouts were given for home reference and patient was encouraged  to return with any questions.  Signature Zeina Seo RN  5/13/25 Discussed the importance of daily weights and low sodium diet for blood pressure improvement/mk  7/11/25 Weighs daily. Verbalizes S/S of fluid overload and when to contact physician. Taking torsemide prn. /mk      Individual Patient Goals:    Decrease fluid in lungs by discharge -Met  Increase heart function on next echo-repeat echo 7/28/25    Goal Status: In Progress    Staff Comments:  Mr. Chatman has been discharged from cardiac rehab. Monitor paced. No cardiac complaints voiced. Blood pressure 120's-130's systolic after resting for 5 minutes. Significant increase noted with exercise duration.

## 2025-07-11 NOTE — PROGRESS NOTES
Cardiac Rehab Education  David Chatman   [phone]    7/11/2025  Cardiovascular changes with exercise were discussed in today's session. Both immediate and long term effects of exercise were covered and the importance of cooling down post workout was reviewed.      Castro Seo RN

## 2025-07-14 DIAGNOSIS — I42.8 NON-ISCHEMIC CARDIOMYOPATHY (MULTI): ICD-10-CM

## 2025-07-14 DIAGNOSIS — I50.21 ACUTE SYSTOLIC HEART FAILURE: ICD-10-CM

## 2025-07-14 DIAGNOSIS — I48.0 PAROXYSMAL ATRIAL FIBRILLATION (MULTI): ICD-10-CM

## 2025-07-14 DIAGNOSIS — I50.20 HFREF (HEART FAILURE WITH REDUCED EJECTION FRACTION): ICD-10-CM

## 2025-07-14 DIAGNOSIS — Z95.828 S/P ASCENDING AORTIC REPLACEMENT: ICD-10-CM

## 2025-07-14 DIAGNOSIS — Z95.3 S/P AORTIC VALVE REPLACEMENT WITH PORCINE VALVE: ICD-10-CM

## 2025-07-14 DIAGNOSIS — Z95.810 CARDIAC RESYNCHRONIZATION THERAPY DEFIBRILLATOR (CRT-D) IN PLACE: ICD-10-CM

## 2025-07-14 DIAGNOSIS — I44.7 LEFT BUNDLE BRANCH BLOCK: ICD-10-CM

## 2025-07-14 DIAGNOSIS — E78.2 MIXED HYPERLIPIDEMIA: ICD-10-CM

## 2025-07-14 PROCEDURE — RXMED WILLOW AMBULATORY MEDICATION CHARGE

## 2025-07-14 RX ORDER — SACUBITRIL AND VALSARTAN 97; 103 MG/1; MG/1
1 TABLET, FILM COATED ORAL 2 TIMES DAILY
Qty: 180 TABLET | Refills: 3 | Status: SHIPPED | OUTPATIENT
Start: 2025-07-14 | End: 2026-07-14

## 2025-07-15 ENCOUNTER — PHARMACY VISIT (OUTPATIENT)
Dept: PHARMACY | Facility: CLINIC | Age: 70
End: 2025-07-15
Payer: COMMERCIAL

## 2025-07-16 ENCOUNTER — HOSPITAL ENCOUNTER (OUTPATIENT)
Dept: CARDIOLOGY | Facility: CLINIC | Age: 70
Discharge: HOME | End: 2025-07-16
Payer: MEDICARE

## 2025-07-16 DIAGNOSIS — I25.5 ISCHEMIC CARDIOMYOPATHY: ICD-10-CM

## 2025-07-28 ENCOUNTER — HOSPITAL ENCOUNTER (OUTPATIENT)
Dept: CARDIOLOGY | Facility: CLINIC | Age: 70
Discharge: HOME | End: 2025-07-28
Payer: MEDICARE

## 2025-07-28 DIAGNOSIS — Z95.3 S/P AORTIC VALVE REPLACEMENT WITH PORCINE VALVE: ICD-10-CM

## 2025-07-28 DIAGNOSIS — I42.8 NON-ISCHEMIC CARDIOMYOPATHY (MULTI): ICD-10-CM

## 2025-07-28 DIAGNOSIS — I50.20 HFREF (HEART FAILURE WITH REDUCED EJECTION FRACTION): ICD-10-CM

## 2025-07-28 DIAGNOSIS — Z95.810 CARDIAC RESYNCHRONIZATION THERAPY DEFIBRILLATOR (CRT-D) IN PLACE: ICD-10-CM

## 2025-07-28 DIAGNOSIS — E78.2 MIXED HYPERLIPIDEMIA: ICD-10-CM

## 2025-07-28 DIAGNOSIS — I44.7 LEFT BUNDLE BRANCH BLOCK: ICD-10-CM

## 2025-07-28 LAB
EJECTION FRACTION APICAL 4 CHAMBER: 35
EJECTION FRACTION: 33 %
LEFT VENTRICLE INTERNAL DIMENSION DIASTOLE: 6.88 CM (ref 3.5–6)
MITRAL VALVE E/A RATIO: 0.54
MITRAL VALVE E/E' RATIO: 9.15
RIGHT VENTRICLE PEAK SYSTOLIC PRESSURE: 23 MMHG

## 2025-07-28 PROCEDURE — 2500000004 HC RX 250 GENERAL PHARMACY W/ HCPCS (ALT 636 FOR OP/ED): Performed by: STUDENT IN AN ORGANIZED HEALTH CARE EDUCATION/TRAINING PROGRAM

## 2025-07-28 PROCEDURE — 93308 TTE F-UP OR LMTD: CPT | Performed by: STUDENT IN AN ORGANIZED HEALTH CARE EDUCATION/TRAINING PROGRAM

## 2025-07-28 PROCEDURE — C8924 2D TTE W OR W/O FOL W/CON,FU: HCPCS

## 2025-07-28 RX ADMIN — PERFLUTREN 3 ML OF DILUTION: 6.52 INJECTION, SUSPENSION INTRAVENOUS at 11:36

## 2025-08-04 ENCOUNTER — APPOINTMENT (OUTPATIENT)
Dept: CARDIOLOGY | Facility: CLINIC | Age: 70
End: 2025-08-04
Payer: MEDICARE

## 2025-08-18 ENCOUNTER — APPOINTMENT (OUTPATIENT)
Dept: PHARMACY | Facility: HOSPITAL | Age: 70
End: 2025-08-18
Payer: MEDICARE

## 2025-08-18 ENCOUNTER — TELEPHONE (OUTPATIENT)
Dept: CARDIOLOGY | Facility: HOSPITAL | Age: 70
End: 2025-08-18

## 2025-08-18 DIAGNOSIS — I48.0 PAROXYSMAL ATRIAL FIBRILLATION (MULTI): ICD-10-CM

## 2025-08-18 DIAGNOSIS — I50.20 HFREF (HEART FAILURE WITH REDUCED EJECTION FRACTION): ICD-10-CM

## 2025-11-05 ENCOUNTER — APPOINTMENT (OUTPATIENT)
Dept: PHARMACY | Facility: HOSPITAL | Age: 70
End: 2025-11-05
Payer: MEDICARE

## (undated) DEVICE — CATHETER, SUCTION, SAFE-T-VAC VALVE, COIL PACKED, 14 FR

## (undated) DEVICE — ELECTRODE, ELECTROSURGICAL, BLADE, INSULATED, ENT/IMA, STERILE

## (undated) DEVICE — CATHETER, THORACIC, STRAIGHT, ADULT, 28 FR, PVC

## (undated) DEVICE — CANNULA, VESSEL, FREE FLOW, BLUNT TIP, 3 MM X 5 CM

## (undated) DEVICE — CANNULA, RETROGRADE, 14FR X 32CM

## (undated) DEVICE — CONNECTOR, STRAIGHT, 0.5 X 0.375 IN

## (undated) DEVICE — CAUTERY, PENCIL, PUSH BUTTON, SMOKE EVAC, 70MM

## (undated) DEVICE — Device

## (undated) DEVICE — DRESSING, ADHESIVE, ISLAND, TELFA, 2 X 3.75 IN, LF

## (undated) DEVICE — COVER, TABLE, 44 X 75 IN, DISPOSABLE, LF, STERILE

## (undated) DEVICE — DRESSING, MEPILEX, BORDER, SACRUM, 8.7 X 9.8 IN

## (undated) DEVICE — CATHETER, THERMODILUTION, SWAN GANZ, HI-SHORE, COATED, W/GUIDEWIRE, 7 FR, 110 CM

## (undated) DEVICE — DRESSING, MEPILEX, BORDER, HEEL, 8.7 X 9.1 IN

## (undated) DEVICE — TOWEL, OR XRAY, RFID DETECT, WHITE, 17X26, STERILE

## (undated) DEVICE — FILTER, IV, BLOOD, MICROAGGREGATE, 40 MIC, RBC TRANSFUSION

## (undated) DEVICE — SENSOR, OXYGEN, CEREBRAL, SOMASENSOR, ADULT

## (undated) DEVICE — GEL, ULTRASOUND, AQUASONIC 100, 20 GM, STERILE

## (undated) DEVICE — APPLICATOR, CHLORAPREP, W/ORANGE TINT, 26ML

## (undated) DEVICE — TAPE, POLYESTER, BRAIDED, PRE-CUT 2 X 30, WHITE"

## (undated) DEVICE — CLIPPER, SURGICAL BLADE ASSEMBLY, GENERAL PURPOSE, SINGLE USE

## (undated) DEVICE — KNIFE, OPHTHALMIC, SLIT, 22.5 DEG

## (undated) DEVICE — SHEATH, PINNACLE, W/.038 GUIDEWIRE, 10 CM,  6FR INTRODUCER, 6FR DIA, 2.5 CM DIALATOR

## (undated) DEVICE — MARKER, SKIN, RULER AND LABEL PACK, CUSTOM

## (undated) DEVICE — BONE WAX, 3.5G ABSORBABLE, OSTENE

## (undated) DEVICE — CLIP, LIGATING, W/ADHESIVE PAD, MEDIUM, TITANIUM

## (undated) DEVICE — TUBING, SUCTION, CONNECTING, NON-CONDUCTIVE, SURE GRIP CONNECTORS, 3/16 X 18 IN, PVC

## (undated) DEVICE — TUBING PACK, OXYGENATOR, ADULT

## (undated) DEVICE — DRESSING, ADHESIVE, ISLAND, TELFA, 4 X 14 IN

## (undated) DEVICE — TOWEL, OR, XRAY DETECT 5 PK, WHITE, 17X26, W/DMT TAG, ST

## (undated) DEVICE — SYRINGE, 60 CC, IRRIGATION, BULB, CONTRO-BULB, PAPER POUCH

## (undated) DEVICE — TUBING, SMOKE EVAC, 3/8 X 10 FT

## (undated) DEVICE — MICROCOAGULATION TEST, ACT+ TEST CUVETTE

## (undated) DEVICE — CATHETER, ATTAIN COMMAND, 50CM, MULTIPURPOSE, 3MM OD

## (undated) DEVICE — DRAPE, INSTRUMENT, W/POUCH, STERI DRAPE, 7 X 11 IN, DISPOSABLE, STERILE

## (undated) DEVICE — SUTURE, PROLENE, 6-0, 30 IN, RB-2, BLUE

## (undated) DEVICE — SLITTER, ADJUSTABLE

## (undated) DEVICE — TUBE SET, PNEUMOCLEAR, SMOKE EVACU, HIGH-FLOW

## (undated) DEVICE — SUTURE, ETHIBOND, XTRA, 30 IN, 0, CT-1, GREEN

## (undated) DEVICE — BAG, DECANTER

## (undated) DEVICE — SUTURE, VICRYL, 2-0, 27 IN, CT-1, VIOLET

## (undated) DEVICE — LOADS, COR-KNOT (6PK)

## (undated) DEVICE — INTRODUCER SYSTEM, PRELUDE SNAP, SPLITTABLE, HEMOSTATIC, 7FR

## (undated) DEVICE — PACING CABLE, EXTENSION, 12 FT BLUE, DISPOSABLE

## (undated) DEVICE — CATHETER, EXPO, AMPLATZ, AR2, 6 FR (5 BX)

## (undated) DEVICE — KIT, COR-KNOT MINI COMBO

## (undated) DEVICE — SPONGE, GAUZE, XRAY DECT, 16 PLY, 4 X 4, W/MASTER DMT,STERILE

## (undated) DEVICE — SYRINGE, LUER LOCK, 12ML

## (undated) DEVICE — ANTIFOG, SOLUTION, FOG-OUT

## (undated) DEVICE — ELECTRODE, ELECTROSURGICAL, BLADE EXT 4 INCH, INSULATED

## (undated) DEVICE — CLIP, SPRING, BULLDOG, FOGARTY, SOFT JAW, 6 MM, LF

## (undated) DEVICE — CABLE, SURGICAL, SM CLIP

## (undated) DEVICE — SPONGE GAUZE, XRAY SC+RFID, 4X4 16 PLY, STERILE

## (undated) DEVICE — GUIDEWIRE, INQWIRE, 3MM J, .035, 150

## (undated) DEVICE — DRAPE, SHEET, CARDIOVASCULAR, ANTIMICROBIAL, W/ANESTHESIA SCREEN, IOBAN 2, STERI DRAPE, 107 X 133 IN, DISPOSABLE, FABRIC, BLUE, STERILE

## (undated) DEVICE — TUBING, SUCTION, CONNECTING, STERILE 0.25 X 120 IN., LF

## (undated) DEVICE — CANNULA, AVID DUAL STAGE VENOUS DRAINAGE

## (undated) DEVICE — CLIP, LIGATING, W/ADHESIVE, WIDE SLOT, SMALL, TITANIUM

## (undated) DEVICE — MARKER, SKIN, DUAL TIP INK W/9 LABEL AND REMOVABLE TIME OUT SLEEVE

## (undated) DEVICE — COVER, CART, 45 X 27 X 48 IN, CLEAR

## (undated) DEVICE — CLOSURE DEVICE, VASCULAR, ANGIO-SEAL, VIP, 6FR, LF

## (undated) DEVICE — PUNCH, AORTIC 4MM

## (undated) DEVICE — BANDAGE, ELASTIC, ACE, ACE, DOUBLE LENGTH, 6 X 550 IN, LF

## (undated) DEVICE — CATHETER, ANGIO, EXPO, WRP, 6 FR X 100 CM

## (undated) DEVICE — GUIDEWIRE, J TIP, 3 MM, 0.035 IN X 150 CM, PTFE

## (undated) DEVICE — DEVICE, ENDOSCOPIC VESSEL HARVESTING, SAPHENA VENAPAX

## (undated) DEVICE — SUTURE, PROLENE 4-0, TAPER POINT, SH-1 BLUE 30 INCH

## (undated) DEVICE — PACING WIRE, 1/2 CIRCLE, 26MM NEEDLE, WHITE

## (undated) DEVICE — SUTURE, SURGICAL STEEL, STERNUM 7, 18 IN, KV40, SINGL-WIRE

## (undated) DEVICE — LEAD, PACING, MYOCARDIAL, BIPOLAR, TEMPORARY

## (undated) DEVICE — SYSTEM KIT, PREVENA PLUS

## (undated) DEVICE — SUTURE, PROLENE, 7-0, 30 IN, BV1, DA, BLUE

## (undated) DEVICE — SPONGE, HEMOSTAT, SURGICEL FIBRILLAR, ABS, 4 X 4, LF

## (undated) DEVICE — SUTURE, SILK, 5-0, 18 IN TF, BLACK

## (undated) DEVICE — DRAPE, SHEET, UTILITY, NON ABSORBENT, 18 X 26 IN, LF

## (undated) DEVICE — OXYGENATOR FX 15, W/HR, ARTERIAL FILTER

## (undated) DEVICE — GUIDEWIRE, SION BLUE PTCA, 0.041 X 190CM, STRT

## (undated) DEVICE — CONNECTOR, STRAIGHT, 0.375 X 0.375 IN

## (undated) DEVICE — BLADE, SAW STERNUM, STERILE

## (undated) DEVICE — TRAY, SURESTEP, URINE METER, 14FR, SILICONE

## (undated) DEVICE — PAD, ELECTRODE DEFIB PADPRO ADULT STRL W/ADAPTER

## (undated) DEVICE — MANIFOLD, 4 PORT NEPTUNE STANDARD

## (undated) DEVICE — CATHETER, DRAINAGE, NASOGASTRIC, DOUBLE LUMEN, FUNNEL END, SUMP, SALEM, 18 FR, 48 IN, PVC, STERILE

## (undated) DEVICE — KIT, CELL SAVER, W/COLLECTION SET, 225ML WASH SET

## (undated) DEVICE — PLEDGET, PTFE, SOFT, LARGE, 3/8 X 3/16 X 1/16 IN

## (undated) DEVICE — KIT, BLOWER / MISTER II

## (undated) DEVICE — TUBE SET, PNEUMOLAR HEATED, SMOKE EVACU, HIGH-FLOW

## (undated) DEVICE — MICROINTRODUCER KIT, VSI, 4FR X 40CM, STIFFEN

## (undated) DEVICE — KIT, TOURNIQUET, 7"

## (undated) DEVICE — SHEATH, PINNACLE, W/O GUIDEWIRE, 25 CM,  6FR INTRODUCER, 5FR DIA, 2.5 CM DIALATOR

## (undated) DEVICE — DRESSING, ISLAND, TELFA, 4 X 5 IN

## (undated) DEVICE — TOWEL, SURGICAL, NEURO, O/R, 16 X 26, BLUE, STERILE

## (undated) DEVICE — PACING CABLE, EXTENSION, 12 FT BEIGE, DISPOSABLE

## (undated) DEVICE — SUTURE, PROLENE, 5-0, 36 IN, RB1, DA, BLUE

## (undated) DEVICE — BONE WAX, 2.5G ABSORBABLE, OSTENE

## (undated) DEVICE — SUTURE, PROLENE, 3-0, 36 IN, SH, DA, BLUE

## (undated) DEVICE — GOWN, SURGICAL, SMARTGOWN, XLARGE, STERILE

## (undated) DEVICE — CLEANER, ELECTROSURGICAL, TIP, 5 X 5 CM, LF

## (undated) DEVICE — CASSETTE, BLOOD, PLEGIC SET

## (undated) DEVICE — PENCIL, ELECTROSURG BUTTON SWITCH

## (undated) DEVICE — ENVELOPE, ANTIBACTERIAL, AIGIS RX TYRX, ABSORBABLE, LRG

## (undated) DEVICE — SUTURE, PROLENE, 4-0, 36 IN, RB1, DA, BLUE

## (undated) DEVICE — MONITORING KIT, TRANSDUCER, RETROGRADE, MPS, W/EXTENSION, LF

## (undated) DEVICE — INTRODUCER SYSTEM, PRELUDE SNAP, SPLITTABLE, 9 FR X 13 CM

## (undated) DEVICE — PACK, ANGIO P2, CUSTOM, LAKE

## (undated) DEVICE — DRAPE, SHEET, FAN FOLDED, HALF, 44 X 58 IN, DISPOSABLE, LF, STERILE

## (undated) DEVICE — CATHETER, EXPO, MODEL-D, 6FR FL5

## (undated) DEVICE — YANKAUER, RIGID, STRAIGHT, OPEN TIP, NO VENT

## (undated) DEVICE — PITCHER, GRADUATE, 32 OZ (1200CC), STERILE

## (undated) DEVICE — KIT, NAMIC STANDARD LEFT HEART, CUSTOM, LWMC

## (undated) DEVICE — SPONGE, LAP, XRAY DECT, 18IN X 18IN, W/LOOP, STERILE

## (undated) DEVICE — MAYO TRAY, SMALL

## (undated) DEVICE — SYRINGE, 20 CC, LUER LOCK, MONOJECT, W/O CAP, LF